# Patient Record
Sex: FEMALE | Race: WHITE | NOT HISPANIC OR LATINO | URBAN - METROPOLITAN AREA
[De-identification: names, ages, dates, MRNs, and addresses within clinical notes are randomized per-mention and may not be internally consistent; named-entity substitution may affect disease eponyms.]

---

## 2017-12-22 ENCOUNTER — MEDICAL CORRESPONDENCE (OUTPATIENT)
Dept: HEALTH INFORMATION MANAGEMENT | Facility: CLINIC | Age: 47
End: 2017-12-22

## 2018-01-16 ENCOUNTER — OFFICE VISIT (OUTPATIENT)
Dept: PSYCHIATRY | Facility: CLINIC | Age: 48
End: 2018-01-16
Payer: COMMERCIAL

## 2018-01-16 VITALS
HEIGHT: 62 IN | DIASTOLIC BLOOD PRESSURE: 87 MMHG | BODY MASS INDEX: 37.43 KG/M2 | WEIGHT: 203.4 LBS | RESPIRATION RATE: 16 BRPM | HEART RATE: 90 BPM | SYSTOLIC BLOOD PRESSURE: 149 MMHG

## 2018-01-16 DIAGNOSIS — F33.2 SEVERE EPISODE OF RECURRENT MAJOR DEPRESSIVE DISORDER, WITHOUT PSYCHOTIC FEATURES (H): Primary | ICD-10-CM

## 2018-01-16 ASSESSMENT — PATIENT HEALTH QUESTIONNAIRE - PHQ9: SUM OF ALL RESPONSES TO PHQ QUESTIONS 1-9: 23

## 2018-01-16 ASSESSMENT — PAIN SCALES - GENERAL: PAINLEVEL: NO PAIN (0)
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
## 2018-01-16 NOTE — MR AVS SNAPSHOT
"              After Visit Summary   2018    Aure Joy    MRN: [id number]           Patient Information     Date Of Birth          1970        Visit Information        Provider Department      2018 1:30 PM Evelyn Zamudio MD [facility]         Follow-ups after your visit        Who to contact     Please call your clinic at [phone] to:    Ask questions about your health    Make or cancel appointments    Discuss your medicines    Learn about your test results    Speak to your doctor   If you have compliments or concerns about an experience at your clinic, or if you wish to file a complaint, please contact [facility] Patient Relations at [phone] or email us at [email or web address]         Additional Information About Your Visit        MyChart Information     Partender is an electronic gateway that provides easy, online access to your medical records. With Partender, you can request a clinic appointment, read your test results, renew a prescription or communicate with your care team.     To sign up for reMailt visit the website at [email or web address]   You will be asked to enter the access code listed below, as well as some personal information. Please follow the directions to create your username and password.     Your access code is: [email or web address]  Expires: 2018  4:15 PM     Your access code will  in 90 days. If you need help or a new code, please contact your [facility] Clinic or call [phone] for assistance.        Care EveryWhere ID     This is your Care EveryWhere ID. This could be used by other organizations to access your [facility] medical records  [id number]        Your Vitals Were     Pulse Respirations Height BMI (Body Mass Index)          90 16 5' 2\" (157.5 cm) 37.2 kg/m2         Blood Pressure from Last 3 Encounters:   18 149/87    Weight from Last 3 Encounters:   18 " 203 lb 6.4 oz (92.3 kg)              Today, you had the following     No orders found for display       Primary Care Provider Office Phone # Fax #    Stephy Valentin [phone]       [facility] [address] [place] [address]        Equal Access to Services     [facility] Center[facility] : Hadii aad ku hadasho Soomaali, waaxda luqadaha, qaybta kaalmada adeegyada, waxay idiin hayaan adeeg kharash la'aan ah. So [facility] [phone].    ATENCIÓN: Si habla español, tiene a chamberlain disposición servicios gratuitos de asistencia lingüística. Llame al [phone].    We comply with applicable federal civil rights laws and [place] laws. We do not discriminate on the basis of race, color, national origin, age, disability, sex, sexual orientation, or gender identity.            Thank you!     Thank you for choosing [facility]  for your care. Our goal is always to provide you with excellent care. Hearing back from our patients is one way we can continue to improve our services. Please take a few minutes to complete the written survey that you may receive in the mail after your visit with us. Thank you!             Your Updated Medication List - Protect others around you: Learn how to safely use, store and throw away your medicines at www.disposemymeds.org.      Notice  As of 1/16/2018  4:15 PM    You have not been prescribed any medications.

## 2018-01-29 ENCOUNTER — OFFICE VISIT (OUTPATIENT)
Dept: PSYCHIATRY | Facility: CLINIC | Age: 48
End: 2018-01-29
Payer: COMMERCIAL

## 2018-01-29 VITALS
SYSTOLIC BLOOD PRESSURE: 127 MMHG | WEIGHT: 204.6 LBS | BODY MASS INDEX: 37.65 KG/M2 | HEART RATE: 86 BPM | TEMPERATURE: 98.6 F | RESPIRATION RATE: 16 BRPM | DIASTOLIC BLOOD PRESSURE: 76 MMHG | HEIGHT: 62 IN

## 2018-01-29 DIAGNOSIS — F33.2 SEVERE EPISODE OF RECURRENT MAJOR DEPRESSIVE DISORDER, WITHOUT PSYCHOTIC FEATURES (H): Primary | ICD-10-CM

## 2018-01-29 ASSESSMENT — PAIN SCALES - GENERAL: PAINLEVEL: MILD PAIN (3)

## 2018-01-29 NOTE — PROGRESS NOTES
" [facility] TMS [facility]  [address], [address]  [place] [address]  TMS Procedure Note   Aure Joy MRN# [id number]  Age: 47 year old year old YOB: 1970    Pre-Procedure:  History and Physical: Reviewed in medical record  Consent Signed by: Aure Joy  On: 01/29/18  Reviewed possible side effects associated with treatment as well as questions regarding possible course of treatments. Pt agreed to procedure and was able to reflect implications.    Clinical Narrative:  Pt endorses significant symptoms of depression including depressed mood, anhedonia, increased baseline anxiety, concentration difficulties, fatigue, amotivation, irrational guilt, self-derogatory thoughts, and passive suicidal ideation.    Indications for TMS:  MDD, recurrent, severe; 4+ medication trials (from 2+ classes) ineffective; Psychotherapy ineffective.     Pre-Procedure Diagnosis:  MDD, recurrent, severe 296.33    Treatment Hx:  Treatment number this series: 1  Total lifetime treatment number: 1    Allergies   Allergen Reactions     Codeine Nausea     Other  [No Clinical Screening - See Comments] Nausea and Vomiting and Other (See Comments)     general anesthesia- uncontrolled vomitting      /76 (BP Location: Right arm, Patient Position: Chair, Cuff Size: Adult Regular)  Pulse 86  Temp 98.6  F (37  C)  Resp 16  Ht 5' 1.81\" (157 cm)  Wt 204 lb 9.6 oz (92.8 kg)  BMI 37.65 kg/m2    Pause for the Cause  Right patient:  Yes  Right procedure/correct coil:  Yes; rTMS; cpt 58275; H1 coil.   Earplugs in place:  Yes    Procedure  Patient was seated in procedure chair.  Identity and procedure was verified.  Ear plugs were placed in ears and patient-specific cap was placed on head and tightened appropriately.  Ruler locations were verified.  Bone conducting headphones were not used. Coil was placed at 0 - eyebrow - 14 and stimulator was set to 69% (100% of MT). Pt was unable to tolerate initial train " secondary to pain over treatment site. Stimulator energy was decreased to 66% and then 64%. Pt was able to tolerate stimulator energy of 64% so 53 additional trains were delivered at that energy.  Pt tolerated procedure well with some minor facial movement but no other motor movement.    Motor Threshold Determination  Distance from nasion to inion: 35.5  MT 1: 0 - 6 - 16 @ 69% on 1/29/2018    Stimulation Parameters  Frequency: 18 Hz     Train duration: 2 sec  Total pulses delivered: 1980  Inter-train interval: 20 sec  Tx Loc: 0 - eyebrows  - 14  Energy: 64% (93% MT)  Trains: 55 trains    Psychiatric Examination  Appearance:  awake, alert, adequately groomed and appeared as age stated  Attitude:  cooperative  Eye Contact:  good  Mood:  anxious and depressed  Affect:  mood congruent, intensity is blunted, constricted mobility, restricted range and reactive  Speech:  clear, coherent and normal prosody  Psychomotor Behavior:  no evidence of tardive dyskinesia, dystonia, or tics and intact station, gait and muscle tone  Thought Process:  logical, linear and goal oriented  Associations:  no loose associations  Thought Content:  no evidence of psychotic thought and passive suicidal ideation present  Insight:  good  Judgment:  intact  Oriented to:  time, person, and place  Attention Span and Concentration:  intact  Recent and Remote Memory:  intact  Language: Able to name objects, Able to repeat phrases and Able to read and write  Fund of Knowledge: appropriate  Muscle Strength and Tone: normal  Gait and Station: Normal    Post-Procedure Diagnosis:  MDD, recurrent, severe 296.33    Plan   - Cont TMS  - Increase energy as tolerated     Evelyn Zamudio MD  [facility] Neuromodulation

## 2018-01-29 NOTE — MR AVS SNAPSHOT
After Visit Summary   1/29/2018    Aure Joy    MRN: [id number]           Patient Information     Date Of Birth          1970        Visit Information        Provider Department      1/29/2018 2:15 PM ME [facility] PROCEDURE ROOM [facility]        Today's Diagnoses     Severe episode of recurrent major depressive disorder, without psychotic features (H)    -  1       Follow-ups after your visit        Your next 10 appointments already scheduled     Jan 30, 2018  3:15 PM CST   TMS TREATMENT with ME UMP PROCEDURE ROOM   [facility] ([facility])    [address]  [facility] [address]   [phone]            Jan 31, 2018  3:15 PM CST   TMS TREATMENT with ME UMP PROCEDURE ROOM   [facility] ([facility])    [address]  [facility] [address]   [phone]            Feb 01, 2018  3:15 PM CST   TMS TREATMENT with ME UMP PROCEDURE ROOM   P Psychiatry ([facility])    [address]  [facility] [address]   [phone]            Feb 02, 2018  3:15 PM CST   TMS TREATMENT with ME UMP PROCEDURE ROOM   [facility] ([facility])    [address]  [facility] [address]   [phone]            Feb 05, 2018  3:15 PM CST   TMS TREATMENT with ME UMP PROCEDURE ROOM   [facility] ([facility])    [address]  [facility] [address]   [phone]            Feb 06, 2018  3:15 PM CST   TMS TREATMENT with ME UMP PROCEDURE ROOM   [facility] ([facility])    [address]  [facility] [address]   [phone]            Feb 07, 2018  3:15 PM CST   TMS TREATMENT with ME UMP PROCEDURE ROOM   P Psychiat[facility] ([facility])    [address]  [facility] [address]   [phone]            Feb 08, 2018  3:15 PM CST   TMS TREATMENT with ME UMP PROCEDURE ROOM   [facility]  Psychiatry ([facility])    [address]  [facility] [address]   [phone]            2018  3:15 PM CST   TMS TREATMENT with ME P PROCEDURE ROOM   [facility])    [address]  [facility] [address]   [phone]            2018  3:15 PM CST   TMS TREATMENT with ME P PROCEDURE ROOM   [facility])    [address]  [facility] [address]   [phone]              Who to contact     Please call your clinic at [phone] to:    Ask questions about your health    Make or cancel appointments    Discuss your medicines    Learn about your test results    Speak to your doctor   If you have compliments or concerns about an experience at your clinic, or if you wish to file a complaint, please contact [facility] Patient Relations at [phone] or email us at [email or web address]         Additional Information About Your Visit        [employer] Information     [employer] is an electronic gateway that provides easy, online access to your medical records. With [employer], you can request a clinic appointment, read your test results, renew a prescription or communicate with your care team.     To sign up for [employer] visit the website at [email or web address]   You will be asked to enter the access code listed below, as well as some personal information. Please follow the directions to create your username and password.     Your access code is: [email or web address]  Expires: 2018  4:15 PM     Your access code will  in 90 days. If you need help or a new code, please contact your [facility] or call [phone] for assistance.        Care EveryWhere ID     This is your Care EveryWhere ID. This could be used by other organizations to access your [place] medical records  [id number]        Your Vitals Were  "    Pulse Temperature Respirations Height BMI (Body Mass Index)       86 98.6  F (37  C) 16 5' 1.81\" (157 cm) 37.65 kg/m2        Blood Pressure from Last 3 Encounters:   01/29/18 127/76   01/16/18 149/87    Weight from Last 3 Encounters:   01/29/18 204 lb 9.6 oz (92.8 kg)   01/16/18 203 lb 6.4 oz (92.3 kg)              We Performed the Following     C TRANSCRANIAL MAGNETIC STIMULATION TREATMENT,PLANNING        Primary Care Provider Office Phone # Fax #    Stephy Valentin [phone]       [facility] [address]   [facility] [address]        Equal Access to Services     HERMANN WASHBURN : Hadii derrell christineo Sodev, waaxda luqadaha, qaybta kaalmada adeegyada, yordy elise. So [facility] [phone].    ATENCIÓN: Si habla español, tiene a chamberlain disposición servicios gratuitos de asistencia lingüística. Llame al [phone].    We comply with applicable federal civil rights laws and [place] laws. We do not discriminate on the basis of race, color, national origin, age, disability, sex, sexual orientation, or gender identity.            Thank you!     Thank you for choosing [facility]  for your care. Our goal is always to provide you with excellent care. Hearing back from our patients is one way we can continue to improve our services. Please take a few minutes to complete the written survey that you may receive in the mail after your visit with us. Thank you!             Your Updated Medication List - Protect others around you: Learn how to safely use, store and throw away your medicines at www.disposemymeds.org.      Notice  As of 1/29/2018  4:37 PM    You have not been prescribed any medications.      "

## 2018-01-30 ENCOUNTER — OFFICE VISIT (OUTPATIENT)
Dept: PSYCHIATRY | Facility: CLINIC | Age: 48
End: 2018-01-30
Payer: COMMERCIAL

## 2018-01-30 VITALS — SYSTOLIC BLOOD PRESSURE: 140 MMHG | DIASTOLIC BLOOD PRESSURE: 81 MMHG | HEART RATE: 94 BPM

## 2018-01-30 DIAGNOSIS — F33.2 SEVERE EPISODE OF RECURRENT MAJOR DEPRESSIVE DISORDER, WITHOUT PSYCHOTIC FEATURES (H): Primary | ICD-10-CM

## 2018-01-30 ASSESSMENT — PATIENT HEALTH QUESTIONNAIRE - PHQ9: SUM OF ALL RESPONSES TO PHQ QUESTIONS 1-9: 22

## 2018-01-30 NOTE — PROGRESS NOTES
[facility] TMS Program  [address]  [place] [address]  TMS Procedure Note   Aure Joy MRN# [id number]  Age: 47 year old year old YOB: 1970    Pre-Procedure:  History and Physical: Reviewed in medical record  Consent Signed by: Aure Joy  On: 01/29/18    Clinical Narrative:  Patient tolerated yesterdays treatment well.     Indications for TMS:  MDD, recurrent, severe; 4+ medication trials (from 2+ classes) ineffective; Psychotherapy ineffective.     Pre-Procedure Diagnosis:  MDD, recurrent, severe 296.33    Treatment Hx:  Treatment number this series: 2  Total lifetime treatment number: 2    Allergies   Allergen Reactions     Codeine Nausea     Other  [No Clinical Screening - See Comments] Nausea and Vomiting and Other (See Comments)     general anesthesia- uncontrolled vomitting      /81 (BP Location: Right arm, Patient Position: Chair, Cuff Size: Adult Regular)  Pulse 94    Pause for the Cause  Right patient:  Yes  Right procedure/correct coil:  Yes; rTMS; cpt 97666; H1 coil.   Earplugs in place:  Yes    Procedure  Patient was seated in procedure chair. Identity and procedure was verified. Ear plugs were placed in ears and patient-specific cap was placed on head and tightened appropriately. Ruler locations were verified. Coil was placed at treatment location and stimulator was set to parameters described below. A test train was delivered and pt tolerated train. Given pt tolerance, 55 treatment trains were delivered. Pt tolerated procedure well with some minor facial movement but no other motor movement.    Motor Threshold Determination  Distance from nasion to inion: 35.5  MT 1: 0 - 6 - 16 @ 69% on 1/29/2018    Stimulation Parameters  Frequency: 18 Hz     Train duration: 2 sec  Total pulses delivered: 1980  Inter-train interval: 20 sec  Tx Loc: 0 - eyebrows  - 14  Energy: 68% (98% MT)  Trains: 55 trains    Psychiatric Examination  Appearance:  awake, alert,  adequately groomed and appeared as age stated  Attitude:  cooperative  Eye Contact:  good  Mood:  anxious and depressed  Affect:  mood congruent, intensity is blunted, constricted mobility, restricted range and reactive  Speech:  clear, coherent and normal prosody  Psychomotor Behavior:  no evidence of tardive dyskinesia, dystonia, or tics and intact station, gait and muscle tone  Thought Process:  logical, linear and goal oriented  Associations:  no loose associations  Thought Content:  no evidence of psychotic thought and passive suicidal ideation present  Insight:  good  Judgment:  intact  Oriented to:  time, person, and place  Attention Span and Concentration:  intact  Recent and Remote Memory:  intact  Language: Able to name objects, Able to repeat phrases and Able to read and write  Fund of Knowledge: appropriate  Muscle Strength and Tone: normal  Gait and Station: Normal    Post-Procedure Diagnosis:  MDD, recurrent, severe 296.33    Pilo Almeida  [facility]    Plan   - Cont TMS  - Increase energy as tolerated       I didn t see the patient during/after treatment but remained available in the clinic during  treatment.    Evelyn Zamudio MD  [facility]      Evelyn Zamudio MD  [facility]

## 2018-01-30 NOTE — MR AVS SNAPSHOT
After Visit Summary   1/30/2018    Aure Joy    MRN: [id number]           Patient Information     Date Of Birth          1970        Visit Information        Provider Department      1/30/2018 3:15 PM ME UMP PROCEDURE ROOM [facility] Psychiatry        Today's Diagnoses     Severe episode of recurrent major depressive disorder, without psychotic features (H)    -  1       Follow-ups after your visit        Your next 10 appointments already scheduled     Jan 31, 2018  3:15 PM CST   TMS TREATMENT with ME UMP PROCEDURE ROOM   [facility] Psychiatry ([facility])    [address]  [facility] [address]   [phone]            Feb 01, 2018  3:15 PM CST   TMS TREATMENT with ME UMP PROCEDURE ROOM   [facility] ([facility])    [address]  [facility] [address]   [phone]            Feb 02, 2018  3:15 PM CST   TMS TREATMENT with ME UMP PROCEDURE ROOM   [facility] ([facility])    [address]  [facility] [address]   [phone]            Feb 05, 2018  3:15 PM CST   TMS TREATMENT with ME UMP PROCEDURE ROOM   [facility] ([facility])    [address]  [facility] [address]   [phone]            Feb 06, 2018  3:15 PM CST   TMS TREATMENT with ME UMP PROCEDURE ROOM   [facility] ([facility])    [address]  [facility] [address]   [phone]            Feb 07, 2018  3:15 PM CST   TMS TREATMENT with ME UMP PROCEDURE ROOM   [facility] ([facility])    [address]  [facility] [address]   [phone]            Feb 08, 2018  3:15 PM CST   TMS TREATMENT with ME UMP PROCEDURE ROOM   [facility] ([facility])    [address]  [facility] [address]   [phone]            Feb 09, 2018  3:15 PM CST   TMS TREATMENT with ME UMP PROCEDURE ROOM   [facility]  Psychiatry ([facility])    [address]  [facility] [address]   [phone]            2018  3:15 PM CST   TMS TREATMENT with ME P PROCEDURE ROOM   [facility] Psychiatry [facility])    [address]  [facility] [address]   [phone]            2018  3:15 PM CST   TMS TREATMENT with ME P PROCEDURE ROOM   [facility])    [address]  [facility] [address]   [phone]              Who to contact     Please call your clinic at [phone] to:    Ask questions about your health    Make or cancel appointments    Discuss your medicines    Learn about your test results    Speak to your doctor   If you have compliments or concerns about an experience at your clinic, or if you wish to file a complaint, please contact [facility] Patient Relations at [phone] or email us at [email or web address]         Additional Information About Your Visit        [employer] Information     [employer] is an electronic gateway that provides easy, online access to your medical records. With [employer], you can request a clinic appointment, read your test results, renew a prescription or communicate with your care team.     To sign up for [employer] visit the website at [email or web address]   You will be asked to enter the access code listed below, as well as some personal information. Please follow the directions to create your username and password.     Your access code is: [email or web address]  Expires: 2018  4:15 PM     Your access code will  in 90 days. If you need help or a new code, please contact your [facility] or call [phone] for assistance.        Care EveryWhere ID     This is your Care EveryWhere ID. This could be used by other organizations to access your [place] medical records  [id number]        Your Vitals Were      Pulse                   94            Blood Pressure from Last 3 Encounters:   01/30/18 140/81   01/29/18 127/76   01/16/18 149/87    Weight from Last 3 Encounters:   01/29/18 92.8 kg (204 lb 9.6 oz)   01/16/18 92.3 kg (203 lb 6.4 oz)              We Performed the Following     C TRANSCRANIAL MAGNETIC STIMULATION TREATMENT,DELIVERY/MANAGEMENT        Primary Care Provider Office Phone # Fax #    Stephy Valentin [phone]       [facility] [address]   [facility] [address]        Equal Access to Services     [facility]: Hadii aad ku hadasho Soomaali, waaxda luqadaha, qaybta kaalmada ademaribeth, yordy araujo . So [facility] [phone].    ATENCIÓN: Si habla español, tiene a chamberlain disposición servicios gratuitos de asistencia lingüística. [facility] [phone].    We comply with applicable federal civil rights laws and [place] laws. We do not discriminate on the basis of race, color, national origin, age, disability, sex, sexual orientation, or gender identity.            Thank you!     Thank you for choosing [facility]  for your care. Our goal is always to provide you with excellent care. Hearing back from our patients is one way we can continue to improve our services. Please take a few minutes to complete the written survey that you may receive in the mail after your visit with us. Thank you!             Your Updated Medication List - Protect others around you: Learn how to safely use, store and throw away your medicines at www.disposemymeds.org.      Notice  As of 1/30/2018  5:03 PM    You have not been prescribed any medications.

## 2018-01-31 ENCOUNTER — OFFICE VISIT (OUTPATIENT)
Dept: PSYCHIATRY | Facility: CLINIC | Age: 48
End: 2018-01-31
Payer: COMMERCIAL

## 2018-01-31 VITALS
DIASTOLIC BLOOD PRESSURE: 93 MMHG | HEART RATE: 99 BPM | BODY MASS INDEX: 37.54 KG/M2 | WEIGHT: 204 LBS | HEIGHT: 62 IN | SYSTOLIC BLOOD PRESSURE: 136 MMHG

## 2018-01-31 DIAGNOSIS — F33.2 SEVERE EPISODE OF RECURRENT MAJOR DEPRESSIVE DISORDER, WITHOUT PSYCHOTIC FEATURES (H): Primary | ICD-10-CM

## 2018-01-31 ASSESSMENT — PATIENT HEALTH QUESTIONNAIRE - PHQ9: SUM OF ALL RESPONSES TO PHQ QUESTIONS 1-9: 22

## 2018-01-31 ASSESSMENT — PAIN SCALES - GENERAL: PAINLEVEL: NO PAIN (0)

## 2018-01-31 NOTE — MR AVS SNAPSHOT
After Visit Summary   1/31/2018    Aure Joy    MRN: [id number]           Patient Information     Date Of Birth          1970        Visit Information        Provider Department      1/31/2018 3:15 PM ME UMP PROCEDURE ROOM [facility] Psychiatry        Today's Diagnoses     Severe episode of recurrent major depressive disorder, without psychotic features (H)    -  1       Follow-ups after your visit        Your next 10 appointments already scheduled     Feb 01, 2018  3:15 PM CST   TMS TREATMENT with ME UMP PROCEDURE ROOM   [facility] Psychiatry ([facility])    [address]  [facility] [address]   [phone]            Feb 02, 2018  3:15 PM CST   TMS TREATMENT with ME UMP PROCEDURE ROOM   [facility] Psychiatry ([facility])    [address]  [facility] [address]   [phone]            Feb 05, 2018  3:15 PM CST   TMS TREATMENT with ME UMP PROCEDURE ROOM   [facility] Psychiatry ([facility])    [address]  [facility] [address]   [phone]            Feb 06, 2018  3:15 PM CST   TMS TREATMENT with ME UMP PROCEDURE ROOM   [facility] Psychiatry ([facility])    [address]  [facility] [address]   [phone]            Feb 07, 2018  3:15 PM CST   TMS TREATMENT with ME UMP PROCEDURE ROOM   [facility] Psych[facility] ([facility])    [address]  [facility] [address]   [phone]            Feb 08, 2018  3:15 PM CST   TMS TREATMENT with ME UMP PROCEDURE ROOM   [facility] ([facility])    [address]  [facility] [address]   [phone]            Feb 09, 2018  3:15 PM CST   TMS TREATMENT with ME UMP PROCEDURE ROOM   [facility] ([facility])    [address]  [facility] [address]   [phone]            Feb 12, 2018  3:15 PM CST   TMS TREATMENT with ME UMP PROCEDURE ROOM   [facility]  Psychiatry ([facility])    [address]  [facility] [address]   [phone]            2018  3:15 PM CST   TMS TREATMENT with ME P PROCEDURE ROOM   [facility])    [address]  [facility] [address]   [phone]            2018  3:15 PM CST   TMS TREATMENT with ME P PROCEDURE ROOM   [facility] Psychiat[facility])    [address]  [facility] [address]   [phone]              Who to contact     Please call your clinic at [phone] to:    Ask questions about your health    Make or cancel appointments    Discuss your medicines    Learn about your test results    Speak to your doctor   If you have compliments or concerns about an experience at your clinic, or if you wish to file a complaint, please contact [facility] Patient Relations at [phone] or email us at [email or web address]         Additional Information About Your Visit        Book[employer] Information     [employer] is an electronic gateway that provides easy, online access to your medical records. With [employer], you can request a clinic appointment, read your test results, renew a prescription or communicate with your care team.     To sign up for [employer] visit the website at [email or web address]   You will be asked to enter the access code listed below, as well as some personal information. Please follow the directions to create your username and password.     Your access code is: [email or web address]  Expires: 2018  4:15 PM     Your access code will  in 90 days. If you need help or a new code, please contact your [facility] or call [phone] for assistance.        Care EveryWhere ID     This is your Care EveryWhere ID. This could be used by other organizations to access your [place] medical records  [id number]        Your Vitals Were  "    Pulse Height Breastfeeding? BMI (Body Mass Index)          99 5' 1.81\" (157 cm) No 37.54 kg/m2         Blood Pressure from Last 3 Encounters:   01/31/18 (!) 136/93   01/30/18 140/81   01/29/18 127/76    Weight from Last 3 Encounters:   01/31/18 204 lb (92.5 kg)   01/29/18 204 lb 9.6 oz (92.8 kg)   01/16/18 203 lb 6.4 oz (92.3 kg)              We Performed the Following     C TRANSCRANIAL MAGNETIC STIMULATION TREATMENT,DELIVERY/MANAGEMENT        Primary Care Provider Office Phone # Fax #    Stephy Valentin [phone]       [facility] [address]   [facility] [address]        Equal Access to Services     HERMANN WASHBURN : González christineo Sodev, waaxda luqadaha, qaybta kaalmada adeegyada, yordy tejadain haydaniel araujo . So [facility] [phone].    ATENCIÓN: Si habla español, tiene a chamberlain disposición servicios gratuitos de asistencia lingüística. Llame al [phone].    We comply with applicable federal civil rights laws and [place] laws. We do not discriminate on the basis of race, color, national origin, age, disability, sex, sexual orientation, or gender identity.            Thank you!     Thank you for choosing [facility] PSYCHIATRY  for your care. Our goal is always to provide you with excellent care. Hearing back from our patients is one way we can continue to improve our services. Please take a few minutes to complete the written survey that you may receive in the mail after your visit with us. Thank you!             Your Updated Medication List - Protect others around you: Learn how to safely use, store and throw away your medicines at www.disposemymeds.org.      Notice  As of 1/31/2018  4:25 PM    You have not been prescribed any medications.      "

## 2018-01-31 NOTE — PROGRESS NOTES
[facility] TMS Program  [address], [address]  [place] [address]  TMS Procedure Note   Aure Joy MRN# [id number]  Age: 47 year old year old YOB: 1970    Pre-Procedure:  History and Physical: Reviewed in medical record  Consent Signed by: Aure Joy  On: 01/29/18    Clinical Narrative:  Patient tolerating treatment well with no side effects.     Indications for TMS:  MDD, recurrent, severe; 4+ medication trials (from 2+ classes) ineffective; Psychotherapy ineffective.     Pre-Procedure Diagnosis:  MDD, recurrent, severe 296.33    Treatment Hx:  Treatment number this series: 3  Total lifetime treatment number: 3    Allergies   Allergen Reactions     Codeine Nausea     Other  [No Clinical Screening - See Comments] Nausea and Vomiting and Other (See Comments)     general anesthesia- uncontrolled vomitting      There were no vitals taken for this visit.    Pause for the Cause  Right patient:  Yes  Right procedure/correct coil:  Yes; rTMS; cpt 11699; H1 coil.   Earplugs in place:  Yes    Procedure  Patient was seated in procedure chair. Identity and procedure was verified. Ear plugs were placed in ears and patient-specific cap was placed on head and tightened appropriately. Ruler locations were verified. Coil was placed at treatment location and stimulator was set to parameters described below. A test train was delivered and pt tolerated train. Given pt tolerance, 55 treatment trains were delivered. Pt tolerated procedure well with some minor facial movement but no other motor movement.    Motor Threshold Determination  Distance from nasion to inion: 35.5  MT 1: 0 - 6 - 16 @ 69% on 1/29/2018    Stimulation Parameters  Frequency: 18 Hz     Train duration: 2 sec  Total pulses delivered: 1980  Inter-train interval: 20 sec  Tx Loc: 0 - eyebrows  - 14  Energy: 71% (103% MT)  Trains: 55 trains    Psychiatric Examination  Appearance:  awake, alert, adequately groomed and appeared as age  stated  Attitude:  cooperative  Eye Contact:  good  Mood:  anxious and depressed  Affect:  mood congruent, intensity is blunted, constricted mobility, restricted range and reactive  Speech:  clear, coherent and normal prosody  Psychomotor Behavior:  no evidence of tardive dyskinesia, dystonia, or tics and intact station, gait and muscle tone  Thought Process:  logical, linear and goal oriented  Associations:  no loose associations  Thought Content:  no evidence of psychotic thought and passive suicidal ideation present  Insight:  good  Judgment:  intact  Oriented to:  time, person, and place  Attention Span and Concentration:  intact  Recent and Remote Memory:  intact  Language: Able to name objects, Able to repeat phrases and Able to read and write  Fund of Knowledge: appropriate  Muscle Strength and Tone: normal  Gait and Station: Normal    Post-Procedure Diagnosis:  MDD, recurrent, severe 296.33    Pilo Almeida  [facility]    Plan   - Cont TMS  - Increase energy as tolerated       I didn t see the patient during/after treatment but remained available in the clinic during  treatment.    NELSY Ordonez CNP  [facility]

## 2018-02-01 ENCOUNTER — OFFICE VISIT (OUTPATIENT)
Dept: PSYCHIATRY | Facility: CLINIC | Age: 48
End: 2018-02-01
Payer: COMMERCIAL

## 2018-02-01 VITALS — HEART RATE: 88 BPM | DIASTOLIC BLOOD PRESSURE: 98 MMHG | SYSTOLIC BLOOD PRESSURE: 147 MMHG

## 2018-02-01 DIAGNOSIS — F33.2 SEVERE EPISODE OF RECURRENT MAJOR DEPRESSIVE DISORDER, WITHOUT PSYCHOTIC FEATURES (H): Primary | ICD-10-CM

## 2018-02-01 ASSESSMENT — PATIENT HEALTH QUESTIONNAIRE - PHQ9: SUM OF ALL RESPONSES TO PHQ QUESTIONS 1-9: 21

## 2018-02-01 NOTE — PROGRESS NOTES
[facility] TMS Program  [address], [address]  [place] [address]  TMS Procedure Note   Aure Joy MRN# [id number]  Age: 47 year old year old YOB: 1970    Pre-Procedure:  History and Physical: Reviewed in medical record  Consent Signed by: Aure Joy  On: 01/29/18    Clinical Narrative:  Patient tolerating treatment well with no side effects.     Indications for TMS:  MDD, recurrent, severe; 4+ medication trials (from 2+ classes) ineffective; Psychotherapy ineffective.     Pre-Procedure Diagnosis:  MDD, recurrent, severe F33.2    Treatment Hx:  Treatment number this series: 4  Total lifetime treatment number: 4    Allergies   Allergen Reactions     Codeine Nausea     Other  [No Clinical Screening - See Comments] Nausea and Vomiting and Other (See Comments)     general anesthesia- uncontrolled vomitting      BP (!) 147/98 (BP Location: Right arm, Patient Position: Chair, Cuff Size: Adult Regular)  Pulse 88    Pause for the Cause  Right patient:  Yes  Right procedure/correct coil:  Yes; rTMS; cpt 34960; H1 coil.   Earplugs in place:  Yes    Procedure  Patient was seated in procedure chair. Identity and procedure was verified. Ear plugs were placed in ears and patient-specific cap was placed on head and tightened appropriately. Ruler locations were verified. Coil was placed at treatment location and stimulator was set to parameters described below. A test train was delivered and pt tolerated train. Given pt tolerance, 55 treatment trains were delivered. Pt tolerated procedure well with some minor facial movement and mild hand movement.    Motor Threshold Determination  Distance from nasion to inion: 35.5  MT 1: 0 - 6 - 16 @ 69% on 1/29/2018    Stimulation Parameters  Frequency: 18 Hz     Train duration: 2 sec  Total pulses delivered: 1980  Inter-train interval: 20 sec  Tx Loc: 0 - eyebrows  - 14  Energy: 75% (108% MT)  Trains: 55 trains          Post-Procedure Diagnosis:  MDD,  recurrent, severe F33.2    Irma Marley  [facility]    Plan   - Cont TMS  - Increase energy as tolerated       Ms. Joy described feeling excited and optimistic about her treatment. She said that her anhedonia was improving as well as her motivation. She described having more energy and interest in personal organization, a practice she was previous adept at as an . Her headache and general discomfort from the treatment has decreased. She is still having some back and neck stiffness. On exam she was well groomed with a bright, reactive affect. Plan to continue rTMS as planned.    He Chaudhry MD PhD  Psychiatry Resident    Attestation:  I, Evelyn Zamudio MD,  have personally performed an examination of this patient on February 1, 2018 and I have reviewed the resident's documentation.  I have edited the note to reflect all relevant changes. I agree with the resident findings and plan in this resident note.  I have reviewed all vitals and laboratory findings.        Evelyn Zamudio MD  [facility]

## 2018-02-01 NOTE — MR AVS SNAPSHOT
After Visit Summary   2/1/2018    Aure Joy    MRN: [id number]           Patient Information     Date Of Birth          1970        Visit Information        Provider Department      2/1/2018 3:15 PM ME [facility] PROCEDURE ROOM [facility] Psychiatry        Today's Diagnoses     Severe episode of recurrent major depressive disorder, without psychotic features (H)    -  1       Follow-ups after your visit        Your next 10 appointments already scheduled     Feb 05, 2018  3:15 PM CST   TMS TREATMENT with ME [facility] PROCEDURE ROOM   [facility] Psychiatry ([facility])    [address]  [facility] [address]   [phone]            Feb 06, 2018  3:15 PM CST   TMS TREATMENT with ME [facility]MP PROCEDURE ROOM   [facility] ([facility])    [address]  [facility] [address]   [phone]            Feb 07, 2018  3:15 PM CST   TMS TREATMENT with ME [facility]MP PROCEDURE ROOM   [facility] ([facility])    [address]  [facility] [address]   [phone]            Feb 08, 2018  3:15 PM CST   TMS TREATMENT with ME UMP PROCEDURE ROOM   [facility] ([facility])    [address]  [facility] [address]   [phone]            Feb 09, 2018  3:15 PM CST   TMS TREATMENT with ME [facility]MP PROCEDURE ROOM   [facility] ([facility])    [address]  [facility] [address]   [phone]            Feb 12, 2018  3:15 PM CST   TMS TREATMENT with ME [facility] PROCEDURE ROOM   [facility] ([facility])    [address]  [facility] [address]   [phone]            Feb 13, 2018  3:15 PM CST   TMS TREATMENT with ME [facility] PROCEDURE ROOM   [facility] ([facility])    [address]  [facility] [address]   [phone]            Feb 14, 2018  3:15 PM CST   TMS TREATMENT with ME [facility]MP PROCEDURE ROOM   [facility]  Psychiatry ([facility])    [address]  [facility] [place] [address]   [phone]            Feb 15, 2018  3:15 PM CST   TMS TREATMENT with ME P PROCEDURE ROOM   [facility] Psychiatry [facility])    [address]  [facility] [place] [address]   [phone]            2018  3:15 PM CST   TMS TREATMENT with ME P PROCEDURE ROOM   [facility] Psychiatry [facility])    [address]  [facility] [place] [address]   [phone]              Who to contact     Please call your clinic at [phone] to:    Ask questions about your health    Make or cancel appointments    Discuss your medicines    Learn about your test results    Speak to your doctor   If you have compliments or concerns about an experience at your clinic, or if you wish to file a complaint, please contact [facility] Patient Relations at [phone] or email us at [email or web address]         Additional Information About Your Visit        Digital Caddies Information     [employer] is an electronic gateway that provides easy, online access to your medical records. With [employer] Caddies, you can request a clinic appointment, read your test results, renew a prescription or communicate with your care team.     To sign up for [employer] visit the website at [email or web address]   You will be asked to enter the access code listed below, as well as some personal information. Please follow the directions to create your username and password.     Your access code is: [email or web address]  Expires: 2018  4:15 PM     Your access code will  in 90 days. If you need help or a new code, please contact your [facility] Clinic or call [phone] for assistance.        Care EveryWhere ID     This is your Care EveryWhere ID. This could be used by other organizations to access your [place] medical records  [id number]        Your Vitals Were      Pulse                   88            Blood Pressure from Last 3 Encounters:   02/02/18 127/77   02/01/18 (!) 147/98   01/31/18 (!) 136/93    Weight from Last 3 Encounters:   02/02/18 92.5 kg (204 lb)   01/31/18 92.5 kg (204 lb)   01/29/18 92.8 kg (204 lb 9.6 oz)              We Performed the Following     C TRANSCRANIAL MAGNETIC STIMULATION TREATMENT,DELIVERY/MANAGEMENT        Primary Care Provider Office Phone # Fax #    Stephy Valentin [phone]       [facility] [address] [place]   [place] [address]        Equal Access to Services     [facility]: Hadii derrell Mcclure, waelenda caridad, qaybta kaalmada amos, yordy elise. So [facility] [phone].    ATENCIÓN: Si habla español, tiene a chamberlain disposición servicios gratuitos de asistencia lingüística. [facility] [phone].    We comply with applicable federal civil rights laws and [place] laws. We do not discriminate on the basis of race, color, national origin, age, disability, sex, sexual orientation, or gender identity.            Thank you!     Thank you for choosing [facility] PSYCHIATRY  for your care. Our goal is always to provide you with excellent care. Hearing back from our patients is one way we can continue to improve our services. Please take a few minutes to complete the written survey that you may receive in the mail after your visit with us. Thank you!             Your Updated Medication List - Protect others around you: Learn how to safely use, store and throw away your medicines at www.disposemymeds.org.      Notice  As of 2/1/2018 11:59 PM    You have not been prescribed any medications.

## 2018-02-02 ENCOUNTER — OFFICE VISIT (OUTPATIENT)
Dept: PSYCHIATRY | Facility: CLINIC | Age: 48
End: 2018-02-02
Payer: COMMERCIAL

## 2018-02-02 VITALS
SYSTOLIC BLOOD PRESSURE: 127 MMHG | DIASTOLIC BLOOD PRESSURE: 77 MMHG | HEART RATE: 82 BPM | HEIGHT: 62 IN | WEIGHT: 204 LBS | BODY MASS INDEX: 37.54 KG/M2

## 2018-02-02 DIAGNOSIS — F33.2 SEVERE EPISODE OF RECURRENT MAJOR DEPRESSIVE DISORDER, WITHOUT PSYCHOTIC FEATURES (H): Primary | ICD-10-CM

## 2018-02-02 ASSESSMENT — PATIENT HEALTH QUESTIONNAIRE - PHQ9: SUM OF ALL RESPONSES TO PHQ QUESTIONS 1-9: 19

## 2018-02-02 NOTE — PROGRESS NOTES
" [facility] TMS Program  [address], [address]  [place] [address]  TMS Procedure Note   Aure Joy MRN# [id number]  Age: 47 year old year old YOB: 1970    Pre-Procedure:  History and Physical: Reviewed in medical record  Consent Signed by: Aure Joy  On: 01/29/18    Clinical Narrative:  Patient tolerating treatment well with no side effects. IDSSR Completed today=44    Indications for TMS:  MDD, recurrent, severe; 4+ medication trials (from 2+ classes) ineffective; Psychotherapy ineffective.     Pre-Procedure Diagnosis:  MDD, recurrent, severe F33.2    Treatment Hx:  Treatment number this series: 5  Total lifetime treatment number: 5    Allergies   Allergen Reactions     Codeine Nausea     Other  [No Clinical Screening - See Comments] Nausea and Vomiting and Other (See Comments)     general anesthesia- uncontrolled vomitting      /77 (BP Location: Right arm, Patient Position: Chair, Cuff Size: Adult Large)  Pulse 82  Ht 1.57 m (5' 1.81\")  Wt 92.5 kg (204 lb)  Breastfeeding? No  BMI 37.54 kg/m2    Pause for the Cause  Right patient:  Yes  Right procedure/correct coil:  Yes; rTMS; cpt 46870; H1 coil.   Earplugs in place:  Yes    Procedure  Patient was seated in procedure chair. Identity and procedure was verified. Ear plugs were placed in ears and patient-specific cap was placed on head and tightened appropriately. Ruler locations were verified. Coil was placed at treatment location and stimulator was set to parameters described below. A test train was delivered and pt tolerated train. Given pt tolerance, 55 treatment trains were delivered. Pt tolerated procedure well with some minor facial movement and mild hand movement.    Motor Threshold Determination  Distance from nasion to inion: 35.5  MT 1: 0 - 6 - 16 @ 69% on 1/29/2018    Stimulation Parameters  Frequency: 18 Hz     Train duration: 2 sec  Total pulses delivered: 1980  Inter-train interval: 20 sec  Tx Loc: 0 - " eyebrows  - 14  Energy: 78% (113% MT)  Trains: 55 trains          Post-Procedure Diagnosis:  MDD, recurrent, severe F33.2    Puneet Jane LPN  [facility]    Plan   - Cont TMS  - Increase energy as tolerated     I didn t see the patient during/after treatment but remained available in the clinic during  treatment.    Evelyn Zamudio MD  [facility]

## 2018-02-02 NOTE — MR AVS SNAPSHOT
After Visit Summary   2/2/2018    Aure Joy    MRN: [id number]           Patient Information     Date Of Birth          1970        Visit Information        Provider Department      2/2/2018 3:15 PM ME [facility] PROCEDURE ROOM [facility] Psychiatry        Today's Diagnoses     Severe episode of recurrent major depressive disorder, without psychotic features (H)    -  1       Follow-ups after your visit        Your next 10 appointments already scheduled     Feb 05, 2018  3:15 PM CST   TMS TREATMENT with ME [facility] PROCEDURE ROOM   [facility] Psychiatry ([facility])    [address]  [facility] [address]   [phone]            Feb 06, 2018  3:15 PM CST   TMS TREATMENT with ME UMP PROCEDURE ROOM   [facility] ([facility])    [address]  [facility] [address]   [phone]            Feb 07, 2018  3:15 PM CST   TMS TREATMENT with ME [facility]MP PROCEDURE ROOM   [facility] ([facility])    [address]  [facility] [address]   [phone]            Feb 08, 2018  3:15 PM CST   TMS TREATMENT with ME UMP PROCEDURE ROOM   [facility] ([facility])    [address]  [facility] [address]   [phone]            Feb 09, 2018  3:15 PM CST   TMS TREATMENT with ME UMP PROCEDURE ROOM   [facility] ([facility])    [address]  [facility] [address]   [phone]            Feb 12, 2018  3:15 PM CST   TMS TREATMENT with ME UMP PROCEDURE ROOM   [facility] Psych[facility] ([facility])    [address]  [facility] [address]   [phone]            Feb 13, 2018  3:15 PM CST   TMS TREATMENT with ME [facility]MP PROCEDURE ROOM   [facility] ([facility])    [address]  [facility] [address]   [phone]            Feb 14, 2018  3:15 PM CST   TMS TREATMENT with ME UMP PROCEDURE ROOM   [facility]  Psychiatry ([facility])    [address]  [facility] [address]   [phone]            Feb 15, 2018  3:15 PM CST   TMS TREATMENT with ME P PROCEDURE ROOM   [facility] Psychiatry [facility])    [address]  [facility] [address]   [phone]            2018  3:15 PM CST   TMS TREATMENT with ME P PROCEDURE ROOM   [facility] Psychiatry [facility])    [address]  [facility] [address]   [phone]              Who to contact     Please call your clinic at [phone] to:    Ask questions about your health    Make or cancel appointments    Discuss your medicines    Learn about your test results    Speak to your doctor   If you have compliments or concerns about an experience at your clinic, or if you wish to file a complaint, please contact [facility] Patient Relations at [phone] or email us at [email or web address]         Additional Information About Your Visit        [employer] Information     [employer] is an electronic gateway that provides easy, online access to your medical records. With [employer], you can request a clinic appointment, read your test results, renew a prescription or communicate with your care team.     To sign up for [employer] visit the website at [email or web address]   You will be asked to enter the access code listed below, as well as some personal information. Please follow the directions to create your username and password.     Your access code is: [email or web address]  Expires: 2018  4:15 PM     Your access code will  in 90 days. If you need help or a new code, please contact your [facility] Clinic or call [phone] for assistance.        Care EveryWhere ID     This is your Care EveryWhere ID. This could be used by other organizations to access your [place] medical records  [id number]        Your Vitals Were  "    Pulse Height Breastfeeding? BMI (Body Mass Index)          82 5' 1.81\" (157 cm) No 37.54 kg/m2         Blood Pressure from Last 3 Encounters:   02/02/18 127/77   02/01/18 (!) 147/98   01/31/18 (!) 136/93    Weight from Last 3 Encounters:   02/02/18 204 lb (92.5 kg)   01/31/18 204 lb (92.5 kg)   01/29/18 204 lb 9.6 oz (92.8 kg)              We Performed the Following     C TRANSCRANIAL MAGNETIC STIMULATION TREATMENT,DELIVERY/MANAGEMENT        Primary Care Provider Office Phone # Fax #    Stephy Valentin [phone]       [facility] [address] [place] [facility] [address]        Equal Access to Services     HERMANN [place] : González christineo Sodev, waaxda luqadaha, qaybta kaalmada adeegyada, yordy araujo . So [facility] [phone].    ATENCIÓN: Si habla español, tiene a chamberlain disposición servicios gratuitos de asistencia lingüística. Llame al [phone].    We comply with applicable federal civil rights laws and [place] laws. We do not discriminate on the basis of race, color, national origin, age, disability, sex, sexual orientation, or gender identity.            Thank you!     Thank you for choosing [facility] PSYCHIATRY  for your care. Our goal is always to provide you with excellent care. Hearing back from our patients is one way we can continue to improve our services. Please take a few minutes to complete the written survey that you may receive in the mail after your visit with us. Thank you!             Your Updated Medication List - Protect others around you: Learn how to safely use, store and throw away your medicines at www.disposemymeds.org.      Notice  As of 2/2/2018  3:48 PM    You have not been prescribed any medications.      "

## 2018-02-03 ASSESSMENT — PATIENT HEALTH QUESTIONNAIRE - PHQ9: SUM OF ALL RESPONSES TO PHQ QUESTIONS 1-9: 19

## 2018-02-05 ENCOUNTER — OFFICE VISIT (OUTPATIENT)
Dept: PSYCHIATRY | Facility: CLINIC | Age: 48
End: 2018-02-05
Payer: COMMERCIAL

## 2018-02-05 VITALS
HEART RATE: 85 BPM | DIASTOLIC BLOOD PRESSURE: 91 MMHG | WEIGHT: 203.8 LBS | BODY MASS INDEX: 37.5 KG/M2 | SYSTOLIC BLOOD PRESSURE: 142 MMHG

## 2018-02-05 DIAGNOSIS — F33.2 SEVERE EPISODE OF RECURRENT MAJOR DEPRESSIVE DISORDER, WITHOUT PSYCHOTIC FEATURES (H): Primary | ICD-10-CM

## 2018-02-05 NOTE — MR AVS SNAPSHOT
After Visit Summary   2/5/2018    Aure Joy    MRN: [id number]           Patient Information     Date Of Birth          1970        Visit Information        Provider Department      2/5/2018 3:15 PM ME UMP PROCEDURE ROOM [facility] Psych[facility]        Today's Diagnoses     Severe episode of recurrent major depressive disorder, without psychotic features (H)    -  1       Follow-ups after your visit        Your next 10 appointments already scheduled     Feb 06, 2018  3:15 PM CST   TMS TREATMENT with ME UMP PROCEDURE ROOM   [facility] Psychiatry ([facility])    [address]  [facility] [address]   [phone]            Feb 07, 2018  3:15 PM CST   TMS TREATMENT with ME UMP PROCEDURE ROOM   [facility] ([facility])    [address]  [facility] [address]   [phone]            Feb 08, 2018  3:15 PM CST   TMS TREATMENT with ME UMP PROCEDURE ROOM   [facility] ([facility])    [address]  [facility] [address]   [phone]            Feb 09, 2018  3:15 PM CST   TMS TREATMENT with ME UMP PROCEDURE ROOM   [facility] ([facility])    [address]  [facility] [address]   [phone]            Feb 12, 2018  3:15 PM CST   TMS TREATMENT with ME UMP PROCEDURE ROOM   [facility] ([facility])    [address]  [facility] [address]   [phone]            Feb 13, 2018  3:15 PM CST   TMS TREATMENT with ME UMP PROCEDURE ROOM   [facility] ([facility])    [address]  [facility] [address]   [phone]            Feb 14, 2018  3:15 PM CST   TMS TREATMENT with ME UMP PROCEDURE ROOM   [facility] ([facility])    [address]  [facility] [address]   [phone]            Feb 15, 2018  3:15 PM CST   TMS TREATMENT with ME UMP PROCEDURE ROOM   [facility]  Psychiatry ([facility])    [address]  [facility] [address]   [phone]            2018  3:15 PM CST   TMS TREATMENT with ME P PROCEDURE ROOM   [facility] Psychiatry [facility])    [address]  [facility] [address]   [phone]            2018  3:15 PM CST   TMS TREATMENT with ME P PROCEDURE ROOM   [facility] Psychiatry [facility])    [address]  [facility] [address]   [phone]              Who to contact     Please call your clinic at [phone] to:    Ask questions about your health    Make or cancel appointments    Discuss your medicines    Learn about your test results    Speak to your doctor   If you have compliments or concerns about an experience at your clinic, or if you wish to file a complaint, please contact [facility] Patient Relations at [phone] or email us at [email or web address]         Additional Information About Your Visit        Leartieste [employer] Information     [employer] is an electronic gateway that provides easy, online access to your medical records. With [employer], you can request a clinic appointment, read your test results, renew a prescription or communicate with your care team.     To sign up for [employer] visit the website at [email or web address]   You will be asked to enter the access code listed below, as well as some personal information. Please follow the directions to create your username and password.     Your access code is: [email or web address]  Expires: 2018  4:15 PM     Your access code will  in 90 days. If you need help or a new code, please contact your [facility] or call [phone] for assistance.        Care EveryWhere ID     This is your Care EveryWhere ID. This could be used by other organizations to access your [place] medical records  [id number]        Your Vitals Were      Pulse BMI (Body Mass Index)                85 37.5 kg/m2           Blood Pressure from Last 3 Encounters:   02/05/18 (!) 142/91   02/02/18 127/77   02/01/18 (!) 147/98    Weight from Last 3 Encounters:   02/05/18 203 lb 12.8 oz (92.4 kg)   02/02/18 204 lb (92.5 kg)   01/31/18 204 lb (92.5 kg)              We Performed the Following     C TRANSCRANIAL MAGNETIC STIMULATION TREATMENT,DELIVERY/MANAGEMENT        Primary Care Provider Office Phone # Fax #    Stephy Valentin [phone]       [facility] [address]   [facility] [address]        Equal Access to Services     HERMANN WASHBURN : González Mcclure, waelenda caridad, qareynaldota kaalmada amos, yordy elise. So [facility] [phone].    ATENCIÓN: Si habla español, tiene a chamberlain disposición servicios gratuitos de asistencia lingüística. Llame al [phone].    We comply with applicable federal civil rights laws and [place] laws. We do not discriminate on the basis of race, color, national origin, age, disability, sex, sexual orientation, or gender identity.            Thank you!     Thank you for choosing [facility] PSYCHIATRY  for your care. Our goal is always to provide you with excellent care. Hearing back from our patients is one way we can continue to improve our services. Please take a few minutes to complete the written survey that you may receive in the mail after your visit with us. Thank you!             Your Updated Medication List - Protect others around you: Learn how to safely use, store and throw away your medicines at www.disposemymeds.org.      Notice  As of 2/5/2018  4:09 PM    You have not been prescribed any medications.

## 2018-02-05 NOTE — PROGRESS NOTES
[facility] TMS Program  [address]  [place] [address]  TMS Procedure Note   Aure Joy MRN# [id number]  Age: 47 year old year old YOB: 1970    Pre-Procedure:  History and Physical: Reviewed in medical record  Consent Signed by: Aure Joy  On: 01/29/18    Clinical Narrative:  Patient tolerating treatment well with no side effects.     Indications for TMS:  MDD, recurrent, severe; 4+ medication trials (from 2+ classes) ineffective; Psychotherapy ineffective.     Pre-Procedure Diagnosis:  MDD, recurrent, severe F33.2    Treatment Hx:  Treatment number this series: 6  Total lifetime treatment number: 6    Allergies   Allergen Reactions     Codeine Nausea     Other  [No Clinical Screening - See Comments] Nausea and Vomiting and Other (See Comments)     general anesthesia- uncontrolled vomitting      BP (!) 142/91 (BP Location: Right arm, Patient Position: Chair, Cuff Size: Adult Regular)  Pulse 85  Wt 92.4 kg (203 lb 12.8 oz)  BMI 37.5 kg/m2    Pause for the Cause  Right patient:  Yes  Right procedure/correct coil:  Yes; rTMS; cpt 79272; H1 coil.   Earplugs in place:  Yes    Procedure  Patient was seated in procedure chair. Identity and procedure was verified. Ear plugs were placed in ears and patient-specific cap was placed on head and tightened appropriately. Ruler locations were verified. Coil was placed at treatment location and stimulator was set to parameters described below. A test train was delivered and pt tolerated train. Given pt tolerance, 55 treatment trains were delivered. Pt tolerated procedure well with some minor facial movement and mild hand movement.    Motor Threshold Determination  Distance from nasion to inion: 35.5  MT 1: 0 - 6 - 16 @ 69% on 1/29/2018    Stimulation Parameters  Frequency: 18 Hz     Train duration: 2 sec  Total pulses delivered: 1980  Inter-train interval: 20 sec  Tx Loc: 0 - eyebrows  - 14  Energy: 81% (118% MT)  Trains: 55  trains      Post-Procedure Diagnosis:  MDD, recurrent, severe F33.2    Pilo Almeida  [facility]od[facility]    Plan   - Cont TMS  - Increase energy as tolerated   - ReMT Tuesday    I didn t see the patient during/after treatment but remained available in the clinic during  treatment.    NELSY Ordonez CNP  [facility]od[facility]

## 2018-02-06 ENCOUNTER — OFFICE VISIT (OUTPATIENT)
Dept: PSYCHIATRY | Facility: CLINIC | Age: 48
End: 2018-02-06
Payer: COMMERCIAL

## 2018-02-06 VITALS
WEIGHT: 203 LBS | DIASTOLIC BLOOD PRESSURE: 75 MMHG | SYSTOLIC BLOOD PRESSURE: 146 MMHG | BODY MASS INDEX: 37.36 KG/M2 | HEART RATE: 98 BPM

## 2018-02-06 DIAGNOSIS — F33.2 SEVERE EPISODE OF RECURRENT MAJOR DEPRESSIVE DISORDER, WITHOUT PSYCHOTIC FEATURES (H): Primary | ICD-10-CM

## 2018-02-06 ASSESSMENT — PATIENT HEALTH QUESTIONNAIRE - PHQ9: SUM OF ALL RESPONSES TO PHQ QUESTIONS 1-9: 18

## 2018-02-06 NOTE — MR AVS SNAPSHOT
After Visit Summary   2/6/2018    Aure Joy    MRN: [id number]           Patient Information     Date Of Birth          1970        Visit Information        Provider Department      2/6/2018 3:15 PM ME UMP PROCEDURE ROOM [facility] Psychiatry        Today's Diagnoses     Severe episode of recurrent major depressive disorder, without psychotic features (H)    -  1       Follow-ups after your visit        Your next 10 appointments already scheduled     Feb 07, 2018  3:15 PM CST   TMS TREATMENT with ME UMP PROCEDURE ROOM   [facility] Psych[facility] ([facility])    [address]  [facility] [address]   [phone]            Feb 08, 2018  3:15 PM CST   TMS TREATMENT with ME UMP PROCEDURE ROOM   [facility] ([facility])    [address]  [facility] [address]   [phone]            Feb 09, 2018  3:15 PM CST   TMS TREATMENT with ME UMP PROCEDURE ROOM   [facility] ([facility])    [address]  [facility] [address]   [phone]            Feb 12, 2018  3:15 PM CST   TMS TREATMENT with ME UMP PROCEDURE ROOM   [facility] ([facility])    [address]  [facility] [address]   [phone]            Feb 13, 2018  3:15 PM CST   TMS TREATMENT with ME UMP PROCEDURE ROOM   [facility] ([facility])    [address]  [facility] [address]   [phone]            Feb 14, 2018  3:15 PM CST   TMS TREATMENT with ME UMP PROCEDURE ROOM   [facility] ([facility])    [address]  [facility] [address]   [phone]            Feb 15, 2018  3:15 PM CST   TMS TREATMENT with ME UMP PROCEDURE ROOM   [facility] ([facility])    [address]  [facility] [address]   [phone]            Feb 16, 2018  3:15 PM CST   TMS TREATMENT with ME UMP PROCEDURE ROOM   [facility]  Psychiatry ([facility])    [address]  [facility] [address]   [phone]            2018  3:15 PM CST   TMS TREATMENT with ME P PROCEDURE ROOM   [facility] Psychiatry [facility])    [address]  [facility] [address]   [phone]            2018  3:15 PM CST   TMS TREATMENT with ME P PROCEDURE ROOM   [facility] Psychiatry [facility])    [address]  [facility] [address]   [phone]              Who to contact     Please call your clinic at [phone] to:    Ask questions about your health    Make or cancel appointments    Discuss your medicines    Learn about your test results    Speak to your doctor   If you have compliments or concerns about an experience at your clinic, or if you wish to file a complaint, please contact [facility] Patient Relations at [phone] or email us at [email or web address]         Additional Information About Your Visit        Razoom Information     Razoom is an electronic gateway that provides easy, online access to your medical records. With Razoom, you can request a clinic appointment, read your test results, renew a prescription or communicate with your care team.     To sign up for Razoom visit the website at [email or web address]   You will be asked to enter the access code listed below, as well as some personal information. Please follow the directions to create your username and password.     Your access code is: [email or web address]  Expires: 2018  4:15 PM     Your access code will  in 90 days. If you need help or a new code, please contact your [facility] or call [phone] for assistance.        Care EveryWhere ID     This is your Care EveryWhere ID. This could be used by other organizations to access your [place] medical records  [id number]        Your Vitals Were      Pulse BMI (Body Mass Index)                98 37.36 kg/m2           Blood Pressure from Last 3 Encounters:   02/06/18 146/75   02/05/18 (!) 142/91   02/02/18 127/77    Weight from Last 3 Encounters:   02/06/18 203 lb (92.1 kg)   02/05/18 203 lb 12.8 oz (92.4 kg)   02/02/18 204 lb (92.5 kg)              We Performed the Following     C REPET TMS TX SUBSEQ MOTR THRESHLD W/DELIV & MNGT        Primary Care Provider Office Phone # Fax #    Stephy Valentin [phone]       [facility] [address]   [facility] [address]        Equal Access to Services     HERMANN WASHBURN : Hadii aad ku hadasho Soomaali, waaxda luqadaha, qaybta kaalmada adeegyada, yordy tejadain haydaniel araujo . So [facility] [phone].    ATENCIÓN: Si habla español, tiene a chamberlain disposición servicios gratuitos de asistencia lingüística. Llame al [phone].    We comply with applicable federal civil rights laws and [place] laws. We do not discriminate on the basis of race, color, national origin, age, disability, sex, sexual orientation, or gender identity.            Thank you!     Thank you for choosing [facility] PSYCHIATRY  for your care. Our goal is always to provide you with excellent care. Hearing back from our patients is one way we can continue to improve our services. Please take a few minutes to complete the written survey that you may receive in the mail after your visit with us. Thank you!             Your Updated Medication List - Protect others around you: Learn how to safely use, store and throw away your medicines at www.disposemymeds.org.      Notice  As of 2/6/2018  3:55 PM    You have not been prescribed any medications.

## 2018-02-06 NOTE — PROGRESS NOTES
[facility] TMS [facility]  [address]  [place] [address]  TMS Procedure Note   Aure Joy MRN# [id number]  Age: 47 year old year old YOB: 1970    Pre-Procedure:  History and Physical: Reviewed in medical record  Consent Signed by: Aure Joy  On: 01/29/18    Clinical Narrative:  Patient tolerating treatment well with no side effects.     Indications for TMS:  MDD, recurrent, severe; 4+ medication trials (from 2+ classes) ineffective; Psychotherapy ineffective.     Pre-Procedure Diagnosis:  MDD, recurrent, severe F33.2    Treatment Hx:  Treatment number this series: 7  Total lifetime treatment number: 7    Allergies   Allergen Reactions     Codeine Nausea     Other  [No Clinical Screening - See Comments] Nausea and Vomiting and Other (See Comments)     general anesthesia- uncontrolled vomitting      There were no vitals taken for this visit.    Pause for the Cause  Right patient:  Yes  Right procedure/correct coil:  Yes; rTMS; cpt 09244; H1 coil.   Earplugs in place:  Yes    Procedure  Patient was seated in procedure chair. Identity and procedure was verified. Ear plugs were placed in ears and patient-specific cap was placed on head and tightened appropriately. Ruler locations were verified. Coil was placed at initial MT location and stimulator was set to initial MT. MT was retested and found as described below. Coil was placed at treatment location and stimulator was set to stimulation parameters detailed below. A test train was delivered and pt tolerated train fine. Given pt tolerance, 55 trains were delivered. Pt tolerated procedure well with some minor facial and hand movement.     Motor Threshold Determination  Distance from nasion to inion: 35.5  MT 1: 0 - 6 - 16 @ 69% on 1/29/2018  MT 2: 0 - 6 - 16 @ 69% on 2/6/2018       Stimulation Parameters  Frequency: 18 Hz     Train duration: 2 sec  Total pulses delivered: 1980  Inter-train interval: 20 sec  Tx Loc: 0 -  eyebrows  - 14  Energy: 83% (120% MT)  Trains: 55 trains      Post-Procedure Diagnosis:  MDD, recurrent, severe F33.2    Pilo Almeida  [facility]od[facility]    Plan   - Cont TMS    I didn t see the patient during/after treatment but remained available in the clinic during  treatment.    ESTELA Zamudio MD  [facility]

## 2018-02-07 ENCOUNTER — OFFICE VISIT (OUTPATIENT)
Dept: PSYCHIATRY | Facility: CLINIC | Age: 48
End: 2018-02-07
Payer: COMMERCIAL

## 2018-02-07 VITALS
WEIGHT: 203 LBS | SYSTOLIC BLOOD PRESSURE: 132 MMHG | HEART RATE: 87 BPM | BODY MASS INDEX: 37.36 KG/M2 | DIASTOLIC BLOOD PRESSURE: 85 MMHG

## 2018-02-07 DIAGNOSIS — F33.2 SEVERE EPISODE OF RECURRENT MAJOR DEPRESSIVE DISORDER, WITHOUT PSYCHOTIC FEATURES (H): Primary | ICD-10-CM

## 2018-02-07 ASSESSMENT — PATIENT HEALTH QUESTIONNAIRE - PHQ9: SUM OF ALL RESPONSES TO PHQ QUESTIONS 1-9: 17

## 2018-02-07 NOTE — MR AVS SNAPSHOT
After Visit Summary   2/7/2018    Aure Joy    MRN: [id number]           Patient Information     Date Of Birth          1970        Visit Information        Provider Department      2/7/2018 3:15 PM ME UMP PROCEDURE ROOM [facility] Psychiatry        Today's Diagnoses     Severe episode of recurrent major depressive disorder, without psychotic features (H)    -  1       Follow-ups after your visit        Your next 10 appointments already scheduled     Feb 08, 2018  3:15 PM CST   TMS TREATMENT with ME UMP PROCEDURE ROOM   [facility] Psychiat[facility] ([facility])    [address]  [facility] [address]   [phone]            Feb 09, 2018  3:15 PM CST   TMS TREATMENT with ME UMP PROCEDURE ROOM   [facility] ([facility])    [address]  [facility] [address]   [phone]            Feb 12, 2018  3:15 PM CST   TMS TREATMENT with ME UMP PROCEDURE ROOM   [facility] ([facility])    [address]  [facility] [address]   [phone]            Feb 13, 2018  3:15 PM CST   TMS TREATMENT with ME UMP PROCEDURE ROOM   [facility] ([facility])    [address]  [facility] [address]   [phone]            Feb 14, 2018  3:15 PM CST   TMS TREATMENT with ME UMP PROCEDURE ROOM   [facility] ([facility])    [address]  [facility] [address]   [phone]            Feb 15, 2018  3:15 PM CST   TMS TREATMENT with ME UMP PROCEDURE ROOM   [facility] ([facility])    [address]  [facility] [address]   [phone]            Feb 16, 2018  3:15 PM CST   TMS TREATMENT with ME UMP PROCEDURE ROOM   [facility] ([facility])    [address]  [facility] [address]   [phone]            Feb 19, 2018  3:15 PM CST   TMS TREATMENT with ME UMP PROCEDURE ROOM   [facility]  Psychiatry ([facility])    [address]  [facility] [address]   [phone]            2018  3:15 PM CST   TMS TREATMENT with ME P PROCEDURE ROOM   [facility] Psychiatry [facility])    [address]  [facility] [address]   [phone]            2018  3:15 PM CST   TMS TREATMENT with ME P PROCEDURE ROOM   [facility] Psychiatry [facility])    [address]  [facility] [address]   [phone]              Who to contact     Please call your clinic at [phone] to:    Ask questions about your health    Make or cancel appointments    Discuss your medicines    Learn about your test results    Speak to your doctor   If you have compliments or concerns about an experience at your clinic, or if you wish to file a complaint, please contact [facility] Patient Relations at [phone] or email us at [email or web address]         Additional Information About Your Visit        Datalogix Information     Datalogix is an electronic gateway that provides easy, online access to your medical records. With Datalogix, you can request a clinic appointment, read your test results, renew a prescription or communicate with your care team.     To sign up for Datalogix visit the website at [email or web address]   You will be asked to enter the access code listed below, as well as some personal information. Please follow the directions to create your username and password.     Your access code is: [email or web address]  Expires: 2018  4:15 PM     Your access code will  in 90 days. If you need help or a new code, please contact your [facility] or call [phone] for assistance.        Care EveryWhere ID     This is your Care EveryWhere ID. This could be used by other organizations to access your [place] medical records  [id number]        Your Vitals Were      Pulse BMI (Body Mass Index)                87 37.36 kg/m2           Blood Pressure from Last 3 Encounters:   02/07/18 132/85   02/06/18 146/75   02/05/18 (!) 142/91    Weight from Last 3 Encounters:   02/07/18 92.1 kg (203 lb)   02/06/18 92.1 kg (203 lb)   02/05/18 92.4 kg (203 lb 12.8 oz)              We Performed the Following     C TRANSCRANIAL MAGNETIC STIMULATION TREATMENT,DELIVERY/MANAGEMENT        Primary Care Provider Office Phone # Fax #    Stephy Valentin [phone]       [facility] [address]   [facility] [address]        Equal Access to Services     HERMANN WASHBURN : Hadii derrell Mcclure, warichard mclean, sindi kaalmada amos, yordy elise. So [facility] [place] [phone].    ATENCIÓN: Si habla español, tiene a chamberlain disposición servicios gratuitos de asistencia lingüística. Llame al [phone].    We comply with applicable federal civil rights laws and [place] laws. We do not discriminate on the basis of race, color, national origin, age, disability, sex, sexual orientation, or gender identity.            Thank you!     Thank you for choosing [facility] PSYCHIATRY  for your care. Our goal is always to provide you with excellent care. Hearing back from our patients is one way we can continue to improve our services. Please take a few minutes to complete the written survey that you may receive in the mail after your visit with us. Thank you!             Your Updated Medication List - Protect others around you: Learn how to safely use, store and throw away your medicines at www.disposemymeds.org.      Notice  As of 2/7/2018  4:38 PM    You have not been prescribed any medications.

## 2018-02-07 NOTE — PROGRESS NOTES
[facility] TMS Program  [address], [address]  [place] [address]  TMS Procedure Note   Aure Joy MRN# [id number]  Age: 47 year old year old YOB: 1970    Pre-Procedure:  History and Physical: Reviewed in medical record  Consent Signed by: Aure Joy  On: 01/29/18    Clinical Narrative:  Patient tolerating treatment well with no side effects.     Indications for TMS:  MDD, recurrent, severe; 4+ medication trials (from 2+ classes) ineffective; Psychotherapy ineffective.     Pre-Procedure Diagnosis:  MDD, recurrent, severe F33.2    Treatment Hx:  Treatment number this series: 8  Total lifetime treatment number: 8    Allergies   Allergen Reactions     Codeine Nausea     Other  [No Clinical Screening - See Comments] Nausea and Vomiting and Other (See Comments)     general anesthesia- uncontrolled vomitting      /85 (BP Location: Left arm, Patient Position: Sitting, Cuff Size: Adult Regular)  Pulse 87  Wt 92.1 kg (203 lb)  BMI 37.36 kg/m2    Pause for the Cause  Right patient:  Yes  Right procedure/correct coil:  Yes; rTMS; cpt 09772; H1 coil.   Earplugs in place:  Yes    Procedure  Patient was seated in procedure chair. Identity and procedure was verified. Ear plugs were placed in ears and patient-specific cap was placed on head and tightened appropriately. Ruler locations were verified. Coil was placed at treatment location and stimulator was set to parameters described below. A test train was delivered and pt tolerated train. Given pt tolerance, 55 treatment trains were delivered. Pt tolerated procedure well with some minor facial and hand movement.    Motor Threshold Determination  Distance from nasion to inion: 35.5  MT 1: 0 - 6 - 16 @ 69% on 1/29/2018  MT 2: 0 - 6 - 16 @ 69% on 2/6/2018       Stimulation Parameters  Frequency: 18 Hz     Train duration: 2 sec  Total pulses delivered: 1980  Inter-train interval: 20 sec  Tx Loc: 0 - eyebrows  - 14  Energy: 83% (120%  MT)  Trains: 55 trains      Post-Procedure Diagnosis:  MDD, recurrent, severe F33.2    Pilo Almeida  [facility]    Plan   - Cont TMS    I didn t see the patient during/after treatment but remained available in the clinic during  treatment.    NELSY Ordonez CNP  [facility]

## 2018-02-08 ENCOUNTER — OFFICE VISIT (OUTPATIENT)
Dept: PSYCHIATRY | Facility: CLINIC | Age: 48
End: 2018-02-08
Payer: COMMERCIAL

## 2018-02-08 VITALS — SYSTOLIC BLOOD PRESSURE: 138 MMHG | HEART RATE: 82 BPM | DIASTOLIC BLOOD PRESSURE: 85 MMHG

## 2018-02-08 DIAGNOSIS — F33.2 SEVERE EPISODE OF RECURRENT MAJOR DEPRESSIVE DISORDER, WITHOUT PSYCHOTIC FEATURES (H): Primary | ICD-10-CM

## 2018-02-08 ASSESSMENT — PATIENT HEALTH QUESTIONNAIRE - PHQ9: SUM OF ALL RESPONSES TO PHQ QUESTIONS 1-9: 16

## 2018-02-08 NOTE — MR AVS SNAPSHOT
After Visit Summary   2/8/2018    Aure Joy    MRN: [id number]           Patient Information     Date Of Birth          1970        Visit Information        Provider Department      2/8/2018 3:15 PM ME [facility] PROCEDURE ROOM [facility]        Today's Diagnoses     Severe episode of recurrent major depressive disorder, without psychotic features (H)    -  1       Follow-ups after your visit        Your next 10 appointments already scheduled     Feb 09, 2018  3:15 PM CST   TMS TREATMENT with ME [facility] PROCEDURE ROOM   [facility] ([facility])    [address]  [facility] [address]   [phone]            Feb 12, 2018  3:15 PM CST   TMS TREATMENT with ME [facility] PROCEDURE ROOM   [facility] ([facility])    [address]  [facility] [address]   [phone]            Feb 13, 2018  3:15 PM CST   TMS TREATMENT with ME [facility] PROCEDURE ROOM   [facility] ([facility])    [address]  [facility] [address]   [phone]            Feb 14, 2018  3:15 PM CST   TMS TREATMENT with ME [facility] PROCEDURE ROOM   [facility] ([facility])    [address]  [facility] [address]   [phone]            Feb 15, 2018  3:15 PM CST   TMS TREATMENT with ME [facility] PROCEDURE ROOM   [facility] ([facility])    [address]  [facility] [address]   [phone]            Feb 16, 2018  3:15 PM CST   TMS TREATMENT with ME [facility] PROCEDURE ROOM   [facility] ([facility])    [address]  [facility] [address]   [phone]            Feb 19, 2018  3:15 PM CST   TMS TREATMENT with ME [facility] PROCEDURE ROOM   [facility] ([facility])    [address]  [facility] [address]   [phone]            Feb 20, 2018  3:15 PM CST   TMS TREATMENT with ME [facility] PROCEDURE ROOM   [facility]  Psychiatry ([facility])    [address]  [facility] [place] [address]   [phone]            2018  3:15 PM CST   TMS TREATMENT with ME [email or web address] PROCEDURE [facility]   [email or web address] Psychiatry ([facility])    [address]  [facility] [place] [address]   [phone]            2018  3:15 PM CST   TMS TREATMENT with ME P PROCEDURE ROOM   [email or web address] Psychiatry [facility])    [address]  [facility] [place] [address]   [phone]              Who to contact     Please call your clinic at [phone] to:    Ask questions about your health    Make or cancel appointments    Discuss your medicines    Learn about your test results    Speak to your doctor            Additional Information About Your Visit        Minilogshart Information     HQ plus is an electronic gateway that provides easy, online access to your medical records. With HQ plus, you can request a clinic appointment, read your test results, renew a prescription or communicate with your care team.     To sign up for HQ plus visit the website at [email or web address]   You will be asked to enter the access code listed below, as well as some personal information. Please follow the directions to create your username and password.     Your access code is: [email or web address]  Expires: 2018  4:15 PM     Your access code will  in 90 days. If you need help or a new code, please contact your [facility] or call [phone] for assistance.        Care EveryWhere ID     This is your Care EveryWhere ID. This could be used by other organizations to access your [place] medical records  [id number]        Your Vitals Were     Pulse                   82            Blood Pressure from Last 3 Encounters:   18 138/85   18 132/85   18 146/75    Weight from Last 3 Encounters:   18 203 lb (92.1 kg)   18 203 lb (92.1 kg)   18 203  lb 12.8 oz (92.4 kg)              We Performed the Following     C TRANSCRANIAL MAGNETIC STIMULATION TREATMENT,DELIVERY/MANAGEMENT        Primary Care Provider Office Phone # Fax #    Stephy Valentin [phone]       [facility] [address] [place] [address]        Equal Access to Services     HERMANN [place] : Hadii aad ku hadasho Soomaali, waaxda luqadaha, qaybta kaalmada adeegyada, waxay idiin hayaan adeeg khvioletashukri lamayan ah. So [facility] [phone].    ATENCIÓN: Si habla español, tiene a chamberlain disposición servicios gratuitos de asistencia lingüística. Llame al [phone].    We comply with applicable federal civil rights laws and [place] laws. We do not discriminate on the basis of race, color, national origin, age, disability, sex, sexual orientation, or gender identity.            Thank you!     Thank you for choosing [facility]  for your care. Our goal is always to provide you with excellent care. Hearing back from our patients is one way we can continue to improve our services. Please take a few minutes to complete the written survey that you may receive in the mail after your visit with us. Thank you!             Your Updated Medication List - Protect others around you: Learn how to safely use, store and throw away your medicines at www.disposemymeds.org.      Notice  As of 2/8/2018  4:18 PM    You have not been prescribed any medications.

## 2018-02-08 NOTE — PROGRESS NOTES
[facility] TMS Program  [address], [address]  [place] [address]  TMS Procedure Note   Aure Joy MRN# [id number]  Age: 47 year old year old YOB: 1970    Pre-Procedure:  History and Physical: Reviewed in medical record  Consent Signed by: Aure Joy  On: 01/29/18    Clinical Narrative:  Patient tolerating treatment well with no side effects.     Indications for TMS:  MDD, recurrent, severe; 4+ medication trials (from 2+ classes) ineffective; Psychotherapy ineffective.     Pre-Procedure Diagnosis:  MDD, recurrent, severe F33.2    Treatment Hx:  Treatment number this series: 9  Total lifetime treatment number: 9    Allergies   Allergen Reactions     Codeine Nausea     Other  [No Clinical Screening - See Comments] Nausea and Vomiting and Other (See Comments)     general anesthesia- uncontrolled vomitting      /85 (BP Location: Right arm, Patient Position: Chair, Cuff Size: Adult Regular)  Pulse 82    Pause for the Cause  Right patient:  Yes  Right procedure/correct coil:  Yes; rTMS; cpt 58194; H1 coil.   Earplugs in place:  Yes    Procedure  Patient was seated in procedure chair. Identity and procedure was verified. Ear plugs were placed in ears and patient-specific cap was placed on head and tightened appropriately. Ruler locations were verified. Coil was placed at treatment location and stimulator was set to parameters described below. A test train was delivered and pt tolerated train. Given pt tolerance, 55 treatment trains were delivered. Pt tolerated procedure well with some minor facial and hand movement.    Motor Threshold Determination  Distance from nasion to inion: 35.5  MT 1: 0 - 6 - 16 @ 69% on 1/29/2018  MT 2: 0 - 6 - 16 @ 69% on 2/6/2018       Stimulation Parameters  Frequency: 18 Hz     Train duration: 2 sec  Total pulses delivered: 1980  Inter-train interval: 20 sec  Tx Loc: 0 - eyebrows  - 14  Energy: 83% (120% MT)  Trains: 55 trains      Post-Procedure  Diagnosis:  MDD, recurrent, severe F33.2    Pilo Almeida  [facility]    Plan   - Cont TMS    I did see the patient during/after treatment and remained available in the clinic during  Treatment. Pt reports ongoing good response to treatment with TMS. Although had substantial improvement in mood after first week, notes that improvement has plateaued some. Discussed this provider's observation that improvement with TMS is not linear but rather follows waxing waning course with general trend towards improvement in individuals that respond to treatment. Validated and encouraged pt's ongoing work in therapy. Plan for her to complete an assessment with TMS therapist, Dr. Maldonado, next week. Recommend that she have regular follow-up with Dr. Maldonado over the course of TMS treatment with goal of gaining greater skill in engaging/implementing behavioral activation strategies to augment response to TMS.    I spent 20 minutes with this patient, greater than 50% of which was spent on counseling.      ESTELA Zamudio MD  [facility]

## 2018-02-09 ENCOUNTER — OFFICE VISIT (OUTPATIENT)
Dept: PSYCHIATRY | Facility: CLINIC | Age: 48
End: 2018-02-09
Payer: COMMERCIAL

## 2018-02-09 VITALS
WEIGHT: 203 LBS | HEART RATE: 86 BPM | SYSTOLIC BLOOD PRESSURE: 136 MMHG | BODY MASS INDEX: 37.36 KG/M2 | DIASTOLIC BLOOD PRESSURE: 85 MMHG

## 2018-02-09 DIAGNOSIS — F33.2 SEVERE EPISODE OF RECURRENT MAJOR DEPRESSIVE DISORDER, WITHOUT PSYCHOTIC FEATURES (H): Primary | ICD-10-CM

## 2018-02-09 ASSESSMENT — PATIENT HEALTH QUESTIONNAIRE - PHQ9: SUM OF ALL RESPONSES TO PHQ QUESTIONS 1-9: 16

## 2018-02-09 NOTE — MR AVS SNAPSHOT
After Visit Summary   2/9/2018    Aure Joy    MRN: [id number]           Patient Information     Date Of Birth          1970        Visit Information        Provider Department      2/9/2018 3:15 PM ME [facility] PROCEDURE ROOM [facility]        Today's Diagnoses     Severe episode of recurrent major depressive disorder, without psychotic features (H)    -  1       Follow-ups after your visit        Your next 10 appointments already scheduled     Feb 14, 2018  3:15 PM CST   TMS TREATMENT with ME [facility] PROCEDURE ROOM   [facility]iat[facility] ([facility])    [address]  [facility] [address]   [phone]            Feb 15, 2018  2:00 PM CST   Adult Therapy Eval with Brenda Maldonado, PhD   [facility] ([facility])    [address]  [facility] [address]   [phone]            Feb 15, 2018  3:15 PM CST   TMS TREATMENT with [facility] PROCEDURE ROOM   [facility] Psychiat[facility] ([facility])    [address]  [facility] [address]   [phone]            Feb 16, 2018  3:15 PM CST   TMS TREATMENT with [facility] PROCEDURE ROOM   [facility] Psychiat[facility] ([facility])    [address]  [facility] [address]   [phone]            Feb 19, 2018  3:15 PM CST   TMS TREATMENT with [facility] PROCEDURE ROOM   [facility] ([facility])    [address]  [facility] [address]   [phone]            Feb 20, 2018  3:15 PM CST   [facility]   [facility] ([facility])    [address]  [facility] [address]   [phone]            Feb 21, 2018  3:15 PM CST   TMS TREATMENT with [facility] PROCEDURE ROOM   [facility] ([facility])    [address]  [facility] [address]   [phone]            Feb 22, 2018  3:15 PM CST   TMS TREATMENT with [facility] PROCEDURE ROOM    [facility] Psychiatry ([facility])    [address]  [facility] [address]   [phone]            2018  3:15 PM CST   TMS TREATMENT with ME [facility] PROCEDURE ROOM   [facility]    [address]  [facility] [address]   [phone]            2018  3:15 PM CST   TMS TREATMENT with ME [facility] PROCEDURE ROOM   [facility]    [address]  [facility] [address]   [phone]              Who to contact     Please call your clinic at [phone] to:    Ask questions about your health    Make or cancel appointments    Discuss your medicines    Learn about your test results    Speak to your doctor            Additional Information About Your Visit        Cellwitchhart Information     Wallix is an electronic gateway that provides easy, online access to your medical records. With Wallix, you can request a clinic appointment, read your test results, renew a prescription or communicate with your care team.     To sign up for Wallix visit the website at [email or web address]   You will be asked to enter the access code listed below, as well as some personal information. Please follow the directions to create your username and password.     Your access code is: [email or web address]  Expires: 2018  4:15 PM     Your access code will  in 90 days. If you need help or a new code, please contact your [facility] or call [phone] for assistance.        Care EveryWhere ID     This is your Care EveryWhere ID. This could be used by other organizations to access your [place] medical records  [id number]        Your Vitals Were     Pulse BMI (Body Mass Index)                86 37.36 kg/m2           Blood Pressure from Last 3 Encounters:   18 (!) 142/106   18 129/72   18 136/85    Weight from Last 3 Encounters:   18 92.1 kg (203 lb)    02/09/18 92.1 kg (203 lb)   02/07/18 92.1 kg (203 lb)              We Performed the Following     C TRANSCRANIAL MAGNETIC STIMULATION TREATMENT,DELIVERY/MANAGEMENT        Primary Care Provider Office Phone # Fax #    Stephy Valentin [phone]       [facility] [address] [address]        Equal Access to Services     [facility]: Hadii aad ku hadasho Soomaali, waaxda luqadaha, qaybta kaalmada adeegyada, waxay idiin hayaan adeeg kharash la'aan ah. So [facility] [phone].    ATENCIÓN: Si habla español, tiene a chamberlain disposición servicios gratuitos de asistencia lingüística. Tamara al [phone].    We comply with applicable federal civil rights laws and [place] laws. We do not discriminate on the basis of race, color, national origin, age, disability, sex, sexual orientation, or gender identity.            Thank you!     Thank you for choosing [facility]  for your care. Our goal is always to provide you with excellent care. Hearing back from our patients is one way we can continue to improve our services. Please take a few minutes to complete the written survey that you may receive in the mail after your visit with us. Thank you!             Your Updated Medication List - Protect others around you: Learn how to safely use, store and throw away your medicines at www.disposemymeds.org.      Notice  As of 2/9/2018 11:59 PM    You have not been prescribed any medications.

## 2018-02-09 NOTE — PROGRESS NOTES
[facility] TMS Program  [address], [address]  [place] [address]  TMS Procedure Note   Aure Joy MRN# [id number]  Age: 47 year old year old YOB: 1970    Pre-Procedure:  History and Physical: Reviewed in medical record  Consent Signed by: Aure Joy  On: 01/29/18    Clinical Narrative:  Patient tolerating treatment well with no side effects.  IDSSR=36    Indications for TMS:  MDD, recurrent, severe; 4+ medication trials (from 2+ classes) ineffective; Psychotherapy ineffective.     Pre-Procedure Diagnosis:  MDD, recurrent, severe F33.2    Treatment Hx:  Treatment number this series: 10  Total lifetime treatment number: 10    Allergies   Allergen Reactions     Codeine Nausea     Other  [No Clinical Screening - See Comments] Nausea and Vomiting and Other (See Comments)     general anesthesia- uncontrolled vomitting      /85 (BP Location: Left arm, Patient Position: Sitting, Cuff Size: Adult Regular)  Pulse 86  Wt 92.1 kg (203 lb)  BMI 37.36 kg/m2    Pause for the Cause  Right patient:  Yes  Right procedure/correct coil:  Yes; rTMS; cpt 02631; H1 coil.   Earplugs in place:  Yes    Procedure  Patient was seated in procedure chair. Identity and procedure was verified. Ear plugs were placed in ears and patient-specific cap was placed on head and tightened appropriately. Ruler locations were verified. Coil was placed at treatment location and stimulator was set to parameters described below. A test train was delivered and pt tolerated train. Given pt tolerance, 55 treatment trains were delivered. Pt tolerated procedure well with some minor facial and hand movement.    Motor Threshold Determination  Distance from nasion to inion: 35.5  MT 1: 0 - 6 - 16 @ 69% on 1/29/2018  MT 2: 0 - 6 - 16 @ 69% on 2/6/2018       Stimulation Parameters  Frequency: 18 Hz     Train duration: 2 sec  Total pulses delivered: 1980  Inter-train interval: 20 sec  Tx Loc: 0 - eyebrows  - 14  Energy: 83%  (120% MT)  Trains: 55 trains      Post-Procedure Diagnosis:  MDD, recurrent, severe F33.2    Jael Alexandra RN   [facility]    Plan   - Cont TMS    I didn t see the patient during/after treatment but remained available in the clinic during  treatment.    ESTELA Zamudio MD  [facility]

## 2018-02-10 ASSESSMENT — PATIENT HEALTH QUESTIONNAIRE - PHQ9: SUM OF ALL RESPONSES TO PHQ QUESTIONS 1-9: 16

## 2018-02-12 ENCOUNTER — OFFICE VISIT (OUTPATIENT)
Dept: PSYCHIATRY | Facility: CLINIC | Age: 48
End: 2018-02-12
Payer: COMMERCIAL

## 2018-02-12 VITALS
WEIGHT: 203 LBS | HEART RATE: 95 BPM | BODY MASS INDEX: 37.36 KG/M2 | SYSTOLIC BLOOD PRESSURE: 129 MMHG | DIASTOLIC BLOOD PRESSURE: 72 MMHG

## 2018-02-12 DIAGNOSIS — F33.2 SEVERE EPISODE OF RECURRENT MAJOR DEPRESSIVE DISORDER, WITHOUT PSYCHOTIC FEATURES (H): Primary | ICD-10-CM

## 2018-02-12 NOTE — PROGRESS NOTES
[facility] TMS Program  [address]  [place] [address]  TMS Procedure Note   Aure Joy MRN# [id number]  Age: 47 year old year old YOB: 1970    Pre-Procedure:  History and Physical: Reviewed in medical record  Consent Signed by: Aure Joy  On: 01/29/18    Clinical Narrative:  Patient tolerating treatment well with no side effects.  IDSSR=36    Indications for TMS:  MDD, recurrent, severe; 4+ medication trials (from 2+ classes) ineffective; Psychotherapy ineffective.     Pre-Procedure Diagnosis:  MDD, recurrent, severe F33.2    Treatment Hx:  Treatment number this series: 11  Total lifetime treatment number: 11    Allergies   Allergen Reactions     Codeine Nausea     Other  [No Clinical Screening - See Comments] Nausea and Vomiting and Other (See Comments)     general anesthesia- uncontrolled vomitting      /72 (BP Location: Left arm, Patient Position: Sitting)  Pulse 95  Wt 92.1 kg (203 lb)  BMI 37.36 kg/m2    Pause for the Cause  Right patient:  Yes  Right procedure/correct coil:  Yes; rTMS; cpt 83810; H1 coil.   Earplugs in place:  Yes    Procedure  Patient was seated in procedure chair. Identity and procedure was verified. Ear plugs were placed in ears and patient-specific cap was placed on head and tightened appropriately. Ruler locations were verified. Coil was placed at treatment location and stimulator was set to parameters described below. A test train was delivered and pt tolerated train. Given pt tolerance, 55 treatment trains were delivered. Pt tolerated procedure well with some minor facial and hand movement.    Motor Threshold Determination  Distance from nasion to inion: 35.5  MT 1: 0 - 6 - 16 @ 69% on 1/29/2018  MT 2: 0 - 6 - 16 @ 69% on 2/6/2018       Stimulation Parameters  Frequency: 18 Hz     Train duration: 2 sec  Total pulses delivered: 1980  Inter-train interval: 20 sec  Tx Loc: 0 - eyebrows  - 14  Energy: 83% (120% MT)  Trains: 55  trains      Post-Procedure Diagnosis:  MDD, recurrent, severe F33.2    Jael Alexandra RN   [facility]    Plan   - Cont TMS  -rMT tomorrow     I didn t see the patient during/after treatment but remained available in the clinic during  treatment.    ESTELA Zamudio MD  [facility]

## 2018-02-13 ENCOUNTER — OFFICE VISIT (OUTPATIENT)
Dept: PSYCHIATRY | Facility: CLINIC | Age: 48
End: 2018-02-13
Payer: COMMERCIAL

## 2018-02-13 VITALS — HEART RATE: 102 BPM | DIASTOLIC BLOOD PRESSURE: 106 MMHG | SYSTOLIC BLOOD PRESSURE: 142 MMHG

## 2018-02-13 DIAGNOSIS — F33.2 SEVERE EPISODE OF RECURRENT MAJOR DEPRESSIVE DISORDER, WITHOUT PSYCHOTIC FEATURES (H): Primary | ICD-10-CM

## 2018-02-13 ASSESSMENT — PATIENT HEALTH QUESTIONNAIRE - PHQ9: SUM OF ALL RESPONSES TO PHQ QUESTIONS 1-9: 15

## 2018-02-13 NOTE — PROGRESS NOTES
[facility] TMS Program  [address], [address]  [place] [address]  TMS Procedure Note   Aure Joy MRN# [id number]  Age: 47 year old year old YOB: 1970    Pre-Procedure:  History and Physical: Reviewed in medical record  Consent Signed by: Aure Joy  On: 01/29/18    Clinical Narrative:  Patient tolerating treatment well with no side effects.      Indications for TMS:  MDD, recurrent, severe; 4+ medication trials (from 2+ classes) ineffective; Psychotherapy ineffective.     Pre-Procedure Diagnosis:  MDD, recurrent, severe F33.2    Treatment Hx:  Treatment number this series: 12  Total lifetime treatment number: 12    Allergies   Allergen Reactions     Codeine Nausea     Other  [No Clinical Screening - See Comments] Nausea and Vomiting and Other (See Comments)     general anesthesia- uncontrolled vomitting      BP (!) 142/106 (BP Location: Right arm, Patient Position: Chair, Cuff Size: Adult Regular)  Pulse 102    Pause for the Cause  Right patient:  Yes  Right procedure/correct coil:  Yes; rTMS; cpt 46734; H1 coil.   Earplugs in place:  Yes    Procedure  Patient was seated in procedure chair. Identity and procedure was verified. Ear plugs were placed in ears and patient-specific cap was placed on head and tightened appropriately. Ruler locations were verified. Coil was placed at initial MT location and stimulator was set to initial MT. MT was retested and found as described below. Coil was placed at treatment location and stimulator was set to stimulation parameters detailed below. A test train was delivered and pt tolerated train fine. Given pt tolerance, 55 trains were delivered. Pt tolerated procedure well with some minor facial and hand movement.  .    Motor Threshold Determination  Distance from nasion to inion: 35.5  MT 1: 0 - 6 - 16 @ 69% on 1/29/2018  MT 2: 0 - 6 - 16 @ 69% on 2/6/2018   MT 3: 0 - 6 - 16 @ 69% on 2/13/2018         Stimulation Parameters  Frequency: 18 Hz      Train duration: 2 sec  Total pulses delivered: 1980  Inter-train interval: 20 sec  Tx Loc: 0 - eyebrows  - 14  Energy: 83% (120% MT)  Trains: 55 trains      Post-Procedure Diagnosis:  MDD, recurrent, severe F33.2    Jael Alexandra RN   [facility] Neuromodulation    Plan   - Cont TMS      I didn t see the patient during/after treatment but remained available in the clinic during  treatment.    ESTELA Zamudio MD  [facility] Neuromodulation

## 2018-02-13 NOTE — MR AVS SNAPSHOT
After Visit Summary   2/13/2018    Aure Joy    MRN: [id number]           Patient Information     Date Of Birth          1970        Visit Information        Provider Department      2/13/2018 3:15 PM ME UMP PROCEDURE ROOM [facility] Psychiatry        Today's Diagnoses     Severe episode of recurrent major depressive disorder, without psychotic features (H)    -  1       Follow-ups after your visit        Your next 10 appointments already scheduled     Feb 14, 2018  3:15 PM CST   TMS TREATMENT with ME UMP PROCEDURE ROOM   [facility] Psychiatry ([facility])    [address]  [facility] [address]   [phone]            Feb 15, 2018  2:00 PM CST   Adult Therapy Eval with Brenda Maldonado, PhD   [facility] ([facility])    [address]  [facility] [address]   [phone]            Feb 15, 2018  3:15 PM CST   TMS TREATMENT with ME UMP PROCEDURE ROOM   [facility] ([facility])    [address]  [facility] [address]   [phone]            Feb 16, 2018  3:15 PM CST   TMS TREATMENT with ME UMP PROCEDURE ROOM   [facility] ([facility])    [address]  [facility] [address]   [phone]            Feb 19, 2018  3:15 PM CST   TMS TREATMENT with ME UMP PROCEDURE ROOM   [facility] ([facility])    [address]  [facility] [address]   [phone]            Feb 20, 2018  3:15 PM CST   TMS TREATMENT with ME UMP PROCEDURE ROOM   [facility] ([facility])    [address]  [facility] [address]   [phone]            Feb 21, 2018  3:15 PM CST   TMS TREATMENT with ME UMP PROCEDURE ROOM   [facility] ([facility])    [address]  [facility] [address]   [phone]            Feb 22, 2018  3:15 PM CST   TMS TREATMENT with ME P PROCEDURE  ROOM   [facility] Psychiat[facility] ([facility])    [address]  [facility] [address]   [phone]            2018  3:15 PM CST   TMS TREATMENT with ME UMP PROCEDURE ROOM   [facility]    [address]  [facility] [address]   [phone]            2018  3:15 PM CST   TMS TREATMENT with ME UMP PROCEDURE ROOM   [facility]    [address]  [facility] [address]   [phone]              Who to contact     Please call your clinic at [phone] to:    Ask questions about your health    Make or cancel appointments    Discuss your medicines    Learn about your test results    Speak to your doctor            Additional Information About Your Visit        [employer]hart Information     Zyncd is an electronic gateway that provides easy, online access to your medical records. With Zyncd, you can request a clinic appointment, read your test results, renew a prescription or communicate with your care team.     To sign up for Zyncd visit the website at [email or web address]   You will be asked to enter the access code listed below, as well as some personal information. Please follow the directions to create your username and password.     Your access code is: [email or web address]  Expires: 2018  4:15 PM     Your access code will  in 90 days. If you need help or a new code, please contact your [facility] or call [phone] for assistance.        Care EveryWhere ID     This is your Care EveryWhere ID. This could be used by other organizations to access your [facility] medical records  [id number]        Your Vitals Were     Pulse                   102            Blood Pressure from Last 3 Encounters:   18 (!) 142/106   18 129/72   18 136/85    Weight from Last 3 Encounters:   18 92.1 kg (203 lb)   18 92.1 kg (203  lb)   02/07/18 92.1 kg (203 lb)              We Performed the Following     C REPET TMS TX SUBSEQ MOTR THRESHLD W/DELIV & MNGT        Primary Care Provider Office Phone # Fax #    Stephy Valentin [phone]       [facility] [address]   [place] [address]        Equal Access to Services     [facility]: Hadii aad ku hadasho Soomaali, waaxda luqadaha, qaybta kaalmada adeegyada, waxay idiin hayaan adeeg kharash la'aan . So [facility] [phone].    ATENCIÓN: Si habla español, tiene a chamberlain disposición servicios gratuitos de asistencia lingüística. Llame al [phone].    We comply with applicable federal civil rights laws and [place] laws. We do not discriminate on the basis of race, color, national origin, age, disability, sex, sexual orientation, or gender identity.            Thank you!     Thank you for choosing [facility] PSYCHIAT[facility]  for your care. Our goal is always to provide you with excellent care. Hearing back from our patients is one way we can continue to improve our services. Please take a few minutes to complete the written survey that you may receive in the mail after your visit with us. Thank you!             Your Updated Medication List - Protect others around you: Learn how to safely use, store and throw away your medicines at www.disposemymeds.org.      Notice  As of 2/13/2018 11:25 PM    You have not been prescribed any medications.

## 2018-02-14 ENCOUNTER — OFFICE VISIT (OUTPATIENT)
Dept: PSYCHIATRY | Facility: CLINIC | Age: 48
End: 2018-02-14
Payer: COMMERCIAL

## 2018-02-14 DIAGNOSIS — F33.2 SEVERE EPISODE OF RECURRENT MAJOR DEPRESSIVE DISORDER, WITHOUT PSYCHOTIC FEATURES (H): Primary | ICD-10-CM

## 2018-02-14 ASSESSMENT — PATIENT HEALTH QUESTIONNAIRE - PHQ9: SUM OF ALL RESPONSES TO PHQ QUESTIONS 1-9: 14

## 2018-02-14 NOTE — MR AVS SNAPSHOT
After Visit Summary   2/14/2018    Aure Joy    MRN: [id number]           Patient Information     Date Of Birth          1970        Visit Information        Provider Department      2/14/2018 3:15 PM ME UMP PROCEDURE ROOM [facility] Psychiat[facility]        Today's Diagnoses     Severe episode of recurrent major depressive disorder, without psychotic features (H)    -  1       Follow-ups after your visit        Your next 10 appointments already scheduled     Mar 05, 2018  3:15 PM CST   TMS TREATMENT with ME UMP PROCEDURE ROOM   [facility] Psychiatry ([facility])    [address]  [facility] [address]   [phone]            Mar 06, 2018  3:15 PM CST   TMS TREATMENT with [facility] PROCEDURE ROOM   [facility] Psychiat[facility] ([facility])    [address]  [facility] [address]   [phone]            Mar 07, 2018  3:15 PM CST   [facility]   [facility] ([facility])    [address]  [facility] [address]   [phone]            Mar 08, 2018 12:00 PM CST   Adult Psychotherapy with Brenda Maldonado, PhD   [facility] Psych[facility] ([facility])    [address]  [facility] [address]   [phone]            Mar 08, 2018  3:15 PM CST   TMS TREATMENT with [facility] PROCEDURE ROOM   [facility] Psychiat[facility] ([facility])    [address]  [facility] [address]   [phone]            Mar 09, 2018  3:15 PM CST   [facility]   [facility] ([facility])    [address]  [facility] [address]   [phone]            Mar 12, 2018  3:15 PM CDT   T[facility] TREATMENT [facility] PROCEDURE ROOM   [facility] Psychiat[facility] ([facility])    [address]  [facility] [address]   [phone]            Mar 14, 2018  3:15 PM CDT   T[facility] TRE[facility]MENT [facility] PROCEDURE  ROOM   [facility] Psychiatry ([facility])    [address]  [facility] [address]   [phone]            Mar 15, 2018 12:00 PM CDT   Adult Psychotherapy with Brenda Maldonado, PhD   [facility] Psych[facility] ([facility])    [address]  [facility] [address]   [phone]            Mar 16, 2018  3:15 PM CDT   TMS TREATMENT with ME [facility] PROCEDURE ROOM   [facility] Psych[facility] ([facility])    [address]  [facility] [address]   [phone]              Who to contact     Please call your clinic at [phone] to:    Ask questions about your health    Make or cancel appointments    Discuss your medicines    Learn about your test results    Speak to your doctor            Additional Information About Your Visit        [employer] Information     Enpirion is an electronic gateway that provides easy, online access to your medical records. With Enpirion, you can request a clinic appointment, read your test results, renew a prescription or communicate with your care team.     To sign up for Enpirion visit the website at [email or web address]   You will be asked to enter the access code listed below, as well as some personal information. Please follow the directions to create your username and password.     Your access code is: [email or web address]  Expires: 2018  4:15 PM     Your access code will  in 90 days. If you need help or a new code, please contact your [facility] or call [phone] for assistance.        Care EveryWhere ID     This is your Care EveryWhere ID. This could be used by other organizations to access your [facility] medical records  [id number]         Blood Pressure from Last 3 Encounters:   18 130/78   18 (!) 143/91   18 128/80    Weight from Last 3 Encounters:   18 92.1 kg (203 lb)   18 92.1 kg (203 lb)   18 92.1 kg (203 lb)              We  Performed the Following     C TRANSCRANIAL MAGNETIC STIMULATION TREATMENT,DELIVERY/MANAGEMENT        Primary Care Provider Office Phone # Fax #    Stephy Valentin [phone]       [facility] [address]   [facility] [address]        Equal Access to Services     [facility] : Hadii derrell villarreal hadsherrieo Soomaali, waaxda luqadaha, qaybta kaalmada adeegyada, waxmagdalena idiin hayaan denton esthershukri elise. So [facility] [phone].    ATENCIÓN: Si habla español, tiene a chamberlain disposición servicios gratuitos de asistencia lingüística. Llame al [phone].    We comply with applicable federal civil rights laws and [place] laws. We do not discriminate on the basis of race, color, national origin, age, disability, sex, sexual orientation, or gender identity.            Thank you!     Thank you for choosing [facility] PSYCHIATRY  for your care. Our goal is always to provide you with excellent care. Hearing back from our patients is one way we can continue to improve our services. Please take a few minutes to complete the written survey that you may receive in the mail after your visit with us. Thank you!             Your Updated Medication List - Protect others around you: Learn how to safely use, store and throw away your medicines at www.disposemymeds.org.      Notice  As of 2/14/2018 11:59 PM    You have not been prescribed any medications.

## 2018-02-14 NOTE — PROGRESS NOTES
[facility] TMS Program  [address]  [place] [address]  TMS Procedure Note   Aure Joy MRN# [id number]  Age: 47 year old year old YOB: 1970    Pre-Procedure:  History and Physical: Reviewed in medical record  Consent Signed by: Aure Joy  On: 01/29/18    Clinical Narrative:  Patient tolerating treatment well with no side effects.      Indications for TMS:  MDD, recurrent, severe; 4+ medication trials (from 2+ classes) ineffective; Psychotherapy ineffective.     Pre-Procedure Diagnosis:  MDD, recurrent, severe F33.2    Treatment Hx:  Treatment number this series: 13  Total lifetime treatment number: 13    Allergies   Allergen Reactions     Codeine Nausea     Other  [No Clinical Screening - See Comments] Nausea and Vomiting and Other (See Comments)     general anesthesia- uncontrolled vomitting      There were no vitals taken for this visit.    Pause for the Cause  Right patient:  Yes  Right procedure/correct coil:  Yes; rTMS; cpt 26084; H1 coil.   Earplugs in place:  Yes    Procedure  Patient was seated in procedure chair. Identity and procedure was verified. Ear plugs were placed in ears and patient-specific cap was placed on head and tightened appropriately. Ruler locations were verified. Coil was placed at treatment location and stimulator was set to parameters described below. A test train was delivered and pt tolerated train. Given pt tolerance, 55 treatment trains were delivered. Pt tolerated procedure well with some minor facial and hand movement.      Motor Threshold Determination  Distance from nasion to inion: 35.5  MT 1: 0 - 6 - 16 @ 69% on 1/29/2018  MT 2: 0 - 6 - 16 @ 69% on 2/6/2018   MT 3: 0 - 6 - 16 @ 69% on 2/13/2018         Stimulation Parameters  Frequency: 18 Hz     Train duration: 2 sec  Total pulses delivered: 1980  Inter-train interval: 20 sec  Tx Loc: 0 - eyebrows  - 14  Energy: 83% (120% MT)  Trains: 55 trains      Post-Procedure  Diagnosis:  MDD, recurrent, severe F33.2    Jael Alexandra, GERARDO   [facility] Neuromodulation    Plan   - Cont TMS      I didn t see the patient during/after treatment but remained available in the clinic during  treatment.    NELSY Ordonez CNP  [facility] Neuromodulation

## 2018-02-15 ENCOUNTER — OFFICE VISIT (OUTPATIENT)
Dept: PSYCHIATRY | Facility: CLINIC | Age: 48
End: 2018-02-15
Payer: COMMERCIAL

## 2018-02-15 VITALS — SYSTOLIC BLOOD PRESSURE: 126 MMHG | DIASTOLIC BLOOD PRESSURE: 82 MMHG | HEART RATE: 86 BPM

## 2018-02-15 DIAGNOSIS — F33.2 SEVERE EPISODE OF RECURRENT MAJOR DEPRESSIVE DISORDER, WITHOUT PSYCHOTIC FEATURES (H): Primary | ICD-10-CM

## 2018-02-15 NOTE — MR AVS SNAPSHOT
After Visit Summary   2/15/2018    Aure Joy    MRN: [id number]           Patient Information     Date Of Birth          1970        Visit Information        Provider Department      2/15/2018 3:15 PM ME UMP PROCEDURE ROOM [facility] Psychiatry        Today's Diagnoses     Severe episode of recurrent major depressive disorder, without psychotic features (H)    -  1       Follow-ups after your visit        Your next 10 appointments already scheduled     Feb 23, 2018  3:15 PM CST   TMS TREATMENT with ME UMP PROCEDURE ROOM   P Psychiatry ([facility])    [address]  [facility] [address]   [phone]            Feb 26, 2018  3:15 PM CST   TMS TREATMENT with ME UMP PROCEDURE ROOM   [facility] Psychiatry ([facility])    [address]  [facility] [address]   [phone]            Feb 27, 2018  3:15 PM CST   TMS TREATMENT with ME UMP PROCEDURE ROOM   [facility] Psychiatry ([facility])    [address]  [facility] [address]   [phone]            Feb 28, 2018  3:15 PM CST   TMS TREATMENT with ME UMP PROCEDURE ROOM   [facility] Psychiatry ([facility])    [address]  [facility] [address]   [phone]            Mar 01, 2018 12:00 PM CST   Adult Psychotherapy with Brenda Maldonado, PhD   [facility] Psychiatry ([facility])    [address] [place] [address]   [phone]            Mar 01, 2018  3:15 PM CST   TMS TREATMENT with ME UMP PROCEDURE ROOM   [facility]iatry ([facility])    [address]  [facility] [address]   [phone]            Mar 02, 2018  3:15 PM CST   TMS TREATMENT with ME UMP PROCEDURE ROOM   [facility] Psychiatry ([facility])    [address]  [facility] [address]   [phone]            Mar 05, 2018  3:15 PM CST   TMS TREATMENT with ME UMP PROCEDURE  ROOM   [facility] Psychiatry ([facility])    [address]  [facility] [address]   [phone]            Mar 06, 2018  3:15 PM CST   TMS TREATMENT with ME UMP PROCEDURE ROOM   [facility])    [address]  [facility] [address]   [phone]            Mar 07, 2018  3:15 PM CST   TMS TREATMENT with ME UMP PROCEDURE ROOM   [facility])    [address]  [facility] [address]   [phone]              Who to contact     Please call your clinic at [phone] to:    Ask questions about your health    Make or cancel appointments    Discuss your medicines    Learn about your test results    Speak to your doctor            Additional Information About Your Visit        alikehart Information     [employer] is an electronic gateway that provides easy, online access to your medical records. With [employer], you can request a clinic appointment, read your test results, renew a prescription or communicate with your care team.     To sign up for [employer] visit the website at [email or web address]   You will be asked to enter the access code listed below, as well as some personal information. Please follow the directions to create your username and password.     Your access code is: [email or web address]  Expires: 2018  4:15 PM     Your access code will  in 90 days. If you need help or a new code, please contact your [facility] or call [phone] for assistance.        Care EveryWhere ID     This is your Care EveryWhere ID. This could be used by other organizations to access your [facility] medical records  [id number]        Your Vitals Were     Pulse                   86            Blood Pressure from Last 3 Encounters:   18 132/78   18 (!) 155/92   18 138/80    Weight from Last 3 Encounters:   18 92.1 kg (203 lb)   18 92.1 kg (203 lb)    02/09/18 92.1 kg (203 lb)              We Performed the Following     C TRANSCRANIAL MAGNETIC STIMULATION TREATMENT,DELIVERY/MANAGEMENT        Primary Care Provider Office Phone # Fax #    Stephy Valentin [phone]       [facility] [address] [place] [address]        Equal Access to Services     [facility] MADDISON : Hadii aad ku hadasho Soomaali, waaxda luqadaha, qaybta kaalmada adeegyada, waxay idiin hayaan adeeg kharash la'aan . So [facility] [phone].    ATENCIÓN: Si habla español, tiene a chamberlain disposición servicios gratuitos de asistencia lingüística. Llame al [phone].    We comply with applicable federal civil rights laws and [place] laws. We do not discriminate on the basis of race, color, national origin, age, disability, sex, sexual orientation, or gender identity.            Thank you!     Thank you for choosing [facility] PSYCHIATRY  for your care. Our goal is always to provide you with excellent care. Hearing back from our patients is one way we can continue to improve our services. Please take a few minutes to complete the written survey that you may receive in the mail after your visit with us. Thank you!             Your Updated Medication List - Protect others around you: Learn how to safely use, store and throw away your medicines at www.disposemymeds.org.      Notice  As of 2/15/2018 11:59 PM    You have not been prescribed any medications.
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
## 2018-02-15 NOTE — PROGRESS NOTES
[facility] TMS Program  [address], [address]  [place] [address]  TMS Procedure Note   Aure Joy MRN# [id number]  Age: 47 year old year old YOB: 1970    Pre-Procedure:  History and Physical: Reviewed in medical record  Consent Signed by: Aure Joy  On: 01/29/18    Clinical Narrative:  Patient tolerating treatment well with no side effects.      Indications for TMS:  MDD, recurrent, severe; 4+ medication trials (from 2+ classes) ineffective; Psychotherapy ineffective.     Pre-Procedure Diagnosis:  MDD, recurrent, severe F33.2    Treatment Hx:  Treatment number this series: 14  Total lifetime treatment number: 14    Allergies   Allergen Reactions     Codeine Nausea     Other  [No Clinical Screening - See Comments] Nausea and Vomiting and Other (See Comments)     general anesthesia- uncontrolled vomitting      /82 (BP Location: Right arm, Patient Position: Chair, Cuff Size: Adult Large)  Pulse 86    Pause for the Cause  Right patient:  Yes  Right procedure/correct coil:  Yes; rTMS; cpt 97206; H1 coil.   Earplugs in place:  Yes    Procedure  Patient was seated in procedure chair. Identity and procedure was verified. Ear plugs were placed in ears and patient-specific cap was placed on head and tightened appropriately. Ruler locations were verified. Coil was placed at treatment location and stimulator was set to parameters described below. A test train was delivered and pt tolerated train. Given pt tolerance, 55 treatment trains were delivered. Pt tolerated procedure well with some minor facial and hand movement.      Motor Threshold Determination  Distance from nasion to inion: 35.5  MT 1: 0 - 6 - 16 @ 69% on 1/29/2018  MT 2: 0 - 6 - 16 @ 69% on 2/6/2018   MT 3: 0 - 6 - 16 @ 69% on 2/13/2018         Stimulation Parameters  Frequency: 18 Hz     Train duration: 2 sec  Total pulses delivered: 1980  Inter-train interval: 20 sec  Tx Loc: 0 - eyebrows  - 14  Energy: 83% (120%  MT)  Trains: 55 trains      Post-Procedure Diagnosis:  MDD, recurrent, severe F33.2    Jael Alexandra RN   [facility]    Plan   - Cont TMS      I didn t see the patient during/after treatment but remained available in the clinic during  treatment.    ESTELA Zamudio MD  [facility]

## 2018-02-15 NOTE — MR AVS SNAPSHOT
After Visit Summary   2/15/2018    Aure Joy    MRN: [id number]           Patient Information     Date Of Birth          1970        Visit Information        Provider Department      2/15/2018 2:00 PM Brenda Maldonado, PhD [facility] Psychiatry        Today's Diagnoses     Severe episode of recurrent major depressive disorder, without psychotic features (H)    -  1       Follow-ups after your visit        Follow-up notes from your care team     Return in about 7 days (around 2/22/2018).      Your next 10 appointments already scheduled     Feb 16, 2018  3:15 PM CST   TMS TREATMENT with ME UMP PROCEDURE ROOM   P Psychiatry ([facility])    [address]  [facility] [address]   [phone]            Feb 19, 2018  3:15 PM CST   TMS TREATMENT with ME UMP PROCEDURE ROOM   [facility] Psychiatry ([facility])    [address]  [facility] [address]   [phone]            Feb 20, 2018  3:15 PM CST   TMS TREATMENT with ME UMP PROCEDURE ROOM   [facility] Psychiat[facility] ([facility])    [address]  [facility] [address] [phone]            Feb 21, 2018  3:15 PM CST   TMS TREATMENT with ME UMP PROCEDURE ROOM   [facility] Psychiat[facility] ([facility])    [address]  [facility] [address] [phone]            Feb 22, 2018  3:15 PM CST   TMS TREATMENT with ME UMP PROCEDURE ROOM   [facility] ([facility])    [address]  [facility] [address]   [phone]            Feb 23, 2018  3:15 PM CST   TMS TREATMENT with ME UMP PROCEDURE ROOM   [facility] ([facility])    [address]  [facility] [id number] [phone]            Feb 26, 2018  3:15 PM CST   TMS TREATMENT with ME UMP PROCEDURE ROOM   [facility] Psych[facility] ([facility])    [address]  [facility] [address]    [phone]            2018  3:15 PM CST   TMS TREATMENT with ME UMP PROCEDURE ROOM   [facility] Psychiatry ([facility])    [address]  [facility] [address] [phone]            2018  3:15 PM CST   TMS TREATMENT with ME UMP PROCEDURE ROOM   [facility] Psychiatry ([facility])    [address]  [facility] [address] [phone]            Mar 01, 2018  3:15 PM CST   TMS TREATMENT with ME UMP PROCEDURE ROOM   [facility]iat[facility] ([facility])    [address]  [facility] [address] [phone]              Who to contact     Please call your clinic at [phone] to:    Ask questions about your health    Make or cancel appointments    Discuss your medicines    Learn about your test results    Speak to your doctor            Additional Information About Your Visit        [employer] Information     [employer] is an electronic gateway that provides easy, online access to your medical records. With [employer], you can request a clinic appointment, read your test results, renew a prescription or communicate with your care team.     To sign up for [employer] visit the website at [email or web address]   You will be asked to enter the access code listed below, as well as some personal information. Please follow the directions to create your username and password.     Your access code is: [email or web address]  Expires: 2018  4:15 PM     Your access code will  in 90 days. If you need help or a new code, please contact your [facility] or call [phone] for assistance.        Care EveryWhere ID     This is your Care EveryWhere ID. This could be used by other organizations to access your [facility] medical records  [id number]         Blood Pressure from Last 3 Encounters:   02/15/18 126/82   18 (!) 142/106   18 129/72    Weight from Last 3 Encounters:   18 92.1 kg (203  lb)   02/09/18 92.1 kg (203 lb)   02/07/18 92.1 kg (203 lb)              Today, you had the following     No orders found for display       Primary Care Provider Office Phone # Fax #    Stephy Valentin [phone]       [facility] [address] [address]        Equal Access to Services     [facility]: Hadii aad ku hadasho Soomaali, waaxda luqadaha, qaybta kaalmada adeegyada, waxay idiin hayaan adeeg kharash la'aan ah. So [facility] [phone].    ATENCIÓN: Si habla español, tiene a chamberlain disposición servicios gratuitos de asistencia lingüística. Tamara al [phone].    We comply with applicable federal civil rights laws and [place] laws. We do not discriminate on the basis of race, color, national origin, age, disability, sex, sexual orientation, or gender identity.            Thank you!     Thank you for choosing [facility] PSYCHIAT[facility]  for your care. Our goal is always to provide you with excellent care. Hearing back from our patients is one way we can continue to improve our services. Please take a few minutes to complete the written survey that you may receive in the mail after your visit with us. Thank you!             Your Updated Medication List - Protect others around you: Learn how to safely use, store and throw away your medicines at www.disposemymeds.org.      Notice  As of 2/15/2018  5:53 PM    You have not been prescribed any medications.

## 2018-02-16 ENCOUNTER — OFFICE VISIT (OUTPATIENT)
Dept: PSYCHIATRY | Facility: CLINIC | Age: 48
End: 2018-02-16
Payer: COMMERCIAL

## 2018-02-16 VITALS — HEART RATE: 91 BPM | DIASTOLIC BLOOD PRESSURE: 92 MMHG | SYSTOLIC BLOOD PRESSURE: 143 MMHG

## 2018-02-16 DIAGNOSIS — F33.2 SEVERE EPISODE OF RECURRENT MAJOR DEPRESSIVE DISORDER, WITHOUT PSYCHOTIC FEATURES (H): Primary | ICD-10-CM

## 2018-02-16 ASSESSMENT — PATIENT HEALTH QUESTIONNAIRE - PHQ9: SUM OF ALL RESPONSES TO PHQ QUESTIONS 1-9: 17

## 2018-02-16 NOTE — MR AVS SNAPSHOT
After Visit Summary   2/16/2018    Aure Joy    MRN: [id number]           Patient Information     Date Of Birth          1970        Visit Information        Provider Department      2/16/2018 3:15 PM ME UMP PROCEDURE ROOM [facility] Psychiatry        Today's Diagnoses     Severe episode of recurrent major depressive disorder, without psychotic features (H)    -  1       Follow-ups after your visit        Your next 10 appointments already scheduled     Feb 26, 2018  3:15 PM CST   TMS TREATMENT with ME UMP PROCEDURE ROOM   [facility]iatry ([facility])    [address]  [facility] [address]   [phone]            Feb 27, 2018  3:15 PM CST   TMS TREATMENT with ME UMP PROCEDURE ROOM   [facility]iatry ([facility])    [address]  [facility] [address]   [phone]            Feb 28, 2018  3:15 PM CST   TMS TREATMENT with ME UMP PROCEDURE ROOM   [facility] ([facility])    [address]  [facility] [address]   [phone]            Mar 01, 2018 12:00 PM CST   Adult Psychotherapy with Brenda Maldonado, PhD   [facility] ([facility])    [address]  [facility] [address]   [phone]            Mar 01, 2018  3:15 PM CST   TMS TREATMENT with ME UMP PROCEDURE ROOM   [facility] ([facility])    [address]  [facility] [address]   [phone]            Mar 02, 2018  3:15 PM CST   TMS TREATMENT with ME UMP PROCEDURE ROOM   [facility] ([facility])    [address]  [facility] [address]   [phone]            Mar 05, 2018  3:15 PM CST   TMS TREATMENT with ME UMP PROCEDURE ROOM   [facility] ([facility])    [address]  [facility] [address]   [phone]            Mar 06, 2018  3:15 PM CST   TMS TREATMENT with ME [facility] PROCEDURE  ROOM   [facility] Psychiatry ([facility])    [address]  [facility] [address]   [phone]            Mar 07, 2018  3:15 PM CST   TMS TREATMENT with ME UMP PROCEDURE ROOM   [facility])    [address]  [facility] [address]   [phone]            Mar 08, 2018  3:15 PM CST   TMS TREATMENT with ME UMP PROCEDURE ROOM   [facility])    [address]  [facility] [address]   [phone]              Who to contact     Please call your clinic at [phone] to:    Ask questions about your health    Make or cancel appointments    Discuss your medicines    Learn about your test results    Speak to your doctor            Additional Information About Your Visit        [employer] Information     [employer] is an electronic gateway that provides easy, online access to your medical records. With [employer], you can request a clinic appointment, read your test results, renew a prescription or communicate with your care team.     To sign up for [employer] visit the website at [email or web address]   You will be asked to enter the access code listed below, as well as some personal information. Please follow the directions to create your username and password.     Your access code is: [email or web address]  Expires: 2018  4:15 PM     Your access code will  in 90 days. If you need help or a new code, please contact your [facility] or call [phone] for assistance.        Care EveryWhere ID     This is your Care EveryWhere ID. This could be used by other organizations to access your [facility] medical records  [id number]        Your Vitals Were     Pulse                   91            Blood Pressure from Last 3 Encounters:   18 140/79   18 132/78   18 (!) 155/92    Weight from Last 3 Encounters:   18 92.1 kg (203 lb)   18 92.1 kg (203 lb)    02/09/18 92.1 kg (203 lb)              We Performed the Following     C TRANSCRANIAL MAGNETIC STIMULATION TREATMENT,DELIVERY/MANAGEMENT        Primary Care Provider Office Phone # Fax #    Stephy Valentin [phone]       [facility] [address] [place] [address]        Equal Access to Services     [facility] MADDISON : Hadii aad ku hadasho Soomaali, waaxda luqadaha, qaybta kaalmada adeegyada, waxay idiin hayaan adeeg kharash la'aan . So [facility] [phone].    ATENCIÓN: Si habla español, tiene a chamberlain disposición servicios gratuitos de asistencia lingüística. Llame al [phone].    We comply with applicable federal civil rights laws and [place] laws. We do not discriminate on the basis of race, color, national origin, age, disability, sex, sexual orientation, or gender identity.            Thank you!     Thank you for choosing [facility] PSYCHIATRY  for your care. Our goal is always to provide you with excellent care. Hearing back from our patients is one way we can continue to improve our services. Please take a few minutes to complete the written survey that you may receive in the mail after your visit with us. Thank you!             Your Updated Medication List - Protect others around you: Learn how to safely use, store and throw away your medicines at www.disposemymeds.org.      Notice  As of 2/16/2018 11:59 PM    You have not been prescribed any medications.

## 2018-02-16 NOTE — PROGRESS NOTES
[facility] TMS Program  [address]  [place] [address]  TMS Procedure Note   Aure Joy MRN# [id number]  Age: 47 year old year old YOB: 1970    Pre-Procedure:  History and Physical: Reviewed in medical record  Consent Signed by: Aure Joy  On: 01/29/18    Clinical Narrative:  Patient tolerating treatment well with no side effects. IDSSR= 38     Indications for TMS:  MDD, recurrent, severe; 4+ medication trials (from 2+ classes) ineffective; Psychotherapy ineffective.     Pre-Procedure Diagnosis:  MDD, recurrent, severe F33.2    Treatment Hx:  Treatment number this series: 15  Total lifetime treatment number: 15    Allergies   Allergen Reactions     Codeine Nausea     Other  [No Clinical Screening - See Comments] Nausea and Vomiting and Other (See Comments)     general anesthesia- uncontrolled vomitting      BP (!) 143/92 (BP Location: Left arm, Patient Position: Sitting, Cuff Size: Adult Regular)  Pulse 91    Pause for the Cause  Right patient:  Yes  Right procedure/correct coil:  Yes; rTMS; cpt 79287; H1 coil.   Earplugs in place:  Yes    Procedure  Patient was seated in procedure chair. Identity and procedure was verified. Ear plugs were placed in ears and patient-specific cap was placed on head and tightened appropriately. Ruler locations were verified. Coil was placed at treatment location and stimulator was set to parameters described below. A test train was delivered and pt tolerated train. Given pt tolerance, 55 treatment trains were delivered. Pt tolerated procedure well with some minor facial and hand movement.      Motor Threshold Determination  Distance from nasion to inion: 35.5  MT 1: 0 - 6 - 16 @ 69% on 1/29/2018  MT 2: 0 - 6 - 16 @ 69% on 2/6/2018   MT 3: 0 - 6 - 16 @ 69% on 2/13/2018         Stimulation Parameters  Frequency: 18 Hz     Train duration: 2 sec  Total pulses delivered: 1980  Inter-train interval: 20 sec  Tx Loc: 0 - eyebrows  -  14  Energy: 83% (120% MT)  Trains: 55 trains      Post-Procedure Diagnosis:  MDD, recurrent, severe F33.2    Jael Alexandra RN   [facility]    Plan   - Cont TMS      I did see the patient during/after treatment and remained available in the clinic during  Treatment. Pt reports ongoing improvement in mood and anxiety. Has chosen to work with our staff psychologist on behavioral activation skills in lieu of DBT skills group as a way to optimize TMS treatment series. Denies side effects. Describes work in psychotherapy as being very helpful.    ESTELA Zamudio MD  [facility]

## 2018-02-17 ASSESSMENT — PATIENT HEALTH QUESTIONNAIRE - PHQ9: SUM OF ALL RESPONSES TO PHQ QUESTIONS 1-9: 17

## 2018-02-19 ENCOUNTER — OFFICE VISIT (OUTPATIENT)
Dept: PSYCHIATRY | Facility: CLINIC | Age: 48
End: 2018-02-19
Payer: COMMERCIAL

## 2018-02-19 VITALS — DIASTOLIC BLOOD PRESSURE: 89 MMHG | HEART RATE: 102 BPM | SYSTOLIC BLOOD PRESSURE: 149 MMHG

## 2018-02-19 DIAGNOSIS — F33.2 SEVERE EPISODE OF RECURRENT MAJOR DEPRESSIVE DISORDER, WITHOUT PSYCHOTIC FEATURES (H): ICD-10-CM

## 2018-02-19 NOTE — PROGRESS NOTES
[facility] TMS Program  [address], [address]  [place] [address]  TMS Procedure Note   Aure Joy MRN# [id number]  Age: 47 year old year old YOB: 1970    Pre-Procedure:  History and Physical: Reviewed in medical record  Consent Signed by: Aure Joy  On: 01/29/18    Clinical Narrative:  Patient tolerating treatment well with no side effects. IDSSR= 38     Indications for TMS:  MDD, recurrent, severe; 4+ medication trials (from 2+ classes) ineffective; Psychotherapy ineffective.     Pre-Procedure Diagnosis:  MDD, recurrent, severe F33.2    Treatment Hx:  Treatment number this series: 16  Total lifetime treatment number: 16    Allergies   Allergen Reactions     Codeine Nausea     Other  [No Clinical Screening - See Comments] Nausea and Vomiting and Other (See Comments)     general anesthesia- uncontrolled vomitting      /89 (BP Location: Left arm, Patient Position: Sitting, Cuff Size: Adult Regular)  Pulse 102    Pause for the Cause  Right patient:  Yes  Right procedure/correct coil:  Yes; rTMS; cpt 21729; H1 coil.   Earplugs in place:  Yes    Procedure  Patient was seated in procedure chair. Identity and procedure was verified. Ear plugs were placed in ears and patient-specific cap was placed on head and tightened appropriately. Ruler locations were verified. Coil was placed at treatment location and stimulator was set to parameters described below. A test train was delivered and pt tolerated train. Given pt tolerance, 55 treatment trains were delivered. Pt tolerated procedure well with some minor facial and hand movement.      Motor Threshold Determination  Distance from nasion to inion: 35.5  MT 1: 0 - 6 - 16 @ 69% on 1/29/2018  MT 2: 0 - 6 - 16 @ 69% on 2/6/2018   MT 3: 0 - 6 - 16 @ 69% on 2/13/2018         Stimulation Parameters  Frequency: 18 Hz     Train duration: 2 sec  Total pulses delivered: 1980  Inter-train interval: 20 sec  Tx Loc: 0 - eyebrows  - 14  Energy:  83% (120% MT)  Trains: 55 trains      Post-Procedure Diagnosis:  MDD, recurrent, severe F33.2    Jael Alexandra RN   [facility] Neuromodulation    Plan   - Cont TMS  -rMT tomorrow       I didn t see the patient during/after treatment but remained available in the clinic during  treatment.    ESTELA Zamudio MD  [facility] Neuromodulation

## 2018-02-19 NOTE — MR AVS SNAPSHOT
After Visit Summary   2/19/2018    Aure Joy    MRN: [id number]           Patient Information     Date Of Birth          1970        Visit Information        Provider Department      2/19/2018 3:15 PM ME UMP PROCEDURE ROOM [facility] Psychiatry        Today's Diagnoses     Severe episode of recurrent major depressive disorder, without psychotic features (H)           Follow-ups after your visit        Your next 10 appointments already scheduled     Feb 22, 2018  3:15 PM CST   TMS TREATMENT with ME UMP PROCEDURE ROOM   [facility] Psychiatry ([facility])    [address]  [facility] [address]   [phone]            Feb 23, 2018  3:15 PM CST   [facility]   [facility] Psychiatry ([facility])    [address]  [facility] [address]   [phone]            Feb 26, 2018  3:15 PM CST   TMS TREATMENT with ME UMP PROCEDURE ROOM   [facility] Psychiatry ([facility])    [address]  [facility] [address]   [phone]            Feb 27, 2018  3:15 PM CST   TMS TREATMENT with ME UMP PROCEDURE ROOM   [facility] Psychiatry ([facility])    [address]  [facility] [address]   [phone]            Feb 28, 2018  3:15 PM CST   TMS TREATMENT with ME UMP PROCEDURE ROOM   [facility] Psychiatry ([facility])    [address]  [facility] [address]   [phone]            Mar 01, 2018 12:00 PM CST   Adult Psychotherapy with Brenda Maldonado, PhD   [facility] Psychiatry ([facility])    [address]  [facility] [address]   [phone]            Mar 01, 2018  3:15 PM CST   [facility]   [facility] Psychiatry ([facility])    [address]  [facility] [address]   [phone]            Mar 02, 2018  3:15 PM CST   TMS TREATMENT with [facility] PROCEDURE ROOM    [facility] Psychiatry ([facility])    [address]  [facility] [address]   [phone]            Mar 05, 2018  3:15 PM CST   TMS TREATMENT with ME [facility] PROCEDURE ROOM   [facility]    [address]  [facility] [address]   [phone]            Mar 06, 2018  3:15 PM CST   TMS TREATMENT with ME [facility] PROCEDURE ROOM   [facility] Psychiatry [facility])    [address]  [facility] [address]   [phone]              Who to contact     Please call your clinic at [phone] to:    Ask questions about your health    Make or cancel appointments    Discuss your medicines    Learn about your test results    Speak to your doctor            Additional Information About Your Visit        Cipher Surgicalhart Information     Goojitsu is an electronic gateway that provides easy, online access to your medical records. With Goojitsu, you can request a clinic appointment, read your test results, renew a prescription or communicate with your care team.     To sign up for Goojitsu visit the website at [email or web address]   You will be asked to enter the access code listed below, as well as some personal information. Please follow the directions to create your username and password.     Your access code is: [email or web address]  Expires: 2018  4:15 PM     Your access code will  in 90 days. If you need help or a new code, please contact your [facility] or call [phone] for assistance.        Care EveryWhere ID     This is your Care EveryWhere ID. This could be used by other organizations to access your [place] medical records  [id number]        Your Vitals Were     Pulse                   102            Blood Pressure from Last 3 Encounters:   18 (!) 155/92   18 138/80   18 149/89    Weight from Last 3 Encounters:   18 92.1 kg (203 lb)   18 92.1 kg (203 lb)    02/09/18 92.1 kg (203 lb)              We Performed the Following     C TRANSCRANIAL MAGNETIC STIMULATION TREATMENT,DELIVERY/MANAGEMENT        Primary Care Provider Office Phone # Fax #    Stephy Valentin [phone]       [facility] [address] [place] [address]        Equal Access to Services     [facility] MADDISON : Hadii aad ku hadasho Soomaali, waaxda luqadaha, qaybta kaalmada adeegyada, waxay idiin hayaan adeeg kharash la'aan . So [facility] [phone].    ATENCIÓN: Si habla español, tiene a chamberlain disposición servicios gratuitos de asistencia lingüística. Llame al [phone].    We comply with applicable federal civil rights laws and [place] laws. We do not discriminate on the basis of race, color, national origin, age, disability, sex, sexual orientation, or gender identity.            Thank you!     Thank you for choosing [facility] PSYCHIATRY  for your care. Our goal is always to provide you with excellent care. Hearing back from our patients is one way we can continue to improve our services. Please take a few minutes to complete the written survey that you may receive in the mail after your visit with us. Thank you!             Your Updated Medication List - Protect others around you: Learn how to safely use, store and throw away your medicines at www.disposemymeds.org.      Notice  As of 2/19/2018 11:59 PM    You have not been prescribed any medications.

## 2018-02-20 ENCOUNTER — OFFICE VISIT (OUTPATIENT)
Dept: PSYCHIATRY | Facility: CLINIC | Age: 48
End: 2018-02-20
Payer: COMMERCIAL

## 2018-02-20 VITALS
WEIGHT: 203 LBS | HEART RATE: 93 BPM | BODY MASS INDEX: 37.36 KG/M2 | DIASTOLIC BLOOD PRESSURE: 80 MMHG | HEIGHT: 62 IN | SYSTOLIC BLOOD PRESSURE: 138 MMHG

## 2018-02-20 DIAGNOSIS — F33.2 SEVERE EPISODE OF RECURRENT MAJOR DEPRESSIVE DISORDER, WITHOUT PSYCHOTIC FEATURES (H): Primary | ICD-10-CM

## 2018-02-20 ASSESSMENT — PATIENT HEALTH QUESTIONNAIRE - PHQ9: SUM OF ALL RESPONSES TO PHQ QUESTIONS 1-9: 17

## 2018-02-20 NOTE — MR AVS SNAPSHOT
After Visit Summary   2/20/2018    Aure Joy    MRN: [id number]           Patient Information     Date Of Birth          1970        Visit Information        Provider Department      2/20/2018 3:15 PM ME UMP PROCEDURE ROOM [facility] Psychiatry        Today's Diagnoses     Severe episode of recurrent major depressive disorder, without psychotic features (H)    -  1       Follow-ups after your visit        Your next 10 appointments already scheduled     Feb 21, 2018  3:15 PM CST   TMS TREATMENT with ME UMP PROCEDURE ROOM   [facility] Psychiatry ([facility])    [address]  [facility] [address]   [phone]            Feb 22, 2018 12:00 PM CST   Adult Psychotherapy with Brenda Maldonado, PhD   [facility] Psychiatry ([facility])    [address]  [facility] [address]   [phone]            Feb 22, 2018  3:15 PM CST   [facility]   [facility] Psychiatry ([facility])    [address]  [facility] [address]   [phone]            Feb 23, 2018  3:15 PM CST   [facility]   [facility] Psychiatry ([facility])    [address]  [facility] [address]   [phone]            Feb 26, 2018  3:15 PM CST   [facility]   [facility] Psychiatry ([facility])    [address]  [facility] [address]   [phone]            Feb 27, 2018  3:15 PM CST   TMS TREATMENT with ME UMP PROCEDURE ROOM   [facility] Psychiatry ([facility])    [address]  [facility] [address]   [phone]            Feb 28, 2018  3:15 PM CST   TMS TREATMENT with ME UMP PROCEDURE ROOM   [facility] Psychiatry ([facility])    [address]  [facility] [address]   [phone]            Mar 01, 2018 12:00 PM CST   Adult Psychotherapy with Brenda Strange  "Joel, PhD   [facility] Psychiatry ([facility])    [address]  [facility] [address] [phone]            Mar 01, 2018  3:15 PM CST   TMS TREATMENT with ME [facility] PROCEDURE ROOM   [facility] ([facility])    [address]  [facility] [address]   [phone]            Mar 02, 2018  3:15 PM CST   TMS TREATMENT with ME [facility] PROCEDURE ROOM   [facility])    [address]  [facility] [address] [phone]              Who to contact     Please call your clinic at [phone] to:    Ask questions about your health    Make or cancel appointments    Discuss your medicines    Learn about your test results    Speak to your doctor            Additional Information About Your Visit        Mobeehart Information     Prodagio Software is an electronic gateway that provides easy, online access to your medical records. With Prodagio Software, you can request a clinic appointment, read your test results, renew a prescription or communicate with your care team.     To sign up for Prodagio Software visit the website at [email or web address]   You will be asked to enter the access code listed below, as well as some personal information. Please follow the directions to create your username and password.     Your access code is: [email or web address]  Expires: 2018  4:15 PM     Your access code will  in 90 days. If you need help or a new code, please contact your [facility] or call [phone] for assistance.        Care EveryWhere ID     This is your Care EveryWhere ID. This could be used by other organizations to access your [place] medical records  [id number]        Your Vitals Were     Pulse Height Breastfeeding? BMI (Body Mass Index)          93 5' 1.81\" (157 cm) No 37.36 kg/m2         Blood Pressure from Last 3 Encounters:   18 138/80   18 149/89   18 (!) 143/92    Weight from " Last 3 Encounters:   02/20/18 203 lb (92.1 kg)   02/12/18 203 lb (92.1 kg)   02/09/18 203 lb (92.1 kg)              We Performed the Following     C TRANSCRANIAL MAGNETIC STIMULATION TREATMENT,DELIVERY/MANAGEMENT        Primary Care Provider Office Phone # Fax #    Stephy Valentin [phone]       [place] [facility] [address] [place] [facility] [address]        Equal Access to Services     RAPHAEL WASHBURN : Hadii aad ku hadasho Soomaali, waaxda luqadaha, qaybta kaalmada adeegyada, waxay idiin hayaan adeeg adriana araujo . So [facility] [phone].    ATENCIÓN: Si habla español, tiene a chamberlain disposición servicios gratuitos de asistencia lingüística. Llame al [phone].    We comply with applicable federal civil rights laws and [place] laws. We do not discriminate on the basis of race, color, national origin, age, disability, sex, sexual orientation, or gender identity.            Thank you!     Thank you for choosing [facility] PSYCHIATRY  for your care. Our goal is always to provide you with excellent care. Hearing back from our patients is one way we can continue to improve our services. Please take a few minutes to complete the written survey that you may receive in the mail after your visit with us. Thank you!             Your Updated Medication List - Protect others around you: Learn how to safely use, store and throw away your medicines at www.disposemymeds.org.      Notice  As of 2/20/2018 11:59 PM    You have not been prescribed any medications.

## 2018-02-20 NOTE — PROGRESS NOTES
" [facility] TMS Program  [address], [address]  [place] [address]  TMS Procedure Note   Aure Joy MRN# [id number]  Age: 47 year old year old YOB: 1970    Pre-Procedure:  History and Physical: Reviewed in medical record  Consent Signed by: Aure Joy  On: 01/29/18    Clinical Narrative:  Patient tolerating treatment well with no side effects. IDSSR= 38     Indications for TMS:  MDD, recurrent, severe; 4+ medication trials (from 2+ classes) ineffective; Psychotherapy ineffective.     Pre-Procedure Diagnosis:  MDD, recurrent, severe F33.2    Treatment Hx:  Treatment number this series: 17  Total lifetime treatment number: 17    Allergies   Allergen Reactions     Codeine Nausea     Other  [No Clinical Screening - See Comments] Nausea and Vomiting and Other (See Comments)     general anesthesia- uncontrolled vomitting      /80 (BP Location: Right arm, Patient Position: Chair, Cuff Size: Adult Large)  Pulse 93  Ht 1.57 m (5' 1.81\")  Wt 92.1 kg (203 lb)  Breastfeeding? No  BMI 37.36 kg/m2    Pause for the Cause  Right patient:  Yes  Right procedure/correct coil:  Yes; rTMS; cpt 05868; H1 coil.   Earplugs in place:  Yes    Procedure  Patient was seated in procedure chair. Identity and procedure was verified. Ear plugs were placed in ears and patient-specific cap was placed on head and tightened appropriately. Ruler locations were verified. Coil was placed at treatment location and stimulator was set to parameters described below. A test train was delivered and pt tolerated train. Given pt tolerance, 55 treatment trains were delivered. Pt tolerated procedure well with some minor facial and hand movement.      Motor Threshold Determination  Distance from nasion to inion: 35.5  MT 1: 0 - 6 - 16 @ 69% on 1/29/2018  MT 2: 0 - 6 - 16 @ 69% on 2/6/2018   MT 3: 0 - 6 - 16 @ 69% on 2/13/2018         Stimulation Parameters  Frequency: 18 Hz     Train duration: 2 sec  Total pulses " delivered: 1980  Inter-train interval: 20 sec  Tx Loc: 0 - eyebrows  - 14  Energy: 83% (120% MT)  Trains: 55 trains      Post-Procedure Diagnosis:  MDD, recurrent, severe F33.2    Jael Alexandra RN   [facility] Neuromodulation    Plan   - Cont TMS      I didn t see the patient during/after treatment but remained available in the clinic during  treatment.    ESTELA Zamudio MD  [facility] Neuromodulation

## 2018-02-21 ENCOUNTER — OFFICE VISIT (OUTPATIENT)
Dept: PSYCHIATRY | Facility: CLINIC | Age: 48
End: 2018-02-21
Payer: COMMERCIAL

## 2018-02-21 VITALS — SYSTOLIC BLOOD PRESSURE: 155 MMHG | HEART RATE: 101 BPM | DIASTOLIC BLOOD PRESSURE: 92 MMHG

## 2018-02-21 DIAGNOSIS — F33.2 SEVERE EPISODE OF RECURRENT MAJOR DEPRESSIVE DISORDER, WITHOUT PSYCHOTIC FEATURES (H): Primary | ICD-10-CM

## 2018-02-21 ASSESSMENT — PATIENT HEALTH QUESTIONNAIRE - PHQ9: SUM OF ALL RESPONSES TO PHQ QUESTIONS 1-9: 16

## 2018-02-21 NOTE — MR AVS SNAPSHOT
After Visit Summary   2/21/2018    Aure Joy    MRN: [id number]           Patient Information     Date Of Birth          1970        Visit Information        Provider Department      2/21/2018 3:15 PM ME UMP PROCEDURE ROOM [facility] Psychiatry        Today's Diagnoses     Severe episode of recurrent major depressive disorder, without psychotic features (H)    -  1       Follow-ups after your visit        Your next 10 appointments already scheduled     Mar 05, 2018  3:15 PM CST   TMS TREATMENT with ME UMP PROCEDURE ROOM   [facility] Psychiatry ([facility])    [address]  [facility] [address]   [phone]            Mar 06, 2018  3:15 PM CST   [facility]   [facility] Psychiatry ([facility])    [address]  [facility] [address]   [phone]            Mar 07, 2018  3:15 PM CST   [facility]   [facility] Psychiatry ([facility])    [address]  [facility] [address]   [phone]            Mar 08, 2018 12:00 PM CST   Adult Psychotherapy with Brenda Maldonado, PhD   [facility] Psychiatry ([facility])    [address]  [facility] [address]   [phone]            Mar 08, 2018  3:15 PM CST   [facility]   [facility] Psychiatry ([facility])    [address]  [facility] [address]   [phone]            Mar 09, 2018  3:15 PM CST   [facility]   [facility] Psychiatry ([facility])    [address]  [facility] [address]   [phone]            Mar 12, 2018  3:15 PM CDT   TMS TREATMENT with [facility] PROCEDURE ROOM   [facility] Psychiatry ([facility])    [address]  [facility] [address]   [phone]            Mar 14, 2018  3:15 PM CDT   TMS TREATMENT with [facility] PROCEDURE  ROOM   [facility] Psychiatry ([facility])    [address]  [facility] [address]   [phone]            Mar 15, 2018 12:00 PM CDT   Adult Psychotherapy with Brenda Maldonado, PhD   [facility] Psychiatry ([facility])    [address]  [facility] [address]   [phone]            Mar 16, 2018  3:15 PM CDT   TMS TREATMENT with ME [facility] PROCEDURE ROOM   [facility] Psychiatry ([facility])    [address]  [facility] [address] [phone]              Who to contact     Please call your clinic at [phone] to:    Ask questions about your health    Make or cancel appointments    Discuss your medicines    Learn about your test results    Speak to your doctor            Additional Information About Your Visit        Pocketbookhart Information     Kopo Kopo is an electronic gateway that provides easy, online access to your medical records. With Kopo Kopo, you can request a clinic appointment, read your test results, renew a prescription or communicate with your care team.     To sign up for Kopo Kopo visit the website at [email or web address]   You will be asked to enter the access code listed below, as well as some personal information. Please follow the directions to create your username and password.     Your access code is: [email or web address]  Expires: 2018  4:15 PM     Your access code will  in 90 days. If you need help or a new code, please contact your [facility] or call [phone] for assistance.        Care EveryWhere ID     This is your Care EveryWhere ID. This could be used by other organizations to access your [place] medical records  [id number]        Your Vitals Were     Pulse                   101            Blood Pressure from Last 3 Encounters:   18 130/78   18 (!) 143/91   18 128/80    Weight from Last 3 Encounters:   18 92.1 kg (203 lb)   18 92.1  kg (203 lb)   02/09/18 92.1 kg (203 lb)              We Performed the Following     C TRANSCRANIAL MAGNETIC STIMULATION TREATMENT,DELIVERY/MANAGEMENT        Primary Care Provider Office Phone # Fax #    Stephy Valentin [phone]       [facility] [address]   [place] [address]        Equal Access to Services     RAPHAEL WASHBURN : Hadii aad ku hadasho Soomaali, waaxda luqadaha, qaybta kaalmada adeegyada, waxay idiin hayaan adeeg kharash laJesus Manuelaan . So [facility] [place] [phone].    ATENCIÓN: Si habla español, tiene a chamberlain disposición servicios gratuitos de asistencia lingüística. Llame al [phone].    We comply with applicable federal civil rights laws and [place] laws. We do not discriminate on the basis of race, color, national origin, age, disability, sex, sexual orientation, or gender identity.            Thank you!     Thank you for choosing [facility] PSYCHIATRY  for your care. Our goal is always to provide you with excellent care. Hearing back from our patients is one way we can continue to improve our services. Please take a few minutes to complete the written survey that you may receive in the mail after your visit with us. Thank you!             Your Updated Medication List - Protect others around you: Learn how to safely use, store and throw away your medicines at www.disposemymeds.org.      Notice  As of 2/21/2018 11:59 PM    You have not been prescribed any medications.

## 2018-02-21 NOTE — PROGRESS NOTES
[facility] TMS Program  [address], [address]  [place] [address]  TMS Procedure Note   Aure Joy MRN# [id number]  Age: 47 year old year old YOB: 1970    Pre-Procedure:  History and Physical: Reviewed in medical record  Consent Signed by: Aure Joy  On: 01/29/18    Clinical Narrative:  Patient tolerating treatment well with no side effects. IDSSR= 38     Indications for TMS:  MDD, recurrent, severe; 4+ medication trials (from 2+ classes) ineffective; Psychotherapy ineffective.     Pre-Procedure Diagnosis:  MDD, recurrent, severe F33.2    Treatment Hx:  Treatment number this series: 18  Total lifetime treatment number: 18    Allergies   Allergen Reactions     Codeine Nausea     Other  [No Clinical Screening - See Comments] Nausea and Vomiting and Other (See Comments)     general anesthesia- uncontrolled vomitting      BP (!) 155/92 (BP Location: Right arm, Patient Position: Chair, Cuff Size: Adult Regular)  Pulse 101    Pause for the Cause  Right patient:  Yes  Right procedure/correct coil:  Yes; rTMS; cpt 02065; H1 coil.   Earplugs in place:  Yes    Procedure  Patient was seated in procedure chair. Identity and procedure was verified. Ear plugs were placed in ears and patient-specific cap was placed on head and tightened appropriately. Ruler locations were verified. Coil was placed at treatment location and stimulator was set to parameters described below. A test train was delivered and pt tolerated train. Given pt tolerance, 55 treatment trains were delivered. Pt tolerated procedure well with some minor facial and hand movement.      Motor Threshold Determination  Distance from nasion to inion: 35.5  MT 1: 0 - 6 - 16 @ 69% on 1/29/2018  MT 2: 0 - 6 - 16 @ 69% on 2/6/2018   MT 3: 0 - 6 - 16 @ 69% on 2/13/2018         Stimulation Parameters  Frequency: 18 Hz     Train duration: 2 sec  Total pulses delivered: 1980  Inter-train interval: 20 sec  Tx Loc: 0 - eyebrows  -  14  Energy: 83% (120% MT)  Trains: 55 trains      Post-Procedure Diagnosis:  MDD, recurrent, severe F33.2    Jael Alexandra RN   [facility] Neuromodulation    Plan   - Cont TMS      I didn t see the patient during/after treatment but remained available in the clinic during  treatment.      NELSY Ordonez CNP  [facility] Neuromodulation

## 2018-02-22 ENCOUNTER — OFFICE VISIT (OUTPATIENT)
Dept: PSYCHIATRY | Facility: CLINIC | Age: 48
End: 2018-02-22
Payer: COMMERCIAL

## 2018-02-22 VITALS — HEART RATE: 87 BPM | SYSTOLIC BLOOD PRESSURE: 132 MMHG | DIASTOLIC BLOOD PRESSURE: 78 MMHG

## 2018-02-22 DIAGNOSIS — F33.2 SEVERE EPISODE OF RECURRENT MAJOR DEPRESSIVE DISORDER, WITHOUT PSYCHOTIC FEATURES (H): Primary | ICD-10-CM

## 2018-02-22 ASSESSMENT — PATIENT HEALTH QUESTIONNAIRE - PHQ9: SUM OF ALL RESPONSES TO PHQ QUESTIONS 1-9: 16

## 2018-02-22 NOTE — PROGRESS NOTES
[facility] TMS Program  [address], [address]  [place] [address]  TMS Procedure Note   Aure Joy MRN# [id number]  Age: 47 year old year old YOB: 1970    Pre-Procedure:  History and Physical: Reviewed in medical record  Consent Signed by: Aure Joy  On: 01/29/18    Clinical Narrative:  Patient tolerating treatment well with no side effects. IDSSR= 38     Indications for TMS:  MDD, recurrent, severe; 4+ medication trials (from 2+ classes) ineffective; Psychotherapy ineffective.     Pre-Procedure Diagnosis:  MDD, recurrent, severe F33.2    Treatment Hx:  Treatment number this series: 19  Total lifetime treatment number: 19    Allergies   Allergen Reactions     Codeine Nausea     Other  [No Clinical Screening - See Comments] Nausea and Vomiting and Other (See Comments)     general anesthesia- uncontrolled vomitting      /78 (BP Location: Left arm, Patient Position: Sitting, Cuff Size: Adult Regular)  Pulse 87    Pause for the Cause  Right patient:  Yes  Right procedure/correct coil:  Yes; rTMS; cpt 87509; H1 coil.   Earplugs in place:  Yes    Procedure  Patient was seated in procedure chair. Identity and procedure was verified. Ear plugs were placed in ears and patient-specific cap was placed on head and tightened appropriately. Ruler locations were verified. Coil was placed at treatment location and stimulator was set to parameters described below. A test train was delivered and pt tolerated train. Given pt tolerance, 55 treatment trains were delivered. Pt tolerated procedure well with some minor facial and hand movement.      Motor Threshold Determination  Distance from nasion to inion: 35.5  MT 1: 0 - 6 - 16 @ 69% on 1/29/2018  MT 2: 0 - 6 - 16 @ 69% on 2/6/2018   MT 3: 0 - 6 - 16 @ 69% on 2/13/2018         Stimulation Parameters  Frequency: 18 Hz     Train duration: 2 sec  Total pulses delivered: 1980  Inter-train interval: 20 sec  Tx Loc: 0 - eyebrows  - 14  Energy:  83% (120% MT)  Trains: 55 trains      Post-Procedure Diagnosis:  MDD, recurrent, severe F33.2    Jael Alexandra RN   [facility] Neuromodulation    Plan   - Cont TMS      I didn t see the patient during/after treatment but remained available in the clinic during  treatment.      ESTELA Zamudio MD  [facility] Neuromodulation

## 2018-02-22 NOTE — MR AVS SNAPSHOT
After Visit Summary   2/22/2018    Aure Joy    MRN: [id number]           Patient Information     Date Of Birth          1970        Visit Information        Provider Department      2/22/2018 3:15 PM ME UMP PROCEDURE ROOM [facility] Psychiatry        Today's Diagnoses     Severe episode of recurrent major depressive disorder, without psychotic features (H)    -  1       Follow-ups after your visit        Your next 10 appointments already scheduled     Feb 23, 2018  3:15 PM CST   TMS TREATMENT with ME UMP PROCEDURE ROOM   P Psychiatry ([facility])    [address]  [facility] [address]   [phone]            Feb 26, 2018  3:15 PM CST   TMS TREATMENT with ME UMP PROCEDURE ROOM   [facility] Psychiatry ([facility])    [address]  [facility] [address]   [phone]            Feb 27, 2018  3:15 PM CST   TMS TREATMENT with ME UMP PROCEDURE ROOM   [facility] Psychiatry ([facility])    [address]  [facility] [address]   [phone]            Feb 28, 2018  3:15 PM CST   TMS TREATMENT with ME UMP PROCEDURE ROOM   [facility] Psychiatry ([facility])    [address]  [facility] [address]   [phone]            Mar 01, 2018 12:00 PM CST   Adult Psychotherapy with Brenda Maldonado, PhD   [facility] Psychiatry ([facility])    [address] [place] [address]   [phone]            Mar 01, 2018  3:15 PM CST   TMS TREATMENT with ME UMP PROCEDURE ROOM   [facility] Psychiatry ([facility])    [address]  [facility] [address]   [phone]            Mar 02, 2018  3:15 PM CST   TMS TREATMENT with ME UMP PROCEDURE ROOM   [facility] Psychiatry ([facility])    [address]  [facility] [address]   [phone]            Mar 05, 2018  3:15 PM CST   TMS TREATMENT with ME UMP PROCEDURE  ROOM   [facility] Psychiatry ([facility])    [address]  [facility] [address]   [phone]            Mar 06, 2018  3:15 PM CST   TMS TREATMENT with ME UMP PROCEDURE ROOM   [facility])    [address]  [facility] [address]   [phone]            Mar 07, 2018  3:15 PM CST   TMS TREATMENT with ME UMP PROCEDURE ROOM   [facility])    [address]  [facility] [address]   [phone]              Who to contact     Please call your clinic at [phone] to:    Ask questions about your health    Make or cancel appointments    Discuss your medicines    Learn about your test results    Speak to your doctor            Additional Information About Your Visit        PaymentWorkshart Information     LiquidPiston is an electronic gateway that provides easy, online access to your medical records. With LiquidPiston, you can request a clinic appointment, read your test results, renew a prescription or communicate with your care team.     To sign up for LiquidPiston visit the website at [email or web address]   You will be asked to enter the access code listed below, as well as some personal information. Please follow the directions to create your username and password.     Your access code is: [email or web address]  Expires: 2018  4:15 PM     Your access code will  in 90 days. If you need help or a new code, please contact your [facility] or call [phone] for assistance.        Care EveryWhere ID     This is your Care EveryWhere ID. This could be used by other organizations to access your [facility] medical records  [id number]        Your Vitals Were     Pulse                   87            Blood Pressure from Last 3 Encounters:   18 132/78   18 (!) 155/92   18 138/80    Weight from Last 3 Encounters:   18 92.1 kg (203 lb)   18 92.1 kg (203 lb)    02/09/18 92.1 kg (203 lb)              We Performed the Following     C TRANSCRANIAL MAGNETIC STIMULATION TREATMENT,DELIVERY/MANAGEMENT        Primary Care Provider Office Phone # Fax #    Stephy Valentin [phone]       [facility] [address] [place] [address]        Equal Access to Services     [facility] MADDISON : Hadii aad ku hadasho Soomaali, waaxda luqadaha, qaybta kaalmada adeegyada, waxay idiin hayaan adeeg kharash la'aan . So [facility] [phone].    ATENCIÓN: Si habla español, tiene a chamberlain disposición servicios gratuitos de asistencia lingüística. Llame al [phone].    We comply with applicable federal civil rights laws and [place] laws. We do not discriminate on the basis of race, color, national origin, age, disability, sex, sexual orientation, or gender identity.            Thank you!     Thank you for choosing [facility] PSYCHIATRY  for your care. Our goal is always to provide you with excellent care. Hearing back from our patients is one way we can continue to improve our services. Please take a few minutes to complete the written survey that you may receive in the mail after your visit with us. Thank you!             Your Updated Medication List - Protect others around you: Learn how to safely use, store and throw away your medicines at www.disposemymeds.org.      Notice  As of 2/22/2018  4:26 PM    You have not been prescribed any medications.

## 2018-02-22 NOTE — MR AVS SNAPSHOT
After Visit Summary   2/22/2018    Aure Joy    MRN: [id number]           Patient Information     Date Of Birth          1970        Visit Information        Provider Department      2/22/2018 12:00 PM Brenda Maldonado, PhD [facility] Psychiatry        Today's Diagnoses     Severe episode of recurrent major depressive disorder, without psychotic features (H)    -  1      Care Instructions    Weekly behavioral activation therapy sessions on Thursdays at noon through the course of TMS.          Follow-ups after your visit        Follow-up notes from your care team     Return in 7 days (on 3/1/2018) for Behavioral activation psychotherapy session.      Your next 10 appointments already scheduled     Feb 22, 2018  3:15 PM CST   TMS TREATMENT with [facility] PROCEDURE ROOM   [facility] Psychiatry ([facility])    [address]  [facility] [address]   [phone]            Feb 23, 2018  3:15 PM CST   TMS TREATMENT with [facility] PROCEDURE ROOM   [facility] Psychiatry ([facility])    [address]  [facility] [address]   [phone]            Feb 26, 2018  3:15 PM CST   TMS TREATMENT with [facility] PROCEDURE ROOM   [facility] Psychiatry ([facility])    [address]  [facility] [address]   [phone]            Feb 27, 2018  3:15 PM CST   TMS TREATMENT with [facility] PROCEDURE ROOM   [facility] Psychiatry ([facility])    [address]  [facility] [address]   [phone]            Feb 28, 2018  3:15 PM CST   TMS TREATMENT with [facility] PROCEDURE ROOM   [facility] Psychiatry ([facility])    [address]  [facility] [address]   [phone]            Mar 01, 2018 12:00 PM CST   Adult Psychotherapy with Brenda Maldonado, PhD   [facility] Psychiatry ([facility])    [address] [place] [address]   [phone]            Mar 01,    3:15 PM CST   TMS TREATMENT with ME UMP PROCEDURE ROOM   [facility] Psychiatry ([facility])    [address]  [facility] [address]   [phone]            Mar 02, 2018  3:15 PM CST   TMS TREATMENT with ME UMP PROCEDURE ROOM   [facility] Psychiatry ([facility])    [address]  [facility] [address]   [phone]            Mar 05, 2018  3:15 PM CST   TMS TREATMENT with ME UMP PROCEDURE ROOM   [facility] Psychiatry ([facility])    [address]  [facility] [address]   [phone]            Mar 06, 2018  3:15 PM CST   TMS TREATMENT with ME UMP PROCEDURE ROOM   [facility] Psychiatry ([facility])    [address] [place] [address]   [phone]              Who to contact     Please call your clinic at [phone] to:    Ask questions about your health    Make or cancel appointments    Discuss your medicines    Learn about your test results    Speak to your doctor            Additional Information About Your Visit        [email or web address] Information     Spling is an electronic gateway that provides easy, online access to your medical records. With Spling, you can request a clinic appointment, read your test results, renew a prescription or communicate with your care team.     To sign up for Spling visit the website at [email or web address]   You will be asked to enter the access code listed below, as well as some personal information. Please follow the directions to create your username and password.     Your access code is: [email or web address]  Expires: 2018  4:15 PM     Your access code will  in 90 days. If you need help or a new code, please contact your [facility] or call [phone] for assistance.        Care EveryWhere ID     This is your Care EveryWhere ID. This could be used by other organizations to access your [facility]  records  [id number]         Blood Pressure from Last 3 Encounters:   02/21/18 (!) 155/92   02/20/18 138/80   02/19/18 149/89    Weight from Last 3 Encounters:   02/20/18 92.1 kg (203 lb)   02/12/18 92.1 kg (203 lb)   02/09/18 92.1 kg (203 lb)              Today, you had the following     No orders found for display       Primary Care Provider Office Phone # Fax #    Stephy Valentin [phone]       [facility] [address]   [facility] [address]        Equal Access to Services     [facility]: Hadii aad phil hadmary Sodev, waaxda luqadaha, qaybta kaalmada adeamyyada, yordy araujo . So [facility] [phone].    ATENCIÓN: Si habla español, tiene a chamberlain disposición servicios gratuitos de asistencia lingüística. Llame al [phone].    We comply with applicable federal civil rights laws and [place] laws. We do not discriminate on the basis of race, color, national origin, age, disability, sex, sexual orientation, or gender identity.            Thank you!     Thank you for choosing [facility] PSYCHIATRY  for your care. Our goal is always to provide you with excellent care. Hearing back from our patients is one way we can continue to improve our services. Please take a few minutes to complete the written survey that you may receive in the mail after your visit with us. Thank you!             Your Updated Medication List - Protect others around you: Learn how to safely use, store and throw away your medicines at www.disposemymeds.org.      Notice  As of 2/22/2018  1:17 PM    You have not been prescribed any medications.

## 2018-02-22 NOTE — PROGRESS NOTES
Office Visit  Brenda Maldonado, PhD   Psychology  Severe episode of recurrent major depressive disorder, without psychotic features (H)   Dx  Referred by Evelyn Zamudio MD   Reason for visit    Progress Notes      Psychotherapy Session (Behavioral Activation)      [facility] Program  [address]  [place] [address]       Name: Aure Joy   MRN# [id number]  Age: 47 year old   YOB: 1970  Date of Session: February 22, 2018  Duration: 45 minutes (starting time = 12:00; stopping time = 12:45)                Session Content  Ms. Joy completed all of the behavioral activation homework assigned at last week's session. We reviewed self-monitoring of the impact of specific activities and noted the effectiveness of cleaning, time with her daughter, and interactions with her dog as particularly mood elevating. We discussed ways of increasing the frequency of these activities as well as strategies to manage her 's criticism (e.g., of cleaning). We then reviewed values assignment and discussed behavioral manifestations (e.g., volunteer activities) as well as potential challenges. We discussed how to engage in activities that elicit positive emotions but do not represent avoidance behavior. Reviewed current status including significant improvement in mood as well as increased activity frequency as well as improved communication with her . Discussed plans to attend daughter's school play and specific strategies (e.g., where to sit) and emotion regulation activities in the context of this challenge.    Assessment  Increased behavioral frequency, highly engaged in behavioral activation strategies, improved mood by self-report and observation. Denies suicidal ideation.    Mental Status Exam   Appearance: appropriate  Attitude: cooperative  Behavior: normal  Eye Contact: normal  Speech: normal  Orientation: oriented  x3  Mood:  Anxious with intermittent  brightening  Affect: Mood congruent  Thought Process: clear  Suicidal Ideation: Denies current suicidal ideation; denies plan or intent, assures of safety   Hallucinations: none    Plan  Continue weekly behavioral activation sessions in the context of TMS treatment. Continue DBT skills group at [facility].    Signed:  Brenda Maldonado, Ph.D., L.P.    Department of Psychiatry  [facility]

## 2018-02-23 ENCOUNTER — OFFICE VISIT (OUTPATIENT)
Dept: PSYCHIATRY | Facility: CLINIC | Age: 48
End: 2018-02-23
Payer: COMMERCIAL

## 2018-02-23 VITALS — SYSTOLIC BLOOD PRESSURE: 140 MMHG | HEART RATE: 84 BPM | DIASTOLIC BLOOD PRESSURE: 79 MMHG

## 2018-02-23 DIAGNOSIS — F33.2 SEVERE EPISODE OF RECURRENT MAJOR DEPRESSIVE DISORDER, WITHOUT PSYCHOTIC FEATURES (H): Primary | ICD-10-CM

## 2018-02-23 ASSESSMENT — PATIENT HEALTH QUESTIONNAIRE - PHQ9: SUM OF ALL RESPONSES TO PHQ QUESTIONS 1-9: 16

## 2018-02-23 NOTE — MR AVS SNAPSHOT
After Visit Summary   2/23/2018    Aure Joy    MRN: [id number]           Patient Information     Date Of Birth          1970        Visit Information        Provider Department      2/23/2018 3:15 PM ME UMP PROCEDURE ROOM [facility] Psychiatry        Today's Diagnoses     Severe episode of recurrent major depressive disorder, without psychotic features (H)    -  1       Follow-ups after your visit        Your next 10 appointments already scheduled     Feb 26, 2018  3:15 PM CST   TMS TREATMENT with ME UMP PROCEDURE ROOM   [facility] Psychiatry ([facility])    [address]  [facility] [address]   [phone]            Feb 27, 2018  3:15 PM CST   TMS TREATMENT with ME UMP PROCEDURE ROOM   [facility] ([facility])    [address]  [facility] [address]   [phone]            Feb 28, 2018  3:15 PM CST   TMS TREATMENT with ME UMP PROCEDURE ROOM   [facility] ([facility])    [address]  [facility] [address]   [phone]            Mar 01, 2018 12:00 PM CST   Adult Psychotherapy with Brenda Maldonado, PhD   [facility] ([facility])    [address]  [facility] [address]   [phone]            Mar 01, 2018  3:15 PM CST   TMS TREATMENT with ME UMP PROCEDURE ROOM   [facility] ([facility])    [address]  [facility] [address]   [phone]            Mar 02, 2018  3:15 PM CST   TMS TREATMENT with ME UMP PROCEDURE ROOM   [facility] ([facility])    [address]  [facility] [address]   [phone]            Mar 05, 2018  3:15 PM CST   TMS TREATMENT with ME UMP PROCEDURE ROOM   [facility] ([facility])    [address]  [facility] [address]   [phone]            Mar 06, 2018  3:15 PM CST   TMS TREATMENT with ME [facility] PROCEDURE  ROOM   [facility] Psychiatry ([facility])    [address]  [facility] [address]   [phone]            Mar 07, 2018  3:15 PM CST   TMS TREATMENT with ME UMP PROCEDURE ROOM   [facility]    [address]  [facility] [address]   [phone]            Mar 08, 2018  3:15 PM CST   TMS TREATMENT with ME UMP PROCEDURE ROOM   [facility]    [address]  [facility] [address]   [phone]              Who to contact     Please call your clinic at [phone] to:    Ask questions about your health    Make or cancel appointments    Discuss your medicines    Learn about your test results    Speak to your doctor            Additional Information About Your Visit        Brain Sentryhart Information     [employer]udev is an electronic gateway that provides easy, online access to your medical records. With Trudev, you can request a clinic appointment, read your test results, renew a prescription or communicate with your care team.     To sign up for Trudev visit the website at [email or web address]   You will be asked to enter the access code listed below, as well as some personal information. Please follow the directions to create your username and password.     Your access code is: [email or web address]  Expires: 2018  4:15 PM     Your access code will  in 90 days. If you need help or a new code, please contact your [facility] or call [phone] for assistance.        Care EveryWhere ID     This is your Care EveryWhere ID. This could be used by other organizations to access your [place] medical records  [id number]        Your Vitals Were     Pulse                   84            Blood Pressure from Last 3 Encounters:   18 140/79   18 132/78   18 (!) 155/92    Weight from Last 3 Encounters:   18 92.1 kg (203 lb)   18 92.1 kg (203 lb)    02/09/18 92.1 kg (203 lb)              We Performed the Following     C REPET TMS TX SUBSEQ MOTR THRESHLD W/DELIV & MNGT        Primary Care Provider Office Phone # Fax #    Stephy Valentin [phone]       [facility] [address]   [place] [address]        Equal Access to Services     [facility]: Hadii aad ku hadasho Soomaali, waaxda luqadaha, qaybta kaalmada adeegyada, waxay idiin hayaan adeeg kharash la'aan . So [facility] [phone].    ATENCIÓN: Si habla español, tiene a chamberlain disposición servicios gratuitos de asistencia lingüística. Llame al [phone].    We comply with applicable federal civil rights laws and [place] laws. We do not discriminate on the basis of race, color, national origin, age, disability, sex, sexual orientation, or gender identity.            Thank you!     Thank you for choosing [facility] PSYCHIATRY  for your care. Our goal is always to provide you with excellent care. Hearing back from our patients is one way we can continue to improve our services. Please take a few minutes to complete the written survey that you may receive in the mail after your visit with us. Thank you!             Your Updated Medication List - Protect others around you: Learn how to safely use, store and throw away your medicines at www.disposemymeds.org.      Notice  As of 2/23/2018  4:39 PM    You have not been prescribed any medications.

## 2018-02-23 NOTE — PROGRESS NOTES
[facility] TMS Program  [address]  [place] [address]  TMS Procedure Note   Aure Joy MRN# [id number]  Age: 47 year old year old YOB: 1970    Pre-Procedure:  History and Physical: Reviewed in medical record  Consent Signed by: Aure Joy  On: 01/29/18    Clinical Narrative:  Patient tolerating treatment well with no side effects. IDSSR= 38     Indications for TMS:  MDD, recurrent, severe; 4+ medication trials (from 2+ classes) ineffective; Psychotherapy ineffective.     Pre-Procedure Diagnosis:  MDD, recurrent, severe F33.2    Treatment Hx:  Treatment number this series: 20  Total lifetime treatment number: 20    Allergies   Allergen Reactions     Codeine Nausea     Other  [No Clinical Screening - See Comments] Nausea and Vomiting and Other (See Comments)     general anesthesia- uncontrolled vomitting      /79 (BP Location: Left arm, Patient Position: Sitting, Cuff Size: Adult Regular)  Pulse 84    Pause for the Cause  Right patient:  Yes  Right procedure/correct coil:  Yes; rTMS; cpt 42524; H1 coil.   Earplugs in place:  Yes    Procedure  Patient was seated in procedure chair. Identity and procedure was verified. Ear plugs were placed in ears and patient-specific cap was placed on head and tightened appropriately. Ruler locations were verified. Coil was placed at initial MT location and stimulator was set to initial MT. MT was retested and found as described below. Coil was placed at treatment location and stimulator was set to stimulation parameters detailed below. A test train was delivered and pt tolerated train fine. Given pt tolerance, 55 trains were delivered. Pt tolerated procedure well with some minor facial and hand movement.    Motor Threshold Determination  Distance from nasion to inion: 35.5  MT 1: 0 - 6 - 16 @ 69% on 1/29/2018  MT 2: 0 - 6 - 16 @ 69% on 2/6/2018   MT 3: 0 - 6 - 16 @ 69% on 2/13/2018   MT 3: 0 - 6 - 16 @ 69% on 2/23/2018          Stimulation Parameters  Frequency: 18 Hz     Train duration: 2 sec  Total pulses delivered: 1980  Inter-train interval: 20 sec  Tx Loc: 0 - eyebrows  - 14  Energy: 83% (120% MT)  Trains: 55 trains      Post-Procedure Diagnosis:  MDD, recurrent, severe F33.2    Jael Alexandra RN   [facility] Neuromodulation    Plan   - Cont TMS      I did see the patient during/after treatment and remained available in the clinic during treatment. Pt reports significant improvement in symptoms of depression including improvement in mood, energy level, sleep, pain, and interest in enjoyable activities. She reports feeling better than she has in many years. Described a number of situations in which she was able to tolerate stress/fatigue and appropriately cope. Discussed prognosis for maintenance of benefit for the future as well as likelihood of TMS being an effective treatment should her depression return. She also describes significant improvement in her symptoms of anxiety. Expressed the belief that amelioration of depressed mood has left her feeling that she can adequately cope/manage anxiety. Symptoms of agoraphobia feel more manageable than they ever have. Plan to continue TMS with possibility of extended taper given good current response.    I spent 30 minutes with this patient, greater than 50% of which was spent on counseling.    ESTELA Zamudio MD  [facility] Neuromodulation

## 2018-02-26 ENCOUNTER — OFFICE VISIT (OUTPATIENT)
Dept: PSYCHIATRY | Facility: CLINIC | Age: 48
End: 2018-02-26
Payer: COMMERCIAL

## 2018-02-26 VITALS — DIASTOLIC BLOOD PRESSURE: 87 MMHG | SYSTOLIC BLOOD PRESSURE: 134 MMHG | HEART RATE: 96 BPM

## 2018-02-26 DIAGNOSIS — F33.2 SEVERE EPISODE OF RECURRENT MAJOR DEPRESSIVE DISORDER, WITHOUT PSYCHOTIC FEATURES (H): Primary | ICD-10-CM

## 2018-02-26 NOTE — PROGRESS NOTES
[facility] TMS Program  [address]  [place] [address]  TMS Procedure Note   Aure Joy MRN# [id number]  Age: 47 year old year old YOB: 1970    Pre-Procedure:  History and Physical: Reviewed in medical record  Consent Signed by: Aure Joy  On: 01/29/18    Clinical Narrative:  Patient tolerating treatment well with no side effects. IDSSR= 38     Indications for TMS:  MDD, recurrent, severe; 4+ medication trials (from 2+ classes) ineffective; Psychotherapy ineffective.     Pre-Procedure Diagnosis:  MDD, recurrent, severe F33.2    Treatment Hx:  Treatment number this series: 21  Total lifetime treatment number: 21    Allergies   Allergen Reactions     Codeine Nausea     Other  [No Clinical Screening - See Comments] Nausea and Vomiting and Other (See Comments)     general anesthesia- uncontrolled vomitting      /87 (BP Location: Left arm, Patient Position: Sitting, Cuff Size: Adult Regular)  Pulse 96    Pause for the Cause  Right patient:  Yes  Right procedure/correct coil:  Yes; rTMS; cpt 18274; H1 coil.   Earplugs in place:  Yes    Procedure  Patient was seated in procedure chair. Identity and procedure was verified. Ear plugs were placed in ears and patient-specific cap was placed on head and tightened appropriately. Ruler locations were verified. Coil was placed at treatment location and stimulator was set to parameters described below. A test train was delivered and pt tolerated train. Given pt tolerance, 55 treatment trains were delivered. Pt tolerated procedure with some minor facial and hand movement.    Motor Threshold Determination  Distance from nasion to inion: 35.5  MT 1: 0 - 6 - 16 @ 69% on 1/29/2018  MT 2: 0 - 6 - 16 @ 69% on 2/6/2018   MT 3: 0 - 6 - 16 @ 69% on 2/13/2018   MT 3: 0 - 6 - 16 @ 69% on 2/23/2018         Stimulation Parameters  Frequency: 18 Hz     Train duration: 2 sec  Total pulses delivered: 1980  Inter-train interval: 20 sec  Tx Loc:  0 - eyebrows  - 14  Energy: 83% (120% MT)  Trains: 55 trains      Post-Procedure Diagnosis:  MDD, recurrent, severe F33.2    Jael Alexandra RN   [facility] Neuromodulation    Plan   - Cont TMS    I didn t see the patient during/after treatment but remained available in the clinic during  treatment.    Evelyn Zamudio MD  [facility] Neuromodulation

## 2018-02-26 NOTE — MR AVS SNAPSHOT
After Visit Summary   2/26/2018    Aure Joy    MRN: [id number]           Patient Information     Date Of Birth          1970        Visit Information        Provider Department      2/26/2018 3:15 PM ME UMP PROCEDURE ROOM [facility] Psychiatry        Today's Diagnoses     Severe episode of recurrent major depressive disorder, without psychotic features (H)    -  1       Follow-ups after your visit        Your next 10 appointments already scheduled     Feb 27, 2018  3:15 PM CST   TMS TREATMENT with ME UMP PROCEDURE ROOM   [facility] ([facility])    [address]  [facility] [address]   [phone]            Feb 28, 2018  3:15 PM CST   TMS TREATMENT with ME UMP PROCEDURE ROOM   [facility] ([facility])    [address]  [facility] [address]   [phone]            Mar 01, 2018 12:00 PM CST   Adult Psychotherapy with Brenda Maldonado, PhD   [facility] ([facility])    [address]  [facility] [address]   [phone]            Mar 01, 2018  3:15 PM CST   TMS TREATMENT with ME UMP PROCEDURE ROOM   [facility] ([facility])    [address]  [facility] [address]   [phone]            Mar 02, 2018  3:15 PM CST   TMS TREATMENT with ME UMP PROCEDURE ROOM   [facility] ([facility])    [address]  [facility] [address]   [phone]            Mar 05, 2018  3:15 PM CST   TMS TREATMENT with ME UMP PROCEDURE ROOM   [facility] ([facility])    [address]  [facility] [address]   [phone]            Mar 06, 2018  3:15 PM CST   TMS TREATMENT with ME UMP PROCEDURE ROOM   [facility] ([facility])    [address]  [facility] [address]   [phone]            Mar 07, 2018  3:15 PM CST   TMS TREATMENT with ME UMP PROCEDURE  ROOM   [email or web address]ry ([facility])    [address]  [facility] [address]   [phone]            Mar 08, 2018 12:00 PM CST   Adult Psychotherapy with Brenda Maldonado, PhD   [email or web address] ([facility])    [address]  [facility] [address]   [phone]            Mar 08, 2018  3:15 PM CST   TMS TREATMENT with ME [email or web address] PROCEDURE ROOM   [email or web address]ry ([facility])    [address]  [facility] [address]   [phone]              Who to contact     Please call your clinic at [phone] to:    Ask questions about your health    Make or cancel appointments    Discuss your medicines    Learn about your test results    Speak to your doctor            Additional Information About Your Visit        KIHEITAIhart Information     [email or web address] is an electronic gateway that provides easy, online access to your medical records. With [email or web address], you can request a clinic appointment, read your test results, renew a prescription or communicate with your care team.     To sign up for [email or web address] visit the website at [email or web address]   You will be asked to enter the access code listed below, as well as some personal information. Please follow the directions to create your username and password.     Your access code is: [email or web address]  Expires: 2018  4:15 PM     Your access code will  in 90 days. If you need help or a new code, please contact your [facility] or call [phone] for assistance.        Care EveryWhere ID     This is your Care EveryWhere ID. This could be used by other organizations to access your [place] medical records  [id number]        Your Vitals Were     Pulse                   96            Blood Pressure from Last 3 Encounters:   18 134/87   18 140/79   18 132/78    Weight from Last 3 Encounters:   18 203 lb (92.1 kg)   18 203 lb  (92.1 kg)   02/09/18 203 lb (92.1 kg)              We Performed the Following     C TRANSCRANIAL MAGNETIC STIMULATION TREATMENT,DELIVERY/MANAGEMENT        Primary Care Provider Office Phone # Fax #    Stephy Valentin [phone]       [facility] [address]   [place] [address]        Equal Access to Services     [facility] : Hadii aad ku hadasho Soomaali, waaxda luqadaha, qaybta kaalmada adeegyada, waxay idiin hayaan adeeg kharash lamayan . So [facility] [phone].    ATENCIÓN: Si habla español, tiene a chamberlain disposición servicios gratuitos de asistencia lingüística. Llame al [phone].    We comply with applicable federal civil rights laws and [place] laws. We do not discriminate on the basis of race, color, national origin, age, disability, sex, sexual orientation, or gender identity.            Thank you!     Thank you for choosing [facility] PSYCHIATRY  for your care. Our goal is always to provide you with excellent care. Hearing back from our patients is one way we can continue to improve our services. Please take a few minutes to complete the written survey that you may receive in the mail after your visit with us. Thank you!             Your Updated Medication List - Protect others around you: Learn how to safely use, store and throw away your medicines at www.disposemymeds.org.      Notice  As of 2/26/2018  4:09 PM    You have not been prescribed any medications.

## 2018-02-27 ENCOUNTER — OFFICE VISIT (OUTPATIENT)
Dept: PSYCHIATRY | Facility: CLINIC | Age: 48
End: 2018-02-27
Payer: COMMERCIAL

## 2018-02-27 VITALS — HEART RATE: 92 BPM | DIASTOLIC BLOOD PRESSURE: 78 MMHG | SYSTOLIC BLOOD PRESSURE: 120 MMHG

## 2018-02-27 DIAGNOSIS — F33.2 SEVERE EPISODE OF RECURRENT MAJOR DEPRESSIVE DISORDER, WITHOUT PSYCHOTIC FEATURES (H): Primary | ICD-10-CM

## 2018-02-27 ASSESSMENT — PATIENT HEALTH QUESTIONNAIRE - PHQ9: SUM OF ALL RESPONSES TO PHQ QUESTIONS 1-9: 3

## 2018-02-27 NOTE — PROGRESS NOTES
[facility] TMS Program  [address]  [place] [address]  TMS Procedure Note   Aure Joy MRN# [id number]  Age: 47 year old year old YOB: 1970    Pre-Procedure:  History and Physical: Reviewed in medical record  Consent Signed by: Aure Joy  On: 01/29/18    Clinical Narrative:  Patient tolerating treatment well with no side effects. IDSSR= 38     Indications for TMS:  MDD, recurrent, severe; 4+ medication trials (from 2+ classes) ineffective; Psychotherapy ineffective.     Pre-Procedure Diagnosis:  MDD, recurrent, severe F33.2    Treatment Hx:  Treatment number this series: 22  Total lifetime treatment number: 22    Allergies   Allergen Reactions     Codeine Nausea     Other  [No Clinical Screening - See Comments] Nausea and Vomiting and Other (See Comments)     general anesthesia- uncontrolled vomitting      /78 (BP Location: Left arm, Patient Position: Chair, Cuff Size: Adult Regular)  Pulse 92    Pause for the Cause  Right patient:  Yes  Right procedure/correct coil:  Yes; rTMS; cpt 09025; H1 coil.   Earplugs in place:  Yes    Procedure  Patient was seated in procedure chair. Identity and procedure was verified. Ear plugs were placed in ears and patient-specific cap was placed on head and tightened appropriately. Ruler locations were verified. Coil was placed at treatment location and stimulator was set to parameters described below. A test train was delivered and pt tolerated train. Given pt tolerance, 55 treatment trains were delivered. Pt tolerated procedure well with some minor facial and hand movement.    Motor Threshold Determination  Distance from nasion to inion: 35.5  MT 1: 0 - 6 - 16 @ 69% on 1/29/2018  MT 2: 0 - 6 - 16 @ 69% on 2/6/2018   MT 3: 0 - 6 - 16 @ 69% on 2/13/2018   MT 3: 0 - 6 - 16 @ 69% on 2/23/2018         Stimulation Parameters  Frequency: 18 Hz     Train duration: 2 sec  Total pulses delivered: 1980  Inter-train interval: 20 sec  Tx  Loc: 0 - eyebrows  - 14  Energy: 83% (120% MT)  Trains: 55 trains      Post-Procedure Diagnosis:  MDD, recurrent, severe F33.2    Jael Alexandra RN   [facility] Neuromodulation    Plan   - Cont TMS    I didn t see the patient during/after treatment but remained available in the clinic during  treatment.    Evelyn Zamudio MD  [facility] Neuromodulation

## 2018-02-27 NOTE — MR AVS SNAPSHOT
After Visit Summary   2/27/2018    Aure Joy    MRN: [id number]           Patient Information     Date Of Birth          1970        Visit Information        Provider Department      2/27/2018 3:15 PM ME [facility] PROCEDURE ROOM [facility] Psychiatry        Today's Diagnoses     Severe episode of recurrent major depressive disorder, without psychotic features (H)    -  1       Follow-ups after your visit        Your next 10 appointments already scheduled     Feb 28, 2018  3:15 PM CST   [facility]   [facility] ([facility])    [address]  [facility] [address]   [phone]            Mar 01, 2018 12:00 PM CST   Adult Psychotherapy with Brenda Maldonado, PhD   [facility] Psychiatry ([facility])    [address]  [facility] [address]   [phone]            Mar 01, 2018  3:15 PM CST   TMS TREATMENT with [facility] PROCEDURE ROOM   [facility]ry ([facility])    [address]  [facility] [address]   [phone]            Mar 02, 2018  3:15 PM CST   TMS TREATMENT with [facility] PROCEDURE ROOM   [facility] ([facility])    [address]  [facility] [address]   [phone]            Mar 05, 2018  3:15 PM CST   TMS TREATMENT with [facility] PROCEDURE ROOM   [facility] ([facility])    [address]  [facility] [address]   [phone]            Mar 06, 2018  3:15 PM CST   TMS TREATMENT with [facility] PROCEDURE ROOM   [facility] ([facility])    [address]  [facility] [address]   [phone]            Mar 07, 2018  3:15 PM CST   TMS TREATMENT with [facility] PROCEDURE ROOM   [facility] ([facility])    [address]  [facility] [address]   [phone]            Mar 08, 2018 12:00 PM CST   Adult Psychotherapy with Brenda Strange  Joel, PhD   [facility] ([facility])    [address]  [facility] [address]   [phone]            Mar 08, 2018  3:15 PM CST   TMS TREATMENT with ME [facility] PROCEDURE ROOM   [facility] ([facility])    [address]  [facility] [address]   [phone]            Mar 09, 2018  3:15 PM CST   TMS TREATMENT with ME [facility] PROCEDURE ROOM   [facility])    [address]  [facility] [address]   [phone]              Who to contact     Please call your clinic at [phone] to:    Ask questions about your health    Make or cancel appointments    Discuss your medicines    Learn about your test results    Speak to your doctor            Additional Information About Your Visit        MyChart Information     [employer] is an electronic gateway that provides easy, online access to your medical records. With [employer], you can request a clinic appointment, read your test results, renew a prescription or communicate with your care team.     To sign up for [employer] visit the website at [email or web address]   You will be asked to enter the access code listed below, as well as some personal information. Please follow the directions to create your username and password.     Your access code is: [email or web address]  Expires: 2018  4:15 PM     Your access code will  in 90 days. If you need help or a new code, please contact your [facility] or call [phone] for assistance.        Care EveryWhere ID     This is your Care EveryWhere ID. This could be used by other organizations to access your [place] medical records  [id number]        Your Vitals Were     Pulse                   92            Blood Pressure from Last 3 Encounters:   18 120/78   18 134/87   18 140/79    Weight from Last 3 Encounters:   18 203 lb (92.1 kg)   18 203 lb (92.1  kg)   02/09/18 203 lb (92.1 kg)              We Performed the Following     C TRANSCRANIAL MAGNETIC STIMULATION TREATMENT,DELIVERY/MANAGEMENT        Primary Care Provider Office Phone # Fax #    Stephy Valentin [phone]       [facility] [address]   [facility] [address]        Equal Access to Services     [facility] : Hadii aad ku hadasho Soomaali, waaxda luqadaha, qaybta kaalmada adeegyada, waxay idiin hayaan adeamy khvioletash lamayan . So [facility] [phone].    ATENCIÓN: Si habla español, tiene a chamberlain disposición servicios gratuitos de asistencia lingüística. Tamara al [phone].    We comply with applicable federal civil rights laws and [place] laws. We do not discriminate on the basis of race, color, national origin, age, disability, sex, sexual orientation, or gender identity.            Thank you!     Thank you for choosing [facility] PSYCHIATRY  for your care. Our goal is always to provide you with excellent care. Hearing back from our patients is one way we can continue to improve our services. Please take a few minutes to complete the written survey that you may receive in the mail after your visit with us. Thank you!             Your Updated Medication List - Protect others around you: Learn how to safely use, store and throw away your medicines at www.disposemymeds.org.      Notice  As of 2/27/2018  3:43 PM    You have not been prescribed any medications.

## 2018-02-28 ENCOUNTER — OFFICE VISIT (OUTPATIENT)
Dept: PSYCHIATRY | Facility: CLINIC | Age: 48
End: 2018-02-28
Payer: COMMERCIAL

## 2018-02-28 VITALS — DIASTOLIC BLOOD PRESSURE: 80 MMHG | HEART RATE: 86 BPM | SYSTOLIC BLOOD PRESSURE: 128 MMHG

## 2018-02-28 DIAGNOSIS — F33.2 SEVERE EPISODE OF RECURRENT MAJOR DEPRESSIVE DISORDER, WITHOUT PSYCHOTIC FEATURES (H): Primary | ICD-10-CM

## 2018-02-28 ASSESSMENT — PATIENT HEALTH QUESTIONNAIRE - PHQ9: SUM OF ALL RESPONSES TO PHQ QUESTIONS 1-9: 2

## 2018-02-28 NOTE — MR AVS SNAPSHOT
After Visit Summary   2/28/2018    Aure Joy    MRN: [id number]           Patient Information     Date Of Birth          1970        Visit Information        Provider Department      2/28/2018 3:15 PM ME [facility] PROCEDURE ROOM [facility] Psychiatry        Today's Diagnoses     Severe episode of recurrent major depressive disorder, without psychotic features (H)    -  1       Follow-ups after your visit        Your next 10 appointments already scheduled     Mar 01, 2018 12:00 PM CST   Adult Psychotherapy with Brenda Maldonado, PhD   [facility] Psychiatry ([facility])    [address]  [facility] [address]   [phone]            Mar 01, 2018  3:15 PM CST   TMS TREATMENT with ME [facility] PROCEDURE ROOM   [facility] ([facility])    [address]  [facility] [address]   [phone]            Mar 02, 2018  3:15 PM CST   TMS TREATMENT with ME [facility] PROCEDURE ROOM   [facility] ([facility])    [address]  [facility] [address]   [phone]            Mar 05, 2018  3:15 PM CST   TMS TREATMENT with ME [facility] PROCEDURE ROOM   [facility] ([facility])    [address]  [facility] [address]   [phone]            Mar 06, 2018  3:15 PM CST   TMS TREATMENT with [facility] PROCEDURE ROOM   [facility] ([facility])    [address]  [facility] [address]   [phone]            Mar 07, 2018  3:15 PM CST   TMS TREATMENT with [facility] PROCEDURE ROOM   [facility] ([facility])    [address]  [facility] [address]   [phone]            Mar 08, 2018 12:00 PM CST   Adult Psychotherapy with Brenda Maldonado, PhD   [facility] ([facility])    [address]  [facility] [address]   [phone]            Mar 08, 2018  3:15 PM CST   [facility]   [facility] ([facility])    [address]  [facility] [address]   [phone]            Mar 09, 2018  3:15 PM CST   TMS TREATMENT with ME [facility] PROCEDURE ROOM   [facility] ([facility])    [address]  [facility] [address]   [phone]            Mar 12, 2018  3:15 PM CDT   TMS TREATMENT with ME [facility] PROCEDURE ROOM   [facility])    [address]  [facility] [address]   [phone]              Who to contact     Please call your clinic at [phone] to:    Ask questions about your health    Make or cancel appointments    Discuss your medicines    Learn about your test results    Speak to your doctor            Additional Information About Your Visit        [employer] Information     [employer] is an electronic gateway that provides easy, online access to your medical records. With [employer], you can request a clinic appointment, read your test results, renew a prescription or communicate with your care team.     To sign up for [employer] visit the website at [email or web address]   You will be asked to enter the access code listed below, as well as some personal information. Please follow the directions to create your username and password.     Your access code is: [email or web address]  Expires: 2018  4:15 PM     Your access code will  in 90 days. If you need help or a new code, please contact your [facility] or call [phone] for assistance.        Care EveryWhere ID     This is your Care EveryWhere ID. This could be used by other organizations to access your [place] medical records  [id number]        Your Vitals Were     Pulse                   86            Blood Pressure from Last 3 Encounters:   18 128/80   18 120/78   18 134/87    Weight from Last 3 Encounters:   18 203 lb (92.1 kg)   18 203 lb  (92.1 kg)   02/09/18 203 lb (92.1 kg)              We Performed the Following     C TRANSCRANIAL MAGNETIC STIMULATION TREATMENT,DELIVERY/MANAGEMENT        Primary Care Provider Office Phone # Fax #    Stephy Valentin [phone]       [facility] [facility] [address]   [place] [address]        Equal Access to Services     [facility] : Hadii aad ku hadasho Soomaali, waaxda luqadaha, qaybta kaalmada adeegyada, waxay idiin hayaan adeeg kharash lamayan . So [facility] [phone].    ATENCIÓN: Si habla español, tiene a chamberlain disposición servicios gratuitos de asistencia lingüística. Llame al [phone].    We comply with applicable federal civil rights laws and [place] laws. We do not discriminate on the basis of race, color, national origin, age, disability, sex, sexual orientation, or gender identity.            Thank you!     Thank you for choosing [facility] PSYCHIATRY  for your care. Our goal is always to provide you with excellent care. Hearing back from our patients is one way we can continue to improve our services. Please take a few minutes to complete the written survey that you may receive in the mail after your visit with us. Thank you!             Your Updated Medication List - Protect others around you: Learn how to safely use, store and throw away your medicines at www.disposemymeds.org.      Notice  As of 2/28/2018  4:11 PM    You have not been prescribed any medications.

## 2018-02-28 NOTE — PROGRESS NOTES
[facility] TMS Program  [address]  [place] [address]  TMS Procedure Note   Aure Joy MRN# [id number]  Age: 47 year old year old YOB: 1970    Pre-Procedure:  History and Physical: Reviewed in medical record  Consent Signed by: Aure Joy  On: 01/29/18    Clinical Narrative:  Patient tolerating treatment well with no side effects. IDSSR= 38     Indications for TMS:  MDD, recurrent, severe; 4+ medication trials (from 2+ classes) ineffective; Psychotherapy ineffective.     Pre-Procedure Diagnosis:  MDD, recurrent, severe F33.2    Treatment Hx:  Treatment number this series: 23  Total lifetime treatment number: 23    Allergies   Allergen Reactions     Codeine Nausea     Other  [No Clinical Screening - See Comments] Nausea and Vomiting and Other (See Comments)     general anesthesia- uncontrolled vomitting      /80 (BP Location: Left arm, Patient Position: Chair, Cuff Size: Adult Large)  Pulse 86    Pause for the Cause  Right patient:  Yes  Right procedure/correct coil:  Yes; rTMS; cpt 90429; H1 coil.   Earplugs in place:  Yes    Procedure  Patient was seated in procedure chair. Identity and procedure was verified. Ear plugs were placed in ears and patient-specific cap was placed on head and tightened appropriately. Ruler locations were verified. Coil was placed at treatment location and stimulator was set to parameters described below. A test train was delivered and pt tolerated train. Given pt tolerance, 55 treatment trains were delivered. Pt tolerated procedure well with some minor facial and hand movement.    Motor Threshold Determination  Distance from nasion to inion: 35.5  MT 1: 0 - 6 - 16 @ 69% on 1/29/2018  MT 2: 0 - 6 - 16 @ 69% on 2/6/2018   MT 3: 0 - 6 - 16 @ 69% on 2/13/2018   MT 3: 0 - 6 - 16 @ 69% on 2/23/2018         Stimulation Parameters  Frequency: 18 Hz     Train duration: 2 sec  Total pulses delivered: 1980  Inter-train interval: 20 sec  Tx  Loc: 0 - eyebrows  - 14  Energy: 83% (120% MT)  Trains: 55 trains      Post-Procedure Diagnosis:  MDD, recurrent, severe F33.2    Jael Alexandra RN   [facility] Neuromodulation    Plan   - Cont TMS      I didn t see the patient during/after treatment but remained available in the clinic during  treatment.    Evelyn Zamudio MD  [facility] Neuromodulation

## 2018-03-01 ENCOUNTER — OFFICE VISIT (OUTPATIENT)
Dept: PSYCHIATRY | Facility: CLINIC | Age: 48
End: 2018-03-01
Payer: COMMERCIAL

## 2018-03-01 VITALS — SYSTOLIC BLOOD PRESSURE: 143 MMHG | DIASTOLIC BLOOD PRESSURE: 91 MMHG | HEIGHT: 62 IN | HEART RATE: 86 BPM

## 2018-03-01 DIAGNOSIS — F33.2 SEVERE EPISODE OF RECURRENT MAJOR DEPRESSIVE DISORDER, WITHOUT PSYCHOTIC FEATURES (H): Primary | ICD-10-CM

## 2018-03-01 ASSESSMENT — PATIENT HEALTH QUESTIONNAIRE - PHQ9: SUM OF ALL RESPONSES TO PHQ QUESTIONS 1-9: 4

## 2018-03-01 NOTE — PROGRESS NOTES
"Office Visit  Brenda Maldonado, PhD   Psychology  Severe episode of recurrent major depressive disorder, without psychotic features (H)   Dx  Referred by Evelyn Zamudio MD      Progress Notes       Psychotherapy Session (Behavioral Activation)       [facility]  [address]  [place] [address]       Name: Aure Joy   MRN# [id number]  Age: 47 year old   YOB: 1970  Date of Session: March 1, 2018  Duration: 45 minutes (starting time = 12:02; stopping time = 12:47)                Session Content  Ms. Joy completed all of the behavioral activation homework assigned at last week's session. We reviewed self-monitoring of the impact of specific activities and noted significant improvement in symptoms of anxiety, depression, anhedonia, and behavioral avoidance. She engaged in numerous value-based and hedonic activities this week, including attending her daughter's play. We discussed ways to continue increasing behavioral activities including pleasurable, task-based, and value-related activities. We also discussed strategies for \"pacing\" as well as ways of self-monitoring in the context of challenges. We focused on anxiety and agoraphobia as well as review of strategies to reduce panic symptoms and increase behavioral exposure. Discussed increasing range and experimenting with pleasurable events with reduced anhedonia. Completed treatment plan.     Assessment  Increased behavioral frequency, highly engaged in behavioral activation strategies, improved mood by self-report and observation. Rates depression 2 on a scale of 10 (formerly 6-7). Denies suicidal and homicidal ideation.     Mental Status Exam   Appearance: appropriate  Attitude: cooperative  Behavior: normal  Eye Contact: normal  Speech: normal  Orientation: oriented  x3  Mood:  euthymic  Affect: Mood congruent  Thought Process: clear  Suicidal Ideation: Denies current suicidal ideation; denies " plan or intent, assures of safety   Hallucinations: none     Plan  Continue weekly behavioral activation sessions in the context of TMS treatment. Continue DBT skills group at [facility].     Signed:  Brenda Maldonado, Ph.D., L.P.    Department of Psychiatry  [facility]

## 2018-03-01 NOTE — PROGRESS NOTES
[facility] TMS Program  [address]  [place] [address]  TMS Procedure Note   Aure Joy MRN# [id number]  Age: 47 year old year old YOB: 1970    Pre-Procedure:  History and Physical: Reviewed in medical record  Consent Signed by: Aure Joy  On: 01/29/18    Clinical Narrative:  Patient tolerating treatment well with no side effects.     Indications for TMS:  MDD, recurrent, severe; 4+ medication trials (from 2+ classes) ineffective; Psychotherapy ineffective.     Pre-Procedure Diagnosis:  MDD, recurrent, severe F33.2    Treatment Hx:  Treatment number this series: 24  Total lifetime treatment number: 24    Allergies   Allergen Reactions     Codeine Nausea     Other  [No Clinical Screening - See Comments] Nausea and Vomiting and Other (See Comments)     general anesthesia- uncontrolled vomitting      There were no vitals taken for this visit.    Pause for the Cause  Right patient:  Yes  Right procedure/correct coil:  Yes; rTMS; cpt 03672; H1 coil.   Earplugs in place:  Yes    Procedure  Patient was seated in procedure chair. Identity and procedure was verified. Ear plugs were placed in ears and patient-specific cap was placed on head and tightened appropriately. Ruler locations were verified. Coil was placed at treatment location and stimulator was set to parameters described below. A test train was delivered and pt tolerated train. Given pt tolerance, 55 treatment trains were delivered. Pt tolerated procedure well with some minor facial and hand movement.    Motor Threshold Determination  Distance from nasion to inion: 35.5  MT 1: 0 - 6 - 16 @ 69% on 1/29/2018  MT 2: 0 - 6 - 16 @ 69% on 2/6/2018   MT 3: 0 - 6 - 16 @ 69% on 2/13/2018   MT 3: 0 - 6 - 16 @ 69% on 2/23/2018         Stimulation Parameters  Frequency: 18 Hz     Train duration: 2 sec  Total pulses delivered: 1980  Inter-train interval: 20 sec  Tx Loc: 0 - eyebrows  - 14  Energy: 83% (120% MT)  Trains: 55  trains      Post-Procedure Diagnosis:  MDD, recurrent, severe F33.2    Jael Alexandra RN   [facility] Neuromodulation    Plan   - Cont TMS      I didn t see the patient during/after treatment but remained available in the clinic during  treatment.    Evelyn Zamudio MD  [facility] Neuromodulation

## 2018-03-01 NOTE — MR AVS SNAPSHOT
After Visit Summary   3/1/2018    Aure Joy    MRN: [id number]           Patient Information     Date Of Birth          1970        Visit Information        Provider Department      3/1/2018 12:00 PM Brenda Maldonado, PhD [facility] Psychiatry        Today's Diagnoses     Severe episode of recurrent major depressive disorder, without psychotic features (H)    -  1      Care Instructions    Weekly behavioral activation psychotherapy sessions concurrent with neuromodulation/TMS.          Follow-ups after your visit        Follow-up notes from your care team     Return in 7 days (on 3/8/2018).      Your next 10 appointments already scheduled     Mar 02, 2018  3:15 PM CST   TMS TREATMENT with ME [facility] PROCEDURE ROOM   [facility] Psychiatry ([facility])    [address]  [facility] [address]   [phone]            Mar 05, 2018  3:15 PM CST   TMS TREATMENT with [facility] PROCEDURE ROOM   [facility] Psychiatry ([facility])    [address]  [facility] [address]   [phone]            Mar 06, 2018  3:15 PM CST   TMS TREATMENT with [facility] PROCEDURE ROOM   [facility] Psychiatry ([facility])    [address]  [facility] [address]   [phone]            Mar 07, 2018  3:15 PM CST   TMS TREATMENT with [facility] PROCEDURE ROOM   [facility] Psychiatry ([facility])    [address]  [facility] [address]   [phone]            Mar 08, 2018 12:00 PM CST   Adult Psychotherapy with Brenda Maldonado, PhD   [facility] Psychiatry ([facility])    [address]  [facility] [address]   [phone]            Mar 08, 2018  3:15 PM CST   TMS TREATMENT with [facility] PROCEDURE ROOM   [facility] Psychiatry ([facility])    [address]  [facility] [address]   [phone]            Mar 09, 2018  3:15 PM CST   TMS TREATMENT with [facility] PROCEDURE  ROOM   [facility] Psychiatry ([facility])    [address]  [facility] [address]   [phone]            Mar 12, 2018  3:15 PM CDT   TMS TREATMENT with ME [facility] PROCEDURE ROOM   [facility])    [address]  [facility] [address]   [phone]            Mar 14, 2018  3:15 PM CDT   TMS TREATMENT with ME [facility] PROCEDURE ROOM   [facility] Psychiatry [facility])    [address]  [facility] [address]   [phone]            Mar 15, 2018 12:00 PM CDT   Adult Psychotherapy with Brenda Maldonado, PhD   [facility])    [address]  [facility] [address]   [phone]              Who to contact     Please call your clinic at [phone] to:    Ask questions about your health    Make or cancel appointments    Discuss your medicines    Learn about your test results    Speak to your doctor            Additional Information About Your Visit        Ancanco Information     Ancanco is an electronic gateway that provides easy, online access to your medical records. With Ancanco, you can request a clinic appointment, read your test results, renew a prescription or communicate with your care team.     To sign up for Anc[employer] visit the website at [email or web address]   You will be asked to enter the access code listed below, as well as some personal information. Please follow the directions to create your username and password.     Your access code is: [email or web address]  Expires: 2018  4:15 PM     Your access code will  in 90 days. If you need help or a new code, please contact your [facility] or call [phone] for assistance.        Care EveryWhere ID     This is your Care EveryWhere ID. This could be used by other organizations to access your [place] medical records  [id number]         Blood Pressure from Last 3  Encounters:   03/01/18 (!) 143/91   02/28/18 128/80   02/27/18 120/78    Weight from Last 3 Encounters:   02/20/18 203 lb (92.1 kg)   02/12/18 203 lb (92.1 kg)   02/09/18 203 lb (92.1 kg)              Today, you had the following     No orders found for display       Primary Care Provider Office Phone # Fax #    Stephy Valentin [phone]       [facility] [address]   [facility] [address]        Equal Access to Services     [facility]: Hadii derrell villarreal hadasho Soedv, waaxda luqadaha, qaybta kaalmada ademaribeth, yordy araujo . So [facility] [phone].    ATENCIÓN: Si habla español, tiene a chamberlain disposición servicios gratuitos de asistencia lingüística. [facility] [phone].    We comply with applicable federal civil rights laws and [place] laws. We do not discriminate on the basis of race, color, national origin, age, disability, sex, sexual orientation, or gender identity.            Thank you!     Thank you for choosing [facility] PSYCHIATRY  for your care. Our goal is always to provide you with excellent care. Hearing back from our patients is one way we can continue to improve our services. Please take a few minutes to complete the written survey that you may receive in the mail after your visit with us. Thank you!             Your Updated Medication List - Protect others around you: Learn how to safely use, store and throw away your medicines at www.disposemymeds.org.      Notice  As of 3/1/2018  4:14 PM    You have not been prescribed any medications.

## 2018-03-01 NOTE — MR AVS SNAPSHOT
After Visit Summary   3/1/2018    Aure Joy    MRN: [id number]           Patient Information     Date Of Birth          1970        Visit Information        Provider Department      3/1/2018 3:15 PM ME [facility]MP PROCEDURE ROOM [facility] Psychiatry        Today's Diagnoses     Severe episode of recurrent major depressive disorder, without psychotic features (H)    -  1       Follow-ups after your visit        Your next 10 appointments already scheduled     Mar 02, 2018  3:15 PM CST   TMS TREATMENT with ME [facility] PROCEDURE ROOM   [facility] Psychiatry ([facility])    [address]  [facility] [address]   [phone]            Mar 05, 2018  3:15 PM CST   TMS TREATMENT with [facility] PROCEDURE ROOM   [facility] Psychiatry ([facility])    [address]  [facility] [address]   [phone]            Mar 06, 2018  3:15 PM CST   TMS TREATMENT with [facility] PROCEDURE ROOM   [facility] Psychiatry ([facility])    [address]  [facility] [address]   [phone]            Mar 07, 2018  3:15 PM CST   TMS TREATMENT with [facility] PROCEDURE ROOM   [facility] Psychiatry ([facility])    [address]  [facility] [address]   [phone]            Mar 08, 2018 12:00 PM CST   Adult Psychotherapy with Brenda Maldonado, PhD   [facility] Psychiatry ([facility])    [address]  [facility] [address]   [phone]            Mar 08, 2018  3:15 PM CST   TMS TREATMENT with [facility] PROCEDURE ROOM   [facility] Psychiatry ([facility])    [address]  [facility] [address]   [phone]            Mar 09, 2018  3:15 PM CST   TMS TREATMENT with [facility] PROCEDURE ROOM   [facility] Psychiatry ([facility])    [address]  [facility] [address]   [phone]            Mar 12, 2018  3:15 PM CDT   TMS TREATMENT with [facility] PROCEDURE ROOM  "  [facility] Psychiatry ([facility])    [address]  [facility] [address]   [phone]            Mar 14, 2018  3:15 PM CDT   TMS TREATMENT with ME [facility] PROCEDURE ROOM   [facility] ([facility])    [address]  [facility] [address]   [phone]            Mar 15, 2018 12:00 PM CDT   Adult Psychotherapy with Brenda Maldonado, PhD   [facility] Psychiatry [facility])    [address]  [facility] [address]   [phone]              Who to contact     Please call your clinic at [phone] to:    Ask questions about your health    Make or cancel appointments    Discuss your medicines    Learn about your test results    Speak to your doctor            Additional Information About Your Visit        [facility] [employer] Information     [employer] is an electronic gateway that provides easy, online access to your medical records. With [employer], you can request a clinic appointment, read your test results, renew a prescription or communicate with your care team.     To sign up for [employer] visit the website at [email or web address]   You will be asked to enter the access code listed below, as well as some personal information. Please follow the directions to create your username and password.     Your access code is: [email or web address]  Expires: 2018  4:15 PM     Your access code will  in 90 days. If you need help or a new code, please contact your [facility] or call [phone] for assistance.        Care EveryWhere ID     This is your Care EveryWhere ID. This could be used by other organizations to access your [place] medical records  [id number]        Your Vitals Were     Pulse Height                86 5' 1.81\" (157 cm)           Blood Pressure from Last 3 Encounters:   18 (!) 143/91   18 128/80   18 120/78    Weight from Last 3 Encounters:   18 203 lb (92.1 kg) "   02/12/18 203 lb (92.1 kg)   02/09/18 203 lb (92.1 kg)              We Performed the Following     C TRANSCRANIAL MAGNETIC STIMULATION TREATMENT,DELIVERY/MANAGEMENT        Primary Care Provider Office Phone # Fax #    Stephy Valentin [phone]       [facility] [address] [address]        Equal Access to Services     [facility]: Hadii aad ku hadasho Soomaali, waaxda luqadaha, qaybta kaalmada adeegyada, waxay idiin hayaan adeeg kharash la'aan ah. So [facility] [phone].    ATENCIÓN: Si habla español, tiene a chamberlain disposición servicios gratuitos de asistencia lingüística. Tamara al [phone].    We comply with applicable federal civil rights laws and [place] laws. We do not discriminate on the basis of race, color, national origin, age, disability, sex, sexual orientation, or gender identity.            Thank you!     Thank you for choosing [facility] PSYCHIATRY  for your care. Our goal is always to provide you with excellent care. Hearing back from our patients is one way we can continue to improve our services. Please take a few minutes to complete the written survey that you may receive in the mail after your visit with us. Thank you!             Your Updated Medication List - Protect others around you: Learn how to safely use, store and throw away your medicines at www.disposemymeds.org.      Notice  As of 3/1/2018 11:59 PM    You have not been prescribed any medications.

## 2018-03-02 ENCOUNTER — OFFICE VISIT (OUTPATIENT)
Dept: PSYCHIATRY | Facility: CLINIC | Age: 48
End: 2018-03-02
Payer: COMMERCIAL

## 2018-03-02 VITALS — SYSTOLIC BLOOD PRESSURE: 130 MMHG | HEART RATE: 92 BPM | DIASTOLIC BLOOD PRESSURE: 78 MMHG

## 2018-03-02 DIAGNOSIS — F33.2 SEVERE EPISODE OF RECURRENT MAJOR DEPRESSIVE DISORDER, WITHOUT PSYCHOTIC FEATURES (H): Primary | ICD-10-CM

## 2018-03-02 ASSESSMENT — PATIENT HEALTH QUESTIONNAIRE - PHQ9: SUM OF ALL RESPONSES TO PHQ QUESTIONS 1-9: 2

## 2018-03-02 NOTE — MR AVS SNAPSHOT
After Visit Summary   3/2/2018    Aure Joy    MRN: [id number]           Patient Information     Date Of Birth          1970        Visit Information        Provider Department      3/2/2018 3:15 PM ME UMP PROCEDURE ROOM [facility] Psychiatry         Follow-ups after your visit        Your next 10 appointments already scheduled     Mar 05, 2018  3:15 PM CST   TMS TREATMENT with ME [facility] PROCEDURE ROOM   P Psychiatry ([facility])    [address]  [facility] [address]   [phone]            Mar 06, 2018  3:15 PM CST   TMS TREATMENT with ME [facility] PROCEDURE ROOM   P Psychiatry ([facility])    [address]  [facility] [address]   [phone]            Mar 07, 2018  3:15 PM CST   TMS TREATMENT with ME [facility] PROCEDURE ROOM   [facility] Psychiatry ([facility])    [address]  [facility] [address]   [phone]            Mar 08, 2018 12:00 PM CST   Adult Psychotherapy with Brenda Maldonado, PhD   [facility] Psychiatry ([facility])    [address]  [facility] [address]   [phone]            Mar 08, 2018  3:15 PM CST   TMS TREATMENT with ME [facility] PROCEDURE ROOM   [facility] Psychiatry ([facility])    [address] [place] [address]   [phone]            Mar 09, 2018  3:15 PM CST   TMS TREATMENT with ME [facility] PROCEDURE ROOM   [facility] Psychiatry ([facility])    [address]  [facility] [address]   [phone]            Mar 12, 2018  3:15 PM CDT   TMS TREATMENT with ME [facility] PROCEDURE ROOM   [facility] Psychiatry ([facility])    [address]  [facility] [address]   [phone]            Mar 14, 2018  3:15 PM CDT   TMS TREATMENT with ME [facility] PROCEDURE ROOM   [facility] Psychiatry ([facility])    [address]  [facility] [address]   [phone]             Mar 15, 2018 12:00 PM CDT   Adult Psychotherapy with Brenda Maldonado, PhD   [facility] Psychiatry ([facility])    [address]  [facility] [address] [phone]            Mar 16, 2018  3:15 PM CDT   TMS TREATMENT with ME [facility] PROCEDURE ROOM   [facility] Psychiatry ([facility])    [address]  [facility] [address] [phone]              Who to contact     Please call your clinic at [phone] to:    Ask questions about your health    Make or cancel appointments    Discuss your medicines    Learn about your test results    Speak to your doctor            Additional Information About Your Visit        [employer] Information     Zimory is an electronic gateway that provides easy, online access to your medical records. With Zimory, you can request a clinic appointment, read your test results, renew a prescription or communicate with your care team.     To sign up for Zimory visit the website at [email or web address]   You will be asked to enter the access code listed below, as well as some personal information. Please follow the directions to create your username and password.     Your access code is: [email or web address]  Expires: 2018  4:15 PM     Your access code will  in 90 days. If you need help or a new code, please contact your [facility] or call [phone] for assistance.        Care EveryWhere ID     This is your Care EveryWhere ID. This could be used by other organizations to access your [place] medical records  [id number]        Your Vitals Were     Pulse                   92            Blood Pressure from Last 3 Encounters:   18 130/78   18 (!) 143/91   18 128/80    Weight from Last 3 Encounters:   18 203 lb (92.1 kg)   18 203 lb (92.1 kg)   18 203 lb (92.1 kg)              Today, you had the following     No orders found for display        Primary Care Provider Office Phone # Fax #    Stephy Valentin [phone]       [facility] [address] [facility] [address]        Equal Access to Services     [facility] : Hadii aad ku hadsherrieo Soomaali, waaxda luqadaha, qaybta kaalmada adeegyada, waxmagdalena vincentn adeamy wright laJesus Manueldaniel arik. So [facility] [phone].    ATENCIÓN: Si habla español, tiene a chamberlain disposición servicios gratuitos de asistencia lingüística. Llame al [phone].    We comply with applicable federal civil rights laws and [place] laws. We do not discriminate on the basis of race, color, national origin, age, disability, sex, sexual orientation, or gender identity.            Thank you!     Thank you for choosing [facility] PSYCHIATRY  for your care. Our goal is always to provide you with excellent care. Hearing back from our patients is one way we can continue to improve our services. Please take a few minutes to complete the written survey that you may receive in the mail after your visit with us. Thank you!             Your Updated Medication List - Protect others around you: Learn how to safely use, store and throw away your medicines at www.disposemymeds.org.      Notice  As of 3/2/2018  3:31 PM    You have not been prescribed any medications.

## 2018-03-02 NOTE — PROGRESS NOTES
[facility] TMS Program  [address]  [place] [address]  TMS Procedure Note   Aure Joy MRN# [id number]  Age: 47 year old year old YOB: 1970    Pre-Procedure:  History and Physical: Reviewed in medical record  Consent Signed by: Aure Joy  On: 01/29/18    Clinical Narrative:  Patient tolerating treatment well with no side effects.     Indications for TMS:  MDD, recurrent, severe; 4+ medication trials (from 2+ classes) ineffective; Psychotherapy ineffective.     Pre-Procedure Diagnosis:  MDD, recurrent, severe F33.2    Treatment Hx:  Treatment number this series: 25  Total lifetime treatment number: 25    Allergies   Allergen Reactions     Codeine Nausea     Other  [No Clinical Screening - See Comments] Nausea and Vomiting and Other (See Comments)     general anesthesia- uncontrolled vomitting      /78 (BP Location: Left arm, Patient Position: Chair, Cuff Size: Adult Large)  Pulse 92    Pause for the Cause  Right patient:  Yes  Right procedure/correct coil:  Yes; rTMS; cpt 85166; H1 coil.   Earplugs in place:  Yes    Procedure  Patient was seated in procedure chair. Identity and procedure was verified. Ear plugs were placed in ears and patient-specific cap was placed on head and tightened appropriately. Ruler locations were verified. Coil was placed at initial MT location and stimulator was set to initial MT. MT was retested and found as described below. Coil was placed at treatment location and stimulator was set to stimulation parameters detailed below. A test train was delivered and pt tolerated train fine. Given pt tolerance, 55 trains were delivered. Pt tolerated procedure with some minor facial and hand movement.    Motor Threshold Determination  Distance from nasion to inion: 35.5  MT 1: 0 - 6 - 16 @ 69% on 1/29/2018  MT 2: 0 - 6 - 16 @ 69% on 2/6/2018   MT 3: 0 - 6 - 16 @ 69% on 2/13/2018   MT 4: 0 - 6 - 16 @ 69% on 2/23/2018   MT 5: 0 - 6 - 16 @ 69% on  3/2/2018        Stimulation Parameters  Frequency: 18 Hz     Train duration: 2 sec  Total pulses delivered: 1980  Inter-train interval: 20 sec  Tx Loc: 0 - eyebrows  - 14  Energy: 83% (120% MT)  Trains: 55 trains      Post-Procedure Diagnosis:  MDD, recurrent, severe F33.2    Jael Alexandra RN   [facility] Neuromodulation    Plan   - Cont TMS      I didn t see the patient during/after treatment but remained available in the clinic during  treatment.    Evelyn Zamudio MD  [facility] Neuromodulation

## 2018-03-03 ASSESSMENT — PATIENT HEALTH QUESTIONNAIRE - PHQ9: SUM OF ALL RESPONSES TO PHQ QUESTIONS 1-9: 4

## 2018-03-05 ENCOUNTER — OFFICE VISIT (OUTPATIENT)
Dept: PSYCHIATRY | Facility: CLINIC | Age: 48
End: 2018-03-05
Payer: COMMERCIAL

## 2018-03-05 VITALS
SYSTOLIC BLOOD PRESSURE: 143 MMHG | HEART RATE: 91 BPM | BODY MASS INDEX: 37.32 KG/M2 | DIASTOLIC BLOOD PRESSURE: 77 MMHG | WEIGHT: 202.8 LBS

## 2018-03-05 DIAGNOSIS — F33.2 SEVERE EPISODE OF RECURRENT MAJOR DEPRESSIVE DISORDER, WITHOUT PSYCHOTIC FEATURES (H): Primary | ICD-10-CM

## 2018-03-05 NOTE — PROGRESS NOTES
[facility] TMS Program  [address]  [place] [address]  TMS Procedure Note   Aure Joy MRN# [id number]  Age: 47 year old year old YOB: 1970    Pre-Procedure:  History and Physical: Reviewed in medical record  Consent Signed by: Aure Joy  On: 01/29/18    Clinical Narrative:  Patient tolerating treatment well with no side effects.     Indications for TMS:  MDD, recurrent, severe; 4+ medication trials (from 2+ classes) ineffective; Psychotherapy ineffective.     Pre-Procedure Diagnosis:  MDD, recurrent, severe F33.2    Treatment Hx:  Treatment number this series: 26  Total lifetime treatment number: 26    Allergies   Allergen Reactions     Codeine Nausea     Other  [No Clinical Screening - See Comments] Nausea and Vomiting and Other (See Comments)     general anesthesia- uncontrolled vomitting      There were no vitals taken for this visit.    Pause for the Cause  Right patient:  Yes  Right procedure/correct coil:  Yes; rTMS; cpt 15048; H1 coil.   Earplugs in place:  Yes    Procedure  Patient was seated in procedure chair. Identity and procedure was verified. Ear plugs were placed in ears and patient-specific cap was placed on head and tightened appropriately. Ruler locations were verified. Coil was placed at treatment location and stimulator was set to parameters described below. A test train was delivered and pt tolerated train. Given pt tolerance, 55 treatment trains were delivered. Pt tolerated procedure well with some minor facial and hand movement.    Motor Threshold Determination  Distance from nasion to inion: 35.5  MT 1: 0 - 6 - 16 @ 69% on 1/29/2018  MT 2: 0 - 6 - 16 @ 69% on 2/6/2018   MT 3: 0 - 6 - 16 @ 69% on 2/13/2018   MT 4: 0 - 6 - 16 @ 69% on 2/23/2018   MT 5: 0 - 6 - 16 @ 69% on 3/2/2018        Stimulation Parameters  Frequency: 18 Hz     Train duration: 2 sec  Total pulses delivered: 1980  Inter-train interval: 20 sec  Tx Loc: 0 - eyebrows  -  14  Energy: 83% (120% MT)  Trains: 55 trains      Post-Procedure Diagnosis:  MDD, recurrent, severe F33.2    Jael Alexandra RN   [facility] Neuromodulation    Plan   - Cont TMS      I didn t see the patient during/after treatment but remained available in the clinic during  treatment.    Evelyn Zamudio MD  [facility] Neuromodulation

## 2018-03-05 NOTE — MR AVS SNAPSHOT
After Visit Summary   3/5/2018    Aure Joy    MRN: [id number]           Patient Information     Date Of Birth          1970        Visit Information        Provider Department      3/5/2018 3:15 PM ME P PROCEDURE ROOM [facility] Psychiatry        Today's Diagnoses     Severe episode of recurrent major depressive disorder, without psychotic features (H)    -  1       Follow-ups after your visit        Your next 10 appointments already scheduled     Mar 07, 2018  3:15 PM CST   [facility]   [facility] ([facility])    [address]  [facility] [address]   [phone]            Mar 08, 2018 12:00 PM CST   Adult Psychotherapy with Brenda Maldonado, PhD   [facility] Psychiatry ([facility])    [address] [place] [address]   [phone]            Mar 08, 2018  3:15 PM CST   TMS TREATMENT with [facility] PROCEDURE ROOM   [facility]iatry ([facility])    [address]  [facility] [address]   [phone]            Mar 09, 2018  3:15 PM CST   TMS TREATMENT with [facility] PROCEDURE ROOM   [facility] Psychiatry ([facility])    [address]  [facility] [address]   [phone]            Mar 12, 2018  3:15 PM CDT   [facility]   [facility] ([facility])    [address] [place] [address]   [phone]            Mar 14, 2018  3:15 PM CDT   TMS TREATMENT with [facility] PROCEDURE ROOM   [facility] ([facility])    [address] [place] [address]   [phone]            Mar 15, 2018 12:00 PM CDT   Adult Psychotherapy with Brenda Maldonado, PhD   [facility] ([facility])    [address] [place] [address]   [phone]            Mar 16, 2018  3:15 PM CDT   [facility]   [facility] Psychiatry ([facility])    [address]  [facility] [address]   [phone]            Mar 19, 2018  3:15 PM CDT   TMS TREATMENT with ME [facility] PROCEDURE ROOM   [facility] ([facility])    [address]  [facility] [address]   [phone]            Mar 21, 2018  3:15 PM CDT   TMS TREATMENT with ME [facility] PROCEDURE ROOM   [facility])    [address]  [facility] [address]   [phone]              Who to contact     Please call your clinic at [phone] to:    Ask questions about your health    Make or cancel appointments    Discuss your medicines    Learn about your test results    Speak to your doctor            Additional Information About Your Visit        MyChart Information     [employer] is an electronic gateway that provides easy, online access to your medical records. With [employer], you can request a clinic appointment, read your test results, renew a prescription or communicate with your care team.     To sign up for [employer] visit the website at [email or web address]   You will be asked to enter the access code listed below, as well as some personal information. Please follow the directions to create your username and password.     Your access code is: [email or web address]  Expires: 2018  4:15 PM     Your access code will  in 90 days. If you need help or a new code, please contact your [facility] or call [phone] for assistance.        Care EveryWhere ID     This is your Care EveryWhere ID. This could be used by other organizations to access your [place] medical records  [id number]        Your Vitals Were     Pulse BMI (Body Mass Index)                91 37.32 kg/m2           Blood Pressure from Last 3 Encounters:   18 133/79   18 143/77   18 130/78    Weight from Last 3 Encounters:   18 92 kg  (202 lb 12.8 oz)   02/20/18 92.1 kg (203 lb)   02/12/18 92.1 kg (203 lb)              We Performed the Following     C TRANSCRANIAL MAGNETIC STIMULATION TREATMENT,DELIVERY/MANAGEMENT        Primary Care Provider Office Phone # Fax #    Stephy Valentin [phone]       [facility] [address] [place]   [facility] [address]        Equal Access to Services     RAPHAEL WASHBURN : Hadii aad ku hadasho Soomaali, waaxda luqadaha, qaybta kaalmada adeegyada, waxay idiin hayaan adeeg kharash la'aan ah. So [facility] [phone].    ATENCIÓN: Si habla elder, tiene a chamberlain disposición servicios gratuitos de asistencia lingüística. Llame al [phone].    We comply with applicable federal civil rights laws and [place] laws. We do not discriminate on the basis of race, color, national origin, age, disability, sex, sexual orientation, or gender identity.            Thank you!     Thank you for choosing [facility] PSYCHIATRY  for your care. Our goal is always to provide you with excellent care. Hearing back from our patients is one way we can continue to improve our services. Please take a few minutes to complete the written survey that you may receive in the mail after your visit with us. Thank you!             Your Updated Medication List - Protect others around you: Learn how to safely use, store and throw away your medicines at www.disposemymeds.org.      Notice  As of 3/5/2018 11:59 PM    You have not been prescribed any medications.

## 2018-03-06 ENCOUNTER — OFFICE VISIT (OUTPATIENT)
Dept: PSYCHIATRY | Facility: CLINIC | Age: 48
End: 2018-03-06
Payer: COMMERCIAL

## 2018-03-06 VITALS — HEART RATE: 85 BPM | SYSTOLIC BLOOD PRESSURE: 133 MMHG | DIASTOLIC BLOOD PRESSURE: 79 MMHG

## 2018-03-06 DIAGNOSIS — F33.2 SEVERE EPISODE OF RECURRENT MAJOR DEPRESSIVE DISORDER, WITHOUT PSYCHOTIC FEATURES (H): Primary | ICD-10-CM

## 2018-03-06 ASSESSMENT — PATIENT HEALTH QUESTIONNAIRE - PHQ9: SUM OF ALL RESPONSES TO PHQ QUESTIONS 1-9: 3

## 2018-03-06 NOTE — PROGRESS NOTES
[facility] TMS Program  [address]  [place] [address]  TMS Procedure Note   Aure Joy MRN# [id number]  Age: 47 year old year old YOB: 1970    Pre-Procedure:  History and Physical: Reviewed in medical record  Consent Signed by: Aure Joy  On: 01/29/18    Clinical Narrative:  Patient tolerating treatment well with no side effects.     Indications for TMS:  MDD, recurrent, severe; 4+ medication trials (from 2+ classes) ineffective; Psychotherapy ineffective.     Pre-Procedure Diagnosis:  MDD, recurrent, severe F33.2    Treatment Hx:  Treatment number this series: 27  Total lifetime treatment number: 27    Allergies   Allergen Reactions     Codeine Nausea     Other  [No Clinical Screening - See Comments] Nausea and Vomiting and Other (See Comments)     general anesthesia- uncontrolled vomitting      /79 (BP Location: Left arm, Patient Position: Sitting, Cuff Size: Adult Regular)  Pulse 85    Pause for the Cause  Right patient:  Yes  Right procedure/correct coil:  Yes; rTMS; cpt 68858; H1 coil.   Earplugs in place:  Yes    Procedure  Patient was seated in procedure chair. Identity and procedure was verified. Ear plugs were placed in ears and patient-specific cap was placed on head and tightened appropriately. Ruler locations were verified. Coil was placed at treatment location and stimulator was set to parameters described below. A test train was delivered and pt tolerated train. Given pt tolerance, 55 treatment trains were delivered. Pt tolerated procedure well with some minor facial and hand movement.    Motor Threshold Determination  Distance from nasion to inion: 35.5  MT 1: 0 - 6 - 16 @ 69% on 1/29/2018  MT 2: 0 - 6 - 16 @ 69% on 2/6/2018   MT 3: 0 - 6 - 16 @ 69% on 2/13/2018   MT 4: 0 - 6 - 16 @ 69% on 2/23/2018   MT 5: 0 - 6 - 16 @ 69% on 3/2/2018        Stimulation Parameters  Frequency: 18 Hz     Train duration: 2 sec  Total pulses delivered:  1980  Inter-train interval: 20 sec  Tx Loc: 0 - eyebrows  - 14  Energy: 83% (120% MT)  Trains: 55 trains      Post-Procedure Diagnosis:  MDD, recurrent, severe F33.2    Jael Alexandra RN   [facility] Neuromodulation    Plan   - Cont TMS      I didn t see the patient during/after treatment but remained available in the clinic during  treatment.    Evelyn Zamudio MD  [facility] Neuromodulation

## 2018-03-06 NOTE — MR AVS SNAPSHOT
After Visit Summary   3/6/2018    Aure Joy    MRN: [id number]           Patient Information     Date Of Birth          1970        Visit Information        Provider Department      3/6/2018 3:15 PM ME P PROCEDURE ROOM [facility] Psychiatry        Today's Diagnoses     Severe episode of recurrent major depressive disorder, without psychotic features (H)    -  1       Follow-ups after your visit        Your next 10 appointments already scheduled     Mar 07, 2018  3:15 PM CST   [facility]   [facility] Psychiatry ([facility])    [address]  [facility] [address]   [phone]            Mar 08, 2018 12:00 PM CST   Adult Psychotherapy with Brenda Maldonado, PhD   [facility] Psychiatry ([facility])    [address] [place] [address]   [phone]            Mar 08, 2018  3:15 PM CST   TMS TREATMENT with [facility] PROCEDURE ROOM   [facility] Psychiatry ([facility])    [address]  [facility] [address]   [phone]            Mar 09, 2018  3:15 PM CST   TMS TREATMENT with [facility] PROCEDURE ROOM   [facility] Psychiat[facility] ([facility])    [address]  [facility] [address]   [phone]            Mar 12, 2018  3:15 PM CDT   [facility]   [facility] Psychiat[facility] ([facility])    [address] [place] [address]   [phone]            Mar 14, 2018  3:15 PM CDT   TMS TREATMENT with [facility] PROCEDURE ROOM   [facility] Psychiatry ([facility])    [address] [place] [address]   [phone]            Mar 15, 2018 12:00 PM CDT   Adult Psychotherapy with Brenda Maldonado, PhD   [facility] Psych[facility] ([facility])    [address] [place] [address]   [phone]            Mar 16, 2018  3:15 PM CDT   [facility]  [facility]   [facility] Psychiatry ([facility])    [address]  [facility] [address]   [phone]            Mar 19, 2018  3:15 PM CDT   TMS TREATMENT with ME [facility] PROCEDURE ROOM   [facility] ([facility])    [address]  [facility] [address]   [phone]            Mar 21, 2018  3:15 PM CDT   TMS TREATMENT with ME [facility] PROCEDURE ROOM   [facility]    [address]  [facility] [address]   [phone]              Who to contact     Please call your clinic at [phone] to:    Ask questions about your health    Make or cancel appointments    Discuss your medicines    Learn about your test results    Speak to your doctor            Additional Information About Your Visit        MyChart Information     [employer] is an electronic gateway that provides easy, online access to your medical records. With [employer], you can request a clinic appointment, read your test results, renew a prescription or communicate with your care team.     To sign up for [employer] visit the website at [email or web address]   You will be asked to enter the access code listed below, as well as some personal information. Please follow the directions to create your username and password.     Your access code is: [email or web address]  Expires: 2018  4:15 PM     Your access code will  in 90 days. If you need help or a new code, please contact your [facility] or call [phone] for assistance.        Care EveryWhere ID     This is your Care EveryWhere ID. This could be used by other organizations to access your [place] medical records  [id number]        Your Vitals Were     Pulse                   85            Blood Pressure from Last 3 Encounters:   18 133/79   18 143/77   18 130/78    Weight from Last 3 Encounters:   18 92 kg (202 lb 12.8 oz)   18 92.1  kg (203 lb)   02/12/18 92.1 kg (203 lb)              We Performed the Following     C TRANSCRANIAL MAGNETIC STIMULATION TREATMENT,DELIVERY/MANAGEMENT        Primary Care Provider Office Phone # Fax #    Stephy Valentin [phone]       [facility] [address] [place]   [place] [address]        Equal Access to Services     RAPHAEL WASHBURN : Hadii aad ku hadasho Soomaali, waaxda luqadaha, qaybta kaalmada adeegyada, waxay idiin hayaan adeeg kharash laJesus Manuelaan . So [facility] [phone].    ATENCIÓN: Si habla español, tiene a chamberlain disposición servicios gratuitos de asistencia lingüística. Llame al [phone].    We comply with applicable federal civil rights laws and [place] laws. We do not discriminate on the basis of race, color, national origin, age, disability, sex, sexual orientation, or gender identity.            Thank you!     Thank you for choosing [facility] PSYCHIATRY  for your care. Our goal is always to provide you with excellent care. Hearing back from our patients is one way we can continue to improve our services. Please take a few minutes to complete the written survey that you may receive in the mail after your visit with us. Thank you!             Your Updated Medication List - Protect others around you: Learn how to safely use, store and throw away your medicines at www.disposemymeds.org.      Notice  As of 3/6/2018  5:07 PM    You have not been prescribed any medications.

## 2018-03-07 ENCOUNTER — OFFICE VISIT (OUTPATIENT)
Dept: PSYCHIATRY | Facility: CLINIC | Age: 48
End: 2018-03-07
Payer: COMMERCIAL

## 2018-03-07 VITALS — HEART RATE: 88 BPM | SYSTOLIC BLOOD PRESSURE: 136 MMHG | DIASTOLIC BLOOD PRESSURE: 85 MMHG

## 2018-03-07 DIAGNOSIS — F33.2 SEVERE EPISODE OF RECURRENT MAJOR DEPRESSIVE DISORDER, WITHOUT PSYCHOTIC FEATURES (H): Primary | ICD-10-CM

## 2018-03-07 ASSESSMENT — PATIENT HEALTH QUESTIONNAIRE - PHQ9: SUM OF ALL RESPONSES TO PHQ QUESTIONS 1-9: 1

## 2018-03-07 NOTE — MR AVS SNAPSHOT
After Visit Summary   3/7/2018    Aure Joy    MRN: [id number]           Patient Information     Date Of Birth          1970        Visit Information        Provider Department      3/7/2018 3:15 PM ME UMP PROCEDURE ROOM [facility] Psychiatry        Today's Diagnoses     Severe episode of recurrent major depressive disorder, without psychotic features (H)    -  1       Follow-ups after your visit        Your next 10 appointments already scheduled     Mar 08, 2018 12:00 PM CST   Adult Psychotherapy with Brenda Maldonado, PhD   [facility] Psychiatry ([facility])    [address]  [facility] [address]   [phone]            Mar 08, 2018  3:15 PM CST   TMS TREATMENT with ME [facility] PROCEDURE ROOM   [facility] Psychiatry ([facility])    [address]  [facility] [address]   [phone]            Mar 09, 2018  3:15 PM CST   TMS TREATMENT with ME P PROCEDURE ROOM   [facility] Psychiatry ([facility])    [address]  [facility] [address]   [phone]            Mar 12, 2018  3:15 PM CDT   TMS TREATMENT with ME UMP PROCEDURE ROOM   [facility] Psychiatry ([facility])    [address]  [facility] [address]   [phone]            Mar 14, 2018  3:15 PM CDT   TMS TREATMENT with ME UMP PROCEDURE ROOM   [facility] Psychiatry ([facility])    [address]  [facility] [address]   [phone]            Mar 15, 2018 12:00 PM CDT   Adult Psychotherapy with Brenda Maldonado, PhD   [facility] Psychiatry ([facility])    [address]  [facility] [address]   [phone]            Mar 16, 2018  3:15 PM CDT   TMS TREATMENT with ME P PROCEDURE ROOM   [facility] Psychiatry ([facility])    [address]  [facility] [address]   [phone]            Mar 19, 2018  3:15 PM CDT   [facility]   [facility] Psychiatry ([facility])    [address]  [facility] [address]   [phone]            Mar 21, 2018  3:15 PM CDT   TMS TREATMENT with ME [facility] PROCEDURE ROOM   [facility] ([facility])    [address]  [facility] [address]   [phone]            Mar 23, 2018  3:15 PM CDT   TMS TREATMENT with ME [facility] PROCEDURE ROOM   [facility])    [address]  [facility] [address]   [phone]              Who to contact     Please call your clinic at [phone] to:    Ask questions about your health    Make or cancel appointments    Discuss your medicines    Learn about your test results    Speak to your doctor            Additional Information About Your Visit        MyChart Information     [employer] is an electronic gateway that provides easy, online access to your medical records. With [employer], you can request a clinic appointment, read your test results, renew a prescription or communicate with your care team.     To sign up for [employer] visit the website at [email or web address]   You will be asked to enter the access code listed below, as well as some personal information. Please follow the directions to create your username and password.     Your access code is: [email or web address]  Expires: 2018  4:15 PM     Your access code will  in 90 days. If you need help or a new code, please contact your [facility] or call [phone] for assistance.        Care EveryWhere ID     This is your Care EveryWhere ID. This could be used by other organizations to access your [place] medical records  [id number]        Your Vitals Were     Pulse                   88            Blood Pressure from Last 3 Encounters:   18 136/85   18 133/79   18 143/77    Weight from Last 3 Encounters:   18 92 kg (202 lb 12.8 oz)   18 92.1  kg (203 lb)   02/12/18 92.1 kg (203 lb)              We Performed the Following     C TRANSCRANIAL MAGNETIC STIMULATION TREATMENT,DELIVERY/MANAGEMENT        Primary Care Provider Office Phone # Fax #    Stephy Valentin [phone]       [facility] [address] [place]   [place] [address]        Equal Access to Services     RAPHAEL [facility] : Hadii aad ku hadasho Soomaali, waaxda luqadaha, qaybta kaalmada adeegyada, waxay idiin hayaan adeeg kharash laJesus Manuelaan . So [facility] [phone].    ATENCIÓN: Si habla español, tiene a chamberlain disposición servicios gratuitos de asistencia lingüística. Llame al [phone].    We comply with applicable federal civil rights laws and [place] laws. We do not discriminate on the basis of race, color, national origin, age, disability, sex, sexual orientation, or gender identity.            Thank you!     Thank you for choosing [facility] PSYCHIATRY  for your care. Our goal is always to provide you with excellent care. Hearing back from our patients is one way we can continue to improve our services. Please take a few minutes to complete the written survey that you may receive in the mail after your visit with us. Thank you!             Your Updated Medication List - Protect others around you: Learn how to safely use, store and throw away your medicines at www.disposemymeds.org.      Notice  As of 3/7/2018 11:59 PM    You have not been prescribed any medications.

## 2018-03-07 NOTE — PROGRESS NOTES
[facility] TMS Program  [address]  [place] [address]  TMS Procedure Note   Aure Joy MRN# [id number]  Age: 47 year old year old YOB: 1970    Pre-Procedure:  History and Physical: Reviewed in medical record  Consent Signed by: Aure Joy  On: 01/29/18    Clinical Narrative:  Patient tolerating treatment well with no side effects.     Indications for TMS:  MDD, recurrent, severe; 4+ medication trials (from 2+ classes) ineffective; Psychotherapy ineffective.     Pre-Procedure Diagnosis:  MDD, recurrent, severe F33.2    Treatment Hx:  Treatment number this series: 28  Total lifetime treatment number: 28    Allergies   Allergen Reactions     Codeine Nausea     Other  [No Clinical Screening - See Comments] Nausea and Vomiting and Other (See Comments)     general anesthesia- uncontrolled vomitting      /85 (BP Location: Right arm, Patient Position: Chair, Cuff Size: Adult Regular)  Pulse 88    Pause for the Cause  Right patient:  Yes  Right procedure/correct coil:  Yes; rTMS; cpt 40913; H1 coil.   Earplugs in place:  Yes    Procedure  Patient was seated in procedure chair. Identity and procedure was verified. Ear plugs were placed in ears and patient-specific cap was placed on head and tightened appropriately. Ruler locations were verified. Coil was placed at treatment location and stimulator was set to parameters described below. A test train was delivered and pt tolerated train. Given pt tolerance, 55 treatment trains were delivered. Pt tolerated procedure well with some minor facial and hand movement.    Motor Threshold Determination  Distance from nasion to inion: 35.5  MT 1: 0 - 6 - 16 @ 69% on 1/29/2018  MT 2: 0 - 6 - 16 @ 69% on 2/6/2018   MT 3: 0 - 6 - 16 @ 69% on 2/13/2018   MT 4: 0 - 6 - 16 @ 69% on 2/23/2018   MT 5: 0 - 6 - 16 @ 69% on 3/2/2018        Stimulation Parameters  Frequency: 18 Hz     Train duration: 2 sec  Total pulses delivered:  1980  Inter-train interval: 20 sec  Tx Loc: 0 - eyebrows  - 14  Energy: 83% (120% MT)  Trains: 55 trains      Post-Procedure Diagnosis:  MDD, recurrent, severe F33.2    Jael Alexandra RN   [facility] Neuromodulation    Plan   - Cont TMS      I didn t see the patient during/after treatment but remained available in the clinic during  treatment.    NELSY Ordonez CNP  [facility] Neuromodulation

## 2018-03-08 ENCOUNTER — OFFICE VISIT (OUTPATIENT)
Dept: PSYCHIATRY | Facility: CLINIC | Age: 48
End: 2018-03-08
Payer: COMMERCIAL

## 2018-03-08 VITALS — SYSTOLIC BLOOD PRESSURE: 127 MMHG | HEART RATE: 84 BPM | DIASTOLIC BLOOD PRESSURE: 86 MMHG

## 2018-03-08 DIAGNOSIS — F33.2 SEVERE EPISODE OF RECURRENT MAJOR DEPRESSIVE DISORDER, WITHOUT PSYCHOTIC FEATURES (H): Primary | ICD-10-CM

## 2018-03-08 ASSESSMENT — PATIENT HEALTH QUESTIONNAIRE - PHQ9: SUM OF ALL RESPONSES TO PHQ QUESTIONS 1-9: 1

## 2018-03-08 NOTE — PROGRESS NOTES
Progress Notes      Office Visit  Brenda Maldonado, PhD   Psychology  Severe episode of recurrent major depressive disorder, without psychotic features (H)   Dx  Referred by Evelyn Zamudio MD    Progress Notes       Psychotherapy Session (Behavioral Activation)       [facility] TMS Program  [address], [address]  [place] [address]       Name: Aure Joy   MRN# [id number]  Age: 47 year old   YOB: 1970  Date of Session: March 8, 2018  Duration: 45 minutes (starting time = 12:05; stopping time = 12:50)                Session Content  Ms. Joy completed all of the behavioral activation homework assigned at last week's session. We reviewed self-monitoring and noted continued improvement in symptoms of anxiety, depression, anhedonia, and behavioral avoidance. She reports continued use of behavioral activation skills and focus on developing habits, including hygiene, cleaning, and time with her family members. Discussed transition with family members in the context of her symptoms remission.  We focused on relapse prevention strategies including continued self-monitoring, use of behavioral activation skills, and cognitive techniques as well as research evidence for mindfulness-based stress reduction. Continued to focus on identity and choices in focus including work, parenting, and writing.      Assessment  In partial remission from major depressive episode, severe (non psychotic features), recurrent. She rates current depression as a 1 on a 1-10 scale and anxiety 3-5. Denies suicidal or homicidal ideation. Reports improvements in mood, hedonic response, motivation, energy, self-evaluation, and cognition. In partial remission from agoraphobia with residual anxiety and reduced avoidance.    Mental Status Exam   Appearance: appropriate  Attitude: cooperative  Behavior: normal  Eye Contact: normal  Speech: normal  Orientation: oriented  x3  Mood:  euthymic  Affect: Mood  congruent  Thought Process: clear  Suicidal Ideation: Denies current suicidal ideation; denies plan or intent, assures of safety   Hallucinations: none      Plan  Continue weekly behavioral activation sessions in the context of TMS treatment. Continue DBT skills group at [facility].      Signed:  Brenda Maldonado, Ph.D., L.P.    Department of Psychiatry  [facility]

## 2018-03-08 NOTE — MR AVS SNAPSHOT
After Visit Summary   3/8/2018    Aure Joy    MRN: [id number]           Patient Information     Date Of Birth          1970        Visit Information        Provider Department      3/8/2018 12:00 PM Brenda Maldonado, PhD [facility] Psychiatry        Today's Diagnoses     Severe episode of recurrent major depressive disorder, without psychotic features (H)    -  1       Follow-ups after your visit        Follow-up notes from your care team     Return in about 7 days (around 3/15/2018) for Behavioral activation psychotherapy session.      Your next 10 appointments already scheduled     Mar 09, 2018  3:15 PM CST   TMS TREATMENT with ME [facility] PROCEDURE ROOM   [facility] Psychiatry ([facility])    [address]  [facility] [address]   [phone]            Mar 12, 2018  3:15 PM CDT   TMS TREATMENT with [facility] PROCEDURE ROOM   [facility] Psychiatry ([facility])    [address]  [facility] [address]   [phone]            Mar 14, 2018  3:15 PM CDT   TMS TREATMENT with [facility] PROCEDURE ROOM   [facility] Psychiatry ([facility])    [address]  [facility] [address]   [phone]            Mar 15, 2018 12:00 PM CDT   Adult Psychotherapy with Brenda Maldonado, PhD   [facility] Psychiatry ([facility])    [address]  [facility] [address]   [phone]            Mar 16, 2018  3:15 PM CDT   TMS TREATMENT with [facility] PROCEDURE ROOM   [facility] Psychiatry ([facility])    [address]  [facility] [address]   [phone]            Mar 19, 2018  3:15 PM CDT   TMS TREATMENT with ME [facility] PROCEDURE ROOM   [facility] Psychiatry ([facility])    [address]  [facility] [address]   [phone]            Mar 21, 2018  3:15 PM CDT   TMS TREATMENT with [facility] PROCEDURE ROOM   [facility] Psychiatry ([facility])    [address]  [facility] [address] [phone]            Mar 23, 2018  3:15 PM CDT   TMS TREATMENT with ME [facility] PROCEDURE ROOM   [facility] Psychiatry ([facility])    [address]  [facility] [address] [phone]              Who to contact     Please call your clinic at [phone] to:    Ask questions about your health    Make or cancel appointments    Discuss your medicines    Learn about your test results    Speak to your doctor            Additional Information About Your Visit        Pairyhart Information     Geodynamics is an electronic gateway that provides easy, online access to your medical records. With Geodynamics, you can request a clinic appointment, read your test results, renew a prescription or communicate with your care team.     To sign up for Geodynamics visit the website at [email or web address]   You will be asked to enter the access code listed below, as well as some personal information. Please follow the directions to create your username and password.     Your access code is: [email or web address]  Expires: 2018  4:15 PM     Your access code will  in 90 days. If you need help or a new code, please contact your [facility] or call [phone] for assistance.        Care EveryWhere ID     This is your Care EveryWhere ID. This could be used by other organizations to access your [place] medical records  [id number]         Blood Pressure from Last 3 Encounters:   18 127/86   18 136/85   18 133/79    Weight from Last 3 Encounters:   18 202 lb 12.8 oz (92 kg)   18 203 lb (92.1 kg)   18 203 lb (92.1 kg)              Today, you had the following     No orders found for display       Primary Care Provider Office Phone # Fax #    Stephy Valentin [phone]       [facility] [address] [address]        Equal Access to Services     HERMANN  GAAR : Hadii aad ku hadmary Mcclure, waelenda luqadaha, qaybta kakishor santanachadyvette, yordy upvioletashukri elise. So [facility] [phone].    ATENCIÓN: Si habla español, tiene a chamberlain disposición servicios gratuitos de asistencia lingüística. Llame al [phone].    We comply with applicable federal civil rights laws and [place] laws. We do not discriminate on the basis of race, color, national origin, age, disability, sex, sexual orientation, or gender identity.            Thank you!     Thank you for choosing [facility] PSYCHIATRY  for your care. Our goal is always to provide you with excellent care. Hearing back from our patients is one way we can continue to improve our services. Please take a few minutes to complete the written survey that you may receive in the mail after your visit with us. Thank you!             Your Updated Medication List - Protect others around you: Learn how to safely use, store and throw away your medicines at www.disposemymeds.org.      Notice  As of 3/8/2018  3:18 PM    You have not been prescribed any medications.

## 2018-03-08 NOTE — MR AVS SNAPSHOT
After Visit Summary   3/8/2018    Aure Joy    MRN: [id number]           Patient Information     Date Of Birth          1970        Visit Information        Provider Department      3/8/2018 3:15 PM ME [facility] PROCEDURE ROOM [facility] Psychiatry        Today's Diagnoses     Severe episode of recurrent major depressive disorder, without psychotic features (H)    -  1       Follow-ups after your visit        Your next 10 appointments already scheduled     Mar 09, 2018  3:15 PM CST   TMS TREATMENT with ME [facility] PROCEDURE ROOM   [facility] Psychiatry ([facility])    [address]  [facility] [address]   [phone]            Mar 12, 2018  3:15 PM CDT   TMS TREATMENT with [facility] PROCEDURE ROOM   [facility] Psychiatry ([facility])    [address]  [facility] [address]   [phone]            Mar 14, 2018  3:15 PM CDT   [facility]   [facility] Psychiatry ([facility])    [address]  [facility] [address]   [phone]            Mar 15, 2018 12:00 PM CDT   Adult Psychotherapy with Brenda Maldonado, PhD   [facility] Psychiatry ([facility])    [address]  [facility] [address]   [phone]            Mar 16, 2018  3:15 PM CDT   TMS TREATMENT with [facility] PROCEDURE ROOM   [facility] Psychiatry ([facility])    [address] [place] [address]   [phone]            Mar 19, 2018  3:15 PM CDT   [facility]   [facility] Psychiatry ([facility])    [address]  [facility] [address]   [phone]            Mar 21, 2018  3:15 PM CDT   TMS TREATMENT with [facility] PROCEDURE ROOM   [facility] Psychiatry ([facility])    [address]  [facility] [address]   [phone]            Mar 23, 2018  3:15 PM CDT   TMS TREATMENT with [facility] PROCEDURE ROOM    [facility] Psychiatry ([facility])    [address]  [facility] [address]   [phone]              Who to contact     Please call your clinic at [phone] to:    Ask questions about your health    Make or cancel appointments    Discuss your medicines    Learn about your test results    Speak to your doctor            Additional Information About Your Visit        MyChart Information     Gridiumt is an electronic gateway that provides easy, online access to your medical records. With Cequint, you can request a clinic appointment, read your test results, renew a prescription or communicate with your care team.     To sign up for Gridiumt visit the website at [email or web address]   You will be asked to enter the access code listed below, as well as some personal information. Please follow the directions to create your username and password.     Your access code is: [email or web address]  Expires: 2018  4:15 PM     Your access code will  in 90 days. If you need help or a new code, please contact your [facility] or call [phone] for assistance.        Care EveryWhere ID     This is your Care EveryWhere ID. This could be used by other organizations to access your [place] medical records  [id number]        Your Vitals Were     Pulse                   84            Blood Pressure from Last 3 Encounters:   18 127/86   18 136/85   18 133/79    Weight from Last 3 Encounters:   18 92 kg (202 lb 12.8 oz)   18 92.1 kg (203 lb)   18 92.1 kg (203 lb)              We Performed the Following     C TRANSCRANIAL MAGNETIC STIMULATION TREATMENT,DELIVERY/MANAGEMENT        Primary Care Provider Office Phone # Fax #    Stephy Valentin [phone]       [facility] [address] [place] [address]        Equal Access to Services     HERMANN WASHBURN AH: varinder Muse  sindi mclean waxmagdalena terrellin hayaakenneth dillonamy carringtonkenneth arik. So [facility] [phone].    ATENCIÓN: Si ninala elder, tiene a chamberlain disposición servicios gratuitos de asistencia lingüística. Llame al [phone].    We comply with applicable federal civil rights laws and [place] laws. We do not discriminate on the basis of race, color, national origin, age, disability, sex, sexual orientation, or gender identity.            Thank you!     Thank you for choosing [facility] PSYCHIATRY  for your care. Our goal is always to provide you with excellent care. Hearing back from our patients is one way we can continue to improve our services. Please take a few minutes to complete the written survey that you may receive in the mail after your visit with us. Thank you!             Your Updated Medication List - Protect others around you: Learn how to safely use, store and throw away your medicines at www.disposemymeds.org.      Notice  As of 3/8/2018 11:59 PM    You have not been prescribed any medications.

## 2018-03-08 NOTE — PROGRESS NOTES
[facility] TMS Program  [address]  [place] [address]  TMS Procedure Note   Aure Joy MRN# [id number]  Age: 47 year old year old YOB: 1970    Pre-Procedure:  History and Physical: Reviewed in medical record  Consent Signed by: Aure Joy  On: 01/29/18    Clinical Narrative:  Patient tolerating treatment well with no side effects.     Indications for TMS:  MDD, recurrent, severe; 4+ medication trials (from 2+ classes) ineffective; Psychotherapy ineffective.     Pre-Procedure Diagnosis:  MDD, recurrent, severe F33.2    Treatment Hx:  Treatment number this series: 29  Total lifetime treatment number: 29    Allergies   Allergen Reactions     Codeine Nausea     Other  [No Clinical Screening - See Comments] Nausea and Vomiting and Other (See Comments)     general anesthesia- uncontrolled vomitting      /86 (BP Location: Left arm, Patient Position: Sitting, Cuff Size: Adult Regular)  Pulse 84    Pause for the Cause  Right patient:  Yes  Right procedure/correct coil:  Yes; rTMS; cpt 11167; H1 coil.   Earplugs in place:  Yes    Procedure  Patient was seated in procedure chair. Identity and procedure was verified. Ear plugs were placed in ears and patient-specific cap was placed on head and tightened appropriately. Ruler locations were verified. Coil was placed at treatment location and stimulator was set to parameters described below. A test train was delivered and pt tolerated train. Given pt tolerance, 55 treatment trains were delivered. Pt tolerated procedure well with some minor facial and hand movement.    Motor Threshold Determination  Distance from nasion to inion: 35.5  MT 1: 0 - 6 - 16 @ 69% on 1/29/2018  MT 2: 0 - 6 - 16 @ 69% on 2/6/2018   MT 3: 0 - 6 - 16 @ 69% on 2/13/2018   MT 4: 0 - 6 - 16 @ 69% on 2/23/2018   MT 5: 0 - 6 - 16 @ 69% on 3/2/2018        Stimulation Parameters  Frequency: 18 Hz     Train duration: 2 sec  Total pulses delivered:  1980  Inter-train interval: 20 sec  Tx Loc: 0 - eyebrows  - 14  Energy: 83% (120% MT)  Trains: 55 trains      Post-Procedure Diagnosis:  MDD, recurrent, severe F33.2    Jael Alexandra RN   [facility]    Plan   - Cont TMS      I didn t see the patient during/after treatment but remained available in the clinic during  treatment.    ESTELA Zamudio MD   [facility]

## 2018-03-09 ENCOUNTER — OFFICE VISIT (OUTPATIENT)
Dept: PSYCHIATRY | Facility: CLINIC | Age: 48
End: 2018-03-09
Payer: COMMERCIAL

## 2018-03-09 VITALS — HEART RATE: 88 BPM | DIASTOLIC BLOOD PRESSURE: 65 MMHG | SYSTOLIC BLOOD PRESSURE: 103 MMHG

## 2018-03-09 DIAGNOSIS — F33.2 SEVERE EPISODE OF RECURRENT MAJOR DEPRESSIVE DISORDER, WITHOUT PSYCHOTIC FEATURES (H): Primary | ICD-10-CM

## 2018-03-09 ASSESSMENT — PATIENT HEALTH QUESTIONNAIRE - PHQ9: SUM OF ALL RESPONSES TO PHQ QUESTIONS 1-9: 1

## 2018-03-09 NOTE — MR AVS SNAPSHOT
After Visit Summary   3/9/2018    Aure Joy    MRN: [id number]           Patient Information     Date Of Birth          1970        Visit Information        Provider Department      3/9/2018 3:15 PM ME [facility] PROCEDURE ROOM [facility] Psychiatry        Today's Diagnoses     Severe episode of recurrent major depressive disorder, without psychotic features (H)    -  1       Follow-ups after your visit        Your next 10 appointments already scheduled     Mar 12, 2018  3:15 PM CDT   TMS TREATMENT with ME [facility] PROCEDURE ROOM   P Psychiatry ([facility])    [address]  [facility] [address]   [phone]            Mar 14, 2018  3:15 PM CDT   TMS TREATMENT with ME [facility] PROCEDURE ROOM   [facility] Psychiatry ([facility])    [address]  [facility] [address]   [phone]            Mar 15, 2018 12:00 PM CDT   Adult Psychotherapy with Brenda Maldonado, PhD   [facility] Psychiatry ([facility])    [address]  [facility] [address]   [phone]            Mar 16, 2018  3:15 PM CDT   TMS TREATMENT with ME [facility] PROCEDURE ROOM   [facility] Psychiatry ([facility])    [address]  [facility] [address]   [phone]            Mar 19, 2018  3:15 PM CDT   TMS TREATMENT with ME [facility] PROCEDURE ROOM   P Psychiatry ([facility])    [address]  [facility] [address]   [phone]            Mar 21, 2018  3:15 PM CDT   TMS TREATMENT with ME [facility] PROCEDURE ROOM   P Psychiatry ([facility])    [address]  [facility] [address]   [phone]            Mar 23, 2018  3:15 PM CDT   TMS TREATMENT with ME [facility] PROCEDURE ROOM   [facility] Psychiatry ([facility])    [address]  [facility] [address]   [phone]              Who to contact     Please call your clinic at [phone]  to:    Ask questions about your health    Make or cancel appointments    Discuss your medicines    Learn about your test results    Speak to your doctor            Additional Information About Your Visit        MyChart Information     Jackrabbit is an electronic gateway that provides easy, online access to your medical records. With Jackrabbit, you can request a clinic appointment, read your test results, renew a prescription or communicate with your care team.     To sign up for Jackrabbit visit the website at [email or web address]   You will be asked to enter the access code listed below, as well as some personal information. Please follow the directions to create your username and password.     Your access code is: [email or web address]  Expires: 2018  4:15 PM     Your access code will  in 90 days. If you need help or a new code, please contact your [facility] or call [phone] for assistance.        Care EveryWhere ID     This is your Care EveryWhere ID. This could be used by other organizations to access your [place] medical records  [id number]        Your Vitals Were     Pulse                   88            Blood Pressure from Last 3 Encounters:   18 103/65   18 127/86   18 136/85    Weight from Last 3 Encounters:   18 202 lb 12.8 oz (92 kg)   18 203 lb (92.1 kg)   18 203 lb (92.1 kg)              We Performed the Following     C TRANSCRANIAL MAGNETIC STIMULATION TREATMENT,DELIVERY/MANAGEMENT        Primary Care Provider Office Phone # Fax #    Stephy Garciairon [phone]       [facility] [address]   [facility] [address]        Equal Access to Services     [facility]: Hadii aad phil hadasho Soomaali, waaxda luqadaha, qaybta kaalmada adeegyada, yordy elise. [facility] [phone].    ATENCIÓN: Si habla español, tiene a chamberlain disposición servicios gratuitos de asistencia  lingüísticaSue Mcdonald al [phone].    We comply with applicable federal civil rights laws and [place] laws. We do not discriminate on the basis of race, color, national origin, age, disability, sex, sexual orientation, or gender identity.            Thank you!     Thank you for choosing [facility] PSYCHIATRY  for your care. Our goal is always to provide you with excellent care. Hearing back from our patients is one way we can continue to improve our services. Please take a few minutes to complete the written survey that you may receive in the mail after your visit with us. Thank you!             Your Updated Medication List - Protect others around you: Learn how to safely use, store and throw away your medicines at www.disposemymeds.org.      Notice  As of 3/9/2018  3:44 PM    You have not been prescribed any medications.

## 2018-03-09 NOTE — PROGRESS NOTES
[facility] TMS Program  [address]  [place] [address]  TMS Procedure Note   Aure Joy MRN# [id number]  Age: 47 year old year old YOB: 1970    Pre-Procedure:  History and Physical: Reviewed in medical record  Consent Signed by: Aure Joy  On: 01/29/18    Clinical Narrative:  Patient tolerating treatment well with no side effects.     Indications for TMS:  MDD, recurrent, severe; 4+ medication trials (from 2+ classes) ineffective; Psychotherapy ineffective.     Pre-Procedure Diagnosis:  MDD, recurrent, severe F33.2    Treatment Hx:  Treatment number this series: 30  Total lifetime treatment number: 30    Allergies   Allergen Reactions     Codeine Nausea     Other  [No Clinical Screening - See Comments] Nausea and Vomiting and Other (See Comments)     general anesthesia- uncontrolled vomitting      /65 (BP Location: Right arm, Patient Position: Chair, Cuff Size: Adult Regular)  Pulse 88    Pause for the Cause  Right patient:  Yes  Right procedure/correct coil:  Yes; rTMS; cpt 65574; H1 coil.   Earplugs in place:  Yes    Procedure  Patient was seated in procedure chair. Identity and procedure was verified. Ear plugs were placed in ears and patient-specific cap was placed on head and tightened appropriately. Ruler locations were verified. Coil was placed at treatment location and stimulator was set to parameters described below. A test train was delivered and pt tolerated train. Given pt tolerance, 55 treatment trains were delivered. Pt tolerated procedure well with some minor facial and hand movement.    Motor Threshold Determination  Distance from nasion to inion: 35.5  MT 1: 0 - 6 - 16 @ 69% on 1/29/2018  MT 2: 0 - 6 - 16 @ 69% on 2/6/2018   MT 3: 0 - 6 - 16 @ 69% on 2/13/2018   MT 4: 0 - 6 - 16 @ 69% on 2/23/2018   MT 5: 0 - 6 - 16 @ 69% on 3/2/2018        Stimulation Parameters  Frequency: 18 Hz     Train duration: 2 sec  Total pulses delivered:  1980  Inter-train interval: 20 sec  Tx Loc: 0 - eyebrows  - 14  Energy: 83% (120% MT)  Trains: 55 trains      Post-Procedure Diagnosis:  MDD, recurrent, severe F33.2    Jael Alexandra RN   [facility] Neu[facility]od[facility]    Plan   - Cont TMS  -Reviewed next two weeks of taper of TMS with patient      I did see the patient during/after treatment and remained available in the clinic during  treatment.    ESTELA Zamudio MD   [facility] Neuromod[facility]

## 2018-03-10 ASSESSMENT — PATIENT HEALTH QUESTIONNAIRE - PHQ9: SUM OF ALL RESPONSES TO PHQ QUESTIONS 1-9: 2

## 2018-03-12 ENCOUNTER — OFFICE VISIT (OUTPATIENT)
Dept: PSYCHIATRY | Facility: CLINIC | Age: 48
End: 2018-03-12
Payer: COMMERCIAL

## 2018-03-12 VITALS
WEIGHT: 204 LBS | BODY MASS INDEX: 37.54 KG/M2 | HEART RATE: 95 BPM | DIASTOLIC BLOOD PRESSURE: 98 MMHG | SYSTOLIC BLOOD PRESSURE: 120 MMHG

## 2018-03-12 DIAGNOSIS — F33.2 SEVERE EPISODE OF RECURRENT MAJOR DEPRESSIVE DISORDER, WITHOUT PSYCHOTIC FEATURES (H): Primary | ICD-10-CM

## 2018-03-12 NOTE — PROGRESS NOTES
[facility] TMS Program  [address], [address]  [place] [address]  TMS Procedure Note   Aure Joy MRN# [id number]  Age: 47 year old year old YOB: 1970    Pre-Procedure:  History and Physical: Reviewed in medical record  Consent Signed by: Aure Joy  On: 01/29/18    Clinical Narrative:  Patient tolerating treatment well with no side effects.     Indications for TMS:  MDD, recurrent, severe; 4+ medication trials (from 2+ classes) ineffective; Psychotherapy ineffective.     Pre-Procedure Diagnosis:  MDD, recurrent, severe F33.2    Treatment Hx:  Treatment number this series: 31  Total lifetime treatment number: 31    Allergies   Allergen Reactions     Codeine Nausea     Other  [No Clinical Screening - See Comments] Nausea and Vomiting and Other (See Comments)     general anesthesia- uncontrolled vomitting      There were no vitals taken for this visit.    Pause for the Cause  Right patient:  Yes  Right procedure/correct coil:  Yes; rTMS; cpt 87359; H1 coil.   Earplugs in place:  Yes    Procedure  Patient was seated in procedure chair. Identity and procedure was verified. Ear plugs were placed in ears and patient-specific cap was placed on head and tightened appropriately. Ruler locations were verified. Coil was placed at treatment location and stimulator was set to parameters described below. A test train was delivered and pt tolerated train. Given pt tolerance, 55 treatment trains were delivered. Pt tolerated procedure well with some minor facial and hand movement.    Motor Threshold Determination  Distance from nasion to inion: 35.5  MT 1: 0 - 6 - 16 @ 69% on 1/29/2018  MT 2: 0 - 6 - 16 @ 69% on 2/6/2018   MT 3: 0 - 6 - 16 @ 69% on 2/13/2018   MT 4: 0 - 6 - 16 @ 69% on 2/23/2018   MT 5: 0 - 6 - 16 @ 69% on 3/2/2018        Stimulation Parameters  Frequency: 18 Hz     Train duration: 2 sec  Total pulses delivered: 1980  Inter-train interval: 20 sec  Tx Loc: 0 - eyebrows  -  14  Energy: 83% (120% MT)  Trains: 55 trains      Post-Procedure Diagnosis:  MDD, recurrent, severe F33.2    Jael Alexandra RN   [facility]    Plan   - Cont TMS taper         I didn t see the patient during/after treatment but remained available in the clinic during  treatment.    Elizabeth Gordon MD  [facility]

## 2018-03-12 NOTE — MR AVS SNAPSHOT
After Visit Summary   3/12/2018    Aure Joy    MRN: [id number]           Patient Information     Date Of Birth          1970        Visit Information        Provider Department      3/12/2018 3:15 PM ME UMP PROCEDURE ROOM [facility]        Today's Diagnoses     Severe episode of recurrent major depressive disorder, without psychotic features (H)    -  1       Follow-ups after your visit        Your next 10 appointments already scheduled     Mar 14, 2018  3:15 PM CDT   TMS TREATMENT with ME UMP PROCEDURE ROOM   [facility] ([facility])    [address]  [facility] [address]   [phone]            Mar 15, 2018 12:00 PM CDT   Adult Psychotherapy with Brenda Maldonado, PhD   [facility] ([facility])    [address]  [facility] [address]   [phone]            Mar 16, 2018  3:15 PM CDT   [facility]   [facility] ([facility])    [address]  [facility] [address]   [phone]            Mar 19, 2018  3:15 PM CDT   TMS TREATMENT with ME [facility] PROCEDURE ROOM   [facility] ([facility])    [address]  [facility] [address]   [phone]            Mar 21, 2018  3:15 PM CDT   TMS TREATMENT with [facility] PROCEDURE ROOM   [facility] ([facility])    [address]  [facility] [address]   [phone]            Mar 23, 2018  3:15 PM CDT   TMS TREATMENT with ME [facility] PROCEDURE ROOM   [facility] ([facility])    [address]  [facility] [address]   [phone]              Who to contact     Please call your clinic at [phone] to:    Ask questions about your health    Make or cancel appointments    Discuss your medicines    Learn about your test results    Speak to your doctor            Additional Information About Your  Visit        [employer] Information     [employer] is an electronic gateway that provides easy, online access to your medical records. With [employer], you can request a clinic appointment, read your test results, renew a prescription or communicate with your care team.     To sign up for [employer] visit the website at [email or web address]   You will be asked to enter the access code listed below, as well as some personal information. Please follow the directions to create your username and password.     Your access code is: [email or web address]  Expires: 2018  5:15 PM     Your access code will  in 90 days. If you need help or a new code, please contact your [facility] or call [phone] for assistance.        Care EveryWhere ID     This is your Care EveryWhere ID. This could be used by other organizations to access your [place] medical records  [id number]        Your Vitals Were     Pulse BMI (Body Mass Index)                95 37.54 kg/m2           Blood Pressure from Last 3 Encounters:   18 (!) 120/98   18 103/65   18 127/86    Weight from Last 3 Encounters:   18 204 lb (92.5 kg)   18 202 lb 12.8 oz (92 kg)   18 203 lb (92.1 kg)              We Performed the Following     C TRANSCRANIAL MAGNETIC STIMULATION TREATMENT,DELIVERY/MANAGEMENT        Primary Care Provider Office Phone # Fax #    Stpehy Valentin [phone]       [facility] [address]   [facility] [address]        Equal Access to Services     HERMANN WASHBURN : Hadii aad ku hadasho Soomaali, waaxda luqadaha, qaybta kaalmada adeegyada, yordy elise. So [facility] [phone].    ATENCIÓN: Si habla español, tiene a chamberlain disposición servicios gratuitos de asistencia lingüística. Llame al [phone].    We comply with applicable federal civil rights laws and [place] laws. We do not discriminate on the basis of race, color, national  origin, age, disability, sex, sexual orientation, or gender identity.            Thank you!     Thank you for choosing [facility]  for your care. Our goal is always to provide you with excellent care. Hearing back from our patients is one way we can continue to improve our services. Please take a few minutes to complete the written survey that you may receive in the mail after your visit with us. Thank you!             Your Updated Medication List - Protect others around you: Learn how to safely use, store and throw away your medicines at www.disposemymeds.org.      Notice  As of 3/12/2018  4:13 PM    You have not been prescribed any medications.

## 2018-03-13 VITALS
HEART RATE: 88 BPM | DIASTOLIC BLOOD PRESSURE: 79 MMHG | SYSTOLIC BLOOD PRESSURE: 134 MMHG | WEIGHT: 203 LBS | HEIGHT: 62 IN | BODY MASS INDEX: 37.36 KG/M2

## 2018-03-14 ENCOUNTER — OFFICE VISIT (OUTPATIENT)
Dept: PSYCHIATRY | Facility: CLINIC | Age: 48
End: 2018-03-14
Payer: COMMERCIAL

## 2018-03-14 VITALS — DIASTOLIC BLOOD PRESSURE: 75 MMHG | HEART RATE: 77 BPM | SYSTOLIC BLOOD PRESSURE: 128 MMHG

## 2018-03-14 DIAGNOSIS — F33.2 SEVERE EPISODE OF RECURRENT MAJOR DEPRESSIVE DISORDER, WITHOUT PSYCHOTIC FEATURES (H): Primary | ICD-10-CM

## 2018-03-14 ASSESSMENT — PATIENT HEALTH QUESTIONNAIRE - PHQ9
SUM OF ALL RESPONSES TO PHQ QUESTIONS 1-9: 16
SUM OF ALL RESPONSES TO PHQ QUESTIONS 1-9: 16

## 2018-03-14 NOTE — MR AVS SNAPSHOT
After Visit Summary   3/14/2018    Aure Joy    MRN: [id number]           Patient Information     Date Of Birth          1970        Visit Information        Provider Department      3/14/2018 3:15 PM [facility] PROCEDURE ROOM [facility]        Today's Diagnoses     Severe episode of recurrent major depressive disorder, without psychotic features (H)    -  1       Follow-ups after your visit        Your next 10 appointments already scheduled     Jul 24, 2018  1:00 PM CDT   TMS FOLLOW-UP with Evelyn Zamudio MD   [facility] ([facility])    [address] [place] [id number] [phone]              Who to contact     Please call your clinic at [phone] to:    Ask questions about your health    Make or cancel appointments    Discuss your medicines    Learn about your test results    Speak to your doctor            Additional Information About Your Visit        MyChart Information     [employer] gives you secure access to your electronic health record. If you see a primary care provider, you can also send messages to your care team and make appointments. If you have questions, please call your primary care clinic.  If you do not have a primary care provider, please call [phone] and they will assist you.      [employer] is an electronic gateway that provides easy, online access to your medical records. With [employer], you can request a clinic appointment, read your test results, renew a prescription or communicate with your care team.     To access your existing account, please contact your [facility] or call [phone] for assistance.        Care EveryWhere ID     This is your Care EveryWhere ID. This could be used by other organizations to access your [place] medical records  [id number]        Your Vitals Were     Pulse                   77            Blood Pressure from Last 3 Encounters:   06/26/18  128/79   05/22/18 126/75   04/24/18 158/87    Weight from Last 3 Encounters:   06/26/18 92.5 kg (204 lb)   05/22/18 91.1 kg (200 lb 12.8 oz)   04/24/18 93.3 kg (205 lb 9.6 oz)              We Performed the Following     C TRANSCRANIAL MAGNETIC STIMULATION TREATMENT,DELIVERY/MANAGEMENT        Primary Care Provider Office Phone # Fax #    Stephy Valentin [phone]       [facility] [address]   [facility] [address]        Equal Access to Services     [facility]: Hadii derrell villarreal hadasho Sodev, waaxda luqadaha, qaybta kaalmada amos, yordy araujo . So [facility] [phone].    ATENCIÓN: Si habla español, tiene a chamberlain disposición servicios gratuitos de asistencia lingüística. [facility] [phone].    We comply with applicable federal civil rights laws and [place] laws. We do not discriminate on the basis of race, color, national origin, age, disability, sex, sexual orientation, or gender identity.            Thank you!     Thank you for choosing [facility]  for your care. Our goal is always to provide you with excellent care. Hearing back from our patients is one way we can continue to improve our services. Please take a few minutes to complete the written survey that you may receive in the mail after your visit with us. Thank you!             Your Updated Medication List - Protect others around you: Learn how to safely use, store and throw away your medicines at www.disposemymeds.org.      Notice  As of 3/14/2018 11:59 PM    You have not been prescribed any medications.

## 2018-03-14 NOTE — PROGRESS NOTES
[facility] TMS Program  [address]  [place] [address]  TMS Procedure Note   Aure Joy MRN# [id number]  Age: 47 year old year old YOB: 1970    Pre-Procedure:  History and Physical: Reviewed in medical record  Consent Signed by: Aure Joy  On: 01/29/18    Clinical Narrative:  Patient tolerating treatment well with no side effects.     Indications for TMS:  MDD, recurrent, severe; 4+ medication trials (from 2+ classes) ineffective; Psychotherapy ineffective.     Pre-Procedure Diagnosis:  MDD, recurrent, severe F33.2    Treatment Hx:  Treatment number this series: 32  Total lifetime treatment number: 32    Allergies   Allergen Reactions     Codeine Nausea     Other  [No Clinical Screening - See Comments] Nausea and Vomiting and Other (See Comments)     general anesthesia- uncontrolled vomitting      /75 (BP Location: Left arm, Patient Position: Sitting, Cuff Size: Adult Regular)  Pulse 77    Pause for the Cause  Right patient:  Yes  Right procedure/correct coil:  Yes; rTMS; cpt 84994; H1 coil.   Earplugs in place:  Yes    Procedure  Patient was seated in procedure chair. Identity and procedure was verified. Ear plugs were placed in ears and patient-specific cap was placed on head and tightened appropriately. Ruler locations were verified. Coil was placed at treatment location and stimulator was set to parameters described below. A test train was delivered and pt tolerated train. Given pt tolerance, 55 treatment trains were delivered. Pt tolerated procedure well with some minor facial and hand movement.    Motor Threshold Determination  Distance from nasion to inion: 35.5  MT 1: 0 - 6 - 16 @ 69% on 1/29/2018  MT 2: 0 - 6 - 16 @ 69% on 2/6/2018   MT 3: 0 - 6 - 16 @ 69% on 2/13/2018   MT 4: 0 - 6 - 16 @ 69% on 2/23/2018   MT 5: 0 - 6 - 16 @ 69% on 3/2/2018        Stimulation Parameters  Frequency: 18 Hz     Train duration: 2 sec  Total pulses delivered:  1980  Inter-train interval: 20 sec  Tx Loc: 0 - eyebrows  - 14  Energy: 83% (120% MT)  Trains: 55 trains      Post-Procedure Diagnosis:  MDD, recurrent, severe F33.2    Jael Alexandra RN   [facility]    Plan   - Cont TMS taper         I didn t see the patient during/after treatment but remained available in the clinic during  treatment.    Elizabeth Gordon MD  [facility]

## 2018-03-15 ENCOUNTER — OFFICE VISIT (OUTPATIENT)
Dept: PSYCHIATRY | Facility: CLINIC | Age: 48
End: 2018-03-15
Payer: COMMERCIAL

## 2018-03-15 DIAGNOSIS — F33.2 SEVERE EPISODE OF RECURRENT MAJOR DEPRESSIVE DISORDER, WITHOUT PSYCHOTIC FEATURES (H): Primary | ICD-10-CM

## 2018-03-15 ASSESSMENT — PATIENT HEALTH QUESTIONNAIRE - PHQ9: SUM OF ALL RESPONSES TO PHQ QUESTIONS 1-9: 0

## 2018-03-15 NOTE — PROGRESS NOTES
Progress Notes       Office Visit       Progress Notes       Psychotherapy Session (Behavioral Activation)       [facility] TMS Program  [address]  [place] [address]       Name: Aure Joy   MRN# [id number]  Age: 47 year old   YOB: 1970  Date of Session: March 15, 2018  Duration: 45 minutes (starting time = 12:02; stopping time = 12:47)                Session Content  Ms. Joy completed all of the written behavioral activation homework assigned at last week's session. We reviewed self-monitoring and noted continued improvement in symptoms of anxiety, depression, anhedonia, and behavioral avoidance. She reports continued use of behavioral activation skills and continued progress in activity, hedonia, motivation, energy, . Discussed relapse prevention strategies including continued self-monitoring, use of behavioral activation skills, and indications of worsening symptoms. Continued to focus on identity and choices in focus including work, parenting, and writing. Focused on avoidance behaviors related to anxiety (e.g., grocery store) including exposure hierarchy. Discussed strategies to prevent major depression if symptoms recur.      Assessment  In remission from major depressive episode, severe (non psychotic features), recurrent. She rates current depression as a 1 on a 1-10 scale. Denies suicidal or homicidal ideation. Reports improvements in mood, hedonic response, motivation, energy, self-evaluation, and cognition. In partial remission from agoraphobia with residual anxiety and avoidance (e.g., grocery store)      Mental Status Exam   Appearance: appropriate  Attitude: cooperative  Behavior: normal  Eye Contact: normal  Speech: normal  Orientation: oriented  x3  Mood:  euthymic  Affect: Mood congruent  Thought Process: clear  Suicidal Ideation: Denies current suicidal ideation; denies plan or intent, assures of safety   Hallucinations: none      Plan  Final  behavioral activation therapy session with me in two weeks (3/29/18) at noon following the completion of TMS. Continue DBT skills group at [facility] during and following TMS.      Signed:  Brenda Maldonado, Ph.D., L.P.    Department of Psychiatry  [facility]

## 2018-03-15 NOTE — MR AVS SNAPSHOT
After Visit Summary   3/15/2018    Aure Joy    MRN: [id number]           Patient Information     Date Of Birth          1970        Visit Information        Provider Department      3/15/2018 12:00 PM Brenda Maldonado, PhD [facility]        Today's Diagnoses     Severe episode of recurrent major depressive disorder, without psychotic features (H)    -  1      Care Instructions    Final behavioral activation psychotherapy session in two weeks on Thursady, March 29th at noon.          Follow-ups after your visit        Follow-up notes from your care team     Return in 2 weeks (on 3/29/2018) for Final behavioral activation psychotherapy session (3/29 at noon).      Your next 10 appointments already scheduled     Mar 16, 2018  3:15 PM CDT   TMS TREATMENT with ME UMP PROCEDURE ROOM   [facility] ([facility])    [address]  [facility] [address] [phone]            Mar 19, 2018  3:15 PM CDT   TMS TREATMENT with ME UMP PROCEDURE ROOM   [facility] ([facility])    [address]  [facility] [address] [phone]            Mar 21, 2018  3:15 PM CDT   TMS TREATMENT with ME UMP PROCEDURE ROOM   [facility] ([facility])    [address]  [facility] [address] [phone]            Mar 23, 2018  3:15 PM CDT   TMS TREATMENT with ME UMP PROCEDURE ROOM   [facility] ([facility])    [address]  [facility] [address] [phone]              Who to contact     Please call your clinic at [phone] to:    Ask questions about your health    Make or cancel appointments    Discuss your medicines    Learn about your test results    Speak to your doctor            Additional Information About Your Visit        [email or web address] Information     [employer] is an electronic gateway that provides easy, online access to your medical records. With  [employer], you can request a clinic appointment, read your test results, renew a prescription or communicate with your care team.     To sign up for [email or web address] visit the website at [email or web address]   You will be asked to enter the access code listed below, as well as some personal information. Please follow the directions to create your username and password.     Your access code is: [email or web address]  Expires: 2018  5:15 PM     Your access code will  in 90 days. If you need help or a new code, please contact your [facility] or call [phone] for assistance.        Care EveryWhere ID     This is your Care EveryWhere ID. This could be used by other organizations to access your [place] medical records  [id number]         Blood Pressure from Last 3 Encounters:   18 128/75   18 (!) 120/98   18 103/65    Weight from Last 3 Encounters:   18 204 lb (92.5 kg)   18 202 lb 12.8 oz (92 kg)   18 203 lb (92.1 kg)              Today, you had the following     No orders found for display       Primary Care Provider Office Phone # Fax #    Stephy Valentin [phone]       [facility] [address] [facility] [address]        Equal Access to Services     HERMANN WASHBURN AH: Hadii aad ku hadasho Soomaali, waaxda luqadaha, qaybta kaalmada adeegyada, yordy barron haydaniel araujo . So [facility] [phone].    ATENCIÓN: Si habla español, tiene a chamberlain disposición servicios gratuitos de asistencia lingüística. Tamara al [phone].    We comply with applicable federal civil rights laws and [place] laws. We do not discriminate on the basis of race, color, national origin, age, disability, sex, sexual orientation, or gender identity.            Thank you!     Thank you for choosing [facility]  for your care. Our goal is always to provide you with excellent care. Hearing back from our patients is one way we can  continue to improve our services. Please take a few minutes to complete the written survey that you may receive in the mail after your visit with us. Thank you!             Your Updated Medication List - Protect others around you: Learn how to safely use, store and throw away your medicines at www.disposemymeds.org.      Notice  As of 3/15/2018  2:01 PM    You have not been prescribed any medications.

## 2018-03-16 ENCOUNTER — OFFICE VISIT (OUTPATIENT)
Dept: PSYCHIATRY | Facility: CLINIC | Age: 48
End: 2018-03-16
Payer: COMMERCIAL

## 2018-03-16 VITALS — SYSTOLIC BLOOD PRESSURE: 120 MMHG | HEART RATE: 78 BPM | DIASTOLIC BLOOD PRESSURE: 72 MMHG

## 2018-03-16 DIAGNOSIS — F33.2 SEVERE EPISODE OF RECURRENT MAJOR DEPRESSIVE DISORDER, WITHOUT PSYCHOTIC FEATURES (H): Primary | ICD-10-CM

## 2018-03-16 NOTE — MR AVS SNAPSHOT
After Visit Summary   3/16/2018    Aure Joy    MRN: [id number]           Patient Information     Date Of Birth          1970        Visit Information        Provider Department      3/16/2018 3:15 PM [facility] PROCEDURE ROOM [facility] Psychiatry        Today's Diagnoses     Severe episode of recurrent major depressive disorder, without psychotic features (H)    -  1       Follow-ups after your visit        Your next 10 appointments already scheduled     Mar 23, 2018  3:15 PM CDT   TMS TREATMENT with [facility] PROCEDURE ROOM   [facility] ([facility])    [address] [place] [id number] [phone]            Mar 29, 2018 12:00 PM CDT   Adult Psychotherapy with Brenda Maldonado, PhD   [facility] ([facility])    [address] [place] [address] [id number] [phone]              Who to contact     Please call your clinic at [phone] to:    Ask questions about your health    Make or cancel appointments    Discuss your medicines    Learn about your test results    Speak to your doctor            Additional Information About Your Visit        uromovieharAtlas Powered Information     [facility] is an electronic gateway that provides easy, online access to your medical records. With [facility], you can request a clinic appointment, read your test results, renew a prescription or communicate with your care team.     To sign up for Private Practice visit the website at [email or web address]   You will be asked to enter the access code listed below, as well as some personal information. Please follow the directions to create your username and password.     Your access code is: [email or web address]  Expires: 2018  5:15 PM     Your access code will  in 90 days. If you need help or a new code, please contact your [facility] or call [phone] for assistance.        Care EveryWhere ID     This is your Care  EveryWhere ID. This could be used by other organizations to access your [place] medical records  [id number]        Your Vitals Were     Pulse                   78            Blood Pressure from Last 3 Encounters:   03/21/18 132/81   03/19/18 133/81   03/16/18 120/72    Weight from Last 3 Encounters:   03/12/18 92.5 kg (204 lb)   03/05/18 92 kg (202 lb 12.8 oz)   02/20/18 92.1 kg (203 lb)              We Performed the Following     C TRANSCRANIAL MAGNETIC STIMULATION TREATMENT,DELIVERY/MANAGEMENT        Primary Care Provider Office Phone # Fax #    Stephy Valentin [phone]       [facility] [address] [place]   [place] [address]        Equal Access to Services     HERMANN WASH[facility]URN : Hadii derrell christineo Sodev, waaxda luqadaha, qaybta kaalmada adeegyada, waxmagdalena elise. So [facility] [phone].    ATENCIÓN: Si habla español, tiene a chamberlain disposición servicios gratuitos de asistencia lingüística. [facility] [phone].    We comply with applicable federal civil rights laws and [place] laws. We do not discriminate on the basis of race, color, national origin, age, disability, sex, sexual orientation, or gender identity.            Thank you!     Thank you for choosing [facility] PSYCHIATRY  for your care. Our goal is always to provide you with excellent care. Hearing back from our patients is one way we can continue to improve our services. Please take a few minutes to complete the written survey that you may receive in the mail after your visit with us. Thank you!             Your Updated Medication List - Protect others around you: Learn how to safely use, store and throw away your medicines at www.disposemymeds.org.      Notice  As of 3/16/2018 11:59 PM    You have not been prescribed any medications.

## 2018-03-16 NOTE — PROGRESS NOTES
[facility] TMS Program  [address]  [place] [address]  TMS Procedure Note   Aure Joy MRN# [id number]  Age: 47 year old year old YOB: 1970    Pre-Procedure:  History and Physical: Reviewed in medical record  Consent Signed by: Aure Joy  On: 01/29/18    Clinical Narrative:  Patient tolerating treatment well with no side effects.     Indications for TMS:  MDD, recurrent, severe; 4+ medication trials (from 2+ classes) ineffective; Psychotherapy ineffective.     Pre-Procedure Diagnosis:  MDD, recurrent, severe F33.2    Treatment Hx:  Treatment number this series: 33  Total lifetime treatment number: 33    Allergies   Allergen Reactions     Codeine Nausea     Other  [No Clinical Screening - See Comments] Nausea and Vomiting and Other (See Comments)     general anesthesia- uncontrolled vomitting      /72 (BP Location: Right arm, Patient Position: Chair, Cuff Size: Adult Regular)  Pulse 78    Pause for the Cause  Right patient:  Yes  Right procedure/correct coil:  Yes; rTMS; cpt 77841; H1 coil.   Earplugs in place:  Yes    Procedure  Patient was seated in procedure chair. Identity and procedure was verified. Ear plugs were placed in ears and patient-specific cap was placed on head and tightened appropriately. Ruler locations were verified. Coil was placed at treatment location and stimulator was set to parameters described below. A test train was delivered and pt tolerated train. Given pt tolerance, 55 treatment trains were delivered. Pt tolerated procedure well with some minor facial and hand movement.    Motor Threshold Determination  Distance from nasion to inion: 35.5  MT 1: 0 - 6 - 16 @ 69% on 1/29/2018  MT 2: 0 - 6 - 16 @ 69% on 2/6/2018   MT 3: 0 - 6 - 16 @ 69% on 2/13/2018   MT 4: 0 - 6 - 16 @ 69% on 2/23/2018   MT 5: 0 - 6 - 16 @ 69% on 3/2/2018        Stimulation Parameters  Frequency: 18 Hz     Train duration: 2 sec  Total pulses delivered:  1980  Inter-train interval: 20 sec  Tx Loc: 0 - eyebrows  - 14  Energy: 83% (120% MT)  Trains: 55 trains      Post-Procedure Diagnosis:  MDD, recurrent, severe F33.2    Jael Alexandra RN   [facility] Neuromodulation    Plan   - Cont TMS taper         I did see the patient during/after treatment and remained available in the clinic during  Treatment. Pt reports that she continues to do well with remission of symptoms of depression. Anxiety continues to be well managed and she describes actively working on strategies for managing anxiety. Discussed her overall good progress with treatment as well as expectations around maintenance of response to TMS. Also discussed plans for follow-up care. Discussed having her join this provider's outpatient clinic panel.    I spent 10 minutes with this patient, greater than 50% of which was spent on counseling.    ESTELA Zamudio MD  [facility] Neuromodulation

## 2018-03-17 ASSESSMENT — PATIENT HEALTH QUESTIONNAIRE - PHQ9: SUM OF ALL RESPONSES TO PHQ QUESTIONS 1-9: 1

## 2018-03-19 ENCOUNTER — OFFICE VISIT (OUTPATIENT)
Dept: PSYCHIATRY | Facility: CLINIC | Age: 48
End: 2018-03-19
Payer: COMMERCIAL

## 2018-03-19 VITALS — HEART RATE: 82 BPM | DIASTOLIC BLOOD PRESSURE: 81 MMHG | SYSTOLIC BLOOD PRESSURE: 133 MMHG

## 2018-03-19 DIAGNOSIS — F33.2 SEVERE EPISODE OF RECURRENT MAJOR DEPRESSIVE DISORDER, WITHOUT PSYCHOTIC FEATURES (H): Primary | ICD-10-CM

## 2018-03-19 NOTE — MR AVS SNAPSHOT
After Visit Summary   3/19/2018    Aure Joy    MRN: [id number]           Patient Information     Date Of Birth          1970        Visit Information        Provider Department      3/19/2018 3:15 PM [facility] PROCEDURE ROOM [facility] Psychiatry        Today's Diagnoses     Severe episode of recurrent major depressive disorder, without psychotic features (H)    -  1       Follow-ups after your visit        Your next 10 appointments already scheduled     Jul 24, 2018  1:00 PM CDT   TMS FOLLOW-UP with Evelyn Zamudio MD   [facility] ([facility])    [address] [place] [id number] [phone]              Who to contact     Please call your clinic at [phone] to:    Ask questions about your health    Make or cancel appointments    Discuss your medicines    Learn about your test results    Speak to your doctor            Additional Information About Your Visit        MyChart Information     [facility] [employer] gives you secure access to your electronic health record. If you see a primary care provider, you can also send messages to your care team and make appointments. If you have questions, please call your primary care clinic.  If you do not have a primary care provider, please call [phone] and they will assist you.      [facility] is an electronic gateway that provides easy, online access to your medical records. With [facility], you can request a clinic appointment, read your test results, renew a prescription or communicate with your care team.     To access your existing account, please contact your [facility] or call [phone] for assistance.        Care EveryWhere ID     This is your Care EveryWhere ID. This could be used by other organizations to access your [place] medical records  [id number]        Your Vitals Were     Pulse                   82            Blood Pressure from Last 3 Encounters:   06/26/18  128/79   05/22/18 126/75   04/24/18 158/87    Weight from Last 3 Encounters:   06/26/18 92.5 kg (204 lb)   05/22/18 91.1 kg (200 lb 12.8 oz)   04/24/18 93.3 kg (205 lb 9.6 oz)              We Performed the Following     C TRANSCRANIAL MAGNETIC STIMULATION TREATMENT,DELIVERY/MANAGEMENT        Primary Care Provider Office Phone # Fax #    Stephy Valentin [phone]       [facility] [address]   [facility] [address]        Equal Access to Services     [facility]: Hadii derrell villarreal hadasho Sodev, waaxda luqadaha, qaybta kaalmada amos, yordy araujo . So [facility] [phone].    ATENCIÓN: Si habla español, tiene a chamberlain disposición servicios gratuitos de asistencia lingüística. [facility] [phone].    We comply with applicable federal civil rights laws and [place] laws. We do not discriminate on the basis of race, color, national origin, age, disability, sex, sexual orientation, or gender identity.            Thank you!     Thank you for choosing [facility] PSYCHIATRY  for your care. Our goal is always to provide you with excellent care. Hearing back from our patients is one way we can continue to improve our services. Please take a few minutes to complete the written survey that you may receive in the mail after your visit with us. Thank you!             Your Updated Medication List - Protect others around you: Learn how to safely use, store and throw away your medicines at www.disposemymeds.org.      Notice  As of 3/19/2018 11:59 PM    You have not been prescribed any medications.

## 2018-03-19 NOTE — PROGRESS NOTES
[facility] TMS Program  [address]  [place] [address]  TMS Procedure Note   Aure Joy MRN# [id number]  Age: 47 year old year old YOB: 1970    Pre-Procedure:  History and Physical: Reviewed in medical record  Consent Signed by: Aure Joy  On: 01/29/18    Clinical Narrative:  Patient tolerating treatment well with no side effects.     Indications for TMS:  MDD, recurrent, severe; 4+ medication trials (from 2+ classes) ineffective; Psychotherapy ineffective.     Pre-Procedure Diagnosis:  MDD, recurrent, severe F33.2    Treatment Hx:  Treatment number this series: 34  Total lifetime treatment number: 34    Allergies   Allergen Reactions     Codeine Nausea     Other  [No Clinical Screening - See Comments] Nausea and Vomiting and Other (See Comments)     general anesthesia- uncontrolled vomitting      /81 (BP Location: Left arm, Patient Position: Sitting, Cuff Size: Adult Regular)  Pulse 82    Pause for the Cause  Right patient:  Yes  Right procedure/correct coil:  Yes; rTMS; cpt 55362; H1 coil.   Earplugs in place:  Yes    Procedure  Patient was seated in procedure chair. Identity and procedure was verified. Ear plugs were placed in ears and patient-specific cap was placed on head and tightened appropriately. Ruler locations were verified. Coil was placed at treatment location and stimulator was set to parameters described below. A test train was delivered and pt tolerated train. Given pt tolerance, 55 treatment trains were delivered. Pt tolerated procedure well with some minor facial and hand movement.    Motor Threshold Determination  Distance from nasion to inion: 35.5  MT 1: 0 - 6 - 16 @ 69% on 1/29/2018  MT 2: 0 - 6 - 16 @ 69% on 2/6/2018   MT 3: 0 - 6 - 16 @ 69% on 2/13/2018   MT 4: 0 - 6 - 16 @ 69% on 2/23/2018   MT 5: 0 - 6 - 16 @ 69% on 3/2/2018        Stimulation Parameters  Frequency: 18 Hz     Train duration: 2 sec  Total pulses delivered:  1980  Inter-train interval: 20 sec  Tx Loc: 0 - eyebrows  - 14  Energy: 83% (120% MT)  Trains: 55 trains      Post-Procedure Diagnosis:  MDD, recurrent, severe F33.2    Jael Alexandra RN   [facility] Neuromod[facility]    Plan   - Cont TMS taper         I didn t see the patient during/after treatment but remained available in the clinic during  treatment.    Elizabeth Gordon MD   [facility] Neuromod[facility]

## 2018-03-20 ASSESSMENT — PATIENT HEALTH QUESTIONNAIRE - PHQ9: SUM OF ALL RESPONSES TO PHQ QUESTIONS 1-9: 0

## 2018-03-21 ENCOUNTER — OFFICE VISIT (OUTPATIENT)
Dept: PSYCHIATRY | Facility: CLINIC | Age: 48
End: 2018-03-21
Payer: COMMERCIAL

## 2018-03-21 VITALS — SYSTOLIC BLOOD PRESSURE: 132 MMHG | DIASTOLIC BLOOD PRESSURE: 81 MMHG | HEART RATE: 80 BPM

## 2018-03-21 DIAGNOSIS — F33.2 SEVERE EPISODE OF RECURRENT MAJOR DEPRESSIVE DISORDER, WITHOUT PSYCHOTIC FEATURES (H): Primary | ICD-10-CM

## 2018-03-21 NOTE — MR AVS SNAPSHOT
After Visit Summary   3/21/2018    Aure Joy    MRN: [id number]           Patient Information     Date Of Birth          1970        Visit Information        Provider Department      3/21/2018 3:15 PM [facility] PROCEDURE ROOM [facility] Psychiatry        Today's Diagnoses     Severe episode of recurrent major depressive disorder, without psychotic features (H)    -  1       Follow-ups after your visit        Your next 10 appointments already scheduled     Mar 23, 2018  3:15 PM CDT   TMS TREATMENT with [facility] PROCEDURE ROOM   [facility] Psychiatry ([facility])    [address] [place] [id number] [phone]            Mar 29, 2018 12:00 PM CDT   Adult Psychotherapy with Brenda Maldonado, PhD   [facility] Psychiatry ([facility])    [address] [place] [address] [phone]              Who to contact     Please call your clinic at [phone] to:    Ask questions about your health    Make or cancel appointments    Discuss your medicines    Learn about your test results    Speak to your doctor            Additional Information About Your Visit        PharmiWeb SolutionsharEnersave Information     Nu-B-2B is an electronic gateway that provides easy, online access to your medical records. With [employer]B-[employer]B, you can request a clinic appointment, read your test results, renew a prescription or communicate with your care team.     To sign up for Nu-[employer]-2B visit the website at [email or web address]   You will be asked to enter the access code listed below, as well as some personal information. Please follow the directions to create your username and password.     Your access code is: [email or web address]  Expires: 2018  5:15 PM     Your access code will  in 90 days. If you need help or a new code, please contact your [facility] or call [phone] for assistance.        Care EveryWhere ID     This is your Care  EveryWhere ID. This could be used by other organizations to access your [place] medical records  [id number]        Your Vitals Were     Pulse                   80            Blood Pressure from Last 3 Encounters:   03/21/18 132/81   03/19/18 133/81   03/16/18 120/72    Weight from Last 3 Encounters:   03/12/18 92.5 kg (204 lb)   03/05/18 92 kg (202 lb 12.8 oz)   02/20/18 92.1 kg (203 lb)              We Performed the Following     C TRANSCRANIAL MAGNETIC STIMULATION TREATMENT,DELIVERY/MANAGEMENT        Primary Care Provider Office Phone # Fax #    Stephy Valentin [phone]       [facility] [address] [place]   [place] [address]        Equal Access to Services     HERMANN WASHBURN : Hadii derrell christineo Sodev, waaxda luqadaha, qaybta kaalmada adeegyada, waxmagdalena elise. So [facility] [phone].    ATENCIÓN: Si habla español, tiene a chamberlain disposición servicios gratuitos de asistencia lingüística. [facility] [phone].    We comply with applicable federal civil rights laws and [place] laws. We do not discriminate on the basis of race, color, national origin, age, disability, sex, sexual orientation, or gender identity.            Thank you!     Thank you for choosing [facility] PSYCHIATRY  for your care. Our goal is always to provide you with excellent care. Hearing back from our patients is one way we can continue to improve our services. Please take a few minutes to complete the written survey that you may receive in the mail after your visit with us. Thank you!             Your Updated Medication List - Protect others around you: Learn how to safely use, store and throw away your medicines at www.disposemymeds.org.      Notice  As of 3/21/2018  4:47 PM    You have not been prescribed any medications.

## 2018-03-21 NOTE — PROGRESS NOTES
[facility] TMS Program  [address]  [place] [address]  TMS Procedure Note   Aure Joy MRN# [id number]  Age: 47 year old year old YOB: 1970    Pre-Procedure:  History and Physical: Reviewed in medical record  Consent Signed by: Aure Joy  On: 01/29/18    Clinical Narrative:  Patient tolerating treatment well with no side effects.     Indications for TMS:  MDD, recurrent, severe; 4+ medication trials (from 2+ classes) ineffective; Psychotherapy ineffective.     Pre-Procedure Diagnosis:  MDD, recurrent, severe F33.2    Treatment Hx:  Treatment number this series: 35  Total lifetime treatment number: 35    Allergies   Allergen Reactions     Codeine Nausea     Other  [No Clinical Screening - See Comments] Nausea and Vomiting and Other (See Comments)     general anesthesia- uncontrolled vomitting      /81 (BP Location: Left arm, Patient Position: Sitting, Cuff Size: Adult Regular)  Pulse 80    Pause for the Cause  Right patient:  Yes  Right procedure/correct coil:  Yes; rTMS; cpt 72466; H1 coil.   Earplugs in place:  Yes    Procedure  Patient was seated in procedure chair. Identity and procedure was verified. Ear plugs were placed in ears and patient-specific cap was placed on head and tightened appropriately. Ruler locations were verified. Coil was placed at treatment location and stimulator was set to parameters described below. A test train was delivered and pt tolerated train. Given pt tolerance, 55 treatment trains were delivered. Pt tolerated procedure well with some minor facial and hand movement.    Motor Threshold Determination  Distance from nasion to inion: 35.5  MT 1: 0 - 6 - 16 @ 69% on 1/29/2018  MT 2: 0 - 6 - 16 @ 69% on 2/6/2018   MT 3: 0 - 6 - 16 @ 69% on 2/13/2018   MT 4: 0 - 6 - 16 @ 69% on 2/23/2018   MT 5: 0 - 6 - 16 @ 69% on 3/2/2018        Stimulation Parameters  Frequency: 18 Hz     Train duration: 2 sec  Total pulses delivered:  1980  Inter-train interval: 20 sec  Tx Loc: 0 - eyebrows  - 14  Energy: 83% (120% MT)  Trains: 55 trains      Post-Procedure Diagnosis:  MDD, recurrent, severe F33.2    Jael Alexandra RN   [facility] Neuromodulation    Plan   - Cont TMS taper         I didn t see the patient during/after treatment but remained available in the clinic during  treatment.    ESTELA Zamudio MD  [facility] Neuromodulation

## 2018-03-22 ASSESSMENT — PATIENT HEALTH QUESTIONNAIRE - PHQ9: SUM OF ALL RESPONSES TO PHQ QUESTIONS 1-9: 0

## 2018-03-23 ENCOUNTER — OFFICE VISIT (OUTPATIENT)
Dept: PSYCHIATRY | Facility: CLINIC | Age: 48
End: 2018-03-23
Payer: COMMERCIAL

## 2018-03-23 VITALS — DIASTOLIC BLOOD PRESSURE: 83 MMHG | HEART RATE: 83 BPM | SYSTOLIC BLOOD PRESSURE: 125 MMHG

## 2018-03-23 DIAGNOSIS — F33.2 SEVERE EPISODE OF RECURRENT MAJOR DEPRESSIVE DISORDER, WITHOUT PSYCHOTIC FEATURES (H): Primary | ICD-10-CM

## 2018-03-23 NOTE — MR AVS SNAPSHOT
After Visit Summary   3/23/2018    Aure Joy    MRN: [id number]           Patient Information     Date Of Birth          1970        Visit Information        Provider Department      3/23/2018 3:15 PM [facility] PROCEDURE ROOM [facility] Psychiatry        Today's Diagnoses     Severe episode of recurrent major depressive disorder, without psychotic features (H)    -  1       Follow-ups after your visit        Your next 10 appointments already scheduled     2018  1:00 PM CDT   TMS FOLLOW-UP with Evelyn Zamudio MD   [facility] Psychiatry ([facility])    [address] [place] [address] [id number] [phone]              Who to contact     Please call your clinic at [phone] to:    Ask questions about your health    Make or cancel appointments    Discuss your medicines    Learn about your test results    Speak to your doctor            Additional Information About Your Visit        MyChart Information     NXT-ID is an electronic gateway that provides easy, online access to your medical records. With NXT-ID, you can request a clinic appointment, read your test results, renew a prescription or communicate with your care team.     To sign up for copygramt visit the website at [email or web address]   You will be asked to enter the access code listed below, as well as some personal information. Please follow the directions to create your username and password.     Your access code is: [email or web address]  Expires: 2018  5:15 PM     Your access code will  in 90 days. If you need help or a new code, please contact your [facility] or call [phone] for assistance.        Care EveryWhere ID     This is your Care EveryWhere ID. This could be used by other organizations to access your [place] medical records  [id number]        Your Vitals Were     Pulse                   83            Blood Pressure from Last 3  Encounters:   03/23/18 125/83   03/21/18 132/81   03/19/18 133/81    Weight from Last 3 Encounters:   03/12/18 92.5 kg (204 lb)   03/05/18 92 kg (202 lb 12.8 oz)   02/20/18 92.1 kg (203 lb)              We Performed the Following     C TRANSCRANIAL MAGNETIC STIMULATION TREATMENT,DELIVERY/MANAGEMENT        Primary Care Provider Office Phone # Fax #    Stephy Valentin [phone]       [facility] [address]   [facility] [address]        Equal Access to Services     [facility]: Hadii aad phil hadasho Sodev, waaxda luqadaha, qaybta kaalmada amos, yordy araujo . So [facility] [phone].    ATENCIÓN: Si habla español, tiene a chamberlain disposición servicios gratuitos de asistencia lingüística. [facility] [phone].    We comply with applicable federal civil rights laws and [place] laws. We do not discriminate on the basis of race, color, national origin, age, disability, sex, sexual orientation, or gender identity.            Thank you!     Thank you for choosing [facility] PSYCHIATRY  for your care. Our goal is always to provide you with excellent care. Hearing back from our patients is one way we can continue to improve our services. Please take a few minutes to complete the written survey that you may receive in the mail after your visit with us. Thank you!             Your Updated Medication List - Protect others around you: Learn how to safely use, store and throw away your medicines at www.disposemymeds.org.      Notice  As of 3/23/2018 11:59 PM    You have not been prescribed any medications.

## 2018-03-23 NOTE — PROGRESS NOTES
[facility] TMS Program  [address], [address]  [place] [address]  TMS Procedure Note   Aure Joy MRN# [id number]  Age: 47 year old year old YOB: 1970    Pre-Procedure:  History and Physical: Reviewed in medical record  Consent Signed by: Aure Joy  On: 01/29/18    Clinical Narrative:  Patient tolerating treatment well with no side effects.     Indications for TMS:  MDD, recurrent, severe; 4+ medication trials (from 2+ classes) ineffective; Psychotherapy ineffective.     Pre-Procedure Diagnosis:  MDD, recurrent, severe F33.2    Treatment Hx:  Treatment number this series: 36  Total lifetime treatment number: 36    Allergies   Allergen Reactions     Codeine Nausea     Other  [No Clinical Screening - See Comments] Nausea and Vomiting and Other (See Comments)     general anesthesia- uncontrolled vomitting      /83 (BP Location: Right arm, Patient Position: Chair, Cuff Size: Adult Regular)  Pulse 83    Pause for the Cause  Right patient:  Yes  Right procedure/correct coil:  Yes; rTMS; cpt 72365; H1 coil.   Earplugs in place:  Yes    Procedure  Patient was seated in procedure chair. Identity and procedure was verified. Ear plugs were placed in ears and patient-specific cap was placed on head and tightened appropriately. Ruler locations were verified. Coil was placed at treatment location and stimulator was set to parameters described below. A test train was delivered and pt tolerated train. Given pt tolerance, 55 treatment trains were delivered. Pt tolerated procedure well with some minor facial and hand movement.    Motor Threshold Determination  Distance from nasion to inion: 35.5  MT 1: 0 - 6 - 16 @ 69% on 1/29/2018  MT 2: 0 - 6 - 16 @ 69% on 2/6/2018   MT 3: 0 - 6 - 16 @ 69% on 2/13/2018   MT 4: 0 - 6 - 16 @ 69% on 2/23/2018   MT 5: 0 - 6 - 16 @ 69% on 3/2/2018        Stimulation Parameters  Frequency: 18 Hz     Train duration: 2 sec  Total pulses delivered:  1980  Inter-train interval: 20 sec  Tx Loc: 0 - eyebrows  - 14  Energy: 83% (120% MT)  Trains: 55 trains      Post-Procedure Diagnosis:  MDD, recurrent, severe F33.2    Jael Alexandra RN   [facility] Neuromodulation    Plan   -Schedule 1 month f/u         I did see the patient during/after treatment and remained available in the clinic during  Treatment. Pt continues to describe substantial benefit from TMS. She describes some ongoing work in therapy processing grief over missing much of her children's childhood secondary to her formidable struggles with depression. Encouraged pt continue to grieve this loss but also to actively practice kindness and forgiveness of herself. She reports that she is actively working on this but states that it is difficult, especially given her developmental history. Discussed plan for moving forward. Will meet with her in 1 months time and then will continue to follow her on a monthly basis for regular medication management. Encouraged her to call the office if she feels she needs to be seen sooner or should her mood decrease in a significant way.     ESTELA Zamudio MD  [facility] Neuromodulation

## 2018-03-24 ASSESSMENT — PATIENT HEALTH QUESTIONNAIRE - PHQ9: SUM OF ALL RESPONSES TO PHQ QUESTIONS 1-9: 0

## 2018-03-29 ENCOUNTER — OFFICE VISIT (OUTPATIENT)
Dept: PSYCHIATRY | Facility: CLINIC | Age: 48
End: 2018-03-29
Payer: COMMERCIAL

## 2018-03-29 DIAGNOSIS — F33.2 SEVERE EPISODE OF RECURRENT MAJOR DEPRESSIVE DISORDER, WITHOUT PSYCHOTIC FEATURES (H): Primary | ICD-10-CM

## 2018-03-29 NOTE — MR AVS SNAPSHOT
After Visit Summary   3/29/2018    Aure Joy    MRN: [id number]           Patient Information     Date Of Birth          1970        Visit Information        Provider Department      3/29/2018 12:00 PM Brenda Maldonado, PhD [facility] Psychiatry        Today's Diagnoses     Severe episode of recurrent major depressive disorder, without psychotic features (H)    -  1       Follow-ups after your visit        Follow-up notes from your care team     Return If patient would like to follow up.      Your next 10 appointments already scheduled     2018  1:00 PM CDT   TMS FOLLOW-UP with Evelyn Zamudio MD   [facility] Psychiatry ([facility])    [address] [facility] [address] [place] [id number] [phone]              Who to contact     Please call your clinic at [phone] to:    Ask questions about your health    Make or cancel appointments    Discuss your medicines    Learn about your test results    Speak to your doctor            Additional Information About Your Visit        MyChart Information     to be is an electronic gateway that provides easy, online access to your medical records. With to be, you can request a clinic appointment, read your test results, renew a prescription or communicate with your care team.     To sign up for ActionBaset visit the website at [email or web address]   You will be asked to enter the access code listed below, as well as some personal information. Please follow the directions to create your username and password.     Your access code is: [email or web address]  Expires: 2018  5:15 PM     Your access code will  in 90 days. If you need help or a new code, please contact your [facility] or call [phone] for assistance.        Care EveryWhere ID     This is your Care EveryWhere ID. This could be used by other organizations to access your [place] medical records  [id number]          Blood Pressure from Last 3 Encounters:   03/23/18 125/83   03/21/18 132/81   03/19/18 133/81    Weight from Last 3 Encounters:   03/12/18 204 lb (92.5 kg)   03/05/18 202 lb 12.8 oz (92 kg)   02/20/18 203 lb (92.1 kg)              Today, you had the following     No orders found for display       Primary Care Provider Office Phone # Fax #    Stephy Valentin [phone]       [facility] [address]   [facility] [address]        Equal Access to Services     [facility]: Hadii aad ku hadasho Soomaali, waaxda luqadaha, qaybta kaalmada adeamyyada, yordy araujo . So [facility] [phone].    ATENCIÓN: Si habla español, tiene a chamberlain disposición servicios gratuitos de asistencia lingüística. Llame al [phone].    We comply with applicable federal civil rights laws and [place] laws. We do not discriminate on the basis of race, color, national origin, age, disability, sex, sexual orientation, or gender identity.            Thank you!     Thank you for choosing [facility] PSYCHIATRY  for your care. Our goal is always to provide you with excellent care. Hearing back from our patients is one way we can continue to improve our services. Please take a few minutes to complete the written survey that you may receive in the mail after your visit with us. Thank you!             Your Updated Medication List - Protect others around you: Learn how to safely use, store and throw away your medicines at www.disposemymeds.org.      Notice  As of 3/29/2018  1:12 PM    You have not been prescribed any medications.

## 2018-03-29 NOTE — PROGRESS NOTES
Office Visit     3/29/2018  [facility] Psychiatry    Brenda Maldonado, PhD   Psychology    Severe episode of recurrent major depressive disorder, without psychotic features (H)   Dx    Referred by Evelyn Zamudio MD   Reason for visit    Progress Notes                   Psychotherapy Session (Behavioral Activation)       [facility]MS [facility]  [address]  [place] [address]       Name: Aure Joy   MRN# [id number]  Age: 47 year old   YOB: 1970  Date of Session: March 29, 2018  Duration: 45 minutes (starting time = 12:01; stopping time = 12:46)                Session Content  We reviewed progress including improvements in mood (euthymic), sleep (8 hour uninterrupted), motivation, engagement in tasks, absence of anhedonia, and consistent energy. Discussed termination of treatment now that TMS is completed. Reviewed self-monitoring and behavioral activation homework. Focused on relapse prevention and discussed ways of incorporating behavioral activation self-monitoring into her ongoing psychotherapy with her DBT therapist. Discussed persistence of guilt and avoidance behavior related to agoraphobia, as well as continued use of emotion-focused and behavioral exposure interventions. Discussed cognitive restructuring as well as habits useful for preventing relapse as well as interpersonal patterns in the context of family and transitions related to her improved functioning.    Assessment  In remission from major depressive episode, severe (non psychotic features), recurrent. Denies suicidal or homicidal ideation. Reports improvements in mood, hedonic response, motivation, energy, self-evaluation, and cognition. In partial remission from agoraphobia with residual anxiety and avoidance.      Mental Status Exam   Appearance: appropriate  Attitude: cooperative  Behavior: normal  Eye Contact: normal  Speech: normal  Orientation: oriented  x3  Mood:  euthymic  Affect:  Mood congruent  Thought Process: clear  Suicidal Ideation: Denies current suicidal ideation; denies plan or intent, assures of safety   Hallucinations: none      Plan  Terminate behavioral activation therapy with the completion of TMS. Encouraged her to contact me if she needs additional resources or wants to follow up. Continue DBT skills group and individual psychotherapy at [facility]. Follow up appointment with Dr. Zamudio next month      Signed:  Brenda Maldonado, Ph.D., L.P.    Department of Psychiatry  [facility]

## 2018-04-24 ENCOUNTER — OFFICE VISIT (OUTPATIENT)
Dept: PSYCHIATRY | Facility: CLINIC | Age: 48
End: 2018-04-24
Payer: COMMERCIAL

## 2018-04-24 VITALS
WEIGHT: 205.6 LBS | SYSTOLIC BLOOD PRESSURE: 158 MMHG | BODY MASS INDEX: 37.84 KG/M2 | HEART RATE: 94 BPM | DIASTOLIC BLOOD PRESSURE: 87 MMHG

## 2018-04-24 DIAGNOSIS — F33.42 RECURRENT MAJOR DEPRESSIVE DISORDER, IN FULL REMISSION (H): Primary | ICD-10-CM

## 2018-04-24 ASSESSMENT — PAIN SCALES - GENERAL: PAINLEVEL: NO PAIN (0)

## 2018-04-24 NOTE — PROGRESS NOTES
Psychiatry Clinic Progress Note  Aure Joy is a 47 year old female with previous diagnoses of ***.     Subjective  Pt reports that she has been pretty good since she ws last seen.   Continues to have some ups and downs secondary to reltionship with people. Staes that because she is no longer depressed she is able to rebound.  Describes having a bad relationship with inlaws. Stes that she was albe to   Reports that she is planning to start couples counseling with  Describes writing   Most of the time is not sad and feels calm.  Therapy continues to go well. Is close to completing year of DBT. After this she and her therapist plans to do some trauma. Sophia Bah  [facility] [place].  Describes trying to get back into your life.  States that she continues to work on agoraphobia.    Review of Systems  Complete ROS was performed and found to be negative other than as noted in the HPI.  Medical Hx  Past Medical History:   Diagnosis Date     Cancer (H)     brain tumor     Complication of anesthesia      Depressive disorder      Hearing loss      Migraines        PCP: Stephy Valentin         Therapist: ***  Allergy  Allergies   Allergen Reactions     Codeine Nausea     Other  [No Clinical Screening - See Comments] Nausea and Vomiting and Other (See Comments)     general anesthesia- uncontrolled vomitting     Medications    No current outpatient prescriptions on file prior to visit.  No current facility-administered medications on file prior to visit.   No current outpatient prescriptions on file.     No current facility-administered medications for this visit.        Vitals  /87 (BP Location: Right arm, Patient Position: Chair, Cuff Size: Adult Regular)  Pulse 94  Wt 205 lb 9.6 oz (93.3 kg)  BMI 37.84 kg/m2  Weight is 205 lbs 9.6 oz BMI  PHQ-9 SCORE 3/21/2018 3/23/2018 4/24/2018   Total Score 0 0 3       Labs  { :809262}    Mental Status Exam  Appearance:  { :556293}  Attitude:  {  :659243}  Eye Contact:  { :366646}  Mood:  { :636088}  Affect:  { :299073}  Speech:  { :742862}  Psychomotor Behavior:  { :745794}  Thought Process:  { :241516}  Associations:  { :961841}  Thought Content:  { :837715}  Insight:  { :560715}  Judgment:  { :347476}  Oriented to:  { :516145}  Attention Span and Concentration:  { :522133}  Recent and Remote Memory:  { :553479}  Language: { :126278}  Fund of Knowledge: { :372242}  These cognitive functions grossly appear as described, but were NOT formally tested.    Assessment  Aure Joy is a 47 year old female with previous psychiatric diagnoses of MDD and ***  who presents for follow-up post TMS. @ notes a significant improvement from TMS and is clearly a responder. @ continues to have numerous stressors and continues in remission from her depression.  At this point her main difficulty is likely related to ***. @ plan is to continue to work with her primary psychiatrist as well as her individual therapist to address these issues.  Should her depression return, she is a good candidate for re-treatment. @ was instructed to call us should her depression return in a significant way.    Treatment Risk Statement: The risks, benefits, alternatives and potential adverse effects have been explained and are understood by the pt. The pt agrees to the treatment plan with the ability to do so.  Discussion of specific concerns included- medication side effects. The pt knows to call the clinic for any problems or access emergency care if needed. There are no medical considerations relevant to treatment, as noted above. Substance use is not a problem as noted above.     Diagnoses:  MDD, recurrent, moderate     Plan:  - Cont meds and therapy per regular outpt providers  - Call our clinic should depression become severe and in need of re-treatment with TMS.    ESTELA Zamudio MD  Psychiatry      Psychiatry Clinic Individual Psychotherapy Note                                                                      [16]   Start time - {psyN/A:214843}        End time - {psyN/A:140014}  Date last reviewed - {psyN/A:886510}       Date next due - {psyN/A:399301}    Subjective: This supportive psychotherapy session addressed issues related to {Psych Therapy issues:872452}.  Patient's reaction: {STAGES OF CHANGE:671547} in the context of mental status appropriate for ambulatory setting.  Progress: {good,fair,poor:134506}  Plan: RTC ***  Psychotherapy services during this visit included  myself and the patient.   Treatment Plan      SYMPTOMS; PROBLEMS   MEASURABLE GOALS;    FUNCTIONAL IMPROVEMENT INTERVENTIONS;   GAINS MADE DISCHARGE CRITERIA   Depression: anhedonia   {Measurable Goals:545755} {INT:199473} {PSYDC:721856}   Anxiety: {:392506}   {Measurable Goals:249678} {INT:199473} {PSYDC:738642}

## 2018-04-24 NOTE — MR AVS SNAPSHOT
After Visit Summary   2018    Aure Joy    MRN: [id number]           Patient Information     Date Of Birth          1970        Visit Information        Provider Department      2018 1:00 PM Evelyn Zamudio MD [facility] Psychiatry        Today's Diagnoses     Recurrent major depressive disorder, in full remission (H)    -  1       Follow-ups after your visit        Follow-up notes from your care team     Return in about 4 weeks (around 2018) for 30 min. U.      Your next 10 appointments already scheduled     2018  1:00 PM CDT   TMS FOLLOW-UP with Evelyn Zamudio MD   [facility] Psychiatry ([facility])    [address] [id number] [phone]              Who to contact     Please call your clinic at [phone] to:    Ask questions about your health    Make or cancel appointments    Discuss your medicines    Learn about your test results    Speak to your doctor            Additional Information About Your Visit        MyChart Information     FlyData is an electronic gateway that provides easy, online access to your medical records. With FlyData, you can request a clinic appointment, read your test results, renew a prescription or communicate with your care team.     To sign up for FlyData visit the website at [email or web address]   You will be asked to enter the access code listed below, as well as some personal information. Please follow the directions to create your username and password.     Your access code is: [email or web address]  Expires: 2018  1:40 PM     Your access code will  in 90 days. If you need help or a new code, please contact your [place] [facility] or call [phone] for assistance.        Care EveryWhere ID     This is your Care EveryWhere ID. This could be used by other organizations to access your [place] medical records  [id number]        Your Vitals  Were     Pulse BMI (Body Mass Index)                94 37.84 kg/m2           Blood Pressure from Last 3 Encounters:   05/22/18 126/75   04/24/18 158/87   03/23/18 125/83    Weight from Last 3 Encounters:   05/22/18 91.1 kg (200 lb 12.8 oz)   04/24/18 93.3 kg (205 lb 9.6 oz)   03/12/18 92.5 kg (204 lb)              Today, you had the following     No orders found for display       Primary Care Provider Office Phone # Fax #    Stephy Valentin [phone]       [facility] [address]   [facility] [address]        Equal Access to Services     [facility] : Hadii derrell Mcclure, warichard mclean, sindi kaalmada amos, yordy elise. So [facility] [phone].    ATENCIÓN: Si habla español, tiene a chamberlain disposición servicios gratuitos de asistencia lingüística. Llame al [phone].    We comply with applicable federal civil rights laws and [place] laws. We do not discriminate on the basis of race, color, national origin, age, disability, sex, sexual orientation, or gender identity.            Thank you!     Thank you for choosing [facility] PSYCHIATRY  for your care. Our goal is always to provide you with excellent care. Hearing back from our patients is one way we can continue to improve our services. Please take a few minutes to complete the written survey that you may receive in the mail after your visit with us. Thank you!             Your Updated Medication List - Protect others around you: Learn how to safely use, store and throw away your medicines at www.disposemymeds.org.      Notice  As of 4/24/2018 11:59 PM    You have not been prescribed any medications.

## 2018-04-25 ASSESSMENT — PATIENT HEALTH QUESTIONNAIRE - PHQ9: SUM OF ALL RESPONSES TO PHQ QUESTIONS 1-9: 3
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
## 2018-05-22 ENCOUNTER — OFFICE VISIT (OUTPATIENT)
Dept: PSYCHIATRY | Facility: CLINIC | Age: 48
End: 2018-05-22
Payer: COMMERCIAL

## 2018-05-22 VITALS
BODY MASS INDEX: 36.95 KG/M2 | HEART RATE: 93 BPM | WEIGHT: 200.8 LBS | SYSTOLIC BLOOD PRESSURE: 126 MMHG | TEMPERATURE: 98.5 F | DIASTOLIC BLOOD PRESSURE: 75 MMHG

## 2018-05-22 DIAGNOSIS — F33.42 RECURRENT MAJOR DEPRESSIVE DISORDER, IN FULL REMISSION (H): Primary | ICD-10-CM

## 2018-05-22 ASSESSMENT — PAIN SCALES - GENERAL: PAINLEVEL: MILD PAIN (3)
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
## 2018-05-22 NOTE — MR AVS SNAPSHOT
After Visit Summary   2018    Arue Joy    MRN: [id number]           Patient Information     Date Of Birth          1970        Visit Information        Provider Department      2018 1:00 PM Evelyn Zamudio MD [facility]         Follow-ups after your visit        Your next 10 appointments already scheduled     2018  1:00 PM CDT   TMS FOLLOW-UP with Evelyn Zamudio MD   [facility] ([facility])    [address] [address] [phone]              Who to contact     Please call your clinic at [phone] to:    Ask questions about your health    Make or cancel appointments    Discuss your medicines    Learn about your test results    Speak to your doctor            Additional Information About Your Visit        MyChart Information     [email or web address] is an electronic gateway that provides easy, online access to your medical records. With [email or web address], you can request a clinic appointment, read your test results, renew a prescription or communicate with your care team.     To sign up for [employer] visit the website at [email or web address]   You will be asked to enter the access code listed below, as well as some personal information. Please follow the directions to create your username and password.     Your access code is: [email or web address]  Expires: 2018  1:40 PM     Your access code will  in 90 days. If you need help or a new code, please contact your [facility] or call [phone] for assistance.        Care EveryWhere ID     This is your Care EveryWhere ID. This could be used by other organizations to access your [place] medical records  [id number]        Your Vitals Were     Pulse Temperature BMI (Body Mass Index)             93 98.5  F (36.9  C) (Temporal) 36.95 kg/m2          Blood Pressure from Last 3 Encounters:   18 126/75   18 158/87   18  125/83    Weight from Last 3 Encounters:   05/22/18 200 lb 12.8 oz (91.1 kg)   04/24/18 205 lb 9.6 oz (93.3 kg)   03/12/18 204 lb (92.5 kg)              Today, you had the following     No orders found for display       Primary Care Provider Office Phone # Fax #    Stephy Valentin [phone]       [facility] [address]   [facility] [address]        Equal Access to Services     RAPHAEL WASHBURN : Hadii aad ku hadasho Soomaali, waaxda luqadaha, qaybta kaalmada adeegyada, waxay idiin hayaan adeeg annelarashukri larandee . So [facility] [phone].    ATENCIÓN: Si habla español, tiene a chamberlain disposición servicios gratuitos de asistencia lingüística. Llame al [phone].    We comply with applicable federal civil rights laws and [place] laws. We do not discriminate on the basis of race, color, national origin, age, disability, sex, sexual orientation, or gender identity.            Thank you!     Thank you for choosing [facility]  for your care. Our goal is always to provide you with excellent care. Hearing back from our patients is one way we can continue to improve our services. Please take a few minutes to complete the written survey that you may receive in the mail after your visit with us. Thank you!             Your Updated Medication List - Protect others around you: Learn how to safely use, store and throw away your medicines at www.disposemymeds.org.          This list is accurate as of 5/22/18  1:40 PM.  Always use your most recent med list.                   Brand Name Dispense Instructions for use Diagnosis    FLEXERIL PO      Take 5 mg by mouth

## 2018-05-25 ASSESSMENT — PATIENT HEALTH QUESTIONNAIRE - PHQ9: SUM OF ALL RESPONSES TO PHQ QUESTIONS 1-9: 4

## 2018-06-26 ENCOUNTER — OFFICE VISIT (OUTPATIENT)
Dept: PSYCHIATRY | Facility: CLINIC | Age: 48
End: 2018-06-26
Payer: COMMERCIAL

## 2018-06-26 VITALS
SYSTOLIC BLOOD PRESSURE: 128 MMHG | HEIGHT: 62 IN | WEIGHT: 204 LBS | HEART RATE: 85 BPM | DIASTOLIC BLOOD PRESSURE: 79 MMHG | TEMPERATURE: 98.1 F | BODY MASS INDEX: 37.54 KG/M2

## 2018-06-26 DIAGNOSIS — F33.42 RECURRENT MAJOR DEPRESSIVE DISORDER, IN FULL REMISSION (H): Primary | ICD-10-CM

## 2018-06-26 ASSESSMENT — PAIN SCALES - GENERAL: PAINLEVEL: NO PAIN (0)
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
## 2018-06-26 NOTE — MR AVS SNAPSHOT
After Visit Summary   2018    Aure Joy    MRN: [id number]           Patient Information     Date Of Birth          1970        Visit Information        Provider Department      2018 1:00 PM Evelyn Zamudio MD [facility] Psychiatry         Follow-ups after your visit        Follow-up notes from your care team     Return in about 4 weeks (around 2018) for 30 min. U.      Your next 10 appointments already scheduled     2018  1:00 PM CDT   TMS FOLLOW-UP with Evelyn Zamudio MD   [facility] Psychiatry ([facility])    [address] [id number] [phone]              Who to contact     Please call your clinic at [phone] to:    Ask questions about your health    Make or cancel appointments    Discuss your medicines    Learn about your test results    Speak to your doctor            Additional Information About Your Visit        MyChart Information     [employer] is an electronic gateway that provides easy, online access to your medical records. With [employer], you can request a clinic appointment, read your test results, renew a prescription or communicate with your care team.     To sign up for [employer] visit the website at [email or web address]   You will be asked to enter the access code listed below, as well as some personal information. Please follow the directions to create your username and password.     Your access code is: [email or web address]  Expires: 2018 12:55 PM     Your access code will  in 90 days. If you need help or a new code, please contact your [facility] or call [phone] for assistance.        Care EveryWhere ID     This is your Care EveryWhere ID. This could be used by other organizations to access your [place] medical records  [id number]        Your Vitals Were     Pulse Temperature Height BMI (Body Mass Index)          85 98.1  F (36.7  C)  "(Temporal) 5' 1.5\" (156.2 cm) 37.92 kg/m2         Blood Pressure from Last 3 Encounters:   06/26/18 128/79   05/22/18 126/75   04/24/18 158/87    Weight from Last 3 Encounters:   06/26/18 204 lb (92.5 kg)   05/22/18 200 lb 12.8 oz (91.1 kg)   04/24/18 205 lb 9.6 oz (93.3 kg)              Today, you had the following     No orders found for display       Primary Care Provider Office Phone # Fax #    Stephy Valentin [phone]       [facility] [address]   [facility] [address]        Equal Access to Services     RAPHAEL WASHBURN : Hadii derrell villarreal hadsherrieo Sodev, waaxda luqadaha, qaybta kaalmada adeegyada, yordy elise. So [facility] [phone].    ATENCIÓN: Si habla español, tiene a chamberlain disposición servicios gratuitos de asistencia lingüística. Llame al [phone].    We comply with applicable federal civil rights laws and [place] laws. We do not discriminate on the basis of race, color, national origin, age, disability, sex, sexual orientation, or gender identity.            Thank you!     Thank you for choosing [facility]  for your care. Our goal is always to provide you with excellent care. Hearing back from our patients is one way we can continue to improve our services. Please take a few minutes to complete the written survey that you may receive in the mail after your visit with us. Thank you!             Your Updated Medication List - Protect others around you: Learn how to safely use, store and throw away your medicines at www.disposemymeds.org.          This list is accurate as of 6/26/18  1:51 PM.  Always use your most recent med list.                   Brand Name Dispense Instructions for use Diagnosis    FLEXERIL PO      Take 5 mg by mouth          "

## 2018-06-27 ASSESSMENT — PATIENT HEALTH QUESTIONNAIRE - PHQ9: SUM OF ALL RESPONSES TO PHQ QUESTIONS 1-9: 2

## 2018-06-30 ENCOUNTER — HEALTH MAINTENANCE LETTER (OUTPATIENT)
Age: 48
End: 2018-06-30

## 2018-07-24 ENCOUNTER — OFFICE VISIT (OUTPATIENT)
Dept: PSYCHIATRY | Facility: CLINIC | Age: 48
End: 2018-07-24
Payer: COMMERCIAL

## 2018-07-24 VITALS
HEIGHT: 62 IN | BODY MASS INDEX: 37.76 KG/M2 | HEART RATE: 99 BPM | TEMPERATURE: 98.1 F | WEIGHT: 205.2 LBS | DIASTOLIC BLOOD PRESSURE: 86 MMHG | SYSTOLIC BLOOD PRESSURE: 141 MMHG

## 2018-07-24 DIAGNOSIS — F33.42 RECURRENT MAJOR DEPRESSIVE DISORDER, IN FULL REMISSION (H): Primary | ICD-10-CM

## 2018-07-24 ASSESSMENT — PAIN SCALES - GENERAL: PAINLEVEL: MODERATE PAIN (4)

## 2018-07-24 NOTE — MR AVS SNAPSHOT
After Visit Summary   7/24/2018    Aure Joy    MRN: [id number]           Patient Information     Date Of Birth          1970        Visit Information        Provider Department      7/24/2018 1:00 PM Evelyn Zamudio MD [facility] Psychiatry        Today's Diagnoses     Recurrent major depressive disorder, in full remission (H)    -  1       Follow-ups after your visit        Follow-up notes from your care team     Return in about 4 weeks (around 8/21/2018) for 30 min. MFU.      Your next 10 appointments already scheduled     Sep 04, 2018  2:00 PM CDT   TMS FOLLOW-UP with Evelyn Zamudio MD   [facility]ry ([facility])    [address] [address] [phone]              Who to contact     Please call your clinic at [phone] to:    Ask questions about your health    Make or cancel appointments    Discuss your medicines    Learn about your test results    Speak to your doctor            Additional Information About Your Visit        "[employer] Information     [employer] gives you secure access to your electronic health record. If you see a primary care provider, you can also send messages to your care team and make appointments. If you have questions, please call your primary care clinic.  If you do not have a primary care provider, please call [phone] and they will assist you.      [employer] is an electronic gateway that provides easy, online access to your medical records. With [employer], you can request a clinic appointment, read your test results, renew a prescription or communicate with your care team.     To access your existing account, please contact your [facility] or call [phone] for assistance.        Care EveryWhere ID     This is your Care EveryWhere ID. This could be used by other organizations to access your [facility] medical records  [id number]        Your Vitals Were     Pulse  "Temperature Height BMI (Body Mass Index)          99 98.1  F (36.7  C) (Temporal) 1.562 m (5' 1.5\") 38.14 kg/m2         Blood Pressure from Last 3 Encounters:   07/24/18 141/86   06/26/18 128/79   05/22/18 126/75    Weight from Last 3 Encounters:   07/24/18 93.1 kg (205 lb 3.2 oz)   06/26/18 92.5 kg (204 lb)   05/22/18 91.1 kg (200 lb 12.8 oz)              Today, you had the following     No orders found for display       Primary Care Provider Office Phone # Fax #    Stephy Valentin PA-C [phone]       [facility] [address] [place] [facility] [address]        Equal Access to Services     [facility]: Hadii aad ku hadasho Soomaali, waaxda luqadaha, qaybta kaalmada adeegyada, yordy araujo . So [facility] [phone].    ATENCIÓN: Si habla español, tiene a chamberlain disposición servicios gratuitos de asistencia lingüística. Llame al [phone].    We comply with applicable federal civil rights laws and [place] laws. We do not discriminate on the basis of race, color, national origin, age, disability, sex, sexual orientation, or gender identity.            Thank you!     Thank you for choosing [facility]  for your care. Our goal is always to provide you with excellent care. Hearing back from our patients is one way we can continue to improve our services. Please take a few minutes to complete the written survey that you may receive in the mail after your visit with us. Thank you!             Your Updated Medication List - Protect others around you: Learn how to safely use, store and throw away your medicines at www.disposemymeds.org.          This list is accurate as of 7/24/18 11:59 PM.  Always use your most recent med list.                   Brand Name Dispense Instructions for use Diagnosis    FLEXERIL PO      Take 5 mg by mouth as needed          "
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
## 2018-07-26 ASSESSMENT — PATIENT HEALTH QUESTIONNAIRE - PHQ9: SUM OF ALL RESPONSES TO PHQ QUESTIONS 1-9: 4

## 2018-09-25 ENCOUNTER — OFFICE VISIT (OUTPATIENT)
Dept: PSYCHIATRY | Facility: CLINIC | Age: 48
End: 2018-09-25
Payer: COMMERCIAL

## 2018-09-25 VITALS
SYSTOLIC BLOOD PRESSURE: 142 MMHG | TEMPERATURE: 98 F | BODY MASS INDEX: 37.55 KG/M2 | WEIGHT: 202 LBS | DIASTOLIC BLOOD PRESSURE: 88 MMHG | HEART RATE: 82 BPM

## 2018-09-25 DIAGNOSIS — F33.42 RECURRENT MAJOR DEPRESSIVE DISORDER, IN FULL REMISSION (H): Primary | ICD-10-CM

## 2018-09-25 ASSESSMENT — PAIN SCALES - GENERAL: PAINLEVEL: NO PAIN (0)

## 2018-09-25 NOTE — MR AVS SNAPSHOT
After Visit Summary   9/25/2018    Aure Joy    MRN: [id number]           Patient Information     Date Of Birth          1970        Visit Information        Provider Department      9/25/2018 1:00 PM Evelyn Zamudio MD [facility]        Today's Diagnoses     Recurrent major depressive disorder, in full remission (H)    -  1       Follow-ups after your visit        Follow-up notes from your care team     Return in about 2 months (around 11/25/2018) for 60 min. APY.      Your next 10 appointments already scheduled     Nov 27, 2018  1:00 PM CST   TMS FOLLOW-UP with Evelyn Zamudio MD   [facility] ([facility])    [address] [address] [phone]              Who to contact     Please call your clinic at [phone] to:    Ask questions about your health    Make or cancel appointments    Discuss your medicines    Learn about your test results    Speak to your doctor            Additional Information About Your Visit        [employer] Information     [employer] gives you secure access to your electronic health record. If you see a primary care provider, you can also send messages to your care team and make appointments. If you have questions, please call your primary care clinic.  If you do not have a primary care provider, please call [phone] and they will assist you.      [employer] is an electronic gateway that provides easy, online access to your medical records. With [employer], you can request a clinic appointment, read your test results, renew a prescription or communicate with your care team.     To access your existing account, please contact your [facility] or call [phone] for assistance.        Care EveryWhere ID     This is your Care EveryWhere ID. This could be used by other organizations to access your [facility] medical records  [id number]        Your Vitals Were      Pulse Temperature BMI (Body Mass Index)             82 98  F (36.7  C) 37.55 kg/m2          Blood Pressure from Last 3 Encounters:   09/25/18 142/88   07/24/18 141/86   06/26/18 128/79    Weight from Last 3 Encounters:   09/25/18 91.6 kg (202 lb)   07/24/18 93.1 kg (205 lb 3.2 oz)   06/26/18 92.5 kg (204 lb)              Today, you had the following     No orders found for display       Primary Care Provider Office Phone # Fax #    Stephy Valentin PA-C [phone]       [facility] [address]   [facility] [address]        Equal Access to Services     HERMANN WASHBURN : Hadii derrell christineo Sodev, waaxda luqadaha, qaybta kaalmada adeegyada, yordy araujo . So [facility] [phone].    ATENCIÓN: Si habla español, tiene a chamberlain disposición servicios gratuitos de asistencia lingüística. Llame al [phone].    We comply with applicable federal civil rights laws and [place] laws. We do not discriminate on the basis of race, color, national origin, age, disability, sex, sexual orientation, or gender identity.            Thank you!     Thank you for choosing [facility]  for your care. Our goal is always to provide you with excellent care. Hearing back from our patients is one way we can continue to improve our services. Please take a few minutes to complete the written survey that you may receive in the mail after your visit with us. Thank you!             Your Updated Medication List - Protect others around you: Learn how to safely use, store and throw away your medicines at www.disposemymeds.org.          This list is accurate as of 9/25/18 11:59 PM.  Always use your most recent med list.                   Brand Name Dispense Instructions for use Diagnosis    FLEXERIL PO      Take 5 mg by mouth as needed
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
## 2018-09-26 ASSESSMENT — PATIENT HEALTH QUESTIONNAIRE - PHQ9: SUM OF ALL RESPONSES TO PHQ QUESTIONS 1-9: 7

## 2018-10-23 ENCOUNTER — OFFICE VISIT (OUTPATIENT)
Dept: PSYCHIATRY | Facility: CLINIC | Age: 48
End: 2018-10-23
Payer: COMMERCIAL

## 2018-10-23 VITALS
BODY MASS INDEX: 37.51 KG/M2 | WEIGHT: 201.8 LBS | SYSTOLIC BLOOD PRESSURE: 135 MMHG | DIASTOLIC BLOOD PRESSURE: 86 MMHG | HEART RATE: 101 BPM | RESPIRATION RATE: 16 BRPM

## 2018-10-23 DIAGNOSIS — F33.2 SEVERE EPISODE OF RECURRENT MAJOR DEPRESSIVE DISORDER, WITHOUT PSYCHOTIC FEATURES (H): Primary | ICD-10-CM

## 2018-10-23 ASSESSMENT — PAIN SCALES - GENERAL: PAINLEVEL: NO PAIN (0)
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
## 2018-10-23 NOTE — MR AVS SNAPSHOT
After Visit Summary   10/23/2018    Aure Joy    MRN: [id number]           Patient Information     Date Of Birth          1970        Visit Information        Provider Department      10/23/2018 3:30 PM Evelyn Zamudio MD [facility] Psychiatry        Today's Diagnoses     Recurrent major depressive disorder, in full remission (H)    -  1       Follow-ups after your visit        Who to contact     Please call your clinic at [phone] to:    Ask questions about your health    Make or cancel appointments    Discuss your medicines    Learn about your test results    Speak to your doctor            Additional Information About Your Visit        MyChart Information     [employer] gives you secure access to your electronic health record. If you see a primary care provider, you can also send messages to your care team and make appointments. If you have questions, please call your primary care clinic.  If you do not have a primary care provider, please call [phone] and they will assist you.      [employer] is an electronic gateway that provides easy, online access to your medical records. With [employer], you can request a clinic appointment, read your test results, renew a prescription or communicate with your care team.     To access your existing account, please contact your [facility] or call [phone] for assistance.        Care EveryWhere ID     This is your Care EveryWhere ID. This could be used by other organizations to access your [facility] medical records  [id number]        Your Vitals Were     Pulse Respirations BMI (Body Mass Index)             101 16 37.51 kg/m2          Blood Pressure from Last 3 Encounters:   10/23/18 135/86   09/25/18 142/88   07/24/18 141/86    Weight from Last 3 Encounters:   10/23/18 91.5 kg (201 lb 12.8 oz)   09/25/18 91.6 kg (202 lb)   07/24/18 93.1 kg (205 lb 3.2 oz)              Today, you had the following     No orders  found for display       Primary Care Provider Office Phone # Fax #    Stephy Valentin PA-C [phone]       [facility] [address]   [facility] [address]        Equal Access to Services     HERMANN WASHBURN : Hadii aad ku hadasho Soomaali, waaxda luqadaha, qaybta kaalmada adeegyada, waxay idiin hayaan adeeg khvioletash lamayan arik. So [facility] [phone].    ATENCIÓN: Si habla español, tiene a chamberlain disposición servicios gratuitos de asistencia lingüística. Llame al [phone].    We comply with applicable federal civil rights laws and [place] laws. We do not discriminate on the basis of race, color, national origin, age, disability, sex, sexual orientation, or gender identity.            Thank you!     Thank you for choosing [facility] PSYCHIATRY  for your care. Our goal is always to provide you with excellent care. Hearing back from our patients is one way we can continue to improve our services. Please take a few minutes to complete the written survey that you may receive in the mail after your visit with us. Thank you!             Your Updated Medication List - Protect others around you: Learn how to safely use, store and throw away your medicines at www.disposemymeds.org.          This list is accurate as of 10/23/18 11:59 PM.  Always use your most recent med list.                   Brand Name Dispense Instructions for use Diagnosis    FLEXERIL PO      Take 5 mg by mouth as needed

## 2018-10-24 ASSESSMENT — PATIENT HEALTH QUESTIONNAIRE - PHQ9: SUM OF ALL RESPONSES TO PHQ QUESTIONS 1-9: 18

## 2019-08-12 ENCOUNTER — TELEPHONE (OUTPATIENT)
Dept: PSYCHIATRY | Facility: CLINIC | Age: 49
End: 2019-08-12

## 2019-08-12 NOTE — TELEPHONE ENCOUNTER
What is the concern that needs to be addressed by a nurse? Patient is feeling more suicidal and her depression feels very severe.  She feels like it may be appropriate to go inpatient.  Had a round of TMS and ECT since our last round of TMS.  Patient also stopped taking Lithium due to severe digestive issues.  Interested in Esketamine, but is unsure if she can wait a month to see Dr. Zamudio.  [place] seeing [place] Wis[place] [facility] [place].    May a detailed message be left on Hara?     Date of last office visit: 10/23/18    Message routed to: Jael

## 2019-08-12 NOTE — TELEPHONE ENCOUNTER
-Writer spoke with Dr. Zamudio.  She agreed to see patient this Thursday, due to her not feeling well.

## 2019-08-12 NOTE — TELEPHONE ENCOUNTER
-Writer returned call to Aure.  States that she has had a bad bought of depression since last winter 2018.  States that she was able to get into T[facility] at [facility], but states that it did not work.        -Aure was hospitalized at [facility] in February, started ECT and did 8 treatments.  Reports this was not effective.      -Tried lithium-states it worked really well however had s/e from it.  Had every GI s/e.  Had an extensive GI workup, but ultimately it was the lithium, took about a month for GI s/e to go away.      -Aure reports that the suicidal thoughts are intense and states that she is approaching being hospitalized again if something does not change.  She is wondering if she were to present to [facility] if they would be able to start Ketamine in the hospital and then transfer to Dr. Zamudio.  Did let her know that this is not likely.      -Writer informed her that she will speak with Dr. Zamudio and see if she can open an earlier appointment time.

## 2019-08-12 NOTE — TELEPHONE ENCOUNTER
-Writer called and spoke with Aure.  Got her scheduled to see Dr. Zamudio this week.  Also received ok from Dr. Zamudio to get a PA started for [employer] TMS.

## 2019-08-15 ENCOUNTER — OFFICE VISIT (OUTPATIENT)
Dept: PSYCHIATRY | Facility: CLINIC | Age: 49
End: 2019-08-15
Payer: COMMERCIAL

## 2019-08-15 VITALS
BODY MASS INDEX: 36.1 KG/M2 | SYSTOLIC BLOOD PRESSURE: 130 MMHG | HEART RATE: 98 BPM | WEIGHT: 194.2 LBS | DIASTOLIC BLOOD PRESSURE: 74 MMHG

## 2019-08-15 DIAGNOSIS — F33.2 SEVERE EPISODE OF RECURRENT MAJOR DEPRESSIVE DISORDER, WITHOUT PSYCHOTIC FEATURES (H): ICD-10-CM

## 2019-08-15 DIAGNOSIS — F33.2 SEVERE RECURRENT MAJOR DEPRESSION WITHOUT PSYCHOTIC FEATURES (H): Primary | ICD-10-CM

## 2019-08-15 RX ORDER — HYDROCODONE BITARTRATE AND ACETAMINOPHEN 5; 325 MG/1; MG/1
1-2 TABLET ORAL
COMMUNITY
Start: 2019-08-13 | End: 2020-05-28

## 2019-08-15 RX ORDER — LAMOTRIGINE 25 MG/1
25 TABLET ORAL DAILY
Qty: 120 TABLET | Refills: 0 | Status: SHIPPED | OUTPATIENT
Start: 2019-08-15 | End: 2019-10-02

## 2019-08-15 ASSESSMENT — PATIENT HEALTH QUESTIONNAIRE - PHQ9: SUM OF ALL RESPONSES TO PHQ QUESTIONS 1-9: 21

## 2019-08-15 ASSESSMENT — PAIN SCALES - GENERAL: PAINLEVEL: MILD PAIN (3)
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
## 2019-08-16 ENCOUNTER — TELEPHONE (OUTPATIENT)
Dept: PSYCHIATRY | Facility: CLINIC | Age: 49
End: 2019-08-16

## 2019-08-16 NOTE — TELEPHONE ENCOUNTER
-Writer called to let Aure know that we are going to be working on getting an Auth for TMS and that we will call her as soon as it comes in, to start treatment.

## 2019-08-19 ENCOUNTER — TELEPHONE (OUTPATIENT)
Dept: PSYCHIATRY | Facility: CLINIC | Age: 49
End: 2019-08-19

## 2019-08-19 NOTE — TELEPHONE ENCOUNTER
TRD Program Chart Review  A Part of the [facility] First Episode of Psychosis Program     Patient Name: Aure Joy  /Age:  1970 (49 year old)        Assessment/Progress Note:     Writer completed an in-depth chart review to gather information for TMS authorization.     Medication Trials  Medication Duration Max dose Outcome Reason for discontinuation   citalopram 6/10-8/10 40 mg Discontinued Fatigue   fluoxetine -4/15 60 mg Discontinued Fatigue   bupropion 1803-0818 300 mg Discontinued Not effective    Amitriptyline unknown unknown Discontinued Not effective   aripiprazole 4783-3764 10 mg Discontinued Excessive sedation    lamotrigine 3250-7258 300mg Discontinued Lost effectiveness   desipramine 3/17- 10 mg  Discontinued Blurry vision   duloxetine - 60 mg Discontinued Choking sensation   sertraline 10/16- 50 mg  Discontinued  Choking sensation    venlafaxine 8/10- 300 mg  Discontinued Loss effectiveness over time   Lithium  3/19- 1200 mg  Discontinued GI effects         Therapy:   Patient has completed two courses of DBT and PHP at [facility].  Patient currently is also participating in another course of DBT at [facility].  Patient has also had couples therapy.      Previous Tx:   Patient had a course of TMS with this clinic 2018-3/2018 and was in remission per MADRS score of 7.  Patient also had a course of TMS at [facility] 2018.  Patient also completed 8 ECT treatments at [facility] 3/2019.        Diagnosis:   Current diagnosis is Severe episode of recurrent major depressive disorder, without psychotic features.  F33.2.  PHQ9 score of 21 on 8/15/19.         8. Plan/Referrals:     Will submit chart review and information to insurance company for TMS authorization.      Jael Alexandra, RN   TRD RN Care Coordinator

## 2019-08-23 ENCOUNTER — ALLIED HEALTH/NURSE VISIT (OUTPATIENT)
Dept: PSYCHIATRY | Facility: CLINIC | Age: 49
End: 2019-08-23
Payer: COMMERCIAL

## 2019-08-23 ENCOUNTER — OFFICE VISIT (OUTPATIENT)
Dept: PSYCHIATRY | Facility: CLINIC | Age: 49
End: 2019-08-23
Payer: COMMERCIAL

## 2019-08-23 VITALS — SYSTOLIC BLOOD PRESSURE: 146 MMHG | HEART RATE: 92 BPM | DIASTOLIC BLOOD PRESSURE: 96 MMHG

## 2019-08-23 DIAGNOSIS — F33.2 SEVERE EPISODE OF RECURRENT MAJOR DEPRESSIVE DISORDER, WITHOUT PSYCHOTIC FEATURES (H): Primary | ICD-10-CM

## 2019-08-23 ASSESSMENT — PATIENT HEALTH QUESTIONNAIRE - PHQ9: SUM OF ALL RESPONSES TO PHQ QUESTIONS 1-9: 21

## 2019-08-23 NOTE — PROGRESS NOTES
TMS Education     Aure Joy MRN# [id number]  Age: 49 year old year old YOB: 1970        Patient is here in clinic for TMS education and consenting.  Patient will be starting TMS today on the Tailored Fit Machine.  Writer and patient reviewed mechanics of TMS.  Discussed using magnetic pulses to stimulate and induce an electrical field inside the brain.  Discussed the difference between F8 and H1 coil.  Patient was able to verbalize understanding of this.  Discussed that the idea is that we are treating the DLPFC of the brain, as it has been shown by in studies that people who have depression have decreased activity in this part of the brain, the idea is that we stimulate this part of the brain to increase activity.       Reviewed processing of mapping and how visit today would go.  Encouraged patient to communicate with staff if treatment at anytime became painful.         Discussed side effects of TMS.  Side effects include headaches after treatment, hearing loss, and seizures.  Discussed ways that TMS Clinic helps with preventing these side effects.  Such as retesting motor thresholds weekly and having patient wear ear plugs.  Instructed patient that she can take OTC pain relievers such as tylenol or IBU if she has a headache after today's treatment.      Encouraged patient to communicate any concerns or life changes to staff as they happen.     Patient was able to ask questions regarding procedure.  Patient asked question regarding how Theta Burst works.     Consent was signed by patient today.

## 2019-08-23 NOTE — PROGRESS NOTES
[facility] TMS Program  [address], [address]  [place] [address]  TMS Procedure Note   Aure Joy MRN# [id number]  Age: 47 year old year old YOB: 1970    Pre-Procedure:  History and Physical: Reviewed in medical record  Consent Signed by: Aure Joy  On: 8/23/2019    Clinical Narrative:  Recurrent depression, with previous exposure to TMS. First was 1.5 years ago with successful treatment with deep TMS and a remission that lasted 5-6 months, the second one back in January 2019 to figure of 8 TMS with no response. NOte that patient has had reported difficulties obtaining motor threshold on her at a different TMS clinic and unclear if she received an adequate dose and intensity of treatment.    Indications for TMS:  MDD, recurrent, severe; 4+ medication trials (from 2+ classes) ineffective; Psychotherapy ineffective.     Pre-Procedure Diagnosis:  MDD, recurrent, severe F33.2    Treatment Hx:  Treatment number this series: 1  Total lifetime treatment number: 37    Allergies   Allergen Reactions     Codeine Nausea     Other  [No Clinical Screening - See Comments] Nausea and Vomiting and Other (See Comments)     general anesthesia- uncontrolled vomitting      There were no vitals taken for this visit.    Pause for the Cause  Right patient:  Yes  Right procedure/correct coil:  Yes; rTMS; cpt 32726; H1 coil.   Earplugs in place:  Yes    Procedure  Patient was seated in procedure chair. Identity and procedure was verified. Ear plugs were placed in ears and patient-specific cap was placed on head and tightened appropriately. Ruler locations were placed on head per  specifications. Coil was placed  and stimulator   MT was found with specific location and energy detailed below. Coil was then placed at treatment location and stimulator was set to parameters described below. A test train was delivered and pt tolerated train. Given pt tolerance, 55 treatment trains were delivered.  Pt tolerated procedure with facial and right hand movement.    Motor Threshold Determination  Distance from nasion to inion: 35.5  MT 1: 0 - 6 - 16 @ 69% on 1/29/2018  MT 2: 0 - 6 - 16 @ 69% on 2/6/2018   MT 3: 0 - 6 - 16 @ 69% on 2/13/2018   MT 4: 0 - 6 - 16 @ 69% on 2/23/2018   MT 5: 0 - 6 - 16 @ 69% on 3/2/2018   MT 6: 0 - 5.5 - 15.5(always use intersection at left side of ruler)@ 85% on 8/23/19        Stimulation Parameters  Frequency: 18 Hz     Train duration: 2 sec  Total pulses delivered: 1980  Inter-train interval: 20 sec  Tx Loc: 0 - eyebrows  - 14  Energy: 81% (95% MT)  Trains: 55 trains    Date MADRS IDS-SR PHQ-9   8/23/19 36 53 21                     Post-Procedure Diagnosis:  MDD, recurrent, severe F33.2    Irma Marley  [facility] Neuromodulation    Plan   -Continue TMS  -Increase energy as tolerated    After attempting and failing to find her motor threshold (MT) both at rest and active (patient flexing arm, wrist and fingers against Dr Taylor's exerting resistance to movement) with a figure of 8 coil (patient was originally scheduled to undergo theta burst treatment), and knowing that she had previously been safely and successfully treated with deep TMS (presence of left dorsal frontal meningioma - currently unchanged in size) I explained to the patient that I would be concerned treating her with a figure of 8 without a clear indication that we could achieve a therapeutic dose. Patient agreed to try the deep TMS again and we proceeded to change room and follow regular procedures. I completed motor threshold mapping  and determination (found it higher than what appears to have been her previous MT) and remained available in the clinic throughout the treatment.      Elizabeth Gordon MD  [facility] Neuromodulation

## 2019-08-23 NOTE — Clinical Note
Sarah, please see my note from today's TMS session. Let me know if you agree with the new treatment plan. I have not seen her MRI to see if unusual distance from skull to cortex (thick skull, generalized atrophy, etc) might explain this increased in MT.

## 2019-08-26 ENCOUNTER — OFFICE VISIT (OUTPATIENT)
Dept: PSYCHIATRY | Facility: CLINIC | Age: 49
End: 2019-08-26
Payer: COMMERCIAL

## 2019-08-26 VITALS — DIASTOLIC BLOOD PRESSURE: 84 MMHG | HEART RATE: 85 BPM | SYSTOLIC BLOOD PRESSURE: 148 MMHG

## 2019-08-26 DIAGNOSIS — F33.2 SEVERE EPISODE OF RECURRENT MAJOR DEPRESSIVE DISORDER, WITHOUT PSYCHOTIC FEATURES (H): Primary | ICD-10-CM

## 2019-08-26 ASSESSMENT — PATIENT HEALTH QUESTIONNAIRE - PHQ9: SUM OF ALL RESPONSES TO PHQ QUESTIONS 1-9: 16

## 2019-08-26 NOTE — PROGRESS NOTES
[facility] TMS Program  [address], [address]  [place] [address]  TMS Procedure Note   Aure Joy MRN# [id number]  Age: 47 year old year old YOB: 1970    Pre-Procedure:  History and Physical: Reviewed in medical record  Consent Signed by: Aure Joy  On: 8/23/2019    Clinical Narrative:  Patient tolerated first treatment with no side effects.    Indications for TMS:  MDD, recurrent, severe; 4+ medication trials (from 2+ classes) ineffective; Psychotherapy ineffective.     Pre-Procedure Diagnosis:  MDD, recurrent, severe F33.2    Treatment Hx:  Treatment number this series: 2  Total lifetime treatment number: 38    Allergies   Allergen Reactions     Codeine Nausea     Other  [No Clinical Screening - See Comments] Nausea and Vomiting and Other (See Comments)     general anesthesia- uncontrolled vomitting      BP (!) 148/84 (BP Location: Right arm, Patient Position: Sitting, Cuff Size: Adult Regular)   Pulse 85     Pause for the Cause  Right patient:  Yes  Right procedure/correct coil:  Yes; rTMS; cpt 13332; H1 coil.   Earplugs in place:  Yes    Procedure  Patient was seated in procedure chair. Identity and procedure was verified. Ear plugs were placed in ears and patient-specific cap was placed on head and tightened appropriately. Ruler locations were verified. Coil was placed at treatment location and stimulator was set to parameters described below. A test train was delivered and pt tolerated train. Given pt tolerance, 55 treatment trains were delivered. Pt tolerated procedure with facial and hand movement.    Motor Threshold Determination  Distance from nasion to inion: 35.5  MT 1: 0 - 6 - 16 @ 69% on 1/29/2018  MT 2: 0 - 6 - 16 @ 69% on 2/6/2018   MT 3: 0 - 6 - 16 @ 69% on 2/13/2018   MT 4: 0 - 6 - 16 @ 69% on 2/23/2018   MT 5: 0 - 6 - 16 @ 69% on 3/2/2018   MT 6: 0 - 5.5 - 15.5(always use intersection at left side of ruler)@ 85% on 8/23/19        Stimulation  Parameters  Frequency: 18 Hz     Train duration: 2 sec  Total pulses delivered: 1980  Inter-train interval: 20 sec  Tx Loc: 0 - eyebrows  - 14  Energy: 85% (100% MT)  Trains: 55 trains    Date MADRS IDS-SR PHQ-9   8/23/19 36 53 21                     Post-Procedure Diagnosis:  MDD, recurrent, severe F33.2    Irma Marley  [facility]  Mental [facility] Neuromodulation    Plan   -Continue TMS  -Increase energy as tolerated    I did not see the patient but remained available in the clinic throughout the treatment.      Elizabeth Gordon MD  [facility] Neuromodulation

## 2019-08-27 ENCOUNTER — OFFICE VISIT (OUTPATIENT)
Dept: PSYCHIATRY | Facility: CLINIC | Age: 49
End: 2019-08-27
Payer: COMMERCIAL

## 2019-08-27 VITALS — SYSTOLIC BLOOD PRESSURE: 136 MMHG | DIASTOLIC BLOOD PRESSURE: 95 MMHG | HEART RATE: 92 BPM

## 2019-08-27 DIAGNOSIS — F33.2 SEVERE EPISODE OF RECURRENT MAJOR DEPRESSIVE DISORDER, WITHOUT PSYCHOTIC FEATURES (H): Primary | ICD-10-CM

## 2019-08-27 ASSESSMENT — PATIENT HEALTH QUESTIONNAIRE - PHQ9: SUM OF ALL RESPONSES TO PHQ QUESTIONS 1-9: 14

## 2019-08-27 NOTE — PROGRESS NOTES
[facility] TMS Program  [address], [address]  [place] [address]  TMS Procedure Note   Aure Joy MRN# [id number]  Age: 47 year old year old YOB: 1970    Pre-Procedure:  History and Physical: Reviewed in medical record  Consent Signed by: Aure Joy  On: 8/23/2019    Clinical Narrative:  Patient tolerating treatment. Patient reports no changes from yesterday. Patient was comfortable doing half the treatment at 105% of her MT and the other half at 110% of her MT.     Indications for TMS:  MDD, recurrent, severe; 4+ medication trials (from 2+ classes) ineffective; Psychotherapy ineffective.     Pre-Procedure Diagnosis:  MDD, recurrent, severe F33.2    Treatment Hx:  Treatment number this series: 3  Total lifetime treatment number: 39    Allergies   Allergen Reactions     Codeine Nausea     Other  [No Clinical Screening - See Comments] Nausea and Vomiting and Other (See Comments)     general anesthesia- uncontrolled vomitting      BP (!) 136/95 (BP Location: Left arm, Patient Position: Sitting, Cuff Size: Adult Regular)   Pulse 92     Pause for the Cause  Right patient:  Yes  Right procedure/correct coil:  Yes; rTMS; cpt 85282; H1 coil.   Earplugs in place:  Yes    Procedure  Patient was seated in procedure chair. Identity and procedure was verified. Ear plugs were placed in ears and patient-specific cap was placed on head and tightened appropriately. Ruler locations were verified. Coil was placed at treatment location and stimulator was set to parameters described below. A test train was delivered and pt tolerated train. Given pt tolerance, 55 treatment trains were delivered. Pt tolerated procedure with facial and hand movement.    Motor Threshold Determination  Distance from nasion to inion: 35.5  MT 1: 0 - 6 - 16 @ 69% on 1/29/2018  MT 2: 0 - 6 - 16 @ 69% on 2/6/2018   MT 3: 0 - 6 - 16 @ 69% on 2/13/2018   MT 4: 0 - 6 - 16 @ 69% on 2/23/2018   MT 5: 0 - 6 - 16 @ 69% on 3/2/2018    MT 6: 0 - 5.5 - 15.5(always use intersection at left side of ruler)@ 85% on 8/23/19        Stimulation Parameters  Frequency: 18 Hz     Train duration: 2 sec  Total pulses delivered: 1980  Inter-train interval: 20 sec  Tx Loc: 0 - eyebrows  - 14  Energy: 85% (110% MT)  Trains: 55 trains    Date MADRS IDS-SR PHQ-9   8/23/19 36 53 21                     Post-Procedure Diagnosis:  MDD, recurrent, severe F33.2    Taamr Balderrama  [facility]  Mental [facility] Neuromodulation    Plan   -Continue TMS  -Increase energy as tolerated      I did not see the patient during/after treatment but remained available in the clinic during  treatment.    Evelyn Zamudio MD  [facility] Neuromodulation

## 2019-08-28 ENCOUNTER — OFFICE VISIT (OUTPATIENT)
Dept: PSYCHIATRY | Facility: CLINIC | Age: 49
End: 2019-08-28
Payer: COMMERCIAL

## 2019-08-28 VITALS — SYSTOLIC BLOOD PRESSURE: 144 MMHG | DIASTOLIC BLOOD PRESSURE: 88 MMHG | HEART RATE: 91 BPM

## 2019-08-28 DIAGNOSIS — F33.2 SEVERE EPISODE OF RECURRENT MAJOR DEPRESSIVE DISORDER, WITHOUT PSYCHOTIC FEATURES (H): Primary | ICD-10-CM

## 2019-08-28 ASSESSMENT — PATIENT HEALTH QUESTIONNAIRE - PHQ9: SUM OF ALL RESPONSES TO PHQ QUESTIONS 1-9: 15

## 2019-08-28 NOTE — PROGRESS NOTES
[facility] TMS Program  [address], [address]  [place] [address]  TMS Procedure Note   Aure Joy MRN# [id number]  Age: 47 year old year old YOB: 1970    Pre-Procedure:  History and Physical: Reviewed in medical record  Consent Signed by: Aure Joy  On: 8/23/2019    Clinical Narrative:  Patient tolerating treatment. Patient reports decrease in symptoms she is noticing is likely placebo that she always feels when she begins any new treatment regimen.  It typically only lasts for 2 weeks.    Indications for TMS:  MDD, recurrent, severe; 4+ medication trials (from 2+ classes) ineffective; Psychotherapy ineffective.     Pre-Procedure Diagnosis:  MDD, recurrent, severe F33.2    Treatment Hx:  Treatment number this series: 4  Total lifetime treatment number: 40    Allergies   Allergen Reactions     Codeine Nausea     Other  [No Clinical Screening - See Comments] Nausea and Vomiting and Other (See Comments)     general anesthesia- uncontrolled vomitting      BP (!) 144/88 (BP Location: Right arm, Patient Position: Sitting, Cuff Size: Adult Regular)   Pulse 91     Pause for the Cause  Right patient:  Yes  Right procedure/correct coil:  Yes; rTMS; cpt 79203; H1 coil.   Earplugs in place:  Yes    Procedure  Patient was seated in procedure chair. Identity and procedure was verified. Ear plugs were placed in ears and patient-specific cap was placed on head and tightened appropriately. Ruler locations were verified. Coil was placed at treatment location and stimulator was set to parameters described below. A test train was delivered at 115%MT as well as 110%MT and 105% MT with significant motor movements, including leg.  Stimulator was reduced to 100% MT and patient tolerated train.  Given pt tolerance, 55 treatment trains were delivered. Pt tolerated procedure with facial and hand movement.    Motor Threshold Determination  Distance from nasion to inion: 35.5  MT 1: 0 - 6 - 16 @ 69% on  1/29/2018  MT 2: 0 - 6 - 16 @ 69% on 2/6/2018   MT 3: 0 - 6 - 16 @ 69% on 2/13/2018   MT 4: 0 - 6 - 16 @ 69% on 2/23/2018   MT 5: 0 - 6 - 16 @ 69% on 3/2/2018   MT 6: 0 - 5.5 - 15.5(always use intersection at left side of ruler)@ 85% on 8/23/19        Stimulation Parameters  Frequency: 18 Hz     Train duration: 2 sec  Total pulses delivered: 1980  Inter-train interval: 20 sec  Tx Loc: 0 - eyebrows  - 14  Energy: 85% (100% MT)  Trains: 55 trains    Date MADRS IDS-SR PHQ-9   8/23/19 36 53 21                     Post-Procedure Diagnosis:  MDD, recurrent, severe F33.2    Irma Marley  [facility]  Mental Health Neuromodulation    Plan   -REMT IS ABSOLUTELY NECESSARY TOMORROW DUE TO INCREASED MOTOR ACTIVATION, RECOMMEND USING EMG   -Continue TMS  -Increase energy as tolerated      I was available in the clinic throughout the treatment but did not evaluate the patient.  Diane Hassan M.D., Ph.D.     Dept. of Psychiatry   [facility]

## 2019-08-29 ENCOUNTER — OFFICE VISIT (OUTPATIENT)
Dept: PSYCHIATRY | Facility: CLINIC | Age: 49
End: 2019-08-29
Payer: COMMERCIAL

## 2019-08-29 VITALS — DIASTOLIC BLOOD PRESSURE: 112 MMHG | SYSTOLIC BLOOD PRESSURE: 151 MMHG | HEART RATE: 84 BPM

## 2019-08-29 DIAGNOSIS — F33.2 SEVERE EPISODE OF RECURRENT MAJOR DEPRESSIVE DISORDER, WITHOUT PSYCHOTIC FEATURES (H): Primary | ICD-10-CM

## 2019-08-29 ASSESSMENT — PATIENT HEALTH QUESTIONNAIRE - PHQ9: SUM OF ALL RESPONSES TO PHQ QUESTIONS 1-9: 14

## 2019-08-30 ENCOUNTER — OFFICE VISIT (OUTPATIENT)
Dept: PSYCHIATRY | Facility: CLINIC | Age: 49
End: 2019-08-30
Payer: COMMERCIAL

## 2019-08-30 VITALS — DIASTOLIC BLOOD PRESSURE: 96 MMHG | HEART RATE: 96 BPM | SYSTOLIC BLOOD PRESSURE: 133 MMHG

## 2019-08-30 DIAGNOSIS — F33.2 SEVERE EPISODE OF RECURRENT MAJOR DEPRESSIVE DISORDER, WITHOUT PSYCHOTIC FEATURES (H): Primary | ICD-10-CM

## 2019-08-30 ASSESSMENT — PATIENT HEALTH QUESTIONNAIRE - PHQ9: SUM OF ALL RESPONSES TO PHQ QUESTIONS 1-9: 14

## 2019-08-30 NOTE — PROGRESS NOTES
[facility] TMS Program  [address]  [place] [address]  TMS Procedure Note   Aure Joy MRN# [id number]  Age: 47 year old year old YOB: 1970    Pre-Procedure:  History and Physical: Reviewed in medical record  Consent Signed by: Aure Joy  On: 8/23/2019    Clinical Narrative:  Patient tolerating treatment. Patient states that she's feeling sad because her oldest daughter moving away to college this weekend.     Indications for TMS:  MDD, recurrent, severe; 4+ medication trials (from 2+ classes) ineffective; Psychotherapy ineffective.     Pre-Procedure Diagnosis:  MDD, recurrent, severe F33.2    Treatment Hx:  Treatment number this series: 6  Total lifetime treatment number: 42    Allergies   Allergen Reactions     Codeine Nausea     Other  [No Clinical Screening - See Comments] Nausea and Vomiting and Other (See Comments)     general anesthesia- uncontrolled vomitting      BP (!) 133/96 (BP Location: Left arm, Patient Position: Sitting, Cuff Size: Adult Regular)   Pulse 96     Pause for the Cause  Right patient:  Yes  Right procedure/correct coil:  Yes; rTMS; cpt 17417; H1 coil.   Earplugs in place:  Yes    Procedure  Patient was seated in procedure chair. Identity and procedure was verified. Ear plugs were placed in ears and patient-specific cap was placed on head and tightened appropriately. Ruler locations were verified. Coil was placed at treatment location and stimulator was set to parameters described below. A test train was delivered and pt tolerated train. Given pt tolerance, 55 treatment trains were delivered. Pt tolerated procedure with some arm and facial movement.    Motor Threshold Determination  Distance from nasion to inion: 35.5  MT 1: 0 - 6 - 16 @ 69% on 1/29/2018  MT 2: 0 - 6 - 16 @ 69% on 2/6/2018   MT 3: 0 - 6 - 16 @ 69% on 2/13/2018   MT 4: 0 - 6 - 16 @ 69% on 2/23/2018   MT 5: 0 - 6 - 16 @ 69% on 3/2/2018   MT 6: 0 - 5.5 - 15.5(always use  intersection at left side of ruler)@ 85% on 8/23/19  MT 7: 0 - 5.5 - 15.5(always use intersection at left side of ruler)@ 80% on 8/29/19      Stimulation Parameters  Frequency: 18 Hz     Train duration: 2 sec  Total pulses delivered: 1980  Inter-train interval: 20 sec  Tx Loc: 0 - eyebrows  - 14  Energy: 88% (110% MT)  Trains: 55 trains    Date MADRS IDS-SR PHQ-9   8/23/19 36 53 21   8/30/19  48 14               Post-Procedure Diagnosis:  MDD, recurrent, severe F33.2    Tamar [facility]  Mental [facility] Neuromodulation    Plan   -Continue TMS  -Increase energy as tolerated    I did not see the patient during/after treatment but remained available in the clinic during  treatment.    Tim Williamson MD, MD, PhD  [facility] Neuromodulation

## 2019-09-03 ENCOUNTER — OFFICE VISIT (OUTPATIENT)
Dept: PSYCHIATRY | Facility: CLINIC | Age: 49
End: 2019-09-03
Payer: COMMERCIAL

## 2019-09-03 VITALS — SYSTOLIC BLOOD PRESSURE: 147 MMHG | HEART RATE: 87 BPM | DIASTOLIC BLOOD PRESSURE: 89 MMHG

## 2019-09-03 DIAGNOSIS — F33.2 SEVERE EPISODE OF RECURRENT MAJOR DEPRESSIVE DISORDER, WITHOUT PSYCHOTIC FEATURES (H): Primary | ICD-10-CM

## 2019-09-03 ASSESSMENT — PATIENT HEALTH QUESTIONNAIRE - PHQ9: SUM OF ALL RESPONSES TO PHQ QUESTIONS 1-9: 10

## 2019-09-03 NOTE — PROGRESS NOTES
[facility] TMS Program  [address]  [place] [address]  TMS Procedure Note   Aure Joy MRN# [id number]  Age: 47 year old year old YOB: 1970    Pre-Procedure:  History and Physical: Reviewed in medical record  Consent Signed by: Aure Joy  On: 8/23/2019    Clinical Narrative:  Patient tolerating treatment. Patient states that she's feeling sad because her oldest daughter moving away to college this weekend.     Indications for TMS:  MDD, recurrent, severe; 4+ medication trials (from 2+ classes) ineffective; Psychotherapy ineffective.     Pre-Procedure Diagnosis:  MDD, recurrent, severe F33.2    Treatment Hx:  Treatment number this series: 6  Total lifetime treatment number: 42    Allergies   Allergen Reactions     Codeine Nausea     Other  [No Clinical Screening - See Comments] Nausea and Vomiting and Other (See Comments)     general anesthesia- uncontrolled vomitting      BP (!) 147/89 (BP Location: Right arm, Patient Position: Sitting, Cuff Size: Adult Regular)   Pulse 87     Pause for the Cause  Right patient:  Yes  Right procedure/correct coil:  Yes; rTMS; cpt 46315; H1 coil.   Earplugs in place:  Yes    Procedure  Patient was seated in procedure chair. Identity and procedure was verified. Ear plugs were placed in ears and patient-specific cap was placed on head and tightened appropriately. Ruler locations were verified. Coil was placed at treatment location and stimulator was set to 88% (110%MT) for 10 trains. Then stimulator was set to parameters below.  Given pt tolerance, 45 treatment additional trains were delivered. Pt tolerated procedure with some arm and facial movement.    Motor Threshold Determination  Distance from nasion to inion: 35.5  MT 1: 0 - 6 - 16 @ 69% on 1/29/2018  MT 2: 0 - 6 - 16 @ 69% on 2/6/2018   MT 3: 0 - 6 - 16 @ 69% on 2/13/2018   MT 4: 0 - 6 - 16 @ 69% on 2/23/2018   MT 5: 0 - 6 - 16 @ 69% on 3/2/2018   MT 6: 0 - 5.5 - 15.5(always use  intersection at left side of ruler)@ 85% on 8/23/19  MT 7: 0 - 5.5 - 15.5(always use intersection at left side of ruler)@ 80% on 8/29/19      Stimulation Parameters  Frequency: 18 Hz     Train duration: 2 sec  Total pulses delivered: 1980  Inter-train interval: 20 sec  Tx Loc: 0 - eyebrows  - 14  Energy: 92% (115% MT)  Trains: 55 trains    Date MADRS IDS-SR PHQ-9   8/23/19 36 53 21   8/30/19  48 14               Post-Procedure Diagnosis:  MDD, recurrent, severe F33.2    Irma Marley  [facility]  Mental [facility] Neuromodulation    Plan   -Continue TMS  -Increase energy as tolerated    I did not see the patient during/after treatment but remained available in the clinic during  treatment.    Evelyn Zamudio MD  [facility] Neuromodulation

## 2019-09-04 ENCOUNTER — OFFICE VISIT (OUTPATIENT)
Dept: PSYCHIATRY | Facility: CLINIC | Age: 49
End: 2019-09-04
Payer: COMMERCIAL

## 2019-09-04 VITALS — SYSTOLIC BLOOD PRESSURE: 154 MMHG | DIASTOLIC BLOOD PRESSURE: 83 MMHG | HEART RATE: 87 BPM

## 2019-09-04 DIAGNOSIS — F33.2 SEVERE EPISODE OF RECURRENT MAJOR DEPRESSIVE DISORDER, WITHOUT PSYCHOTIC FEATURES (H): Primary | ICD-10-CM

## 2019-09-04 ASSESSMENT — PATIENT HEALTH QUESTIONNAIRE - PHQ9: SUM OF ALL RESPONSES TO PHQ QUESTIONS 1-9: 11

## 2019-09-04 NOTE — PROGRESS NOTES
[facility] TMS Program  [address], [address]  [place] [address]  TMS Procedure Note   Aure Joy MRN# [id number]  Age: 47 year old year old YOB: 1970    Pre-Procedure:  History and Physical: Reviewed in medical record  Consent Signed by: Aure Joy  On: 8/23/2019    Clinical Narrative:  Patient tolerating treatment. Patient states that she's doing fine.     Indications for TMS:  MDD, recurrent, severe; 4+ medication trials (from 2+ classes) ineffective; Psychotherapy ineffective.     Pre-Procedure Diagnosis:  MDD, recurrent, severe F33.2    Treatment Hx:  Treatment number this series: 7  Total lifetime treatment number: 43    Allergies   Allergen Reactions     Codeine Nausea     Other  [No Clinical Screening - See Comments] Nausea and Vomiting and Other (See Comments)     general anesthesia- uncontrolled vomitting      BP (!) 154/83 (BP Location: Left arm, Patient Position: Sitting, Cuff Size: Adult Regular)   Pulse 87     Pause for the Cause  Right patient:  Yes  Right procedure/correct coil:  Yes; rTMS; cpt 51434; H1 coil.   Earplugs in place:  Yes    Procedure  Patient was seated in procedure chair. Identity and procedure was verified. Ear plugs were placed in ears and patient-specific cap was placed on head and tightened appropriately. Ruler locations were verified. Coil was placed at treatment location and stimulator was set to parameters as described below. A test train was delivered at 120%MT and 115%MT with significant motor movements, including leg.  Stimulator was reduced to 110% MT and patient tolerated train.  Given pt tolerance, 55 treatment trains were delivered. Pt tolerated procedure with facial and hand movement.    Motor Threshold Determination  Distance from nasion to inion: 35.5  MT 1: 0 - 6 - 16 @ 69% on 1/29/2018  MT 2: 0 - 6 - 16 @ 69% on 2/6/2018   MT 3: 0 - 6 - 16 @ 69% on 2/13/2018   MT 4: 0 - 6 - 16 @ 69% on 2/23/2018   MT 5: 0 - 6 - 16 @ 69% on  3/2/2018   MT 6: 0 - 5.5 - 15.5(always use intersection at left side of ruler)@ 85% on 8/23/19  MT 7: 0 - 5.5 - 15.5(always use intersection at left side of ruler)@ 80% on 8/29/19    Stimulation Parameters  Frequency: 18 Hz     Train duration: 2 sec  Total pulses delivered: 1980  Inter-train interval: 20 sec  Tx Loc: 0 - eyebrows  - 14  Energy: 88% (110% MT)  Trains: 55 trains    Date MADRS IDS-SR PHQ-9   8/23/19 36 53 21   8/30/19  48 14               Post-Procedure Diagnosis:  MDD, recurrent, severe F33.2    [facility]  Mental Health Neuromodulation    Plan   -Continue TMS  -Increase energy as tolerated   -ReMT tomorrow    I was available in the clinic throughout the treatment but did not evaluate the patient.  Diane Hassan M.D., Ph.D.     Dept. of Psychiatry   [facility]

## 2019-09-05 ENCOUNTER — OFFICE VISIT (OUTPATIENT)
Dept: PSYCHIATRY | Facility: CLINIC | Age: 49
End: 2019-09-05
Payer: COMMERCIAL

## 2019-09-05 VITALS — SYSTOLIC BLOOD PRESSURE: 138 MMHG | HEART RATE: 94 BPM | DIASTOLIC BLOOD PRESSURE: 93 MMHG

## 2019-09-05 DIAGNOSIS — F33.2 SEVERE EPISODE OF RECURRENT MAJOR DEPRESSIVE DISORDER, WITHOUT PSYCHOTIC FEATURES (H): Primary | ICD-10-CM

## 2019-09-05 ASSESSMENT — PATIENT HEALTH QUESTIONNAIRE - PHQ9: SUM OF ALL RESPONSES TO PHQ QUESTIONS 1-9: 13

## 2019-09-05 NOTE — PROGRESS NOTES
" [facility] TMS Program  [address], [address]  [place] [address]  TMS Procedure Note   Aure Joy MRN# [id number]  Age: 47 year old year old YOB: 1970    Pre-Procedure:  History and Physical: Reviewed in medical record  Consent Signed by: Aure Joy  On: 8/23/2019    Clinical Narrative:  Patient tolerating treatment. Patient states that her initial bump in mood is gone and she is feeling more depressed lately. She feels that her \"body won't let [her] get better.\" She also describes finding TMS as being a \"humiliating\" treatment likely secondary to the motor activation associated with stimulation. Expressed concern about this and scheduled a follow-up visit next week to discuss options and plan for moving forward.    Indications for TMS:  MDD, recurrent, severe; 4+ medication trials (from 2+ classes) ineffective; Psychotherapy ineffective.     Pre-Procedure Diagnosis:  MDD, recurrent, severe F33.2    Treatment Hx:  Treatment number this series: 8  Total lifetime treatment number: 44    Allergies   Allergen Reactions     Codeine Nausea     Other  [No Clinical Screening - See Comments] Nausea and Vomiting and Other (See Comments)     general anesthesia- uncontrolled vomitting      BP (!) 138/93 (BP Location: Right arm, Patient Position: Sitting, Cuff Size: Adult Regular)   Pulse 94     Pause for the Cause  Right patient:  Yes  Right procedure/correct coil:  Yes; rTMS; cpt 30399; H1 coil.   Earplugs in place:  Yes    Procedure  Patient was seated in procedure chair. Identity and procedure was verified. Ear plugs were placed in ears and patient-specific cap was placed on head and tightened appropriately. Ruler locations were verified. Coil was placed at initial MT location and stimulator was set to initial MT. MT was retested and found as described below. Coil was placed at treatment location and stimulator was set to stimulation parameters detailed below. A test train was delivered " and pt tolerated train fine. Given pt tolerance, 55 trains were delivered. Pt tolerated procedure with facial and bilateral hand movement.    Motor Threshold Determination  Distance from nasion to inion: 35.5  MT 1: 0 - 6 - 16 @ 69% on 1/29/2018  MT 2: 0 - 6 - 16 @ 69% on 2/6/2018   MT 3: 0 - 6 - 16 @ 69% on 2/13/2018   MT 4: 0 - 6 - 16 @ 69% on 2/23/2018   MT 5: 0 - 6 - 16 @ 69% on 3/2/2018   MT 6: 0 - 5.5 - 15.5(always use intersection at left side of ruler)@ 85% on 8/23/19  MT 7: 0 - 5.5 - 15.5(always use intersection at left side of ruler)@ 80% on 8/29/19  MT : 0 - 5.5 - 37.5(always use intersection at left side of ruler)@ 76% on 8/29/19 (right side using EMG on left hand)    Stimulation Parameters  Frequency: 18 Hz     Train duration: 2 sec  Total pulses delivered: 1980  Inter-train interval: 20 sec  Tx Loc: 0 - 3- 13.5  Energy: 87% (115% MT)  Trains: 55 trains    Date MADRS IDS-SR PHQ-9   8/23/19 36 53 21   8/30/19 -- 48 14               Post-Procedure Diagnosis:  MDD, recurrent, severe F33.2    Irma Marley  [facility]  Mental [facility] Neuromodulation    Plan   -Continue TMS  -Increase energy as tolerated     I completed repeat determination of the pt's motor threshold during the visit today. I was available in the clinic throughout the treatment.    Evelyn Zamudio MD  [facility]  Mental [facility] Neuromodulation

## 2019-09-09 ENCOUNTER — OFFICE VISIT (OUTPATIENT)
Dept: PSYCHIATRY | Facility: CLINIC | Age: 49
End: 2019-09-09
Payer: COMMERCIAL

## 2019-09-09 VITALS — SYSTOLIC BLOOD PRESSURE: 135 MMHG | DIASTOLIC BLOOD PRESSURE: 87 MMHG | HEART RATE: 89 BPM

## 2019-09-09 DIAGNOSIS — F33.2 SEVERE EPISODE OF RECURRENT MAJOR DEPRESSIVE DISORDER, WITHOUT PSYCHOTIC FEATURES (H): Primary | ICD-10-CM

## 2019-09-09 ASSESSMENT — PATIENT HEALTH QUESTIONNAIRE - PHQ9: SUM OF ALL RESPONSES TO PHQ QUESTIONS 1-9: 17

## 2019-09-09 NOTE — PROGRESS NOTES
[facility] TMS Program  [address], [address]  [place] [address]  TMS Procedure Note   Aure Joy MRN# [id number]  Age: 47 year old year old YOB: 1970    Pre-Procedure:  History and Physical: Reviewed in medical record  Consent Signed by: Aure Joy  On: 8/23/2019    Clinical Narrative:  Patient tolerating treatment. Pt stated it was a very quite weekend for her. No changes.     Indications for TMS:  MDD, recurrent, severe; 4+ medication trials (from 2+ classes) ineffective; Psychotherapy ineffective.     Pre-Procedure Diagnosis:  MDD, recurrent, severe F33.2    Treatment Hx:  Treatment number this series: 9  Total lifetime treatment number: 45    Allergies   Allergen Reactions     Codeine Nausea     Other  [No Clinical Screening - See Comments] Nausea and Vomiting and Other (See Comments)     general anesthesia- uncontrolled vomitting      /87 (BP Location: Right arm, Patient Position: Sitting, Cuff Size: Adult Regular)   Pulse 89     Pause for the Cause  Right patient:  Yes  Right procedure/correct coil:  Yes; rTMS; cpt 33140; H1 coil.   Earplugs in place:  Yes    Procedure  Patient was seated in procedure chair. Identity and procedure was verified. Ear plugs were placed in ears and patient-specific cap was placed on head and tightened appropriately. Ruler locations were verified. Coil was placed at treatment location and stimulator was set to parameters described below. A test train was delivered and pt tolerated train. Given pt tolerance, 55 treatment trains were delivered. Pt tolerated procedure with facial and right hand movement, along with very minimal to no right leg movement throughout treatment.    Motor Threshold Determination  Distance from nasion to inion: 35.5  MT 1: 0 - 6 - 16 @ 69% on 1/29/2018  MT 2: 0 - 6 - 16 @ 69% on 2/6/2018   MT 3: 0 - 6 - 16 @ 69% on 2/13/2018   MT 4: 0 - 6 - 16 @ 69% on 2/23/2018   MT 5: 0 - 6 - 16 @ 69% on 3/2/2018   MT 6: 0 -  5.5 - 15.5(always use intersection at left side of ruler)@ 85% on 8/23/19  MT 7: 0 - 5.5 - 15.5(always use intersection at left side of ruler)@ 80% on 8/29/19  MT 8: 0 - 5.5 - 37.5(always use intersection at left side of ruler)@ 76% on 9/5/19 (right side using EMG on left hand)    Stimulation Parameters  Frequency: 18 Hz     Train duration: 2 sec  Total pulses delivered: 1980  Inter-train interval: 20 sec  Tx Loc: 0 - 3- 13.5  Energy: 91% (120% MT)  Trains: 55 trains    Date MADRS IDS-SR PHQ-9   8/23/19 36 53 21   8/30/19 -- 48 14               Post-Procedure Diagnosis:  MDD, recurrent, severe F33.2    Pilo Almeida  [facility]  Mental [facility] Neuromodulation    Plan   -Continue TMS      I didn't see the patient during/after treatment but remained available throughout treatment.    Elizabeth Gordon MD  [facility] Neuromodulation

## 2019-09-10 ENCOUNTER — OFFICE VISIT (OUTPATIENT)
Dept: PSYCHIATRY | Facility: CLINIC | Age: 49
End: 2019-09-10
Payer: COMMERCIAL

## 2019-09-10 VITALS
BODY MASS INDEX: 35.65 KG/M2 | DIASTOLIC BLOOD PRESSURE: 83 MMHG | HEART RATE: 100 BPM | WEIGHT: 191.8 LBS | SYSTOLIC BLOOD PRESSURE: 119 MMHG

## 2019-09-10 VITALS — DIASTOLIC BLOOD PRESSURE: 77 MMHG | SYSTOLIC BLOOD PRESSURE: 125 MMHG | HEART RATE: 103 BPM

## 2019-09-10 DIAGNOSIS — F33.2 SEVERE EPISODE OF RECURRENT MAJOR DEPRESSIVE DISORDER, WITHOUT PSYCHOTIC FEATURES (H): Primary | ICD-10-CM

## 2019-09-10 RX ORDER — LORAZEPAM 0.5 MG/1
0.5 TABLET ORAL EVERY 6 HOURS PRN
COMMUNITY
End: 2021-09-09

## 2019-09-10 ASSESSMENT — PAIN SCALES - GENERAL: PAINLEVEL: NO PAIN (0)

## 2019-09-10 ASSESSMENT — PATIENT HEALTH QUESTIONNAIRE - PHQ9: SUM OF ALL RESPONSES TO PHQ QUESTIONS 1-9: 19
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
## 2019-09-10 NOTE — PROGRESS NOTES
[facility] TMS Program  [address], [address]  [place] [address]  TMS Procedure Note   Aure Joy MRN# [id number]  Age: 47 year old year old YOB: 1970    Pre-Procedure:  History and Physical: Reviewed in medical record  Consent Signed by: Aure Joy  On: 8/23/2019    Clinical Narrative:  Patient tolerating treatment. Patient states that the boost of starting a new treatment is gone, and feels depressed.     Indications for TMS:  MDD, recurrent, severe; 4+ medication trials (from 2+ classes) ineffective; Psychotherapy ineffective.     Pre-Procedure Diagnosis:  MDD, recurrent, severe F33.2    Treatment Hx:  Treatment number this series: 10  Total lifetime treatment number: 46    Allergies   Allergen Reactions     Codeine Nausea     Other  [No Clinical Screening - See Comments] Nausea and Vomiting and Other (See Comments)     general anesthesia- uncontrolled vomitting      /77 (BP Location: Left arm, Patient Position: Sitting, Cuff Size: Adult Regular)   Pulse 103     Pause for the Cause  Right patient:  Yes  Right procedure/correct coil:  Yes; rTMS; cpt 13534; H1 coil.   Earplugs in place:  Yes    Procedure  Patient was seated in procedure chair. Identity and procedure was verified. Ear plugs were placed in ears and patient-specific cap was placed on head and tightened appropriately. Ruler locations were verified. Coil was placed at treatment location and stimulator was set to parameters described below. A test train was delivered and pt tolerated train. Given pt tolerance, 55 treatment trains were delivered. Pt tolerated procedure with facial and right hand movement, along with very minimal to no right leg movement throughout treatment.    Motor Threshold Determination  Distance from nasion to inion: 35.5  MT 1: 0 - 6 - 16 @ 69% on 1/29/2018  MT 2: 0 - 6 - 16 @ 69% on 2/6/2018   MT 3: 0 - 6 - 16 @ 69% on 2/13/2018   MT 4: 0 - 6 - 16 @ 69% on 2/23/2018   MT 5: 0 - 6 - 16 @  69% on 3/2/2018   MT 6: 0 - 5.5 - 15.5(always use intersection at left side of ruler)@ 85% on 8/23/19  MT 7: 0 - 5.5 - 15.5(always use intersection at left side of ruler)@ 80% on 8/29/19  MT 8: 0 - 5.5 - 37.5(always use intersection at left side of ruler)@ 76% on 9/5/19 (right side using EMG on left hand)    Stimulation Parameters  Frequency: 18 Hz     Train duration: 2 sec  Total pulses delivered: 1980  Inter-train interval: 20 sec  Tx Loc: 0 - 3- 13.5  Energy: 91% (120% MT)  Trains: 55 trains    Date MADRS IDS-SR PHQ-9   8/23/19 36 53 21   8/30/19 -- 48 14               Post-Procedure Diagnosis:  MDD, recurrent, severe F33.2    Tamar Balderrama  [facility]  Mental [facility] Neuromodulation    Plan   -Continue TMS      I did see the patient before treatment and remained available in the clinic during  treatment.    Evelyn Zamudio MD  [facility] Neuromodulation
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
## 2019-09-11 ENCOUNTER — OFFICE VISIT (OUTPATIENT)
Dept: PSYCHIATRY | Facility: CLINIC | Age: 49
End: 2019-09-11
Payer: COMMERCIAL

## 2019-09-11 VITALS — SYSTOLIC BLOOD PRESSURE: 141 MMHG | DIASTOLIC BLOOD PRESSURE: 92 MMHG | HEART RATE: 94 BPM

## 2019-09-11 DIAGNOSIS — F33.2 SEVERE EPISODE OF RECURRENT MAJOR DEPRESSIVE DISORDER, WITHOUT PSYCHOTIC FEATURES (H): Primary | ICD-10-CM

## 2019-09-11 ASSESSMENT — PATIENT HEALTH QUESTIONNAIRE - PHQ9: SUM OF ALL RESPONSES TO PHQ QUESTIONS 1-9: 20

## 2019-09-11 NOTE — PROGRESS NOTES
[facility] TMS Program  [address], [address]  [place] [address]  TMS Procedure Note   Aure Jyo MRN# [id number]  Age: 47 year old year old YOB: 1970    Pre-Procedure:  History and Physical: Reviewed in medical record  Consent Signed by: Aure Joy  On: 8/23/2019    Clinical Narrative:  Patient tolerating treatment. Patient would like to start setting goals for herself. Patient's oldest daughter moved away to college and patient states that she wants to pack all of her oldest daughter's things in boxes and put them in the basement, so her other daughter can move into that bedroom. Patient's first goal is to get boxes/storage containers.     Indications for TMS:  MDD, recurrent, severe; 4+ medication trials (from 2+ classes) ineffective; Psychotherapy ineffective.     Pre-Procedure Diagnosis:  MDD, recurrent, severe F33.2    Treatment Hx:  Treatment number this series: 11  Total lifetime treatment number: 47    Allergies   Allergen Reactions     Codeine Nausea     Other  [No Clinical Screening - See Comments] Nausea and Vomiting and Other (See Comments)     general anesthesia- uncontrolled vomitting      BP (!) 141/92 (BP Location: Left arm, Patient Position: Sitting, Cuff Size: Adult Regular)   Pulse 94     Pause for the Cause  Right patient:  Yes  Right procedure/correct coil:  Yes; rTMS; cpt 03003; H1 coil.   Earplugs in place:  Yes    Procedure  Patient was seated in procedure chair. Identity and procedure was verified. Ear plugs were placed in ears and patient-specific cap was placed on head and tightened appropriately. Ruler locations were verified. Coil was placed at treatment location and stimulator was set to parameters described below. A test train was delivered and pt tolerated train. Given pt tolerance, 55 treatment trains were delivered. Pt tolerated procedure with facial and right hand movement, along with very minimal to no right leg movement throughout  treatment.    Motor Threshold Determination  Distance from nasion to inion: 35.5  MT 1: 0 - 6 - 16 @ 69% on 1/29/2018  MT 2: 0 - 6 - 16 @ 69% on 2/6/2018   MT 3: 0 - 6 - 16 @ 69% on 2/13/2018   MT 4: 0 - 6 - 16 @ 69% on 2/23/2018   MT 5: 0 - 6 - 16 @ 69% on 3/2/2018   MT 6: 0 - 5.5 - 15.5(always use intersection at left side of ruler)@ 85% on 8/23/19  MT 7: 0 - 5.5 - 15.5(always use intersection at left side of ruler)@ 80% on 8/29/19  MT 8: 0 - 5.5 - 37.5(always use intersection at left side of ruler)@ 76% on 9/5/19 (right side using EMG on left hand)    Stimulation Parameters  Frequency: 18 Hz     Train duration: 2 sec  Total pulses delivered: 1980  Inter-train interval: 20 sec  Tx Loc: 0 - 3- 13.5  Energy: 91% (120% MT)  Trains: 55 trains    Date MADRS IDS-SR PHQ-9   8/23/19 36 53 21   8/30/19 -- 48 14               Post-Procedure Diagnosis:  MDD, recurrent, severe F33.2    Tamar Conchis  [facility]  Mental Health Neuromodulation    Plan   -Continue TMS    I was available in the clinic throughout the treatment but did not evaluate the patient.  Diane Hassan M.D., Ph.D.     Dept. of Psychiatry   [facility]

## 2019-09-12 ENCOUNTER — OFFICE VISIT (OUTPATIENT)
Dept: PSYCHIATRY | Facility: CLINIC | Age: 49
End: 2019-09-12
Payer: COMMERCIAL

## 2019-09-12 VITALS — DIASTOLIC BLOOD PRESSURE: 79 MMHG | HEART RATE: 76 BPM | SYSTOLIC BLOOD PRESSURE: 127 MMHG

## 2019-09-12 DIAGNOSIS — F33.2 SEVERE EPISODE OF RECURRENT MAJOR DEPRESSIVE DISORDER, WITHOUT PSYCHOTIC FEATURES (H): Primary | ICD-10-CM

## 2019-09-12 ASSESSMENT — PATIENT HEALTH QUESTIONNAIRE - PHQ9: SUM OF ALL RESPONSES TO PHQ QUESTIONS 1-9: 18
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
## 2019-09-13 ENCOUNTER — OFFICE VISIT (OUTPATIENT)
Dept: PSYCHIATRY | Facility: CLINIC | Age: 49
End: 2019-09-13
Payer: COMMERCIAL

## 2019-09-13 VITALS — DIASTOLIC BLOOD PRESSURE: 87 MMHG | SYSTOLIC BLOOD PRESSURE: 136 MMHG | HEART RATE: 98 BPM

## 2019-09-13 DIAGNOSIS — F33.2 SEVERE EPISODE OF RECURRENT MAJOR DEPRESSIVE DISORDER, WITHOUT PSYCHOTIC FEATURES (H): Primary | ICD-10-CM

## 2019-09-13 ASSESSMENT — PATIENT HEALTH QUESTIONNAIRE - PHQ9: SUM OF ALL RESPONSES TO PHQ QUESTIONS 1-9: 14
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
## 2019-09-16 ENCOUNTER — OFFICE VISIT (OUTPATIENT)
Dept: PSYCHIATRY | Facility: CLINIC | Age: 49
End: 2019-09-16
Payer: COMMERCIAL

## 2019-09-16 VITALS — SYSTOLIC BLOOD PRESSURE: 138 MMHG | HEART RATE: 89 BPM | DIASTOLIC BLOOD PRESSURE: 87 MMHG

## 2019-09-16 DIAGNOSIS — F33.2 SEVERE EPISODE OF RECURRENT MAJOR DEPRESSIVE DISORDER, WITHOUT PSYCHOTIC FEATURES (H): Primary | ICD-10-CM

## 2019-09-16 ASSESSMENT — PATIENT HEALTH QUESTIONNAIRE - PHQ9: SUM OF ALL RESPONSES TO PHQ QUESTIONS 1-9: 11

## 2019-09-16 NOTE — PROGRESS NOTES
[facility] TMS Program  [address], [address]  [place] [address]  TMS Procedure Note   Aure Joy MRN# [id number]  Age: 47 year old year old YOB: 1970    Pre-Procedure:  History and Physical: Reviewed in medical record  Consent Signed by: Aure Joy  On: 8/23/2019    Clinical Narrative:  Patient tolerating treatment.  Patient reports no changes.    Indications for TMS:  MDD, recurrent, severe; 4+ medication trials (from 2+ classes) ineffective; Psychotherapy ineffective.     Pre-Procedure Diagnosis:  MDD, recurrent, severe F33.2    Treatment Hx:  Treatment number this series: 14  Total lifetime treatment number: 50    Allergies   Allergen Reactions     Codeine Nausea     Other  [No Clinical Screening - See Comments] Nausea and Vomiting and Other (See Comments)     general anesthesia- uncontrolled vomitting      /87 (BP Location: Right arm, Patient Position: Sitting, Cuff Size: Adult Regular)   Pulse 89     Pause for the Cause  Right patient:  Yes  Right procedure/correct coil:  Yes; rTMS; cpt 89761; F8 coil.   Earplugs in place:  Yes    Procedure  Patient was seated in procedure chair. Identity and procedure was verified. Ear plugs were placed in ears and patient-specific cap was placed on head and tightened appropriately. Ruler locations were verified. Coil was placed at treatment location and stimulator was set to parameters described below. A test train was delivered and pt tolerated train. Given pt tolerance, 60 treatment trains were delivered. Pt tolerated procedure with forehead movement.      Motor Threshold Determination  Distance from nasion to inion: 35.5  MT 1: 0 - 6 - 16 @ 69% on 1/29/2018  MT 2: 0 - 6 - 16 @ 69% on 2/6/2018   MT 3: 0 - 6 - 16 @ 69% on 2/13/2018   MT 4: 0 - 6 - 16 @ 69% on 2/23/2018   MT 5: 0 - 6 - 16 @ 69% on 3/2/2018   MT 6: 0 - 5.5 - 15.5(always use intersection at left side of ruler)@ 85% on 8/23/19  MT 7: 0 - 5.5 - 15.5(always use  intersection at left side of ruler)@ 80% on 8/29/19  MT 8: 0 - 5.5 - 37.5(always use intersection at left side of ruler)@ 76% on 9/5/19 (right side using EMG on left hand)  MT 9: C2 (R side using EMG on L hand) 78% on 9/12/2019    LDLPFC:  Frequency: 50 Hz  Number of pulses:  3                        Number of bursts: 10  Burst frequency: 5 Hz  Cycle time: 10 sec  Total pulses delivered: 1800  Tx Loc: F3  Energy: 70% (90% MT)  Number of Cycles: 60 trains    Date MADRS IDS-SR PHQ-9   8/23/19 36 53 21   8/30/19 -- 48 14   9/13/19  43 14         Post-Procedure Diagnosis:  MDD, recurrent, severe F33.2    Irma Marley  [facility]  Mental [facility] Neuromodulation    Plan   -Continue TMS      I did not see patient but remained available in clinic throughout the TMS session.     Elizabeth Gordon MD  [facility] Neuromodulation

## 2019-09-17 ENCOUNTER — OFFICE VISIT (OUTPATIENT)
Dept: PSYCHIATRY | Facility: CLINIC | Age: 49
End: 2019-09-17
Payer: COMMERCIAL

## 2019-09-17 VITALS — HEART RATE: 94 BPM | SYSTOLIC BLOOD PRESSURE: 144 MMHG | DIASTOLIC BLOOD PRESSURE: 86 MMHG

## 2019-09-17 DIAGNOSIS — F33.2 SEVERE EPISODE OF RECURRENT MAJOR DEPRESSIVE DISORDER, WITHOUT PSYCHOTIC FEATURES (H): Primary | ICD-10-CM

## 2019-09-17 ASSESSMENT — PATIENT HEALTH QUESTIONNAIRE - PHQ9: SUM OF ALL RESPONSES TO PHQ QUESTIONS 1-9: 12

## 2019-09-17 NOTE — PROGRESS NOTES
[facility] TMS [facility]  [address], [address]  [place] [address]  TMS Procedure Note   Aure Joy MRN# [id number]  Age: 47 year old year old YOB: 1970    Pre-Procedure:  History and Physical: Reviewed in medical record  Consent Signed by: Aure Joy  On: 8/23/2019    Clinical Narrative:  Patient tolerating treatment.  Patient has set a goal of packing her daughter's things for 30 minutes each day. Patient feels like this has been helping her stay motivated but not overwhelmed.     Indications for TMS:  MDD, recurrent, severe; 4+ medication trials (from 2+ classes) ineffective; Psychotherapy ineffective.     Pre-Procedure Diagnosis:  MDD, recurrent, severe F33.2    Treatment Hx:  Treatment number this series: 15  Total lifetime treatment number: 51    Allergies   Allergen Reactions     Codeine Nausea     Other  [No Clinical Screening - See Comments] Nausea and Vomiting and Other (See Comments)     general anesthesia- uncontrolled vomitting      BP (!) 144/86 (BP Location: Right arm, Patient Position: Sitting, Cuff Size: Adult Regular)   Pulse 94     Pause for the Cause  Right patient:  Yes  Right procedure/correct coil:  Yes; rTMS; cpt 22986; F8 coil.   Earplugs in place:  Yes    Procedure  Patient was seated in procedure chair. Identity and procedure was verified. Ear plugs were placed in ears and patient-specific cap was placed on head and tightened appropriately. Ruler locations were verified. Coil was placed at treatment location and stimulator was set to parameters described below. A test train was delivered and pt tolerated train. Given pt tolerance, 60 treatment trains were delivered. Pt tolerated procedure with forehead movement.      Motor Threshold Determination  Distance from nasion to inion: 35.5  MT 1: 0 - 6 - 16 @ 69% on 1/29/2018  MT 2: 0 - 6 - 16 @ 69% on 2/6/2018   MT 3: 0 - 6 - 16 @ 69% on 2/13/2018   MT 4: 0 - 6 - 16 @ 69% on 2/23/2018   MT 5: 0 - 6 - 16 @ 69%  on 3/2/2018   MT 6: 0 - 5.5 - 15.5(always use intersection at left side of ruler)@ 85% on 8/23/19  MT 7: 0 - 5.5 - 15.5(always use intersection at left side of ruler)@ 80% on 8/29/19  MT 8: 0 - 5.5 - 37.5(always use intersection at left side of ruler)@ 76% on 9/5/19 (right side using EMG on left hand)  MT 9: C2 (R side using EMG on L hand) 78% on 9/12/2019    LDLPFC:  Frequency: 50 Hz  Number of pulses:  3                        Number of bursts: 10  Burst frequency: 5 Hz  Cycle time: 10 sec  Total pulses delivered: 1800  Tx Loc: F3  Energy: 70% (90% MT)  Number of Cycles: 60 trains    Date MADRS IDS-SR PHQ-9   8/23/19 36 53 21   8/30/19 -- 48 14   9/13/19  43 14         Post-Procedure Diagnosis:  MDD, recurrent, severe F33.2    Tamar Balderrama   [facility]  Mental [facility] Neuromodulation    Plan   -Continue TMS      I did not see the patient during/after treatment but remained available in the clinic during  treatment.    Evelyn Zamudio MD  [facility] Neuromodulation

## 2019-09-18 ENCOUNTER — OFFICE VISIT (OUTPATIENT)
Dept: PSYCHIATRY | Facility: CLINIC | Age: 49
End: 2019-09-18
Payer: COMMERCIAL

## 2019-09-18 VITALS — SYSTOLIC BLOOD PRESSURE: 155 MMHG | HEART RATE: 94 BPM | DIASTOLIC BLOOD PRESSURE: 105 MMHG

## 2019-09-18 DIAGNOSIS — F33.2 SEVERE EPISODE OF RECURRENT MAJOR DEPRESSIVE DISORDER, WITHOUT PSYCHOTIC FEATURES (H): Primary | ICD-10-CM

## 2019-09-18 ASSESSMENT — PATIENT HEALTH QUESTIONNAIRE - PHQ9: SUM OF ALL RESPONSES TO PHQ QUESTIONS 1-9: 13

## 2019-09-18 NOTE — PROGRESS NOTES
[facility] TMS Program  [address], [address]  [place] [address]  TMS Procedure Note   Aure Joy MRN# [id number]  Age: 47 year old year old YOB: 1970    Pre-Procedure:  History and Physical: Reviewed in medical record  Consent Signed by: Aure Joy  On: 8/23/2019    Clinical Narrative:  Patient tolerating treatment.  Patient didn't report any mood changes, although she seemed to be in good spirits.     Indications for TMS:  MDD, recurrent, severe; 4+ medication trials (from 2+ classes) ineffective; Psychotherapy ineffective.     Pre-Procedure Diagnosis:  MDD, recurrent, severe F33.2    Treatment Hx:  Treatment number this series: 16  Total lifetime treatment number: 52    Allergies   Allergen Reactions     Codeine Nausea     Other  [No Clinical Screening - See Comments] Nausea and Vomiting and Other (See Comments)     general anesthesia- uncontrolled vomitting      BP (!) 155/105 (BP Location: Right arm, Patient Position: Sitting, Cuff Size: Adult Regular)   Pulse 94     Pause for the Cause  Right patient:  Yes  Right procedure/correct coil:  Yes; rTMS; cpt 78479; F8 coil.   Earplugs in place:  Yes    Procedure  Patient was seated in procedure chair. Identity and procedure was verified. Ear plugs were placed in ears and patient-specific cap was placed on head and tightened appropriately. Ruler locations were verified. Coil was placed at treatment location and stimulator was set to parameters described below. A test train was delivered and pt tolerated train. Given pt tolerance, 60 treatment trains were delivered. Pt tolerated procedure with forehead movement.      Motor Threshold Determination  Distance from nasion to inion: 35.5  MT 1: 0 - 6 - 16 @ 69% on 1/29/2018  MT 2: 0 - 6 - 16 @ 69% on 2/6/2018   MT 3: 0 - 6 - 16 @ 69% on 2/13/2018   MT 4: 0 - 6 - 16 @ 69% on 2/23/2018   MT 5: 0 - 6 - 16 @ 69% on 3/2/2018   MT 6: 0 - 5.5 - 15.5(always use intersection at left side of  ruler)@ 85% on 8/23/19  MT 7: 0 - 5.5 - 15.5(always use intersection at left side of ruler)@ 80% on 8/29/19  MT 8: 0 - 5.5 - 37.5(always use intersection at left side of ruler)@ 76% on 9/5/19 (right side using EMG on left hand)  MT 9: C2 (R side using EMG on L hand) 78% on 9/12/2019    LDLPFC:  Frequency: 50 Hz  Number of pulses:  3                        Number of bursts: 10  Burst frequency: 5 Hz  Cycle time: 10 sec  Total pulses delivered: 1800  Tx Loc: F3  Energy: 70% (90% MT)  Number of Cycles: 60 trains    Date MADRS IDS-SR PHQ-9   8/23/19 36 53 21   8/30/19 -- 48 14   9/13/19  43 14         Post-Procedure Diagnosis:  MDD, recurrent, severe F33.2    Tamar [facility]  Mental Health Neuromodulation    Plan   -Continue TMS    I was available in the clinic throughout the treatment but did not evaluate the patient.  Diane Hassan M.D., Ph.D.     Dept. of Psychiatry   [facility]

## 2019-09-19 ENCOUNTER — OFFICE VISIT (OUTPATIENT)
Dept: PSYCHIATRY | Facility: CLINIC | Age: 49
End: 2019-09-19
Payer: COMMERCIAL

## 2019-09-19 VITALS — SYSTOLIC BLOOD PRESSURE: 108 MMHG | DIASTOLIC BLOOD PRESSURE: 81 MMHG | HEART RATE: 88 BPM

## 2019-09-19 DIAGNOSIS — F33.2 SEVERE EPISODE OF RECURRENT MAJOR DEPRESSIVE DISORDER, WITHOUT PSYCHOTIC FEATURES (H): Primary | ICD-10-CM

## 2019-09-19 ASSESSMENT — PATIENT HEALTH QUESTIONNAIRE - PHQ9: SUM OF ALL RESPONSES TO PHQ QUESTIONS 1-9: 15

## 2019-09-19 NOTE — PROGRESS NOTES
[facility] TMS [facility]  [address], [address]  [place] [address]  TMS Procedure Note   Aure Joy MRN# [id number]  Age: 47 year old year old YOB: 1970    Pre-Procedure:  History and Physical: Reviewed in medical record  Consent Signed by: Aure Joy  On: 8/23/2019    Clinical Narrative:  Patient tolerating treatment.  Patient reported having a difficult day yesterday, she tried a day treatment program then ended up not being a good fit for her.  She was disappointed about this.    Indications for TMS:  MDD, recurrent, severe; 4+ medication trials (from 2+ classes) ineffective; Psychotherapy ineffective.     Pre-Procedure Diagnosis:  MDD, recurrent, severe F33.2    Treatment Hx:  Treatment number this series: 17  Total lifetime treatment number: 53    Allergies   Allergen Reactions     Codeine Nausea     Other  [No Clinical Screening - See Comments] Nausea and Vomiting and Other (See Comments)     general anesthesia- uncontrolled vomitting      /81 (BP Location: Right arm, Patient Position: Sitting, Cuff Size: Adult Regular)   Pulse 88     Pause for the Cause  Right patient:  Yes  Right procedure/correct coil:  Yes; rTMS; cpt 17712; F8 coil.   Earplugs in place:  Yes    Procedure  Patient was seated in procedure chair. Identity and procedure was verified. Ear plugs were placed in ears and patient-specific cap was placed on head and tightened appropriately. Ruler locations were verified. Coil was placed at treatment location and stimulator was set to parameters described below. A test train was delivered and pt tolerated train. Given pt tolerance, 60 treatment trains were delivered. Pt tolerated procedure with forehead movement.      Motor Threshold Determination  Distance from nasion to inion: 35.5  MT 1: 0 - 6 - 16 @ 69% on 1/29/2018  MT 2: 0 - 6 - 16 @ 69% on 2/6/2018   MT 3: 0 - 6 - 16 @ 69% on 2/13/2018   MT 4: 0 - 6 - 16 @ 69% on 2/23/2018   MT 5: 0 - 6 - 16 @ 69% on  3/2/2018   MT 6: 0 - 5.5 - 15.5(always use intersection at left side of ruler)@ 85% on 8/23/19  MT 7: 0 - 5.5 - 15.5(always use intersection at left side of ruler)@ 80% on 8/29/19  MT 8: 0 - 5.5 - 37.5(always use intersection at left side of ruler)@ 76% on 9/5/19 (right side using EMG on left hand)  MT 9: C2 (R side using EMG on L hand) 78% on 9/12/2019    LDLPFC:  Frequency: 50 Hz  Number of pulses:  3                        Number of bursts: 10  Burst frequency: 5 Hz  Cycle time: 10 sec  Total pulses delivered: 1800  Tx Loc: F3  Energy: 70% (90% MT)  Number of Cycles: 60 trains    Date MADRS IDS-SR PHQ-9   8/23/19 36 53 21   8/30/19 -- 48 14   9/13/19  43 14         Post-Procedure Diagnosis:  MDD, recurrent, severe F33.2    Irma Marley  [facility]  Mental [facility] Neuromodulation    Plan   -Continue TMS    I did not see the patient during/after treatment but remained available in the clinic during  treatment.    Evelyn Zamudio MD  [facility] Neuromodulation

## 2019-09-20 ENCOUNTER — OFFICE VISIT (OUTPATIENT)
Dept: PSYCHIATRY | Facility: CLINIC | Age: 49
End: 2019-09-20
Payer: COMMERCIAL

## 2019-09-20 VITALS — HEART RATE: 90 BPM | SYSTOLIC BLOOD PRESSURE: 135 MMHG | DIASTOLIC BLOOD PRESSURE: 86 MMHG

## 2019-09-20 DIAGNOSIS — F33.2 SEVERE EPISODE OF RECURRENT MAJOR DEPRESSIVE DISORDER, WITHOUT PSYCHOTIC FEATURES (H): Primary | ICD-10-CM

## 2019-09-20 ASSESSMENT — PATIENT HEALTH QUESTIONNAIRE - PHQ9: SUM OF ALL RESPONSES TO PHQ QUESTIONS 1-9: 16

## 2019-09-20 NOTE — PROGRESS NOTES
[facility] TMS Program  [address]  [place] [address]  TMS Procedure Note   Aure Joy MRN# [id number]  Age: 47 year old year old YOB: 1970    Pre-Procedure:  History and Physical: Reviewed in medical record  Consent Signed by: Aure Joy  On: 8/23/2019    Clinical Narrative:  Patient tolerating treatment.  Patient reports no new changes.    Indications for TMS:  MDD, recurrent, severe; 4+ medication trials (from 2+ classes) ineffective; Psychotherapy ineffective.     Pre-Procedure Diagnosis:  MDD, recurrent, severe F33.2    Treatment Hx:  Treatment number this series: 18  Total lifetime treatment number: 54    Allergies   Allergen Reactions     Codeine Nausea     Other  [No Clinical Screening - See Comments] Nausea and Vomiting and Other (See Comments)     general anesthesia- uncontrolled vomitting      /86 (BP Location: Right arm, Patient Position: Sitting, Cuff Size: Adult Regular)   Pulse 90     Pause for the Cause  Right patient:  Yes  Right procedure/correct coil:  Yes; rTMS; cpt 89761; F8 coil.   Earplugs in place:  Yes    Procedure  Patient was seated in procedure chair. Identity and procedure was verified. Ear plugs were placed in ears and patient-specific cap was placed on head and tightened appropriately. Ruler locations were verified. Coil was placed at treatment location and stimulator was set to parameters described below. A test train was delivered and pt tolerated train. Given pt tolerance, 60 treatment trains were delivered. Pt tolerated procedure with forehead movement.      Motor Threshold Determination  Distance from nasion to inion: 35.5  MT 1: 0 - 6 - 16 @ 69% on 1/29/2018  MT 2: 0 - 6 - 16 @ 69% on 2/6/2018   MT 3: 0 - 6 - 16 @ 69% on 2/13/2018   MT 4: 0 - 6 - 16 @ 69% on 2/23/2018   MT 5: 0 - 6 - 16 @ 69% on 3/2/2018   MT 6: 0 - 5.5 - 15.5(always use intersection at left side of ruler)@ 85% on 8/23/19  MT 7: 0 - 5.5 - 15.5(always use  intersection at left side of ruler)@ 80% on 8/29/19  MT 8: 0 - 5.5 - 37.5(always use intersection at left side of ruler)@ 76% on 9/5/19 (right side using EMG on left hand)  MT 9: C2 (R side using EMG on L hand) 78% on 9/12/2019    LDLPFC:  Frequency: 50 Hz  Number of pulses:  3                        Number of bursts: 10  Burst frequency: 5 Hz  Cycle time: 10 sec  Total pulses delivered: 1800  Tx Loc: F3  Energy: 70% (90% MT)  Number of Cycles: 60 trains    Date MADRS IDS-SR PHQ-9   8/23/19 36 53 21   8/30/19 -- 48 14   9/13/19  43 14   9/20/19  43 16                           Post-Procedure Diagnosis:  MDD, recurrent, severe F33.2    Irma Marley  [facility] Neuromodulation    Plan   -Continue TMS    I did not see the patient during/after treatment but remained available in the clinic during  treatment.    Tim Williamson MD, MD, PhD  [facility] Neuromodulation

## 2019-09-23 ENCOUNTER — OFFICE VISIT (OUTPATIENT)
Dept: PSYCHIATRY | Facility: CLINIC | Age: 49
End: 2019-09-23
Payer: COMMERCIAL

## 2019-09-23 VITALS — HEART RATE: 83 BPM | SYSTOLIC BLOOD PRESSURE: 153 MMHG | DIASTOLIC BLOOD PRESSURE: 77 MMHG

## 2019-09-23 DIAGNOSIS — F33.2 SEVERE EPISODE OF RECURRENT MAJOR DEPRESSIVE DISORDER, WITHOUT PSYCHOTIC FEATURES (H): Primary | ICD-10-CM

## 2019-09-23 ASSESSMENT — PATIENT HEALTH QUESTIONNAIRE - PHQ9: SUM OF ALL RESPONSES TO PHQ QUESTIONS 1-9: 13

## 2019-09-23 NOTE — PROGRESS NOTES
[facility] TMS Program  [address], [address]  [place] [address]  TMS Procedure Note   Aure Joy MRN# [id number]  Age: 47 year old year old YOB: 1970    Pre-Procedure:  History and Physical: Reviewed in medical record  Consent Signed by: Aure Joy  On: 8/23/2019    Clinical Narrative:  Patient tolerating treatment.  Patient reports no new changes.    Indications for TMS:  MDD, recurrent, severe; 4+ medication trials (from 2+ classes) ineffective; Psychotherapy ineffective.     Pre-Procedure Diagnosis:  MDD, recurrent, severe F33.2    Treatment Hx:  Treatment number this series: 19  Total lifetime treatment number: 55    Allergies   Allergen Reactions     Codeine Nausea     Other  [No Clinical Screening - See Comments] Nausea and Vomiting and Other (See Comments)     general anesthesia- uncontrolled vomitting      BP (!) 153/77 (BP Location: Right arm, Patient Position: Sitting, Cuff Size: Adult Regular)   Pulse 83     Pause for the Cause  Right patient:  Yes  Right procedure/correct coil:  Yes; rTMS; cpt 54697; F8 coil.   Earplugs in place:  Yes    Procedure  Patient was seated in procedure chair. Identity and procedure was verified. Ear plugs were placed in ears and patient-specific cap was placed on head and tightened appropriately. Ruler locations were verified. Coil was placed at treatment location and stimulator was set to parameters described below. A test train was delivered and pt tolerated train. Given pt tolerance, 60 treatment trains were delivered. Pt tolerated procedure with forehead movement.      Motor Threshold Determination  Distance from nasion to inion: 35.5  MT 1: 0 - 6 - 16 @ 69% on 1/29/2018  MT 2: 0 - 6 - 16 @ 69% on 2/6/2018   MT 3: 0 - 6 - 16 @ 69% on 2/13/2018   MT 4: 0 - 6 - 16 @ 69% on 2/23/2018   MT 5: 0 - 6 - 16 @ 69% on 3/2/2018   MT 6: 0 - 5.5 - 15.5(always use intersection at left side of ruler)@ 85% on 8/23/19  MT 7: 0 - 5.5 - 15.5(always use  intersection at left side of ruler)@ 80% on 8/29/19  MT 8: 0 - 5.5 - 37.5(always use intersection at left side of ruler)@ 76% on 9/5/19 (right side using EMG on left hand)  MT 9: C2 (R side using EMG on L hand) 78% on 9/12/2019    LDLPFC:  Frequency: 50 Hz  Number of pulses:  3                        Number of bursts: 10  Burst frequency: 5 Hz  Cycle time: 10 sec  Total pulses delivered: 1800  Tx Loc: F3  Energy: 70% (90% MT)  Number of Cycles: 60 trains    Date MADRS IDS-SR PHQ-9   8/23/19 36 53 21   8/30/19 -- 48 14   9/13/19  43 14   9/20/19  43 16                           Post-Procedure Diagnosis:  MDD, recurrent, severe F33.2    Irma Marley  [facility]  Mental [facility] Neuromodulation    Plan   -Continue TMS    I did not see patient but remained available at the clinic throughout the TMS session    Elizabeth Gordon MD  [facility] Neuromodulation

## 2019-09-24 ENCOUNTER — OFFICE VISIT (OUTPATIENT)
Dept: PSYCHIATRY | Facility: CLINIC | Age: 49
End: 2019-09-24
Payer: COMMERCIAL

## 2019-09-24 VITALS — DIASTOLIC BLOOD PRESSURE: 99 MMHG | SYSTOLIC BLOOD PRESSURE: 138 MMHG | HEART RATE: 102 BPM

## 2019-09-24 DIAGNOSIS — F33.2 SEVERE EPISODE OF RECURRENT MAJOR DEPRESSIVE DISORDER, WITHOUT PSYCHOTIC FEATURES (H): Primary | ICD-10-CM

## 2019-09-24 ASSESSMENT — PATIENT HEALTH QUESTIONNAIRE - PHQ9: SUM OF ALL RESPONSES TO PHQ QUESTIONS 1-9: 11

## 2019-09-24 NOTE — PROGRESS NOTES
" [facility] TMS Program  [address], [address]  [place] [address]  TMS Procedure Note   Aure Joy MRN# [id number]  Age: 47 year old year old YOB: 1970    Pre-Procedure:  History and Physical: Reviewed in medical record  Consent Signed by: Aure Joy  On: 8/23/2019    Clinical Narrative:  Patient tolerating treatment.  Patient is unsure if she's actually doing better or if she's still in her \"honeymoon\" stage. Patient states she's doing well right now.     Indications for TMS:  MDD, recurrent, severe; 4+ medication trials (from 2+ classes) ineffective; Psychotherapy ineffective.     Pre-Procedure Diagnosis:  MDD, recurrent, severe F33.2    Treatment Hx:  Treatment number this series: 20  Total lifetime treatment number: 56    Allergies   Allergen Reactions     Codeine Nausea     Other  [No Clinical Screening - See Comments] Nausea and Vomiting and Other (See Comments)     general anesthesia- uncontrolled vomitting      BP (!) 138/99 (BP Location: Right arm, Patient Position: Sitting, Cuff Size: Adult Regular)   Pulse 102     Pause for the Cause  Right patient:  Yes  Right procedure/correct coil:  Yes; rTMS; cpt 36639; F8 coil.   Earplugs in place:  Yes    Procedure  Patient was seated in procedure chair. Identity and procedure was verified. Ear plugs were placed in ears and patient-specific cap was placed on head and tightened appropriately. Ruler locations were verified. Coil was placed at treatment location and stimulator was set to parameters described below. A test train was delivered and pt tolerated train. Given pt tolerance, 60 treatment trains were delivered. Pt tolerated procedure with forehead movement.      Motor Threshold Determination  Distance from nasion to inion: 35.5  MT 1: 0 - 6 - 16 @ 69% on 1/29/2018  MT 2: 0 - 6 - 16 @ 69% on 2/6/2018   MT 3: 0 - 6 - 16 @ 69% on 2/13/2018   MT 4: 0 - 6 - 16 @ 69% on 2/23/2018   MT 5: 0 - 6 - 16 @ 69% on 3/2/2018   MT 6: 0 - " 5.5 - 15.5(always use intersection at left side of ruler)@ 85% on 8/23/19  MT 7: 0 - 5.5 - 15.5(always use intersection at left side of ruler)@ 80% on 8/29/19  MT 8: 0 - 5.5 - 37.5(always use intersection at left side of ruler)@ 76% on 9/5/19 (right side using EMG on left hand)  MT 9: C2 (R side using EMG on L hand) 78% on 9/12/2019    LDLPFC:  Frequency: 50 Hz  Number of pulses:  3                        Number of bursts: 10  Burst frequency: 5 Hz  Cycle time: 10 sec  Total pulses delivered: 1800  Tx Loc: F3  Energy: 70% (90% MT)  Number of Cycles: 60 trains    Date MADRS IDS-SR PHQ-9   8/23/19 36 53 21   8/30/19 -- 48 14   9/13/19  43 14   9/20/19  43 16                           Post-Procedure Diagnosis:  MDD, recurrent, severe F33.2    Tamar Balderrama  [facility]  Mental [facility] Neuromodulation    Plan   -Continue TMS    I did not see patient but remained available in the clinic throughout the TMS session    Elizabeth Gordon MD  [facility] Neuromodulation

## 2019-09-25 ENCOUNTER — OFFICE VISIT (OUTPATIENT)
Dept: PSYCHIATRY | Facility: CLINIC | Age: 49
End: 2019-09-25
Payer: COMMERCIAL

## 2019-09-25 VITALS — HEART RATE: 85 BPM | DIASTOLIC BLOOD PRESSURE: 90 MMHG | SYSTOLIC BLOOD PRESSURE: 137 MMHG

## 2019-09-25 DIAGNOSIS — F33.2 SEVERE EPISODE OF RECURRENT MAJOR DEPRESSIVE DISORDER, WITHOUT PSYCHOTIC FEATURES (H): Primary | ICD-10-CM

## 2019-09-25 ASSESSMENT — PATIENT HEALTH QUESTIONNAIRE - PHQ9: SUM OF ALL RESPONSES TO PHQ QUESTIONS 1-9: 9

## 2019-09-25 NOTE — PROGRESS NOTES
[facility] TMS Program  [address], [address]  [place] [address]  TMS Procedure Note   Aure Joy MRN# [id number]  Age: 47 year old year old YOB: 1970    Pre-Procedure:  History and Physical: Reviewed in medical record  Consent Signed by: Aure Joy  On: 8/23/2019    Clinical Narrative:  Patient tolerating treatment.  Patient states that she feels good today, she was able to get a lot of household chores done.     Indications for TMS:  MDD, recurrent, severe; 4+ medication trials (from 2+ classes) ineffective; Psychotherapy ineffective.     Pre-Procedure Diagnosis:  MDD, recurrent, severe F33.2    Treatment Hx:  Treatment number this series: 21  Total lifetime treatment number: 57    Allergies   Allergen Reactions     Codeine Nausea     Other  [No Clinical Screening - See Comments] Nausea and Vomiting and Other (See Comments)     general anesthesia- uncontrolled vomitting      BP (!) 137/90 (BP Location: Right arm, Patient Position: Sitting, Cuff Size: Adult Regular)   Pulse 85     Pause for the Cause  Right patient:  Yes  Right procedure/correct coil:  Yes; rTMS; cpt 38589; F8 coil.   Earplugs in place:  Yes    Procedure  Patient was seated in procedure chair. Identity and procedure was verified. Ear plugs were placed in ears and patient-specific cap was placed on head and tightened appropriately. Ruler locations were verified. Coil was placed at treatment location and stimulator was set to parameters described below. A test train was delivered and pt tolerated train. Given pt tolerance, 60 treatment trains were delivered. Pt tolerated procedure with forehead movement.      Motor Threshold Determination  Distance from nasion to inion: 35.5  MT 1: 0 - 6 - 16 @ 69% on 1/29/2018  MT 2: 0 - 6 - 16 @ 69% on 2/6/2018   MT 3: 0 - 6 - 16 @ 69% on 2/13/2018   MT 4: 0 - 6 - 16 @ 69% on 2/23/2018   MT 5: 0 - 6 - 16 @ 69% on 3/2/2018   MT 6: 0 - 5.5 - 15.5(always use intersection at left  side of ruler)@ 85% on 8/23/19  MT 7: 0 - 5.5 - 15.5(always use intersection at left side of ruler)@ 80% on 8/29/19  MT 8: 0 - 5.5 - 37.5(always use intersection at left side of ruler)@ 76% on 9/5/19 (right side using EMG on left hand)  MT 9: C2 (R side using EMG on L hand) 78% on 9/12/2019    LDLPFC:  Frequency: 50 Hz  Number of pulses:  3                        Number of bursts: 10  Burst frequency: 5 Hz  Cycle time: 10 sec  Total pulses delivered: 1800  Tx Loc: F3  Energy: 70% (90% MT)  Number of Cycles: 60 trains    Date MADRS IDS-SR PHQ-9   8/23/19 36 53 21   8/30/19 -- 48 14   9/13/19  43 14   9/20/19  43 16                           Post-Procedure Diagnosis:  MDD, recurrent, severe F33.2    [facility]  Mental Health Neuromodulation    Plan   -Continue TMS    I was available in the clinic throughout the treatment but did not evaluate the patient.  Diane Hassan M.D., Ph.D.     Dept. of Psychiatry   [facility]

## 2019-09-26 ENCOUNTER — OFFICE VISIT (OUTPATIENT)
Dept: PSYCHIATRY | Facility: CLINIC | Age: 49
End: 2019-09-26
Payer: COMMERCIAL

## 2019-09-26 VITALS — SYSTOLIC BLOOD PRESSURE: 130 MMHG | DIASTOLIC BLOOD PRESSURE: 89 MMHG | HEART RATE: 62 BPM

## 2019-09-26 DIAGNOSIS — F33.2 SEVERE EPISODE OF RECURRENT MAJOR DEPRESSIVE DISORDER, WITHOUT PSYCHOTIC FEATURES (H): Primary | ICD-10-CM

## 2019-09-26 ASSESSMENT — PATIENT HEALTH QUESTIONNAIRE - PHQ9: SUM OF ALL RESPONSES TO PHQ QUESTIONS 1-9: 9

## 2019-09-26 NOTE — PROGRESS NOTES
[facility] TMS Program  [address], [address]  [place] [address]  TMS Procedure Note   Aure Joy MRN# [id number]  Age: 47 year old year old YOB: 1970    Pre-Procedure:  History and Physical: Reviewed in medical record  Consent Signed by: Aure Joy  On: 8/23/2019    Clinical Narrative:  Patient tolerating treatment.  Patient states that she feels good today.      Indications for TMS:  MDD, recurrent, severe; 4+ medication trials (from 2+ classes) ineffective; Psychotherapy ineffective.     Pre-Procedure Diagnosis:  MDD, recurrent, severe F33.2    Treatment Hx:  Treatment number this series: 22  Total lifetime treatment number: 58    Allergies   Allergen Reactions     Codeine Nausea     Other  [No Clinical Screening - See Comments] Nausea and Vomiting and Other (See Comments)     general anesthesia- uncontrolled vomitting      /89 (BP Location: Right arm, Patient Position: Sitting, Cuff Size: Adult Regular)   Pulse 62     Pause for the Cause  Right patient:  Yes  Right procedure/correct coil:  Yes; rTMS; cpt 48234; F8 coil.   Earplugs in place:  Yes    Procedure  Patient was seated in procedure chair. Identity and procedure was verified. Ear plugs were placed in ears and patient-specific cap was placed on head and tightened appropriately. Ruler locations were verified. Coil was placed at initial MT location and stimulator was set to initial MT. MT was retested and found as described below. Coil was placed at treatment location and stimulator was set to stimulation parameters detailed below. A test train was delivered and pt tolerated train fine. Given pt tolerance, 60 trains were delivered. Pt tolerated procedure with forehead movement.        Motor Threshold Determination  Distance from nasion to inion: 35.5  MT 1: 0 - 6 - 16 @ 69% on 1/29/2018  MT 2: 0 - 6 - 16 @ 69% on 2/6/2018   MT 3: 0 - 6 - 16 @ 69% on 2/13/2018   MT 4: 0 - 6 - 16 @ 69% on 2/23/2018   MT 5: 0 - 6 -  16 @ 69% on 3/2/2018   MT 6: 0 - 5.5 - 15.5(always use intersection at left side of ruler)@ 85% on 8/23/19  MT 7: 0 - 5.5 - 15.5(always use intersection at left side of ruler)@ 80% on 8/29/19  MT 8: 0 - 5.5 - 37.5(always use intersection at left side of ruler)@ 76% on 9/5/19 (right side using EMG on left hand)  MT 9: C2 (R side using EMG on L hand) 78% on 9/12/2019  MT 10: C2 (R side using EMG on L hand) 76% on 9/26/2019    LDLPFC:  Frequency: 50 Hz  Number of pulses:  3                        Number of bursts: 10  Burst frequency: 5 Hz  Cycle time: 10 sec  Total pulses delivered: 1800  Tx Loc: F3  Energy: 68% (90% MT)  Number of Cycles: 60 trains    Date MADRS IDS-SR PHQ-9   8/23/19 36 53 21   8/30/19 -- 48 14   9/13/19  43 14   9/20/19  43 16                           Post-Procedure Diagnosis:  MDD, recurrent, severe F33.2    Irma Marley  [facility] Neuromodulation    Plan   -Continue TMS      I completed repeat determination of the pt's motor threshold during the visit today. I was available in the clinic throughout the treatment.    Evelyn Zamudio MD  [facility] Neuromodulation

## 2019-09-27 ENCOUNTER — OFFICE VISIT (OUTPATIENT)
Dept: PSYCHIATRY | Facility: CLINIC | Age: 49
End: 2019-09-27
Payer: COMMERCIAL

## 2019-09-27 VITALS — SYSTOLIC BLOOD PRESSURE: 135 MMHG | DIASTOLIC BLOOD PRESSURE: 91 MMHG | HEART RATE: 72 BPM

## 2019-09-27 DIAGNOSIS — F33.2 SEVERE EPISODE OF RECURRENT MAJOR DEPRESSIVE DISORDER, WITHOUT PSYCHOTIC FEATURES (H): Primary | ICD-10-CM

## 2019-09-27 ASSESSMENT — PATIENT HEALTH QUESTIONNAIRE - PHQ9: SUM OF ALL RESPONSES TO PHQ QUESTIONS 1-9: 9

## 2019-09-27 NOTE — PROGRESS NOTES
[facility] TMS Program  [address], [address]  [place] [address]  TMS Procedure Note   Aure Joy MRN# [id number]  Age: 47 year old year old YOB: 1970    Pre-Procedure:  History and Physical: Reviewed in medical record  Consent Signed by: Aure Joy  On: 8/23/2019    Clinical Narrative:  Patient tolerating treatment.  Patient states that she feels good today.      Indications for TMS:  MDD, recurrent, severe; 4+ medication trials (from 2+ classes) ineffective; Psychotherapy ineffective.     Pre-Procedure Diagnosis:  MDD, recurrent, severe F33.2    Treatment Hx:  Treatment number this series: 23  Total lifetime treatment number: 59    Allergies   Allergen Reactions     Codeine Nausea     Other  [No Clinical Screening - See Comments] Nausea and Vomiting and Other (See Comments)     general anesthesia- uncontrolled vomitting      BP (!) 135/91 (BP Location: Right arm, Patient Position: Sitting, Cuff Size: Adult Regular)   Pulse 72     Pause for the Cause  Right patient:  Yes  Right procedure/correct coil:  Yes; rTMS; cpt 78370; F8 coil.   Earplugs in place:  Yes    Procedure  Patient was seated in procedure chair. Identity and procedure was verified. Ear plugs were placed in ears and patient-specific cap was placed on head and tightened appropriately. Ruler locations were verified. Coil was placed at treatment location and stimulator was set to parameters described below. A test train was delivered and pt tolerated train. Given pt tolerance, 60 treatment trains were delivered. Pt tolerated procedure with forehead movement.        Motor Threshold Determination  Distance from nasion to inion: 35.5  MT 1: 0 - 6 - 16 @ 69% on 1/29/2018  MT 2: 0 - 6 - 16 @ 69% on 2/6/2018   MT 3: 0 - 6 - 16 @ 69% on 2/13/2018   MT 4: 0 - 6 - 16 @ 69% on 2/23/2018   MT 5: 0 - 6 - 16 @ 69% on 3/2/2018   MT 6: 0 - 5.5 - 15.5(always use intersection at left side of ruler)@ 85% on 8/23/19  MT 7: 0 - 5.5 -  15.5(always use intersection at left side of ruler)@ 80% on 8/29/19  MT 8: 0 - 5.5 - 37.5(always use intersection at left side of ruler)@ 76% on 9/5/19 (right side using EMG on left hand)  MT 9: C2 (R side using EMG on L hand) 78% on 9/12/2019  MT 10: C2 (R side using EMG on L hand) 76% on 9/26/2019    LDLPFC:  Frequency: 50 Hz  Number of pulses:  3                        Number of bursts: 10  Burst frequency: 5 Hz  Cycle time: 10 sec  Total pulses delivered: 1800  Tx Loc: F3  Energy: 68% (90% MT)  Number of Cycles: 60 trains    Date MADRS IDS-SR PHQ-9   8/23/19 36 53 21   8/30/19 -- 48 14   9/13/19  43 14   9/20/19  43 16   9/27/19  34 9                     Post-Procedure Diagnosis:  MDD, recurrent, severe F33.2    Irma Marley  [facility] Neuromodulation    Plan   -Continue TMS      I did not see the patient during/after treatment but remained available in the clinic during  treatment.    Tim Williamson MD, MD, PhD  [facility] Neuromodulation

## 2019-10-01 ENCOUNTER — OFFICE VISIT (OUTPATIENT)
Dept: PSYCHIATRY | Facility: CLINIC | Age: 49
End: 2019-10-01
Payer: COMMERCIAL

## 2019-10-01 VITALS — DIASTOLIC BLOOD PRESSURE: 106 MMHG | SYSTOLIC BLOOD PRESSURE: 156 MMHG | HEART RATE: 93 BPM

## 2019-10-01 DIAGNOSIS — F33.2 SEVERE EPISODE OF RECURRENT MAJOR DEPRESSIVE DISORDER, WITHOUT PSYCHOTIC FEATURES (H): Primary | ICD-10-CM

## 2019-10-01 ASSESSMENT — PATIENT HEALTH QUESTIONNAIRE - PHQ9: SUM OF ALL RESPONSES TO PHQ QUESTIONS 1-9: 11

## 2019-10-01 NOTE — PROGRESS NOTES
[facility] TMS Program  [address], [address]  [place] [address]  TMS Procedure Note   Aure Joy MRN# [id number]  Age: 47 year old year old YOB: 1970    Pre-Procedure:  History and Physical: Reviewed in medical record  Consent Signed by: Aure Joy  On: 8/23/2019    Clinical Narrative:  Patient tolerating treatment.  Patient is doing well.     Indications for TMS:  MDD, recurrent, severe; 4+ medication trials (from 2+ classes) ineffective; Psychotherapy ineffective.     Pre-Procedure Diagnosis:  MDD, recurrent, severe F33.2    Treatment Hx:  Treatment number this series: 24  Total lifetime treatment number: 60     Allergies   Allergen Reactions     Codeine Nausea     Other  [No Clinical Screening - See Comments] Nausea and Vomiting and Other (See Comments)     general anesthesia- uncontrolled vomitting      BP (!) 156/106 (BP Location: Right arm, Patient Position: Sitting, Cuff Size: Adult Regular)   Pulse 93     Pause for the Cause  Right patient:  Yes  Right procedure/correct coil:  Yes; rTMS; cpt 38703; F8 coil.   Earplugs in place:  Yes    Procedure  Patient was seated in procedure chair. Identity and procedure was verified. Ear plugs were placed in ears and patient-specific cap was placed on head and tightened appropriately. Ruler locations were verified. Coil was placed at treatment location and stimulator was set to parameters described below. A test train was delivered and pt tolerated train. Given pt tolerance, 60 treatment trains were delivered. Pt tolerated procedure with forehead movement.        Motor Threshold Determination  Distance from nasion to inion: 35.5  MT 1: 0 - 6 - 16 @ 69% on 1/29/2018  MT 2: 0 - 6 - 16 @ 69% on 2/6/2018   MT 3: 0 - 6 - 16 @ 69% on 2/13/2018   MT 4: 0 - 6 - 16 @ 69% on 2/23/2018   MT 5: 0 - 6 - 16 @ 69% on 3/2/2018   MT 6: 0 - 5.5 - 15.5(always use intersection at left side of ruler)@ 85% on 8/23/19  MT 7: 0 - 5.5 - 15.5(always use  intersection at left side of ruler)@ 80% on 8/29/19  MT 8: 0 - 5.5 - 37.5(always use intersection at left side of ruler)@ 76% on 9/5/19 (right side using EMG on left hand)  MT 9: C2 (R side using EMG on L hand) 78% on 9/12/2019  MT 10: C2 (R side using EMG on L hand) 76% on 9/26/2019    LDLPFC:  Frequency: 50 Hz  Number of pulses:  3                        Number of bursts: 10  Burst frequency: 5 Hz  Cycle time: 10 sec  Total pulses delivered: 1800  Tx Loc: F3  Energy: 68% (90% MT)  Number of Cycles: 60 trains    Date MADRS IDS-SR PHQ-9   8/23/19 36 53 21   8/30/19 -- 48 14   9/13/19  43 14   9/20/19  43 16   9/27/19  34 9                     Post-Procedure Diagnosis:  MDD, recurrent, severe F33.2    Tamar Balderrama  [facility] Neuromodulation    Plan   -Continue TMS      I did not see the patient during/after treatment but remained available in the clinic during  treatment.    Evelyn Zamudio MD  [facility] Neuromodulation

## 2019-10-02 ENCOUNTER — OFFICE VISIT (OUTPATIENT)
Dept: PSYCHIATRY | Facility: CLINIC | Age: 49
End: 2019-10-02
Payer: COMMERCIAL

## 2019-10-02 VITALS — SYSTOLIC BLOOD PRESSURE: 158 MMHG | HEART RATE: 96 BPM | DIASTOLIC BLOOD PRESSURE: 103 MMHG

## 2019-10-02 DIAGNOSIS — F33.2 SEVERE EPISODE OF RECURRENT MAJOR DEPRESSIVE DISORDER, WITHOUT PSYCHOTIC FEATURES (H): ICD-10-CM

## 2019-10-02 DIAGNOSIS — F33.2 SEVERE EPISODE OF RECURRENT MAJOR DEPRESSIVE DISORDER, WITHOUT PSYCHOTIC FEATURES (H): Primary | ICD-10-CM

## 2019-10-02 ASSESSMENT — PATIENT HEALTH QUESTIONNAIRE - PHQ9: SUM OF ALL RESPONSES TO PHQ QUESTIONS 1-9: 9

## 2019-10-02 NOTE — PROGRESS NOTES
[facility] TMS Program  [address], [address]  [place] [address]  TMS Procedure Note   Aure Joy MRN# [id number]  Age: 47 year old year old YOB: 1970    Pre-Procedure:  History and Physical: Reviewed in medical record  Consent Signed by: Aure Joy  On: 8/23/2019    Clinical Narrative:  Patient tolerating treatment.  Patient is doing well.     Indications for TMS:  MDD, recurrent, severe; 4+ medication trials (from 2+ classes) ineffective; Psychotherapy ineffective.     Pre-Procedure Diagnosis:  MDD, recurrent, severe F33.2    Treatment Hx:  Treatment number this series: 25  Total lifetime treatment number: 61    Allergies   Allergen Reactions     Codeine Nausea     Other  [No Clinical Screening - See Comments] Nausea and Vomiting and Other (See Comments)     general anesthesia- uncontrolled vomitting      BP (!) 158/103 (BP Location: Right arm, Patient Position: Sitting, Cuff Size: Adult Regular)   Pulse 96     Pause for the Cause  Right patient:  Yes  Right procedure/correct coil:  Yes; rTMS; cpt 47805; F8 coil.   Earplugs in place:  Yes    Procedure  Patient was seated in procedure chair. Identity and procedure was verified. Ear plugs were placed in ears and patient-specific cap was placed on head and tightened appropriately. Ruler locations were verified. Coil was placed at treatment location and stimulator was set to parameters described below. A test train was delivered and pt tolerated train. Given pt tolerance, 60 treatment trains were delivered. Pt tolerated procedure with forehead movement.        Motor Threshold Determination  Distance from nasion to inion: 35.5  MT 1: 0 - 6 - 16 @ 69% on 1/29/2018  MT 2: 0 - 6 - 16 @ 69% on 2/6/2018   MT 3: 0 - 6 - 16 @ 69% on 2/13/2018   MT 4: 0 - 6 - 16 @ 69% on 2/23/2018   MT 5: 0 - 6 - 16 @ 69% on 3/2/2018   MT 6: 0 - 5.5 - 15.5(always use intersection at left side of ruler)@ 85% on 8/23/19  MT 7: 0 - 5.5 - 15.5(always use  intersection at left side of ruler)@ 80% on 8/29/19  MT 8: 0 - 5.5 - 37.5(always use intersection at left side of ruler)@ 76% on 9/5/19 (right side using EMG on left hand)  MT 9: C2 (R side using EMG on L hand) 78% on 9/12/2019  MT 10: C2 (R side using EMG on L hand) 76% on 9/26/2019    LDLPFC:  Frequency: 50 Hz  Number of pulses:  3                        Number of bursts: 10  Burst frequency: 5 Hz  Cycle time: 10 sec  Total pulses delivered: 1800  Tx Loc: F3  Energy: 68% (90% MT)  Number of Cycles: 60 trains    Date MADRS IDS-SR PHQ-9   8/23/19 36 53 21   8/30/19 -- 48 14   9/13/19  43 14   9/20/19  43 16   9/27/19  34 9                     Post-Procedure Diagnosis:  MDD, recurrent, severe F33.2    [facility]  Mental Health Neuromodulation    Plan   -Continue TMS    I was available in the clinic throughout the treatment but did not evaluate the patient.  Diane Hassan M.D., Ph.D.     Dept. of Psychiatry   [facility]

## 2019-10-03 ENCOUNTER — OFFICE VISIT (OUTPATIENT)
Dept: PSYCHIATRY | Facility: CLINIC | Age: 49
End: 2019-10-03
Payer: COMMERCIAL

## 2019-10-03 VITALS — HEART RATE: 84 BPM | DIASTOLIC BLOOD PRESSURE: 83 MMHG | SYSTOLIC BLOOD PRESSURE: 128 MMHG

## 2019-10-03 DIAGNOSIS — F33.2 SEVERE EPISODE OF RECURRENT MAJOR DEPRESSIVE DISORDER, WITHOUT PSYCHOTIC FEATURES (H): Primary | ICD-10-CM

## 2019-10-03 ASSESSMENT — PATIENT HEALTH QUESTIONNAIRE - PHQ9: SUM OF ALL RESPONSES TO PHQ QUESTIONS 1-9: 10

## 2019-10-03 NOTE — PROGRESS NOTES
[facility] TMS Program  [address], [address]  [place] [address]  TMS Procedure Note   Aure Joy MRN# [id number]  Age: 47 year old year old YOB: 1970    Pre-Procedure:  History and Physical: Reviewed in medical record  Consent Signed by: Aure Joy  On: 8/23/2019    Clinical Narrative:  Patient tolerating treatment.  Patient is doing well.     Indications for TMS:  MDD, recurrent, severe; 4+ medication trials (from 2+ classes) ineffective; Psychotherapy ineffective.     Pre-Procedure Diagnosis:  MDD, recurrent, severe F33.2    Treatment Hx:  Treatment number this series: 26  Total lifetime treatment number: 62    Allergies   Allergen Reactions     Codeine Nausea     Other  [No Clinical Screening - See Comments] Nausea and Vomiting and Other (See Comments)     general anesthesia- uncontrolled vomitting      /83 (BP Location: Right arm, Patient Position: Sitting, Cuff Size: Adult Regular)   Pulse 84     Pause for the Cause  Right patient:  Yes  Right procedure/correct coil:  Yes; rTMS; cpt 82495; F8 coil.   Earplugs in place:  Yes    Procedure  Patient was seated in procedure chair. Identity and procedure was verified. Ear plugs were placed in ears and patient-specific cap was placed on head and tightened appropriately. Ruler locations were verified. Coil was placed at treatment location and stimulator was set to parameters described below. A test train was delivered and pt tolerated train. Given pt tolerance, 60 treatment trains were delivered. Pt tolerated procedure with forehead movement.        Motor Threshold Determination  Distance from nasion to inion: 35.5  MT 1: 0 - 6 - 16 @ 69% on 1/29/2018  MT 2: 0 - 6 - 16 @ 69% on 2/6/2018   MT 3: 0 - 6 - 16 @ 69% on 2/13/2018   MT 4: 0 - 6 - 16 @ 69% on 2/23/2018   MT 5: 0 - 6 - 16 @ 69% on 3/2/2018   MT 6: 0 - 5.5 - 15.5(always use intersection at left side of ruler)@ 85% on 8/23/19  MT 7: 0 - 5.5 - 15.5(always use  intersection at left side of ruler)@ 80% on 8/29/19  MT 8: 0 - 5.5 - 37.5(always use intersection at left side of ruler)@ 76% on 9/5/19 (right side using EMG on left hand)  MT 9: C2 (R side using EMG on L hand) 78% on 9/12/2019  MT 10: C2 (R side using EMG on L hand) 76% on 9/26/2019    LDLPFC:  Frequency: 50 Hz  Number of pulses:  3                        Number of bursts: 10  Burst frequency: 5 Hz  Cycle time: 10 sec  Total pulses delivered: 1800  Tx Loc: F3  Energy: 68% (90% MT)  Number of Cycles: 60 trains    Date MADRS IDS-SR PHQ-9   8/23/19 36 53 21   8/30/19 -- 48 14   9/13/19  43 14   9/20/19  43 16   9/27/19  34 9                     Post-Procedure Diagnosis:  MDD, recurrent, severe F33.2    Irma Marley  [facility]  Mental [facility] Neuromodulation    Plan   -Continue TMS    I did not see the patient during/after treatment but remained available in the clinic during  treatment.    Evelyn Zamudio MD  [facility] Neuromodulation,[facility]

## 2019-10-04 ENCOUNTER — HEALTH MAINTENANCE LETTER (OUTPATIENT)
Age: 49
End: 2019-10-04

## 2019-10-04 RX ORDER — LAMOTRIGINE 100 MG/1
100 TABLET ORAL DAILY
Qty: 30 TABLET | Refills: 0 | Status: SHIPPED | OUTPATIENT
Start: 2019-10-04 | End: 2019-10-23

## 2019-10-04 NOTE — TELEPHONE ENCOUNTER
Last seen: 9/10/19  RTC: 1 month  Cancel: none  No-show: none  Next appt: 10/15/19    Incoming refill from Pharmacy via interface    Medication requested: Lamotrigine 100 mg     Directions: Take 1 tablet by mouth daily   Qty: 30  Last refilled: 8/15/19    Medication refill approved per refill protocol

## 2019-10-07 ENCOUNTER — OFFICE VISIT (OUTPATIENT)
Dept: PSYCHIATRY | Facility: CLINIC | Age: 49
End: 2019-10-07
Payer: COMMERCIAL

## 2019-10-07 VITALS — HEART RATE: 93 BPM | SYSTOLIC BLOOD PRESSURE: 129 MMHG | DIASTOLIC BLOOD PRESSURE: 83 MMHG

## 2019-10-07 DIAGNOSIS — F33.2 SEVERE EPISODE OF RECURRENT MAJOR DEPRESSIVE DISORDER, WITHOUT PSYCHOTIC FEATURES (H): Primary | ICD-10-CM

## 2019-10-07 ASSESSMENT — PATIENT HEALTH QUESTIONNAIRE - PHQ9: SUM OF ALL RESPONSES TO PHQ QUESTIONS 1-9: 7

## 2019-10-07 NOTE — PROGRESS NOTES
[facility] TMS Program  [address], [address]  [place] [address]  TMS Procedure Note   Aure Joy MRN# [id number]  Age: 47 year old year old YOB: 1970    Pre-Procedure:  History and Physical: Reviewed in medical record  Consent Signed by: Aure Joy  On: 8/23/2019    Clinical Narrative:  Patient tolerating treatment.  Patient is doing well.     Indications for TMS:  MDD, recurrent, severe; 4+ medication trials (from 2+ classes) ineffective; Psychotherapy ineffective.     Pre-Procedure Diagnosis:  MDD, recurrent, severe F33.2    Treatment Hx:  Treatment number this series: 27  Total lifetime treatment number: 63    Allergies   Allergen Reactions     Codeine Nausea     Other  [No Clinical Screening - See Comments] Nausea and Vomiting and Other (See Comments)     general anesthesia- uncontrolled vomitting      /83 (BP Location: Right arm, Patient Position: Sitting, Cuff Size: Adult Regular)   Pulse 93     Pause for the Cause  Right patient:  Yes  Right procedure/correct coil:  Yes; rTMS; cpt 73217; F8 coil.   Earplugs in place:  Yes    Procedure  Patient was seated in procedure chair. Identity and procedure was verified. Ear plugs were placed in ears and patient-specific cap was placed on head and tightened appropriately. Ruler locations were verified. Coil was placed at treatment location and stimulator was set to parameters described below. A test train was delivered and pt tolerated train. Given pt tolerance, 60 treatment trains were delivered. Pt tolerated procedure with forehead movement.        Motor Threshold Determination  Distance from nasion to inion: 35.5  MT 1: 0 - 6 - 16 @ 69% on 1/29/2018  MT 2: 0 - 6 - 16 @ 69% on 2/6/2018   MT 3: 0 - 6 - 16 @ 69% on 2/13/2018   MT 4: 0 - 6 - 16 @ 69% on 2/23/2018   MT 5: 0 - 6 - 16 @ 69% on 3/2/2018   MT 6: 0 - 5.5 - 15.5(always use intersection at left side of ruler)@ 85% on 8/23/19  MT 7: 0 - 5.5 - 15.5(always use  intersection at left side of ruler)@ 80% on 8/29/19  MT 8: 0 - 5.5 - 37.5(always use intersection at left side of ruler)@ 76% on 9/5/19 (right side using EMG on left hand)  MT 9: C2 (R side using EMG on L hand) 78% on 9/12/2019  MT 10: C2 (R side using EMG on L hand) 76% on 9/26/2019    LDLPFC:  Frequency: 50 Hz  Number of pulses:  3                        Number of bursts: 10  Burst frequency: 5 Hz  Cycle time: 10 sec  Total pulses delivered: 1800  Tx Loc: F3  Energy: 68% (90% MT)  Number of Cycles: 60 trains    Date MADRS IDS-SR PHQ-9   8/23/19 36 53 21   8/30/19 -- 48 14   9/13/19  43 14   9/20/19  43 16   9/27/19  34 9                     Post-Procedure Diagnosis:  MDD, recurrent, severe F33.2    Irma Marley  [facility]  Mental [facility] Neuromodulation    Plan   -Continue TMS    I did not see patient but remained available at the clinic throughout the TMS session    Elizabeth Gordon MD  [facility] Neuromodulation,

## 2019-10-08 ENCOUNTER — OFFICE VISIT (OUTPATIENT)
Dept: PSYCHIATRY | Facility: CLINIC | Age: 49
End: 2019-10-08
Payer: COMMERCIAL

## 2019-10-08 VITALS — SYSTOLIC BLOOD PRESSURE: 161 MMHG | HEART RATE: 98 BPM | DIASTOLIC BLOOD PRESSURE: 93 MMHG

## 2019-10-08 DIAGNOSIS — F33.2 SEVERE EPISODE OF RECURRENT MAJOR DEPRESSIVE DISORDER, WITHOUT PSYCHOTIC FEATURES (H): Primary | ICD-10-CM

## 2019-10-08 ASSESSMENT — PATIENT HEALTH QUESTIONNAIRE - PHQ9: SUM OF ALL RESPONSES TO PHQ QUESTIONS 1-9: 7

## 2019-10-08 NOTE — PROGRESS NOTES
[facility] TMS Program  [address], [address]  [place] [address]  TMS Procedure Note   Aure Joy MRN# [id number]  Age: 47 year old year old YOB: 1970    Pre-Procedure:  History and Physical: Reviewed in medical record  Consent Signed by: Aure Joy  On: 8/23/2019    Clinical Narrative:  Patient tolerating treatment.  Patient is doing well.     Indications for TMS:  MDD, recurrent, severe; 4+ medication trials (from 2+ classes) ineffective; Psychotherapy ineffective.     Pre-Procedure Diagnosis:  MDD, recurrent, severe F33.2    Treatment Hx:  Treatment number this series: 28  Total lifetime treatment number: 64    Allergies   Allergen Reactions     Codeine Nausea     Other  [No Clinical Screening - See Comments] Nausea and Vomiting and Other (See Comments)     general anesthesia- uncontrolled vomitting      BP (!) 161/93 (BP Location: Right arm, Patient Position: Sitting, Cuff Size: Adult Regular)   Pulse 98     Pause for the Cause  Right patient:  Yes  Right procedure/correct coil:  Yes; rTMS; cpt 10047; F8 coil.   Earplugs in place:  Yes    Procedure  Patient was seated in procedure chair. Identity and procedure was verified. Ear plugs were placed in ears and patient-specific cap was placed on head and tightened appropriately. Ruler locations were verified. Coil was placed at treatment location and stimulator was set to parameters described below. A test train was delivered and pt tolerated train. Given pt tolerance, 60 treatment trains were delivered. Pt tolerated procedure with forehead movement.        Motor Threshold Determination  Distance from nasion to inion: 35.5  MT 1: 0 - 6 - 16 @ 69% on 1/29/2018  MT 2: 0 - 6 - 16 @ 69% on 2/6/2018   MT 3: 0 - 6 - 16 @ 69% on 2/13/2018   MT 4: 0 - 6 - 16 @ 69% on 2/23/2018   MT 5: 0 - 6 - 16 @ 69% on 3/2/2018   MT 6: 0 - 5.5 - 15.5(always use intersection at left side of ruler)@ 85% on 8/23/19  MT 7: 0 - 5.5 - 15.5(always use  intersection at left side of ruler)@ 80% on 8/29/19  MT 8: 0 - 5.5 - 37.5(always use intersection at left side of ruler)@ 76% on 9/5/19 (right side using EMG on left hand)  MT 9: C2 (R side using EMG on L hand) 78% on 9/12/2019  MT 10: C2 (R side using EMG on L hand) 76% on 9/26/2019    LDLPFC:  Frequency: 50 Hz  Number of pulses:  3                        Number of bursts: 10  Burst frequency: 5 Hz  Cycle time: 10 sec  Total pulses delivered: 1800  Tx Loc: F3  Energy: 68% (90% MT)  Number of Cycles: 60 trains    Date MADRS IDS-SR PHQ-9   8/23/19 36 53 21   8/30/19 -- 48 14   9/13/19  43 14   9/20/19  43 16   9/27/19  34 9                   Post-Procedure Diagnosis:  MDD, recurrent, severe F33.2    Tamar Balderrama  [facility] Neuromodulation    Plan   -Continue TMS      I did not see the patient during/after treatment but remained available in the clinic during  treatment.    Evelyn Zamudio MD  [facility] Neuromodulation

## 2019-10-09 ENCOUNTER — OFFICE VISIT (OUTPATIENT)
Dept: PSYCHIATRY | Facility: CLINIC | Age: 49
End: 2019-10-09
Payer: COMMERCIAL

## 2019-10-09 VITALS — DIASTOLIC BLOOD PRESSURE: 85 MMHG | SYSTOLIC BLOOD PRESSURE: 132 MMHG | HEART RATE: 92 BPM

## 2019-10-09 DIAGNOSIS — F33.2 SEVERE EPISODE OF RECURRENT MAJOR DEPRESSIVE DISORDER, WITHOUT PSYCHOTIC FEATURES (H): Primary | ICD-10-CM

## 2019-10-09 ASSESSMENT — PATIENT HEALTH QUESTIONNAIRE - PHQ9: SUM OF ALL RESPONSES TO PHQ QUESTIONS 1-9: 8

## 2019-10-09 NOTE — PROGRESS NOTES
[facility] TMS Program  [address], [address]  [place] [address]  TMS Procedure Note   Aure Joy MRN# [id number]  Age: 47 year old year old YOB: 1970    Pre-Procedure:  History and Physical: Reviewed in medical record  Consent Signed by: Aure Joy  On: 8/23/2019    Clinical Narrative:  Patient tolerating treatment.  Patient is doing well. Patient reports no changes.     Indications for TMS:  MDD, recurrent, severe; 4+ medication trials (from 2+ classes) ineffective; Psychotherapy ineffective.     Pre-Procedure Diagnosis:  MDD, recurrent, severe F33.2    Treatment Hx:  Treatment number this series: 29  Total lifetime treatment number: 65    Allergies   Allergen Reactions     Codeine Nausea     Other  [No Clinical Screening - See Comments] Nausea and Vomiting and Other (See Comments)     general anesthesia- uncontrolled vomitting      /85 (BP Location: Right arm, Patient Position: Sitting, Cuff Size: Adult Regular)   Pulse 92     Pause for the Cause  Right patient:  Yes  Right procedure/correct coil:  Yes; rTMS; cpt 45215; F8 coil.   Earplugs in place:  Yes    Procedure  Patient was seated in procedure chair. Identity and procedure was verified. Ear plugs were placed in ears and patient-specific cap was placed on head and tightened appropriately. Ruler locations were verified. Coil was placed at treatment location and stimulator was set to parameters described below. A test train was delivered and pt tolerated train. Given pt tolerance, 60 treatment trains were delivered. Pt tolerated procedure with forehead movement.        Motor Threshold Determination  Distance from nasion to inion: 35.5  MT 1: 0 - 6 - 16 @ 69% on 1/29/2018  MT 2: 0 - 6 - 16 @ 69% on 2/6/2018   MT 3: 0 - 6 - 16 @ 69% on 2/13/2018   MT 4: 0 - 6 - 16 @ 69% on 2/23/2018   MT 5: 0 - 6 - 16 @ 69% on 3/2/2018   MT 6: 0 - 5.5 - 15.5(always use intersection at left side of ruler)@ 85% on 8/23/19  MT 7: 0 - 5.5  - 15.5(always use intersection at left side of ruler)@ 80% on 8/29/19  MT 8: 0 - 5.5 - 37.5(always use intersection at left side of ruler)@ 76% on 9/5/19 (right side using EMG on left hand)  MT 9: C2 (R side using EMG on L hand) 78% on 9/12/2019  MT 10: C2 (R side using EMG on L hand) 76% on 9/26/2019    LDLPFC:  Frequency: 50 Hz  Number of pulses:  3                        Number of bursts: 10  Burst frequency: 5 Hz  Cycle time: 10 sec  Total pulses delivered: 1800  Tx Loc: F3  Energy: 68% (90% MT)  Number of Cycles: 60 trains    Date MADRS IDS-SR PHQ-9   8/23/19 36 53 21   8/30/19 -- 48 14   9/13/19  43 14   9/20/19  43 16   9/27/19  34 9                   Post-Procedure Diagnosis:  MDD, recurrent, severe F33.2    [facility]  Mental Health Neuromodulation    Plan   -Continue TMS    I was available in the clinic throughout the treatment but did not evaluate the patient.  Diane Hassan M.D., Ph.D.     Dept. of Psychiatry   [facility]

## 2019-10-10 ENCOUNTER — OFFICE VISIT (OUTPATIENT)
Dept: PSYCHIATRY | Facility: CLINIC | Age: 49
End: 2019-10-10
Payer: COMMERCIAL

## 2019-10-10 VITALS — DIASTOLIC BLOOD PRESSURE: 84 MMHG | SYSTOLIC BLOOD PRESSURE: 129 MMHG | HEART RATE: 79 BPM

## 2019-10-10 DIAGNOSIS — F33.2 SEVERE EPISODE OF RECURRENT MAJOR DEPRESSIVE DISORDER, WITHOUT PSYCHOTIC FEATURES (H): Primary | ICD-10-CM

## 2019-10-10 ASSESSMENT — PATIENT HEALTH QUESTIONNAIRE - PHQ9: SUM OF ALL RESPONSES TO PHQ QUESTIONS 1-9: 14

## 2019-10-10 NOTE — PROGRESS NOTES
[facility] TMS Program  [address], [address]  [place] [address]  TMS Procedure Note   Aure Joy MRN# [id number]  Age: 47 year old year old YOB: 1970    Pre-Procedure:  History and Physical: Reviewed in medical record  Consent Signed by: Aure Joy  On: 8/23/2019    Clinical Narrative:  Patient tolerating treatment.  Patient reports a dip in mood the last couple of days, she doesn't feel there was anything that caused this.  Just woke up feeling more depressed and wanting to sleep all day.    Indications for TMS:  MDD, recurrent, severe; 4+ medication trials (from 2+ classes) ineffective; Psychotherapy ineffective.     Pre-Procedure Diagnosis:  MDD, recurrent, severe F33.2    Treatment Hx:  Treatment number this series: 30  Total lifetime treatment number: 66    Allergies   Allergen Reactions     Codeine Nausea     Other  [No Clinical Screening - See Comments] Nausea and Vomiting and Other (See Comments)     general anesthesia- uncontrolled vomitting      /84 (BP Location: Right arm, Patient Position: Sitting, Cuff Size: Adult Regular)   Pulse 79     Pause for the Cause  Right patient:  Yes  Right procedure/correct coil:  Yes; rTMS; cpt 39258; F8 coil.   Earplugs in place:  Yes    Procedure  Patient was seated in procedure chair. Identity and procedure was verified. Ear plugs were placed in ears and patient-specific cap was placed on head and tightened appropriately. Ruler locations were verified. Coil was placed at treatment location and stimulator was set to parameters described below. A test train was delivered and pt tolerated train. Given pt tolerance, 60 treatment trains were delivered. Pt tolerated procedure with forehead movement.        Motor Threshold Determination  Distance from nasion to inion: 35.5  MT 1: 0 - 6 - 16 @ 69% on 1/29/2018  MT 2: 0 - 6 - 16 @ 69% on 2/6/2018   MT 3: 0 - 6 - 16 @ 69% on 2/13/2018   MT 4: 0 - 6 - 16 @ 69% on 2/23/2018   MT 5: 0 - 6  - 16 @ 69% on 3/2/2018   MT 6: 0 - 5.5 - 15.5(always use intersection at left side of ruler)@ 85% on 8/23/19  MT 7: 0 - 5.5 - 15.5(always use intersection at left side of ruler)@ 80% on 8/29/19  MT 8: 0 - 5.5 - 37.5(always use intersection at left side of ruler)@ 76% on 9/5/19 (right side using EMG on left hand)  MT 9: C2 (R side using EMG on L hand) 78% on 9/12/2019  MT 10: C2 (R side using EMG on L hand) 76% on 9/26/2019    LDLPFC:  Frequency: 50 Hz  Number of pulses:  3                        Number of bursts: 10  Burst frequency: 5 Hz  Cycle time: 10 sec  Total pulses delivered: 1800  Tx Loc: F3  Energy: 68% (90% MT)  Number of Cycles: 60 trains    Date MADRS IDS-SR PHQ-9   8/23/19 36 53 21   8/30/19 -- 48 14   9/13/19  43 14   9/20/19  43 16   9/27/19  34 9                   Post-Procedure Diagnosis:  MDD, recurrent, severe F33.2    Irma Marley  [facility] Neuromodulation    Plan   -Continue TMS      I did not see the patient during/after treatment but remained available in the clinic during  treatment.    Evelyn Zamudio MD  [facility] Neuromodulation

## 2019-10-11 ENCOUNTER — OFFICE VISIT (OUTPATIENT)
Dept: PSYCHIATRY | Facility: CLINIC | Age: 49
End: 2019-10-11
Payer: COMMERCIAL

## 2019-10-11 VITALS — HEART RATE: 92 BPM | DIASTOLIC BLOOD PRESSURE: 79 MMHG | SYSTOLIC BLOOD PRESSURE: 146 MMHG

## 2019-10-11 DIAGNOSIS — F33.2 SEVERE EPISODE OF RECURRENT MAJOR DEPRESSIVE DISORDER, WITHOUT PSYCHOTIC FEATURES (H): Primary | ICD-10-CM

## 2019-10-11 ASSESSMENT — PATIENT HEALTH QUESTIONNAIRE - PHQ9: SUM OF ALL RESPONSES TO PHQ QUESTIONS 1-9: 11

## 2019-10-11 NOTE — PROGRESS NOTES
[facility] TMS Program  [address], [address]  [place] [address]  TMS Procedure Note   Aure Joy MRN# [id number]  Age: 47 year old year old YOB: 1970    Pre-Procedure:  History and Physical: Reviewed in medical record  Consent Signed by: Aure Joy  On: 8/23/2019    Clinical Narrative:  Patient tolerating treatment.  Met with Dr. Zamudio today, we will be adding right sided treatment today.      Indications for TMS:  MDD, recurrent, severe; 4+ medication trials (from 2+ classes) ineffective; Psychotherapy ineffective.     Pre-Procedure Diagnosis:  MDD, recurrent, severe F33.2    Treatment Hx:  Treatment number this series: 31  Total lifetime treatment number: 67    Allergies   Allergen Reactions     Codeine Nausea     Other  [No Clinical Screening - See Comments] Nausea and Vomiting and Other (See Comments)     general anesthesia- uncontrolled vomitting      BP (!) 146/79 (BP Location: Right arm, Patient Position: Sitting, Cuff Size: Adult Regular)   Pulse 92     Pause for the Cause  Right patient:  Yes  Right procedure/correct coil:  Yes; rTMS; cpt 44746; F8 coil.   Earplugs in place:  Yes    Procedure  Patient was seated in procedure chair. Identity and procedure was verified. Ear plugs were placed in ears and patient-specific cap was placed on head and tightened appropriately. Ruler locations were verified. Coil was placed at treatment location and stimulator was set to parameters described below. A test train was delivered and pt tolerated train. Given pt tolerance, 60 treatment trains were delivered to the left side and 3 treatment trains were delivered to the right side. Pt tolerated procedure with forehead movement.        Motor Threshold Determination  Distance from nasion to inion: 35.5  MT 1: 0 - 6 - 16 @ 69% on 1/29/2018  MT 2: 0 - 6 - 16 @ 69% on 2/6/2018   MT 3: 0 - 6 - 16 @ 69% on 2/13/2018   MT 4: 0 - 6 - 16 @ 69% on 2/23/2018   MT 5: 0 - 6 - 16 @ 69% on 3/2/2018    MT 6: 0 - 5.5 - 15.5(always use intersection at left side of ruler)@ 85% on 8/23/19  MT 7: 0 - 5.5 - 15.5(always use intersection at left side of ruler)@ 80% on 8/29/19  MT 8: 0 - 5.5 - 37.5(always use intersection at left side of ruler)@ 76% on 9/5/19 (right side using EMG on left hand)  MT 9: C2 (R side using EMG on L hand) 78% on 9/12/2019  MT 10: C2 (R side using EMG on L hand) 76% on 9/26/2019    LDLPFC:  Frequency: 50 Hz  Number of pulses:  3                        Number of bursts: 10  Burst frequency: 5 Hz  Cycle time: 10 sec  Total pulses delivered: 1800  Tx Loc: F3  Energy: 68% (90% MT)  Number of Cycles: 60 trains    RDLPFC:  Frequency: 50 Hz  Number of pulses:  3                        Number of bursts: 200  Burst frequency: 5 Hz  Cycle time: 84.0  Total pulses delivered: 1800  Tx Loc: F4 (right side)  Energy: 76% (100% MT)  Number of Cycles: 3 trains          Date MADRS IDS-SR PHQ-9   8/23/19 36 53 21   8/30/19 -- 48 14   9/13/19  43 14   9/20/19  43 16   9/27/19  34 9   10/11/19  36 11             Post-Procedure Diagnosis:  MDD, recurrent, severe F33.2    Irma Marley  [facility] Neuromodulation    Plan   -Continue TMS    I did see the patient before treatment and remained available in the clinic during  treatment.    Evelyn Zamudio MD  [facility] Neuromodulation

## 2019-10-14 ENCOUNTER — OFFICE VISIT (OUTPATIENT)
Dept: PSYCHIATRY | Facility: CLINIC | Age: 49
End: 2019-10-14
Payer: COMMERCIAL

## 2019-10-14 VITALS — HEART RATE: 91 BPM | SYSTOLIC BLOOD PRESSURE: 146 MMHG | DIASTOLIC BLOOD PRESSURE: 90 MMHG

## 2019-10-14 DIAGNOSIS — F33.2 SEVERE EPISODE OF RECURRENT MAJOR DEPRESSIVE DISORDER, WITHOUT PSYCHOTIC FEATURES (H): Primary | ICD-10-CM

## 2019-10-14 ASSESSMENT — PATIENT HEALTH QUESTIONNAIRE - PHQ9: SUM OF ALL RESPONSES TO PHQ QUESTIONS 1-9: 13

## 2019-10-14 NOTE — PROGRESS NOTES
[facility] TMS Program  [address], [address]  [place] [address]  TMS Procedure Note   Aure Joy MRN# [id number]  Age: 47 year old year old YOB: 1970    Pre-Procedure:  History and Physical: Reviewed in medical record  Consent Signed by: Aure Joy  On: 8/23/2019    Clinical Narrative:  Patient tolerating treatment.  Patient reported her mood was a little better last night, she was joking with her family.        Indications for TMS:  MDD, recurrent, severe; 4+ medication trials (from 2+ classes) ineffective; Psychotherapy ineffective.     Pre-Procedure Diagnosis:  MDD, recurrent, severe F33.2    Treatment Hx:  Treatment number this series: 32  Total lifetime treatment number: 68    Allergies   Allergen Reactions     Codeine Nausea     Other  [No Clinical Screening - See Comments] Nausea and Vomiting and Other (See Comments)     general anesthesia- uncontrolled vomitting      BP (!) 146/90 (BP Location: Right arm, Patient Position: Sitting, Cuff Size: Adult Regular)   Pulse 91     Pause for the Cause  Right patient:  Yes  Right procedure/correct coil:  Yes; rTMS; cpt 87661; F8 coil.   Earplugs in place:  Yes    Procedure  Patient was seated in procedure chair. Identity and procedure was verified. Ear plugs were placed in ears and patient-specific cap was placed on head and tightened appropriately. Ruler locations were verified. Coil was placed at treatment location and stimulator was set to parameters described below. A test train was delivered and pt tolerated train. Given pt tolerance, 60 treatment trains were delivered to the left side and 3 treatment trains were delivered to the right side. Pt tolerated procedure with forehead movement.        Motor Threshold Determination  Distance from nasion to inion: 35.5  MT 1: 0 - 6 - 16 @ 69% on 1/29/2018  MT 2: 0 - 6 - 16 @ 69% on 2/6/2018   MT 3: 0 - 6 - 16 @ 69% on 2/13/2018   MT 4: 0 - 6 - 16 @ 69% on 2/23/2018   MT 5: 0 - 6 - 16  @ 69% on 3/2/2018   MT 6: 0 - 5.5 - 15.5(always use intersection at left side of ruler)@ 85% on 8/23/19  MT 7: 0 - 5.5 - 15.5(always use intersection at left side of ruler)@ 80% on 8/29/19  MT 8: 0 - 5.5 - 37.5(always use intersection at left side of ruler)@ 76% on 9/5/19 (right side using EMG on left hand)  MT 9: C2 (R side using EMG on L hand) 78% on 9/12/2019  MT 10: C2 (R side using EMG on L hand) 76% on 9/26/2019    LDLPFC:  Frequency: 50 Hz  Number of pulses:  3                        Number of bursts: 10  Burst frequency: 5 Hz  Cycle time: 10 sec  Total pulses delivered: 1800  Tx Loc: F3  Energy: 68% (90% MT)  Number of Cycles: 60 trains    RDLPFC:  Frequency: 50 Hz  Number of pulses:  3                        Number of bursts: 200  Burst frequency: 5 Hz  Cycle time: 86.5  Total pulses delivered: 1800  Tx Loc: F4 (right side)  Energy: 76% (100% MT)  Number of Cycles: 3 trains          Date MADRS IDS-SR PHQ-9   8/23/19 36 53 21   8/30/19 -- 48 14   9/13/19  43 14   9/20/19  43 16   9/27/19  34 9   10/11/19  36 11             Post-Procedure Diagnosis:  MDD, recurrent, severe F33.2    Irma Marley  [facility]  Mental [facility] Neuromodulation    Plan   -Continue TMS    I did not see patient but remained available at the clinic throughout the TMS session    Elizabeth Gordon MD  [facility] Neuromodulation

## 2019-10-15 ENCOUNTER — OFFICE VISIT (OUTPATIENT)
Dept: PSYCHIATRY | Facility: CLINIC | Age: 49
End: 2019-10-15
Payer: COMMERCIAL

## 2019-10-15 DIAGNOSIS — F32.2 CURRENT SEVERE EPISODE OF MAJOR DEPRESSIVE DISORDER WITHOUT PSYCHOTIC FEATURES WITHOUT PRIOR EPISODE (H): Primary | ICD-10-CM

## 2019-10-15 DIAGNOSIS — F33.2 SEVERE EPISODE OF RECURRENT MAJOR DEPRESSIVE DISORDER, WITHOUT PSYCHOTIC FEATURES (H): ICD-10-CM

## 2019-10-16 ENCOUNTER — OFFICE VISIT (OUTPATIENT)
Dept: PSYCHIATRY | Facility: CLINIC | Age: 49
End: 2019-10-16
Payer: COMMERCIAL

## 2019-10-16 VITALS — HEART RATE: 88 BPM | DIASTOLIC BLOOD PRESSURE: 92 MMHG | SYSTOLIC BLOOD PRESSURE: 147 MMHG

## 2019-10-16 DIAGNOSIS — F32.2 CURRENT SEVERE EPISODE OF MAJOR DEPRESSIVE DISORDER WITHOUT PSYCHOTIC FEATURES WITHOUT PRIOR EPISODE (H): Primary | ICD-10-CM

## 2019-10-16 ASSESSMENT — PATIENT HEALTH QUESTIONNAIRE - PHQ9: SUM OF ALL RESPONSES TO PHQ QUESTIONS 1-9: 7

## 2019-10-16 NOTE — PROGRESS NOTES
[facility] TMS Program  [address], [address]  [place] [address]  TMS Procedure Note   Aure Joy MRN# [id number]  Age: 47 year old year old YOB: 1970    Pre-Procedure:  History and Physical: Reviewed in medical record  Consent Signed by: Aure Joy  On: 8/23/2019    Clinical Narrative:  Patient tolerating treatment.  Patient reports no changes from yesterday.     Indications for TMS:  MDD, recurrent, severe; 4+ medication trials (from 2+ classes) ineffective; Psychotherapy ineffective.     Pre-Procedure Diagnosis:  MDD, recurrent, severe F33.2    Treatment Hx:  Treatment number this series: 34  Total lifetime treatment number: 70    Allergies   Allergen Reactions     Codeine Nausea     Other  [No Clinical Screening - See Comments] Nausea and Vomiting and Other (See Comments)     general anesthesia- uncontrolled vomitting      BP (!) 147/92 (BP Location: Right arm, Patient Position: Sitting, Cuff Size: Adult Regular)   Pulse 88     Pause for the Cause  Right patient:  Yes  Right procedure/correct coil:  Yes; rTMS; cpt 94404; F8 coil.   Earplugs in place:  Yes    Procedure  Patient was seated in procedure chair. Identity and procedure was verified. Ear plugs were placed in ears and patient-specific cap was placed on head and tightened appropriately. Ruler locations were verified. Coil was placed at treatment location and stimulator was set to parameters described below. A test train was delivered and pt tolerated train. Given pt tolerance, 60 treatment trains were delivered to the left side and 3 treatment trains were delivered to the right side. Pt tolerated procedure with forehead movement.    Motor Threshold Determination  Distance from nasion to inion: 35.5  MT 1: 0 - 6 - 16 @ 69% on 1/29/2018  MT 2: 0 - 6 - 16 @ 69% on 2/6/2018   MT 3: 0 - 6 - 16 @ 69% on 2/13/2018   MT 4: 0 - 6 - 16 @ 69% on 2/23/2018   MT 5: 0 - 6 - 16 @ 69% on 3/2/2018   MT 6: 0 - 5.5 - 15.5(always use  intersection at left side of ruler)@ 85% on 8/23/19  MT 7: 0 - 5.5 - 15.5(always use intersection at left side of ruler)@ 80% on 8/29/19  MT 8: 0 - 5.5 - 37.5(always use intersection at left side of ruler)@ 76% on 9/5/19 (right side using EMG on left hand)  MT 9: C2 (R side using EMG on L hand) 78% on 9/12/2019  MT 10: C2 (R side using EMG on L hand) 76% on 9/26/2019    LDLPFC:  Frequency: 50 Hz  Number of pulses:  3                        Number of bursts: 10  Burst frequency: 5 Hz  Cycle time: 10 sec  Total pulses delivered: 1800  Tx Loc: F3  Energy: 68% (90% MT)  Number of Cycles: 60 trains    RDLPFC:  Frequency: 50 Hz  Number of pulses:  3                        Number of bursts: 200  Burst frequency: 5 Hz  Cycle time: 86.5  Total pulses delivered: 1800  Tx Loc: F4 (right side)  Energy: 76% (100% MT)  Number of Cycles: 3 trains      Date MADRS IDS-SR PHQ-9   8/23/19 36 53 21   8/30/19 -- 48 14   9/13/19  43 14   9/20/19  43 16   9/27/19  34 9   10/11/19  36 11             Post-Procedure Diagnosis:  MDD, recurrent, severe F33.2    [facility]  Mental Health Neuromodulation    Plan   -Continue TMS      I was available in the clinic throughout the treatment but did not evaluate the patient.  Diane Hassan M.D., Ph.D.     Dept. of Psychiatry   [facility]

## 2019-10-16 NOTE — PROGRESS NOTES
[facility] TMS Program  [address], [address]  [place] [address]  TMS Procedure Note   Aure Joy MRN# [id number]  Age: 47 year old year old YOB: 1970    Pre-Procedure:  History and Physical: Reviewed in medical record  Consent Signed by: Aure Joy  On: 8/23/2019    Clinical Narrative:  Patient tolerating treatment.  Patient reports no changes from yesterday.     Indications for TMS:  MDD, recurrent, severe; 4+ medication trials (from 2+ classes) ineffective; Psychotherapy ineffective.     Pre-Procedure Diagnosis:  MDD, recurrent, severe F33.2    Treatment Hx:  Treatment number this series: 33  Total lifetime treatment number: 69    Allergies   Allergen Reactions     Codeine Nausea     Other  [No Clinical Screening - See Comments] Nausea and Vomiting and Other (See Comments)     general anesthesia- uncontrolled vomitting      There were no vitals taken for this visit.    Pause for the Cause  Right patient:  Yes  Right procedure/correct coil:  Yes; rTMS; cpt 39582; F8 coil.   Earplugs in place:  Yes    Procedure  Patient was seated in procedure chair. Identity and procedure was verified. Ear plugs were placed in ears and patient-specific cap was placed on head and tightened appropriately. Ruler locations were verified. Coil was placed at treatment location and stimulator was set to parameters described below. A test train was delivered and pt tolerated train. Given pt tolerance, 60 treatment trains were delivered to the left side and 3 treatment trains were delivered to the right side. Pt tolerated procedure with forehead movement.    Motor Threshold Determination  Distance from nasion to inion: 35.5  MT 1: 0 - 6 - 16 @ 69% on 1/29/2018  MT 2: 0 - 6 - 16 @ 69% on 2/6/2018   MT 3: 0 - 6 - 16 @ 69% on 2/13/2018   MT 4: 0 - 6 - 16 @ 69% on 2/23/2018   MT 5: 0 - 6 - 16 @ 69% on 3/2/2018   MT 6: 0 - 5.5 - 15.5(always use intersection at left side of ruler)@ 85% on 8/23/19  MT 7: 0 -  5.5 - 15.5(always use intersection at left side of ruler)@ 80% on 8/29/19  MT 8: 0 - 5.5 - 37.5(always use intersection at left side of ruler)@ 76% on 9/5/19 (right side using EMG on left hand)  MT 9: C2 (R side using EMG on L hand) 78% on 9/12/2019  MT 10: C2 (R side using EMG on L hand) 76% on 9/26/2019    LDLPFC:  Frequency: 50 Hz  Number of pulses:  3                        Number of bursts: 10  Burst frequency: 5 Hz  Cycle time: 10 sec  Total pulses delivered: 1800  Tx Loc: F3  Energy: 68% (90% MT)  Number of Cycles: 60 trains    RDLPFC:  Frequency: 50 Hz  Number of pulses:  3                        Number of bursts: 200  Burst frequency: 5 Hz  Cycle time: 86.5  Total pulses delivered: 1800  Tx Loc: F4 (right side)  Energy: 76% (100% MT)  Number of Cycles: 3 trains      Date MADRS IDS-SR PHQ-9   8/23/19 36 53 21   8/30/19 -- 48 14   9/13/19  43 14   9/20/19  43 16   9/27/19  34 9   10/11/19  36 11             Post-Procedure Diagnosis:  MDD, recurrent, severe F33.2    Tamar Balderrama   [facility] Neuromodulation    Plan   -Continue TMS    I did not see the patient during/after treatment but remained available in the clinic during  treatment.    Evelyn Zamudio MD  [facility] Neuromodulation

## 2019-10-17 ENCOUNTER — OFFICE VISIT (OUTPATIENT)
Dept: PSYCHIATRY | Facility: CLINIC | Age: 49
End: 2019-10-17
Payer: COMMERCIAL

## 2019-10-17 VITALS — SYSTOLIC BLOOD PRESSURE: 136 MMHG | DIASTOLIC BLOOD PRESSURE: 86 MMHG | HEART RATE: 92 BPM

## 2019-10-17 DIAGNOSIS — F33.2 SEVERE EPISODE OF RECURRENT MAJOR DEPRESSIVE DISORDER, WITHOUT PSYCHOTIC FEATURES (H): Primary | ICD-10-CM

## 2019-10-17 ASSESSMENT — PATIENT HEALTH QUESTIONNAIRE - PHQ9: SUM OF ALL RESPONSES TO PHQ QUESTIONS 1-9: 7

## 2019-10-17 NOTE — PROGRESS NOTES
[facility] TMS Program  [address], [address]  [place] [address]  TMS Procedure Note   Aure Joy MRN# [id number]  Age: 47 year old year old YOB: 1970    Pre-Procedure:  History and Physical: Reviewed in medical record  Consent Signed by: Aure Joy  On: 8/23/2019    Clinical Narrative:  Patient tolerating treatment.  Patient reports no changes from yesterday. She is feeling better overall.     Indications for TMS:  MDD, recurrent, severe; 4+ medication trials (from 2+ classes) ineffective; Psychotherapy ineffective.     Pre-Procedure Diagnosis:  MDD, recurrent, severe F33.2    Treatment Hx:  Treatment number this series: 35  Total lifetime treatment number: 71    Allergies   Allergen Reactions     Codeine Nausea     Other  [No Clinical Screening - See Comments] Nausea and Vomiting and Other (See Comments)     general anesthesia- uncontrolled vomitting      /86 (BP Location: Right arm, Patient Position: Sitting, Cuff Size: Adult Regular)   Pulse 92     Pause for the Cause  Right patient:  Yes  Right procedure/correct coil:  Yes; rTMS; cpt 18110; F8 coil.   Earplugs in place:  Yes    Procedure  Patient was seated in procedure chair. Identity and procedure was verified. Ear plugs were placed in ears and patient-specific cap was placed on head and tightened appropriately. Ruler locations were verified. Coil was placed at treatment location and stimulator was set to parameters described below. A test train was delivered and pt tolerated train. Given pt tolerance, 60 treatment trains were delivered to the left side and 3 treatment trains were delivered to the right side. Pt tolerated procedure with forehead movement.    Motor Threshold Determination  Distance from nasion to inion: 35.5  MT 1: 0 - 6 - 16 @ 69% on 1/29/2018  MT 2: 0 - 6 - 16 @ 69% on 2/6/2018   MT 3: 0 - 6 - 16 @ 69% on 2/13/2018   MT 4: 0 - 6 - 16 @ 69% on 2/23/2018   MT 5: 0 - 6 - 16 @ 69% on 3/2/2018   MT 6:  0 - 5.5 - 15.5(always use intersection at left side of ruler)@ 85% on 8/23/19  MT 7: 0 - 5.5 - 15.5(always use intersection at left side of ruler)@ 80% on 8/29/19  MT 8: 0 - 5.5 - 37.5(always use intersection at left side of ruler)@ 76% on 9/5/19 (right side using EMG on left hand)  MT 9: C2 (R side using EMG on L hand) 78% on 9/12/2019  MT 10: C2 (R side using EMG on L hand) 76% on 9/26/2019    LDLPFC:  Frequency: 50 Hz  Number of pulses:  3                        Number of bursts: 10  Burst frequency: 5 Hz  Cycle time: 10 sec  Total pulses delivered: 1800  Tx Loc: F3  Energy: 68% (90% MT)  Number of Cycles: 60 trains    RDLPFC:  Frequency: 50 Hz  Number of pulses:  3                        Number of bursts: 200  Burst frequency: 5 Hz  Cycle time: 86.5  Total pulses delivered: 1800  Tx Loc: F4 (right side)  Energy: 76% (100% MT)  Number of Cycles: 3 trains      Date MADRS IDS-SR PHQ-9   8/23/19 36 53 21   8/30/19 -- 48 14   9/13/19  43 14   9/20/19  43 16   9/27/19  34 9   10/11/19  36 11             Post-Procedure Diagnosis:  MDD, recurrent, severe F33.2    Pilo Almeida  [facility] Neuromodulation    Plan   -Continue TMS    I did not see this patient but was available for potential intervention throughout the entire TMS visit.    William Rodriguez MD  [facility] Neuromod[facility]

## 2019-10-18 ENCOUNTER — OFFICE VISIT (OUTPATIENT)
Dept: PSYCHIATRY | Facility: CLINIC | Age: 49
End: 2019-10-18
Payer: COMMERCIAL

## 2019-10-18 VITALS — DIASTOLIC BLOOD PRESSURE: 80 MMHG | HEART RATE: 83 BPM | SYSTOLIC BLOOD PRESSURE: 136 MMHG

## 2019-10-18 DIAGNOSIS — F32.2 CURRENT SEVERE EPISODE OF MAJOR DEPRESSIVE DISORDER WITHOUT PSYCHOTIC FEATURES WITHOUT PRIOR EPISODE (H): Primary | ICD-10-CM

## 2019-10-18 ASSESSMENT — PATIENT HEALTH QUESTIONNAIRE - PHQ9: SUM OF ALL RESPONSES TO PHQ QUESTIONS 1-9: 6

## 2019-10-18 NOTE — PROGRESS NOTES
[facility] TMS Program  [address], [address]  [place] [address]  TMS Procedure Note   Aure Joy MRN# [id number]  Age: 47 year old year old YOB: 1970    Pre-Procedure:  History and Physical: Reviewed in medical record  Consent Signed by: Aure Joy  On: 8/23/2019    Clinical Narrative:  Patient tolerating treatment.  Patient reports no changes from yesterday. She is feeling better overall.     Indications for TMS:  MDD, recurrent, severe; 4+ medication trials (from 2+ classes) ineffective; Psychotherapy ineffective.     Pre-Procedure Diagnosis:  MDD, recurrent, severe F33.2    Treatment Hx:  Treatment number this series: 36  Total lifetime treatment number: 72    Allergies   Allergen Reactions     Codeine Nausea     Other  [No Clinical Screening - See Comments] Nausea and Vomiting and Other (See Comments)     general anesthesia- uncontrolled vomitting      /80 (BP Location: Right arm, Patient Position: Sitting, Cuff Size: Adult Regular)   Pulse 83     Pause for the Cause  Right patient:  Yes  Right procedure/correct coil:  Yes; rTMS; cpt 48360; F8 coil.   Earplugs in place:  Yes    Procedure  Patient was seated in procedure chair. Identity and procedure was verified. Ear plugs were placed in ears and patient-specific cap was placed on head and tightened appropriately. Ruler locations were verified. Coil was placed at treatment location and stimulator was set to parameters described below. A test train was delivered and pt tolerated train. Given pt tolerance, 60 treatment trains were delivered to the left side and 3 treatment trains were delivered to the right side. Pt tolerated procedure with forehead movement.    Motor Threshold Determination  Distance from nasion to inion: 35.5  MT 1: 0 - 6 - 16 @ 69% on 1/29/2018  MT 2: 0 - 6 - 16 @ 69% on 2/6/2018   MT 3: 0 - 6 - 16 @ 69% on 2/13/2018   MT 4: 0 - 6 - 16 @ 69% on 2/23/2018   MT 5: 0 - 6 - 16 @ 69% on 3/2/2018   MT 6:  0 - 5.5 - 15.5(always use intersection at left side of ruler)@ 85% on 8/23/19  MT 7: 0 - 5.5 - 15.5(always use intersection at left side of ruler)@ 80% on 8/29/19  MT 8: 0 - 5.5 - 37.5(always use intersection at left side of ruler)@ 76% on 9/5/19 (right side using EMG on left hand)  MT 9: C2 (R side using EMG on L hand) 78% on 9/12/2019  MT 10: C2 (R side using EMG on L hand) 76% on 9/26/2019    LDLPFC:  Frequency: 50 Hz  Number of pulses:  3                        Number of bursts: 10  Burst frequency: 5 Hz  Cycle time: 10 sec  Total pulses delivered: 1800  Tx Loc: F3  Energy: 68% (90% MT)  Number of Cycles: 60 trains    RDLPFC:  Frequency: 50 Hz  Number of pulses:  3                        Number of bursts: 200  Burst frequency: 5 Hz  Cycle time: 86.5  Total pulses delivered: 1800  Tx Loc: F4 (right side)  Energy: 76% (100% MT)  Number of Cycles: 3 trains      Date MADRS IDS-SR PHQ-9   8/23/19 36 53 21   8/30/19 -- 48 14   9/13/19  43 14   9/20/19  43 16   9/27/19  34 9   10/11/19  36 11   10/18/19  37 6       Post-Procedure Diagnosis:  MDD, recurrent, severe F33.2    Tamar Conchis  [facility] Neuromod[facility]    Plan   -Continue TMS    I did not see the patient during/after treatment but remained available in the clinic during  treatment.    Tim Williamson MD, MD, PhD  [facility]od[facility]

## 2019-10-23 ENCOUNTER — OFFICE VISIT (OUTPATIENT)
Dept: PSYCHIATRY | Facility: CLINIC | Age: 49
End: 2019-10-23
Payer: COMMERCIAL

## 2019-10-23 VITALS — SYSTOLIC BLOOD PRESSURE: 147 MMHG | HEART RATE: 89 BPM | DIASTOLIC BLOOD PRESSURE: 98 MMHG

## 2019-10-23 DIAGNOSIS — F32.2 CURRENT SEVERE EPISODE OF MAJOR DEPRESSIVE DISORDER WITHOUT PSYCHOTIC FEATURES WITHOUT PRIOR EPISODE (H): Primary | ICD-10-CM

## 2019-10-23 RX ORDER — LAMOTRIGINE 200 MG/1
200 TABLET ORAL DAILY
Qty: 30 TABLET | Refills: 1 | Status: SHIPPED | OUTPATIENT
Start: 2019-10-23 | End: 2019-12-19

## 2019-10-23 ASSESSMENT — PATIENT HEALTH QUESTIONNAIRE - PHQ9: SUM OF ALL RESPONSES TO PHQ QUESTIONS 1-9: 6

## 2019-10-23 NOTE — PROGRESS NOTES
[facility] TMS Program  [address], [address]  [place] [address]  TMS Procedure Note   Aure Joy MRN# [id number]  Age: 47 year old year old YOB: 1970    Pre-Procedure:  History and Physical: Reviewed in medical record  Consent Signed by: Aure Joy  On: 8/23/2019    Clinical Narrative:  Patient tolerating treatment.  States TMS is going well.  States she is feeling much better, does report that the Magstim machine is more tolerable compared to Bra[employer]way.      Indications for TMS:  MDD, recurrent, severe; 4+ medication trials (from 2+ classes) ineffective; Psychotherapy ineffective.     Pre-Procedure Diagnosis:  MDD, recurrent, severe F33.2    Treatment Hx:  Treatment number this series: 37  Total lifetime treatment number: 73    Allergies   Allergen Reactions     Codeine Nausea     Other  [No Clinical Screening - See Comments] Nausea and Vomiting and Other (See Comments)     general anesthesia- uncontrolled vomitting      BP (!) 147/98   Pulse 89     Pause for the Cause  Right patient:  Yes  Right procedure/correct coil:  Yes; rTMS; cpt 53162; F8 coil.   Earplugs in place:  Yes    Procedure  Patient was seated in procedure chair. Identity and procedure was verified. Ear plugs were placed in ears and patient-specific cap was placed on head and tightened appropriately. Ruler locations were verified. Coil was placed at treatment location and stimulator was set to parameters described below. A test train was delivered and pt tolerated train. Given pt tolerance, 60 treatment trains were delivered to the left side and 3 treatment trains were delivered to the right side. Pt tolerated procedure with forehead movement.    Motor Threshold Determination  Distance from nasion to inion: 35.5  MT 1: 0 - 6 - 16 @ 69% on 1/29/2018  MT 2: 0 - 6 - 16 @ 69% on 2/6/2018   MT 3: 0 - 6 - 16 @ 69% on 2/13/2018   MT 4: 0 - 6 - 16 @ 69% on 2/23/2018   MT 5: 0 - 6 - 16 @ 69% on 3/2/2018   MT 6: 0 - 5.5  - 15.5(always use intersection at left side of ruler)@ 85% on 8/23/19  MT 7: 0 - 5.5 - 15.5(always use intersection at left side of ruler)@ 80% on 8/29/19  MT 8: 0 - 5.5 - 37.5(always use intersection at left side of ruler)@ 76% on 9/5/19 (right side using EMG on left hand)  MT 9: C2 (R side using EMG on L hand) 78% on 9/12/2019  MT 10: C2 (R side using EMG on L hand) 76% on 9/26/2019    LDLPFC:  Frequency: 50 Hz  Number of pulses:  3                        Number of bursts: 10  Burst frequency: 5 Hz  Cycle time: 10 sec  Total pulses delivered: 1800  Tx Loc: F3  Energy: 68% (90% MT)  Number of Cycles: 60 trains    RDLPFC:  Frequency: 50 Hz  Number of pulses:  3                        Number of bursts: 200  Burst frequency: 5 Hz  Cycle time: 86.5  Total pulses delivered: 1800  Tx Loc: F4 (right side)  Energy: 76% (100% MT)  Number of Cycles: 3 trains      Date MADRS IDS-SR PHQ-9   8/23/19 36 53 21   8/30/19 -- 48 14   9/13/19  43 14   9/20/19  43 16   9/27/19  34 9   10/11/19  36 11   10/18/19  37 6       Post-Procedure Diagnosis:  MDD, recurrent, severe F33.2    Jael Alexandra RN   [facility]  Mental Health Neuromodulation    Plan   -Continue TMS      I was available in the clinic throughout the treatment but did not evaluate the patient.  Diane Hassan M.D., Ph.D.     Dept. of Psychiatry   [facility]

## 2019-10-24 ENCOUNTER — OFFICE VISIT (OUTPATIENT)
Dept: PSYCHIATRY | Facility: CLINIC | Age: 49
End: 2019-10-24
Payer: COMMERCIAL

## 2019-10-24 VITALS — SYSTOLIC BLOOD PRESSURE: 153 MMHG | HEART RATE: 85 BPM | DIASTOLIC BLOOD PRESSURE: 90 MMHG

## 2019-10-24 DIAGNOSIS — F33.2 SEVERE EPISODE OF RECURRENT MAJOR DEPRESSIVE DISORDER, WITHOUT PSYCHOTIC FEATURES (H): Primary | ICD-10-CM

## 2019-10-24 ASSESSMENT — PATIENT HEALTH QUESTIONNAIRE - PHQ9: SUM OF ALL RESPONSES TO PHQ QUESTIONS 1-9: 6
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
## 2019-10-24 NOTE — PROGRESS NOTES
[facility] TMS Program  [address], [address]  [place] [address]  TMS Procedure Note   Aure Joy MRN# [id number]  Age: 47 year old year old YOB: 1970    Pre-Procedure:  History and Physical: Reviewed in medical record  Consent Signed by: Aure Joy  On: 8/23/2019    Clinical Narrative:  Patient tolerating treatment.  States TMS is going well.  States she is feeling much better, does report that the Magstim machine is more tolerable compared to Brainsway.      Indications for TMS:  MDD, recurrent, severe; 4+ medication trials (from 2+ classes) ineffective; Psychotherapy ineffective.     Pre-Procedure Diagnosis:  MDD, recurrent, severe F33.2    Treatment Hx:  Treatment number this series: 38  Total lifetime treatment number: 74    Allergies   Allergen Reactions     Codeine Nausea     Other  [No Clinical Screening - See Comments] Nausea and Vomiting and Other (See Comments)     general anesthesia- uncontrolled vomitting      BP (!) 153/90 (BP Location: Right arm, Patient Position: Sitting, Cuff Size: Adult Regular)   Pulse 85     Pause for the Cause  Right patient:  Yes  Right procedure/correct coil:  Yes; rTMS; cpt 92433; F8 coil.   Earplugs in place:  Yes    Procedure  Patient was seated in procedure chair. Identity and procedure was verified. Ear plugs were placed in ears and patient-specific cap was placed on head and tightened appropriately. Ruler locations were verified. Coil was placed at treatment location and stimulator was set to parameters described below. A test train was delivered and pt tolerated train. Given pt tolerance, 60 treatment trains were delivered to the left side and 3 treatment trains were delivered to the right side. Pt tolerated procedure with forehead movement.    Motor Threshold Determination  Distance from nasion to inion: 35.5  MT 1: 0 - 6 - 16 @ 69% on 1/29/2018  MT 2: 0 - 6 - 16 @ 69% on 2/6/2018   MT 3: 0 - 6 - 16 @ 69% on 2/13/2018   MT 4: 0 -  6 - 16 @ 69% on 2/23/2018   MT 5: 0 - 6 - 16 @ 69% on 3/2/2018   MT 6: 0 - 5.5 - 15.5(always use intersection at left side of ruler)@ 85% on 8/23/19  MT 7: 0 - 5.5 - 15.5(always use intersection at left side of ruler)@ 80% on 8/29/19  MT 8: 0 - 5.5 - 37.5(always use intersection at left side of ruler)@ 76% on 9/5/19 (right side using EMG on left hand)  MT 9: C2 (R side using EMG on L hand) 78% on 9/12/2019  MT 10: C2 (R side using EMG on L hand) 76% on 9/26/2019    LDLPFC:  Frequency: 50 Hz  Number of pulses:  3                        Number of bursts: 10  Burst frequency: 5 Hz  Cycle time: 10 sec  Total pulses delivered: 1800  Tx Loc: F3  Energy: 68% (90% MT)  Number of Cycles: 60 trains    RDLPFC:  Frequency: 50 Hz  Number of pulses:  3                        Number of bursts: 200  Burst frequency: 5 Hz  Cycle time: 86.5  Total pulses delivered: 1800  Tx Loc: F4 (right side)  Energy: 76% (100% MT)  Number of Cycles: 3 trains      Date MADRS IDS-SR PHQ-9   8/23/19 36 53 21   8/30/19 -- 48 14   9/13/19  43 14   9/20/19  43 16   9/27/19  34 9   10/11/19  36 11   10/18/19  37 6       Post-Procedure Diagnosis:  MDD, recurrent, severe F33.2    Irma Marley  [facility] Neuromodulation    Plan   -Continue TMS    I did not see the patient during/after treatment but remained available in the clinic during  treatment.    Evelyn Zamudio MD  [facility] Neuromodulation
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
## 2019-10-25 ENCOUNTER — OFFICE VISIT (OUTPATIENT)
Dept: PSYCHIATRY | Facility: CLINIC | Age: 49
End: 2019-10-25
Payer: COMMERCIAL

## 2019-10-25 VITALS — DIASTOLIC BLOOD PRESSURE: 90 MMHG | SYSTOLIC BLOOD PRESSURE: 145 MMHG | HEART RATE: 84 BPM

## 2019-10-25 DIAGNOSIS — F33.2 SEVERE EPISODE OF RECURRENT MAJOR DEPRESSIVE DISORDER, WITHOUT PSYCHOTIC FEATURES (H): Primary | ICD-10-CM

## 2019-10-25 ASSESSMENT — PATIENT HEALTH QUESTIONNAIRE - PHQ9: SUM OF ALL RESPONSES TO PHQ QUESTIONS 1-9: 6

## 2019-10-25 NOTE — PROGRESS NOTES
[facility] TMS Program  [address], [address]  [place] [address]  TMS Procedure Note   Aure Joy MRN# [id number]  Age: 47 year old year old YOB: 1970    Pre-Procedure:  History and Physical: Reviewed in medical record  Consent Signed by: Aure Joy  On: 8/23/2019    Clinical Narrative:  Patient tolerating treatment.  States TMS is going well.  States she is feeling much better, does report that the Magstim machine is more tolerable compared to Brainsway.      Indications for TMS:  MDD, recurrent, severe; 4+ medication trials (from 2+ classes) ineffective; Psychotherapy ineffective.     Pre-Procedure Diagnosis:  MDD, recurrent, severe F33.2    Treatment Hx:  Treatment number this series: 39  Total lifetime treatment number: 75    Allergies   Allergen Reactions     Codeine Nausea     Other  [No Clinical Screening - See Comments] Nausea and Vomiting and Other (See Comments)     general anesthesia- uncontrolled vomitting      BP (!) 145/90 (BP Location: Right arm, Patient Position: Sitting, Cuff Size: Adult Regular)   Pulse 84     Pause for the Cause  Right patient:  Yes  Right procedure/correct coil:  Yes; rTMS; cpt 34594; F8 coil.   Earplugs in place:  Yes    Procedure  Patient was seated in procedure chair. Identity and procedure was verified. Ear plugs were placed in ears and patient-specific cap was placed on head and tightened appropriately. Ruler locations were verified. Coil was placed at treatment location and stimulator was set to parameters described below. A test train was delivered and pt tolerated train. Given pt tolerance, 60 treatment trains were delivered to the left side and 3 treatment trains were delivered to the right side. Pt tolerated procedure with forehead movement.    Motor Threshold Determination  Distance from nasion to inion: 35.5  MT 1: 0 - 6 - 16 @ 69% on 1/29/2018  MT 2: 0 - 6 - 16 @ 69% on 2/6/2018   MT 3: 0 - 6 - 16 @ 69% on 2/13/2018   MT 4: 0 -  6 - 16 @ 69% on 2/23/2018   MT 5: 0 - 6 - 16 @ 69% on 3/2/2018   MT 6: 0 - 5.5 - 15.5(always use intersection at left side of ruler)@ 85% on 8/23/19  MT 7: 0 - 5.5 - 15.5(always use intersection at left side of ruler)@ 80% on 8/29/19  MT 8: 0 - 5.5 - 37.5(always use intersection at left side of ruler)@ 76% on 9/5/19 (right side using EMG on left hand)  MT 9: C2 (R side using EMG on L hand) 78% on 9/12/2019  MT 10: C2 (R side using EMG on L hand) 76% on 9/26/2019    LDLPFC:  Frequency: 50 Hz  Number of pulses:  3                        Number of bursts: 10  Burst frequency: 5 Hz  Cycle time: 10 sec  Total pulses delivered: 1800  Tx Loc: F3  Energy: 68% (90% MT)  Number of Cycles: 60 trains    RDLPFC:  Frequency: 50 Hz  Number of pulses:  3                        Number of bursts: 200  Burst frequency: 5 Hz  Cycle time: 86.5  Total pulses delivered: 1800  Tx Loc: F4 (right side)  Energy: 76% (100% MT)  Number of Cycles: 3 trains      Date MADRS IDS-SR PHQ-9   8/23/19 36 53 21   8/30/19 -- 48 14   9/13/19  43 14   9/20/19  43 16   9/27/19  34 9   10/11/19  36 11   10/18/19  37 6   10/25/19  33 6             Post-Procedure Diagnosis:  MDD, recurrent, severe F33.2    Irma Marley  [facility]  Mental [facility] Neuromodulation    Plan   -Continue TMS    I did not see the patient during/after treatment but remained available in the clinic during  treatment.    Tim Williamson MD, MD, PhD  [facility] Neuromodulation

## 2019-10-28 ENCOUNTER — OFFICE VISIT (OUTPATIENT)
Dept: PSYCHIATRY | Facility: CLINIC | Age: 49
End: 2019-10-28
Payer: COMMERCIAL

## 2019-10-28 VITALS — DIASTOLIC BLOOD PRESSURE: 81 MMHG | SYSTOLIC BLOOD PRESSURE: 134 MMHG | HEART RATE: 90 BPM

## 2019-10-28 DIAGNOSIS — F33.2 SEVERE EPISODE OF RECURRENT MAJOR DEPRESSIVE DISORDER, WITHOUT PSYCHOTIC FEATURES (H): Primary | ICD-10-CM

## 2019-10-28 ASSESSMENT — PATIENT HEALTH QUESTIONNAIRE - PHQ9: SUM OF ALL RESPONSES TO PHQ QUESTIONS 1-9: 6

## 2019-10-28 NOTE — PROGRESS NOTES
[facility] TMS Program  [address], [address]  [place] [address]  TMS Procedure Note   Aure Joy MRN# [id number]  Age: 47 year old year old YOB: 1970    Pre-Procedure:  History and Physical: Reviewed in medical record  Consent Signed by: Aure Joy  On: 8/23/2019    Clinical Narrative:  Patient tolerating treatment.  States TMS is going well.      Indications for TMS:  MDD, recurrent, severe; 4+ medication trials (from 2+ classes) ineffective; Psychotherapy ineffective.     Pre-Procedure Diagnosis:  MDD, recurrent, severe F33.2    Treatment Hx:  Treatment number this series: 40  Total lifetime treatment number: 76    Allergies   Allergen Reactions     Codeine Nausea     Other  [No Clinical Screening - See Comments] Nausea and Vomiting and Other (See Comments)     general anesthesia- uncontrolled vomitting      /81 (BP Location: Right arm, Patient Position: Sitting, Cuff Size: Adult Regular)   Pulse 90     Pause for the Cause  Right patient:  Yes  Right procedure/correct coil:  Yes; rTMS; cpt 12494; F8 coil.   Earplugs in place:  Yes    Procedure  Patient was seated in procedure chair. Identity and procedure was verified. Ear plugs were placed in ears and patient-specific cap was placed on head and tightened appropriately. Ruler locations were verified. Coil was placed at treatment location and stimulator was set to parameters described below. A test train was delivered and pt tolerated train. Given pt tolerance, 60 treatment trains were delivered to the left side and 3 treatment trains were delivered to the right side. Pt tolerated procedure with forehead movement.    Motor Threshold Determination  Distance from nasion to inion: 35.5  MT 1: 0 - 6 - 16 @ 69% on 1/29/2018  MT 2: 0 - 6 - 16 @ 69% on 2/6/2018   MT 3: 0 - 6 - 16 @ 69% on 2/13/2018   MT 4: 0 - 6 - 16 @ 69% on 2/23/2018   MT 5: 0 - 6 - 16 @ 69% on 3/2/2018   MT 6: 0 - 5.5 - 15.5(always use intersection at left  side of ruler)@ 85% on 8/23/19  MT 7: 0 - 5.5 - 15.5(always use intersection at left side of ruler)@ 80% on 8/29/19  MT 8: 0 - 5.5 - 37.5(always use intersection at left side of ruler)@ 76% on 9/5/19 (right side using EMG on left hand)  MT 9: C2 (R side using EMG on L hand) 78% on 9/12/2019  MT 10: C2 (R side using EMG on L hand) 76% on 9/26/2019    LDLPFC:  Frequency: 50 Hz  Number of pulses:  3                        Number of bursts: 10  Burst frequency: 5 Hz  Cycle time: 10 sec  Total pulses delivered: 1800  Tx Loc: F3  Energy: 68% (90% MT)  Number of Cycles: 60 trains    RDLPFC:  Frequency: 50 Hz  Number of pulses:  3                        Number of bursts: 200  Burst frequency: 5 Hz  Cycle time: 86.5  Total pulses delivered: 1800  Tx Loc: F4 (right side)  Energy: 76% (100% MT)  Number of Cycles: 3 trains      Date MADRS IDS-SR PHQ-9   8/23/19 36 53 21   8/30/19 -- 48 14   9/13/19  43 14   9/20/19  43 16   9/27/19  34 9   10/11/19  36 11   10/18/19  37 6   10/25/19  33 6             Post-Procedure Diagnosis:  MDD, recurrent, severe F33.2    Irma Marley  [facility]  Mental [facility] Neuromodulation    Plan   -Continue TMS    I did not see patient but remained available at the clinic throughout the TMS session.    Elizabeth Gordon MD  [facility] Neuromodulation

## 2019-10-29 ENCOUNTER — OFFICE VISIT (OUTPATIENT)
Dept: PSYCHIATRY | Facility: CLINIC | Age: 49
End: 2019-10-29
Payer: COMMERCIAL

## 2019-10-29 VITALS — HEART RATE: 93 BPM | DIASTOLIC BLOOD PRESSURE: 82 MMHG | SYSTOLIC BLOOD PRESSURE: 141 MMHG

## 2019-10-29 DIAGNOSIS — F32.2 CURRENT SEVERE EPISODE OF MAJOR DEPRESSIVE DISORDER WITHOUT PSYCHOTIC FEATURES WITHOUT PRIOR EPISODE (H): Primary | ICD-10-CM

## 2019-10-29 ASSESSMENT — PATIENT HEALTH QUESTIONNAIRE - PHQ9: SUM OF ALL RESPONSES TO PHQ QUESTIONS 1-9: 7

## 2019-10-29 NOTE — PROGRESS NOTES
[facility] TMS [facility]  [address], [address]  [place] [address]  TMS Procedure Note   Aure Joy MRN# [id number]  Age: 47 year old year old YOB: 1970    Pre-Procedure:  History and Physical: Reviewed in medical record  Consent Signed by: Aure oJy  On: 8/23/2019    Clinical Narrative:  Patient tolerating treatment.  States TMS is going well.      Indications for TMS:  MDD, recurrent, severe; 4+ medication trials (from 2+ classes) ineffective; Psychotherapy ineffective.     Pre-Procedure Diagnosis:  MDD, recurrent, severe F33.2    Treatment Hx:  Treatment number this series: 41  Total lifetime treatment number: 77    Allergies   Allergen Reactions     Codeine Nausea     Other  [No Clinical Screening - See Comments] Nausea and Vomiting and Other (See Comments)     general anesthesia- uncontrolled vomitting      BP (!) 141/82 (BP Location: Right arm, Patient Position: Sitting, Cuff Size: Adult Regular)   Pulse 93     Pause for the Cause  Right patient:  Yes  Right procedure/correct coil:  Yes; rTMS; cpt 22048; F8 coil.   Earplugs in place:  Yes    Procedure  Patient was seated in procedure chair. Identity and procedure was verified. Ear plugs were placed in ears and patient-specific cap was placed on head and tightened appropriately. Ruler locations were verified. Coil was placed at treatment location and stimulator was set to parameters described below. A test train was delivered and pt tolerated train. Given pt tolerance, 60 treatment trains were delivered to the left side and 3 treatment trains were delivered to the right side. Pt tolerated procedure with forehead movement.    Motor Threshold Determination  Distance from nasion to inion: 35.5  MT 1: 0 - 6 - 16 @ 69% on 1/29/2018  MT 2: 0 - 6 - 16 @ 69% on 2/6/2018   MT 3: 0 - 6 - 16 @ 69% on 2/13/2018   MT 4: 0 - 6 - 16 @ 69% on 2/23/2018   MT 5: 0 - 6 - 16 @ 69% on 3/2/2018   MT 6: 0 - 5.5 - 15.5(always use intersection at  left side of ruler)@ 85% on 8/23/19  MT 7: 0 - 5.5 - 15.5(always use intersection at left side of ruler)@ 80% on 8/29/19  MT 8: 0 - 5.5 - 37.5(always use intersection at left side of ruler)@ 76% on 9/5/19 (right side using EMG on left hand)  MT 9: C2 (R side using EMG on L hand) 78% on 9/12/2019  MT 10: C2 (R side using EMG on L hand) 76% on 9/26/2019    LDLPFC:  Frequency: 50 Hz  Number of pulses:  3                        Number of bursts: 10  Burst frequency: 5 Hz  Cycle time: 10 sec  Total pulses delivered: 1800  Tx Loc: F3  Energy: 68% (90% MT)  Number of Cycles: 60 trains    RDLPFC:  Frequency: 50 Hz  Number of pulses:  3                        Number of bursts: 200  Burst frequency: 5 Hz  Cycle time: 86.5  Total pulses delivered: 1800  Tx Loc: F4 (right side)  Energy: 76% (100% MT)  Number of Cycles: 3 trains      Date MADRS IDS-SR PHQ-9   8/23/19 36 53 21   8/30/19 -- 48 14   9/13/19  43 14   9/20/19  43 16   9/27/19  34 9   10/11/19  36 11   10/18/19  37 6   10/25/19  33 6   10/29/19 25         Post-Procedure Diagnosis:  MDD, recurrent, severe F33.2    Tamar Balderrama   [facility]  [facility] Neuromodulation    Plan   -Continue TMS    I did not see the patient during/after treatment but remained available in the clinic during  treatment.    Evelyn Zamudio MD  [facility] Neuromodulation

## 2019-12-19 ENCOUNTER — OFFICE VISIT (OUTPATIENT)
Dept: PSYCHIATRY | Facility: CLINIC | Age: 49
End: 2019-12-19
Payer: COMMERCIAL

## 2019-12-19 VITALS — BODY MASS INDEX: 36.69 KG/M2 | WEIGHT: 197.4 LBS

## 2019-12-19 DIAGNOSIS — F33.2 SEVERE EPISODE OF RECURRENT MAJOR DEPRESSIVE DISORDER, WITHOUT PSYCHOTIC FEATURES (H): Primary | ICD-10-CM

## 2019-12-19 RX ORDER — LAMOTRIGINE 200 MG/1
300 TABLET ORAL DAILY
Qty: 45 TABLET | Refills: 1 | Status: SHIPPED | OUTPATIENT
Start: 2019-12-19 | End: 2020-02-13

## 2019-12-19 ASSESSMENT — ANXIETY QUESTIONNAIRES
2. NOT BEING ABLE TO STOP OR CONTROL WORRYING: MORE THAN HALF THE DAYS
1. FEELING NERVOUS, ANXIOUS, OR ON EDGE: NEARLY EVERY DAY
5. BEING SO RESTLESS THAT IT IS HARD TO SIT STILL: NOT AT ALL
3. WORRYING TOO MUCH ABOUT DIFFERENT THINGS: NEARLY EVERY DAY

## 2019-12-19 ASSESSMENT — PATIENT HEALTH QUESTIONNAIRE - PHQ9
SUM OF ALL RESPONSES TO PHQ QUESTIONS 1-9: 20
5. POOR APPETITE OR OVEREATING: SEVERAL DAYS

## 2019-12-19 ASSESSMENT — PAIN SCALES - GENERAL: PAINLEVEL: NO PAIN (0)

## 2019-12-19 NOTE — Clinical Note
Rashi Duque,Can we get a PA for another round of TMS? Dr. Zamudio will also write a letter to [facility] for maintenance TMS. In addition, we are ordering esketamine.Thanks!Mukesh
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
## 2019-12-19 NOTE — PATIENT INSTRUCTIONS
- Increase lamotrigine to 300mg (1.5 tablets)  - we will ask your insurance about the potential for maintenance TMS  - we can continue to look into VNS as a future option  - please go to any [facility] lab at your convenience to have kidney/liver function checked  - we will send a prior authorization request for intranasal esketamine      INSTRUCTIONS FOR USE OF LAMICTAL  [lamotrigine]      DOSE INSTRUCTIONS:                  Titration to a therapeutic dose ALWAYS STRICTLY follows this dosing plan:  - Increase lamictal to 1.5 tablets daily (300mg)      CRITICAL ADDITIONAL DOSING INSTRUCTIONS:  - lamotrigine must be taken daily  - if you miss 2 or more days of medication, DO NOT restart at previous dose; you will be at risk for           developing a serious rash;  please call the clinic if you miss 2 or more days of medication      DO:  - call clinic if you, or another provider, makes any medication changes  - call clinic if your use of Ibuprofen (or similar) increases significantly  - call clinic if you experience any symptom listed below         FOOD: take with or without food       COMMON ADVERSE EFFECTS:  non-dangerous rash (7-14%), GI distress and blurred vision (up to 25%), dizziness (up to 54%),  headache (29%), ataxia, insomnia, somnolence, tremor, dizziness, low risk for anxiety and depression;  [please call the clinic or present to urgent care for any rash]      CALL CLINIC URGENTLY or EMERGENCY ROOM:  if you have any concerns, especially the following...  - unusual itching, dark urine, yellow skin/eyes, any skin changes  - thoughts of death or hurting yourself    - LAMICTAL RASH (Shah-Mahesh rash)-    You indicated that you understand use and risks of Lamictal.  This includes the fact that Lamictal must be     taken daily and cannot be restarted at previous dose if 2 or more days of medication are missed (see above).       If the following RASH occurs, STOP lamotrigine and go to the ED:    rash  accompanied by fever or flu-like symptoms, and loss of appetite.    involves the face, mouth, tongue, nose, eyes ('above the neck') and genital or anal areas.    more prominent in the neck and upper trunk.     merging rash that appear red and swollen (wheal or hives)    blistering     purplish, small spots that are tender to touch.    skin redness and swelling all over the body, with or without skin shedding

## 2019-12-20 ENCOUNTER — TELEPHONE (OUTPATIENT)
Dept: PSYCHIATRY | Facility: CLINIC | Age: 49
End: 2019-12-20

## 2019-12-20 NOTE — TELEPHONE ENCOUNTER
Prior Authorization Retail Medication Request    Medication/Dose: Spravato 56 mg   ICD code (if different than what is on RX):  F33.2  Previously Tried and Failed:  See chart  Rationale:  Patient has failed multiple trials of medications     Insurance Name:     Insurance ID:  [id number]       Pharmacy Information (if different than what is on RX)  Name:  [facility]   Phone:  [phone]

## 2019-12-20 NOTE — TELEPHONE ENCOUNTER
Central Prior Authorization Team   Phone: [phone]      PA Initiation via fax    Medication: esketamine (SPRAVATO) 56 mg dose kit  Insurance Company: [employer] - Phone [phone] Fax [phone]  Pharmacy Filling the Rx: [id number] [place] [address] & [facility]  Filling Pharmacy Phone: [phone]  Filling Pharmacy Fax:    Start Date: 12/20/2019

## 2019-12-20 NOTE — LETTER
December 27, 2019    RE:Aure Joy  [address] [address]    Request: Authorization for Treatment with Spravato (esketamine) Nasal [place] CIII  Diagnosis: Severe Episode of recurrent Major Depressive Disorder, without Psychotic Features   Dose and Frequency: 56 mg Twice a week for one month, 56 mg weekly for one month  Request Type: Standard    To Whom It May Concern,    I am writing to support my request for an authorization for the above mentioned patient to receive treatment with Spravato for Treatment Resistant Depression.  My request is supported by the following.      Summary of Patient's Diagnosis   Ms. Joywas diagnosed with Severe Episode of Major Depressive Disorder  years ago, nine years ago, but previously has reported depression since age 12.  Ms. Joy currently endorses symptoms of depressed mood, anhedonia, low energy, insomnia, appetite changes, poor concentration /memory, excessive guilt, indecisiveness and self-critical cognitions.     Summary of Patient's History   Ms. Joy has had adequate trials of citalopram, fluoxetine, bupropion, amitriptyline, desipramine, duloxetine, sertraline, and venlafaxine without any response.  Ms. Joy has also had three acute courses of Transcranial Magnetic Stimulation and a course of Electroconvulsive Therapy.      Ms. Joy has failed many medications along with two neuromodulation techniques, therefore making Spravato the next line in treatment.    Rationale for Treatment  Considering Ms. Joy's history, condition and the full prescribing information supporting uses of Spravato, I believe treatment with Spravato at this time is medically necessary, and should be a covered and reimbursed service.  Our clinic is certified with the Spravato REMS program and patient will be enrolled once Spravato is approved.        Please let us know if you have any questions or require more information.  Thank you.                    Sincerely,      ESTELA  Sarah Zamudio MD

## 2019-12-23 DIAGNOSIS — F33.2 SEVERE EPISODE OF RECURRENT MAJOR DEPRESSIVE DISORDER, WITHOUT PSYCHOTIC FEATURES (H): ICD-10-CM

## 2019-12-23 PROCEDURE — 80053 COMPREHEN METABOLIC PANEL: CPT | Performed by: PSYCHIATRY & NEUROLOGY

## 2019-12-23 PROCEDURE — 36415 COLL VENOUS BLD VENIPUNCTURE: CPT | Performed by: PSYCHIATRY & NEUROLOGY

## 2019-12-23 NOTE — TELEPHONE ENCOUNTER
PRIOR AUTHORIZATION DENIED    Medication: esketamine (SPRAVATO) 56 mg dose kit-DENIED    Denial Date: 12/21/2019    Denial Rational:           Appeal Information:

## 2019-12-24 LAB
ALBUMIN SERPL-MCNC: 3.5 G/DL (ref 3.4–5)
ALP SERPL-CCNC: 95 U/L (ref 40–150)
ALT SERPL W P-5'-P-CCNC: 28 U/L (ref 0–50)
ANION GAP SERPL CALCULATED.3IONS-SCNC: 6 MMOL/L (ref 3–14)
AST SERPL W P-5'-P-CCNC: 14 U/L (ref 0–45)
BILIRUB SERPL-MCNC: 0.1 MG/DL (ref 0.2–1.3)
BUN SERPL-MCNC: 8 MG/DL (ref 7–30)
CALCIUM SERPL-MCNC: 9.1 MG/DL (ref 8.5–10.1)
CHLORIDE SERPL-SCNC: 107 MMOL/L (ref 94–109)
CO2 SERPL-SCNC: 26 MMOL/L (ref 20–32)
CREAT SERPL-MCNC: 0.69 MG/DL (ref 0.52–1.04)
GFR SERPL CREATININE-BSD FRML MDRD: >90 ML/MIN/{1.73_M2}
GLUCOSE SERPL-MCNC: 127 MG/DL (ref 70–99)
POTASSIUM SERPL-SCNC: 3.9 MMOL/L (ref 3.4–5.3)
PROT SERPL-MCNC: 7 G/DL (ref 6.8–8.8)
SODIUM SERPL-SCNC: 139 MMOL/L (ref 133–144)

## 2019-12-27 DIAGNOSIS — F33.2 SEVERE EPISODE OF RECURRENT MAJOR DEPRESSIVE DISORDER, WITHOUT PSYCHOTIC FEATURES (H): ICD-10-CM

## 2019-12-27 NOTE — TELEPHONE ENCOUNTER
PRIOR AUTHORIZATION DENIED    Medication: esketamine (SPRAVATO) 56 mg dose kit-DENIED    Denial Date: 12/24/2019    Denial Rational:             Appeal Information:

## 2019-12-30 NOTE — TELEPHONE ENCOUNTER
Medication Appeal Initiation    We have initiated an appeal for the requested medication:  Medication: esketamine (SPRAVATO) 56 mg dose kit-APPEAL PENDING  Appeal Start Date:  12/30/2019  Insurance Company: [employer] - Phone [phone] Fax [phone]  Comments:  Appeal letter faxed to [facility]

## 2019-12-30 NOTE — TELEPHONE ENCOUNTER
Prior Authorization Approval    Authorization Effective Date: 12/30/2019  Authorization Expiration Date: 3/30/2020  Medication: esketamine (SPRAVATO) 56 mg dose kit-APPEAL PENDING  Approved Dose/Quantity:   Reference #:     Insurance Company: [employer] - Phone [phone] Fax [phone]  Expected CoPay:       CoPay Card Available:      Foundation Assistance Needed:    Which Pharmacy is filling the prescription (Not needed for infusion/clinic administered): [id number] - [place] [address] & [facility]  Pharmacy Notified: Yes  Patient Notified: No      Per pharmacy, [facility] is unable to order the medication in so patient must fill at a pharmacy that can.

## 2019-12-30 NOTE — TELEPHONE ENCOUNTER
"Received phone call from [employer] Appeals Department and spoke with Tiny. She was calling to inform that the appeal was approved as of today's date 12/30/2019. She did not have the exact approval dates as it has not completely updated yet in the sytem.  She also inquired about the patient's phone number as they were going to call her.  I told her that I would send this to my co-worker that was working on the case to let her know that the appeal was approved.  It was approved based on these directions: \"2 weekly for one month\", then \"1 weekly for one month.  "

## 2019-12-31 NOTE — TELEPHONE ENCOUNTER
-Writer called Aure and directed her to the website to download the Spravato enrollment form.  She will fill it out and drop it off at the clinic.

## 2020-01-07 ENCOUNTER — TELEPHONE (OUTPATIENT)
Dept: PSYCHIATRY | Facility: CLINIC | Age: 50
End: 2020-01-07

## 2020-01-07 DIAGNOSIS — F33.2 SEVERE EPISODE OF RECURRENT MAJOR DEPRESSIVE DISORDER, WITHOUT PSYCHOTIC FEATURES (H): Primary | ICD-10-CM

## 2020-01-10 ENCOUNTER — TELEPHONE (OUTPATIENT)
Dept: PSYCHIATRY | Facility: CLINIC | Age: 50
End: 2020-01-10

## 2020-01-10 NOTE — TELEPHONE ENCOUNTER
Prior Authorization Approval    Authorization Effective Date: 12/10/2019  Authorization Expiration Date: 4/10/2020  Medication: esketamine (SPRAVATO) 56 mg dose kit-APPROVED  Approved Dose/Quantity:   Reference #:     Insurance Company: [employer] - Phone [phone] Fax [phone]  Expected CoPay:       CoPay Card Available:      Foundation Assistance Needed:    Which Pharmacy is filling the prescription (Not needed for infusion/clinic administered): [id number] - [place], [place] - [address]  Pharmacy Notified: Yes  Patient Notified: No    Pharmacist at [facility] states that even if the drug is covered up to 8 packages for 28 days, they can only dispense 1 box at a time legally per REMS program. They are processing the script at 1 box per 1 day, however that exceeds the limit the insurance will cover.

## 2020-01-10 NOTE — TELEPHONE ENCOUNTER
Prior Authorization Retail Medication Request    Medication/Dose: esketamine (SPRAVATO) 56 mg dose kit  ICD code (if different than what is on RX):  F33.2  Previously Tried and Failed:   Rationale:  Patient has failed multiple trials of medications     Insurance Name:  [employer]  Insurance ID:  [id number]      Pharmacy Information (if different than what is on RX)  Name:  [id number] - [place], [place] - [address]  Phone:  [phone]    Resubmit PA due to pharmacy change

## 2020-01-10 NOTE — TELEPHONE ENCOUNTER
PA Initiation via fax    Medication: esketamine (SPRAVATO) 56 mg dose kit  Insurance Company: [employer] - Phone [phone] Fax [phone]  Pharmacy Filling the Rx: [id number] - [place], [place] - [address] [place] [address] AVE  Filling Pharmacy Phone: [phone]  Filling Pharmacy Fax:    Start Date: 1/10/2020    Resubmitted PA to fill at [facility], [place].

## 2020-01-21 ENCOUNTER — ALLIED HEALTH/NURSE VISIT (OUTPATIENT)
Dept: PSYCHIATRY | Facility: CLINIC | Age: 50
End: 2020-01-21
Payer: COMMERCIAL

## 2020-01-21 VITALS — OXYGEN SATURATION: 96 % | HEART RATE: 64 BPM | SYSTOLIC BLOOD PRESSURE: 134 MMHG | DIASTOLIC BLOOD PRESSURE: 90 MMHG

## 2020-01-21 DIAGNOSIS — F33.2 SEVERE EPISODE OF RECURRENT MAJOR DEPRESSIVE DISORDER, WITHOUT PSYCHOTIC FEATURES (H): Primary | ICD-10-CM

## 2020-01-21 ASSESSMENT — PATIENT HEALTH QUESTIONNAIRE - PHQ9: SUM OF ALL RESPONSES TO PHQ QUESTIONS 1-9: 20

## 2020-01-21 NOTE — PROGRESS NOTES
Aure Joy comes into clinic today at the request of ESTELA Zamudio MD Ordering Provider for Medication Management:  Spravato Admin.    Medication checked by: Eliot Fuentes RN  Prior to administration pt identified by name and : yes    Appearance: dressed casually   Mood: depressed, anxious   Affect: flat  Eye contact: good  Side effects: dissociation   Level of cooperation: cooperative  Education: Reviewed how to use Spravato and let patient practice with  device.    Next appt: Thursday       I spent an additional 120 minutes today, in direct patient contact monitoring the patient after Spravato administration. Patient had the following issues; First treatment for treatment resistant depression, vitals monitored through entire visit.      Medication provided by Pharmacy      This service provided today was under the supervising provider of the day ESTELA Zamudio MD, who was available if needed.    BEN CORDOVA, RN

## 2020-01-23 ENCOUNTER — ALLIED HEALTH/NURSE VISIT (OUTPATIENT)
Dept: PSYCHIATRY | Facility: CLINIC | Age: 50
End: 2020-01-23
Payer: COMMERCIAL

## 2020-01-23 VITALS — DIASTOLIC BLOOD PRESSURE: 85 MMHG | HEART RATE: 70 BPM | SYSTOLIC BLOOD PRESSURE: 143 MMHG | OXYGEN SATURATION: 98 %

## 2020-01-23 DIAGNOSIS — F33.2 SEVERE EPISODE OF RECURRENT MAJOR DEPRESSIVE DISORDER, WITHOUT PSYCHOTIC FEATURES (H): Primary | ICD-10-CM

## 2020-01-23 ASSESSMENT — PATIENT HEALTH QUESTIONNAIRE - PHQ9: SUM OF ALL RESPONSES TO PHQ QUESTIONS 1-9: 20

## 2020-01-23 NOTE — PROGRESS NOTES
Aure Joy comes into clinic today at the request of ESTELA Zamudio MD Ordering Provider for Medication Management:  Spravato Admin .    Medication checked by: Eliot Fuentes RN  Prior to administration pt identified by name and : yes    Appearance: dressed casually   Mood: depressed, anxious  Affect: congruent to mood  Eye contact: good  Side effects: dissociatoin  Level of cooperation: cooperatve  Education: none needed  Next appt: next week     I spent an additional 120 minutes today, in direct patient contact monitoring the patient after Spravato administration. Patient had the following issues; depression continues, vitals monitored through entire visit today.      Medication provided by Pharmacy      This service provided today was under the supervising provider of the day ESTELA Zamudio MD, who was available if needed.    BEN CORDOVA, RN

## 2020-01-28 ENCOUNTER — ALLIED HEALTH/NURSE VISIT (OUTPATIENT)
Dept: PSYCHIATRY | Facility: CLINIC | Age: 50
End: 2020-01-28
Payer: COMMERCIAL

## 2020-01-28 VITALS — HEART RATE: 93 BPM | OXYGEN SATURATION: 99 % | SYSTOLIC BLOOD PRESSURE: 117 MMHG | DIASTOLIC BLOOD PRESSURE: 77 MMHG

## 2020-01-28 DIAGNOSIS — F33.2 SEVERE EPISODE OF RECURRENT MAJOR DEPRESSIVE DISORDER, WITHOUT PSYCHOTIC FEATURES (H): Primary | ICD-10-CM

## 2020-01-28 NOTE — PROGRESS NOTES
Aure Joy comes into clinic today at the request of ESTELA Zamudio MD Ordering Provider for Medication Management:  Spravato Admin.    Medication checked by: Irma East RN  Prior to administration pt identified by name and : yes    Appearance: dressed casually   Mood: depressed  Affect: flat  Eye contact: good  Side effects: dissociation  Level of cooperation: cooperative  Education: none needed  Next appt: Thursday       I spent an additional 120 minutes today, in direct patient contact monitoring the patient after Spravato administration. Patient had the following issues; depression continues, vitals monitored through entire visit.    Medication provided by Pharmacy      This service provided today was under the supervising provider of the day ESTELA Zamudio MD, who was available if needed.    BEN CORDOVA, RN

## 2020-01-30 ENCOUNTER — ALLIED HEALTH/NURSE VISIT (OUTPATIENT)
Dept: PSYCHIATRY | Facility: CLINIC | Age: 50
End: 2020-01-30
Payer: COMMERCIAL

## 2020-01-30 VITALS
SYSTOLIC BLOOD PRESSURE: 119 MMHG | RESPIRATION RATE: 16 BRPM | DIASTOLIC BLOOD PRESSURE: 88 MMHG | OXYGEN SATURATION: 99 % | HEART RATE: 71 BPM

## 2020-01-30 DIAGNOSIS — F33.2 SEVERE EPISODE OF RECURRENT MAJOR DEPRESSIVE DISORDER, WITHOUT PSYCHOTIC FEATURES (H): Primary | ICD-10-CM

## 2020-01-31 ENCOUNTER — ALLIED HEALTH/NURSE VISIT (OUTPATIENT)
Dept: PSYCHIATRY | Facility: CLINIC | Age: 50
End: 2020-01-31
Payer: COMMERCIAL

## 2020-01-31 ENCOUNTER — OFFICE VISIT (OUTPATIENT)
Dept: PSYCHIATRY | Facility: CLINIC | Age: 50
End: 2020-01-31
Payer: COMMERCIAL

## 2020-01-31 VITALS — SYSTOLIC BLOOD PRESSURE: 136 MMHG | HEART RATE: 75 BPM | DIASTOLIC BLOOD PRESSURE: 87 MMHG

## 2020-01-31 DIAGNOSIS — F33.2 SEVERE EPISODE OF RECURRENT MAJOR DEPRESSIVE DISORDER, WITHOUT PSYCHOTIC FEATURES (H): Primary | ICD-10-CM

## 2020-01-31 ASSESSMENT — PATIENT HEALTH QUESTIONNAIRE - PHQ9: SUM OF ALL RESPONSES TO PHQ QUESTIONS 1-9: 8

## 2020-01-31 NOTE — PROGRESS NOTES
[facility] TMS Program  [address], [address]  [place] [address]  TMS Procedure Note   Aure Joy MRN# [id number]  Age: 49 year old year old YOB: 1970    Pre-Procedure:  History and Physical: Reviewed in medical record  Consent Signed by: Aure Joy  On: 1/31/2020    Clinical Narrative:  Patient presents for treatment of recurrent depression.    Indications for TMS:  MDD, recurrent, severe; 4+ medication trials (from 2+ classes) ineffective; Psychotherapy ineffective.     Pre-Procedure Diagnosis:  MDD, recurrent, severe F33.2    Treatment Hx:  Treatment number this series: 1  Total lifetime treatment number: 78    Allergies   Allergen Reactions     Codeine Nausea     Other  [No Clinical Screening - See Comments] Nausea and Vomiting and Other (See Comments)     general anesthesia- uncontrolled vomitting      /87 (BP Location: Right arm, Patient Position: Sitting, Cuff Size: Adult Regular)   Pulse 75     Pause for the Cause  Right patient:  Yes  Right procedure/correct coil:  Yes; rTMS; cpt 14307; F8 coil.   Earplugs in place:  Yes    Procedure  Patient was seated in procedure chair.  Identity and procedure was verified.  Ear plugs were placed in ears and patient-specific cap was placed on head and tightened appropriately.  Ruler locations were determined according to standard cranial landmarks. Coil was placed at C3 and stimulator was set to initial 50%. Mapping pulses were delivered and response was quantified by visual inspection.  MT was found with specific location and energy detailed below. Coil was placed at treatment location and stimulator was set to parameters described below. A test train was delivered which pt tolerated well. Given pt tolerance,  60 treatment trains were delivered to the left side and 3 treatment trains were delivered to the right side . Pt tolerated procedure well with forehead movement.    Motor Threshold Determination  Distance from nasion to  inion: 35.5  MT 1: 0 - 6 - 16 @ 69% on 1/29/2018  MT 2: 0 - 6 - 16 @ 69% on 2/6/2018   MT 3: 0 - 6 - 16 @ 69% on 2/13/2018   MT 4: 0 - 6 - 16 @ 69% on 2/23/2018   MT 5: 0 - 6 - 16 @ 69% on 3/2/2018   MT 6: 0 - 5.5 - 15.5(always use intersection at left side of ruler)@ 85% on 8/23/19  MT 7: 0 - 5.5 - 15.5(always use intersection at left side of ruler)@ 80% on 8/29/19  MT 8: 0 - 5.5 - 37.5(always use intersection at left side of ruler)@ 76% on 9/5/19 (right side using EMG on left hand)  MT 9: C2 (R side using EMG on L hand) 78% on 9/12/2019  MT 10: C2 (R side using EMG on L hand) 76% on 9/26/2019  MT 11: C2 (R side using EMG on L hand) 82 on 1/31/2020    LDLPFC:  Frequency: 50 Hz  Number of pulses:  3                        Number of bursts: 10  Burst frequency: 5 Hz  Cycle time: 10 sec  Total pulses delivered: 1800  Tx Loc: F3  Energy: 73% (90% MT)  Number of Cycles: 60 trains    RDLPFC:  Frequency: 50 Hz  Number of pulses:  3                        Number of bursts: 200  Burst frequency: 5 Hz  Cycle time: 79.0  Total pulses delivered: 1800  Tx Loc: F4 (right side)  Energy: 73% (90% MT)  Number of Cycles: 3 trains        Rating Scales:  Date MADRS IDS-SR PHQ-9   01/31/20 23  8    --         Post-Procedure Diagnosis:  MDD, recurrent, severe 296.33    Irma Marley  [facility]  Mental [facility] Neuromodulation      Plan   - Cont TMS  - increase energy on right side as tolerated     I saw the patient and determined the motor threshold using EMG. I remained available at the clinic throughout the TMS session    Elizabeth Gordon MD  [facility] Neuromodulation

## 2020-01-31 NOTE — PROGRESS NOTES
TMS Education     Aure Joy MRN# [id number]  Age: 49 year old year old YOB: 1970        Patient is here in clinic for TMS education and consenting.  Patient will be starting TMS today on the FIMBex.  Writer and patient reviewed mechanics of TMS.  Discussed using magnetic pulses to stimulate and induce an electrical field inside the brain.  Discussed the difference between F8 and H1 coil.  Patient was able to verbalize understanding of this.  Discussed that the idea is that we are treating the DLPFC of the brain, as it has been shown by in studies that people who have depression have decreased activity in this part of the brain, the idea is that we stimulate this part of the brain to increase activity.       Reviewed processing of mapping and how visit today would go.  Encouraged patient to communicate with staff if treatment at anytime became painful.         Discussed side effects of TMS.  Side effects include headaches after treatment, hearing loss, lightheadness, and seizures.  Discussed ways that TMS Clinic helps with preventing these side effects.  Such as retesting motor thresholds weekly and having patient wear ear plugs.  Instructed patient that she can take OTC pain relievers such as tylenol or IBU if she has a headache after today's treatment.       Patient was able to ask questions regarding procedure.  Encouraged patient to communicate with treatment team should there be any changes.     Consent was signed by patient today.

## 2020-02-03 ENCOUNTER — OFFICE VISIT (OUTPATIENT)
Dept: PSYCHIATRY | Facility: CLINIC | Age: 50
End: 2020-02-03
Payer: COMMERCIAL

## 2020-02-03 VITALS — SYSTOLIC BLOOD PRESSURE: 159 MMHG | DIASTOLIC BLOOD PRESSURE: 95 MMHG | HEART RATE: 85 BPM

## 2020-02-03 DIAGNOSIS — F33.2 SEVERE RECURRENT MAJOR DEPRESSION WITHOUT PSYCHOTIC FEATURES (H): Primary | ICD-10-CM

## 2020-02-03 ASSESSMENT — PATIENT HEALTH QUESTIONNAIRE - PHQ9: SUM OF ALL RESPONSES TO PHQ QUESTIONS 1-9: 4

## 2020-02-03 NOTE — PROGRESS NOTES
[facility] TMS Program  [address], [address]  [place] [address]  TMS Procedure Note   Aure Joy MRN# [id number]  Age: 49 year old year old YOB: 1970    Pre-Procedure:  History and Physical: Reviewed in medical record  Consent Signed by: Aure Joy  On: 1/31/2020    Clinical Narrative:  Patient tolerating treatment, no side effect after first treatment.  Machine overheated prior to 3rd train on right side, needed to let it cool down before administering that final train.  May need to treat at less then 80% (97% MT) to prevent this.    Indications for TMS:  MDD, recurrent, severe; 4+ medication trials (from 2+ classes) ineffective; Psychotherapy ineffective.     Pre-Procedure Diagnosis:  MDD, recurrent, severe F33.2    Treatment Hx:  Treatment number this series: 2  Total lifetime treatment number: 79    Allergies   Allergen Reactions     Codeine Nausea     Other  [No Clinical Screening - See Comments] Nausea and Vomiting and Other (See Comments)     general anesthesia- uncontrolled vomitting      BP (!) 159/95 (BP Location: Right arm, Patient Position: Sitting, Cuff Size: Adult Regular)   Pulse 85     Pause for the Cause  Right patient:  Yes  Right procedure/correct coil:  Yes; rTMS; cpt 52854; F8 coil.   Earplugs in place:  Yes    Procedure  Patient was seated in procedure chair. Identity and procedure was verified. Ear plugs were placed in ears and patient-specific cap was placed on head and tightened appropriately. Ruler locations were verified. Coil was placed at treatment location and stimulator was set to parameters described below. A test train was delivered and pt tolerated train. Given pt tolerance, 60 treatment trains were delivered to the left side and 3 trains were delivered to the right side. Pt tolerated procedure with forehead movement.      Motor Threshold Determination  Distance from nasion to inion: 35.5  MT 1: 0 - 6 - 16 @ 69% on 1/29/2018  MT 2: 0 - 6 - 16  @ 69% on 2/6/2018   MT 3: 0 - 6 - 16 @ 69% on 2/13/2018   MT 4: 0 - 6 - 16 @ 69% on 2/23/2018   MT 5: 0 - 6 - 16 @ 69% on 3/2/2018   MT 6: 0 - 5.5 - 15.5(always use intersection at left side of ruler)@ 85% on 8/23/19  MT 7: 0 - 5.5 - 15.5(always use intersection at left side of ruler)@ 80% on 8/29/19  MT 8: 0 - 5.5 - 37.5(always use intersection at left side of ruler)@ 76% on 9/5/19 (right side using EMG on left hand)  MT 9: C2 (R side using EMG on L hand) 78% on 9/12/2019  MT 10: C2 (R side using EMG on L hand) 76% on 9/26/2019  MT 11: C2 (R side using EMG on L hand) 82 on 1/31/2020    LDLPFC:  Frequency: 50 Hz  Number of pulses:  3                        Number of bursts: 10  Burst frequency: 5 Hz  Cycle time: 10 sec  Total pulses delivered: 1800  Tx Loc: F3  Energy: 73% (90% MT)  Number of Cycles: 60 trains    RDLPFC:  Frequency: 50 Hz  Number of pulses:  3                        Number of bursts: 200  Burst frequency: 5 Hz  Cycle time: 97.0  Total pulses delivered: 1800  Tx Loc: F4 (right side)  Energy: 80% (97% MT, maximum due to stimulator limitations)  Number of Cycles: 3 trains        Rating Scales:  Date MADRS IDS-SR PHQ-9   01/31/20 23  8    --         Post-Procedure Diagnosis:  MDD, recurrent, severe 296.33    Irma Marley  [facility] Neuromodulation      Plan   - Cont TMS      I did not see patient but remained available in the clinic throughout the TMS session    Elizabeth Gordon MD  [facility] Neuromodulation

## 2020-02-04 ENCOUNTER — OFFICE VISIT (OUTPATIENT)
Dept: PSYCHIATRY | Facility: CLINIC | Age: 50
End: 2020-02-04
Payer: COMMERCIAL

## 2020-02-04 VITALS — HEART RATE: 88 BPM | DIASTOLIC BLOOD PRESSURE: 92 MMHG | SYSTOLIC BLOOD PRESSURE: 141 MMHG

## 2020-02-04 DIAGNOSIS — F33.2 SEVERE RECURRENT MAJOR DEPRESSION WITHOUT PSYCHOTIC FEATURES (H): Primary | ICD-10-CM

## 2020-02-04 ASSESSMENT — PATIENT HEALTH QUESTIONNAIRE - PHQ9: SUM OF ALL RESPONSES TO PHQ QUESTIONS 1-9: 5

## 2020-02-04 NOTE — PROGRESS NOTES
[facility] TMS Program  [address], [address]  [place] [address]  TMS Procedure Note   Aure Joy MRN# [id number]  Age: 49 year old year old YOB: 1970    Pre-Procedure:  History and Physical: Reviewed in medical record  Consent Signed by: Aure Joy  On: 1/31/2020    Clinical Narrative:  Patient tolerating treatment with no side effects.      Indications for TMS:  MDD, recurrent, severe; 4+ medication trials (from 2+ classes) ineffective; Psychotherapy ineffective.     Pre-Procedure Diagnosis:  MDD, recurrent, severe F33.2    Treatment Hx:  Treatment number this series: 3  Total lifetime treatment number: 80    Allergies   Allergen Reactions     Codeine Nausea     Other  [No Clinical Screening - See Comments] Nausea and Vomiting and Other (See Comments)     general anesthesia- uncontrolled vomitting      BP (!) 141/92 (BP Location: Right arm, Patient Position: Sitting, Cuff Size: Adult Regular)   Pulse 88     Pause for the Cause  Right patient:  Yes  Right procedure/correct coil:  Yes; rTMS; cpt 01289; F8 coil.   Earplugs in place:  Yes    Procedure  Patient was seated in procedure chair. Identity and procedure was verified. Ear plugs were placed in ears and patient-specific cap was placed on head and tightened appropriately. Ruler locations were verified. Coil was placed at treatment location and stimulator was set to parameters described below. A test train was delivered and pt tolerated train. Given pt tolerance, 60 treatment trains were delivered to the left side and 3 trains were delivered to the right side. Pt tolerated procedure with forehead movement.      Motor Threshold Determination  Distance from nasion to inion: 35.5  MT 1: 0 - 6 - 16 @ 69% on 1/29/2018  MT 2: 0 - 6 - 16 @ 69% on 2/6/2018   MT 3: 0 - 6 - 16 @ 69% on 2/13/2018   MT 4: 0 - 6 - 16 @ 69% on 2/23/2018   MT 5: 0 - 6 - 16 @ 69% on 3/2/2018   MT 6: 0 - 5.5 - 15.5(always use intersection at left side of  ruler)@ 85% on 8/23/19  MT 7: 0 - 5.5 - 15.5(always use intersection at left side of ruler)@ 80% on 8/29/19  MT 8: 0 - 5.5 - 37.5(always use intersection at left side of ruler)@ 76% on 9/5/19 (right side using EMG on left hand)  MT 9: C2 (R side using EMG on L hand) 78% on 9/12/2019  MT 10: C2 (R side using EMG on L hand) 76% on 9/26/2019  MT 11: C2 (R side using EMG on L hand) 82 on 1/31/2020    LDLPFC:  Frequency: 50 Hz  Number of pulses:  3                        Number of bursts: 10  Burst frequency: 5 Hz  Cycle time: 10 sec  Total pulses delivered: 1800  Tx Loc: F3  Energy: 80% (97% MT, maximum due to stimulator limitations)  Number of Cycles: 60 trains    RDLPFC:  Frequency: 50 Hz  Number of pulses:  3                        Number of bursts: 200  Burst frequency: 5 Hz  Cycle time: 100.0 (overheated after 2 trains, will need to extend this time, try 105.0)  Total pulses delivered: 1800  Tx Loc: F4 (right side)  Energy: 80% (97% MT, maximum due to stimulator limitations)  Number of Cycles: 3 trains        Rating Scales:  Date MADRS IDS-SR PHQ-9   01/31/20 23  8    --         Post-Procedure Diagnosis:  MDD, recurrent, severe 296.33    Irma Marley  [facility] Neuromodulation      Plan   - Cont TMS      I did not see the patient during/after treatment but remained available in the clinic during  treatment.    Evelyn Zamudio MD  [facility] Neuromodulation

## 2020-02-05 ENCOUNTER — ALLIED HEALTH/NURSE VISIT (OUTPATIENT)
Dept: PSYCHIATRY | Facility: CLINIC | Age: 50
End: 2020-02-05
Payer: COMMERCIAL

## 2020-02-05 ENCOUNTER — OFFICE VISIT (OUTPATIENT)
Dept: PSYCHIATRY | Facility: CLINIC | Age: 50
End: 2020-02-05
Payer: COMMERCIAL

## 2020-02-05 VITALS — HEART RATE: 74 BPM | SYSTOLIC BLOOD PRESSURE: 133 MMHG | OXYGEN SATURATION: 96 % | DIASTOLIC BLOOD PRESSURE: 87 MMHG

## 2020-02-05 VITALS — HEART RATE: 76 BPM | SYSTOLIC BLOOD PRESSURE: 124 MMHG | DIASTOLIC BLOOD PRESSURE: 83 MMHG

## 2020-02-05 DIAGNOSIS — F32.2 CURRENT SEVERE EPISODE OF MAJOR DEPRESSIVE DISORDER WITHOUT PSYCHOTIC FEATURES WITHOUT PRIOR EPISODE (H): Primary | ICD-10-CM

## 2020-02-05 ASSESSMENT — PATIENT HEALTH QUESTIONNAIRE - PHQ9: SUM OF ALL RESPONSES TO PHQ QUESTIONS 1-9: 5

## 2020-02-05 NOTE — PROGRESS NOTES
[facility] TMS Program  [address], [address]  [place] [address]  TMS Procedure Note   Aure Joy MRN# [id number]  Age: 49 year old year old YOB: 1970    Pre-Procedure:  History and Physical: Reviewed in medical record  Consent Signed by: Aure Joy  On: 1/31/2020    Clinical Narrative:  Patient tolerating treatment with no side effects.    Indications for TMS:  MDD, recurrent, severe; 4+ medication trials (from 2+ classes) ineffective; Psychotherapy ineffective.     Pre-Procedure Diagnosis:  MDD, recurrent, severe F33.2    Treatment Hx:  Treatment number this series: 4  Total lifetime treatment number: 81    Allergies   Allergen Reactions     Codeine Nausea     Other  [No Clinical Screening - See Comments] Nausea and Vomiting and Other (See Comments)     general anesthesia- uncontrolled vomitting      /83 (BP Location: Right arm, Patient Position: Sitting, Cuff Size: Adult Regular)   Pulse 76     Pause for the Cause  Right patient:  Yes  Right procedure/correct coil:  Yes; rTMS; cpt 88792; F8 coil.   Earplugs in place:  Yes    Procedure  Patient was seated in procedure chair. Identity and procedure was verified. Ear plugs were placed in ears and patient-specific cap was placed on head and tightened appropriately. Ruler locations were verified. Coil was placed at treatment location and stimulator was set to parameters described below. A test train was delivered and pt tolerated train. Given pt tolerance, 60 treatment trains were delivered to the left side and 3 trains were delivered to the right side. Pt tolerated procedure with forehead movement.      Motor Threshold Determination  Distance from nasion to inion: 35.5  MT 1: 0 - 6 - 16 @ 69% on 1/29/2018  MT 2: 0 - 6 - 16 @ 69% on 2/6/2018   MT 3: 0 - 6 - 16 @ 69% on 2/13/2018   MT 4: 0 - 6 - 16 @ 69% on 2/23/2018   MT 5: 0 - 6 - 16 @ 69% on 3/2/2018   MT 6: 0 - 5.5 - 15.5(always use intersection at left side of ruler)@  85% on 8/23/19  MT 7: 0 - 5.5 - 15.5(always use intersection at left side of ruler)@ 80% on 8/29/19  MT 8: 0 - 5.5 - 37.5(always use intersection at left side of ruler)@ 76% on 9/5/19 (right side using EMG on left hand)  MT 9: C2 (R side using EMG on L hand) 78% on 9/12/2019  MT 10: C2 (R side using EMG on L hand) 76% on 9/26/2019  MT 11: C2 (R side using EMG on L hand) 82 on 1/31/2020    LDLPFC:  Frequency: 50 Hz  Number of pulses:  3                        Number of bursts: 10  Burst frequency: 5 Hz  Cycle time: 10 sec  Total pulses delivered: 1800  Tx Loc: F3  Energy: 80% (97% MT, maximum due to stimulator limitations)  Number of Cycles: 60 trains    RDLPFC:  Frequency: 50 Hz  Number of pulses:  3                        Number of bursts: 200  Burst frequency: 5 Hz  Cycle time: 100.0 (overheated after 2 trains, will need to extend this time, try 105.0)  Total pulses delivered: 1800  Tx Loc: F4 (right side)  Energy: 80% (97% MT, maximum due to stimulator limitations)  Number of Cycles: 3 trains    Rating Scales:  Date MADRS IDS-SR PHQ-9   01/31/20 23  8    --         Post-Procedure Diagnosis:  MDD, recurrent, severe 296.33    Tamar Balderrama  [facility] Neuromodulation      Plan   - Cont TMS      I did not see this patient but was available for potential intervention throughout the entire TMS visit.        William Rodriguez MD  [facility] Neuromodulation

## 2020-02-05 NOTE — PROGRESS NOTES
Aure Joy comes into clinic today at the request of ESTELA Zamudio MD Ordering Provider for Medication Management:  Spravato Admin .    Medication checked by: John Velazco RN  Prior to administration pt identified by name and : yes    Appearance: dressed casually   Mood: better today!  Affect: congruent to mood  Eye contact: good  Side effects: dissociation  Level of cooperation: cooperative  Education: none needed  Next appt: Friday     I spent an additional 120 minutes today, in direct patient contact monitoring the patient after Spravato administration. Patient had the following issues; depression improved, vitals monitored through entire visit today.      Medication provided by Pharmacy      This service provided today was under the supervising provider of the day Jessee Fu MD, who was available if needed.    BEN CORDOVA RN

## 2020-02-06 ENCOUNTER — OFFICE VISIT (OUTPATIENT)
Dept: PSYCHIATRY | Facility: CLINIC | Age: 50
End: 2020-02-06
Payer: COMMERCIAL

## 2020-02-06 VITALS — HEART RATE: 88 BPM | SYSTOLIC BLOOD PRESSURE: 139 MMHG | DIASTOLIC BLOOD PRESSURE: 89 MMHG

## 2020-02-06 DIAGNOSIS — F33.2 SEVERE EPISODE OF RECURRENT MAJOR DEPRESSIVE DISORDER, WITHOUT PSYCHOTIC FEATURES (H): Primary | ICD-10-CM

## 2020-02-06 ASSESSMENT — PATIENT HEALTH QUESTIONNAIRE - PHQ9: SUM OF ALL RESPONSES TO PHQ QUESTIONS 1-9: 4

## 2020-02-06 NOTE — PROGRESS NOTES
[facility] TMS Program  [address], [address]  [place] [address]  TMS Procedure Note   Aure Joy MRN# [id number]  Age: 49 year old year old YOB: 1970    Pre-Procedure:  History and Physical: Reviewed in medical record  Consent Signed by: Aure Joy  On: 1/31/2020    Clinical Narrative:  Patient tolerating treatment with no side effects. Patient report feeling better overall with TMS and ketamine together.     Indications for TMS:  MDD, recurrent, severe; 4+ medication trials (from 2+ classes) ineffective; Psychotherapy ineffective.     Pre-Procedure Diagnosis:  MDD, recurrent, severe F33.2    Treatment Hx:  Treatment number this series: 5  Total lifetime treatment number: 82    Allergies   Allergen Reactions     Codeine Nausea     Other  [No Clinical Screening - See Comments] Nausea and Vomiting and Other (See Comments)     general anesthesia- uncontrolled vomitting      /89 (BP Location: Right arm, Patient Position: Sitting, Cuff Size: Adult Regular)   Pulse 88     Pause for the Cause  Right patient:  Yes  Right procedure/correct coil:  Yes; rTMS; cpt 78618; F8 coil.   Earplugs in place:  Yes    Procedure  Patient was seated in procedure chair. Identity and procedure was verified. Ear plugs were placed in ears and patient-specific cap was placed on head and tightened appropriately. Ruler locations were verified. Coil was placed at treatment location and stimulator was set to parameters described below. A test train was delivered and pt tolerated train. Given pt tolerance, 60 treatment trains were delivered to the left side and 3 trains were delivered to the right side. Pt tolerated procedure with forehead movement.      Motor Threshold Determination  Distance from nasion to inion: 35.5  MT 1: 0 - 6 - 16 @ 69% on 1/29/2018  MT 2: 0 - 6 - 16 @ 69% on 2/6/2018   MT 3: 0 - 6 - 16 @ 69% on 2/13/2018   MT 4: 0 - 6 - 16 @ 69% on 2/23/2018   MT 5: 0 - 6 - 16 @ 69% on 3/2/2018    MT 6: 0 - 5.5 - 15.5(always use intersection at left side of ruler)@ 85% on 8/23/19  MT 7: 0 - 5.5 - 15.5(always use intersection at left side of ruler)@ 80% on 8/29/19  MT 8: 0 - 5.5 - 37.5(always use intersection at left side of ruler)@ 76% on 9/5/19 (right side using EMG on left hand)  MT 9: C2 (R side using EMG on L hand) 78% on 9/12/2019  MT 10: C2 (R side using EMG on L hand) 76% on 9/26/2019  MT 11: C2 (R side using EMG on L hand) 82 on 1/31/2020    LDLPFC:  Frequency: 50 Hz  Number of pulses:  3                        Number of bursts: 10  Burst frequency: 5 Hz  Cycle time: 10 sec  Total pulses delivered: 1800  Tx Loc: F3  Energy: 80% (97% MT, maximum due to stimulator limitations)  Number of Cycles: 60 trains    RDLPFC:  Frequency: 50 Hz  Number of pulses:  3                        Number of bursts: 200  Burst frequency: 5 Hz  Cycle time: 110.0 (overheated after 2 trains, try 120.0 sec or reduce energy)  Total pulses delivered: 1800  Tx Loc: F4 (right side)  Energy: 80% (97% MT, maximum due to stimulator limitations)  Number of Cycles: 3 trains    Rating Scales:  Date MADRS IDS-SR PHQ-9   01/31/20 23  8                   Post-Procedure Diagnosis:  MDD, recurrent, severe 296.33    Pilo Almeida  [facility]od[facility]      Plan   - Cont TMS      I did not see the patient during/after treatment but remained available in the clinic during  treatment.    Evelyn Zamudio MD  [facility]

## 2020-02-07 ENCOUNTER — OFFICE VISIT (OUTPATIENT)
Dept: PSYCHIATRY | Facility: CLINIC | Age: 50
End: 2020-02-07
Payer: COMMERCIAL

## 2020-02-07 ENCOUNTER — ALLIED HEALTH/NURSE VISIT (OUTPATIENT)
Dept: PSYCHIATRY | Facility: CLINIC | Age: 50
End: 2020-02-07
Payer: COMMERCIAL

## 2020-02-07 VITALS — HEART RATE: 80 BPM | OXYGEN SATURATION: 95 % | DIASTOLIC BLOOD PRESSURE: 75 MMHG | SYSTOLIC BLOOD PRESSURE: 123 MMHG

## 2020-02-07 VITALS — SYSTOLIC BLOOD PRESSURE: 155 MMHG | DIASTOLIC BLOOD PRESSURE: 83 MMHG | HEART RATE: 80 BPM

## 2020-02-07 DIAGNOSIS — F33.2 SEVERE EPISODE OF RECURRENT MAJOR DEPRESSIVE DISORDER, WITHOUT PSYCHOTIC FEATURES (H): Primary | ICD-10-CM

## 2020-02-07 ASSESSMENT — PATIENT HEALTH QUESTIONNAIRE - PHQ9: SUM OF ALL RESPONSES TO PHQ QUESTIONS 1-9: 7

## 2020-02-07 NOTE — PROGRESS NOTES
Aure Joy comes into clinic today at the request of ESTELA Zamudio MD Ordering Provider for Medication Management:  Spravato Admin .    Medication checked by: Irma East RN  Prior to administration pt identified by name and : yes    Appearance: dressed casually   Mood: good  Affect: congruent to mood  Eye contact: good  Side effects: dissociation   Level of cooperation: cooperative  Education: none needed  Next appt: next week     I spent an additional 120 minutes today, in direct patient contact monitoring the patient after Spravato administration. Patient had the following issues; depression improved, vitals monitored through entire visit.      Medication provided by Pharmacy      This service provided today was under the supervising provider of the day Elizabeth Gordon MD, who was available if needed.    BEN CORDOVA RN

## 2020-02-10 ENCOUNTER — OFFICE VISIT (OUTPATIENT)
Dept: PSYCHIATRY | Facility: CLINIC | Age: 50
End: 2020-02-10
Payer: COMMERCIAL

## 2020-02-10 ENCOUNTER — TELEPHONE (OUTPATIENT)
Dept: PSYCHIATRY | Facility: CLINIC | Age: 50
End: 2020-02-10

## 2020-02-10 VITALS — HEART RATE: 103 BPM | DIASTOLIC BLOOD PRESSURE: 116 MMHG | SYSTOLIC BLOOD PRESSURE: 150 MMHG

## 2020-02-10 DIAGNOSIS — F33.2 SEVERE RECURRENT MAJOR DEPRESSION WITHOUT PSYCHOTIC FEATURES (H): Primary | ICD-10-CM

## 2020-02-10 ASSESSMENT — PATIENT HEALTH QUESTIONNAIRE - PHQ9: SUM OF ALL RESPONSES TO PHQ QUESTIONS 1-9: 10

## 2020-02-10 NOTE — TELEPHONE ENCOUNTER
What is the concern that needs to be addressed by a nurse? There was information missing from the [facility] Patient Monitoring form from 2/7/20 visit. Please call with the reference number: [id number]     May a detailed message be left on voicemail? n/a    Date of last office visit: n/a    Message routed to: Jael Alexandra

## 2020-02-10 NOTE — TELEPHONE ENCOUNTER
-Writer called and spoke with Irma at [facility]  Answered her question on patient monitoring form.  No further action needed.

## 2020-02-10 NOTE — PROGRESS NOTES
[facility] TMS Program  [address], [address]  [place] [address]  TMS Procedure Note   Aure Joy MRN# [id number]  Age: 49 year old year old YOB: 1970    Pre-Procedure:  History and Physical: Reviewed in medical record  Consent Signed by: Aure Joy  On: 1/31/2020    Clinical Narrative:  Patient tolerating treatment with no side effects. Patient report feeling better overall with TMS and ketamine together.     Indications for TMS:  MDD, recurrent, severe; 4+ medication trials (from 2+ classes) ineffective; Psychotherapy ineffective.     Pre-Procedure Diagnosis:  MDD, recurrent, severe F33.2    Treatment Hx:  Treatment number this series: 7  Total lifetime treatment number: 84    Allergies   Allergen Reactions     Codeine Nausea     Other  [No Clinical Screening - See Comments] Nausea and Vomiting and Other (See Comments)     general anesthesia- uncontrolled vomitting      BP (!) 150/116 (BP Location: Right arm, Patient Position: Sitting, Cuff Size: Adult Regular)   Pulse 103     Pause for the Cause  Right patient:  Yes  Right procedure/correct coil:  Yes; rTMS; cpt 28389; F8 coil.   Earplugs in place:  Yes    Procedure  Patient was seated in procedure chair. Identity and procedure was verified. Ear plugs were placed in ears and patient-specific cap was placed on head and tightened appropriately. Ruler locations were verified. Coil was placed at treatment location and stimulator was set to parameters described below. A test train was delivered and pt tolerated train. Given pt tolerance, 60 treatment trains were delivered to the left side and 3 trains were delivered to the right side. Pt tolerated procedure with forehead movement.      Motor Threshold Determination  Distance from nasion to inion: 35.5  MT 1: 0 - 6 - 16 @ 69% on 1/29/2018  MT 2: 0 - 6 - 16 @ 69% on 2/6/2018   MT 3: 0 - 6 - 16 @ 69% on 2/13/2018   MT 4: 0 - 6 - 16 @ 69% on 2/23/2018   MT 5: 0 - 6 - 16 @ 69% on  3/2/2018   MT 6: 0 - 5.5 - 15.5(always use intersection at left side of ruler)@ 85% on 8/23/19  MT 7: 0 - 5.5 - 15.5(always use intersection at left side of ruler)@ 80% on 8/29/19  MT 8: 0 - 5.5 - 37.5(always use intersection at left side of ruler)@ 76% on 9/5/19 (right side using EMG on left hand)  MT 9: C2 (R side using EMG on L hand) 78% on 9/12/2019  MT 10: C2 (R side using EMG on L hand) 76% on 9/26/2019  MT 11: C2 (R side using EMG on L hand) 82 on 1/31/2020    LDLPFC:  Frequency: 50 Hz  Number of pulses:  3                        Number of bursts: 10  Burst frequency: 5 Hz  Cycle time: 10 sec  Total pulses delivered: 1800  Tx Loc: F3  Energy: 80% (97% MT, maximum due to stimulator limitations)  Number of Cycles: 60 trains    RDLPFC:  Frequency: 50 Hz  Number of pulses:  3                        Number of bursts: 200  Burst frequency: 5 Hz  Cycle time: 120.0 *Friday this length worked, today it overheated.  Total pulses delivered: 1800  Tx Loc: F4 (right side)  Energy: 80% (97% MT, maximum due to stimulator limitations)  Number of Cycles: 3 trains    Rating Scales:  Date MADRS IDS-SR PHQ-9   01/31/20 23  8   2/7/20 28  7             Post-Procedure Diagnosis:  MDD, recurrent, severe 296.33    Irma Marley  [facility]romod[facility]      Plan   - Cont TMS    I did not see patient but remained available at the clinic throughout the TMS session    Elizabeth Gordon MD  [facility]od[facility]

## 2020-02-11 ENCOUNTER — OFFICE VISIT (OUTPATIENT)
Dept: PSYCHIATRY | Facility: CLINIC | Age: 50
End: 2020-02-11
Payer: COMMERCIAL

## 2020-02-11 VITALS — HEART RATE: 98 BPM | SYSTOLIC BLOOD PRESSURE: 150 MMHG | DIASTOLIC BLOOD PRESSURE: 92 MMHG

## 2020-02-11 DIAGNOSIS — F32.2 CURRENT SEVERE EPISODE OF MAJOR DEPRESSIVE DISORDER WITHOUT PSYCHOTIC FEATURES WITHOUT PRIOR EPISODE (H): Primary | ICD-10-CM

## 2020-02-11 ASSESSMENT — PATIENT HEALTH QUESTIONNAIRE - PHQ9: SUM OF ALL RESPONSES TO PHQ QUESTIONS 1-9: 8

## 2020-02-11 NOTE — PROGRESS NOTES
[facility] TMS Program  [address], [address]  [place] [address]  TMS Procedure Note   Aure Joy MRN# [id number]  Age: 49 year old year old YOB: 1970    Pre-Procedure:  History and Physical: Reviewed in medical record  Consent Signed by: Aure Joy  On: 1/31/2020    Clinical Narrative:  Patient tolerating treatment with no side effects. Patient reports feeling low over the weekend.     Indications for TMS:  MDD, recurrent, severe; 4+ medication trials (from 2+ classes) ineffective; Psychotherapy ineffective.     Pre-Procedure Diagnosis:  MDD, recurrent, severe F33.2    Treatment Hx:  Treatment number this series: 8  Total lifetime treatment number: 85    Allergies   Allergen Reactions     Codeine Nausea     Other  [No Clinical Screening - See Comments] Nausea and Vomiting and Other (See Comments)     general anesthesia- uncontrolled vomitting      BP (!) 150/92 (BP Location: Right arm, Patient Position: Sitting, Cuff Size: Adult Regular)   Pulse 98     Pause for the Cause  Right patient:  Yes  Right procedure/correct coil:  Yes; rTMS; cpt 19162; F8 coil.   Earplugs in place:  Yes    Procedure  Patient was seated in procedure chair. Identity and procedure was verified. Ear plugs were placed in ears and patient-specific cap was placed on head and tightened appropriately. Ruler locations were verified. Coil was placed at treatment location and stimulator was set to parameters described below. A test train was delivered and pt tolerated train. Given pt tolerance, 60 treatment trains were delivered to the left side and 3 trains were delivered to the right side. Pt tolerated procedure with forehead movement.      Motor Threshold Determination  Distance from nasion to inion: 35.5  MT 1: 0 - 6 - 16 @ 69% on 1/29/2018  MT 2: 0 - 6 - 16 @ 69% on 2/6/2018   MT 3: 0 - 6 - 16 @ 69% on 2/13/2018   MT 4: 0 - 6 - 16 @ 69% on 2/23/2018   MT 5: 0 - 6 - 16 @ 69% on 3/2/2018   MT 6: 0 - 5.5 -  15.5(always use intersection at left side of ruler)@ 85% on 8/23/19  MT 7: 0 - 5.5 - 15.5(always use intersection at left side of ruler)@ 80% on 8/29/19  MT 8: 0 - 5.5 - 37.5(always use intersection at left side of ruler)@ 76% on 9/5/19 (right side using EMG on left hand)  MT 9: C2 (R side using EMG on L hand) 78% on 9/12/2019  MT 10: C2 (R side using EMG on L hand) 76% on 9/26/2019  MT 11: C2 (R side using EMG on L hand) 82 on 1/31/2020    LDLPFC:  Frequency: 50 Hz  Number of pulses:  3                        Number of bursts: 10  Burst frequency: 5 Hz  Cycle time: 10 sec  Total pulses delivered: 1800  Tx Loc: F3  Energy: 80% (97% MT, maximum due to stimulator limitations)  Number of Cycles: 60 trains    RDLPFC:  Frequency: 50 Hz  Number of pulses:  3                        Number of bursts: 200  Burst frequency: 5 Hz  Cycle time: 120.0 *Friday this length worked, today it overheated.  Total pulses delivered: 1800  Tx Loc: F4 (right side)  Energy: 80% (97% MT, maximum due to stimulator limitations)  Number of Cycles: 3 trains    Rating Scales:  Date MADRS IDS-SR PHQ-9   01/31/20 23  8   2/7/20 28  7             Post-Procedure Diagnosis:  MDD, recurrent, severe 296.33    Tamar Balderrama  [facility] Neuromod[facility]      Plan   - Cont TMS      I did not see the patient during/after treatment but remained available in the clinic during  treatment.    Evelyn Zamudio MD  [facility]od[facility]

## 2020-02-12 ENCOUNTER — OFFICE VISIT (OUTPATIENT)
Dept: PSYCHIATRY | Facility: CLINIC | Age: 50
End: 2020-02-12
Payer: COMMERCIAL

## 2020-02-12 ENCOUNTER — ALLIED HEALTH/NURSE VISIT (OUTPATIENT)
Dept: PSYCHIATRY | Facility: CLINIC | Age: 50
End: 2020-02-12
Payer: COMMERCIAL

## 2020-02-12 VITALS — SYSTOLIC BLOOD PRESSURE: 132 MMHG | DIASTOLIC BLOOD PRESSURE: 85 MMHG | HEART RATE: 79 BPM | OXYGEN SATURATION: 98 %

## 2020-02-12 VITALS — DIASTOLIC BLOOD PRESSURE: 86 MMHG | HEART RATE: 81 BPM | SYSTOLIC BLOOD PRESSURE: 137 MMHG

## 2020-02-12 DIAGNOSIS — F33.2 SEVERE EPISODE OF RECURRENT MAJOR DEPRESSIVE DISORDER, WITHOUT PSYCHOTIC FEATURES (H): Primary | ICD-10-CM

## 2020-02-12 ASSESSMENT — PATIENT HEALTH QUESTIONNAIRE - PHQ9: SUM OF ALL RESPONSES TO PHQ QUESTIONS 1-9: 8

## 2020-02-12 NOTE — PROGRESS NOTES
[facility] TMS Program  [address], [address]  [place] [address]  TMS Procedure Note   Aure Joy MRN# [id number]  Age: 49 year old year old YOB: 1970    Pre-Procedure:  History and Physical: Reviewed in medical record  Consent Signed by: Aure Joy  On: 1/31/2020    Clinical Narrative:  Patient tolerating treatment with no side effects. Patient reports mood still low.     Indications for TMS:  MDD, recurrent, severe; 4+ medication trials (from 2+ classes) ineffective; Psychotherapy ineffective.     Pre-Procedure Diagnosis:  MDD, recurrent, severe F33.2    Treatment Hx:  Treatment number this series: 9  Total lifetime treatment number: 86    Allergies   Allergen Reactions     Codeine Nausea     Other  [No Clinical Screening - See Comments] Nausea and Vomiting and Other (See Comments)     general anesthesia- uncontrolled vomitting      /86 (BP Location: Right arm, Patient Position: Sitting, Cuff Size: Adult Regular)   Pulse 81     Pause for the Cause  Right patient:  Yes  Right procedure/correct coil:  Yes; rTMS; cpt 41730; F8 coil.   Earplugs in place:  Yes    Procedure  Patient was seated in procedure chair. Identity and procedure was verified. Ear plugs were placed in ears and patient-specific cap was placed on head and tightened appropriately. Ruler locations were verified. Coil was placed at treatment location and stimulator was set to parameters described below. A test train was delivered and pt tolerated train. Given pt tolerance, 60 treatment trains were delivered to the left side and 3 trains were delivered to the right side. Pt tolerated procedure with forehead movement.      Motor Threshold Determination  Distance from nasion to inion: 35.5  MT 1: 0 - 6 - 16 @ 69% on 1/29/2018  MT 2: 0 - 6 - 16 @ 69% on 2/6/2018   MT 3: 0 - 6 - 16 @ 69% on 2/13/2018   MT 4: 0 - 6 - 16 @ 69% on 2/23/2018   MT 5: 0 - 6 - 16 @ 69% on 3/2/2018   MT 6: 0 - 5.5 - 15.5(always use  intersection at left side of ruler)@ 85% on 8/23/19  MT 7: 0 - 5.5 - 15.5(always use intersection at left side of ruler)@ 80% on 8/29/19  MT 8: 0 - 5.5 - 37.5(always use intersection at left side of ruler)@ 76% on 9/5/19 (right side using EMG on left hand)  MT 9: C2 (R side using EMG on L hand) 78% on 9/12/2019  MT 10: C2 (R side using EMG on L hand) 76% on 9/26/2019  MT 11: C2 (R side using EMG on L hand) 82 on 1/31/2020    LDLPFC:  Frequency: 50 Hz  Number of pulses:  3                        Number of bursts: 10  Burst frequency: 5 Hz  Cycle time: 10 sec  Total pulses delivered: 1800  Tx Loc: F3  Energy: 80% (97% MT, maximum due to stimulator limitations)  Number of Cycles: 60 trains    RDLPFC:  Frequency: 50 Hz  Number of pulses:  3                        Number of bursts: 200  Burst frequency: 5 Hz  Cycle time: 120.0, Overheated, try increasing to 130 sec next treatment  Total pulses delivered: 1800  Tx Loc: F4 (right side)  Energy: 80% (97% MT, maximum due to stimulator limitations)  Number of Cycles: 3 trains    Rating Scales:  Date MADRS IDS-SR PHQ-9   01/31/20 23  8   2/7/20 28  7                   Post-Procedure Diagnosis:  MDD, recurrent, severe 296.33    Pilo Almeida  [facility] Neuromod[facility]      Plan   - Cont TMS    I did not see patient but remained available throughout the TMS session.    Steven Gill MD  [facility] Neuromod[facility]

## 2020-02-12 NOTE — PROGRESS NOTES
Aure Joy comes into clinic today at the request of ESTELA Zamudio MD Ordering Provider for Medication Management:  Spravato Admin .    Medication checked by: Irma East RN  Prior to administration pt identified by name and : yes    Appearance: dressed casually   Mood: good  Affect: flat  Eye contact: good  Side effects: dissociatoin  Level of cooperation: cooperative  Education: none needed  Next appt: Friday     I spent an additional 120 minutes today, in direct patient contact monitoring the patient after Spravato administration. Patient had the following issues; depression improved, vitals monitored through entire visit today.      Medication provided by Pharmacy      This service provided today was under the supervising provider of the day Jessee Fu MD, who was available if needed.    BEN CORDOVA RN

## 2020-02-13 ENCOUNTER — OFFICE VISIT (OUTPATIENT)
Dept: PSYCHIATRY | Facility: CLINIC | Age: 50
End: 2020-02-13
Payer: COMMERCIAL

## 2020-02-13 VITALS — DIASTOLIC BLOOD PRESSURE: 104 MMHG | SYSTOLIC BLOOD PRESSURE: 158 MMHG | HEART RATE: 87 BPM

## 2020-02-13 VITALS — BODY MASS INDEX: 36.62 KG/M2 | WEIGHT: 197 LBS

## 2020-02-13 DIAGNOSIS — F32.2 CURRENT SEVERE EPISODE OF MAJOR DEPRESSIVE DISORDER WITHOUT PSYCHOTIC FEATURES WITHOUT PRIOR EPISODE (H): Primary | ICD-10-CM

## 2020-02-13 DIAGNOSIS — F33.2 SEVERE EPISODE OF RECURRENT MAJOR DEPRESSIVE DISORDER, WITHOUT PSYCHOTIC FEATURES (H): Primary | ICD-10-CM

## 2020-02-13 DIAGNOSIS — F33.2 SEVERE RECURRENT MAJOR DEPRESSION WITHOUT PSYCHOTIC FEATURES (H): ICD-10-CM

## 2020-02-13 RX ORDER — LAMOTRIGINE 200 MG/1
300 TABLET ORAL DAILY
Qty: 45 TABLET | Refills: 1 | Status: SHIPPED | OUTPATIENT
Start: 2020-02-13 | End: 2020-04-16

## 2020-02-13 ASSESSMENT — PATIENT HEALTH QUESTIONNAIRE - PHQ9
SUM OF ALL RESPONSES TO PHQ QUESTIONS 1-9: 7
SUM OF ALL RESPONSES TO PHQ QUESTIONS 1-9: 7

## 2020-02-13 ASSESSMENT — PAIN SCALES - GENERAL: PAINLEVEL: NO PAIN (0)

## 2020-02-13 NOTE — PROGRESS NOTES
[facility] TMS Program  [address], [address]  [place] [address]  TMS Procedure Note   Aure Joy MRN# [id number]  Age: 49 year old year old YOB: 1970    Pre-Procedure:  History and Physical: Reviewed in medical record  Consent Signed by: Aure Joy  On: 1/31/2020    Clinical Narrative:  Patient tolerating treatment with no side effects. Patient reports no change in mood since yesterday. Patient had a follow up with Dr. Zamudio this morning.     Indications for TMS:  MDD, recurrent, severe; 4+ medication trials (from 2+ classes) ineffective; Psychotherapy ineffective.     Pre-Procedure Diagnosis:  MDD, recurrent, severe F33.2    Treatment Hx:  Treatment number this series: 10  Total lifetime treatment number: 87    Allergies   Allergen Reactions     Codeine Nausea     Other  [No Clinical Screening - See Comments] Nausea and Vomiting and Other (See Comments)     general anesthesia- uncontrolled vomitting      BP (!) 158/104 (BP Location: Right arm, Patient Position: Sitting, Cuff Size: Adult Regular)   Pulse 87     Pause for the Cause  Right patient:  Yes  Right procedure/correct coil:  Yes; rTMS; cpt 89882; F8 coil.   Earplugs in place:  Yes    Procedure  Patient was seated in procedure chair. Identity and procedure was verified. Ear plugs were placed in ears and patient-specific cap was placed on head and tightened appropriately. Ruler locations were verified. Coil was placed at treatment location and stimulator was set to parameters described below. A test train was delivered and pt tolerated train. Given pt tolerance, 60 treatment trains were delivered to the left side and 3 trains were delivered to the right side. Pt tolerated procedure with forehead movement.      Motor Threshold Determination  Distance from nasion to inion: 35.5  MT 1: 0 - 6 - 16 @ 69% on 1/29/2018  MT 2: 0 - 6 - 16 @ 69% on 2/6/2018   MT 3: 0 - 6 - 16 @ 69% on 2/13/2018   MT 4: 0 - 6 - 16 @ 69% on  2/23/2018   MT 5: 0 - 6 - 16 @ 69% on 3/2/2018   MT 6: 0 - 5.5 - 15.5(always use intersection at left side of ruler)@ 85% on 8/23/19  MT 7: 0 - 5.5 - 15.5(always use intersection at left side of ruler)@ 80% on 8/29/19  MT 8: 0 - 5.5 - 37.5(always use intersection at left side of ruler)@ 76% on 9/5/19 (right side using EMG on left hand)  MT 9: C2 (R side using EMG on L hand) 78% on 9/12/2019  MT 10: C2 (R side using EMG on L hand) 76% on 9/26/2019  MT 11: C2 (R side using EMG on L hand) 82 on 1/31/2020    LDLPFC:  Frequency: 50 Hz  Number of pulses:  3                        Number of bursts: 10  Burst frequency: 5 Hz  Cycle time: 10 sec  Total pulses delivered: 1800  Tx Loc: F3  Energy: 80% (97% MT, maximum due to stimulator limitations)  Number of Cycles: 60 trains    RDLPFC:  Frequency: 50 Hz  Number of pulses:  3                        Number of bursts: 200  Burst frequency: 5 Hz  Cycle time: 130.0, machine did not overheat  Total pulses delivered: 1800  Tx Loc: F4 (right side)  Energy: 80% (97% MT, maximum due to stimulator limitations)  Number of Cycles: 3 trains    Rating Scales:  Date MADRS IDS-SR PHQ-9   01/31/20 23  8   2/7/20 28  7                   Post-Procedure Diagnosis:  MDD, recurrent, severe 296.33    Tamar Conchis  [facility] Neuromod[facility]      Plan   - Cont TMS      I did not see the patient during/after treatment but remained available in the clinic during  treatment.    Evelyn Zamudio MD  [facility] Neuromod[facility]

## 2020-02-13 NOTE — Clinical Note
Rashi Gonzalez,Could you possible see this patient for therapy to address depression and anxiety. She has a current therapist but has not been seeing them much due to distance, and she is currently coming in every day for TMS. She likely would benefit from weaving in some DBT components but is pretty adamant about not wanting to redo DBT therapy (states that she felt invalidated since there was a strong emphasis on exercise). Do you think you could see her?Thanks!Mukesh
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
## 2020-02-13 NOTE — PATIENT INSTRUCTIONS
- try to reconnect with your therapist and see if then can give you a referral for a closer therapist  - continue TMS and ketamine course

## 2020-02-14 ENCOUNTER — OFFICE VISIT (OUTPATIENT)
Dept: PSYCHIATRY | Facility: CLINIC | Age: 50
End: 2020-02-14
Payer: COMMERCIAL

## 2020-02-14 ENCOUNTER — TELEPHONE (OUTPATIENT)
Dept: PSYCHIATRY | Facility: CLINIC | Age: 50
End: 2020-02-14

## 2020-02-14 ENCOUNTER — ALLIED HEALTH/NURSE VISIT (OUTPATIENT)
Dept: PSYCHIATRY | Facility: CLINIC | Age: 50
End: 2020-02-14
Payer: COMMERCIAL

## 2020-02-14 VITALS — DIASTOLIC BLOOD PRESSURE: 85 MMHG | HEART RATE: 71 BPM | SYSTOLIC BLOOD PRESSURE: 145 MMHG

## 2020-02-14 VITALS — SYSTOLIC BLOOD PRESSURE: 129 MMHG | OXYGEN SATURATION: 95 % | HEART RATE: 76 BPM | DIASTOLIC BLOOD PRESSURE: 79 MMHG

## 2020-02-14 DIAGNOSIS — F33.2 SEVERE EPISODE OF RECURRENT MAJOR DEPRESSIVE DISORDER, WITHOUT PSYCHOTIC FEATURES (H): Primary | ICD-10-CM

## 2020-02-14 ASSESSMENT — PATIENT HEALTH QUESTIONNAIRE - PHQ9: SUM OF ALL RESPONSES TO PHQ QUESTIONS 1-9: 6

## 2020-02-14 NOTE — PROGRESS NOTES
[facility] TMS Program  [address], [address]  [place] [address]  TMS Procedure Note   Aure Joy MRN# [id number]  Age: 49 year old year old YOB: 1970    Pre-Procedure:  History and Physical: Reviewed in medical record  Consent Signed by: Aure Joy  On: 1/31/2020    Clinical Narrative:  Patient tolerating treatment with no side effects. Patient reports no change in mood since yesterday.     Indications for TMS:  MDD, recurrent, severe; 4+ medication trials (from 2+ classes) ineffective; Psychotherapy ineffective.     Pre-Procedure Diagnosis:  MDD, recurrent, severe F33.2    Treatment Hx:  Treatment number this series: 11  Total lifetime treatment number: 88    Allergies   Allergen Reactions     Codeine Nausea     Other  [No Clinical Screening - See Comments] Nausea and Vomiting and Other (See Comments)     general anesthesia- uncontrolled vomitting      BP (!) 145/85 (BP Location: Right arm, Patient Position: Sitting, Cuff Size: Adult Regular)   Pulse 71     Pause for the Cause  Right patient:  Yes  Right procedure/correct coil:  Yes; rTMS; cpt 63821; F8 coil.   Earplugs in place:  Yes    Procedure  Patient was seated in procedure chair. Identity and procedure was verified. Ear plugs were placed in ears and patient-specific cap was placed on head and tightened appropriately. Ruler locations were verified. Coil was placed at treatment location and stimulator was set to parameters described below. A test train was delivered and pt tolerated train. Given pt tolerance, 60 treatment trains were delivered to the left side and 3 trains were delivered to the right side. Pt tolerated procedure with forehead movement.      Motor Threshold Determination  Distance from nasion to inion: 35.5  MT 1: 0 - 6 - 16 @ 69% on 1/29/2018  MT 2: 0 - 6 - 16 @ 69% on 2/6/2018   MT 3: 0 - 6 - 16 @ 69% on 2/13/2018   MT 4: 0 - 6 - 16 @ 69% on 2/23/2018   MT 5: 0 - 6 - 16 @ 69% on 3/2/2018   MT 6: 0 - 5.5  - 15.5(always use intersection at left side of ruler)@ 85% on 8/23/19  MT 7: 0 - 5.5 - 15.5(always use intersection at left side of ruler)@ 80% on 8/29/19  MT 8: 0 - 5.5 - 37.5(always use intersection at left side of ruler)@ 76% on 9/5/19 (right side using EMG on left hand)  MT 9: C2 (R side using EMG on L hand) 78% on 9/12/2019  MT 10: C2 (R side using EMG on L hand) 76% on 9/26/2019  MT 11: C2 (R side using EMG on L hand) 82 on 1/31/2020    LDLPFC:  Frequency: 50 Hz  Number of pulses:  3                        Number of bursts: 10  Burst frequency: 5 Hz  Cycle time: 10 sec  Total pulses delivered: 1800  Tx Loc: F3  Energy: 80% (97% MT, maximum due to stimulator limitations)  Number of Cycles: 60 trains    RDLPFC:  Frequency: 50 Hz  Number of pulses:  3                        Number of bursts: 200  Burst frequency: 5 Hz  Cycle time: 130.0, machine overheated prior to 3rd train  Total pulses delivered: 1800  Tx Loc: F4 (right side)  Energy: 80% (97% MT, maximum due to stimulator limitations)  Number of Cycles: 3 trains    Rating Scales:  Date MADRS IDS-SR PHQ-9   01/31/20 23  8   2/7/20  28 7   2/14/20  27 6             Post-Procedure Diagnosis:  MDD, recurrent, severe 296.33    [facility]od[facility]      Plan   - Cont TMS    I did not see the patient during/after treatment but remained available in the clinic during  treatment.    Tim Williamson MD, PhD  [facility] Neuromodulation

## 2020-02-14 NOTE — PROGRESS NOTES
Aure Joy comes into clinic today at the request of ESTELA Zamudio MD Ordering Provider for Medication Management:  Spravato Admin .    Medication checked by: John Velazco RN  Prior to administration pt identified by name and : yes    Appearance: dressed casually   Mood: good  Affect: flat  Eye contact: good  Side effects: dissociation  Level of cooperation: cooperative  Education: none needed  Next appt: next week     I spent an additional 120 minutes today, in direct patient contact monitoring the patient after Spravato administration. Patient had the following issues; depression stable, vitals monitored through entire visit.      Medication provided by Pharmacy      This service provided today was under the supervising provider of the day Tim Williamson MD, who was available if needed.    BEN CORDOVA RN

## 2020-02-17 ENCOUNTER — OFFICE VISIT (OUTPATIENT)
Dept: PSYCHIATRY | Facility: CLINIC | Age: 50
End: 2020-02-17
Payer: COMMERCIAL

## 2020-02-17 ENCOUNTER — ALLIED HEALTH/NURSE VISIT (OUTPATIENT)
Dept: PSYCHIATRY | Facility: CLINIC | Age: 50
End: 2020-02-17
Payer: COMMERCIAL

## 2020-02-17 VITALS — HEART RATE: 85 BPM | DIASTOLIC BLOOD PRESSURE: 78 MMHG | OXYGEN SATURATION: 98 % | SYSTOLIC BLOOD PRESSURE: 120 MMHG

## 2020-02-17 VITALS — HEART RATE: 93 BPM | DIASTOLIC BLOOD PRESSURE: 87 MMHG | SYSTOLIC BLOOD PRESSURE: 144 MMHG

## 2020-02-17 DIAGNOSIS — F33.2 SEVERE RECURRENT MAJOR DEPRESSION WITHOUT PSYCHOTIC FEATURES (H): Primary | ICD-10-CM

## 2020-02-17 ASSESSMENT — PATIENT HEALTH QUESTIONNAIRE - PHQ9: SUM OF ALL RESPONSES TO PHQ QUESTIONS 1-9: 7

## 2020-02-17 NOTE — PROGRESS NOTES
[facility] TMS Program  [address], [address]  [place] [address]  TMS Procedure Note   Aure Joy MRN# [id number]  Age: 49 year old year old YOB: 1970    Pre-Procedure:  History and Physical: Reviewed in medical record  Consent Signed by: Aure Joy  On: 1/31/2020    Clinical Narrative:  Patient tolerating treatment with no side effects. Patient reports no change in mood since yesterday.     Indications for TMS:  MDD, recurrent, severe; 4+ medication trials (from 2+ classes) ineffective; Psychotherapy ineffective.     Pre-Procedure Diagnosis:  MDD, recurrent, severe F33.2    Treatment Hx:  Treatment number this series: 12  Total lifetime treatment number: 89    Allergies   Allergen Reactions     Codeine Nausea     Other  [No Clinical Screening - See Comments] Nausea and Vomiting and Other (See Comments)     general anesthesia- uncontrolled vomitting      BP (!) 144/87 (BP Location: Right arm, Patient Position: Sitting, Cuff Size: Adult Regular)   Pulse 93     Pause for the Cause  Right patient:  Yes  Right procedure/correct coil:  Yes; rTMS; cpt 64807(do not bill, provider unavailable); F8 coil.   Earplugs in place:  Yes    Procedure  Patient was seated in procedure chair. Identity and procedure was verified. Ear plugs were placed in ears and patient-specific cap was placed on head and tightened appropriately. Ruler locations were verified. Coil was placed at treatment location and stimulator was set to parameters described below. A test train was delivered and pt tolerated train. Given pt tolerance, 60 treatment trains were delivered to the left side and 3 trains were delivered to the right side. Pt tolerated procedure with forehead movement.      Motor Threshold Determination  Distance from nasion to inion: 35.5  MT 1: 0 - 6 - 16 @ 69% on 1/29/2018  MT 2: 0 - 6 - 16 @ 69% on 2/6/2018   MT 3: 0 - 6 - 16 @ 69% on 2/13/2018   MT 4: 0 - 6 - 16 @ 69% on 2/23/2018   MT 5: 0 - 6 -  16 @ 69% on 3/2/2018   MT 6: 0 - 5.5 - 15.5(always use intersection at left side of ruler)@ 85% on 8/23/19  MT 7: 0 - 5.5 - 15.5(always use intersection at left side of ruler)@ 80% on 8/29/19  MT 8: 0 - 5.5 - 37.5(always use intersection at left side of ruler)@ 76% on 9/5/19 (right side using EMG on left hand)  MT 9: C2 (R side using EMG on L hand) 78% on 9/12/2019  MT 10: C2 (R side using EMG on L hand) 76% on 9/26/2019  MT 11: C2 (R side using EMG on L hand) 82 on 1/31/2020    LDLPFC:  Frequency: 50 Hz  Number of pulses:  3                        Number of bursts: 10  Burst frequency: 5 Hz  Cycle time: 10 sec  Total pulses delivered: 1800  Tx Loc: F3  Energy: 80% (97% MT, maximum due to stimulator limitations)  Number of Cycles: 60 trains    RDLPFC:  Frequency: 50 Hz  Number of pulses:  3                        Number of bursts: 200  Burst frequency: 5 Hz  Cycle time: 130.0, machine overheated prior to 3rd train  Total pulses delivered: 1800  Tx Loc: F4 (right side)  Energy: 80% (97% MT, maximum due to stimulator limitations)  Number of Cycles: 3 trains    Rating Scales:  Date MADRS IDS-SR PHQ-9   01/31/20 23  8   2/7/20  28 7   2/14/20  27 6             Post-Procedure Diagnosis:  MDD, recurrent, severe 296.33    Irma Marley  [facility]  Mental [facility] Neuromodulation      Plan   - Cont TMS    Provider unavailable, do not bill    Elizabeth Gordon MD  [facility] Neuromodulation

## 2020-02-17 NOTE — PROGRESS NOTES
Aure Joy comes into clinic today at the request of ESTELA Zamudio MD Ordering Provider for Medication Management:  Spravato Admin .    Medication checked by: John Velazco RN  Prior to administration pt identified by name and : yes    Appearance: dressed casually   Mood: good  Affect: congruent to mood  Eye contact: good  Side effects: dissociation  Level of cooperation: cooperative  Education: none needed  Next appt: weekly     I spent an additional 120 minutes today, in direct patient contact monitoring the patient after Spravato administration. Patient had the following issues; depression stable, vitals monitored through entire visit.      Medication provided by Pharmacy      This service provided today was under the supervising provider of the day Elizabeth Gordon MD, who was available if needed.    BEN CORDOVA RN

## 2020-02-18 ENCOUNTER — OFFICE VISIT (OUTPATIENT)
Dept: PSYCHIATRY | Facility: CLINIC | Age: 50
End: 2020-02-18
Payer: COMMERCIAL

## 2020-02-18 VITALS — SYSTOLIC BLOOD PRESSURE: 136 MMHG | DIASTOLIC BLOOD PRESSURE: 88 MMHG | HEART RATE: 81 BPM

## 2020-02-18 DIAGNOSIS — F32.2 CURRENT SEVERE EPISODE OF MAJOR DEPRESSIVE DISORDER WITHOUT PSYCHOTIC FEATURES WITHOUT PRIOR EPISODE (H): Primary | ICD-10-CM

## 2020-02-18 ASSESSMENT — PATIENT HEALTH QUESTIONNAIRE - PHQ9: SUM OF ALL RESPONSES TO PHQ QUESTIONS 1-9: 7

## 2020-02-18 NOTE — PROGRESS NOTES
[facility] TMS Program  [address], [address]  [place] [address]  TMS Procedure Note   Aure Joy MRN# [id number]  Age: 49 year old year old YOB: 1970    Pre-Procedure:  History and Physical: Reviewed in medical record  Consent Signed by: Aure Joy  On: 1/31/2020    Clinical Narrative:  Patient tolerating treatment with no side effects. Patient reports no change in mood since yesterday.     Indications for TMS:  MDD, recurrent, severe; 4+ medication trials (from 2+ classes) ineffective; Psychotherapy ineffective.     Pre-Procedure Diagnosis:  MDD, recurrent, severe F33.2    Treatment Hx:  Treatment number this series: 13  Total lifetime treatment number: 90    Allergies   Allergen Reactions     Codeine Nausea     Other  [No Clinical Screening - See Comments] Nausea and Vomiting and Other (See Comments)     general anesthesia- uncontrolled vomitting      /88 (BP Location: Right arm, Patient Position: Sitting, Cuff Size: Adult Regular)   Pulse 81     Pause for the Cause  Right patient:  Yes  Right procedure/correct coil:  Yes; rTMS; cpt 80282; F8 coil.   Earplugs in place:  Yes    Procedure  Patient was seated in procedure chair. Identity and procedure was verified. Ear plugs were placed in ears and patient-specific cap was placed on head and tightened appropriately. Ruler locations were verified. Coil was placed at treatment location and stimulator was set to parameters described below. A test train was delivered and pt tolerated train. Given pt tolerance, 60 treatment trains were delivered to the left side and 3 trains were delivered to the right side. Pt tolerated procedure with forehead movement.      Motor Threshold Determination  Distance from nasion to inion: 35.5  MT 1: 0 - 6 - 16 @ 69% on 1/29/2018  MT 2: 0 - 6 - 16 @ 69% on 2/6/2018   MT 3: 0 - 6 - 16 @ 69% on 2/13/2018   MT 4: 0 - 6 - 16 @ 69% on 2/23/2018   MT 5: 0 - 6 - 16 @ 69% on 3/2/2018   MT 6: 0 - 5.5 -  15.5(always use intersection at left side of ruler)@ 85% on 8/23/19  MT 7: 0 - 5.5 - 15.5(always use intersection at left side of ruler)@ 80% on 8/29/19  MT 8: 0 - 5.5 - 37.5(always use intersection at left side of ruler)@ 76% on 9/5/19 (right side using EMG on left hand)  MT 9: C2 (R side using EMG on L hand) 78% on 9/12/2019  MT 10: C2 (R side using EMG on L hand) 76% on 9/26/2019  MT 11: C2 (R side using EMG on L hand) 82 on 1/31/2020    LDLPFC:  Frequency: 50 Hz  Number of pulses:  3                        Number of bursts: 10  Burst frequency: 5 Hz  Cycle time: 10 sec  Total pulses delivered: 1800  Tx Loc: F3  Energy: 80% (97% MT, maximum due to stimulator limitations)  Number of Cycles: 60 trains    RDLPFC:  Frequency: 50 Hz  Number of pulses:  3                        Number of bursts: 200  Burst frequency: 5 Hz  Cycle time: 130.0, machine overheated prior to 3rd train  Total pulses delivered: 1800  Tx Loc: F4 (right side)  Energy: 80% (97% MT, maximum due to stimulator limitations)  Number of Cycles: 3 trains    Rating Scales:  Date MADRS IDS-SR PHQ-9   01/31/20 23  8   2/7/20  28 7   2/14/20  27 6             Post-Procedure Diagnosis:  MDD, recurrent, severe 296.33    Tamar Balderrama  [facility]      Plan   - Cont TMS      I did see the patient before treatment and remained available in the clinic during  treatment.    Evelyn Zamudio MD  [facility]

## 2020-02-19 ENCOUNTER — OFFICE VISIT (OUTPATIENT)
Dept: PSYCHIATRY | Facility: CLINIC | Age: 50
End: 2020-02-19
Payer: COMMERCIAL

## 2020-02-19 VITALS — DIASTOLIC BLOOD PRESSURE: 86 MMHG | SYSTOLIC BLOOD PRESSURE: 150 MMHG | HEART RATE: 92 BPM

## 2020-02-19 DIAGNOSIS — F33.2 SEVERE RECURRENT MAJOR DEPRESSION WITHOUT PSYCHOTIC FEATURES (H): Primary | ICD-10-CM

## 2020-02-19 ASSESSMENT — PATIENT HEALTH QUESTIONNAIRE - PHQ9: SUM OF ALL RESPONSES TO PHQ QUESTIONS 1-9: 5

## 2020-02-19 NOTE — PROGRESS NOTES
[facility] TMS Program  [address], [address]  [place] [address]  TMS Procedure Note   Aure Joy MRN# [id number]  Age: 49 year old year old YOB: 1970    Pre-Procedure:  History and Physical: Reviewed in medical record  Consent Signed by: Aure Joy  On: 1/31/2020    Clinical Narrative:  Patient tolerating treatment with no side effects. Patient reports no change in mood since yesterday.     Indications for TMS:  MDD, recurrent, severe; 4+ medication trials (from 2+ classes) ineffective; Psychotherapy ineffective.     Pre-Procedure Diagnosis:  MDD, recurrent, severe F33.2    Treatment Hx:  Treatment number this series: 14  Total lifetime treatment number: 91    Allergies   Allergen Reactions     Codeine Nausea     Other  [No Clinical Screening - See Comments] Nausea and Vomiting and Other (See Comments)     general anesthesia- uncontrolled vomitting      BP (!) 150/86 (BP Location: Right arm, Patient Position: Sitting, Cuff Size: Adult Regular)   Pulse 92     Pause for the Cause  Right patient:  Yes  Right procedure/correct coil:  Yes; rTMS; cpt 48147; F8 coil.   Earplugs in place:  Yes    Procedure  Patient was seated in procedure chair. Identity and procedure was verified. Ear plugs were placed in ears and patient-specific cap was placed on head and tightened appropriately. Ruler locations were verified. Coil was placed at treatment location and stimulator was set to parameters described below. A test train was delivered and pt tolerated train. Given pt tolerance, 60 treatment trains were delivered to the left side and 3 trains were delivered to the right side. Pt tolerated procedure with forehead movement.      Motor Threshold Determination  Distance from nasion to inion: 35.5  MT 1: 0 - 6 - 16 @ 69% on 1/29/2018  MT 2: 0 - 6 - 16 @ 69% on 2/6/2018   MT 3: 0 - 6 - 16 @ 69% on 2/13/2018   MT 4: 0 - 6 - 16 @ 69% on 2/23/2018   MT 5: 0 - 6 - 16 @ 69% on 3/2/2018   MT 6: 0 - 5.5  - 15.5(always use intersection at left side of ruler)@ 85% on 8/23/19  MT 7: 0 - 5.5 - 15.5(always use intersection at left side of ruler)@ 80% on 8/29/19  MT 8: 0 - 5.5 - 37.5(always use intersection at left side of ruler)@ 76% on 9/5/19 (right side using EMG on left hand)  MT 9: C2 (R side using EMG on L hand) 78% on 9/12/2019  MT 10: C2 (R side using EMG on L hand) 76% on 9/26/2019  MT 11: C2 (R side using EMG on L hand) 82 on 1/31/2020    LDLPFC:  Frequency: 50 Hz  Number of pulses:  3                        Number of bursts: 10  Burst frequency: 5 Hz  Cycle time: 10 sec  Total pulses delivered: 1800  Tx Loc: F3  Energy: 80% (97% MT, maximum due to stimulator limitations)  Number of Cycles: 60 trains    RDLPFC:  Frequency: 50 Hz  Number of pulses:  3                        Number of bursts: 200  Burst frequency: 5 Hz  Cycle time: 130.0, machine overheated prior to 3rd train  Total pulses delivered: 1800  Tx Loc: F4 (right side)  Energy: 80% (97% MT, maximum due to stimulator limitations)  Number of Cycles: 3 trains    Rating Scales:  Date MADRS IDS-SR PHQ-9   01/31/20 23  8   2/7/20  28 7   2/14/20  27 6             Post-Procedure Diagnosis:  MDD, recurrent, severe 296.33    Irma Marley  [facility] Neuromod[facility]      Plan   - Cont TMS    I did not see patient but remained available throughout the TMS session.    Steven Gill MD  [facility] Neu[facility]od[facility]

## 2020-02-20 ENCOUNTER — OFFICE VISIT (OUTPATIENT)
Dept: PSYCHIATRY | Facility: CLINIC | Age: 50
End: 2020-02-20
Payer: COMMERCIAL

## 2020-02-20 VITALS — DIASTOLIC BLOOD PRESSURE: 92 MMHG | SYSTOLIC BLOOD PRESSURE: 156 MMHG | HEART RATE: 94 BPM

## 2020-02-20 DIAGNOSIS — F33.2 SEVERE RECURRENT MAJOR DEPRESSION WITHOUT PSYCHOTIC FEATURES (H): Primary | ICD-10-CM

## 2020-02-20 ASSESSMENT — PATIENT HEALTH QUESTIONNAIRE - PHQ9: SUM OF ALL RESPONSES TO PHQ QUESTIONS 1-9: 6

## 2020-02-20 NOTE — PROGRESS NOTES
[facility] TMS Program  [address], [address]  [place] [address]  TMS Procedure Note   Aure Joy MRN# [id number]  Age: 49 year old year old YOB: 1970    Pre-Procedure:  History and Physical: Reviewed in medical record  Consent Signed by: Aure Joy  On: 1/31/2020    Clinical Narrative:  Patient tolerating treatment with no side effects. Patient reports no change in mood since yesterday.     Indications for TMS:  MDD, recurrent, severe; 4+ medication trials (from 2+ classes) ineffective; Psychotherapy ineffective.     Pre-Procedure Diagnosis:  MDD, recurrent, severe F33.2    Treatment Hx:  Treatment number this series: 15  Total lifetime treatment number: 92    Allergies   Allergen Reactions     Codeine Nausea     Other  [No Clinical Screening - See Comments] Nausea and Vomiting and Other (See Comments)     general anesthesia- uncontrolled vomitting      BP (!) 156/92 (BP Location: Right arm, Patient Position: Sitting, Cuff Size: Adult Regular)   Pulse 94     Pause for the Cause  Right patient:  Yes  Right procedure/correct coil:  Yes; rTMS; cpt 30236; F8 coil.   Earplugs in place:  Yes    Procedure  Patient was seated in procedure chair. Identity and procedure was verified. Ear plugs were placed in ears and patient-specific cap was placed on head and tightened appropriately. Ruler locations were verified. Coil was placed at treatment location and stimulator was set to parameters described below. A test train was delivered and pt tolerated train. Given pt tolerance, 60 treatment trains were delivered to the left side and 3 trains were delivered to the right side. Pt tolerated procedure with forehead movement.      Motor Threshold Determination  Distance from nasion to inion: 35.5  MT 1: 0 - 6 - 16 @ 69% on 1/29/2018  MT 2: 0 - 6 - 16 @ 69% on 2/6/2018   MT 3: 0 - 6 - 16 @ 69% on 2/13/2018   MT 4: 0 - 6 - 16 @ 69% on 2/23/2018   MT 5: 0 - 6 - 16 @ 69% on 3/2/2018   MT 6: 0 - 5.5  - 15.5(always use intersection at left side of ruler)@ 85% on 8/23/19  MT 7: 0 - 5.5 - 15.5(always use intersection at left side of ruler)@ 80% on 8/29/19  MT 8: 0 - 5.5 - 37.5(always use intersection at left side of ruler)@ 76% on 9/5/19 (right side using EMG on left hand)  MT 9: C2 (R side using EMG on L hand) 78% on 9/12/2019  MT 10: C2 (R side using EMG on L hand) 76% on 9/26/2019  MT 11: C2 (R side using EMG on L hand) 82 on 1/31/2020    LDLPFC:  Frequency: 50 Hz  Number of pulses:  3                        Number of bursts: 10  Burst frequency: 5 Hz  Cycle time: 10 sec  Total pulses delivered: 1800  Tx Loc: F3  Energy: 80% (97% MT, maximum due to stimulator limitations)  Number of Cycles: 60 trains    RDLPFC:  Frequency: 50 Hz  Number of pulses:  3                        Number of bursts: 200  Burst frequency: 5 Hz  Cycle time: 94.3, machine overheated prior to 3rd train, overheats regardless, so kept cycle shorter  Total pulses delivered: 1800  Tx Loc: F4 (right side)  Energy: 80% (97% MT, maximum due to stimulator limitations)  Number of Cycles: 3 trains    Rating Scales:  Date MADRS IDS-SR PHQ-9   01/31/20 23  8   2/7/20  28 7   2/14/20  27 6             Post-Procedure Diagnosis:  MDD, recurrent, severe 296.33    Irma Marley  [facility] Neuromod[facility]      Plan   - Cont TMS    I did not see the patient during/after treatment but remained available in the clinic during  treatment.    Evelyn Zamudio MD  [facility] Neuromod[facility]

## 2020-02-21 ENCOUNTER — OFFICE VISIT (OUTPATIENT)
Dept: PSYCHIATRY | Facility: CLINIC | Age: 50
End: 2020-02-21
Payer: COMMERCIAL

## 2020-02-21 VITALS — HEART RATE: 99 BPM | SYSTOLIC BLOOD PRESSURE: 118 MMHG | DIASTOLIC BLOOD PRESSURE: 71 MMHG

## 2020-02-21 DIAGNOSIS — F33.2 SEVERE RECURRENT MAJOR DEPRESSION WITHOUT PSYCHOTIC FEATURES (H): Primary | ICD-10-CM

## 2020-02-21 ASSESSMENT — PATIENT HEALTH QUESTIONNAIRE - PHQ9: SUM OF ALL RESPONSES TO PHQ QUESTIONS 1-9: 5

## 2020-02-21 NOTE — PROGRESS NOTES
[facility] TMS Program  [address], [address]  [place] [address]  TMS Procedure Note   Aure Joy MRN# [id number]  Age: 49 year old year old YOB: 1970    Pre-Procedure:  History and Physical: Reviewed in medical record  Consent Signed by: Aure Joy  On: 1/31/2020    Clinical Narrative:  Patient tolerating treatment with no side effects. Patient reports she has really noticed an improvement in her mood over the past week.     Indications for TMS:  MDD, recurrent, severe; 4+ medication trials (from 2+ classes) ineffective; Psychotherapy ineffective.     Pre-Procedure Diagnosis:  MDD, recurrent, severe F33.2    Treatment Hx:  Treatment number this series: 16  Total lifetime treatment number: 93    Allergies   Allergen Reactions     Codeine Nausea     Other  [No Clinical Screening - See Comments] Nausea and Vomiting and Other (See Comments)     general anesthesia- uncontrolled vomitting      /71 (BP Location: Right arm, Patient Position: Sitting, Cuff Size: Adult Regular)   Pulse 99     Pause for the Cause  Right patient:  Yes  Right procedure/correct coil:  Yes; rTMS; cpt 86993; F8 coil.   Earplugs in place:  Yes    Procedure  Patient was seated in procedure chair. Identity and procedure was verified. Ear plugs were placed in ears and patient-specific cap was placed on head and tightened appropriately. Ruler locations were verified. Coil was placed at treatment location and stimulator was set to parameters described below. A test train was delivered and pt tolerated train. Given pt tolerance, 60 treatment trains were delivered to the left side and 3 trains were delivered to the right side. Pt tolerated procedure with forehead movement.      Motor Threshold Determination  Distance from nasion to inion: 35.5  MT 1: 0 - 6 - 16 @ 69% on 1/29/2018  MT 2: 0 - 6 - 16 @ 69% on 2/6/2018   MT 3: 0 - 6 - 16 @ 69% on 2/13/2018   MT 4: 0 - 6 - 16 @ 69% on 2/23/2018   MT 5: 0 - 6 - 16 @  69% on 3/2/2018   MT 6: 0 - 5.5 - 15.5(always use intersection at left side of ruler)@ 85% on 8/23/19  MT 7: 0 - 5.5 - 15.5(always use intersection at left side of ruler)@ 80% on 8/29/19  MT 8: 0 - 5.5 - 37.5(always use intersection at left side of ruler)@ 76% on 9/5/19 (right side using EMG on left hand)  MT 9: C2 (R side using EMG on L hand) 78% on 9/12/2019  MT 10: C2 (R side using EMG on L hand) 76% on 9/26/2019  MT 11: C2 (R side using EMG on L hand) 82 on 1/31/2020    LDLPFC:  Frequency: 50 Hz  Number of pulses:  3                        Number of bursts: 10  Burst frequency: 5 Hz  Cycle time: 10 sec  Total pulses delivered: 1800  Tx Loc: F3  Energy: 80% (97% MT, maximum due to stimulator limitations)  Number of Cycles: 60 trains    RDLPFC:  Frequency: 50 Hz  Number of pulses:  3                        Number of bursts: 200  Burst frequency: 5 Hz  Cycle time: 120.0, machine overheated prior to aminata train  Total pulses delivered: 1800  Tx Loc: F4 (right side)  Energy: 80% (97% MT, maximum due to stimulator limitations)  Number of Cycles: 3 trains    Rating Scales:  Date MADRS IDS-SR PHQ-9   01/31/20 23  8   2/7/20  28 7   2/14/20  27 6   2/21/20  26 5       Post-Procedure Diagnosis:  MDD, recurrent, severe 296.33    Irma Marley  [facility]  [facility]      Plan   - Cont TMS    I did not see patient but remained available in the clinic throughout the TMS session    Elizabeth Gordon MD  [facility] Neuromodulation

## 2020-02-24 ENCOUNTER — OFFICE VISIT (OUTPATIENT)
Dept: PSYCHIATRY | Facility: CLINIC | Age: 50
End: 2020-02-24
Payer: COMMERCIAL

## 2020-02-24 VITALS — DIASTOLIC BLOOD PRESSURE: 91 MMHG | HEART RATE: 81 BPM | SYSTOLIC BLOOD PRESSURE: 146 MMHG

## 2020-02-24 DIAGNOSIS — F40.00 AGORAPHOBIA: ICD-10-CM

## 2020-02-24 DIAGNOSIS — F33.1 MODERATE RECURRENT MAJOR DEPRESSION (H): Primary | ICD-10-CM

## 2020-02-24 DIAGNOSIS — F33.2 SEVERE RECURRENT MAJOR DEPRESSION WITHOUT PSYCHOTIC FEATURES (H): Primary | ICD-10-CM

## 2020-02-24 DIAGNOSIS — T74.12XA: ICD-10-CM

## 2020-02-24 ASSESSMENT — PATIENT HEALTH QUESTIONNAIRE - PHQ9: SUM OF ALL RESPONSES TO PHQ QUESTIONS 1-9: 7
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
## 2020-02-24 NOTE — Clinical Note
Amy- this is Dr Zamudio and my TRD patient whom is very motivated for trauma therapy. Thank you for seeing her!  Lias

## 2020-02-24 NOTE — PROGRESS NOTES
[facility] TMS Program  [address], [address]  [place] [address]  TMS Procedure Note   Aure Joy MRN# [id number]  Age: 49 year old year old YOB: 1970    Pre-Procedure:  History and Physical: Reviewed in medical record  Consent Signed by: Aure Joy  On: 1/31/2020    Clinical Narrative:  Patient tolerating treatment with no side effects. Patient reports no changes in mood, had an ok weekend.    Indications for TMS:  MDD, recurrent, severe; 4+ medication trials (from 2+ classes) ineffective; Psychotherapy ineffective.     Pre-Procedure Diagnosis:  MDD, recurrent, severe F33.2    Treatment Hx:  Treatment number this series: 17  Total lifetime treatment number: 94    Allergies   Allergen Reactions     Codeine Nausea     Other  [No Clinical Screening - See Comments] Nausea and Vomiting and Other (See Comments)     general anesthesia- uncontrolled vomitting      BP (!) 146/91 (BP Location: Right arm, Patient Position: Sitting, Cuff Size: Adult Regular)   Pulse 81     Pause for the Cause  Right patient:  Yes  Right procedure/correct coil:  Yes; rTMS; cpt 50884; F8 coil.   Earplugs in place:  Yes    Procedure  Patient was seated in procedure chair. Identity and procedure was verified. Ear plugs were placed in ears and patient-specific cap was placed on head and tightened appropriately. Ruler locations were verified. Coil was placed at treatment location and stimulator was set to parameters described below. A test train was delivered and pt tolerated train. Given pt tolerance, 60 treatment trains were delivered to the left side and 3 trains were delivered to the right side. Pt tolerated procedure with forehead movement.      Motor Threshold Determination  Distance from nasion to inion: 35.5  MT 1: 0 - 6 - 16 @ 69% on 1/29/2018  MT 2: 0 - 6 - 16 @ 69% on 2/6/2018   MT 3: 0 - 6 - 16 @ 69% on 2/13/2018   MT 4: 0 - 6 - 16 @ 69% on 2/23/2018   MT 5: 0 - 6 - 16 @ 69% on 3/2/2018   MT 6: 0 -  5.5 - 15.5(always use intersection at left side of ruler)@ 85% on 8/23/19  MT 7: 0 - 5.5 - 15.5(always use intersection at left side of ruler)@ 80% on 8/29/19  MT 8: 0 - 5.5 - 37.5(always use intersection at left side of ruler)@ 76% on 9/5/19 (right side using EMG on left hand)  MT 9: C2 (R side using EMG on L hand) 78% on 9/12/2019  MT 10: C2 (R side using EMG on L hand) 76% on 9/26/2019  MT 11: C2 (R side using EMG on L hand) 82 on 1/31/2020    LDLPFC:  Frequency: 50 Hz  Number of pulses:  3                        Number of bursts: 10  Burst frequency: 5 Hz  Cycle time: 10 sec  Total pulses delivered: 1800  Tx Loc: F3  Energy: 80% (97% MT, maximum due to stimulator limitations)  Number of Cycles: 60 trains    RDLPFC:  Frequency: 50 Hz  Number of pulses:  3                        Number of bursts: 200  Burst frequency: 5 Hz  Cycle time: 110.0, machine overheated prior to 2nd and 3rd  train  Total pulses delivered: 1800  Tx Loc: F4 (right side)  Energy: 80% (97% MT, maximum due to stimulator limitations)  Number of Cycles: 3 trains    Rating Scales:  Date MADRS IDS-SR PHQ-9   01/31/20 23  8   2/7/20  28 7   2/14/20  27 6   2/21/20  26 5       Post-Procedure Diagnosis:  MDD, recurrent, severe 296.33    Irma Marley  [facility]      Plan   - Cont TMS    I did not see patient but remained available at the clinic throughout the TMS session    Elizabeth Gordon MD  [facility]

## 2020-02-25 ENCOUNTER — OFFICE VISIT (OUTPATIENT)
Dept: PSYCHIATRY | Facility: CLINIC | Age: 50
End: 2020-02-25
Payer: COMMERCIAL

## 2020-02-25 VITALS — DIASTOLIC BLOOD PRESSURE: 86 MMHG | HEART RATE: 84 BPM | SYSTOLIC BLOOD PRESSURE: 146 MMHG

## 2020-02-25 DIAGNOSIS — F32.2 CURRENT SEVERE EPISODE OF MAJOR DEPRESSIVE DISORDER WITHOUT PSYCHOTIC FEATURES WITHOUT PRIOR EPISODE (H): Primary | ICD-10-CM

## 2020-02-25 ASSESSMENT — PATIENT HEALTH QUESTIONNAIRE - PHQ9: SUM OF ALL RESPONSES TO PHQ QUESTIONS 1-9: 9

## 2020-02-25 NOTE — PROGRESS NOTES
[facility] TMS Program  [address], [address]  [place] [address]  TMS Procedure Note   Aure Joy MRN# [id number]  Age: 49 year old year old YOB: 1970    Pre-Procedure:  History and Physical: Reviewed in medical record  Consent Signed by: Aure Joy  On: 1/31/2020    Clinical Narrative:  Patient tolerating treatment with no side effects. Patient reports no changes in mood.    Indications for TMS:  MDD, recurrent, severe; 4+ medication trials (from 2+ classes) ineffective; Psychotherapy ineffective.     Pre-Procedure Diagnosis:  MDD, recurrent, severe F33.2    Treatment Hx:  Treatment number this series: 18  Total lifetime treatment number: 95    Allergies   Allergen Reactions     Codeine Nausea     Other  [No Clinical Screening - See Comments] Nausea and Vomiting and Other (See Comments)     general anesthesia- uncontrolled vomitting      BP (!) 146/86 (BP Location: Right arm, Patient Position: Sitting, Cuff Size: Adult Regular)   Pulse 84     Pause for the Cause  Right patient:  Yes  Right procedure/correct coil:  Yes; rTMS; cpt 70043; F8 coil.   Earplugs in place:  Yes    Procedure  Patient was seated in procedure chair. Identity and procedure was verified. Ear plugs were placed in ears and patient-specific cap was placed on head and tightened appropriately. Ruler locations were verified. Coil was placed at treatment location and stimulator was set to parameters described below. A test train was delivered and pt tolerated train. Given pt tolerance, 60 treatment trains were delivered to the left side and 3 trains were delivered to the right side. Pt tolerated procedure with forehead movement.      Motor Threshold Determination  Distance from nasion to inion: 35.5  MT 1: 0 - 6 - 16 @ 69% on 1/29/2018  MT 2: 0 - 6 - 16 @ 69% on 2/6/2018   MT 3: 0 - 6 - 16 @ 69% on 2/13/2018   MT 4: 0 - 6 - 16 @ 69% on 2/23/2018   MT 5: 0 - 6 - 16 @ 69% on 3/2/2018   MT 6: 0 - 5.5 - 15.5(always  use intersection at left side of ruler)@ 85% on 8/23/19  MT 7: 0 - 5.5 - 15.5(always use intersection at left side of ruler)@ 80% on 8/29/19  MT 8: 0 - 5.5 - 37.5(always use intersection at left side of ruler)@ 76% on 9/5/19 (right side using EMG on left hand)  MT 9: C2 (R side using EMG on L hand) 78% on 9/12/2019  MT 10: C2 (R side using EMG on L hand) 76% on 9/26/2019  MT 11: C2 (R side using EMG on L hand) 82 on 1/31/2020    LDLPFC:  Frequency: 50 Hz  Number of pulses:  3                        Number of bursts: 10  Burst frequency: 5 Hz  Cycle time: 10 sec  Total pulses delivered: 1800  Tx Loc: F3  Energy: 80% (97% MT, maximum due to stimulator limitations)  Number of Cycles: 60 trains    RDLPFC:  Frequency: 50 Hz  Number of pulses:  3                        Number of bursts: 200  Burst frequency: 5 Hz  Cycle time: 110.0, machine overheated prior to 2nd and 3rd  train  Total pulses delivered: 1800  Tx Loc: F4 (right side)  Energy: 80% (97% MT, maximum due to stimulator limitations)  Number of Cycles: 3 trains    Rating Scales:  Date MADRS IDS-SR PHQ-9   01/31/20 23  8   2/7/20  28 7   2/14/20  27 6   2/21/20  26 5       Post-Procedure Diagnosis:  MDD, recurrent, severe 296.33    Tamar Conchis  [facility]      Plan   - Cont TMS      I did not see the patient during/after treatment but remained available in the clinic during  treatment.    Evelyn Zamudio MD  [facility]

## 2020-02-26 ENCOUNTER — OFFICE VISIT (OUTPATIENT)
Dept: PSYCHIATRY | Facility: CLINIC | Age: 50
End: 2020-02-26
Payer: COMMERCIAL

## 2020-02-26 VITALS — SYSTOLIC BLOOD PRESSURE: 148 MMHG | DIASTOLIC BLOOD PRESSURE: 85 MMHG | HEART RATE: 84 BPM

## 2020-02-26 DIAGNOSIS — F33.2 SEVERE RECURRENT MAJOR DEPRESSION WITHOUT PSYCHOTIC FEATURES (H): Primary | ICD-10-CM

## 2020-02-26 ASSESSMENT — PATIENT HEALTH QUESTIONNAIRE - PHQ9: SUM OF ALL RESPONSES TO PHQ QUESTIONS 1-9: 6

## 2020-02-27 ENCOUNTER — ALLIED HEALTH/NURSE VISIT (OUTPATIENT)
Dept: PSYCHIATRY | Facility: CLINIC | Age: 50
End: 2020-02-27
Payer: COMMERCIAL

## 2020-02-27 ENCOUNTER — OFFICE VISIT (OUTPATIENT)
Dept: PSYCHIATRY | Facility: CLINIC | Age: 50
End: 2020-02-27
Payer: COMMERCIAL

## 2020-02-27 VITALS — SYSTOLIC BLOOD PRESSURE: 138 MMHG | DIASTOLIC BLOOD PRESSURE: 81 MMHG | HEART RATE: 77 BPM

## 2020-02-27 VITALS
HEART RATE: 69 BPM | OXYGEN SATURATION: 97 % | SYSTOLIC BLOOD PRESSURE: 117 MMHG | DIASTOLIC BLOOD PRESSURE: 78 MMHG | TEMPERATURE: 98.2 F

## 2020-02-27 DIAGNOSIS — F33.2 SEVERE RECURRENT MAJOR DEPRESSION WITHOUT PSYCHOTIC FEATURES (H): Primary | ICD-10-CM

## 2020-02-27 ASSESSMENT — PATIENT HEALTH QUESTIONNAIRE - PHQ9: SUM OF ALL RESPONSES TO PHQ QUESTIONS 1-9: 7

## 2020-02-27 NOTE — PROGRESS NOTES
"Aure Joy comes into clinic today at the request of Dr. Sarah Zamudio Ordering Provider for Medication Management:  Spravato administration.   Verbal order obtained from Dr. Zamudio to administer spravato today.     Medication checked by: Irma East RN  Prior to administration pt identified by name and : yes    Appearance: casual, clean   Mood: \"fine\"  Affect: flat  Eye contact: good  Side effects: unpleasant feeling of being \"high\"  Level of cooperation: cooperative  Education: no needs identified  Next appt: next week, Thursday      Medication provided by Pharmacy    I spent an additional 120 minutes today, in direct patient contact monitoring the patient after Spravato administration. Patient had the following issues sedation.    This service provided today was under the supervising provider of the day Dr. Sarah Zamudio, who was available if needed.    Estrella Fuentes RN    "

## 2020-02-27 NOTE — PROGRESS NOTES
[facility] TMS Program  [address], [address]  [place] [address]  TMS Procedure Note   Aure Joy MRN# [id number]  Age: 49 year old year old YOB: 1970    Pre-Procedure:  History and Physical: Reviewed in medical record  Consent Signed by: Aure Joy  On: 1/31/2020    Clinical Narrative:  Patient tolerating treatment with no side effects. Patient reports no changes in mood.    Indications for TMS:  MDD, recurrent, severe; 4+ medication trials (from 2+ classes) ineffective; Psychotherapy ineffective.     Pre-Procedure Diagnosis:  MDD, recurrent, severe F33.2    Treatment Hx:  Treatment number this series: 20  Total lifetime treatment number: 97    Allergies   Allergen Reactions     Codeine Nausea     Other  [No Clinical Screening - See Comments] Nausea and Vomiting and Other (See Comments)     general anesthesia- uncontrolled vomitting      /81 (BP Location: Right arm, Patient Position: Sitting, Cuff Size: Adult Regular)   Pulse 77     Pause for the Cause  Right patient:  Yes  Right procedure/correct coil:  Yes; rTMS; cpt 56671; F8 coil.   Earplugs in place:  Yes    Procedure  Patient was seated in procedure chair. Identity and procedure was verified. Ear plugs were placed in ears and patient-specific cap was placed on head and tightened appropriately. Ruler locations were verified. Coil was placed at treatment location and stimulator was set to parameters described below. A test train was delivered and pt tolerated train. Given pt tolerance, 60 treatment trains were delivered to the left side and 3 trains were delivered to the right side. Pt tolerated procedure with forehead movement.      Motor Threshold Determination  Distance from nasion to inion: 35.5  MT 1: 0 - 6 - 16 @ 69% on 1/29/2018  MT 2: 0 - 6 - 16 @ 69% on 2/6/2018   MT 3: 0 - 6 - 16 @ 69% on 2/13/2018   MT 4: 0 - 6 - 16 @ 69% on 2/23/2018   MT 5: 0 - 6 - 16 @ 69% on 3/2/2018   MT 6: 0 - 5.5 - 15.5(always use  intersection at left side of ruler)@ 85% on 8/23/19  MT 7: 0 - 5.5 - 15.5(always use intersection at left side of ruler)@ 80% on 8/29/19  MT 8: 0 - 5.5 - 37.5(always use intersection at left side of ruler)@ 76% on 9/5/19 (right side using EMG on left hand)  MT 9: C2 (R side using EMG on L hand) 78% on 9/12/2019  MT 10: C2 (R side using EMG on L hand) 76% on 9/26/2019  MT 11: C2 (R side using EMG on L hand) 82 on 1/31/2020    LDLPFC:  Frequency: 50 Hz  Number of pulses:  3                        Number of bursts: 10  Burst frequency: 5 Hz  Cycle time: 10 sec  Total pulses delivered: 1800  Tx Loc: F3  Energy: 80% (97% MT, maximum due to stimulator limitations)  Number of Cycles: 60 trains    RDLPFC:  Frequency: 50 Hz  Number of pulses:  3                        Number of bursts: 200  Burst frequency: 5 Hz  Cycle time: 105.0 sec  Total pulses delivered: 1800  Tx Loc: F4 (right side)  Energy: 80% (97% MT, maximum due to stimulator limitations)  Number of Cycles: 3 trains    Rating Scales:  Date MADRS IDS-SR PHQ-9   01/31/20 23  8   2/7/20  28 7   2/14/20  27 6   2/21/20  26 5                   Post-Procedure Diagnosis:  MDD, recurrent, severe 296.33    Pilo Almeida  [facility]      Plan   - Cont TMS      I did not see the patient during/after treatment but remained available in the clinic during  treatment.    Evelyn Zamudio MD  [facility]

## 2020-02-28 ENCOUNTER — OFFICE VISIT (OUTPATIENT)
Dept: PSYCHIATRY | Facility: CLINIC | Age: 50
End: 2020-02-28
Payer: COMMERCIAL

## 2020-02-28 VITALS — HEART RATE: 79 BPM | SYSTOLIC BLOOD PRESSURE: 129 MMHG | DIASTOLIC BLOOD PRESSURE: 83 MMHG

## 2020-02-28 DIAGNOSIS — F33.2 SEVERE RECURRENT MAJOR DEPRESSION WITHOUT PSYCHOTIC FEATURES (H): Primary | ICD-10-CM

## 2020-02-28 ASSESSMENT — PATIENT HEALTH QUESTIONNAIRE - PHQ9: SUM OF ALL RESPONSES TO PHQ QUESTIONS 1-9: 7

## 2020-02-28 NOTE — PROGRESS NOTES
[facility] TMS Program  [address], [address]  [place] [address]  TMS Procedure Note   Aure Joy MRN# [id number]  Age: 49 year old year old YOB: 1970    Pre-Procedure:  History and Physical: Reviewed in medical record  Consent Signed by: Aure Joy  On: 1/31/2020    Clinical Narrative:  Patient tolerating treatment with no side effects. Patient reports no changes in mood. She feels kind of fluster today. She's happy as her daughter will be coming home for the weekend.      Indications for TMS:  MDD, recurrent, severe; 4+ medication trials (from 2+ classes) ineffective; Psychotherapy ineffective.     Pre-Procedure Diagnosis:  MDD, recurrent, severe F33.2    Treatment Hx:  Treatment number this series: 21  Total lifetime treatment number: 98    Allergies   Allergen Reactions     Codeine Nausea     Other  [No Clinical Screening - See Comments] Nausea and Vomiting and Other (See Comments)     general anesthesia- uncontrolled vomitting      /83 (BP Location: Right arm, Patient Position: Sitting, Cuff Size: Adult Regular)   Pulse 79     Pause for the Cause  Right patient:  Yes  Right procedure/correct coil:  Yes; rTMS; cpt 58650; F8 coil.   Earplugs in place:  Yes    Procedure  Patient was seated in procedure chair. Identity and procedure was verified. Ear plugs were placed in ears and patient-specific cap was placed on head and tightened appropriately. Ruler locations were verified. Coil was placed at treatment location and stimulator was set to parameters described below. A test train was delivered and pt tolerated train. Given pt tolerance, 60 treatment trains were delivered to the left side and 3 trains were delivered to the right side. Pt tolerated procedure with forehead movement.      Motor Threshold Determination  Distance from nasion to inion: 35.5  MT 1: 0 - 6 - 16 @ 69% on 1/29/2018  MT 2: 0 - 6 - 16 @ 69% on 2/6/2018   MT 3: 0 - 6 - 16 @ 69% on 2/13/2018   MT 4: 0  - 6 - 16 @ 69% on 2/23/2018   MT 5: 0 - 6 - 16 @ 69% on 3/2/2018   MT 6: 0 - 5.5 - 15.5(always use intersection at left side of ruler)@ 85% on 8/23/19  MT 7: 0 - 5.5 - 15.5(always use intersection at left side of ruler)@ 80% on 8/29/19  MT 8: 0 - 5.5 - 37.5(always use intersection at left side of ruler)@ 76% on 9/5/19 (right side using EMG on left hand)  MT 9: C2 (R side using EMG on L hand) 78% on 9/12/2019  MT 10: C2 (R side using EMG on L hand) 76% on 9/26/2019  MT 11: C2 (R side using EMG on L hand) 82 on 1/31/2020    LDLPFC:  Frequency: 50 Hz  Number of pulses:  3                        Number of bursts: 10  Burst frequency: 5 Hz  Cycle time: 10 sec  Total pulses delivered: 1800  Tx Loc: F3  Energy: 80% (97% MT, maximum due to stimulator limitations)  Number of Cycles: 60 trains    RDLPFC:  Frequency: 50 Hz  Number of pulses:  3                        Number of bursts: 200  Burst frequency: 5 Hz  Cycle time: 105.0 sec  Total pulses delivered: 1800  Tx Loc: F4 (right side)  Energy: 80% (97% MT, maximum due to stimulator limitations)  Number of Cycles: 3 trains    Rating Scales:  Date MADRS IDS-SR PHQ-9   01/31/20 23  8   2/7/20  28 7   2/14/20  27 6   2/21/20  26 5   2/28/20  32 7                   Post-Procedure Diagnosis:  MDD, recurrent, severe 296.33    Pilo Almeida  [facility]      Plan   - Cont TMS  - ReMT    I did not see patient but remained available at the clinic throughout the TMS session    Elizabeth Gordon MD  [facility]

## 2020-03-02 ENCOUNTER — OFFICE VISIT (OUTPATIENT)
Dept: PSYCHIATRY | Facility: CLINIC | Age: 50
End: 2020-03-02
Payer: COMMERCIAL

## 2020-03-02 ENCOUNTER — BEH TREATMENT PLAN (OUTPATIENT)
Dept: PSYCHIATRY | Facility: CLINIC | Age: 50
End: 2020-03-02

## 2020-03-02 VITALS — HEART RATE: 93 BPM | DIASTOLIC BLOOD PRESSURE: 93 MMHG | SYSTOLIC BLOOD PRESSURE: 146 MMHG

## 2020-03-02 DIAGNOSIS — T74.12XA: ICD-10-CM

## 2020-03-02 DIAGNOSIS — F40.00 AGORAPHOBIA: ICD-10-CM

## 2020-03-02 DIAGNOSIS — F33.1 MODERATE RECURRENT MAJOR DEPRESSION (H): Primary | ICD-10-CM

## 2020-03-02 DIAGNOSIS — F33.2 SEVERE RECURRENT MAJOR DEPRESSION WITHOUT PSYCHOTIC FEATURES (H): Primary | ICD-10-CM

## 2020-03-02 ASSESSMENT — PATIENT HEALTH QUESTIONNAIRE - PHQ9: SUM OF ALL RESPONSES TO PHQ QUESTIONS 1-9: 7

## 2020-03-02 NOTE — PROGRESS NOTES
[facility] TMS Program  [address], [address]  [place] [address]  TMS Procedure Note   Aure Joy MRN# [id number]  Age: 49 year old year old YOB: 1970    Pre-Procedure:  History and Physical: Reviewed in medical record  Consent Signed by: Aure Joy  On: 1/31/2020    Clinical Narrative:  Patient tolerating treatment with no side effects. Patient reports no changes in mood. She had a good weekend since her daughter was in town from college. They went out and had breakfast together.     Indications for TMS:  MDD, recurrent, severe; 4+ medication trials (from 2+ classes) ineffective; Psychotherapy ineffective.     Pre-Procedure Diagnosis:  MDD, recurrent, severe F33.2    Treatment Hx:  Treatment number this series: 22  Total lifetime treatment number: 99    Allergies   Allergen Reactions     Codeine Nausea     Other  [No Clinical Screening - See Comments] Nausea and Vomiting and Other (See Comments)     general anesthesia- uncontrolled vomitting      BP (!) 146/93 (BP Location: Right arm, Patient Position: Sitting, Cuff Size: Adult Regular)   Pulse 93     Pause for the Cause  Right patient:  Yes  Right procedure/correct coil:  Yes; rTMS; cpt 75864; F8 coil.   Earplugs in place:  Yes    Procedure  Patient was seated in procedure chair. Identity and procedure was verified. Ear plugs were placed in ears and patient-specific cap was placed on head and tightened appropriately. Ruler locations were verified. Coil was placed at treatment location and stimulator was set to parameters described below. A test train was delivered and pt tolerated train. Given pt tolerance, 60 treatment trains were delivered to the left side and 3 trains were delivered to the right side. Pt tolerated procedure with forehead movement.      Motor Threshold Determination  Distance from nasion to inion: 35.5  MT 1: 0 - 6 - 16 @ 69% on 1/29/2018  MT 2: 0 - 6 - 16 @ 69% on 2/6/2018   MT 3: 0 - 6 - 16 @ 69% on  2/13/2018   MT 4: 0 - 6 - 16 @ 69% on 2/23/2018   MT 5: 0 - 6 - 16 @ 69% on 3/2/2018   MT 6: 0 - 5.5 - 15.5(always use intersection at left side of ruler)@ 85% on 8/23/19  MT 7: 0 - 5.5 - 15.5(always use intersection at left side of ruler)@ 80% on 8/29/19  MT 8: 0 - 5.5 - 37.5(always use intersection at left side of ruler)@ 76% on 9/5/19 (right side using EMG on left hand)  MT 9: C2 (R side using EMG on L hand) 78% on 9/12/2019  MT 10: C2 (R side using EMG on L hand) 76% on 9/26/2019  MT 11: C2 (R side using EMG on L hand) 82 on 1/31/2020    LDLPFC:  Frequency: 50 Hz  Number of pulses:  3                        Number of bursts: 10  Burst frequency: 5 Hz  Cycle time: 10 sec  Total pulses delivered: 1800  Tx Loc: F3  Energy: 80% (97% MT, maximum due to stimulator limitations)  Number of Cycles: 60 trains    RDLPFC:  Frequency: 50 Hz  Number of pulses:  3                        Number of bursts: 200  Burst frequency: 5 Hz  Cycle time: 105.0 sec (machine overheated after 2 trains, increase cycle time tomorrow)  Total pulses delivered: 1800  Tx Loc: F4 (right side)  Energy: 80% (97% MT, maximum due to stimulator limitations)  Number of Cycles: 3 trains    Rating Scales:  Date MADRS IDS-SR PHQ-9   01/31/20 23  8   2/7/20  28 7   2/14/20  27 6   2/21/20  26 5   2/28/20  32 7                   Post-Procedure Diagnosis:  MDD, recurrent, severe 296.33    Pilo Almeida  [facility]      Plan   - Cont TMS  - ReMT    I did not see patient but remained available at the clinic throughout the TMS session    Elizabeth Gordon MD  [facility]

## 2020-03-02 NOTE — PROGRESS NOTES
Outpatient Treatment Plan     Today's Date: March 2, 2020                         90-Day Review Date: March 2, 2020 +90 days    Date of Initial Service: 2/24/2020          Diagnosis:  Major Depressive Disorder, moderate, recurrent  Agoraphobia  Anxiety and residual symptoms of PTSD    Current symptoms and circumstances that substantiate the diagnosis:  Depression, anxiety, fear, difficulty with trust in relationships, agoraphobia    How symptoms and/or behaviors are affecting level of function:  work, marital    Risk Assessment:  Suicide:  Assessed Level of Immediate Risk: estimated to be low at this time  Ideation: None current  Plan:  No  Means: No  Intent: No     Homicide/Violence:  Assessed Level of Immediate Risk: None  Ideation: No  Plan: No  Means: No  Intent: No          SYMPTOMS; PROBLEMS   MEASURABLE GOALS;    FUNCTIONAL IMPROVEMENT INTERVENTIONS Gains Made:     Depression reduce depressive symptoms, reduce depressive episodes and find enjoyment at least once a day cognitive behavioral therapy; BA; ACT; mindfulness practice N/A Onset of treatment   Fear, agoraphobia        PTSD, trauma hx Reduce fear and anxiety inference with functioning    Reduce PTSD residual symptoms and avoidance CBT for anxiety        Refer for trauma - focused therapy (PTSD protocols)   N/A Onset of treatment                                Frequency of Sessions: Weekly     Discharge and Aftercare Goals: see measurable goals above     Expected duration of treatment: 2-6 months     Participants in therapy plan (family, friends, support network): self     See Consents for Acknowledgement of Current Treatment Plan

## 2020-03-03 ENCOUNTER — OFFICE VISIT (OUTPATIENT)
Dept: PSYCHIATRY | Facility: CLINIC | Age: 50
End: 2020-03-03
Payer: COMMERCIAL

## 2020-03-03 VITALS — HEART RATE: 92 BPM | DIASTOLIC BLOOD PRESSURE: 95 MMHG | SYSTOLIC BLOOD PRESSURE: 141 MMHG

## 2020-03-03 DIAGNOSIS — F33.2 SEVERE RECURRENT MAJOR DEPRESSION WITHOUT PSYCHOTIC FEATURES (H): Primary | ICD-10-CM

## 2020-03-03 ASSESSMENT — PATIENT HEALTH QUESTIONNAIRE - PHQ9: SUM OF ALL RESPONSES TO PHQ QUESTIONS 1-9: 7

## 2020-03-03 NOTE — PROGRESS NOTES
[facility] TMS Program  [address], [address]  [place] [address]  TMS Procedure Note   Aure Joy MRN# [id number]  Age: 49 year old year old YOB: 1970    Pre-Procedure:  History and Physical: Reviewed in medical record  Consent Signed by: Aure Joy  On: 1/31/2020    Clinical Narrative:  Patient tolerating treatment with no side effects.  Patient reports no change in mood.      Indications for TMS:  MDD, recurrent, severe; 4+ medication trials (from 2+ classes) ineffective; Psychotherapy ineffective.     Pre-Procedure Diagnosis:  MDD, recurrent, severe F33.2    Treatment Hx:  Treatment number this series: 23  Total lifetime treatment number: 100    Allergies   Allergen Reactions     Codeine Nausea     Other  [No Clinical Screening - See Comments] Nausea and Vomiting and Other (See Comments)     general anesthesia- uncontrolled vomitting      BP (!) 141/95   Pulse 92     Pause for the Cause  Right patient:  Yes  Right procedure/correct coil:  Yes; rTMS; cpt 18553; F8 coil.   Earplugs in place:  Yes    Procedure  Patient was seated in procedure chair. Identity and procedure was verified. Ear plugs were placed in ears and patient-specific cap was placed on head and tightened appropriately. Ruler locations were verified. Coil was placed at treatment location and stimulator was set to parameters described below. A test train was delivered and pt tolerated train. Given pt tolerance, 60 treatment trains were delivered to the left side and 3 trains were delivered to the right side. Pt tolerated procedure with forehead movement.      Motor Threshold Determination  Distance from nasion to inion: 35.5  MT 1: 0 - 6 - 16 @ 69% on 1/29/2018  MT 2: 0 - 6 - 16 @ 69% on 2/6/2018   MT 3: 0 - 6 - 16 @ 69% on 2/13/2018   MT 4: 0 - 6 - 16 @ 69% on 2/23/2018   MT 5: 0 - 6 - 16 @ 69% on 3/2/2018   MT 6: 0 - 5.5 - 15.5(always use intersection at left side of ruler)@ 85% on 8/23/19  MT 7: 0 - 5.5 -  15.5(always use intersection at left side of ruler)@ 80% on 8/29/19  MT 8: 0 - 5.5 - 37.5(always use intersection at left side of ruler)@ 76% on 9/5/19 (right side using EMG on left hand)  MT 9: C2 (R side using EMG on L hand) 78% on 9/12/2019  MT 10: C2 (R side using EMG on L hand) 76% on 9/26/2019  MT 11: C2 (R side using EMG on L hand) 82 on 1/31/2020    LDLPFC:  Frequency: 50 Hz  Number of pulses:  3                        Number of bursts: 10  Burst frequency: 5 Hz  Cycle time: 10 sec  Total pulses delivered: 1800  Tx Loc: F3  Energy: 80% (97% MT, maximum due to stimulator limitations)  Number of Cycles: 60 trains    RDLPFC:  Frequency: 50 Hz  Number of pulses:  3                        Number of bursts: 200  Burst frequency: 5 Hz  Cycle time: 115.0 sec (machine overheated after 2 trains, increase cycle time tomorrow)  Total pulses delivered: 1800  Tx Loc: F4 (right side)  Energy: 80% (97% MT, maximum due to stimulator limitations)  Number of Cycles: 3 trains    Rating Scales:  Date MADRS IDS-SR PHQ-9   01/31/20 23  8   2/7/20  28 7   2/14/20  27 6   2/21/20  26 5   2/28/20  32 7                   Post-Procedure Diagnosis:  MDD, recurrent, severe 296.33    Jael Alexandra RN   [facility]      Plan   - Cont TMS  - ReMT      I did not see the patient during/after treatment but remained available in the clinic during  treatment.    Evelyn Zamudio MD  [facility]

## 2020-03-04 ENCOUNTER — OFFICE VISIT (OUTPATIENT)
Dept: PSYCHIATRY | Facility: CLINIC | Age: 50
End: 2020-03-04
Payer: COMMERCIAL

## 2020-03-04 DIAGNOSIS — F33.2 SEVERE RECURRENT MAJOR DEPRESSION WITHOUT PSYCHOTIC FEATURES (H): Primary | ICD-10-CM

## 2020-03-04 ASSESSMENT — PATIENT HEALTH QUESTIONNAIRE - PHQ9: SUM OF ALL RESPONSES TO PHQ QUESTIONS 1-9: 7

## 2020-03-04 NOTE — PROGRESS NOTES
[facility] TMS Program  [address], [address]  [place] [address]  TMS Procedure Note   Aure Joy MRN# [id number]  Age: 49 year old year old YOB: 1970    Pre-Procedure:  History and Physical: Reviewed in medical record  Consent Signed by: Aure Joy  On: 1/31/2020    Clinical Narrative:  Patient tolerating treatment with no side effects.  Patient reports no change in mood.      Indications for TMS:  MDD, recurrent, severe; 4+ medication trials (from 2+ classes) ineffective; Psychotherapy ineffective.     Pre-Procedure Diagnosis:  MDD, recurrent, severe F33.2    Treatment Hx:  Treatment number this series: 24  Total lifetime treatment number: 101    Allergies   Allergen Reactions     Codeine Nausea     Other  [No Clinical Screening - See Comments] Nausea and Vomiting and Other (See Comments)     general anesthesia- uncontrolled vomitting      There were no vitals taken for this visit.    Pause for the Cause  Right patient:  Yes  Right procedure/correct coil:  Yes; rTMS; cpt 34533; F8 coil.   Earplugs in place:  Yes    Procedure  Patient was seated in procedure chair. Identity and procedure was verified. Ear plugs were placed in ears and patient-specific cap was placed on head and tightened appropriately. Ruler locations were verified. Coil was placed at treatment location and stimulator was set to parameters described below. A test train was delivered and pt tolerated train. Given pt tolerance, 60 treatment trains were delivered to the left side and 3 trains were delivered to the right side. Pt tolerated procedure with forehead movement.      Motor Threshold Determination  Distance from nasion to inion: 35.5  MT 1: 0 - 6 - 16 @ 69% on 1/29/2018  MT 2: 0 - 6 - 16 @ 69% on 2/6/2018   MT 3: 0 - 6 - 16 @ 69% on 2/13/2018   MT 4: 0 - 6 - 16 @ 69% on 2/23/2018   MT 5: 0 - 6 - 16 @ 69% on 3/2/2018   MT 6: 0 - 5.5 - 15.5(always use intersection at left side of ruler)@ 85% on 8/23/19  MT  7: 0 - 5.5 - 15.5(always use intersection at left side of ruler)@ 80% on 8/29/19  MT 8: 0 - 5.5 - 37.5(always use intersection at left side of ruler)@ 76% on 9/5/19 (right side using EMG on left hand)  MT 9: C2 (R side using EMG on L hand) 78% on 9/12/2019  MT 10: C2 (R side using EMG on L hand) 76% on 9/26/2019  MT 11: C2 (R side using EMG on L hand) 82 on 1/31/2020    LDLPFC:  Frequency: 50 Hz  Number of pulses:  3                        Number of bursts: 10  Burst frequency: 5 Hz  Cycle time: 10 sec  Total pulses delivered: 1800  Tx Loc: F3  Energy: 80% (97% MT, maximum due to stimulator limitations)  Number of Cycles: 60 trains    RDLPFC:  Frequency: 50 Hz  Number of pulses:  3                        Number of bursts: 200  Burst frequency: 5 Hz  Cycle time: 115.0 sec (machine overheated after 2 trains, increase cycle time tomorrow)  Total pulses delivered: 1800  Tx Loc: F4 (right side)  Energy: 80% (97% MT, maximum due to stimulator limitations)  Number of Cycles: 3 trains    Rating Scales:  Date MADRS IDS-SR PHQ-9   01/31/20 23  8   2/7/20  28 7   2/14/20  27 6   2/21/20  26 5   2/28/20  32 7                   Post-Procedure Diagnosis:  MDD, recurrent, severe 296.33    Jael Alexandra RN   [facility]      Plan   - Cont TMS  - ReMT    I did not see patient but remained available throughout the TMS session.    Steven Gill MD  [facility]

## 2020-03-05 ENCOUNTER — ALLIED HEALTH/NURSE VISIT (OUTPATIENT)
Dept: PSYCHIATRY | Facility: CLINIC | Age: 50
End: 2020-03-05
Payer: COMMERCIAL

## 2020-03-05 ENCOUNTER — TELEPHONE (OUTPATIENT)
Dept: PSYCHIATRY | Facility: CLINIC | Age: 50
End: 2020-03-05

## 2020-03-05 ENCOUNTER — OFFICE VISIT (OUTPATIENT)
Dept: PSYCHIATRY | Facility: CLINIC | Age: 50
End: 2020-03-05
Payer: COMMERCIAL

## 2020-03-05 VITALS — SYSTOLIC BLOOD PRESSURE: 128 MMHG | DIASTOLIC BLOOD PRESSURE: 90 MMHG | OXYGEN SATURATION: 96 % | HEART RATE: 79 BPM

## 2020-03-05 VITALS — HEART RATE: 81 BPM | SYSTOLIC BLOOD PRESSURE: 147 MMHG | DIASTOLIC BLOOD PRESSURE: 83 MMHG

## 2020-03-05 DIAGNOSIS — F33.2 SEVERE RECURRENT MAJOR DEPRESSION WITHOUT PSYCHOTIC FEATURES (H): Primary | ICD-10-CM

## 2020-03-05 ASSESSMENT — PATIENT HEALTH QUESTIONNAIRE - PHQ9: SUM OF ALL RESPONSES TO PHQ QUESTIONS 1-9: 6

## 2020-03-05 NOTE — TELEPHONE ENCOUNTER
Patient called in stating that she has been experiencing more anxiety.  Reports that she feels shaky and nervous.  She is wondering if this is a side effect of Spravato.      -Discussed with Aure that anxiety can be a side effect of Spravato and that patients have experienced this.  Encouraged her to use her normal coping skills of anxiety and to follow up tomorrow if things are not better.

## 2020-03-05 NOTE — PROGRESS NOTES
Aure Joy comes into clinic today at the request of ESTELA Zamudio MD Ordering Provider for Medication Management:  Spravato Admin .    Medication checked by: Irma East RN  Prior to administration pt identified by name and : yes    Appearance: dressed casually   Mood: good  Affect: congruen to mood  Eye contact: good  Side effects: dissociatoin  Level of cooperation: coopeerative  Education: none needed  Next appt: weekly     I spent an additional 120 minutes today, in direct patient contact monitoring the patient after Spravato administration. Patient had the following issues; depression stable; vitals monitored through entire visit.      Medication provided by Pharmacy      This service provided today was under the supervising provider of the day ESTELA Zamudio MD, who was available if needed.    BEN CORDOVA, RN

## 2020-03-09 ENCOUNTER — BEH TREATMENT PLAN (OUTPATIENT)
Dept: PSYCHIATRY | Facility: CLINIC | Age: 50
End: 2020-03-09

## 2020-03-09 ENCOUNTER — OFFICE VISIT (OUTPATIENT)
Dept: PSYCHIATRY | Facility: CLINIC | Age: 50
End: 2020-03-09
Payer: COMMERCIAL

## 2020-03-09 VITALS — DIASTOLIC BLOOD PRESSURE: 88 MMHG | HEART RATE: 94 BPM | SYSTOLIC BLOOD PRESSURE: 146 MMHG

## 2020-03-09 DIAGNOSIS — T74.12XA: ICD-10-CM

## 2020-03-09 DIAGNOSIS — F33.1 MODERATE RECURRENT MAJOR DEPRESSION (H): Primary | ICD-10-CM

## 2020-03-09 DIAGNOSIS — F32.2 CURRENT SEVERE EPISODE OF MAJOR DEPRESSIVE DISORDER WITHOUT PSYCHOTIC FEATURES WITHOUT PRIOR EPISODE (H): Primary | ICD-10-CM

## 2020-03-09 DIAGNOSIS — F40.00 AGORAPHOBIA: ICD-10-CM

## 2020-03-09 ASSESSMENT — ANXIETY QUESTIONNAIRES
2. NOT BEING ABLE TO STOP OR CONTROL WORRYING: SEVERAL DAYS
IF YOU CHECKED OFF ANY PROBLEMS ON THIS QUESTIONNAIRE, HOW DIFFICULT HAVE THESE PROBLEMS MADE IT FOR YOU TO DO YOUR WORK, TAKE CARE OF THINGS AT HOME, OR GET ALONG WITH OTHER PEOPLE: VERY DIFFICULT
4. TROUBLE RELAXING: SEVERAL DAYS
3. WORRYING TOO MUCH ABOUT DIFFERENT THINGS: MORE THAN HALF THE DAYS
7. FEELING AFRAID AS IF SOMETHING AWFUL MIGHT HAPPEN: SEVERAL DAYS
6. BECOMING EASILY ANNOYED OR IRRITABLE: MORE THAN HALF THE DAYS
1. FEELING NERVOUS, ANXIOUS, OR ON EDGE: NEARLY EVERY DAY
5. BEING SO RESTLESS THAT IT IS HARD TO SIT STILL: SEVERAL DAYS
GAD7 TOTAL SCORE: 11

## 2020-03-09 ASSESSMENT — PATIENT HEALTH QUESTIONNAIRE - PHQ9: SUM OF ALL RESPONSES TO PHQ QUESTIONS 1-9: 7
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
## 2020-03-09 NOTE — PROGRESS NOTES
[facility] TMS Program  [address], [address]  [place] [address]  TMS Procedure Note   Aure Joy MRN# [id number]  Age: 49 year old year old YOB: 1970    Pre-Procedure:  History and Physical: Reviewed in medical record  Consent Signed by: Aure Joy  On: 1/31/2020    Clinical Narrative:  Patient tolerating treatment with no side effects.  Patient reports no change in mood.  Patient completed 2 of 3  trains during the right-sided treatment.     Indications for TMS:  MDD, recurrent, severe; 4+ medication trials (from 2+ classes) ineffective; Psychotherapy ineffective.     Pre-Procedure Diagnosis:  MDD, recurrent, severe F33.2    Treatment Hx:  Treatment number this series: 26  Total lifetime treatment number: 103    Allergies   Allergen Reactions     Codeine Nausea     Other  [No Clinical Screening - See Comments] Nausea and Vomiting and Other (See Comments)     general anesthesia- uncontrolled vomitting      BP (!) 146/88 (BP Location: Right arm, Patient Position: Sitting, Cuff Size: Adult Regular)   Pulse 94     Pause for the Cause  Right patient:  Yes  Right procedure/correct coil:  Yes; rTMS; cpt 98608; F8 coil.   Earplugs in place:  Yes    Procedure  Patient was seated in procedure chair. Identity and procedure was verified. Ear plugs were placed in ears and patient-specific cap was placed on head and tightened appropriately. Ruler locations were verified. Coil was placed at treatment location and stimulator was set to parameters described below. A test train was delivered and pt tolerated train. Given pt tolerance, 60 treatment trains were delivered to the left side and 3 trains were delivered to the right side. Pt tolerated procedure with forehead movement.      Motor Threshold Determination  Distance from nasion to inion: 35.5  MT 1: 0 - 6 - 16 @ 69% on 1/29/2018  MT 2: 0 - 6 - 16 @ 69% on 2/6/2018   MT 3: 0 - 6 - 16 @ 69% on 2/13/2018   MT 4: 0 - 6 - 16 @ 69% on  2/23/2018   MT 5: 0 - 6 - 16 @ 69% on 3/2/2018   MT 6: 0 - 5.5 - 15.5(always use intersection at left side of ruler)@ 85% on 8/23/19  MT 7: 0 - 5.5 - 15.5(always use intersection at left side of ruler)@ 80% on 8/29/19  MT 8: 0 - 5.5 - 37.5(always use intersection at left side of ruler)@ 76% on 9/5/19 (right side using EMG on left hand)  MT 9: C2 (R side using EMG on L hand) 78% on 9/12/2019  MT 10: C2 (R side using EMG on L hand) 76% on 9/26/2019  MT 11: C2 (R side using EMG on L hand) 82 on 1/31/2020    LDLPFC:  Frequency: 50 Hz  Number of pulses:  3                        Number of bursts: 10  Burst frequency: 5 Hz  Cycle time: 10 sec  Total pulses delivered: 1800  Tx Loc: F3  Energy: 80% (97% MT, maximum due to stimulator limitations)  Number of Cycles: 60 trains    RDLPFC:  Frequency: 50 Hz  Number of pulses:  3                        Number of bursts: 200  Burst frequency: 5 Hz  Cycle time: 100.0   Total pulses delivered: 1800  Tx Loc: F4 (right side)  Energy: 80% (97% MT, maximum due to stimulator limitations)  Number of Cycles: 3 trains    Rating Scales:  Date MADRS IDS-SR PHQ-9   01/31/20 23  8   2/7/20  28 7   2/14/20  27 6   2/21/20  26 5   2/28/20  32 7                   Post-Procedure Diagnosis:  MDD, recurrent, severe 296.33    Tamar Conchis  [facility]  Mental [facility]      Plan   - Cont TMS  - ReMT    I did not see patient but remained available in the clinic throughout the TMS session.    Elizabeth Gordon MD  [facility]

## 2020-03-10 ENCOUNTER — OFFICE VISIT (OUTPATIENT)
Dept: PSYCHIATRY | Facility: CLINIC | Age: 50
End: 2020-03-10
Payer: COMMERCIAL

## 2020-03-10 VITALS — HEART RATE: 87 BPM | DIASTOLIC BLOOD PRESSURE: 105 MMHG | SYSTOLIC BLOOD PRESSURE: 152 MMHG

## 2020-03-10 DIAGNOSIS — F32.2 CURRENT SEVERE EPISODE OF MAJOR DEPRESSIVE DISORDER WITHOUT PSYCHOTIC FEATURES WITHOUT PRIOR EPISODE (H): Primary | ICD-10-CM

## 2020-03-10 ASSESSMENT — PATIENT HEALTH QUESTIONNAIRE - PHQ9: SUM OF ALL RESPONSES TO PHQ QUESTIONS 1-9: 5

## 2020-03-10 ASSESSMENT — ANXIETY QUESTIONNAIRES: GAD7 TOTAL SCORE: 11

## 2020-03-10 NOTE — PROGRESS NOTES
[facility] TMS Program  [address], [address]  [place] [address]  TMS Procedure Note   Aure Joy MRN# [id number]  Age: 49 year old year old YOB: 1970    Pre-Procedure:  History and Physical: Reviewed in medical record  Consent Signed by: Aure Joy  On: 1/31/2020    Clinical Narrative:  Patient tolerating treatment with no side effects.  Patient reports no change in mood.      Indications for TMS:  MDD, recurrent, severe; 4+ medication trials (from 2+ classes) ineffective; Psychotherapy ineffective.     Pre-Procedure Diagnosis:  MDD, recurrent, severe F33.2    Treatment Hx:  Treatment number this series: 27  Total lifetime treatment number: 104    Allergies   Allergen Reactions     Codeine Nausea     Other  [No Clinical Screening - See Comments] Nausea and Vomiting and Other (See Comments)     general anesthesia- uncontrolled vomitting      BP (!) 152/105 (BP Location: Right arm, Patient Position: Sitting, Cuff Size: Adult Regular)   Pulse 87     Pause for the Cause  Right patient:  Yes  Right procedure/correct coil:  Yes; rTMS; cpt 32363; F8 coil.   Earplugs in place:  Yes    Procedure  Patient was seated in procedure chair. Identity and procedure was verified. Ear plugs were placed in ears and patient-specific cap was placed on head and tightened appropriately. Ruler locations were verified. Coil was placed at treatment location and stimulator was set to parameters described below. A test train was delivered and pt tolerated train. Given pt tolerance, 60 treatment trains were delivered to the left side and 3 trains were delivered to the right side. Pt tolerated procedure with forehead movement.      Motor Threshold Determination  Distance from nasion to inion: 35.5  MT 1: 0 - 6 - 16 @ 69% on 1/29/2018  MT 2: 0 - 6 - 16 @ 69% on 2/6/2018   MT 3: 0 - 6 - 16 @ 69% on 2/13/2018   MT 4: 0 - 6 - 16 @ 69% on 2/23/2018   MT 5: 0 - 6 - 16 @ 69% on 3/2/2018   MT 6: 0 - 5.5 -  15.5(always use intersection at left side of ruler)@ 85% on 8/23/19  MT 7: 0 - 5.5 - 15.5(always use intersection at left side of ruler)@ 80% on 8/29/19  MT 8: 0 - 5.5 - 37.5(always use intersection at left side of ruler)@ 76% on 9/5/19 (right side using EMG on left hand)  MT 9: C2 (R side using EMG on L hand) 78% on 9/12/2019  MT 10: C2 (R side using EMG on L hand) 76% on 9/26/2019  MT 11: C2 (R side using EMG on L hand) 82 on 1/31/2020    LDLPFC:  Frequency: 50 Hz  Number of pulses:  3                        Number of bursts: 10  Burst frequency: 5 Hz  Cycle time: 10 sec  Total pulses delivered: 1800  Tx Loc: F3  Energy: 80% (97% MT, maximum due to stimulator limitations)  Number of Cycles: 60 trains    RDLPFC:  Frequency: 50 Hz  Number of pulses:  3                        Number of bursts: 200  Burst frequency: 5 Hz  Cycle time: 100.0   Total pulses delivered: 1800  Tx Loc: F4 (right side)  Energy: 80% (97% MT, maximum due to stimulator limitations)  Number of Cycles: 3 trains    Rating Scales:  Date MADRS IDS-SR PHQ-9   01/31/20 23  8   2/7/20  28 7   2/14/20  27 6   2/21/20  26 5   2/28/20  32 7                   Post-Procedure Diagnosis:  MDD, recurrent, severe 296.33    Tamar Conchis  [facility]      Plan   - Cont TMS  - ReMT      I did not see the patient during/after treatment but remained available in the clinic during  treatment.    Evelyn Zamudio MD  [facility]

## 2020-03-11 ENCOUNTER — OFFICE VISIT (OUTPATIENT)
Dept: PSYCHIATRY | Facility: CLINIC | Age: 50
End: 2020-03-11
Payer: COMMERCIAL

## 2020-03-11 VITALS — SYSTOLIC BLOOD PRESSURE: 164 MMHG | DIASTOLIC BLOOD PRESSURE: 85 MMHG | HEART RATE: 84 BPM

## 2020-03-11 DIAGNOSIS — F32.2 CURRENT SEVERE EPISODE OF MAJOR DEPRESSIVE DISORDER WITHOUT PSYCHOTIC FEATURES WITHOUT PRIOR EPISODE (H): Primary | ICD-10-CM

## 2020-03-11 ASSESSMENT — PATIENT HEALTH QUESTIONNAIRE - PHQ9: SUM OF ALL RESPONSES TO PHQ QUESTIONS 1-9: 4

## 2020-03-11 NOTE — PROGRESS NOTES
[facility] TMS Program  [address], [address]  [place] [address]  TMS Procedure Note   Aure Joy MRN# [id number]  Age: 49 year old year old YOB: 1970    Pre-Procedure:  History and Physical: Reviewed in medical record  Consent Signed by: Aure Joy  On: 1/31/2020    Clinical Narrative:  Patient tolerating treatment with no side effects.  Patient reports no change in mood.  Patient states she is doing really well today.     Indications for TMS:  MDD, recurrent, severe; 4+ medication trials (from 2+ classes) ineffective; Psychotherapy ineffective.     Pre-Procedure Diagnosis:  MDD, recurrent, severe F33.2    Treatment Hx:  Treatment number this series: 28  Total lifetime treatment number: 105    Allergies   Allergen Reactions     Codeine Nausea     Other  [No Clinical Screening - See Comments] Nausea and Vomiting and Other (See Comments)     general anesthesia- uncontrolled vomitting      BP (!) 164/85   Pulse 84     Pause for the Cause  Right patient:  Yes  Right procedure/correct coil:  Yes; rTMS; cpt 10877; F8 coil.   Earplugs in place:  Yes    Procedure  Patient was seated in procedure chair. Identity and procedure was verified. Ear plugs were placed in ears and patient-specific cap was placed on head and tightened appropriately. Ruler locations were verified. Coil was placed at treatment location and stimulator was set to parameters described below. A test train was delivered and pt tolerated train. Given pt tolerance, 60 treatment trains were delivered to the left side and 3 trains were delivered to the right side. Pt tolerated procedure with forehead movement.      Motor Threshold Determination  Distance from nasion to inion: 35.5  MT 1: 0 - 6 - 16 @ 69% on 1/29/2018  MT 2: 0 - 6 - 16 @ 69% on 2/6/2018   MT 3: 0 - 6 - 16 @ 69% on 2/13/2018   MT 4: 0 - 6 - 16 @ 69% on 2/23/2018   MT 5: 0 - 6 - 16 @ 69% on 3/2/2018   MT 6: 0 - 5.5 - 15.5(always use intersection at left side  of ruler)@ 85% on 8/23/19  MT 7: 0 - 5.5 - 15.5(always use intersection at left side of ruler)@ 80% on 8/29/19  MT 8: 0 - 5.5 - 37.5(always use intersection at left side of ruler)@ 76% on 9/5/19 (right side using EMG on left hand)  MT 9: C2 (R side using EMG on L hand) 78% on 9/12/2019  MT 10: C2 (R side using EMG on L hand) 76% on 9/26/2019  MT 11: C2 (R side using EMG on L hand) 82 on 1/31/2020    LDLPFC:  Frequency: 50 Hz  Number of pulses:  3                        Number of bursts: 10  Burst frequency: 5 Hz  Cycle time: 10 sec  Total pulses delivered: 1800  Tx Loc: F3  Energy: 80% (97% MT, maximum due to stimulator limitations)  Number of Cycles: 60 trains    RDLPFC:  Frequency: 50 Hz  Number of pulses:  3                        Number of bursts: 200  Burst frequency: 5 Hz  Cycle time: 100.0   Total pulses delivered: 1800  Tx Loc: F4 (right side)  Energy: 80% (97% MT, maximum due to stimulator limitations)  Number of Cycles: 3 trains    Rating Scales:  Date MADRS IDS-SR PHQ-9   01/31/20 23  8   2/7/20  28 7   2/14/20  27 6   2/21/20  26 5   2/28/20  32 7                   Post-Procedure Diagnosis:  MDD, recurrent, severe 296.33    Puneet Jane LPN  [facility]      Plan   - Cont TMS  - ReMT      I did not see the patient during/after treatment but remained available in the clinic during  treatment.    William Rodriguez MD  [facility]

## 2020-03-12 ENCOUNTER — OFFICE VISIT (OUTPATIENT)
Dept: PSYCHIATRY | Facility: CLINIC | Age: 50
End: 2020-03-12
Payer: COMMERCIAL

## 2020-03-12 ENCOUNTER — ALLIED HEALTH/NURSE VISIT (OUTPATIENT)
Dept: PSYCHIATRY | Facility: CLINIC | Age: 50
End: 2020-03-12
Payer: COMMERCIAL

## 2020-03-12 VITALS — HEART RATE: 86 BPM | DIASTOLIC BLOOD PRESSURE: 86 MMHG | SYSTOLIC BLOOD PRESSURE: 138 MMHG

## 2020-03-12 VITALS — SYSTOLIC BLOOD PRESSURE: 105 MMHG | HEART RATE: 74 BPM | OXYGEN SATURATION: 95 % | DIASTOLIC BLOOD PRESSURE: 65 MMHG

## 2020-03-12 DIAGNOSIS — F32.2 CURRENT SEVERE EPISODE OF MAJOR DEPRESSIVE DISORDER WITHOUT PSYCHOTIC FEATURES WITHOUT PRIOR EPISODE (H): Primary | ICD-10-CM

## 2020-03-12 ASSESSMENT — PATIENT HEALTH QUESTIONNAIRE - PHQ9: SUM OF ALL RESPONSES TO PHQ QUESTIONS 1-9: 3

## 2020-03-12 NOTE — PROGRESS NOTES
Aure Joy comes into clinic today at the request of ESTELA Zamudio MD Ordering Provider for Medication Management:  Spravato Admin .    Medication checked by: Irma East RN  Prior to administration pt identified by name and : yes    Appearance: dressed casually   Mood: good  Affect: wnl  Eye contact: good  Side effects: dissociation  Level of cooperation: cooperative  Education: none needed  Next appt:     I spent an additional 120 minutes today, in direct patient contact monitoring the patient after Spravato administration. Patient had the following issues; depression stable, vitals monitored through entire visit today.    QIDS score of 11  PCL-5 score of 29    Medication provided by Pharmacy      This service provided today was under the supervising provider of the day ESTELA Zamudio MD, who was available if needed.    BEN CORDOVA, RN

## 2020-03-12 NOTE — PROGRESS NOTES
[facility] TMS Program  [address], [address]  [place] [address]  TMS Procedure Note   Aure Joy MRN# [id number]  Age: 49 year old year old YOB: 1970    Pre-Procedure:  History and Physical: Reviewed in medical record  Consent Signed by: Aure Joy  On: 1/31/2020    Clinical Narrative:  Patient tolerating treatment with no side effects.  Patient reports no change in mood.      Indications for TMS:  MDD, recurrent, severe; 4+ medication trials (from 2+ classes) ineffective; Psychotherapy ineffective.     Pre-Procedure Diagnosis:  MDD, recurrent, severe F33.2    Treatment Hx:  Treatment number this series: 29  Total lifetime treatment number: 106    Allergies   Allergen Reactions     Codeine Nausea     Other  [No Clinical Screening - See Comments] Nausea and Vomiting and Other (See Comments)     general anesthesia- uncontrolled vomitting      /86 (BP Location: Right arm, Patient Position: Sitting, Cuff Size: Adult Regular)   Pulse 86     Pause for the Cause  Right patient:  Yes  Right procedure/correct coil:  Yes; rTMS; cpt 25453; F8 coil.   Earplugs in place:  Yes    Procedure  Patient was seated in procedure chair. Identity and procedure was verified. Ear plugs were placed in ears and patient-specific cap was placed on head and tightened appropriately. Ruler locations were verified. Coil was placed at treatment location and stimulator was set to parameters described below. A test train was delivered and pt tolerated train. Given pt tolerance, 60 treatment trains were delivered to the left side and 3 trains were delivered to the right side. Pt tolerated procedure with forehead movement.      Motor Threshold Determination  Distance from nasion to inion: 35.5  MT 1: 0 - 6 - 16 @ 69% on 1/29/2018  MT 2: 0 - 6 - 16 @ 69% on 2/6/2018   MT 3: 0 - 6 - 16 @ 69% on 2/13/2018   MT 4: 0 - 6 - 16 @ 69% on 2/23/2018   MT 5: 0 - 6 - 16 @ 69% on 3/2/2018   MT 6: 0 - 5.5 - 15.5(always  use intersection at left side of ruler)@ 85% on 8/23/19  MT 7: 0 - 5.5 - 15.5(always use intersection at left side of ruler)@ 80% on 8/29/19  MT 8: 0 - 5.5 - 37.5(always use intersection at left side of ruler)@ 76% on 9/5/19 (right side using EMG on left hand)  MT 9: C2 (R side using EMG on L hand) 78% on 9/12/2019  MT 10: C2 (R side using EMG on L hand) 76% on 9/26/2019  MT 11: C2 (R side using EMG on L hand) 82 on 1/31/2020    LDLPFC:  Frequency: 50 Hz  Number of pulses:  3                        Number of bursts: 10  Burst frequency: 5 Hz  Cycle time: 10 sec  Total pulses delivered: 1800  Tx Loc: F3  Energy: 80% (97% MT, maximum due to stimulator limitations)  Number of Cycles: 60 trains    RDLPFC:  Frequency: 50 Hz  Number of pulses:  3                        Number of bursts: 200  Burst frequency: 5 Hz  Cycle time: 100.0   Total pulses delivered: 1800  Tx Loc: F4 (right side)  Energy: 80% (97% MT, maximum due to stimulator limitations)  Number of Cycles: 3 trains    Rating Scales:  Date MADRS IDS-SR PHQ-9   01/31/20 23  8   2/7/20  28 7   2/14/20  27 6   2/21/20  26 5   2/28/20  32 7                   Post-Procedure Diagnosis:  MDD, recurrent, severe 296.33    Tamar Balderraam   [facility]      Plan   - Cont TMS  - ReMT      I did not see the patient during/after treatment but remained available in the clinic during  treatment.    Evelyn Zamudio MD  [facility]

## 2020-03-13 ENCOUNTER — OFFICE VISIT (OUTPATIENT)
Dept: PSYCHIATRY | Facility: CLINIC | Age: 50
End: 2020-03-13
Payer: COMMERCIAL

## 2020-03-13 VITALS — SYSTOLIC BLOOD PRESSURE: 156 MMHG | HEART RATE: 85 BPM | DIASTOLIC BLOOD PRESSURE: 93 MMHG

## 2020-03-13 DIAGNOSIS — F32.2 CURRENT SEVERE EPISODE OF MAJOR DEPRESSIVE DISORDER WITHOUT PSYCHOTIC FEATURES WITHOUT PRIOR EPISODE (H): Primary | ICD-10-CM

## 2020-03-13 ASSESSMENT — PATIENT HEALTH QUESTIONNAIRE - PHQ9: SUM OF ALL RESPONSES TO PHQ QUESTIONS 1-9: 5

## 2020-03-13 NOTE — PROGRESS NOTES
[facility] TMS Program  [address], [address]  [place] [address]  TMS Procedure Note   Aure Joy MRN# [id number]  Age: 49 year old year old YOB: 1970    Pre-Procedure:  History and Physical: Reviewed in medical record  Consent Signed by: Aure Joy  On: 1/31/2020    Clinical Narrative:  Patient tolerating treatment with no side effects.  Patient reports no change in mood.      Indications for TMS:  MDD, recurrent, severe; 4+ medication trials (from 2+ classes) ineffective; Psychotherapy ineffective.     Pre-Procedure Diagnosis:  MDD, recurrent, severe F33.2    Treatment Hx:  Treatment number this series: 30  Total lifetime treatment number: 107    Allergies   Allergen Reactions     Codeine Nausea     Other  [No Clinical Screening - See Comments] Nausea and Vomiting and Other (See Comments)     general anesthesia- uncontrolled vomitting      BP (!) 156/93 (BP Location: Right arm, Patient Position: Sitting, Cuff Size: Adult Regular)   Pulse 85     Pause for the Cause  Right patient:  Yes  Right procedure/correct coil:  Yes; rTMS; cpt 66475; F8 coil.   Earplugs in place:  Yes    Procedure  Patient was seated in procedure chair. Identity and procedure was verified. Ear plugs were placed in ears and patient-specific cap was placed on head and tightened appropriately. Ruler locations were verified. Coil was placed at treatment location and stimulator was set to parameters described below. A test train was delivered and pt tolerated train. Given pt tolerance, 60 treatment trains were delivered to the left side and 3 trains were delivered to the right side. Pt tolerated procedure with forehead movement.      Motor Threshold Determination  Distance from nasion to inion: 35.5  MT 1: 0 - 6 - 16 @ 69% on 1/29/2018  MT 2: 0 - 6 - 16 @ 69% on 2/6/2018   MT 3: 0 - 6 - 16 @ 69% on 2/13/2018   MT 4: 0 - 6 - 16 @ 69% on 2/23/2018   MT 5: 0 - 6 - 16 @ 69% on 3/2/2018   MT 6: 0 - 5.5 -  15.5(always use intersection at left side of ruler)@ 85% on 8/23/19  MT 7: 0 - 5.5 - 15.5(always use intersection at left side of ruler)@ 80% on 8/29/19  MT 8: 0 - 5.5 - 37.5(always use intersection at left side of ruler)@ 76% on 9/5/19 (right side using EMG on left hand)  MT 9: C2 (R side using EMG on L hand) 78% on 9/12/2019  MT 10: C2 (R side using EMG on L hand) 76% on 9/26/2019  MT 11: C2 (R side using EMG on L hand) 82 on 1/31/2020    LDLPFC:  Frequency: 50 Hz  Number of pulses:  3                        Number of bursts: 10  Burst frequency: 5 Hz  Cycle time: 10 sec  Total pulses delivered: 1800  Tx Loc: F3  Energy: 80% (97% MT, maximum due to stimulator limitations)  Number of Cycles: 60 trains    RDLPFC:  Frequency: 50 Hz  Number of pulses:  3                        Number of bursts: 200  Burst frequency: 5 Hz  Cycle time: 100.0   Total pulses delivered: 1800  Tx Loc: F4 (right side)  Energy: 80% (97% MT, maximum due to stimulator limitations)  Number of Cycles: 3 trains    Rating Scales:  Date MADRS IDS-SR PHQ-9   01/31/20 23  8   2/7/20  28 7   2/14/20  27 6   2/21/20  26 5   2/28/20  32 7   3/13/20  24 5             Post-Procedure Diagnosis:  MDD, recurrent, severe 296.33    Tamar Balderrama   [facility]      Plan   - Cont TMS  - ReMT      I did not see patient but remained available at the clinic throughout the TMS session    Elizabeth Gordon MD  [facility]
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
## 2020-03-16 ENCOUNTER — OFFICE VISIT (OUTPATIENT)
Dept: PSYCHIATRY | Facility: CLINIC | Age: 50
End: 2020-03-16
Payer: COMMERCIAL

## 2020-03-16 VITALS — HEART RATE: 74 BPM | DIASTOLIC BLOOD PRESSURE: 82 MMHG | SYSTOLIC BLOOD PRESSURE: 135 MMHG

## 2020-03-16 DIAGNOSIS — F33.2 SEVERE RECURRENT MAJOR DEPRESSION WITHOUT PSYCHOTIC FEATURES (H): Primary | ICD-10-CM

## 2020-03-16 ASSESSMENT — PATIENT HEALTH QUESTIONNAIRE - PHQ9: SUM OF ALL RESPONSES TO PHQ QUESTIONS 1-9: 3

## 2020-03-16 NOTE — PROGRESS NOTES
[facility] TMS Program  [address], [address]  [place] [address]  TMS Procedure Note   Aure Joy MRN# [id number]  Age: 49 year old year old YOB: 1970    Pre-Procedure:  History and Physical: Reviewed in medical record  Consent Signed by: Aure Joy  On: 1/31/2020    Clinical Narrative:  Patient tolerating treatment with no side effects.  Patient reports no change in mood.      Indications for TMS:  MDD, recurrent, severe; 4+ medication trials (from 2+ classes) ineffective; Psychotherapy ineffective.     Pre-Procedure Diagnosis:  MDD, recurrent, severe F33.2    Treatment Hx:  Treatment number this series: 31  Total lifetime treatment number: 108    Allergies   Allergen Reactions     Codeine Nausea     Other  [No Clinical Screening - See Comments] Nausea and Vomiting and Other (See Comments)     general anesthesia- uncontrolled vomitting      /82 (BP Location: Right arm, Patient Position: Sitting, Cuff Size: Adult Regular)   Pulse 74     Pause for the Cause  Right patient:  Yes  Right procedure/correct coil:  Yes; rTMS; cpt 27511; F8 coil.   Earplugs in place:  Yes    Procedure  Patient was seated in procedure chair. Identity and procedure was verified. Ear plugs were placed in ears and patient-specific cap was placed on head and tightened appropriately. Ruler locations were verified. Coil was placed at treatment location and stimulator was set to parameters described below. A test train was delivered and pt tolerated train. Given pt tolerance, 60 treatment trains were delivered to the left side and 3 trains were delivered to the right side. Pt tolerated procedure with forehead movement.      Motor Threshold Determination  Distance from nasion to inion: 35.5  MT 1: 0 - 6 - 16 @ 69% on 1/29/2018  MT 2: 0 - 6 - 16 @ 69% on 2/6/2018   MT 3: 0 - 6 - 16 @ 69% on 2/13/2018   MT 4: 0 - 6 - 16 @ 69% on 2/23/2018   MT 5: 0 - 6 - 16 @ 69% on 3/2/2018   MT 6: 0 - 5.5 - 15.5(always  use intersection at left side of ruler)@ 85% on 8/23/19  MT 7: 0 - 5.5 - 15.5(always use intersection at left side of ruler)@ 80% on 8/29/19  MT 8: 0 - 5.5 - 37.5(always use intersection at left side of ruler)@ 76% on 9/5/19 (right side using EMG on left hand)  MT 9: C2 (R side using EMG on L hand) 78% on 9/12/2019  MT 10: C2 (R side using EMG on L hand) 76% on 9/26/2019  MT 11: C2 (R side using EMG on L hand) 82 on 1/31/2020    LDLPFC:  Frequency: 50 Hz  Number of pulses:  3                        Number of bursts: 10  Burst frequency: 5 Hz  Cycle time: 10 sec  Total pulses delivered: 1800  Tx Loc: F3  Energy: 80% (97% MT, maximum due to stimulator limitations)  Number of Cycles: 60 trains    RDLPFC:  Frequency: 50 Hz  Number of pulses:  3                        Number of bursts: 200  Burst frequency: 5 Hz  Cycle time: 100.0   Total pulses delivered: 1800  Tx Loc: F4 (right side)  Energy: 80% (97% MT, maximum due to stimulator limitations)  Number of Cycles: 3 trains    Rating Scales:  Date MADRS IDS-SR PHQ-9   01/31/20 23  8   2/7/20  28 7   2/14/20  27 6   2/21/20  26 5   2/28/20  32 7   3/13/20  24 5             Post-Procedure Diagnosis:  MDD, recurrent, severe 296.33    Irma Marley   [facility]      Plan   - Cont TMS  - ReMT    I did not see patient but remained available in the clinic throughout the TMS session    Elizabeth Gordon MD  [facility]

## 2020-03-17 ENCOUNTER — OFFICE VISIT (OUTPATIENT)
Dept: PSYCHIATRY | Facility: CLINIC | Age: 50
End: 2020-03-17
Payer: COMMERCIAL

## 2020-03-17 ENCOUNTER — TELEPHONE (OUTPATIENT)
Dept: PSYCHIATRY | Facility: CLINIC | Age: 50
End: 2020-03-17

## 2020-03-17 VITALS — DIASTOLIC BLOOD PRESSURE: 82 MMHG | SYSTOLIC BLOOD PRESSURE: 125 MMHG | HEART RATE: 86 BPM

## 2020-03-17 DIAGNOSIS — F33.2 SEVERE RECURRENT MAJOR DEPRESSION WITHOUT PSYCHOTIC FEATURES (H): Primary | ICD-10-CM

## 2020-03-17 ASSESSMENT — PATIENT HEALTH QUESTIONNAIRE - PHQ9: SUM OF ALL RESPONSES TO PHQ QUESTIONS 1-9: 5

## 2020-03-17 NOTE — TELEPHONE ENCOUNTER
-Writer called patient as she had questions regarding her TMS and ketamine appointments due to recent COVID-19 issues.      -Let her know that TMS and Ketamine are considered essential services and these appointments will continue to happen.

## 2020-03-17 NOTE — PROGRESS NOTES
[facility] TMS Program  [address], [address]  [place] [address]  TMS Procedure Note   Aure Joy MRN# [id number]  Age: 49 year old year old YOB: 1970    Pre-Procedure:  History and Physical: Reviewed in medical record  Consent Signed by: Aure Joy  On: 1/31/2020    Clinical Narrative:  Patient tolerating treatment with no side effects.  Patient reports no change in mood.      Indications for TMS:  MDD, recurrent, severe; 4+ medication trials (from 2+ classes) ineffective; Psychotherapy ineffective.     Pre-Procedure Diagnosis:  MDD, recurrent, severe F33.2    Treatment Hx:  Treatment number this series: 32  Total lifetime treatment number: 109    Allergies   Allergen Reactions     Codeine Nausea     Other  [No Clinical Screening - See Comments] Nausea and Vomiting and Other (See Comments)     general anesthesia- uncontrolled vomitting      /82 (BP Location: Right arm, Patient Position: Sitting, Cuff Size: Adult Regular)   Pulse 86     Pause for the Cause  Right patient:  Yes  Right procedure/correct coil:  Yes; rTMS; cpt 79047; F8 coil.   Earplugs in place:  Yes    Procedure  Patient was seated in procedure chair. Identity and procedure was verified. Ear plugs were placed in ears and patient-specific cap was placed on head and tightened appropriately. Ruler locations were verified. Coil was placed at treatment location and stimulator was set to parameters described below. A test train was delivered and pt tolerated train. Given pt tolerance, 60 treatment trains were delivered to the left side and 3 trains were delivered to the right side. Pt tolerated procedure with forehead movement.      Motor Threshold Determination  Distance from nasion to inion: 35.5  MT 1: 0 - 6 - 16 @ 69% on 1/29/2018  MT 2: 0 - 6 - 16 @ 69% on 2/6/2018   MT 3: 0 - 6 - 16 @ 69% on 2/13/2018   MT 4: 0 - 6 - 16 @ 69% on 2/23/2018   MT 5: 0 - 6 - 16 @ 69% on 3/2/2018   MT 6: 0 - 5.5 - 15.5(always  use intersection at left side of ruler)@ 85% on 8/23/19  MT 7: 0 - 5.5 - 15.5(always use intersection at left side of ruler)@ 80% on 8/29/19  MT 8: 0 - 5.5 - 37.5(always use intersection at left side of ruler)@ 76% on 9/5/19 (right side using EMG on left hand)  MT 9: C2 (R side using EMG on L hand) 78% on 9/12/2019  MT 10: C2 (R side using EMG on L hand) 76% on 9/26/2019  MT 11: C2 (R side using EMG on L hand) 82 on 1/31/2020    LDLPFC:  Frequency: 50 Hz  Number of pulses:  3                        Number of bursts: 10  Burst frequency: 5 Hz  Cycle time: 10 sec  Total pulses delivered: 1800  Tx Loc: F3  Energy: 80% (97% MT, maximum due to stimulator limitations)  Number of Cycles: 60 trains    RDLPFC:  Frequency: 50 Hz  Number of pulses:  3                        Number of bursts: 200  Burst frequency: 5 Hz  Cycle time: 100.0 machine overheated prior to 3rd train  Total pulses delivered: 1800  Tx Loc: F4 (right side)  Energy: 80% (97% MT, maximum due to stimulator limitations)  Number of Cycles: 3 trains    Rating Scales:  Date MADRS IDS-SR PHQ-9   01/31/20 23  8   2/7/20  28 7   2/14/20  27 6   2/21/20  26 5   2/28/20  32 7   3/13/20  24 5             Post-Procedure Diagnosis:  MDD, recurrent, severe 296.33    Irma Marley   [facility]      Plan   - Cont TMS  - ReMT      I did not see the patient during/after treatment but remained available in the clinic during  treatment.    Evelyn Zamudio MD  [facility]

## 2020-03-18 ENCOUNTER — OFFICE VISIT (OUTPATIENT)
Dept: PSYCHIATRY | Facility: CLINIC | Age: 50
End: 2020-03-18
Payer: COMMERCIAL

## 2020-03-18 VITALS — DIASTOLIC BLOOD PRESSURE: 77 MMHG | HEART RATE: 88 BPM | SYSTOLIC BLOOD PRESSURE: 127 MMHG

## 2020-03-18 DIAGNOSIS — F33.2 SEVERE RECURRENT MAJOR DEPRESSION WITHOUT PSYCHOTIC FEATURES (H): Primary | ICD-10-CM

## 2020-03-18 ASSESSMENT — PATIENT HEALTH QUESTIONNAIRE - PHQ9: SUM OF ALL RESPONSES TO PHQ QUESTIONS 1-9: 4

## 2020-03-19 ENCOUNTER — OFFICE VISIT (OUTPATIENT)
Dept: PSYCHIATRY | Facility: CLINIC | Age: 50
End: 2020-03-19
Payer: COMMERCIAL

## 2020-03-19 VITALS — HEART RATE: 97 BPM | DIASTOLIC BLOOD PRESSURE: 76 MMHG | SYSTOLIC BLOOD PRESSURE: 155 MMHG

## 2020-03-19 DIAGNOSIS — F33.2 SEVERE RECURRENT MAJOR DEPRESSION WITHOUT PSYCHOTIC FEATURES (H): Primary | ICD-10-CM

## 2020-03-19 ASSESSMENT — PATIENT HEALTH QUESTIONNAIRE - PHQ9: SUM OF ALL RESPONSES TO PHQ QUESTIONS 1-9: 3

## 2020-03-19 NOTE — PROGRESS NOTES
[facility] TMS Program  [address], [address]  [place] [address]  TMS Procedure Note   Aure Joy MRN# [id number]  Age: 49 year old year old YOB: 1970    Pre-Procedure:  History and Physical: Reviewed in medical record  Consent Signed by: Aure Joy  On: 1/31/2020    Clinical Narrative:  Patient tolerating treatment with no side effects.  Patient reports no change in mood.      Indications for TMS:  MDD, recurrent, severe; 4+ medication trials (from 2+ classes) ineffective; Psychotherapy ineffective.     Pre-Procedure Diagnosis:  MDD, recurrent, severe F33.2    Treatment Hx:  Treatment number this series: 33  Total lifetime treatment number: 110    Allergies   Allergen Reactions     Codeine Nausea     Other  [No Clinical Screening - See Comments] Nausea and Vomiting and Other (See Comments)     general anesthesia- uncontrolled vomitting      /77 (BP Location: Right arm, Patient Position: Sitting, Cuff Size: Adult Regular)   Pulse 88     Pause for the Cause  Right patient:  Yes  Right procedure/correct coil:  Yes; rTMS; cpt 89785; F8 coil.   Earplugs in place:  Yes    Procedure  Patient was seated in procedure chair. Identity and procedure was verified. Ear plugs were placed in ears and patient-specific cap was placed on head and tightened appropriately. Ruler locations were verified. Coil was placed at treatment location and stimulator was set to parameters described below. A test train was delivered and pt tolerated train. Given pt tolerance, 60 treatment trains were delivered to the left side and 3 trains were delivered to the right side. Pt tolerated procedure with forehead movement.      Motor Threshold Determination  Distance from nasion to inion: 35.5  MT 1: 0 - 6 - 16 @ 69% on 1/29/2018  MT 2: 0 - 6 - 16 @ 69% on 2/6/2018   MT 3: 0 - 6 - 16 @ 69% on 2/13/2018   MT 4: 0 - 6 - 16 @ 69% on 2/23/2018   MT 5: 0 - 6 - 16 @ 69% on 3/2/2018   MT 6: 0 - 5.5 - 15.5(always  use intersection at left side of ruler)@ 85% on 8/23/19  MT 7: 0 - 5.5 - 15.5(always use intersection at left side of ruler)@ 80% on 8/29/19  MT 8: 0 - 5.5 - 37.5(always use intersection at left side of ruler)@ 76% on 9/5/19 (right side using EMG on left hand)  MT 9: C2 (R side using EMG on L hand) 78% on 9/12/2019  MT 10: C2 (R side using EMG on L hand) 76% on 9/26/2019  MT 11: C2 (R side using EMG on L hand) 82 on 1/31/2020    LDLPFC:  Frequency: 50 Hz  Number of pulses:  3                        Number of bursts: 10  Burst frequency: 5 Hz  Cycle time: 10 sec  Total pulses delivered: 1800  Tx Loc: F3  Energy: 80% (97% MT, maximum due to stimulator limitations)  Number of Cycles: 60 trains    RDLPFC:  Frequency: 50 Hz  Number of pulses:  3                        Number of bursts: 200  Burst frequency: 5 Hz  Cycle time: 100.0 machine overheated prior to 3rd train  Total pulses delivered: 1800  Tx Loc: F4 (right side)  Energy: 80% (97% MT, maximum due to stimulator limitations)  Number of Cycles: 3 trains    Rating Scales:  Date MADRS IDS-SR PHQ-9   01/31/20 23  8   2/7/20  28 7   2/14/20  27 6   2/21/20  26 5   2/28/20  32 7   3/13/20  24 5             Post-Procedure Diagnosis:  MDD, recurrent, severe 296.3    Rosa Almeida   [facility]      Plan   - Cont TMS  - ReMT    I did not see this patient but was available for potential intervention throughout the entire TMS visit.    William Rodriguez MD  [facility]

## 2020-03-19 NOTE — PROGRESS NOTES
[facility] TMS Program  [address], [address]  [place] [address]  TMS Procedure Note   Aure Joy MRN# [id number]  Age: 49 year old year old YOB: 1970    Pre-Procedure:  History and Physical: Reviewed in medical record  Consent Signed by: Aure Joy  On: 1/31/2020    Clinical Narrative:  Patient tolerating treatment with no side effects. Patient reports no changes in mood.    Indications for TMS:  MDD, recurrent, severe; 4+ medication trials (from 2+ classes) ineffective; Psychotherapy ineffective.     Pre-Procedure Diagnosis:  MDD, recurrent, severe F33.2    Treatment Hx:  Treatment number this series: 19  Total lifetime treatment number: 96    Allergies   Allergen Reactions     Codeine Nausea     Other  [No Clinical Screening - See Comments] Nausea and Vomiting and Other (See Comments)     general anesthesia- uncontrolled vomitting      BP (!) 148/85 (BP Location: Right arm, Patient Position: Sitting, Cuff Size: Adult Regular)   Pulse 84     Pause for the Cause  Right patient:  Yes  Right procedure/correct coil:  Yes; rTMS; cpt 89889; F8 coil.   Earplugs in place:  Yes    Procedure  Patient was seated in procedure chair. Identity and procedure was verified. Ear plugs were placed in ears and patient-specific cap was placed on head and tightened appropriately. Ruler locations were verified. Coil was placed at treatment location and stimulator was set to parameters described below. A test train was delivered and pt tolerated train. Given pt tolerance, 60 treatment trains were delivered to the left side and 3 trains were delivered to the right side. Pt tolerated procedure with forehead movement.      Motor Threshold Determination  Distance from nasion to inion: 35.5  MT 1: 0 - 6 - 16 @ 69% on 1/29/2018  MT 2: 0 - 6 - 16 @ 69% on 2/6/2018   MT 3: 0 - 6 - 16 @ 69% on 2/13/2018   MT 4: 0 - 6 - 16 @ 69% on 2/23/2018   MT 5: 0 - 6 - 16 @ 69% on 3/2/2018   MT 6: 0 - 5.5 - 15.5(always  use intersection at left side of ruler)@ 85% on 8/23/19  MT 7: 0 - 5.5 - 15.5(always use intersection at left side of ruler)@ 80% on 8/29/19  MT 8: 0 - 5.5 - 37.5(always use intersection at left side of ruler)@ 76% on 9/5/19 (right side using EMG on left hand)  MT 9: C2 (R side using EMG on L hand) 78% on 9/12/2019  MT 10: C2 (R side using EMG on L hand) 76% on 9/26/2019  MT 11: C2 (R side using EMG on L hand) 82 on 1/31/2020    LDLPFC:  Frequency: 50 Hz  Number of pulses:  3                        Number of bursts: 10  Burst frequency: 5 Hz  Cycle time: 10 sec  Total pulses delivered: 1800  Tx Loc: F3  Energy: 80% (97% MT, maximum due to stimulator limitations)  Number of Cycles: 60 trains    RDLPFC:  Frequency: 50 Hz  Number of pulses:  3                        Number of bursts: 200  Burst frequency: 5 Hz  Cycle time: 94.3, machine overheated prior to 2nd and 3rd  train  Total pulses delivered: 1800  Tx Loc: F4 (right side)  Energy: 80% (97% MT, maximum due to stimulator limitations)  Number of Cycles: 3 trains    Rating Scales:  Date MADRS IDS-SR PHQ-9   01/31/20 23  8   2/7/20  28 7   2/14/20  27 6   2/21/20  26 5       Post-Procedure Diagnosis:  MDD, recurrent, severe 296.33    Irma Marley  [facility]      Plan   - Cont TMS    ***(attestation)    William Rodriguez MD  [facility]

## 2020-03-19 NOTE — PROGRESS NOTES
[facility] TMS Program  [address], [address]  [place] [address]  TMS Procedure Note   Aure Joy MRN# [id number]  Age: 49 year old year old YOB: 1970    Pre-Procedure:  History and Physical: Reviewed in medical record  Consent Signed by: Aure Joy  On: 1/31/2020    Clinical Narrative:  Patient tolerating treatment with no side effects.  Patient reports no change in mood.      Indications for TMS:  MDD, recurrent, severe; 4+ medication trials (from 2+ classes) ineffective; Psychotherapy ineffective.     Pre-Procedure Diagnosis:  MDD, recurrent, severe F33.2    Treatment Hx:  Treatment number this series: 34  Total lifetime treatment number: 111    Allergies   Allergen Reactions     Codeine Nausea     Other  [No Clinical Screening - See Comments] Nausea and Vomiting and Other (See Comments)     general anesthesia- uncontrolled vomitting      BP (!) 155/76 (BP Location: Right arm, Patient Position: Sitting, Cuff Size: Adult Regular)   Pulse 97     Pause for the Cause  Right patient:  Yes  Right procedure/correct coil:  Yes; rTMS; cpt 79800; F8 coil.   Earplugs in place:  Yes    Procedure  Patient was seated in procedure chair. Identity and procedure was verified. Ear plugs were placed in ears and patient-specific cap was placed on head and tightened appropriately. Ruler locations were verified. Coil was placed at treatment location and stimulator was set to parameters described below. A test train was delivered and pt tolerated train. Given pt tolerance, 60 treatment trains were delivered to the left side and 3 trains were delivered to the right side. Pt tolerated procedure with forehead movement.      Motor Threshold Determination  Distance from nasion to inion: 35.5  MT 1: 0 - 6 - 16 @ 69% on 1/29/2018  MT 2: 0 - 6 - 16 @ 69% on 2/6/2018   MT 3: 0 - 6 - 16 @ 69% on 2/13/2018   MT 4: 0 - 6 - 16 @ 69% on 2/23/2018   MT 5: 0 - 6 - 16 @ 69% on 3/2/2018   MT 6: 0 - 5.5 -  15.5(always use intersection at left side of ruler)@ 85% on 8/23/19  MT 7: 0 - 5.5 - 15.5(always use intersection at left side of ruler)@ 80% on 8/29/19  MT 8: 0 - 5.5 - 37.5(always use intersection at left side of ruler)@ 76% on 9/5/19 (right side using EMG on left hand)  MT 9: C2 (R side using EMG on L hand) 78% on 9/12/2019  MT 10: C2 (R side using EMG on L hand) 76% on 9/26/2019  MT 11: C2 (R side using EMG on L hand) 82 on 1/31/2020    LDLPFC:  Frequency: 50 Hz  Number of pulses:  3                        Number of bursts: 10  Burst frequency: 5 Hz  Cycle time: 10 sec  Total pulses delivered: 1800  Tx Loc: F3  Energy: 80% (97% MT, maximum due to stimulator limitations)  Number of Cycles: 60 trains    RDLPFC:  Frequency: 50 Hz  Number of pulses:  3                        Number of bursts: 200  Burst frequency: 5 Hz  Cycle time: 100.0 machine overheated prior to 3rd train  Total pulses delivered: 1800  Tx Loc: F4 (right side)  Energy: 80% (97% MT, maximum due to stimulator limitations)  Number of Cycles: 3 trains    Rating Scales:  Date MADRS IDS-SR PHQ-9   01/31/20 23  8   2/7/20  28 7   2/14/20  27 6   2/21/20  26 5   2/28/20  32 7   3/13/20  24 5             Post-Procedure Diagnosis:  MDD, recurrent, severe 296.3    Irma Marley  [facility]      Plan   - Cont TMS  - ReMT      I did not see the patient during/after treatment but remained available in the clinic during  treatment.    Evelyn Zamudio MD  [facility]

## 2020-03-20 ENCOUNTER — OFFICE VISIT (OUTPATIENT)
Dept: PSYCHIATRY | Facility: CLINIC | Age: 50
End: 2020-03-20
Payer: COMMERCIAL

## 2020-03-20 VITALS — SYSTOLIC BLOOD PRESSURE: 134 MMHG | DIASTOLIC BLOOD PRESSURE: 82 MMHG | HEART RATE: 90 BPM

## 2020-03-20 DIAGNOSIS — F33.2 SEVERE RECURRENT MAJOR DEPRESSION WITHOUT PSYCHOTIC FEATURES (H): Primary | ICD-10-CM

## 2020-03-20 ASSESSMENT — PATIENT HEALTH QUESTIONNAIRE - PHQ9: SUM OF ALL RESPONSES TO PHQ QUESTIONS 1-9: 4

## 2020-03-20 NOTE — PROGRESS NOTES
[facility] TMS Program  [address], [address]  [place] [address]  TMS Procedure Note   Aure Joy MRN# [id number]  Age: 49 year old year old YOB: 1970    Pre-Procedure:  History and Physical: Reviewed in medical record  Consent Signed by: Aure Joy  On: 1/31/2020    Clinical Narrative:  Patient tolerating treatment with no side effects.  Patient reports no change in mood.      Indications for TMS:  MDD, recurrent, severe; 4+ medication trials (from 2+ classes) ineffective; Psychotherapy ineffective.     Pre-Procedure Diagnosis:  MDD, recurrent, severe F33.2    Treatment Hx:  Treatment number this series: 35  Total lifetime treatment number: 112    Allergies   Allergen Reactions     Codeine Nausea     Other  [No Clinical Screening - See Comments] Nausea and Vomiting and Other (See Comments)     general anesthesia- uncontrolled vomitting      /82 (BP Location: Right arm, Patient Position: Sitting, Cuff Size: Adult Regular)   Pulse 90     Pause for the Cause  Right patient:  Yes  Right procedure/correct coil:  Yes; rTMS; cpt 80073; F8 coil.   Earplugs in place:  Yes    Procedure  Patient was seated in procedure chair. Identity and procedure was verified. Ear plugs were placed in ears and patient-specific cap was placed on head and tightened appropriately. Ruler locations were verified. Coil was placed at treatment location and stimulator was set to parameters described below. A test train was delivered and pt tolerated train. Given pt tolerance, 60 treatment trains were delivered to the left side and 3 trains were delivered to the right side. Pt tolerated procedure with forehead movement.      Motor Threshold Determination  Distance from nasion to inion: 35.5  MT 1: 0 - 6 - 16 @ 69% on 1/29/2018  MT 2: 0 - 6 - 16 @ 69% on 2/6/2018   MT 3: 0 - 6 - 16 @ 69% on 2/13/2018   MT 4: 0 - 6 - 16 @ 69% on 2/23/2018   MT 5: 0 - 6 - 16 @ 69% on 3/2/2018   MT 6: 0 - 5.5 - 15.5(always  use intersection at left side of ruler)@ 85% on 8/23/19  MT 7: 0 - 5.5 - 15.5(always use intersection at left side of ruler)@ 80% on 8/29/19  MT 8: 0 - 5.5 - 37.5(always use intersection at left side of ruler)@ 76% on 9/5/19 (right side using EMG on left hand)  MT 9: C2 (R side using EMG on L hand) 78% on 9/12/2019  MT 10: C2 (R side using EMG on L hand) 76% on 9/26/2019  MT 11: C2 (R side using EMG on L hand) 82 on 1/31/2020    LDLPFC:  Frequency: 50 Hz  Number of pulses:  3                        Number of bursts: 10  Burst frequency: 5 Hz  Cycle time: 10 sec  Total pulses delivered: 1800  Tx Loc: F3  Energy: 80% (97% MT, maximum due to stimulator limitations)  Number of Cycles: 60 trains    RDLPFC:  Frequency: 50 Hz  Number of pulses:  3                        Number of bursts: 200  Burst frequency: 5 Hz  Cycle time: 100.0 machine overheated prior to 3rd train  Total pulses delivered: 1800  Tx Loc: F4 (right side)  Energy: 80% (97% MT, maximum due to stimulator limitations)  Number of Cycles: 3 trains    Rating Scales:  Date MADRS IDS-SR PHQ-9   01/31/20 23  8   2/7/20  28 7   2/14/20  27 6   2/21/20  26 5   2/28/20  32 7   3/13/20  24 5             Post-Procedure Diagnosis:  MDD, recurrent, severe 296.3    Irma Marley  [facility]      Plan   - Cont TMS  - ReMT    I did not see patient, but remained available in the clinic throughout the TMS session.    Elizabeth Gordon MD  [facility]

## 2020-03-23 ENCOUNTER — OFFICE VISIT (OUTPATIENT)
Dept: PSYCHIATRY | Facility: CLINIC | Age: 50
End: 2020-03-23
Payer: COMMERCIAL

## 2020-03-23 VITALS — SYSTOLIC BLOOD PRESSURE: 116 MMHG | HEART RATE: 84 BPM | DIASTOLIC BLOOD PRESSURE: 79 MMHG

## 2020-03-23 DIAGNOSIS — F33.2 SEVERE RECURRENT MAJOR DEPRESSION WITHOUT PSYCHOTIC FEATURES (H): Primary | ICD-10-CM

## 2020-03-23 ASSESSMENT — PATIENT HEALTH QUESTIONNAIRE - PHQ9: SUM OF ALL RESPONSES TO PHQ QUESTIONS 1-9: 4

## 2020-03-23 NOTE — PROGRESS NOTES
[facility] TMS Program  [address], [address]  [place] [address]  TMS Procedure Note   Aure Joy MRN# [id number]  Age: 49 year old year old YOB: 1970    Pre-Procedure:  History and Physical: Reviewed in medical record  Consent Signed by: Aure Joy  On: 1/31/2020    Clinical Narrative:  Patient tolerating treatment with no side effects.  Patient reports no change in mood.      Indications for TMS:  MDD, recurrent, severe; 4+ medication trials (from 2+ classes) ineffective; Psychotherapy ineffective.     Pre-Procedure Diagnosis:  MDD, recurrent, severe F33.2    Treatment Hx:  Treatment number this series: 36  Total lifetime treatment number: 113    Allergies   Allergen Reactions     Codeine Nausea     Other  [No Clinical Screening - See Comments] Nausea and Vomiting and Other (See Comments)     general anesthesia- uncontrolled vomitting      /79 (BP Location: Right arm, Patient Position: Sitting, Cuff Size: Adult Regular)   Pulse 84     Pause for the Cause  Right patient:  Yes  Right procedure/correct coil:  Yes; rTMS; cpt 65306; F8 coil.   Earplugs in place:  Yes    Procedure  Patient was seated in procedure chair. Identity and procedure was verified. Ear plugs were placed in ears and patient-specific cap was placed on head and tightened appropriately. Ruler locations were verified. Coil was placed at treatment location and stimulator was set to parameters described below. A test train was delivered and pt tolerated train. Given pt tolerance, 60 treatment trains were delivered to the left side and 3 trains were delivered to the right side. Pt tolerated procedure with forehead movement.      Motor Threshold Determination  Distance from nasion to inion: 35.5  MT 1: 0 - 6 - 16 @ 69% on 1/29/2018  MT 2: 0 - 6 - 16 @ 69% on 2/6/2018   MT 3: 0 - 6 - 16 @ 69% on 2/13/2018   MT 4: 0 - 6 - 16 @ 69% on 2/23/2018   MT 5: 0 - 6 - 16 @ 69% on 3/2/2018   MT 6: 0 - 5.5 - 15.5(always  use intersection at left side of ruler)@ 85% on 8/23/19  MT 7: 0 - 5.5 - 15.5(always use intersection at left side of ruler)@ 80% on 8/29/19  MT 8: 0 - 5.5 - 37.5(always use intersection at left side of ruler)@ 76% on 9/5/19 (right side using EMG on left hand)  MT 9: C2 (R side using EMG on L hand) 78% on 9/12/2019  MT 10: C2 (R side using EMG on L hand) 76% on 9/26/2019  MT 11: C2 (R side using EMG on L hand) 82 on 1/31/2020    LDLPFC:  Frequency: 50 Hz  Number of pulses:  3                        Number of bursts: 10  Burst frequency: 5 Hz  Cycle time: 10 sec  Total pulses delivered: 1800  Tx Loc: F3  Energy: 80% (97% MT, maximum due to stimulator limitations)  Number of Cycles: 60 trains    RDLPFC:  Frequency: 50 Hz  Number of pulses:  3                        Number of bursts: 200  Burst frequency: 5 Hz  Cycle time: 100.0 machine overheated prior to 3rd train  Total pulses delivered: 1800  Tx Loc: F4 (right side)  Energy: 80% (97% MT, maximum due to stimulator limitations)  Number of Cycles: 3 trains    Rating Scales:  Date MADRS IDS-SR PHQ-9   01/31/20 23  8   2/7/20  28 7   2/14/20  27 6   2/21/20  26 5   2/28/20  32 7   3/13/20  24 5             Post-Procedure Diagnosis:  MDD, recurrent, severe 296.3    Irma Marley  [facility]      Plan   - Cont TMS    I did not see patient, but remained available in the clinic throughout the TMS session.    Elizabeth Gordon MD  [facility]

## 2020-03-24 ENCOUNTER — OFFICE VISIT (OUTPATIENT)
Dept: PSYCHIATRY | Facility: CLINIC | Age: 50
End: 2020-03-24
Payer: COMMERCIAL

## 2020-03-24 VITALS — SYSTOLIC BLOOD PRESSURE: 143 MMHG | DIASTOLIC BLOOD PRESSURE: 89 MMHG | HEART RATE: 87 BPM

## 2020-03-24 DIAGNOSIS — F33.2 SEVERE RECURRENT MAJOR DEPRESSION WITHOUT PSYCHOTIC FEATURES (H): Primary | ICD-10-CM

## 2020-03-24 ASSESSMENT — PATIENT HEALTH QUESTIONNAIRE - PHQ9: SUM OF ALL RESPONSES TO PHQ QUESTIONS 1-9: 5

## 2020-03-24 NOTE — PROGRESS NOTES
[facility] TMS Program  [address], [address]  [place] [address]  TMS Procedure Note   Aure Joy MRN# [id number]  Age: 49 year old year old YOB: 1970    Pre-Procedure:  History and Physical: Reviewed in medical record  Consent Signed by: Aure Joy  On: 1/31/2020    Clinical Narrative:  Patient tolerating treatment with no side effects.  Patient reports no change in mood.      Indications for TMS:  MDD, recurrent, severe; 4+ medication trials (from 2+ classes) ineffective; Psychotherapy ineffective.     Pre-Procedure Diagnosis:  MDD, recurrent, severe F33.2    Treatment Hx:  Treatment number this series: 37  Total lifetime treatment number: 114    Allergies   Allergen Reactions     Codeine Nausea     Other  [No Clinical Screening - See Comments] Nausea and Vomiting and Other (See Comments)     general anesthesia- uncontrolled vomitting      BP (!) 143/89 (BP Location: Right arm, Patient Position: Sitting, Cuff Size: Adult Regular)   Pulse 87     Pause for the Cause  Right patient:  Yes  Right procedure/correct coil:  Yes; rTMS; cpt 99216; F8 coil.   Earplugs in place:  Yes    Procedure  Patient was seated in procedure chair. Identity and procedure was verified. Ear plugs were placed in ears and patient-specific cap was placed on head and tightened appropriately. Ruler locations were verified. Coil was placed at treatment location and stimulator was set to parameters described below. A test train was delivered and pt tolerated train. Given pt tolerance, 60 treatment trains were delivered to the left side and 3 trains were delivered to the right side. Pt tolerated procedure with forehead movement.      Motor Threshold Determination  Distance from nasion to inion: 35.5  MT 1: 0 - 6 - 16 @ 69% on 1/29/2018  MT 2: 0 - 6 - 16 @ 69% on 2/6/2018   MT 3: 0 - 6 - 16 @ 69% on 2/13/2018   MT 4: 0 - 6 - 16 @ 69% on 2/23/2018   MT 5: 0 - 6 - 16 @ 69% on 3/2/2018   MT 6: 0 - 5.5 -  15.5(always use intersection at left side of ruler)@ 85% on 8/23/19  MT 7: 0 - 5.5 - 15.5(always use intersection at left side of ruler)@ 80% on 8/29/19  MT 8: 0 - 5.5 - 37.5(always use intersection at left side of ruler)@ 76% on 9/5/19 (right side using EMG on left hand)  MT 9: C2 (R side using EMG on L hand) 78% on 9/12/2019  MT 10: C2 (R side using EMG on L hand) 76% on 9/26/2019  MT 11: C2 (R side using EMG on L hand) 82 on 1/31/2020    LDLPFC:  Frequency: 50 Hz  Number of pulses:  3                        Number of bursts: 10  Burst frequency: 5 Hz  Cycle time: 10 sec  Total pulses delivered: 1800  Tx Loc: F3  Energy: 80% (97% MT, maximum due to stimulator limitations)  Number of Cycles: 60 trains    RDLPFC:  Frequency: 50 Hz  Number of pulses:  3                        Number of bursts: 200  Burst frequency: 5 Hz  Cycle time: 100.0 machine overheated prior to 3rd train  Total pulses delivered: 1800  Tx Loc: F4 (right side)  Energy: 80% (97% MT, maximum due to stimulator limitations)  Number of Cycles: 3 trains    Rating Scales:  Date MADRS IDS-SR PHQ-9   01/31/20 23  8   2/7/20  28 7   2/14/20  27 6   2/21/20  26 5   2/28/20  32 7   3/13/20  24 5   3/24/20 26  5       Post-Procedure Diagnosis:  MDD, recurrent, severe 296.3    Irma Marley  [facility]      Plan   - Follow up as scheduled      I did not see the patient during/after treatment but remained available in the clinic during  treatment.    Evelyn Zamudio MD  [facility]

## 2020-03-26 ENCOUNTER — ALLIED HEALTH/NURSE VISIT (OUTPATIENT)
Dept: PSYCHIATRY | Facility: CLINIC | Age: 50
End: 2020-03-26
Payer: COMMERCIAL

## 2020-03-26 ENCOUNTER — TELEPHONE (OUTPATIENT)
Dept: PSYCHIATRY | Facility: CLINIC | Age: 50
End: 2020-03-26

## 2020-03-26 VITALS — HEART RATE: 75 BPM | SYSTOLIC BLOOD PRESSURE: 118 MMHG | DIASTOLIC BLOOD PRESSURE: 73 MMHG | OXYGEN SATURATION: 97 %

## 2020-03-26 DIAGNOSIS — F33.2 SEVERE RECURRENT MAJOR DEPRESSION WITHOUT PSYCHOTIC FEATURES (H): Primary | ICD-10-CM

## 2020-03-26 NOTE — PROGRESS NOTES
Aure Joy comes into clinic today at the request of ESTELA Zamudio MD Ordering Provider for Medication Management:  Spravato Admin .    Medication checked by: Eliot Fuentes RN  Prior to administration pt identified by name and : yes    Appearance: dressed casually   Mood: good, some irritabliyt   Affect: congruent to mood  Eye contact: good  Side effects: dissociatoin  Level of cooperation: cooperative  Education: none needed  Next appt: to weeks     I spent an additional 120 minutes today, in direct patient contact monitoring the patient after Spravato administration. Patient had the following issues; depression stable, vitals monitored through entire visit.      Medication provided by Pharmacy      This service provided today was under the supervising provider of the day ESTELA Zamudio MD, who was available if needed.    BEN CORDOVA, RN

## 2020-03-30 ENCOUNTER — VIRTUAL VISIT (OUTPATIENT)
Dept: PSYCHIATRY | Facility: CLINIC | Age: 50
End: 2020-03-30
Payer: COMMERCIAL

## 2020-03-30 DIAGNOSIS — F33.1 MODERATE RECURRENT MAJOR DEPRESSION (H): Primary | ICD-10-CM

## 2020-03-30 DIAGNOSIS — F40.00 AGORAPHOBIA: ICD-10-CM
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
## 2020-04-06 ENCOUNTER — VIRTUAL VISIT (OUTPATIENT)
Dept: PSYCHIATRY | Facility: CLINIC | Age: 50
End: 2020-04-06
Payer: COMMERCIAL

## 2020-04-06 DIAGNOSIS — F33.1 MODERATE RECURRENT MAJOR DEPRESSION (H): Primary | ICD-10-CM

## 2020-04-06 DIAGNOSIS — F40.00 AGORAPHOBIA: ICD-10-CM
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
## 2020-04-09 ENCOUNTER — ALLIED HEALTH/NURSE VISIT (OUTPATIENT)
Dept: PSYCHIATRY | Facility: CLINIC | Age: 50
End: 2020-04-09
Payer: COMMERCIAL

## 2020-04-09 VITALS — OXYGEN SATURATION: 97 % | SYSTOLIC BLOOD PRESSURE: 127 MMHG | DIASTOLIC BLOOD PRESSURE: 86 MMHG | HEART RATE: 86 BPM

## 2020-04-09 DIAGNOSIS — F33.1 MODERATE RECURRENT MAJOR DEPRESSION (H): Primary | ICD-10-CM

## 2020-04-09 NOTE — PROGRESS NOTES
Aure Joy comes into clinic today at the request of ESTELA Zamudio MD Ordering Provider for Medication Management:  Spravato Admin .    Medication checked by: Puneet Jane LPN   Prior to administration pt identified by name and : yes    Appearance: dressed casually    Mood: good  Affect: flat  Eye contact: good  Side effects: dissociation  Level of cooperation: cooperative  Education: none needed  Next appt: two weeks     QIDS score of 8  PCL score of 27    I spent an additional 120 minutes today, in direct patient contact monitoring the patient after Spravato administration. Patient had the following issues; depression stable, vitals monitored through entire visit today.      Medication provided by Pharmacy      This service provided today was under the supervising provider of the day ESTELA Zamudio MD, who was available if needed.    BEN CORDOVA, RN

## 2020-04-13 ENCOUNTER — VIRTUAL VISIT (OUTPATIENT)
Dept: PSYCHIATRY | Facility: CLINIC | Age: 50
End: 2020-04-13
Payer: COMMERCIAL

## 2020-04-13 DIAGNOSIS — F40.00 AGORAPHOBIA: ICD-10-CM

## 2020-04-13 DIAGNOSIS — F33.1 MODERATE RECURRENT MAJOR DEPRESSION (H): Primary | ICD-10-CM
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
## 2020-04-14 ENCOUNTER — MEDICAL CORRESPONDENCE (OUTPATIENT)
Dept: HEALTH INFORMATION MANAGEMENT | Facility: CLINIC | Age: 50
End: 2020-04-14

## 2020-04-16 ENCOUNTER — TELEPHONE (OUTPATIENT)
Dept: PSYCHIATRY | Facility: CLINIC | Age: 50
End: 2020-04-16

## 2020-04-16 ENCOUNTER — VIRTUAL VISIT (OUTPATIENT)
Dept: PSYCHIATRY | Facility: CLINIC | Age: 50
End: 2020-04-16
Payer: COMMERCIAL

## 2020-04-16 DIAGNOSIS — F33.2 SEVERE EPISODE OF RECURRENT MAJOR DEPRESSIVE DISORDER, WITHOUT PSYCHOTIC FEATURES (H): Primary | ICD-10-CM

## 2020-04-16 RX ORDER — LAMOTRIGINE 200 MG/1
300 TABLET ORAL DAILY
Qty: 45 TABLET | Refills: 1 | Status: SHIPPED | OUTPATIENT
Start: 2020-04-16 | End: 2020-06-17

## 2020-04-16 NOTE — Clinical Note
Rashi Duque,    We would like to space out Aure's esketamine treatment further, pushing to every 3 weeks for at least 3 treatments and then every 4 if mood is remaining stable.     Thanks!  Mukesh

## 2020-04-16 NOTE — PATIENT INSTRUCTIONS
- we discussed decreasing esketamine frequency to every 3weeks for at least 3 treatments to reduce frequency of post-treatment dips. If continuing to maintain mood, can further decrease to every 4 weeks

## 2020-04-16 NOTE — TELEPHONE ENCOUNTER
Evangelina Taylor MD Swanson, Melanie A, RN               Hi Dunia,     We would like to space out Aure's esketamine treatment further, pushing to every 3 weeks for at least 3 treatments and then every 4 if mood is remaining stable.     Thanks!   Mukesh
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
## 2020-04-16 NOTE — PROGRESS NOTES
"Aure Joy is a 49 year old female who is being evaluated via a billable video visit.      The patient has been notified of following:     \"This video visit will be conducted via a call between you and your physician/provider. We have found that certain health care needs can be provided without the need for an in-person physical exam.  This service lets us provide the care you need with a video conversation.  If a prescription is necessary we can send it directly to your pharmacy.  If lab work is needed we can place an order for that and you can then stop by our lab to have the test done at a later time.    Video visits are billed at different rates depending on your insurance coverage.  Please reach out to your insurance provider with any questions.    If during the course of the call the physician/provider feels a video visit is not appropriate, you will not be charged for this service.\"    Patient has given verbal consent for Video visit? Yes    How would you like to obtain your AVS? Tashia    Patient would like the video invitation sent by: Send to e-mail at: [email or web address]      Video Start Time: 9:45am   Unfortunately, video conferencing software was not working so appointment was transitioned to telephone.    Additional provider notes: see note by this provider of 4/16/2020.      Video-Visit Details    Type of service:  Video Visit    Video End Time (time video stopped): 10:15am    Originating Location (pt. Location): Home    Distant Location (provider location):  [facility]     Mode of Communication:  Video Conference via [facility]      Evelyn Zamudio MD    "

## 2020-04-17 ENCOUNTER — TELEPHONE (OUTPATIENT)
Dept: PSYCHIATRY | Facility: CLINIC | Age: 50
End: 2020-04-17

## 2020-04-17 NOTE — TELEPHONE ENCOUNTER
Central Prior Authorization Team   Phone: [phone]      PA Initiation    Medication: esketamine (SPRAVATO) 56 mg dose kit   Insurance Company: [employer] - Phone [phone] Fax [phone]  Pharmacy Filling the Rx: [facility] [id number] - [place], [place] - [facility] [address]  Filling Pharmacy Phone: [phone]  Filling Pharmacy Fax:    Start Date: 4/17/2020

## 2020-04-17 NOTE — TELEPHONE ENCOUNTER
Prior Authorization Retail Medication Request    Medication/Dose:   ICD code (if different than what is on RX):  esketamine (SPRAVATO) 56 mg dose kit    Previously Tried and Failed:    Rationale:      Insurance Name:    Insurance ID:        Pharmacy Information (if different than what is on RX)  Name:    Phone:      Will need to submit new PA  Because this current one on file will .

## 2020-04-20 ENCOUNTER — VIRTUAL VISIT (OUTPATIENT)
Dept: PSYCHIATRY | Facility: CLINIC | Age: 50
End: 2020-04-20
Payer: COMMERCIAL

## 2020-04-20 DIAGNOSIS — F40.00 AGORAPHOBIA: ICD-10-CM

## 2020-04-20 DIAGNOSIS — F33.1 MODERATE RECURRENT MAJOR DEPRESSION (H): Primary | ICD-10-CM
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
## 2020-04-23 NOTE — TELEPHONE ENCOUNTER
Spoke to insurance rep to check status on PA. Rep stated that have not received it and request to fax it back. This will be the 4th time and notified rep what was included, cover page, page with additional questions answered and office visit notes.

## 2020-04-23 NOTE — TELEPHONE ENCOUNTER
"Per call to pharmacy regarding PA approval, they were still unable to process script. Called insurance to discuss and was told that [employer] is going back to have Spravato being billed as medical benefit since it's being administered in clinic, rather than pharmacy benefit. Notified pharmacy of this and they stated they will let the clinic know this will have to be a \"buy and bill\" to be billed through medical.      Prior Authorization Approval    Authorization Effective Date: 4/17/2020  Authorization Expiration Date: 4/17/2021  Medication: esketamine (SPRAVATO) 56 mg dose kit- APPROVED  Approved Dose/Quantity:   Reference #:     Insurance Company: [employer] - Phone [phone] Fax [phone]  Expected CoPay:       CoPay Card Available:      Foundation Assistance Needed:    Which Pharmacy is filling the prescription (Not needed for infusion/clinic administered): [facility], [place] [id number] - [place], [place] - [address] [place] [address]  Pharmacy Notified: Yes-Pharmacy will notify patient when ready.  Patient Notified: No             "

## 2020-04-27 ENCOUNTER — VIRTUAL VISIT (OUTPATIENT)
Dept: PSYCHIATRY | Facility: CLINIC | Age: 50
End: 2020-04-27
Payer: COMMERCIAL

## 2020-04-27 DIAGNOSIS — F40.00 AGORAPHOBIA: ICD-10-CM

## 2020-04-27 DIAGNOSIS — F33.1 MODERATE RECURRENT MAJOR DEPRESSION (H): Primary | ICD-10-CM
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
## 2020-05-01 ENCOUNTER — ALLIED HEALTH/NURSE VISIT (OUTPATIENT)
Dept: PSYCHIATRY | Facility: CLINIC | Age: 50
End: 2020-05-01
Payer: COMMERCIAL

## 2020-05-01 VITALS — DIASTOLIC BLOOD PRESSURE: 82 MMHG | OXYGEN SATURATION: 94 % | SYSTOLIC BLOOD PRESSURE: 125 MMHG | HEART RATE: 81 BPM

## 2020-05-01 DIAGNOSIS — F33.2 SEVERE EPISODE OF RECURRENT MAJOR DEPRESSIVE DISORDER, WITHOUT PSYCHOTIC FEATURES (H): Primary | ICD-10-CM

## 2020-05-01 NOTE — PROGRESS NOTES
Aure Joy comes into clinic today at the request of ESTELA Zamudio MD Ordering Provider for Medication Management:  Spravato Admin .     Medication checked by: Estrella Fuentes RN   Prior to administration pt identified by name and : yes  Patient confirmed expected medication and dose     Appearance: dressed casually             Mood: good  Affect: flat  Eye contact: good  Side effects: none  Level of cooperation: cooperative  Education: none needed  Next appt: 21 days      QIDS score of 11     I spent an additional 120 minutes today, in direct patient contact monitoring the patient after Spravato administration. Patient had the following issues; depression stable, vitals monitored through entire visit today.        Medication provided by Pharmacy        This service provided today was under the supervising provider of the day Elizabeth Gordon MD, who was available if needed.    LOT [id number]  Exp 2021-MARY

## 2020-05-04 ENCOUNTER — VIRTUAL VISIT (OUTPATIENT)
Dept: PSYCHIATRY | Facility: CLINIC | Age: 50
End: 2020-05-04
Payer: COMMERCIAL

## 2020-05-04 DIAGNOSIS — F40.00 AGORAPHOBIA: ICD-10-CM

## 2020-05-04 DIAGNOSIS — T74.12XA: ICD-10-CM

## 2020-05-04 DIAGNOSIS — F33.1 MODERATE RECURRENT MAJOR DEPRESSION (H): Primary | ICD-10-CM

## 2020-05-04 ASSESSMENT — PATIENT HEALTH QUESTIONNAIRE - PHQ9: SUM OF ALL RESPONSES TO PHQ QUESTIONS 1-9: 6
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
## 2020-05-11 ENCOUNTER — VIRTUAL VISIT (OUTPATIENT)
Dept: PSYCHIATRY | Facility: CLINIC | Age: 50
End: 2020-05-11
Attending: PSYCHOLOGIST
Payer: COMMERCIAL

## 2020-05-11 DIAGNOSIS — F43.10 COMPLEX POSTTRAUMATIC STRESS DISORDER: Primary | ICD-10-CM

## 2020-05-18 ENCOUNTER — VIRTUAL VISIT (OUTPATIENT)
Dept: PSYCHIATRY | Facility: CLINIC | Age: 50
End: 2020-05-18
Attending: PSYCHOLOGIST
Payer: COMMERCIAL

## 2020-05-18 DIAGNOSIS — F43.10 COMPLEX POSTTRAUMATIC STRESS DISORDER: Primary | ICD-10-CM

## 2020-05-22 ENCOUNTER — ALLIED HEALTH/NURSE VISIT (OUTPATIENT)
Dept: PSYCHIATRY | Facility: CLINIC | Age: 50
End: 2020-05-22
Payer: COMMERCIAL

## 2020-05-22 VITALS — DIASTOLIC BLOOD PRESSURE: 84 MMHG | OXYGEN SATURATION: 95 % | HEART RATE: 80 BPM | SYSTOLIC BLOOD PRESSURE: 128 MMHG

## 2020-05-22 DIAGNOSIS — F33.1 MODERATE RECURRENT MAJOR DEPRESSION (H): Primary | ICD-10-CM

## 2020-05-22 ASSESSMENT — PATIENT HEALTH QUESTIONNAIRE - PHQ9: SUM OF ALL RESPONSES TO PHQ QUESTIONS 1-9: 8

## 2020-05-22 NOTE — PROGRESS NOTES
Aure Joy comes into clinic today at the request of ESTELA Zamudio MD Ordering Provider for Medication Management:  Spravato administration.    Medication checked by: Dunia Alexandra RN  Prior to administration pt identified by name and : yes    PHQ-9 Score today: 8; somewhat difficult  [id number] score: 9  Appearance: adequately groomed   Mood: depressed  Affect: flat, quiet  Eye contact: good  Side effects: none  Level of cooperation: cooperative  Education: no needs identified  Next appt: 21 days      Medication provided by Pharmacy    I spent an additional 120 minutes today, in direct patient contact monitoring the patient after Spravato administration. Patient had the following issues: none.      This service provided today was under the supervising provider of the day Tim Williamson MD, who was available if needed.    Estrella Fuentes RN

## 2020-05-28 ENCOUNTER — VIRTUAL VISIT (OUTPATIENT)
Dept: PSYCHIATRY | Facility: CLINIC | Age: 50
End: 2020-05-28
Payer: COMMERCIAL

## 2020-05-28 DIAGNOSIS — F43.10 PTSD (POST-TRAUMATIC STRESS DISORDER): ICD-10-CM

## 2020-05-28 DIAGNOSIS — F33.1 MODERATE RECURRENT MAJOR DEPRESSION (H): Primary | ICD-10-CM

## 2020-05-28 DIAGNOSIS — F40.00 AGORAPHOBIA: ICD-10-CM

## 2020-05-28 ASSESSMENT — PATIENT HEALTH QUESTIONNAIRE - PHQ9: SUM OF ALL RESPONSES TO PHQ QUESTIONS 1-9: 9
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
## 2020-05-28 NOTE — PATIENT INSTRUCTIONS
1- Increase the Es-ketamine infusion back to each 2 weeks for the next 4 weeks to control her depression and acute stressors     2- Continue Lamictal 200 mg po/day   3- Continue psychotherapy and trauma focused therapy   4- RTC in 1 month

## 2020-05-28 NOTE — PROGRESS NOTES
"Aure Joy is a 50 year old female who is being evaluated via a billable video visit.      The patient has been notified of following:     \"This video visit will be conducted via a call between you and your physician/provider. We have found that certain health care needs can be provided without the need for an in-person physical exam.  This service lets us provide the care you need with a video conversation.  If a prescription is necessary we can send it directly to your pharmacy.  If lab work is needed we can place an order for that and you can then stop by our lab to have the test done at a later time.    Video visits are billed at different rates depending on your insurance coverage.  Please reach out to your insurance provider with any questions.    If during the course of the call the physician/provider feels a video visit is not appropriate, you will not be charged for this service.\"    Patient has given verbal consent for Video visit? Yes    How would you like to obtain your AVS? MyChart    Patient would like the video invitation sent by: Text to cell phone: [phone]    Will anyone else be joining your video visit? No    Thank you  Puneet Jane LPN      Video-Visit Details    Type of service:  Video Visit    Video Start Time: 9:45am  Video End Time: 10:15am    Originating Location (pt. Location): Home    Distant Location (provider location):  [facility]     Platform used for Video Visit: Karley Zamudio MD        "

## 2020-06-01 ENCOUNTER — VIRTUAL VISIT (OUTPATIENT)
Dept: PSYCHIATRY | Facility: CLINIC | Age: 50
End: 2020-06-01
Attending: PSYCHOLOGIST
Payer: COMMERCIAL

## 2020-06-01 DIAGNOSIS — F43.10 COMPLEX POSTTRAUMATIC STRESS DISORDER: Primary | ICD-10-CM

## 2020-06-03 ENCOUNTER — TELEPHONE (OUTPATIENT)
Dept: PSYCHIATRY | Facility: CLINIC | Age: 50
End: 2020-06-03

## 2020-06-03 NOTE — TELEPHONE ENCOUNTER
On 5/18/2020 the patient signed a PHI authorizing electronic communication.  I sent this document to scanning on 6/3/2020 and kept a copy in Psychiatry until scanning is confirmed. Rashmi Leonard, CMA

## 2020-06-05 ENCOUNTER — ALLIED HEALTH/NURSE VISIT (OUTPATIENT)
Dept: PSYCHIATRY | Facility: CLINIC | Age: 50
End: 2020-06-05
Payer: COMMERCIAL

## 2020-06-05 VITALS
OXYGEN SATURATION: 97 % | SYSTOLIC BLOOD PRESSURE: 123 MMHG | HEART RATE: 82 BPM | TEMPERATURE: 98.8 F | DIASTOLIC BLOOD PRESSURE: 78 MMHG

## 2020-06-05 DIAGNOSIS — F33.1 MODERATE RECURRENT MAJOR DEPRESSION (H): Primary | ICD-10-CM

## 2020-06-05 ASSESSMENT — PATIENT HEALTH QUESTIONNAIRE - PHQ9: SUM OF ALL RESPONSES TO PHQ QUESTIONS 1-9: 10

## 2020-06-05 NOTE — PROGRESS NOTES
Aure Joy comes into clinic today at the request of ESTELA Zamudio MD Ordering Provider for Medication Management:  Spravato administration.  Today's administration is one week earlier than noted in previous charting, the schedule was increased to every 14 days for three administrations by   Medication checked by: Jael Alexandra RN  Prior to administration pt identified by name and : yes     PHQ-9 Score: 10  QIDS-SR16 score: 15  Appearance: appropriately dressed     Mood: fair  Affect: constricted  Eye contact: good  Side effects: none  Level of cooperation: cooperative  Education: no needs identified  Next appt: 14 days - to be arranged        Medication provided by Pharmacy  LOT:[id number]  EXP:     I spent an additional 120 minutes today, in direct patient contact monitoring the patient after Spravato administration. Patient had the following issues: none.      This service provided today was under the supervising provider of the day Tim Williamson MD, who was available if needed.

## 2020-06-08 ENCOUNTER — BEH TREATMENT PLAN (OUTPATIENT)
Dept: PSYCHIATRY | Facility: CLINIC | Age: 50
End: 2020-06-08

## 2020-06-08 ENCOUNTER — VIRTUAL VISIT (OUTPATIENT)
Dept: PSYCHIATRY | Facility: CLINIC | Age: 50
End: 2020-06-08
Attending: PSYCHOLOGIST
Payer: COMMERCIAL

## 2020-06-08 DIAGNOSIS — F43.10 COMPLEX POSTTRAUMATIC STRESS DISORDER: Primary | ICD-10-CM

## 2020-06-08 DIAGNOSIS — F33.1 MODERATE RECURRENT MAJOR DEPRESSION (H): ICD-10-CM
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
## 2020-06-09 PROBLEM — F43.10 COMPLEX POSTTRAUMATIC STRESS DISORDER: Status: ACTIVE | Noted: 2020-06-09

## 2020-06-09 PROBLEM — T76.12XS: Status: ACTIVE | Noted: 2020-06-09
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
## 2020-06-12 NOTE — PROGRESS NOTES
[facility]    Psychotherapy Progress Note      Date: May 18, 2020    Patient Name: Aure Joy     Patient MRN: [id number]    Provider: Amy Leyva, PhD LP    Procedure: Individual CPT psychotherapy session    Appointment Duration: 50 minutes (8:00 am to 8:50 am)    Diagnosis:   Encounter Diagnosis   Name Primary?     Complex posttraumatic stress disorder Yes      Session Content: The patient completed the first session of Cognitive Processing Therapy (CPT) for PTSD. Patient reported strong interest and commitment to completing the treatment and agreement with treatment course. Patient did share anxiety about discussing difficult life experiences during treatment. Reinforced patient for engaging in this treatment and normalized anticipatory anxiety. Other CPT interventions completed in this session are listed below.    CPT Interventions performed in this session:   Therapist described symptoms of PTSD and theory of why some people get stuck in recovery   Therapist described the cognitive theory   Therapist discussed the role of emotions in trauma recovery  Therapist briefly reviewed the index trauma  Therapist assigned practice assignment  Therapist checked patient's reactions to session and practice assignment    Homework Assigned:  Write at least 1-page Impact Statement about the cause of the traumatic event  Read handouts on PTSD symptoms and Stuck Points     Weekly PTSD Checklist (PCL-5) Score: Not completed in this session.    Risk assessment: The patient has the following risk and protective factors: Risk: Remote history of suicide attempts; current passive suicidal ideation without plan or intent but with identified means (gun); psychiatric diagnoses; current psychosocial stressors (potential divorce without currently employment/means to protect self). Protective: Responsibility to adult daughters; active engagement and motivation for  mental health treatment. Based on risk and protective factors, patient is judged to be at the following levels of acute and chronic suicide risk:  Acute Risk: Intermediate  Chronic Risk: Intermediate    Plan: Continue with CPT in two weeks.    Video- Visit Details  Type of service:  video visit for psychotherapy  Time of service: Date:  5/18/20  Video Start Time:  8:00 am        Video End Time:  8:50 am  Reason for video visit:  Patient unable to travel due to Covid-19  Originating Site (patient location):  [facility]   Location- Patient's home  Distant Site (provider location):  Remote location  Mode of Communication:  Video Conference via [email or web address]  Consent:  Patient has given verbal consent for video visit?: Yes

## 2020-06-14 DIAGNOSIS — F33.2 SEVERE EPISODE OF RECURRENT MAJOR DEPRESSIVE DISORDER, WITHOUT PSYCHOTIC FEATURES (H): ICD-10-CM

## 2020-06-15 ENCOUNTER — VIRTUAL VISIT (OUTPATIENT)
Dept: PSYCHIATRY | Facility: CLINIC | Age: 50
End: 2020-06-15
Attending: PSYCHOLOGIST
Payer: COMMERCIAL

## 2020-06-15 DIAGNOSIS — F43.10 COMPLEX POSTTRAUMATIC STRESS DISORDER: Primary | ICD-10-CM

## 2020-06-16 NOTE — PROGRESS NOTES
[facility]    Psychotherapy Progress Note      Date: Crow 15, 2020    Patient Name: Aure Joy     Patient MRN: [id number]    Provider: Amy Leyva, PhD LP    Procedure: Individual CPT session    Session Length: 60 minutes (start: 8:00 am, stop: 9:00 am).    Session Content: The patient completed the fourth session of Cognitive Processing Therapy (CPT) for PTSD. Patient did complete homework from previous session. CPT interventions performed in session are listed below. Patient also shared distress related to transgender identity and recent political issues around this as well as ongoing sense of invalidation of this identity from family members. Agreed there may be important stuck points around this issue we can address in future sessions.     Weekly PTSD Checklist (PCL-5) Score: 39 (indicating a score that is consistent with PTSD)    CPT Interventions performed in this session:   Therapist reviewed ABC Worksheets  Therapist addressed Stuck Points using Socratic Dialogue  Therapist discussed difference between blame/intent, responsibility, and unforeseeable  Therapist introduced Challenging Questions Worksheet  Therapist assigned practice assignment  Therapist checked patient's reactions to session and practice assignment    Homework Assigned:  Each day choose a Stuck Point and answer the Challenging Questions Worksheet about it     Diagnoses Treated:  Encounter Diagnosis   Name Primary?     Complex posttraumatic stress disorder Yes       Risk Assessment: The patient reported suicidal ideation without plan or intent. She has the following risk and protective factors: Risk: Remote history of suicide attempts; current passive suicidal ideation without plan or intent but with identified means (gun); psychiatric diagnoses; current psychosocial stressors (potential divorce without currently employment/means to protect self). Protective: Responsibility  to adult daughters; active engagement and motivation for mental health treatment. Based on risk and protective factors, patient is judged to be at the following levels of acute and chronic suicide risk:     Acute Risk: Intermediate  Chronic Risk: Intermediate     Plan: Continue with CPT next week.     Video- Visit Details  Type of service:  video visit for psychotherapy  Time of service:    Date:  6/15/20     Video Start Time:  8:00 am      Video End Time:  9:00 am  Reason for video visit:  Patient unable to travel due to Covid-19  Originating Site (patient location):  [facility]   Location- Patient's home  Distant Site (provider location):  Remote location  Mode of Communication:  Video Conference via [email or web address]  Consent:  Patient has given verbal consent for video visit?: Yes

## 2020-06-17 RX ORDER — LAMOTRIGINE 200 MG/1
TABLET ORAL
Qty: 45 TABLET | Refills: 0 | Status: SHIPPED | OUTPATIENT
Start: 2020-06-17 | End: 2020-07-15

## 2020-06-17 NOTE — TELEPHONE ENCOUNTER
Medication requested: LAMOTRIGINE 200MG TABLETS Take 1.5 tablets (300 mg) by mouth daily   Last refilled: 4/16/20  Qty: 45/1      Last seen: 5/28/20  RTC: 1 month  Cancel: 0  No-show: 0  Next appt: 7/30/20    Refill decision:   Refilled for 30 days per protocol.

## 2020-06-19 ENCOUNTER — ALLIED HEALTH/NURSE VISIT (OUTPATIENT)
Dept: PSYCHIATRY | Facility: CLINIC | Age: 50
End: 2020-06-19
Payer: COMMERCIAL

## 2020-06-19 VITALS — DIASTOLIC BLOOD PRESSURE: 86 MMHG | SYSTOLIC BLOOD PRESSURE: 133 MMHG | OXYGEN SATURATION: 94 % | HEART RATE: 80 BPM

## 2020-06-19 DIAGNOSIS — F33.2 SEVERE EPISODE OF RECURRENT MAJOR DEPRESSIVE DISORDER, WITHOUT PSYCHOTIC FEATURES (H): Primary | ICD-10-CM

## 2020-06-19 ASSESSMENT — PATIENT HEALTH QUESTIONNAIRE - PHQ9: SUM OF ALL RESPONSES TO PHQ QUESTIONS 1-9: 10

## 2020-06-19 NOTE — PROGRESS NOTES
Aure Joy comes into clinic today at the request of ESTELA Zamudio MD Ordering Provider for Medication Management:  spravato administration.    Medication checked by: Dunia Alexandra RN  Prior to administration pt identified by name and : yes    PHQ-9:10  TVHKTK03: 9  Appearance: casual   Mood: ok  Affect: flat depressed  Eye contact: good  Side effects: none  Level of cooperation: cooperative  Education: no needs identified  Next appt: two weeks, then every 3 weeks    Medication provided by Pharmacy    I spent an additional 120 minutes today, in direct patient contact monitoring the patient after Spravato administration. Patient had the following issues: none.    This service provided today was under the supervising provider of the day Elizabeth Gordon MD, who was available if needed.    Estrella Fuentes RN

## 2020-06-22 ENCOUNTER — VIRTUAL VISIT (OUTPATIENT)
Dept: PSYCHIATRY | Facility: CLINIC | Age: 50
End: 2020-06-22
Attending: PSYCHOLOGIST
Payer: COMMERCIAL

## 2020-06-22 DIAGNOSIS — F43.10 COMPLEX POSTTRAUMATIC STRESS DISORDER: Primary | ICD-10-CM

## 2020-06-22 NOTE — PROGRESS NOTES
"       [facility]    Psychotherapy Progress Note      Date: Jun 22, 2020    Patient Name: Auer Joy     Patient MRN: [id number]    Provider: Amy Leyva, PhD LP    Procedure: Individual CPT psychotherapy session    Appointment Duration: 55 minutes (8:00 am to 8:55 am)    Diagnosis:   Encounter Diagnosis   Name Primary?     Complex posttraumatic stress disorder Yes      Content: The patient completed the fifth session of Cognitive Processing Therapy (CPT) for PTSD. Patient did complete homework from previous session. CPT interventions performed in this session are listed below. Patient reported having a \"freeing\" feeling due to cognitive shifts she is experiencing through completing CPT homework as well as a desire to enjoy life and get better for herself rather than just to avoid her family leaving her.     Weekly PTSD Checklist (PCL-5) Score: 29 (indicating a score that is not consistent with PTSD)    CPT Interventions performed in this session:   Therapist reviewed Challenging Questions Worksheet  Therapist introduced Patterns of Problematic Thinking Worksheet  Therapist assigned practice assignment  Therapist checked patient's reactions to session and practice assignment    Homework Assigned:  Each day list a Stuck Point or example of everyday thinking in the Patterns of Problematic Thinking Worksheet    Risk Assessment: The patient has the following risk and protective factors: Risk: Remote history of suicide attempts; current passive suicidal ideation without plan or intent but with identified means (gun); psychiatric diagnoses; current psychosocial stressors (potential divorce without currently employment/means to protect self). Protective: Responsibility to adult daughters; active engagement and motivation for mental health treatment. Based on risk and protective factors, patient is judged to be at the following levels of acute and chronic " suicide risk:    Acute Risk: Intermediate  Chronic Risk: Intermediate    Plan: Continue with CPT next week.    Treatment Plan Last Reviewed: 6/8/20    Treatment Plan Next Reviewed: 9/6/20    Video- Visit Details  Type of service:  video visit for psychotherapy  Time of service:    Date:  6/22/20     Video Start Time:  8:00 am      Video End Time:  8:55 am  Reason for video visit:  Patient unable to travel due to Covid-19  Originating Site (patient location):  [facility]   Location- Patient's home  Distant Site (provider location):  Remote location  Mode of Communication:  Video Conference via [email or web address]  Consent:  Patient has given verbal consent for video visit?: Yes

## 2020-06-22 NOTE — PROGRESS NOTES
[facility]    Psychotherapy Progress Note      Date: Jun 1, 2020    Patient Name: Aure Joy     Patient MRN: [id number]    Provider: Amy Leyva, PhD LP    Procedure: Individual CPT psychotherapy session    Appointment Duration: 52 minutes (8:00 am to 8:52 am)    Diagnosis:   Encounter Diagnosis   Name Primary?     Complex posttraumatic stress disorder Yes      Content: The patient completed the second session of Cognitive Processing Therapy (CPT) for PTSD. Patient did complete homework from previous session. CPT interventions are listed below. Also discussed patient's increased dissociation over the past week which may be automatic self-protective behavior related to increased distress from approaching difficult memories from childhood; will continue to monitor on weekly basis.    Weekly PTSD Checklist (PCL-5) Score: 32 (indicating a score that is not consistent with PTSD)    CPT Interventions performed in this session:   Patient read Impact Statement out loud and therapist assisted in identifying Stuck Points   Therapist taught relationship between events, thoughts, emotions  Therapist introduced ABC Worksheets  Therapist discussed Stuck Points  Therapist assigned practice assignment  Therapist checked patient's reactions to session and practice assignment      Homework Assigned:  Monitor events, thoughts, and feelings using ABC Worksheets, daily, with at least one sheet about the traumatic event  Add newly noticed Stuck Points to the Stuck Point Log     Risk Assessment: The patient has the following risk and protective factors: Risk: Remote history of suicide attempts; current passive suicidal ideation without plan or intent but with identified means (gun); psychiatric diagnoses; current psychosocial stressors (potential divorce without currently employment/means to protect self). Protective: Responsibility to adult daughters; active  engagement and motivation for mental health treatment. Based on risk and protective factors, patient is judged to be at the following levels of acute and chronic suicide risk:    Acute Risk: Intermediate  Chronic Risk: Intermediate    Plan: Continue with CPT next week.    Video- Visit Details  Type of service:  video visit for psychotherapy  Time of service:    Date:  6/1/20     Video Start Time:  8:00 am      Video End Time:  8:52 am  Reason for video visit:  Patient unable to travel due to Covid-19  Originating Site (patient location):  [facility]   Location- Patient's home  Distant Site (provider location):  Remote location  Mode of Communication:  Video Conference via [email or web address]  Consent:  Patient has given verbal consent for video visit?: Yes

## 2020-07-01 NOTE — PROGRESS NOTES
[facility]    Psychotherapy Progress Note      Date: Jun 8, 2020    Patient Name: Aure Joy     Patient MRN: [id number]    Provider: Amy Leyva, PhD LP    Procedure: Individual CPT psychotherapy session    Appointment Duration: 58 minutes (8:00 am to 8:58 am)    Diagnosis:   Encounter Diagnoses   Name Primary?     Complex posttraumatic stress disorder Yes     Moderate recurrent major depression (H)       Session Content: The patient completed the third session of Cognitive Processing Therapy (CPT) for PTSD. Patient did complete homework from previous session. CPT interventions are listed below. Also reviewed and discussed treatment plan with patient; patient was in agreement with plan but unable to sign form due to remote format.  Discussed increased in patient's dissociation over the past week; she reported these episodes were briefer (~5 minutes) than two weeks ago. Agreed to continue to monitor over the course of treatment.     Weekly PTSD Checklist (PCL-5) Score: 31 (indicating a score that is not consistent with PTSD)    CPT Interventions Performed in This Session:   Therapist introduced concept that changing thoughts can change intensity/type of emotion experienced  Therapist used completed ABC sheets about traumatic event to challenge assimilated thoughts about index trauma  Therapist assigned practice assignment  Therapist checked patient's reactions to session and practice assignment    Homework Assigned:  Monitor events, thoughts, and feelings using ABC Worksheets, daily, about the trauma  Complete additional ABC Worksheets on day-to-day events  Add newly noticed Stuck Points to the Stuck Point Log     Risk Assessment: The patient reported suicidal ideation without plan or intent. She has the following risk and protective factors: Risk: Remote history of suicide attempts; current passive suicidal ideation without plan or intent but  with identified means (gun); psychiatric diagnoses; current psychosocial stressors (potential divorce without currently employment/means to protect self). Protective: Responsibility to adult daughters; active engagement and motivation for mental health treatment. Based on risk and protective factors, patient is judged to be at the following levels of acute and chronic suicide risk:    Acute Risk: Intermediate  Chronic Risk: Intermediate    Plan: Continue with CPT next week.    Video- Visit Details  Type of service:  video visit for psychotherapy  Time of service:    Date:  6/8/20     Video Start Time:  8:00 am      Video End Time:  8:58 am  Reason for video visit:  Patient unable to travel due to Covid-19  Originating Site (patient location):  [facility]   Location- Patient's home  Distant Site (provider location):  Remote location  Mode of Communication:  Video Conference via [email or web address]  Consent:  Patient has given verbal consent for video visit?: Yes

## 2020-07-02 ENCOUNTER — ALLIED HEALTH/NURSE VISIT (OUTPATIENT)
Dept: PSYCHIATRY | Facility: CLINIC | Age: 50
End: 2020-07-02
Payer: COMMERCIAL

## 2020-07-02 VITALS — OXYGEN SATURATION: 96 % | DIASTOLIC BLOOD PRESSURE: 78 MMHG | SYSTOLIC BLOOD PRESSURE: 129 MMHG | HEART RATE: 85 BPM

## 2020-07-02 DIAGNOSIS — F33.2 SEVERE EPISODE OF RECURRENT MAJOR DEPRESSIVE DISORDER, WITHOUT PSYCHOTIC FEATURES (H): Primary | ICD-10-CM

## 2020-07-02 ASSESSMENT — PATIENT HEALTH QUESTIONNAIRE - PHQ9: SUM OF ALL RESPONSES TO PHQ QUESTIONS 1-9: 20

## 2020-07-02 NOTE — PROGRESS NOTES
Aure Joy comes into clinic today at the request of ESTELA Zamudio MD Ordering Provider for Spravato Admin    Medication checked by: John Velazco, RN  Prior to administration pt identified by name and : yes    Appearance: dressed casually   Mood: depressed; Aure became very tearful at beginning of visit.  States that she has been getting worse lately.  She is unsure if Spravato is helping that much.  She is meeting with Dr. Zamudio on 20, but is wondering if something can be done sooner then that.  Writer will note to Dr. Zamudio for options.  Affect: flat  Eye contact: good  Side effects: sedation and dissociation  Level of cooperation: cooperative  Education: none needed  Next appt:     PCL-39  PHQ9-29  QIDS- 21    I spent an additional 120 minutes today, in direct patient contact monitoring the patient after Spravato administration. Patient had the following issues; depression is increasing, vitals monitored through entire visit.        Medication provided by Clinic      This service provided today was under the supervising provider of the day ESTELA Zamudio MD, who was available if needed.    BEN CORDOVA, RN

## 2020-07-03 ENCOUNTER — MEDICAL CORRESPONDENCE (OUTPATIENT)
Dept: HEALTH INFORMATION MANAGEMENT | Facility: CLINIC | Age: 50
End: 2020-07-03

## 2020-07-06 ENCOUNTER — VIRTUAL VISIT (OUTPATIENT)
Dept: PSYCHIATRY | Facility: CLINIC | Age: 50
End: 2020-07-06
Attending: PSYCHOLOGIST
Payer: COMMERCIAL

## 2020-07-06 DIAGNOSIS — F43.10 COMPLEX POSTTRAUMATIC STRESS DISORDER: Primary | ICD-10-CM

## 2020-07-06 NOTE — PROGRESS NOTES
"       [facility]    Psychotherapy Progress Note      Date: Jul 6, 2020    Patient Name: Aure Joy     Patient MRN: [id number]    Provider: Amy Leyva, PhD LP    Procedure: Individual CPT psychotherapy session    Appointment Duration: 61 minutes (7:58 am to 8:59 am)    Diagnosis:    Encounter Diagnosis   Name Primary?     Complex posttraumatic stress disorder Yes      Session Content: The patient completed the sixth session of Cognitive Processing Therapy (CPT) for PTSD. Patient did complete homework from previous session. CPT interventions performed in this session are listed below. Reviewed patient's responses on the PCL-5 as this is the alf point of treatment and patient endorsed significant depressive symptoms. Patient stated she does not believe her ketamine treatments are working and she reported feeling \"dead\" and that she would \"rather be sleeping\". She also reported no longer believing insights she reported in the last CPT session. Used patient's stuck point \"I'm never going to get better/not be depressed\" as example when we collaboratively worked through Challenging Beliefs Worksheet in session. In addition to CPT homework also assigned two behavioral activation activities (clearning bathroom, takeout  lunch with daughters).     Weekly PTSD Checklist (PCL-5) Score: 32 (indicating a score that is not consistent with PTSD)    CPT Interventions performed in this session:   Reviewed patient's scores on self-report measures  Therapist reviewed patient's Patterns of Problematic Thinking Worksheet  Therapist introduced Challenging Beliefs Worksheet and practiced completing it with a trauma example  Therapist assigned practice assignment    Homework Assigned:  Use Challenging Beliefs Worksheet to analyze at least one Stuck Point each day    Risk Assessment: The patient has the following risk and protective factors: Risk: Remote history of " suicide attempts; current passive suicidal ideation without plan or intent but with identified means (gun); psychiatric diagnoses; current psychosocial stressors (potential divorce without currently employment/means to protect self). Protective: Responsibility to adult daughters; active engagement and motivation for mental health treatment. Based on risk and protective factors, patient is judged to be at the following levels of acute and chronic suicide risk:    Acute Risk: Intermediate  Chronic Risk: Intermediate    Plan: Continue with CPT next week.    Treatment Plan Last Reviewed: 6/8/20    Treatment Plan Next Reviewed: 9/6/20    Video- Visit Details  Type of service:  video visit for psychotherapy  Time of service:    Date:  7/6/20     Video Start Time:  7:58 am      Video End Time:  8:59 am  Reason for video visit:  Patient unable to travel due to Covid-19  Originating Site (patient location):  [facility]   Location- Patient's home  Distant Site (provider location):  Remote location  Mode of Communication:  Video Conference via [email or web address]  Consent:  Patient has given verbal consent for video visit?: Yes

## 2020-07-07 ENCOUNTER — TELEPHONE (OUTPATIENT)
Dept: PSYCHIATRY | Facility: CLINIC | Age: 50
End: 2020-07-07

## 2020-07-07 NOTE — TELEPHONE ENCOUNTER
-Writer spoke with Dr. Zamudio regarding patient and her reported symptoms at last Spravato treatment.      -Dr. Zamudio agreed to put patient on TMS list and gave orders to continue Spravato 56 mg every 2 weeks.      -Dr. Zamudio will see patient this month to further discuss treatment options.

## 2020-07-09 NOTE — TELEPHONE ENCOUNTER
-Writer returned call to update patient on current plan with Dr. Zamudio.  She verbalized understanding. No further action needed.

## 2020-07-13 ENCOUNTER — VIRTUAL VISIT (OUTPATIENT)
Dept: PSYCHIATRY | Facility: CLINIC | Age: 50
End: 2020-07-13
Attending: PSYCHOLOGIST
Payer: COMMERCIAL

## 2020-07-13 DIAGNOSIS — F43.10 COMPLEX POSTTRAUMATIC STRESS DISORDER: Primary | ICD-10-CM

## 2020-07-13 DIAGNOSIS — F33.1 MODERATE RECURRENT MAJOR DEPRESSION (H): ICD-10-CM

## 2020-07-13 DIAGNOSIS — F33.2 SEVERE RECURRENT MAJOR DEPRESSION WITHOUT PSYCHOTIC FEATURES (H): Primary | ICD-10-CM

## 2020-07-14 NOTE — TELEPHONE ENCOUNTER
SPRAVATO, 56 MG  Last refilled: 4/16/20  Qty: 4 KIT     Last seen: 5/28/20   RTC: 1 MOS  Cancel: 0  No-show: 0  Next appt: 7/30/20  Refill pended and routed to the provider for review/determination due to   LIMITED RF / CONTINUE MED?   * 1- Increase the Es-ketamine infusion back to each 2 weeks for the next 4 weeks to control her depression and acute stressors

## 2020-07-16 ENCOUNTER — VIRTUAL VISIT (OUTPATIENT)
Dept: PSYCHIATRY | Facility: CLINIC | Age: 50
End: 2020-07-16
Payer: COMMERCIAL

## 2020-07-16 DIAGNOSIS — F33.2 SEVERE EPISODE OF RECURRENT MAJOR DEPRESSIVE DISORDER, WITHOUT PSYCHOTIC FEATURES (H): Primary | ICD-10-CM

## 2020-07-16 DIAGNOSIS — F33.2 SEVERE RECURRENT MAJOR DEPRESSION WITHOUT PSYCHOTIC FEATURES (H): ICD-10-CM

## 2020-07-16 ASSESSMENT — PATIENT HEALTH QUESTIONNAIRE - PHQ9: SUM OF ALL RESPONSES TO PHQ QUESTIONS 1-9: 19
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
## 2020-07-17 ENCOUNTER — ALLIED HEALTH/NURSE VISIT (OUTPATIENT)
Dept: PSYCHIATRY | Facility: CLINIC | Age: 50
End: 2020-07-17
Payer: COMMERCIAL

## 2020-07-17 VITALS
DIASTOLIC BLOOD PRESSURE: 80 MMHG | SYSTOLIC BLOOD PRESSURE: 145 MMHG | OXYGEN SATURATION: 96 % | TEMPERATURE: 97.8 F | HEART RATE: 65 BPM

## 2020-07-17 DIAGNOSIS — F33.2 SEVERE EPISODE OF RECURRENT MAJOR DEPRESSIVE DISORDER, WITHOUT PSYCHOTIC FEATURES (H): Primary | ICD-10-CM

## 2020-07-17 ASSESSMENT — PATIENT HEALTH QUESTIONNAIRE - PHQ9: SUM OF ALL RESPONSES TO PHQ QUESTIONS 1-9: 18

## 2020-07-17 NOTE — PROGRESS NOTES
"Aure Joy comes into clinic today at the request of ESTELA Zamudio MD Ordering Provider for Spravato Admin  This is the first of an increased dose. She will be receiving 84mg dose kit     Prior to administration pt identified by name and : yes     QIDS-SR16 score 19    Appearance: dressed casually             Mood: depressed;   Affect: blunted  Eye contact: good  Side effects: sedation and dissociation  Level of cooperation: cooperative  Education: we discussed the increased dose (increased at recent visit with )  Patient needed to use the restroom about 80 mins in to the visit. She reported she felt a \"little spacey\", and following this reported feeling a little nausea.  Blood pressure and pulse were WNL at this time.  By the end of the visit her nausea had substantially reduced, and the patient felt okay to leave    Next appt: two weeks (TBA)     Medication provided by clinic  LOT: [id number]  EXP: 2021-MAR      This service provided today was under the supervising provider of the day Tim Mckenna MD, who was available if needed     I spent an additional 120 minutes today, in direct patient contact monitoring the patient after Spravato administration. Patient had the following issues; nausea, vitals monitored through entire visit.  "

## 2020-07-20 ENCOUNTER — VIRTUAL VISIT (OUTPATIENT)
Dept: PSYCHIATRY | Facility: CLINIC | Age: 50
End: 2020-07-20
Attending: PSYCHOLOGIST
Payer: COMMERCIAL

## 2020-07-20 DIAGNOSIS — F43.10 COMPLEX POSTTRAUMATIC STRESS DISORDER: Primary | ICD-10-CM

## 2020-07-20 RX ORDER — ESKETAMINE HYDROCHLORIDE 28 MG/.2ML
SOLUTION NASAL
Qty: 2 EACH | Refills: 0 | Status: SHIPPED | OUTPATIENT
Start: 2020-07-20 | End: 2020-07-31 | Stop reason: DRUGHIGH

## 2020-07-20 NOTE — PROGRESS NOTES
[facility]    Psychotherapy Progress Note      Date: Jul 20, 2020    Patient Name: Aure Joy     Patient MRN: [id number]    Provider: Amy Leyva, PhD LP    Procedure: Individual CPT psychotherapy session    Appointment Duration: 58 minutes (8:00 am to 8:58 am)    Diagnosis:    Encounter Diagnosis   Name Primary?     Complex posttraumatic stress disorder Yes       Session Content: The patient completed the seventh session of Cognitive Processing Therapy (CPT) for PTSD. Patient did complete homework from previous session. CPT interventions performed in this session are listed below. The patient reported her ketamine treatment was helpful in giving her motivation/energy to complete CPT worksheets.    Weekly PTSD Checklist (PCL-5) Score: 40 (indicating a score that is consistent with PTSD)    CPT Interventions performed in this session:   Reviewed patient's scores on self-report measures  Therapist reviewed patient's Challenging Beliefs Worksheets   Therapist provided overview of the five themes/modules that will be discussed in remaining sessions  Therapist introduced Safety, the first of the themes  Therapist assigned practice assignment  Therapist checked patient's reactions to session and practice assignment    Homework Assigned:  Use Challenging Beliefs Worksheet to analyze at least one Stuck Point each day  Read over Safety Issues Module and consider how your beliefs were impacted by trauma    Risk Assessment: The patient has the following risk and protective factors: Risk: Remote history of suicide attempts; current passive suicidal ideation without plan or intent but with identified means (gun); psychiatric diagnoses; current psychosocial stressors (potential divorce without currently employment/means to protect self). Protective: Responsibility to adult daughters; active engagement and motivation for mental health treatment. Based on risk  and protective factors, patient is judged to be at the following levels of acute and chronic suicide risk:    Acute Risk: Intermediate  Chronic Risk: Intermediate    Plan: Continue with CPT next week.    Treatment Plan Last Reviewed: 6/8/20    Treatment Plan Next Reviewed: 9/6/20    Video- Visit Details  Type of service:  video visit for psychotherapy  Time of service:    Date:  7/20/20     Video Start Time:  8:00 am      Video End Time:  8:58 am  Reason for video visit:  Patient unable to travel due to Covid-19  Originating Site (patient location):  [facility]   Location- Patient's home  Distant Site (provider location):  Remote location  Mode of Communication:  Video Conference via [email or web address]  Consent:  Patient has given verbal consent for video visit?: Yes

## 2020-07-27 ENCOUNTER — VIRTUAL VISIT (OUTPATIENT)
Dept: PSYCHIATRY | Facility: CLINIC | Age: 50
End: 2020-07-27
Attending: PSYCHOLOGIST
Payer: COMMERCIAL

## 2020-07-27 DIAGNOSIS — F43.10 COMPLEX POSTTRAUMATIC STRESS DISORDER: Primary | ICD-10-CM

## 2020-07-28 NOTE — PROGRESS NOTES
[facility]ry [facility]    Psychotherapy Progress Note      Date: Jul 27, 2020    Patient Name: Aure Joy     Patient MRN: [id number]    Provider: Amy Leyva, PhD LP    Procedure: Individual CPT psychotherapy session    Appointment Duration: 58 minutes (8:00 am to 8:58 am)    Diagnosis:    Encounter Diagnosis   Name Primary?     Complex posttraumatic stress disorder Yes      Weekly PTSD Checklist (PCL-5) Score: 26 (indicating a score that is not consistent with PTSD).    Session Content: The patient completed the eighth session of Cognitive Processing Therapy (CPT) for PTSD. Patient did complete homework from previous session. As trust of self and others is a major issue for patient, spent time going through handout on trust together as well as completing trust start exercise. Patient shared some concern that she does not have the skills to determine whether others are trustworthy or not; encouraged her to examine possible stuck points related to this thought.    CPT Interventions performed in this session:   Therapist reviewed patient's Challenging Beliefs Worksheets related to Safety and other Stuck Points  herapist introduced Trust theme  Therapist assigned practice assignment  Therapist checked patient's reactions to session and practice assignment    Homework Assigned:  Use Challenging Beliefs Worksheet to analyze at least one Stuck Point each day and Read over trust issues module and examine how trust was impacted by trauma    Risk Assessment: The patient has the following risk and protective factors: Risk: Remote history of suicide attempts; current passive suicidal ideation without plan or intent but with identified means (gun); psychiatric diagnoses; current psychosocial stressors (potential divorce without currently employment/means to protect self). Protective: Responsibility to adult daughters; active engagement and motivation for mental health  treatment. Based on risk and protective factors, patient is judged to be at the following levels of acute and chronic suicide risk:    Acute Risk: Intermediate  Chronic Risk: Intermediate    Plan: Continue with CPT next week.    Treatment Plan Last Reviewed: 6/8/20    Treatment Plan Next Reviewed: 9/6/20    Video- Visit Details  Type of service:  video visit for psychotherapy  Time of service:    Date:  7/27/20     Video Start Time:  8:00 am      Video End Time:  8:58 am  Reason for video visit:  Patient unable to travel due to Covid-19  Originating Site (patient location):  [facility]   Location- Patient's home  Distant Site (provider location):  Remote location  Mode of Communication:  Video Conference via [employer]  Consent:  Patient has given verbal consent for video visit?: Yes

## 2020-07-31 ENCOUNTER — ALLIED HEALTH/NURSE VISIT (OUTPATIENT)
Dept: PSYCHIATRY | Facility: CLINIC | Age: 50
End: 2020-07-31
Payer: COMMERCIAL

## 2020-07-31 VITALS
OXYGEN SATURATION: 96 % | DIASTOLIC BLOOD PRESSURE: 82 MMHG | SYSTOLIC BLOOD PRESSURE: 138 MMHG | TEMPERATURE: 98.2 F | HEART RATE: 88 BPM

## 2020-07-31 DIAGNOSIS — F33.2 SEVERE EPISODE OF RECURRENT MAJOR DEPRESSIVE DISORDER, WITHOUT PSYCHOTIC FEATURES (H): Primary | ICD-10-CM

## 2020-07-31 ASSESSMENT — PATIENT HEALTH QUESTIONNAIRE - PHQ9: SUM OF ALL RESPONSES TO PHQ QUESTIONS 1-9: 17

## 2020-07-31 NOTE — PROGRESS NOTES
Aure Joy comes into clinic today at the request of ESTELA Zamudio MD Ordering Provider for Spravato Admin     Prior to administration pt identified by name and : yes     QIDS-SR16 score 16     Appearance: dressed casually             Mood: depressed;   Affect: blunted  Eye contact: good  Side effects: sedation and dissociation  Level of cooperation: cooperative  Education:none needed     Next appt: two weeks (TBA) for Spravato, 20 at 10:15am with      Medication provided by clinic  LOT: [id number]  EXP: 2021-MAY        This service provided today was under the supervising provider of the day Tim Mckenna MD, who was available if needed     I spent an additional 120 minutes today, in direct patient contact monitoring the patient after Spravato administration.

## 2020-08-03 ENCOUNTER — VIRTUAL VISIT (OUTPATIENT)
Dept: PSYCHIATRY | Facility: CLINIC | Age: 50
End: 2020-08-03
Attending: PSYCHOLOGIST
Payer: COMMERCIAL

## 2020-08-03 DIAGNOSIS — F33.2 SEVERE EPISODE OF RECURRENT MAJOR DEPRESSIVE DISORDER, WITHOUT PSYCHOTIC FEATURES (H): ICD-10-CM

## 2020-08-03 DIAGNOSIS — F43.10 COMPLEX POSTTRAUMATIC STRESS DISORDER: Primary | ICD-10-CM
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
## 2020-08-10 ENCOUNTER — VIRTUAL VISIT (OUTPATIENT)
Dept: PSYCHIATRY | Facility: CLINIC | Age: 50
End: 2020-08-10
Attending: PSYCHOLOGIST
Payer: COMMERCIAL

## 2020-08-10 DIAGNOSIS — F33.2 SEVERE EPISODE OF RECURRENT MAJOR DEPRESSIVE DISORDER, WITHOUT PSYCHOTIC FEATURES (H): ICD-10-CM

## 2020-08-10 DIAGNOSIS — F43.10 COMPLEX POSTTRAUMATIC STRESS DISORDER: Primary | ICD-10-CM

## 2020-08-11 DIAGNOSIS — F33.2 SEVERE EPISODE OF RECURRENT MAJOR DEPRESSIVE DISORDER, WITHOUT PSYCHOTIC FEATURES (H): ICD-10-CM

## 2020-08-11 NOTE — PROGRESS NOTES
[facility]    Psychotherapy Progress Note      Date: Jul 13, 2020    Patient Name: Aure Joy     Patient MRN: [id number]    Provider: Amy Leyva, PhD LP    Procedure: Individual CPT psychotherapy session - remote format    Appointment Duration: 55 minutes (8:00 am to 8:55 am)    Diagnosis:    Encounter Diagnoses   Name Primary?     Complex posttraumatic stress disorder Yes     Moderate recurrent major depression (H)         Weekly PTSD Checklist (PCL-5) Score: 34 (indicating a score that is consistent with PTSD)    Session Content: CPT interventions are described below. Patient completed behavioral activation activities but did not complete her CPT homework this week due to increased depression/feeling overwhelmed so repeated the sixth session of CPT. Addressed non-adherence issues and collaboratively completed challenging beliefs worksheet together. Reassigned homework and additional behavioral activation activities.     CPT Interventions performed in this session:   Reviewed patient's scores on self-report measures  Therapist addressed any issues of non-adherence with practice assignment  Therapist reviewed patient's Challenging Beliefs Worksheets   Therapist assigned practice assignment  Therapist checked patient's reactions to session and practice assignment    Homework Assigned:  Use Challenging Beliefs Worksheet to analyze at least one Stuck Point each day    Risk Assessment: The patient has the following risk and protective factors: Risk: Remote history of suicide attempts; current passive suicidal ideation without plan or intent but with identified means (gun); psychiatric diagnoses; current psychosocial stressors (potential divorce without currently employment/means to protect self). Protective: Responsibility to adult daughters; active engagement and motivation for mental health treatment. Based on risk and protective factors, patient  is judged to be at the following levels of acute and chronic suicide risk:    Acute Risk: Intermediate  Chronic Risk: Intermediate    Plan: Continue with CPT next week.    Treatment Plan Last Reviewed: 6/8/20    Treatment Plan Next Reviewed: 9/6/20    Video- Visit Details  Type of service:  video visit for psychotherapy  Time of service:    Date:  7/13/20     Video Start Time:  8:00 am      Video End Time:  8:55 am  Reason for video visit:  Patient unable to travel due to Covid-19  Originating Site (patient location):  [facility]   Location- Patient's home  Distant Site (provider location):  Remote location  Mode of Communication:  Video Conference via [email or web address]  Consent:  Patient has given verbal consent for video visit?: Yes

## 2020-08-13 ENCOUNTER — VIRTUAL VISIT (OUTPATIENT)
Dept: PSYCHIATRY | Facility: CLINIC | Age: 50
End: 2020-08-13
Payer: COMMERCIAL

## 2020-08-13 DIAGNOSIS — F33.2 SEVERE EPISODE OF RECURRENT MAJOR DEPRESSIVE DISORDER, WITHOUT PSYCHOTIC FEATURES (H): Primary | ICD-10-CM

## 2020-08-13 DIAGNOSIS — F40.00 AGORAPHOBIA: ICD-10-CM

## 2020-08-13 DIAGNOSIS — F43.10 COMPLEX POSTTRAUMATIC STRESS DISORDER: ICD-10-CM

## 2020-08-13 ASSESSMENT — PATIENT HEALTH QUESTIONNAIRE - PHQ9: SUM OF ALL RESPONSES TO PHQ QUESTIONS 1-9: 17
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
## 2020-08-13 NOTE — PROGRESS NOTES
"Aure Joy is a 50 year old female who is being evaluated via a billable video visit.      The patient has been notified of following:     \"This video visit will be conducted via a call between you and your physician/provider. We have found that certain health care needs can be provided without the need for an in-person physical exam.  This service lets us provide the care you need with a video conversation.  If a prescription is necessary we can send it directly to your pharmacy.  If lab work is needed we can place an order for that and you can then stop by our lab to have the test done at a later time.    Video visits are billed at different rates depending on your insurance coverage.  Please reach out to your insurance provider with any questions.    If during the course of the call the physician/provider feels a video visit is not appropriate, you will not be charged for this service.\"    Patient has given verbal consent for Video visit? Yes  How would you like to obtain your AVS? Slicehart  If you are dropped from the video visit, the video invite should be resent to: Other e-mail: CallFire   Will anyone else be joining your video visit? No      Video-Visit Details    Type of service:  Video Visit    Video Start Time: 10:15am  Video End Time: 10:50am    Originating Location (pt. Location): Home    Distant Location (provider location):  [facility]     Platform used for Video Visit: Shaji Zamudio MD        "

## 2020-08-14 ENCOUNTER — ALLIED HEALTH/NURSE VISIT (OUTPATIENT)
Dept: PSYCHIATRY | Facility: CLINIC | Age: 50
End: 2020-08-14
Payer: COMMERCIAL

## 2020-08-14 ENCOUNTER — TELEPHONE (OUTPATIENT)
Dept: PSYCHIATRY | Facility: CLINIC | Age: 50
End: 2020-08-14

## 2020-08-14 VITALS
SYSTOLIC BLOOD PRESSURE: 122 MMHG | OXYGEN SATURATION: 95 % | DIASTOLIC BLOOD PRESSURE: 80 MMHG | HEART RATE: 80 BPM | TEMPERATURE: 98.4 F

## 2020-08-14 DIAGNOSIS — F33.2 SEVERE EPISODE OF RECURRENT MAJOR DEPRESSIVE DISORDER, WITHOUT PSYCHOTIC FEATURES (H): Primary | ICD-10-CM

## 2020-08-14 NOTE — TELEPHONE ENCOUNTER
Medication requested: LAMOTRIGINE 200MG TABLETS    Last refilled: 7/15/20  Qty: 45/0  Last seen: 8/13/20 Continue lamotrigine 300 mg daily/ unsigned  RTC: not noted  Cancel: 0  No-show: 0  Next appt: 9/17/20    Refill decision:   Refill pended and routed to the provider for review/determination due to   Unsigned note 8/13/20

## 2020-08-14 NOTE — PROGRESS NOTES
Aure oJy comes into clinic today at the request of ESTELA Zamudio MD Ordering Provider for Spravato Admin    Medication checked by Jael Alexandra RN  Prior to administration pt identified by name and : yes     Appearance: dressed casually             Mood: fair   Affect: full  Eye contact: good  Side effects: sedation  Level of cooperation: good  Education:none needed     Next appt: two weeks (TBA) for Spravato,      Medication provided by clinic  LOT: [id number]  EXP: 2021-MAY        This service provided today was under the supervising provider of the day Tim Mckenna MD, who was available if needed     I spent an additional 120 minutes today, in direct patient contact monitoring the patient after Spravato administration.

## 2020-08-17 ENCOUNTER — VIRTUAL VISIT (OUTPATIENT)
Dept: PSYCHIATRY | Facility: CLINIC | Age: 50
End: 2020-08-17
Attending: PSYCHOLOGIST
Payer: COMMERCIAL

## 2020-08-17 DIAGNOSIS — F43.10 COMPLEX POSTTRAUMATIC STRESS DISORDER: Primary | ICD-10-CM

## 2020-08-17 NOTE — PROGRESS NOTES
"       [facility]    Psychotherapy Progress Note      Date: Aug 17, 2020    Patient Name: Aure Joy     Patient MRN: [id number]    Provider: Amy Leyva, PhD LP    Procedure: Individual CPT psychotherapy session    Appointment Duration: 60 minutes (8:00 am to 9:00 am)    Diagnosis:    Encounter Diagnosis   Name Primary?     Complex posttraumatic stress disorder Yes      Weekly PTSD Checklist (PCL-5) Score: 27 (indicating a score that is not consistent with PTSD).    Session Content: The patient completed the 10th session of Cognitive Processing Therapy (CPT) for PTSD. Patient did complete homework from previous session. CPT interventions completed in session are listed below. Patient stuck points around power and control were related to current relationships with her  and daughters. Patient is demonstrating increasing ability to challenge stuck points and generate more balanced alternative thoughts. Also encouraged patient to feel appropriate sadness related to not having the supportive loving upbringing she hoped while challenging stuck points \"I should be able to think normally\"/judging herself for having extreme stuck points. Will likely spend several sessions on esteem-related stuck points due to patient's longstanding and pervasive negative view of herself.     CPT Interventions performed in this session:   Reviewed patient's scores on self-report measures  Therapist reviewed patient's Challenging Beliefs Worksheets related to Power/Control  Therapist introduced Esteem theme  Therapist assigned practice assignment  Therapist checked patient's reactions to session and practice assignment    Homework Assigned:  Use Challenging Beliefs Worksheet to analyze at least one Stuck Point each day  Read over Esteem Issues module and examine how initimacy was impacted by trauma  Do one nice thing each day, \"just because\"  Offer or receive a compliment " "each day     Behavioral Observations/Mental Status: Patient arrived on time to session. Patient was appropriately groomed and dressed. Eye contact was good. Mood today was \"okay\". Observed affect was euthymic through most of session with patient becoming tearful at points when discussing difficult childhood and low self-esteem. Speech was normal in tone, rate and volume. Thought process was linear, logical, goal oriented. Patient was actively engaged in session.    Risk Assessment: The patient has the following risk and protective factors: Risk: Remote history of suicide attempts; history of passive suicidal ideation without plan or intent but with identified means (gun); psychiatric diagnoses; current psychosocial stressors (potential divorce without currently employment/means to protect self). Protective: Responsibility to adult daughters; active engagement and motivation for mental health treatment. Based on risk and protective factors, patient is judged to be at the following levels of acute and chronic suicide risk:    Acute Risk: Low  Chronic Risk: Intermediate    Plan: Continue with CPT next week.     Treatment Plan Last Reviewed: 6/8/20    Treatment Plan Next Reviewed: 9/6/20    Video- Visit Details  Type of service:  video visit for psychotherapy  Time of service:    Date:  8/17/20     Video Start Time:  8:00 am      Video End Time:  9:00 am  Reason for video visit:  Patient unable to travel due to Covid-19  Originating Site (patient location):  [facility]   Location- Patient's home  Distant Site (provider location):  Remote location  Mode of Communication:  Video Conference via [email or web address]  Consent:  Patient has given verbal consent for video visit?: Yes   "

## 2020-08-19 RX ORDER — LAMOTRIGINE 200 MG/1
TABLET ORAL
Qty: 45 TABLET | Refills: 0 | Status: SHIPPED | OUTPATIENT
Start: 2020-08-19 | End: 2020-09-17

## 2020-08-24 ENCOUNTER — VIRTUAL VISIT (OUTPATIENT)
Dept: PSYCHIATRY | Facility: CLINIC | Age: 50
End: 2020-08-24
Attending: PSYCHOLOGIST
Payer: COMMERCIAL

## 2020-08-24 DIAGNOSIS — F43.10 COMPLEX POSTTRAUMATIC STRESS DISORDER: Primary | ICD-10-CM

## 2020-08-24 DIAGNOSIS — F33.2 SEVERE EPISODE OF RECURRENT MAJOR DEPRESSIVE DISORDER, WITHOUT PSYCHOTIC FEATURES (H): ICD-10-CM

## 2020-08-24 NOTE — PROGRESS NOTES
"       [facility]    Psychotherapy Progress Note      Date: Aug 24, 2020    Patient Name: Aure Joy     Patient MRN: [id number]    Provider: Amy Leyva, PhD LP    Procedure: Individual CPT psychotherapy session    Appointment Duration: 59 minutes (8:00 am to 8:59 am)    Diagnosis:    Encounter Diagnoses   Name Primary?     Complex posttraumatic stress disorder Yes     Severe episode of recurrent major depressive disorder, without psychotic features (H)         Weekly PTSD Checklist (PCL-5) Score: 21 (indicating a score that is not consistent with PTSD).    Session Content: The patient completed the 11th session of Cognitive Processing Therapy (CPT) for PTSD. However, did not introduce new theme due to patient's esteem issues being a central of trauma/depression presentation. CPT interventions completed in session are listed below. Focused on stuck points related to worthlessness and lovability. Patient reported finding these stuck points difficult to challenge but said she is gaining some perspective and distance from thoughts which is giving her some hope.     CPT Interventions performed in this session:   Reviewed patient's scores on self-report measures  Therapist reviewed patient's Challenging Beliefs Worksheets related to Esteem    Homework Assigned:  Use Challenging Beliefs Worksheet to analyze at least one Stuck Point each day  Read over Esteem Issues module and examine how initimacy was impacted by trauma  Do one nice thing each day, \"just because\"  Offer or receive a compliment each day     Behavioral Observations/Mental Status: Patient arrived on time to session. Patient was appropriately groomed and dressed. Eye contact was good. Mood today was \"okay\". Observed affect was euthymic through most of session with patient becoming tearful at points when discussing difficult childhood and low self-esteem. Speech was normal in tone, rate and " volume. Thought process was linear, logical, goal oriented. Patient was actively engaged in session.    Risk Assessment: The patient has the following risk and protective factors: Risk: Remote history of suicide attempts; history of passive suicidal ideation without plan or intent but with identified means (gun); psychiatric diagnoses; current psychosocial stressors (potential divorce without currently employment/means to protect self). Protective: Responsibility to adult daughters; active engagement and motivation for mental health treatment. Based on risk and protective factors, patient is judged to be at the following levels of acute and chronic suicide risk:    Acute Risk: Low  Chronic Risk: Intermediate    Plan: Continue with CPT next week.     Treatment Plan Last Reviewed: 6/8/20    Treatment Plan Next Reviewed: 9/6/20    Video- Visit Details  Type of service:  video visit for psychotherapy  Time of service:    Date:  8/24/20     Video Start Time:  8:00 am      Video End Time:  8:59 am  Reason for video visit:  Patient unable to travel due to Covid-19  Originating Site (patient location):  [facility]   Location- Patient's home  Distant Site (provider location):  Remote location  Mode of Communication:  Video Conference via [email or web address]  Consent:  Patient has given verbal consent for video visit?: Yes

## 2020-08-28 ENCOUNTER — ALLIED HEALTH/NURSE VISIT (OUTPATIENT)
Dept: PSYCHIATRY | Facility: CLINIC | Age: 50
End: 2020-08-28
Payer: COMMERCIAL

## 2020-08-28 VITALS — DIASTOLIC BLOOD PRESSURE: 84 MMHG | SYSTOLIC BLOOD PRESSURE: 135 MMHG | HEART RATE: 78 BPM | OXYGEN SATURATION: 95 %

## 2020-08-28 DIAGNOSIS — F33.2 SEVERE EPISODE OF RECURRENT MAJOR DEPRESSIVE DISORDER, WITHOUT PSYCHOTIC FEATURES (H): Primary | ICD-10-CM

## 2020-08-28 NOTE — PROGRESS NOTES
Aure Joy comes into clinic today at the request of GHADA Zamudio MD Ordering Provider for Medication Management:  Spravato.    Medication checked by: Jael Alexandra RN  Prior to administration pt identified by name and : Yes    Appearance: dressed casually  Mood: good, Aure is pleasant and smiles during our conversation.   Affect: flat  Eye contact: good  Side effects: mild sedation  Level of cooperation: cooperative  Education: None needed  Next appt: In two weeks    PCL- 25  QIDS- 16    Medication provided by clinic  LOT: [id number]  EXP: 2021-MAY    I spent an additional 120 minutes today, in direct patient contact monitoring the patient after Spravato administration. Patient had the following issues, none.    This service provided today was under the supervising provider of the day Tim Williamson MD, who was available if needed.    Braden Christian RN

## 2020-08-31 ENCOUNTER — VIRTUAL VISIT (OUTPATIENT)
Dept: PSYCHIATRY | Facility: CLINIC | Age: 50
End: 2020-08-31
Attending: PSYCHOLOGIST
Payer: COMMERCIAL

## 2020-08-31 DIAGNOSIS — F43.10 COMPLEX POSTTRAUMATIC STRESS DISORDER: Primary | ICD-10-CM

## 2020-08-31 DIAGNOSIS — F33.2 SEVERE EPISODE OF RECURRENT MAJOR DEPRESSIVE DISORDER, WITHOUT PSYCHOTIC FEATURES (H): ICD-10-CM

## 2020-08-31 NOTE — PROGRESS NOTES
[facility]    Psychotherapy Progress Note      Date: Aug 10, 2020    Patient Name: Aure Joy     Patient MRN: [id number]    Provider: Amy Leyva, PhD LP    Procedure: Individual CPT psychotherapy session    Appointment Duration: 57 minutes (8:00 am to 8:57 am)    Diagnosis:    Encounter Diagnoses   Name Primary?     Complex posttraumatic stress disorder Yes     Severe episode of recurrent major depressive disorder, without psychotic features (H)         Weekly PTSD Checklist (PCL-5) Score: 21 (indicating a score that is not consistent with PTSD).    Session Content: The patient is engaged in Cognitive Processing Therapy (CPT) for PTSD. Patient did not fully complete homework from previous session on Power/Control. Discussed patient's stuck points related to this theme and collaboratively completed Challenging Beliefs Worksheet in session. Reassigned Power/Control theme to complete this week.     CPT Interventions performed in this session:   Reviewed patient's scores on self-report measures  Therapist reviewed patient's Challenging Beliefs Worksheets related to Safety and other Stuck Points  Therapist reassigned practice assignment for power/control  Therapist checked patient's reactions to session and practice assignment    Homework Assigned:  Use Challenging Beliefs Worksheet to analyze at least one Stuck Point each day with focus on Power/Control    Risk Assessment: The patient has the following risk and protective factors: Risk: Remote history of suicide attempts; current passive suicidal ideation without plan or intent but with identified means (gun); psychiatric diagnoses; current psychosocial stressors (potential divorce without currently employment/means to protect self). Protective: Responsibility to adult daughters; active engagement and motivation for mental health treatment. Based on risk and protective factors, patient is judged to  be at the following levels of acute and chronic suicide risk:    Acute Risk: Intermediate  Chronic Risk: Intermediate    Plan: Continue with CPT next week.    Treatment Plan Last Reviewed: 6/8/20    Treatment Plan Next Reviewed: 9/6/20    Video- Visit Details  Type of service:  video visit for psychotherapy  Time of service:    Date:  8/10/20     Video Start Time:  8:00 am      Video End Time:  8:57 am  Reason for video visit:  Patient unable to travel due to Covid-19  Originating Site (patient location):  [facility]   Location- Patient's home  Distant Site (provider location):  Remote location  Mode of Communication:  Video Conference via Amwell  Consent:  Patient has given verbal consent for video visit?: Yes

## 2020-08-31 NOTE — PROGRESS NOTES
"       [facility]    Psychotherapy Progress Note      Date: Aug 31, 2020    Patient Name: Aure Joy     Patient MRN: [id number]    Provider: Amy Leyva, PhD LP    Procedure: Individual CPT psychotherapy session - remote format    Appointment Duration: 53 minutes (8:00 am to 8:53 am)    Diagnosis:    Encounter Diagnoses   Name Primary?     Complex posttraumatic stress disorder Yes     Severe episode of recurrent major depressive disorder, without psychotic features (H)         Weekly PTSD Checklist (PCL-5) Score: 20 (indicating a score that is not consistent with PTSD).    Session Content: This session was a continuation of content from the 11th session of Cognitive Processing Therapy (CPT) for PTSD. Spent multiple weeks addressing esteem-related stuck points due to these beliefs being a central component of patient's trauma/depression presentation. CPT interventions completed in session are listed below. Patient did complete the homework. Patient was able to generate several alternative thoughts independently related to stuck points \"If I'm not loved there is no point in living\", \"I'm worthless\", and \"I'm broken\" and decreased belief in, and negative emotions related to these stuck points.      CPT Interventions performed in this session:   Reviewed patient's scores on self-report measures  Therapist reviewed patient's Challenging Beliefs Worksheets related to Esteem  Therapist reviewed assignments related to compliments and nice activities  Therapist introduced Intimacy theme  Therapist assigned practice assignment  Therapist checked patient's reactions to session and practice assignment    Homework Assigned:  Use Challenging Beliefs Worksheet to analyze at least one Stuck Point each day  Read over Intimacy Issues module and examine how initimacy was impacted by trauma  Continue practicing doing nice things and giving/receiving compliments  Write at " least 1-page about what you think now about why the traumatic events occurred      Behavioral Observations/Mental Status: Patient arrived on time to session. Patient was appropriately groomed and dressed. Eye contact was good. Observed affect was euthymic. Speech was normal in tone, rate and volume. Thought process was linear, logical, goal oriented. Patient was actively engaged in session.    Risk Assessment: The patient has the following risk and protective factors: Risk: Remote history of suicide attempts; history of passive suicidal ideation without plan or intent but with identified means (gun); psychiatric diagnoses; current psychosocial stressors (potential divorce without currently employment/means to protect self). Protective: Responsibility to adult daughters; active engagement and motivation for mental health treatment. Based on risk and protective factors, patient is judged to be at the following levels of acute and chronic suicide risk:    Acute Risk: Low  Chronic Risk: Intermediate    Plan: Continue with CPT.     Treatment Plan Last Reviewed: 6/8/20    Treatment Plan Next Reviewed: 9/6/20    Video- Visit Details  Type of service:  video visit for psychotherapy  Time of service:    Date:  8/31/20     Video Start Time:  8:00 am      Video End Time:  8:53 am  Reason for video visit:  Patient unable to travel due to Covid-19  Originating Site (patient location):  [facility]   Location- Patient's home  Distant Site (provider location):  Remote location  Mode of Communication:  Video Conference via Amwell  Consent:  Patient has given verbal consent for video visit?: Yes    Cheek Interpolation Flap Text: A decision was made to reconstruct the defect utilizing an interpolation axial flap and a staged reconstruction.  A telfa template was made of the defect.  This telfa template was then used to outline the Cheek Interpolation flap.  The donor area for the pedicle flap was then injected with anesthesia.  The flap was excised through the skin and subcutaneous tissue down to the layer of the underlying musculature.  The interpolation flap was carefully excised within this deep plane to maintain its blood supply.  The edges of the donor site were undermined.   The donor site was closed in a primary fashion.  The pedicle was then rotated into position and sutured.  Once the tube was sutured into place, adequate blood supply was confirmed with blanching and refill.  The pedicle was then wrapped with xeroform gauze and dressed appropriately with a telfa and gauze bandage to ensure continued blood supply and protect the attached pedicle.

## 2020-09-11 ENCOUNTER — ALLIED HEALTH/NURSE VISIT (OUTPATIENT)
Dept: PSYCHIATRY | Facility: CLINIC | Age: 50
End: 2020-09-11
Payer: COMMERCIAL

## 2020-09-11 VITALS — HEART RATE: 103 BPM | DIASTOLIC BLOOD PRESSURE: 98 MMHG | OXYGEN SATURATION: 96 % | SYSTOLIC BLOOD PRESSURE: 148 MMHG

## 2020-09-11 DIAGNOSIS — F33.2 SEVERE EPISODE OF RECURRENT MAJOR DEPRESSIVE DISORDER, WITHOUT PSYCHOTIC FEATURES (H): Primary | ICD-10-CM

## 2020-09-11 NOTE — PROGRESS NOTES
Aure Joy comes into clinic today at the request of GHADA Zamudio MD Ordering Provider for Medication Management:  Spravato.    Medication checked by: Jael Alexandra RN  Prior to administration pt identified by name and : yes    Appearance: dressed casually   Mood: good  Affect: WNL  Eye contact: good  Side effects: sedation  Level of cooperation: cooperative  Education: none needed  Next appt: in two weeks    QIDS-SR16 score: 12  PCL 5: 22      Medication provided by Clinic      This service provided today was under the supervising provider of the day Elizabeth Gordon MD, who was available if needed.    Braden Christian RN

## 2020-09-14 ENCOUNTER — VIRTUAL VISIT (OUTPATIENT)
Dept: PSYCHIATRY | Facility: CLINIC | Age: 50
End: 2020-09-14
Attending: PSYCHOLOGIST
Payer: COMMERCIAL

## 2020-09-14 ENCOUNTER — TELEPHONE (OUTPATIENT)
Dept: PSYCHIATRY | Facility: CLINIC | Age: 50
End: 2020-09-14

## 2020-09-14 DIAGNOSIS — F43.10 COMPLEX POSTTRAUMATIC STRESS DISORDER: Primary | ICD-10-CM

## 2020-09-14 NOTE — PROGRESS NOTES
"       [facility]    Psychotherapy Progress Note      Date: Sep 14, 2020    Patient Name: Aure Joy     Patient MRN: [id number]    Provider: Amy Leyva, PhD LP    Procedure: Individual CPT psychotherapy session - remote format    Appointment Duration: 52 minutes (8:00 am to 8:52 am)    Diagnosis:    Encounter Diagnosis   Name Primary?     Complex posttraumatic stress disorder Yes        Weekly PTSD Checklist (PCL-5) Score: 8 (indicating a score that is not consistent with PTSD).    Session Content: The patient completed the 12th and final session of Cognitive Processing Therapy (CPT) for PTSD. CPT interventions completed in session are listed below. Discussed patient's decrease in PTSD symptoms over the course of treatment (PCL-5 total score decrease from 32 to 8). Patient shared adaptive beliefs related to her childhood experiences in her new impact statement (e.g., recognizing she could not change her parents' actions and they were not a reflection of her self-worth). Patient also shared decreased self-directed anger, shame, and guilt and reported now experiencing lower level sadness related to her childhood. Highlighted that this sadness reflects healthy processing of childhood experiences. Patient also reported more adaptive coping in day-to-day interactions with family members and helpful mantra she came up with when she feels hurt (\"don't miss the forest for the mosquitoes\"). Discussed areas patient would still like to work on (improving self-esteem, reducing avoidance coping/excessive time spent on the computer, building healthier relationships, and working on agoraphobia). Agreed writer will consult with referring therapist and we will meet for booster session in ~3 weeks.     CPT Interventions performed in this session:   Reviewed patient's scores on self-report measures  Therapist reviewed patient's Challenging Beliefs Worksheets related to " Intimacy  herapist reviewed patient's original and new Impact Statements  Therapist reviewed patient's course of treatment and progress  Therapist and patient identified goals for the future    Homework Assigned: Continue challenging stuck points as they arise.     Behavioral Observations/Mental Status: Patient arrived on time to session. Patient was appropriately groomed and dressed. Eye contact was good. Observed affect was euthymic. Speech was normal in tone, rate and volume. Thought process was linear, logical, goal oriented. Patient was actively engaged in session.    Risk Assessment: The patient has the following risk and protective factors: Risk: Remote history of suicide attempts; history of passive suicidal ideation without plan or intent but with identified means (gun); psychiatric diagnoses; current psychosocial stressors (potential divorce without currently employment/means to protect self). Protective: Responsibility to adult daughters; active engagement and motivation for mental health treatment. Based on risk and protective factors, patient is judged to be at the following levels of acute and chronic suicide risk:    Acute Risk: Low  Chronic Risk: Intermediate    Plan: Patient has completed CPT. Will have booster session in ~3 weeks and will consult with patient's previous therapist, Dr. Lisa Varner, regarding options for brief course of CBT to work on remaining self-esteem and agoraphobia issues.      Treatment Plan Last Reviewed: 6/8/20    Treatment Plan Next Reviewed: 9/6/20    Video- Visit Details  Type of service:  video visit for psychotherapy  Time of service:    Date:  9/14/20     Video Start Time:  8:00 am      Video End Time:  8:52 am  Reason for video visit:  Patient unable to travel due to Covid-19  Originating Site (patient location):  [facility]   Location- Patient's home  Distant Site (provider location):  Remote location  Mode of Communication:  Video Conference via Amwell  Consent:   Patient has given verbal consent for video visit?: Yes

## 2020-09-14 NOTE — TELEPHONE ENCOUNTER
Arlen calling from [facility] about two monitoring form that was sent in incomplete. Information is missing.    DOS 8/28/20 missing  1. Medications  2. Discharge time  3. And if the vital signs were in acceptable range prior to discharge    DOS 9/11/20  1. If sedation resolved within 2 hrs of administration  2. commitant Medications, 3 of them were left blank, only 1 was checked yes        Call [phone] or fill in the missing information and fax back to them.   Reference # [id number]

## 2020-09-16 DIAGNOSIS — F33.2 SEVERE EPISODE OF RECURRENT MAJOR DEPRESSIVE DISORDER, WITHOUT PSYCHOTIC FEATURES (H): ICD-10-CM

## 2020-09-16 NOTE — TELEPHONE ENCOUNTER
[facility] provided the requested missing information.

## 2020-09-17 ENCOUNTER — VIRTUAL VISIT (OUTPATIENT)
Dept: PSYCHIATRY | Facility: CLINIC | Age: 50
End: 2020-09-17
Payer: COMMERCIAL

## 2020-09-17 DIAGNOSIS — F33.2 SEVERE EPISODE OF RECURRENT MAJOR DEPRESSIVE DISORDER, WITHOUT PSYCHOTIC FEATURES (H): Primary | ICD-10-CM

## 2020-09-17 RX ORDER — LAMOTRIGINE 200 MG/1
TABLET ORAL
Qty: 45 TABLET | Refills: 2 | Status: SHIPPED | OUTPATIENT
Start: 2020-09-17 | End: 2020-09-25

## 2020-09-17 ASSESSMENT — PATIENT HEALTH QUESTIONNAIRE - PHQ9: SUM OF ALL RESPONSES TO PHQ QUESTIONS 1-9: 15
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
## 2020-09-18 RX ORDER — LAMOTRIGINE 200 MG/1
TABLET ORAL
Qty: 45 TABLET | Refills: 0 | OUTPATIENT
Start: 2020-09-18

## 2020-09-25 ENCOUNTER — ALLIED HEALTH/NURSE VISIT (OUTPATIENT)
Dept: PSYCHIATRY | Facility: CLINIC | Age: 50
End: 2020-09-25
Payer: COMMERCIAL

## 2020-09-25 VITALS
SYSTOLIC BLOOD PRESSURE: 145 MMHG | OXYGEN SATURATION: 99 % | HEART RATE: 76 BPM | TEMPERATURE: 97.7 F | DIASTOLIC BLOOD PRESSURE: 88 MMHG

## 2020-09-25 DIAGNOSIS — F33.2 SEVERE EPISODE OF RECURRENT MAJOR DEPRESSIVE DISORDER, WITHOUT PSYCHOTIC FEATURES (H): ICD-10-CM

## 2020-09-25 RX ORDER — LAMOTRIGINE 200 MG/1
TABLET ORAL
Qty: 45 TABLET | Refills: 2
Start: 2020-09-25 | End: 2020-12-17

## 2020-09-25 NOTE — PROGRESS NOTES
Aure Joy comes into clinic today at the request of GHADA Zamudio MD Ordering Provider for Medication Management:  [place]rav[place].     Medication checked by: Jael Alexandra RN  Prior to administration pt identified by name and : yes     Appearance: dressed casually             Mood: good  Affect: WNL  Eye contact: good  Side effects: sedation  Level of cooperation: cooperative  Education: none needed  Next appt: in two weeks     QIDS-SR16 score: 8      Medication provided by Clinic  LOT: [id number]  EXP:         This service provided today was under the supervising provider of the day Elizabeth Gordon MD, who was available if needed.

## 2020-09-28 ENCOUNTER — VIRTUAL VISIT (OUTPATIENT)
Dept: PSYCHIATRY | Facility: CLINIC | Age: 50
End: 2020-09-28
Payer: COMMERCIAL

## 2020-09-28 DIAGNOSIS — F43.10 COMPLEX POSTTRAUMATIC STRESS DISORDER: Primary | ICD-10-CM

## 2020-09-28 DIAGNOSIS — F33.1 MODERATE RECURRENT MAJOR DEPRESSION (H): ICD-10-CM

## 2020-09-28 NOTE — PROGRESS NOTES
Clinician Contact & Progress Note  Department of Psychiatry & Behavioral Sciences  [facility]    Patient: Aure Joy (1970)     MRN: [id number]  Date of Treatment:  9/28/20  Duration of Treatment: Start Time: 10:00a; End Time: 11:00a  Provider: Lisa Varner, PhD LP     People present:   Provider: Lisa Varner, Ph.D.  Patient: Aure Joy    Telehealth Video Visit AddOn  Type of service: Telemedicine Psychotherapy for depression.  The patient's condition can be safely assessed and treated via synchronous audio and visual telemedicine encounter.    Mode of Communication:  Video Conference via [employer]  Reason for Telemedicine Visit: This patient visit was converted to a Telehealth video visit to minimize exposure to COVID-19.  Originating Site (Patient Location): Patient's home  Distant Site (Provider Location): Provider Remote Setting  Attestation: As the provider I attest to compliance with applicable laws and regulations related to telemedicine.  Consent:  The patient has verbally consented to: the potential risks and benefits of telemedicine versus in person care with special emphasis on confidentiality given family members in the home.    Current Diagnosis:  Diagnosis:   Encounter Diagnoses   Name Primary?     Complex posttraumatic stress disorder Yes     Moderate recurrent major depression (H)        Assessment (current symptoms):  PHQ 7/31/2020 8/13/2020 9/17/2020   PHQ-9 Total Score 17 17 15   Q9: Thoughts of better off dead/self-harm past 2 weeks Not at all Not at all Not at all     RAMEZ-7 SCORE 3/9/2020   Total Score 11     Patient reports improved depression and increase in activity. Has made significant progress in leaving home and engaging in activities outside the home (e.g., went on hike last weekend). More hopefull about getting a job. Patient denies suicide ideation, plan, and intent.    Session Content:    Updates since last visit with me:    With  "Amy MCINTYRE, she identified stuck points: the things that runs my emotional live:    I am worthless, not lovable, not worth being loved, I cannot cope with it, I'm vulnerable, I'm weak    I need others to take take care of me    Triggered by other people's comments     Esketamine is very helpful, reduced hopelessness, lack of pleasure, now easier to try and want to try     Treatment strategies:       Reviewed Treatment progress with  / Amy Leyva    Applied CBT strategies addressing the following topics:    Carry around a great deal of guilt from \"checking out of girls lives (slept or spent time in front of the computer for 18 hrs/day)    Healthier relationship with : doing more such as cleaning, weeding, watching TV together which makes things more equal, which make ponce more equal. Lot's of guilt about not having done things in past.     Treatment Plan:    return in 1 week for cognitive behavioral therapy with writer.    Coordinate Care with referring provider, Dr. Sarah Zamudio.     Behavioral Observations/Mental Status: Patient arrived on time to session. Patient was appropriately groomed and dressed. Eye contact was good. Observed affect was euthymic. Speech was normal in tone, rate and volume. Thought process was linear, logical, goal oriented. Patient was actively engaged in session.     Risk Assessment: The patient has the following risk and protective factors: Risk: Remote history of suicide attempts; history of passive suicidal ideation without plan or intent but with identified means (gun); psychiatric diagnoses; current psychosocial stressors (potential divorce without currently employment/means to protect self). Protective: Responsibility to adult daughters; active engagement and motivation for mental health treatment. Based on risk and protective factors, patient is judged to be at the following levels of acute and chronic suicide risk:     Acute Risk: Low    Lisa Varner, PhD LP "

## 2020-10-05 ENCOUNTER — VIRTUAL VISIT (OUTPATIENT)
Dept: PSYCHIATRY | Facility: CLINIC | Age: 50
End: 2020-10-05
Payer: COMMERCIAL

## 2020-10-05 DIAGNOSIS — F33.2 SEVERE EPISODE OF RECURRENT MAJOR DEPRESSIVE DISORDER, WITHOUT PSYCHOTIC FEATURES (H): ICD-10-CM

## 2020-10-05 DIAGNOSIS — F43.10 COMPLEX POSTTRAUMATIC STRESS DISORDER: ICD-10-CM

## 2020-10-05 DIAGNOSIS — F33.1 MODERATE RECURRENT MAJOR DEPRESSION (H): Primary | ICD-10-CM

## 2020-10-05 NOTE — PROGRESS NOTES
"Clinician Contact & Progress Note  Department of Psychiatry & Behavioral Sciences  [facility] [place]    Patient: Aure Joy (1970)     MRN: [id number]  Date of Treatment:  10/05/20  Duration of Treatment: Start Time: 10:00a; End Time: 11:00a  Provider: Lisa Varner, PhD LP     People present:   Provider: Lisa Varner, Ph.D.  Patient: Aure Joy    Telehealth Video Visit AddOn  Type of service: Telemedicine Psychotherapy for depression.  The patient's condition can be safely assessed and treated via synchronous audio and visual telemedicine encounter.    Mode of Communication:  Video Conference via [employer]  Reason for Telemedicine Visit: This patient visit was converted to a Telehealth video visit to minimize exposure to COVID-19.  Originating Site (Patient Location): Patient's home  Distant Site (Provider Location): Provider Remote Setting  Attestation: As the provider I attest to compliance with applicable laws and regulations related to telemedicine.    Current Diagnosis:  Diagnosis:   Encounter Diagnoses   Name Primary?     Moderate recurrent major depression (H) Yes     Complex posttraumatic stress disorder        Assessment (current symptoms):  PHQ 7/31/2020 8/13/2020 9/17/2020   PHQ-9 Total Score 17 17 15   Q9: Thoughts of better off dead/self-harm past 2 weeks Not at all Not at all Not at all     RAMEZ-7 SCORE 3/9/2020   Total Score 11     Patient reports doing \"really well\" right now. Patient denies suicide ideation, plan, and intent.    Session Content:    Updates: None    Treatment strategies:     Formulated Tx plan/What is left to do to for pt to become her own CBT therapist and advocate:    Techniques to work through when brain brings up the stuck points and \"poisens\" her thinking    Ability to apply them and practice to automatize the process    Explored stuck point related to upbringing:     pt had to work in [employer] where she learned the following " "schema: love = hard work    Parent role:    If you love someone, you are really hard on them    I'm supposed to be controlling    When my kids don't work hard, it's because they don't love you    Child role:    I'm only worthy of love when I do what I'm told    Show my love by working hard    Show anger by not working    Unfortunately, the rule was applied inconsistently leading to a lack of controllability. Definition of \"hard work\" changed a lot and parenting included a lot of \"gas lighting, blaming\"     Pt realized she disagrees with the schema:  - her daughter Valerie shouldn't have to earn my love by working hard    HW: challenging schema Love = Hard work; review antidote statements    Treatment Plan:    return in 1 week for cognitive behavioral therapy with writer.    Coordinate Care with referring provider, Dr. Sarah Zamudio.     Behavioral Observations/Mental Status: Patient arrived on time to session. Patient was appropriately groomed and dressed. Eye contact was good. Observed affect was euthymic. Speech was normal in tone, rate and volume. Thought process was linear, logical, goal oriented. Patient was actively engaged in session.     Risk Assessment: The patient has the following risk and protective factors: Risk: Remote history of suicide attempts; history of passive suicidal ideation without plan or intent but with identified means (gun); psychiatric diagnoses; current psychosocial stressors (potential divorce without currently employment/means to protect self). Protective: Responsibility to adult daughters; active engagement and motivation for mental health treatment. Based on risk and protective factors, patient is judged to be at the following levels of acute and chronic suicide risk:     Acute Risk: Low    Lisa Varner, PhD LP   "

## 2020-10-06 ENCOUNTER — VIRTUAL VISIT (OUTPATIENT)
Dept: PSYCHIATRY | Facility: CLINIC | Age: 50
End: 2020-10-06
Attending: PSYCHOLOGIST
Payer: COMMERCIAL

## 2020-10-06 DIAGNOSIS — F43.10 COMPLEX POSTTRAUMATIC STRESS DISORDER: Primary | ICD-10-CM

## 2020-10-06 PROCEDURE — 90834 PSYTX W PT 45 MINUTES: CPT | Mod: GT | Performed by: PSYCHOLOGIST

## 2020-10-08 RX ORDER — ESKETAMINE HYDROCHLORIDE 28 MG/.2ML
SOLUTION NASAL
Qty: 24 KIT | Refills: 0 | Status: SHIPPED | OUTPATIENT
Start: 2020-10-08 | End: 2020-12-04

## 2020-10-12 ENCOUNTER — ALLIED HEALTH/NURSE VISIT (OUTPATIENT)
Dept: PSYCHIATRY | Facility: CLINIC | Age: 50
End: 2020-10-12
Payer: COMMERCIAL

## 2020-10-12 VITALS
HEART RATE: 82 BPM | DIASTOLIC BLOOD PRESSURE: 80 MMHG | TEMPERATURE: 97.3 F | SYSTOLIC BLOOD PRESSURE: 132 MMHG | OXYGEN SATURATION: 96 %

## 2020-10-12 DIAGNOSIS — F33.2 SEVERE EPISODE OF RECURRENT MAJOR DEPRESSIVE DISORDER, WITHOUT PSYCHOTIC FEATURES (H): Primary | ICD-10-CM

## 2020-10-12 ASSESSMENT — PATIENT HEALTH QUESTIONNAIRE - PHQ9: SUM OF ALL RESPONSES TO PHQ QUESTIONS 1-9: 8

## 2020-10-12 NOTE — PROGRESS NOTES
Aure Joy comes into clinic today at the request of GHADA Zamudio MD Ordering Provider for Medication Management:  [place]rav[place].     Medication checked by: Braden Christian RN  Prior to administration pt identified by name and : yes     Appearance: dressed casually             Mood: good  Affect: WNL  Eye contact: good  Side effects: sedation  Level of cooperation: cooperative  Education: none needed  Next appt: in two weeks     QIDS-SR16 score: 6      Medication provided by Clinic  LOT: [id number]  EXP:         This service provided today was under the supervising provider of the day Elizabeth Gordon MD, who was available if needed.

## 2020-10-19 ENCOUNTER — VIRTUAL VISIT (OUTPATIENT)
Dept: PSYCHIATRY | Facility: CLINIC | Age: 50
End: 2020-10-19
Payer: COMMERCIAL

## 2020-10-19 DIAGNOSIS — F33.41 RECURRENT MAJOR DEPRESSION IN PARTIAL REMISSION (H): Primary | ICD-10-CM

## 2020-10-19 NOTE — PROGRESS NOTES
"Clinician Contact & Progress Note  Department of Psychiatry & Behavioral Sciences  [facility]    Patient: Aure Joy (1970)     MRN: [id number]  Date of Treatment:  10/19/20  Duration of Treatment: Start Time: 10:00a; End Time: 11:00a  Provider: Lisa Varner, PhD LP     People present:   Provider: Lisa Varner, Ph.D.  Patient: Aure Joy    Telehealth Video Visit AddOn  Type of service: Telemedicine Psychotherapy for depression.  The patient's condition can be safely assessed and treated via synchronous audio and visual telemedicine encounter.    Mode of Communication:  Video Conference via [employer]  Reason for Telemedicine Visit: This patient visit was converted to a Telehealth video visit to minimize exposure to COVID-19.  Originating Site (Patient Location): Patient's home  Distant Site (Provider Location): Provider Remote Setting  Attestation: As the provider I attest to compliance with applicable laws and regulations related to telemedicine.    Current Diagnosis:  Diagnosis:   Encounter Diagnosis   Name Primary?     Recurrent major depression in partial remission (H) Yes       Assessment (current symptoms):  PHQ 9/17/2020 10/12/2020 10/26/2020   PHQ-9 Total Score 15 8 9   Q9: Thoughts of better off dead/self-harm past 2 weeks Not at all Not at all Not at all     RAMEZ-7 SCORE 3/9/2020   Total Score 11     Patient reports doing \"really well\" right now. Patient denies suicide ideation, plan, and intent.    Session Content:    Updates:    Pt ready to take a break from therapy after today's session    Treatment strategies:     Review of therapy progress: pt notes significant benefit from both our work together (BA focus) and from her work with Amy Leyva, where she learned Cognitive ProcessingTherapy (trauma focus) skills    Discussed termination and relapse prevention: How to be your own therapist/CBT     Has set alarm to go off every hour, when " she takes inventory: do I need to/want to be doing what I am doing?    Reviewed scheduling activities, overcoming obstacles (especially stuckpoints / depressogenic thinking)    Distress Tolerance skills: does that make me feel good?     Video recording of positive thinking    Practicing new habits    Treatment Plan:    Mutually agreed to terminate treatment today    Invited pt to return for CBT booster sessions    Coordinate Care with referring provider, Dr. Sarah Zamudio.     Behavioral Observations/Mental Status: Patient arrived on time to session. Patient was appropriately groomed and dressed. Eye contact was good. Observed affect was euthymic. Speech was normal in tone, rate and volume. Thought process was linear, logical, goal oriented. Patient was actively engaged in session.     Risk Assessment: The patient has the following risk and protective factors: Risk: Remote history of suicide attempts; history of passive suicidal ideation without plan or intent but with identified means (gun); psychiatric diagnoses; current psychosocial stressors (potential divorce without currently employment/means to protect self). Protective: Responsibility to adult daughters; active engagement and motivation for mental health treatment. Based on risk and protective factors, patient is judged to be at the following levels of acute and chronic suicide risk:     Acute Risk: Low    Lisa Varner, PhD LP

## 2020-10-22 NOTE — PROGRESS NOTES
[facility]    Psychotherapy Progress Note      Date: Oct 6, 2020    Patient Name: Aure Joy     Patient MRN: [id number]    Provider: Amy Leyva, PhD LP    Procedure: Individual CPT psychotherapy booster session - remote format    Appointment Duration: 47 minutes (9:00 am to 9:47 am)    Diagnosis:    Encounter Diagnosis   Name Primary?     Complex posttraumatic stress disorder Yes        Weekly PTSD Checklist (PCL-5) Score: 10 (indicating a score that is not consistent with PTSD).    Session Content: This was a planned booster and treatment planning session with patient after successful completing of Cognitive Processing Therapy (CPT) for PTSD. Patient shared maintenance of gains and is continuing to challenge stuck points in relationships with  and daughters. Patient also shared she has started walking outside for enjoyment and is experiencing improved mood due to Ketamine treatments. Discussed treatment options for patient moving forward - she shared she appreciates a structured therapy approach similar to CPT. Patient also shared she feels ready to manage mental health symptoms on her own with existing skill set. Provided recommendations for self-directed CBT workbooks. Also briefly discussed strategies for patient to spend time away from her computer in more values-consistent activities.     Behavioral Observations/Mental Status: Patient arrived on time to session. Patient was appropriately groomed and dressed. Eye contact was good. Observed affect was euthymic. Speech was normal in tone, rate and volume. Thought process was linear, logical, goal oriented. Patient was actively engaged in session.    Risk Assessment: The patient has the following risk and protective factors: Risk: Remote history of suicide attempts; history of passive suicidal ideation without plan or intent but with identified means (gun); psychiatric diagnoses;  current psychosocial stressors (potential divorce without currently employment/means to protect self). Protective: Responsibility to adult daughters; active engagement and motivation for mental health treatment. Based on risk and protective factors, patient is judged to be at the following levels of acute and chronic suicide risk:    Acute Risk: Low  Chronic Risk: Intermediate    Plan: Patient has completed CPT.     Video- Visit Details  Type of service:  video visit for psychotherapy  Time of service:    Date:  10/6/20     Video Start Time:  9:00 am      Video End Time:  9:47 am  Reason for video visit:  Patient unable to travel due to Covid-19  Originating Site (patient location):  [facility]   Location- Patient's home  Distant Site (provider location):  Remote location  Mode of Communication:  Video Conference via [employer]  Consent:  Patient has given verbal consent for video visit?: Yes

## 2020-10-26 ENCOUNTER — ALLIED HEALTH/NURSE VISIT (OUTPATIENT)
Dept: PSYCHIATRY | Facility: CLINIC | Age: 50
End: 2020-10-26
Payer: COMMERCIAL

## 2020-10-26 VITALS
DIASTOLIC BLOOD PRESSURE: 84 MMHG | OXYGEN SATURATION: 97 % | SYSTOLIC BLOOD PRESSURE: 133 MMHG | HEART RATE: 80 BPM | TEMPERATURE: 99.1 F

## 2020-10-26 DIAGNOSIS — F33.1 MODERATE RECURRENT MAJOR DEPRESSION (H): Primary | ICD-10-CM

## 2020-10-26 ASSESSMENT — PATIENT HEALTH QUESTIONNAIRE - PHQ9: SUM OF ALL RESPONSES TO PHQ QUESTIONS 1-9: 9

## 2020-10-26 NOTE — PROGRESS NOTES
Aure Joy comes into clinic today at the request of GHADA Zamudio MD Ordering Provider for Medication Management:  Spravato.     Medication checked by: Braden Christian RN  Prior to administration pt identified by name and : yes     Appearance: dressed casually             Mood: good  Affect: full  Eye contact: good  Side effects: sedation, dissociation  Level of cooperation: cooperative  Education: none needed  Next appt: in two weeks     QIDS-SR16 score: 7      Medication provided by Clinic  LOT: [id number]  EXP:         This service provided today was under the supervising provider of the day Elizabeth Gordon MD, who was available if needed.

## 2020-11-07 ENCOUNTER — HEALTH MAINTENANCE LETTER (OUTPATIENT)
Age: 50
End: 2020-11-07

## 2020-11-09 ENCOUNTER — ALLIED HEALTH/NURSE VISIT (OUTPATIENT)
Dept: PSYCHIATRY | Facility: CLINIC | Age: 50
End: 2020-11-09
Payer: COMMERCIAL

## 2020-11-09 VITALS
HEART RATE: 83 BPM | TEMPERATURE: 98.7 F | OXYGEN SATURATION: 96 % | SYSTOLIC BLOOD PRESSURE: 132 MMHG | DIASTOLIC BLOOD PRESSURE: 87 MMHG

## 2020-11-09 DIAGNOSIS — F33.2 SEVERE EPISODE OF RECURRENT MAJOR DEPRESSIVE DISORDER, WITHOUT PSYCHOTIC FEATURES (H): Primary | ICD-10-CM

## 2020-11-09 ASSESSMENT — PATIENT HEALTH QUESTIONNAIRE - PHQ9: SUM OF ALL RESPONSES TO PHQ QUESTIONS 1-9: 9

## 2020-11-09 NOTE — PROGRESS NOTES
Aure Joy comes into clinic today at the request of GHADA Zamudio MD Ordering Provider for Medication Management:  Spravato.     Medication checked by: Braden Christian RN  Prior to administration pt identified by name and : yes     Appearance: dressed casually             Mood: good  Affect: full  Eye contact: good  Side effects: sedation, dissociation  Level of cooperation: cooperative  Education: none needed  Next appt: 20   Next DEEPTHI Hastings    QIDS-SR16 score: 8      Medication provided by Clinic  LOT: [id number]  EXP:         This service provided today was under the supervising provider of the day Elizabeth Gordon MD, who was available if needed.

## 2020-11-12 ENCOUNTER — VIRTUAL VISIT (OUTPATIENT)
Dept: PSYCHIATRY | Facility: CLINIC | Age: 50
End: 2020-11-12
Payer: COMMERCIAL

## 2020-11-12 DIAGNOSIS — F33.2 SEVERE EPISODE OF RECURRENT MAJOR DEPRESSIVE DISORDER, WITHOUT PSYCHOTIC FEATURES (H): Primary | ICD-10-CM

## 2020-11-12 ASSESSMENT — PATIENT HEALTH QUESTIONNAIRE - PHQ9: SUM OF ALL RESPONSES TO PHQ QUESTIONS 1-9: 9
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
## 2020-11-19 ENCOUNTER — TELEPHONE (OUTPATIENT)
Dept: PSYCHIATRY | Facility: CLINIC | Age: 50
End: 2020-11-19

## 2020-11-19 NOTE — TELEPHONE ENCOUNTER
Dr. Zamudio, An recent visit occurred on 11/12/20. Can you confirm the intent to continue the spravato as written below? Aure is coming to clinic tomorrow (12 days since last treatment)    SPRAVATO, 84 MG DOSE, 84 mg dose kit 24 kit 0 10/8/2020  No   Sig: APPLY 84 MG INTO ONE NOSTRIL AS DIRECTED EVERY 14 DAYS       Thank you, Eliot CARRILLO

## 2020-11-20 ENCOUNTER — ALLIED HEALTH/NURSE VISIT (OUTPATIENT)
Dept: PSYCHIATRY | Facility: CLINIC | Age: 50
End: 2020-11-20
Payer: COMMERCIAL

## 2020-11-20 VITALS — HEART RATE: 72 BPM | DIASTOLIC BLOOD PRESSURE: 86 MMHG | SYSTOLIC BLOOD PRESSURE: 132 MMHG | OXYGEN SATURATION: 98 %

## 2020-11-20 DIAGNOSIS — F33.2 SEVERE EPISODE OF RECURRENT MAJOR DEPRESSIVE DISORDER, WITHOUT PSYCHOTIC FEATURES (H): Primary | ICD-10-CM

## 2020-11-20 ASSESSMENT — PATIENT HEALTH QUESTIONNAIRE - PHQ9: SUM OF ALL RESPONSES TO PHQ QUESTIONS 1-9: 9

## 2020-11-20 NOTE — PROGRESS NOTES
Aure Joy comes into clinic today at the request of ESTELA Zamudio MD Ordering Provider for Medication Management:  Spravato administration and monitoring.    Medication checked by: Braden GARSIA RN  Prior to administration pt identified by name and : yes    PHQ 2020   PHQ-9 Total Score 9   Q9: Thoughts of better off dead/self-harm past 2 weeks Several days       QIDS-SR 16 score: 9  Appearance: Casual   Mood: good  Affect: congruent to mood  Eye contact: good  Side effects: none  Level of cooperation: cooperative  Education: no needs identified  Next appt: Continuing in 14 days with IM ketamine    Medication provided by Pharmacy    I spent an additional 120 minutes today, in direct patient contact monitoring the patient after Spravato administration. Patient had the following issues: none.    This service provided today was under the supervising provider of the day Elizabeth Gordon MD, who was available if needed.    Estrella Fuentes RN

## 2020-12-04 ENCOUNTER — TELEPHONE (OUTPATIENT)
Dept: PSYCHIATRY | Facility: CLINIC | Age: 50
End: 2020-12-04

## 2020-12-04 ENCOUNTER — ALLIED HEALTH/NURSE VISIT (OUTPATIENT)
Dept: PSYCHIATRY | Facility: CLINIC | Age: 50
End: 2020-12-04
Payer: COMMERCIAL

## 2020-12-04 VITALS — BODY MASS INDEX: 36.03 KG/M2 | HEIGHT: 62 IN

## 2020-12-04 VITALS — HEART RATE: 72 BPM | SYSTOLIC BLOOD PRESSURE: 132 MMHG | DIASTOLIC BLOOD PRESSURE: 86 MMHG | OXYGEN SATURATION: 99 %

## 2020-12-04 DIAGNOSIS — F33.2 SEVERE RECURRENT MAJOR DEPRESSION WITHOUT PSYCHOTIC FEATURES (H): Primary | ICD-10-CM

## 2020-12-04 RX ORDER — KETAMINE HYDROCHLORIDE 50 MG/ML
0.5 INJECTION, SOLUTION INTRAMUSCULAR; INTRAVENOUS
Qty: 0.56 ML | Refills: 0 | Status: SHIPPED | OUTPATIENT
Start: 2020-12-04 | End: 2020-12-04

## 2020-12-04 RX ORDER — KETAMINE HYDROCHLORIDE 50 MG/ML
0.5 INJECTION, SOLUTION INTRAMUSCULAR; INTRAVENOUS
Qty: 0.56 ML | Refills: 0 | Status: SHIPPED | OUTPATIENT
Start: 2020-12-04 | End: 2020-12-17

## 2020-12-04 ASSESSMENT — PATIENT HEALTH QUESTIONNAIRE - PHQ9: SUM OF ALL RESPONSES TO PHQ QUESTIONS 1-9: 7

## 2020-12-04 NOTE — PROGRESS NOTES
Aure Joy comes into clinic today at the request of ESTELA Zamudio MD Ordering Provider for Med Injection only ketamine.    Medication checked by: Estrella Fuentes RN  Prior to administration pt identified by name and : Yes    Appearance: casual   Mood: good  Affect: congruent to mood  Eye contact: good  Side effects: sedation   Level of cooperation: cooperative  Education: discussed med side effects, use, interactions, expectations and difference and similarities to spravato.  Next appt: in 14 days    QIDS-SR16 score: 9  PHQ 9 score: 7      Medication provided by Pharmacy      This service provided today was under the supervising provider of the day Tim Williamson MD, who was available if needed.    Braden Christian RN

## 2020-12-13 DIAGNOSIS — F33.2 SEVERE EPISODE OF RECURRENT MAJOR DEPRESSIVE DISORDER, WITHOUT PSYCHOTIC FEATURES (H): ICD-10-CM

## 2020-12-14 ENCOUNTER — TELEPHONE (OUTPATIENT)
Dept: PSYCHIATRY | Facility: CLINIC | Age: 50
End: 2020-12-14

## 2020-12-14 NOTE — TELEPHONE ENCOUNTER
Patient call because she received a bill for the ketamine and she check with her pharmacy and they said that it need to be as  as buy and bill.

## 2020-12-15 RX ORDER — LAMOTRIGINE 200 MG/1
TABLET ORAL
Qty: 45 TABLET | Refills: 2 | OUTPATIENT
Start: 2020-12-15

## 2020-12-15 NOTE — TELEPHONE ENCOUNTER
Writer spoke with patient, medication needs to be put under as buy and bill. Writer will call Buffy to change billing.

## 2020-12-17 ENCOUNTER — VIRTUAL VISIT (OUTPATIENT)
Dept: PSYCHIATRY | Facility: CLINIC | Age: 50
End: 2020-12-17
Payer: COMMERCIAL

## 2020-12-17 DIAGNOSIS — F33.2 SEVERE EPISODE OF RECURRENT MAJOR DEPRESSIVE DISORDER, WITHOUT PSYCHOTIC FEATURES (H): ICD-10-CM

## 2020-12-17 DIAGNOSIS — F33.2 SEVERE RECURRENT MAJOR DEPRESSION WITHOUT PSYCHOTIC FEATURES (H): ICD-10-CM

## 2020-12-17 RX ORDER — KETAMINE HYDROCHLORIDE 50 MG/ML
0.75 INJECTION, SOLUTION INTRAMUSCULAR; INTRAVENOUS
Qty: 0.56 ML | Refills: 0 | Status: SHIPPED | OUTPATIENT
Start: 2020-12-17 | End: 2020-12-24

## 2020-12-17 RX ORDER — LAMOTRIGINE 200 MG/1
TABLET ORAL
Qty: 45 TABLET | Refills: 2 | Status: SHIPPED | OUTPATIENT
Start: 2020-12-17 | End: 2020-12-24

## 2020-12-17 ASSESSMENT — PATIENT HEALTH QUESTIONNAIRE - PHQ9: SUM OF ALL RESPONSES TO PHQ QUESTIONS 1-9: 11
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
## 2020-12-18 ENCOUNTER — ALLIED HEALTH/NURSE VISIT (OUTPATIENT)
Dept: PSYCHIATRY | Facility: CLINIC | Age: 50
End: 2020-12-18
Payer: COMMERCIAL

## 2020-12-18 ENCOUNTER — TELEPHONE (OUTPATIENT)
Dept: PSYCHIATRY | Facility: CLINIC | Age: 50
End: 2020-12-18

## 2020-12-18 VITALS
HEART RATE: 79 BPM | SYSTOLIC BLOOD PRESSURE: 138 MMHG | DIASTOLIC BLOOD PRESSURE: 80 MMHG | OXYGEN SATURATION: 99 % | TEMPERATURE: 98.2 F

## 2020-12-18 DIAGNOSIS — F33.2 SEVERE RECURRENT MAJOR DEPRESSION WITHOUT PSYCHOTIC FEATURES (H): ICD-10-CM

## 2020-12-18 DIAGNOSIS — F33.2 SEVERE RECURRENT MAJOR DEPRESSION WITHOUT PSYCHOTIC FEATURES (H): Primary | ICD-10-CM

## 2020-12-18 ASSESSMENT — PATIENT HEALTH QUESTIONNAIRE - PHQ9: SUM OF ALL RESPONSES TO PHQ QUESTIONS 1-9: 12

## 2020-12-18 NOTE — TELEPHONE ENCOUNTER
Telephone order to update CAM for patient's ketamine injection. Patient now receiving 42 mg ketamine IM q 14 days.

## 2020-12-22 ENCOUNTER — OFFICE VISIT (OUTPATIENT)
Dept: PSYCHIATRY | Facility: CLINIC | Age: 50
End: 2020-12-22
Payer: COMMERCIAL

## 2020-12-22 ENCOUNTER — ALLIED HEALTH/NURSE VISIT (OUTPATIENT)
Dept: PSYCHIATRY | Facility: CLINIC | Age: 50
End: 2020-12-22
Payer: COMMERCIAL

## 2020-12-22 DIAGNOSIS — F33.2 SEVERE RECURRENT MAJOR DEPRESSION WITHOUT PSYCHOTIC FEATURES (H): Primary | ICD-10-CM

## 2020-12-22 DIAGNOSIS — F33.2 SEVERE EPISODE OF RECURRENT MAJOR DEPRESSIVE DISORDER, WITHOUT PSYCHOTIC FEATURES (H): Primary | ICD-10-CM

## 2020-12-22 ASSESSMENT — PATIENT HEALTH QUESTIONNAIRE - PHQ9: SUM OF ALL RESPONSES TO PHQ QUESTIONS 1-9: 13

## 2020-12-22 NOTE — PROGRESS NOTES
Patient is here in clinic for TMS education and consenting.  Patient will be starting TMS today on the Agilvax.  Writer and patient reviewed mechanics of TMS.  Discussed using magnetic pulses to stimulate and induce an electrical field inside the brain.  Discussed the difference between F8 and H1 coil.  Patient was able to verbalize understanding of this.  Discussed that the idea is that we are treating the DLPFC of the brain, as it has been shown by in studies that people who have depression have decreased activity in this part of the brain, the idea is that we stimulate this part of the brain to increase activity. Patient has undergone TMS treatment in our clinic before and was familiar with the education points.     Reviewed processing of mapping and how visit today would go.  Encouraged patient to communicate with staff if treatment at anytime became painful.         Patient was able to ask questions regarding procedure. Patient is aware of 10 minute late policy and video monitoring policy. Patient encouraged to communicate with treatment team. Patient had no questions and consent was signed by patient today.

## 2020-12-22 NOTE — PROGRESS NOTES
[facility] TMS Program  [address], [address]  [place] [address]  TMS Procedure Note   Aure Joy MRN# [id number]  Age: 49 year old year old YOB: 1970    Pre-Procedure:  History and Physical: Reviewed in medical record  Consent Signed by: Aure Joy  On: 12/22/2020    Clinical Narrative:  Patient presents to initiate an acute course of TMS for management of severe symptoms of depression that have not responded to conventional interventions (pharmacotherapy or psychotherapy).    Indications for TMS:  MDD, recurrent, severe; 4+ medication trials (from 2+ classes) ineffective; Psychotherapy ineffective.     Pre-Procedure Diagnosis:  MDD, recurrent, severe F33.2    Treatment Hx:  Treatment number this series: 1  Total lifetime treatment number: 115    Allergies   Allergen Reactions     Codeine Nausea     Other  [No Clinical Screening - See Comments] Nausea and Vomiting and Other (See Comments)     general anesthesia- uncontrolled vomitting      There were no vitals taken for this visit.    Pause for the Cause  Right patient:  Yes  Right procedure/correct coil:  Yes; rTMS; cpt 74990; F8 coil.   Earplugs in place:  Yes    Procedure  Patient was seated in procedure chair.  Identity and procedure was verified.  Ear plugs were placed in ears and patient-specific cap was placed on head and tightened appropriately.  Ruler locations were determined according to standard cranial landmarks. Coil was placed at C3 and stimulator was set to initial 50%. Mapping pulses were delivered and response was quantified by visual inspection.  MT was found with specific location and energy detailed below. EMG MEP was used to assist in determining treatment energy. Coil was placed at treatment location and stimulator was set to parameters described below. A test train was delivered which pt tolerated well. Given pt tolerance,  60 treatment trains were delivered, then 3 treatment trains on the opposite side.  Pt tolerated procedure well with no movement.      Motor Threshold Determination  Distance from nasion to inion: 35  Distance along circumference from midline: 6.67cm  Distance from Vertex: 9.56cm  MT 1: 0 - 6 - 16 @ 69% on 1/29/2018  MT 2: 0 - 6 - 16 @ 69% on 2/6/2018   MT 3: 0 - 6 - 16 @ 69% on 2/13/2018   MT 4: 0 - 6 - 16 @ 69% on 2/23/2018   MT 5: 0 - 6 - 16 @ 69% on 3/2/2018   MT 6: 0 - 5.5 - 15.5(always use intersection at left side of ruler)@ 85% on 8/23/19  MT 7: 0 - 5.5 - 15.5(always use intersection at left side of ruler)@ 80% on 8/29/19  MT 8: 0 - 5.5 - 37.5(always use intersection at left side of ruler)@ 76% on 9/5/19 (right side using EMG on left hand)  MT 9: C2 (R side using EMG on L hand) 78% on 9/12/2019  MT 10: C2 (R side using EMG on L hand) 76% on 9/26/2019  MT 11: C2 (R side using EMG on L hand) 82 on 1/31/2020  MT 12: C2 (R side using EMG on L Hand) 86% on 12/22/2020     LDLPFC:  Frequency: 50 Hz  Number of pulses:  3                        Number of bursts: 10  Burst frequency: 5 Hz  Cycle time: 10 sec  Total pulses delivered: 1800  Tx Loc: F3  Energy: 77% (90% MT)  Number of Cycles: 60 trains    RDLPFC:  Frequency: 50 Hz  Number of pulses:  3                        Number of bursts: 200  Burst frequency: 5 Hz  Cycle time: 89.1 sec  Total pulses delivered: 1800  Tx Loc: F4 (right side)  Energy: 77% (90% MT)  Number of Cycles: 3 trains    Rating Scales:  Date MADRS IDS-SR PHQ-9   12/22/2020 25 39 13                                           Post-Procedure Diagnosis:  MDD, recurrent, severe 296.3    Irma Marley  [facility]  Mental [facility] Neuromodulation      Plan   - Cont TMS  - Increase energy as tolerated      Stephanie Ospina, TMS Technician  [facility] Neuromodulation      I completed motor mapping and determination of the pt's motor threshold during the visit today. I was available in the clinic throughout the treatment.    Evelyn Zamudio,  MD  [facility]  Mental Health Neuromodulation

## 2020-12-23 ENCOUNTER — OFFICE VISIT (OUTPATIENT)
Dept: PSYCHIATRY | Facility: CLINIC | Age: 50
End: 2020-12-23
Payer: COMMERCIAL

## 2020-12-23 DIAGNOSIS — F33.2 SEVERE RECURRENT MAJOR DEPRESSION WITHOUT PSYCHOTIC FEATURES (H): Primary | ICD-10-CM

## 2020-12-23 ASSESSMENT — PATIENT HEALTH QUESTIONNAIRE - PHQ9: SUM OF ALL RESPONSES TO PHQ QUESTIONS 1-9: 8

## 2020-12-23 NOTE — PROGRESS NOTES
[facility] TMS Program  [address], [address]  [place] [address]  TMS Procedure Note   Aure Joy MRN# [id number]  Age: 49 year old year old YOB: 1970    Pre-Procedure:  History and Physical: Reviewed in medical record  Consent Signed by: Aure Joy  On: 12/22/2020    Clinical Narrative:  Patient tolerated 1st treatment with very minor headache.  Tolerated increase today.    Indications for TMS:  MDD, recurrent, severe; 4+ medication trials (from 2+ classes) ineffective; Psychotherapy ineffective.     Pre-Procedure Diagnosis:  MDD, recurrent, severe F33.2    Treatment Hx:  Treatment number this series: 2  Total lifetime treatment number: 116    Allergies   Allergen Reactions     Codeine Nausea     Other  [No Clinical Screening - See Comments] Nausea and Vomiting and Other (See Comments)     general anesthesia- uncontrolled vomitting      There were no vitals taken for this visit.    Pause for the Cause  Right patient:  Yes  Right procedure/correct coil:  Yes; rTMS; cpt 46256; F8 coil.   Earplugs in place:  Yes    Procedure  Patient was seated in procedure chair. Identity and procedure was verified. Ear plugs were placed in ears and patient-specific cap was placed on head and tightened appropriately. Ruler locations were verified. Coil was placed at treatment location and stimulator was set to parameters described below. A test train was delivered and pt tolerated train. Given pt tolerance, 60 treatment trains were delivered. Pt tolerated procedure with forehead movement.        Motor Threshold Determination  Distance from nasion to inion: 35  Distance along circumference from midline: 6.67cm  Distance from Vertex: 9.56cm  MT 1: 0 - 6 - 16 @ 69% on 1/29/2018  MT 2: 0 - 6 - 16 @ 69% on 2/6/2018   MT 3: 0 - 6 - 16 @ 69% on 2/13/2018   MT 4: 0 - 6 - 16 @ 69% on 2/23/2018   MT 5: 0 - 6 - 16 @ 69% on 3/2/2018   MT 6: 0 - 5.5 - 15.5(always use intersection at left side of ruler)@ 85%  on 8/23/19  MT 7: 0 - 5.5 - 15.5(always use intersection at left side of ruler)@ 80% on 8/29/19  MT 8: 0 - 5.5 - 37.5(always use intersection at left side of ruler)@ 76% on 9/5/19 (right side using EMG on left hand)  MT 9: C2 (R side using EMG on L hand) 78% on 9/12/2019  MT 10: C2 (R side using EMG on L hand) 76% on 9/26/2019  MT 11: C2 (R side using EMG on L hand) 82 on 1/31/2020  MT 12: C2 (R side using EMG on L Hand) 86% on 12/22/2020     LDLPFC:  Frequency: 50 Hz  Number of pulses:  3                        Number of bursts: 10  Burst frequency: 5 Hz  Cycle time: 10 sec  Total pulses delivered: 1800  Tx Loc: F3  Energy: 80% (93% MT) maximum due to stimulator limitations  Number of Cycles: 60 trains    RDLPFC:  Frequency: 50 Hz  Number of pulses:  3                        Number of bursts: 200  Burst frequency: 5 Hz  Cycle time: 89.1 sec  Total pulses delivered: 1800  Tx Loc: F4 (right side)  Energy: 80% (93% MT) maximum due to stimulator limitations  Number of Cycles: 3 trains    Rating Scales:  Date MADRS IDS-SR PHQ-9   12/22/2020 25 39 13                                           Post-Procedure Diagnosis:  MDD, recurrent, severe 296.3    [facility]      Plan   - Cont TMS        [facility] Neuromod[facility]    ***    Evelyn Zamudio MD  [facility] Neuromodulation

## 2020-12-24 ENCOUNTER — OFFICE VISIT (OUTPATIENT)
Dept: PSYCHIATRY | Facility: CLINIC | Age: 50
End: 2020-12-24
Payer: COMMERCIAL

## 2020-12-24 DIAGNOSIS — F33.2 SEVERE RECURRENT MAJOR DEPRESSION WITHOUT PSYCHOTIC FEATURES (H): Primary | ICD-10-CM

## 2020-12-24 RX ORDER — LAMOTRIGINE 200 MG/1
TABLET ORAL
Qty: 45 TABLET | Refills: 2 | Status: SHIPPED | OUTPATIENT
Start: 2020-12-24 | End: 2021-03-16

## 2020-12-24 RX ORDER — KETAMINE HYDROCHLORIDE 50 MG/ML
1 INJECTION, SOLUTION INTRAMUSCULAR; INTRAVENOUS
Qty: 0.56 ML | Refills: 0 | Status: SHIPPED | OUTPATIENT
Start: 2020-12-24 | End: 2021-03-22

## 2020-12-24 ASSESSMENT — PATIENT HEALTH QUESTIONNAIRE - PHQ9: SUM OF ALL RESPONSES TO PHQ QUESTIONS 1-9: 8

## 2020-12-24 NOTE — PROGRESS NOTES
[facility] TMS Program  [address], [address]  [place] [address]  TMS Procedure Note   Aure Jyo MRN# [id number]  Age: 49 year old year old YOB: 1970    Pre-Procedure:  History and Physical: Reviewed in medical record  Consent Signed by: Aure Joy  On: 12/22/2020    Clinical Narrative:  Patient tolerating treatment.      Indications for TMS:  MDD, recurrent, severe; 4+ medication trials (from 2+ classes) ineffective; Psychotherapy ineffective.     Pre-Procedure Diagnosis:  MDD, recurrent, severe F33.2    Treatment Hx:  Treatment number this series: 2  Total lifetime treatment number: 116    Allergies   Allergen Reactions     Codeine Nausea     Other  [No Clinical Screening - See Comments] Nausea and Vomiting and Other (See Comments)     general anesthesia- uncontrolled vomitting      There were no vitals taken for this visit.    Pause for the Cause  Right patient:  Yes  Right procedure/correct coil:  Yes; rTMS; cpt 17098; F8 coil.   Earplugs in place:  Yes    Procedure  Patient was seated in procedure chair. Identity and procedure was verified. Ear plugs were placed in ears and patient-specific cap was placed on head and tightened appropriately. Ruler locations were verified. Coil was placed at treatment location and stimulator was set to parameters described below. A test train was delivered and pt tolerated train. Given pt tolerance, 60 treatment trains were delivered. Pt tolerated procedure with forehead movement.        Motor Threshold Determination  Distance from nasion to inion: 35  Distance along circumference from midline: 6.67cm  Distance from Vertex: 9.56cm  MT 1: 0 - 6 - 16 @ 69% on 1/29/2018  MT 2: 0 - 6 - 16 @ 69% on 2/6/2018   MT 3: 0 - 6 - 16 @ 69% on 2/13/2018   MT 4: 0 - 6 - 16 @ 69% on 2/23/2018   MT 5: 0 - 6 - 16 @ 69% on 3/2/2018   MT 6: 0 - 5.5 - 15.5(always use intersection at left side of ruler)@ 85% on 8/23/19  MT 7: 0 - 5.5 - 15.5(always use  intersection at left side of ruler)@ 80% on 8/29/19  MT 8: 0 - 5.5 - 37.5(always use intersection at left side of ruler)@ 76% on 9/5/19 (right side using EMG on left hand)  MT 9: C2 (R side using EMG on L hand) 78% on 9/12/2019  MT 10: C2 (R side using EMG on L hand) 76% on 9/26/2019  MT 11: C2 (R side using EMG on L hand) 82 on 1/31/2020  MT 12: C2 (R side using EMG on L Hand) 86% on 12/22/2020     LDLPFC:  Frequency: 50 Hz  Number of pulses:  3                        Number of bursts: 10  Burst frequency: 5 Hz  Cycle time: 10 sec  Total pulses delivered: 1800  Tx Loc: F3  Energy: 80% (93% MT) maximum due to stimulator limitations  Number of Cycles: 60 trains    RDLPFC:  Frequency: 50 Hz  Number of pulses:  3                        Number of bursts: 200  Burst frequency: 5 Hz  Cycle time: 89.1 sec  Total pulses delivered: 1800  Tx Loc: F4 (right side)  Energy: 80% (93% MT) maximum due to stimulator limitations  Number of Cycles: 3 trains    Rating Scales:  Date MADRS IDS-SR PHQ-9   12/22/2020 25 39 13   12/24/2020  31 8                                     Post-Procedure Diagnosis:  MDD, recurrent, severe 296.3    Sophia Harris CMA  [facility] Neuromod[facility]      Plan   - Cont TMS        I did not see the patient during/after treatment but remained available in the clinic during  treatment.    Evelyn Zamudio MD  [facility] Neuromodulation

## 2020-12-28 ENCOUNTER — ALLIED HEALTH/NURSE VISIT (OUTPATIENT)
Dept: PSYCHIATRY | Facility: CLINIC | Age: 50
End: 2020-12-28
Payer: COMMERCIAL

## 2020-12-28 ENCOUNTER — OFFICE VISIT (OUTPATIENT)
Dept: PSYCHIATRY | Facility: CLINIC | Age: 50
End: 2020-12-28
Payer: COMMERCIAL

## 2020-12-28 VITALS — HEART RATE: 80 BPM | OXYGEN SATURATION: 97 % | DIASTOLIC BLOOD PRESSURE: 96 MMHG | SYSTOLIC BLOOD PRESSURE: 140 MMHG

## 2020-12-28 DIAGNOSIS — F33.2 SEVERE EPISODE OF RECURRENT MAJOR DEPRESSIVE DISORDER, WITHOUT PSYCHOTIC FEATURES (H): Primary | ICD-10-CM

## 2020-12-28 DIAGNOSIS — F33.2 SEVERE RECURRENT MAJOR DEPRESSION WITHOUT PSYCHOTIC FEATURES (H): Primary | ICD-10-CM

## 2020-12-28 ASSESSMENT — PATIENT HEALTH QUESTIONNAIRE - PHQ9: SUM OF ALL RESPONSES TO PHQ QUESTIONS 1-9: 9

## 2020-12-28 NOTE — PROGRESS NOTES
Aure Joy comes into clinic today at the request of ESTELA Zamudio MD Ordering Provider for Med Injection only Ketamine.    Medication checked by: Estrella Fuentes RN   Prior to administration pt identified by name and : Yes    Appearance: Dressed casually   Mood: Good  Affect: congruent to mood  Eye contact: Good  Side effects: Sedation, mild dissociation  Level of cooperation: Cooperative  Education: no needs identified  Next appt: in 14 days    Scores-  PHQ9: 9  [id number]:     Medication provided by Pharmacy      This service provided today was under the supervising provider of the day ESTELA Zamudio MD, who was available if needed.    Braden Christian RN

## 2020-12-28 NOTE — PROGRESS NOTES
[facility] TMS Program  [address], [address]  [place] [address]  TMS Procedure Note   Aure Joy MRN# [id number]  Age: 49 year old year old YOB: 1970    Pre-Procedure:  History and Physical: Reviewed in medical record  Consent Signed by: Aure Joy  On: 12/22/2020    Clinical Narrative:  Patient tolerating treatment.  Patient stated she had a nice holiday weekend.    Indications for TMS:  MDD, recurrent, severe; 4+ medication trials (from 2+ classes) ineffective; Psychotherapy ineffective.     Pre-Procedure Diagnosis:  MDD, recurrent, severe F33.2    Treatment Hx:  Treatment number this series: 3  Total lifetime treatment number: 117    Allergies   Allergen Reactions     Codeine Nausea     Other  [No Clinical Screening - See Comments] Nausea and Vomiting and Other (See Comments)     general anesthesia- uncontrolled vomitting      There were no vitals taken for this visit.    Pause for the Cause  Right patient:  Yes  Right procedure/correct coil:  Yes; rTMS; cpt 11340; F8 coil.   Earplugs in place:  Yes    Procedure  Patient was seated in procedure chair. Identity and procedure was verified. Ear plugs were placed in ears and patient-specific cap was placed on head and tightened appropriately. Ruler locations were verified. Coil was placed at treatment location and stimulator was set to parameters described below. A test train was delivered and pt tolerated train. Given pt tolerance, 60 treatment trains were delivered. Pt tolerated procedure with forehead movement.        Motor Threshold Determination  Distance from nasion to inion: 35  Distance along circumference from midline: 6.67cm  Distance from Vertex: 9.56cm  MT 1: 0 - 6 - 16 @ 69% on 1/29/2018  MT 2: 0 - 6 - 16 @ 69% on 2/6/2018   MT 3: 0 - 6 - 16 @ 69% on 2/13/2018   MT 4: 0 - 6 - 16 @ 69% on 2/23/2018   MT 5: 0 - 6 - 16 @ 69% on 3/2/2018   MT 6: 0 - 5.5 - 15.5(always use intersection at left side of ruler)@ 85% on  8/23/19  MT 7: 0 - 5.5 - 15.5(always use intersection at left side of ruler)@ 80% on 8/29/19  MT 8: 0 - 5.5 - 37.5(always use intersection at left side of ruler)@ 76% on 9/5/19 (right side using EMG on left hand)  MT 9: C2 (R side using EMG on L hand) 78% on 9/12/2019  MT 10: C2 (R side using EMG on L hand) 76% on 9/26/2019  MT 11: C2 (R side using EMG on L hand) 82 on 1/31/2020  MT 12: C2 (R side using EMG on L Hand) 86% on 12/22/2020     LDLPFC:  Frequency: 50 Hz  Number of pulses:  3                        Number of bursts: 10  Burst frequency: 5 Hz  Cycle time: 10 sec  Total pulses delivered: 1800  Tx Loc: F3  Energy: 80% (93% MT) maximum due to stimulator limitations  Number of Cycles: 60 trains    RDLPFC:  Frequency: 50 Hz  Number of pulses:  3                        Number of bursts: 200  Burst frequency: 5 Hz  Cycle time: 89.1 sec  Total pulses delivered: 1800  Tx Loc: F4 (right side)  Energy: 80% (93% MT) maximum due to stimulator limitations  Number of Cycles: 3 trains    Rating Scales:  Date MADRS IDS-SR PHQ-9   12/22/2020 25 39 13   12/24/2020  31 8                                     Post-Procedure Diagnosis:  MDD, recurrent, severe 296.3    Sophia Harris CMA  [facility] Neuromodulation      Plan   - Cont TMS    I did not see the patient during/after treatment but remained available in the clinic during  treatment.    Evelyn Zamudio MD  [facility] Neuromodulation

## 2020-12-29 ENCOUNTER — OFFICE VISIT (OUTPATIENT)
Dept: PSYCHIATRY | Facility: CLINIC | Age: 50
End: 2020-12-29
Payer: COMMERCIAL

## 2020-12-29 DIAGNOSIS — F33.2 SEVERE EPISODE OF RECURRENT MAJOR DEPRESSIVE DISORDER, WITHOUT PSYCHOTIC FEATURES (H): Primary | ICD-10-CM

## 2020-12-29 ASSESSMENT — PATIENT HEALTH QUESTIONNAIRE - PHQ9: SUM OF ALL RESPONSES TO PHQ QUESTIONS 1-9: 5

## 2020-12-29 NOTE — PROGRESS NOTES
[facility] TMS Program  [address], [address]  [place] [address]  TMS Procedure Note   Aure Joy MRN# [id number]  Age: 49 year old year old YOB: 1970    Pre-Procedure:  History and Physical: Reviewed in medical record  Consent Signed by: Aure Joy  On: 12/22/2020    Clinical Narrative:  Patient tolerating treatment.  Patient did not report any changes, but expressed that she was very thankful that TMS and ketamine are able to give her relief.    Indications for TMS:  MDD, recurrent, severe; 4+ medication trials (from 2+ classes) ineffective; Psychotherapy ineffective.     Pre-Procedure Diagnosis:  MDD, recurrent, severe F33.2    Treatment Hx:  Treatment number this series: 4  Total lifetime treatment number: 118    Allergies   Allergen Reactions     Codeine Nausea     Other  [No Clinical Screening - See Comments] Nausea and Vomiting and Other (See Comments)     general anesthesia- uncontrolled vomitting      There were no vitals taken for this visit.    Pause for the Cause  Right patient:  Yes  Right procedure/correct coil:  Yes; rTMS; cpt 61093; F8 coil.   Earplugs in place:  Yes    Procedure  Patient was seated in procedure chair. Identity and procedure was verified. Ear plugs were placed in ears and patient-specific cap was placed on head and tightened appropriately. Ruler locations were verified. Coil was placed at treatment location and stimulator was set to parameters described below. A test train was delivered and pt tolerated train. Given pt tolerance, 60 treatment trains were delivered. Pt tolerated procedure with forehead movement.        Motor Threshold Determination  Distance from nasion to inion: 35  Distance along circumference from midline: 6.67cm  Distance from Vertex: 9.56cm  MT 1: 0 - 6 - 16 @ 69% on 1/29/2018  MT 2: 0 - 6 - 16 @ 69% on 2/6/2018   MT 3: 0 - 6 - 16 @ 69% on 2/13/2018   MT 4: 0 - 6 - 16 @ 69% on 2/23/2018   MT 5: 0 - 6 - 16 @ 69% on 3/2/2018    MT 6: 0 - 5.5 - 15.5(always use intersection at left side of ruler)@ 85% on 8/23/19  MT 7: 0 - 5.5 - 15.5(always use intersection at left side of ruler)@ 80% on 8/29/19  MT 8: 0 - 5.5 - 37.5(always use intersection at left side of ruler)@ 76% on 9/5/19 (right side using EMG on left hand)  MT 9: C2 (R side using EMG on L hand) 78% on 9/12/2019  MT 10: C2 (R side using EMG on L hand) 76% on 9/26/2019  MT 11: C2 (R side using EMG on L hand) 82 on 1/31/2020  MT 12: C2 (R side using EMG on L Hand) 86% on 12/22/2020     LDLPFC:  Frequency: 50 Hz  Number of pulses:  3                        Number of bursts: 10  Burst frequency: 5 Hz  Cycle time: 10 sec  Total pulses delivered: 1800  Tx Loc: F3  Energy: 80% (93% MT) maximum due to stimulator limitations  Number of Cycles: 60 trains    RDLPFC:  Frequency: 50 Hz  Number of pulses:  3                        Number of bursts: 200  Burst frequency: 5 Hz  Cycle time: 89.1 sec  Total pulses delivered: 1800  Tx Loc: F4 (right side)  Energy: 80% (93% MT) maximum due to stimulator limitations  Number of Cycles: 3 trains    Rating Scales:  Date MADRS IDS-SR PHQ-9   12/22/2020 25 39 13   12/24/2020  31 8                                     Post-Procedure Diagnosis:  MDD, recurrent, severe 296.3    Stephanie Ospina, TMS Technician  [facility] Neuromodulation      Plan   - Cont TMS    I did not see the patient during/after treatment but remained available in the clinic during  treatment.    Evelyn Zamudio MD  [facility] Neuromodulation

## 2020-12-30 ENCOUNTER — OFFICE VISIT (OUTPATIENT)
Dept: PSYCHIATRY | Facility: CLINIC | Age: 50
End: 2020-12-30
Payer: COMMERCIAL

## 2020-12-30 DIAGNOSIS — F33.2 SEVERE EPISODE OF RECURRENT MAJOR DEPRESSIVE DISORDER, WITHOUT PSYCHOTIC FEATURES (H): Primary | ICD-10-CM

## 2020-12-30 ASSESSMENT — PATIENT HEALTH QUESTIONNAIRE - PHQ9: SUM OF ALL RESPONSES TO PHQ QUESTIONS 1-9: 3

## 2020-12-30 NOTE — PROGRESS NOTES
[facility] TMS Program  [address], [address]  [place] [address]  TMS Procedure Note   Aure Joy MRN# [id number]  Age: 49 year old year old YOB: 1970    Pre-Procedure:  History and Physical: Reviewed in medical record  Consent Signed by: Aure Joy  On: 12/22/2020    Clinical Narrative:  Patient tolerating treatment.  Patient did not report any changes.    Indications for TMS:  MDD, recurrent, severe; 4+ medication trials (from 2+ classes) ineffective; Psychotherapy ineffective.     Pre-Procedure Diagnosis:  MDD, recurrent, severe F33.2    Treatment Hx:  Treatment number this series: 5  Total lifetime treatment number: 119    Allergies   Allergen Reactions     Codeine Nausea     Other  [No Clinical Screening - See Comments] Nausea and Vomiting and Other (See Comments)     general anesthesia- uncontrolled vomitting      There were no vitals taken for this visit.    Pause for the Cause  Right patient:  Yes  Right procedure/correct coil:  Yes; rTMS; cpt 10677; F8 coil.   Earplugs in place:  Yes    Procedure  Patient was seated in procedure chair. Identity and procedure was verified. Ear plugs were placed in ears and patient-specific cap was placed on head and tightened appropriately. Ruler locations were verified. Coil was placed at treatment location and stimulator was set to parameters described below. A test train was delivered and pt tolerated train. Given pt tolerance, 60 treatment trains were delivered. Pt tolerated procedure with forehead movement.        Motor Threshold Determination  Distance from nasion to inion: 35  Distance along circumference from midline: 6.67cm  Distance from Vertex: 9.56cm  MT 1: 0 - 6 - 16 @ 69% on 1/29/2018  MT 2: 0 - 6 - 16 @ 69% on 2/6/2018   MT 3: 0 - 6 - 16 @ 69% on 2/13/2018   MT 4: 0 - 6 - 16 @ 69% on 2/23/2018   MT 5: 0 - 6 - 16 @ 69% on 3/2/2018   MT 6: 0 - 5.5 - 15.5(always use intersection at left side of ruler)@ 85% on 8/23/19  MT 7: 0  - 5.5 - 15.5(always use intersection at left side of ruler)@ 80% on 8/29/19  MT 8: 0 - 5.5 - 37.5(always use intersection at left side of ruler)@ 76% on 9/5/19 (right side using EMG on left hand)  MT 9: C2 (R side using EMG on L hand) 78% on 9/12/2019  MT 10: C2 (R side using EMG on L hand) 76% on 9/26/2019  MT 11: C2 (R side using EMG on L hand) 82 on 1/31/2020  MT 12: C2 (R side using EMG on L Hand) 86% on 12/22/2020     LDLPFC:  Frequency: 50 Hz  Number of pulses:  3                        Number of bursts: 10  Burst frequency: 5 Hz  Cycle time: 10 sec  Total pulses delivered: 1800  Tx Loc: F3  Energy: 80% (93% MT) maximum due to stimulator limitations  Number of Cycles: 60 trains    RDLPFC:  Frequency: 50 Hz  Number of pulses:  3                        Number of bursts: 200  Burst frequency: 5 Hz  Cycle time: 120.0sec (added time in order for machine to cool down)  Total pulses delivered: 1800  Tx Loc: F4 (right side)  Energy: 80% (93% MT) maximum due to stimulator limitations  Number of Cycles: 3 trains    Rating Scales:  Date MADRS IDS-SR PHQ-9   12/22/2020 25 39 13   12/24/2020  31 8                                     Post-Procedure Diagnosis:  MDD, recurrent, severe 296.3    Stephanie Ospina, TMS Technician  [facility] Neuromodulation      Plan   - Cont TMS    ***     Evelyn Zamudio MD  [facility] Neuromod[facility]

## 2020-12-31 ENCOUNTER — OFFICE VISIT (OUTPATIENT)
Dept: PSYCHIATRY | Facility: CLINIC | Age: 50
End: 2020-12-31
Payer: COMMERCIAL

## 2020-12-31 DIAGNOSIS — F33.2 SEVERE EPISODE OF RECURRENT MAJOR DEPRESSIVE DISORDER, WITHOUT PSYCHOTIC FEATURES (H): Primary | ICD-10-CM

## 2020-12-31 ASSESSMENT — PATIENT HEALTH QUESTIONNAIRE - PHQ9: SUM OF ALL RESPONSES TO PHQ QUESTIONS 1-9: 7

## 2020-12-31 NOTE — PROGRESS NOTES
[facility] TMS Program  [address], [address]  [place] [address]  TMS Procedure Note   Aure Joy MRN# [id number]  Age: 49 year old year old YOB: 1970    Pre-Procedure:  History and Physical: Reviewed in medical record  Consent Signed by: Aure Joy  On: 12/22/2020    Clinical Narrative:  Patient tolerating treatment.  Patient did not report any changes. She is looking forward to the long weekend.    Indications for TMS:  MDD, recurrent, severe; 4+ medication trials (from 2+ classes) ineffective; Psychotherapy ineffective.     Pre-Procedure Diagnosis:  MDD, recurrent, severe F33.2    Treatment Hx:  Treatment number this series: 6  Total lifetime treatment number: 120    Allergies   Allergen Reactions     Codeine Nausea     Other  [No Clinical Screening - See Comments] Nausea and Vomiting and Other (See Comments)     general anesthesia- uncontrolled vomitting      There were no vitals taken for this visit.    Pause for the Cause  Right patient:  Yes  Right procedure/correct coil:  Yes; rTMS; cpt 43870; F8 coil.   Earplugs in place:  Yes    Procedure  Patient was seated in procedure chair. Identity and procedure was verified. Ear plugs were placed in ears and patient-specific cap was placed on head and tightened appropriately. Ruler locations were verified. Coil was placed at treatment location and stimulator was set to parameters described below. A test train was delivered and pt tolerated train. Given pt tolerance, 60 treatment trains were delivered. Pt tolerated procedure with forehead movement.        Motor Threshold Determination  Distance from nasion to inion: 35  Distance along circumference from midline: 6.67cm  Distance from Vertex: 9.56cm  MT 1: 0 - 6 - 16 @ 69% on 1/29/2018  MT 2: 0 - 6 - 16 @ 69% on 2/6/2018   MT 3: 0 - 6 - 16 @ 69% on 2/13/2018   MT 4: 0 - 6 - 16 @ 69% on 2/23/2018   MT 5: 0 - 6 - 16 @ 69% on 3/2/2018   MT 6: 0 - 5.5 - 15.5(always use intersection at  left side of ruler)@ 85% on 8/23/19  MT 7: 0 - 5.5 - 15.5(always use intersection at left side of ruler)@ 80% on 8/29/19  MT 8: 0 - 5.5 - 37.5(always use intersection at left side of ruler)@ 76% on 9/5/19 (right side using EMG on left hand)  MT 9: C2 (R side using EMG on L hand) 78% on 9/12/2019  MT 10: C2 (R side using EMG on L hand) 76% on 9/26/2019  MT 11: C2 (R side using EMG on L hand) 82 on 1/31/2020  MT 12: C2 (R side using EMG on L Hand) 86% on 12/22/2020     LDLPFC:  Frequency: 50 Hz  Number of pulses:  3                        Number of bursts: 10  Burst frequency: 5 Hz  Cycle time: 10 sec  Total pulses delivered: 1800  Tx Loc: F3  Energy: 80% (93% MT) maximum due to stimulator limitations  Number of Cycles: 60 trains    RDLPFC:  Frequency: 50 Hz  Number of pulses:  3                        Number of bursts: 200  Burst frequency: 5 Hz  Cycle time: 140.0sec (added time in order for machine to cool down)  Total pulses delivered: 1800  Tx Loc: F4 (right side)  Energy: 80% (93% MT) maximum due to stimulator limitations  Number of Cycles: 3 trains    Rating Scales:  Date MADRS IDS-SR PHQ-9   12/22/2020 25 39 13   12/24/2020  31 8   12/30/20  31 7                               Post-Procedure Diagnosis:  MDD, recurrent, severe 296.3    Stephanie Ospina, TMS Technician  [facility] Neuromodulation      Plan   - Cont TMS      I did not see the patient during/after treatment but remained available in the clinic during  treatment.    Evelyn Zamudio MD  [facility] Neuromodulation

## 2021-01-05 ENCOUNTER — OFFICE VISIT (OUTPATIENT)
Dept: PSYCHIATRY | Facility: CLINIC | Age: 51
End: 2021-01-05
Payer: COMMERCIAL

## 2021-01-05 DIAGNOSIS — F33.2 SEVERE EPISODE OF RECURRENT MAJOR DEPRESSIVE DISORDER, WITHOUT PSYCHOTIC FEATURES (H): Primary | ICD-10-CM

## 2021-01-05 ASSESSMENT — PATIENT HEALTH QUESTIONNAIRE - PHQ9: SUM OF ALL RESPONSES TO PHQ QUESTIONS 1-9: 5

## 2021-01-05 NOTE — PROGRESS NOTES
[facility] TMS Program  [address], [address]  [place] [address]  TMS Procedure Note   Aure Joy MRN# [id number]  Age: 49 year old year old YOB: 1970    Pre-Procedure:  History and Physical: Reviewed in medical record  Consent Signed by: Aure Joy  On: 12/22/2020    Clinical Narrative:  Patient tolerating treatment.  Patient has a nice New Years with family and was in a good mood.    Indications for TMS:  MDD, recurrent, severe; 4+ medication trials (from 2+ classes) ineffective; Psychotherapy ineffective.     Pre-Procedure Diagnosis:  MDD, recurrent, severe F33.2    Treatment Hx:  Treatment number this series: 7  Total lifetime treatment number: 121    Allergies   Allergen Reactions     Codeine Nausea     Other  [No Clinical Screening - See Comments] Nausea and Vomiting and Other (See Comments)     general anesthesia- uncontrolled vomitting      There were no vitals taken for this visit.    Pause for the Cause  Right patient:  Yes  Right procedure/correct coil:  Yes; rTMS; cpt 02321; F8 coil.   Earplugs in place:  Yes    Procedure  Patient was seated in procedure chair. Identity and procedure was verified. Ear plugs were placed in ears and patient-specific cap was placed on head and tightened appropriately. Ruler locations were verified. Coil was placed at treatment location and stimulator was set to parameters described below. A test train was delivered and pt tolerated train. Given pt tolerance, 60 treatment trains were delivered. Pt tolerated procedure with forehead movement.        Motor Threshold Determination  Distance from nasion to inion: 35  Distance along circumference from midline: 6.67cm  Distance from Vertex: 9.56cm  MT 1: 0 - 6 - 16 @ 69% on 1/29/2018  MT 2: 0 - 6 - 16 @ 69% on 2/6/2018   MT 3: 0 - 6 - 16 @ 69% on 2/13/2018   MT 4: 0 - 6 - 16 @ 69% on 2/23/2018   MT 5: 0 - 6 - 16 @ 69% on 3/2/2018   MT 6: 0 - 5.5 - 15.5(always use intersection at left side of  ruler)@ 85% on 8/23/19  MT 7: 0 - 5.5 - 15.5(always use intersection at left side of ruler)@ 80% on 8/29/19  MT 8: 0 - 5.5 - 37.5(always use intersection at left side of ruler)@ 76% on 9/5/19 (right side using EMG on left hand)  MT 9: C2 (R side using EMG on L hand) 78% on 9/12/2019  MT 10: C2 (R side using EMG on L hand) 76% on 9/26/2019  MT 11: C2 (R side using EMG on L hand) 82 on 1/31/2020  MT 12: C2 (R side using EMG on L Hand) 86% on 12/22/2020     LDLPFC:  Frequency: 50 Hz  Number of pulses:  3                        Number of bursts: 10  Burst frequency: 5 Hz  Cycle time: 10 sec  Total pulses delivered: 1800  Tx Loc: F3  Energy: 80% (93% MT) maximum due to stimulator limitations  Number of Cycles: 60 trains    RDLPFC:  Frequency: 50 Hz  Number of pulses:  3                        Number of bursts: 200  Burst frequency: 5 Hz  Cycle time: 140.0sec (added time in order for machine to cool down)  Total pulses delivered: 1800  Tx Loc: F4 (right side)  Energy: 80% (93% MT) maximum due to stimulator limitations  Number of Cycles: 3 trains    Rating Scales:  Date MADRS IDS-SR PHQ-9   12/22/2020 25 39 13   12/24/2020  31 8   12/30/20  31 7                               Post-Procedure Diagnosis:  MDD, recurrent, severe 296.3    Stephanie Ospina, TMS Technician  [facility]      Plan   - Cont TMS      I did not see the patient during/after treatment but remained available in the clinic during  treatment.    Evelyn Zamudio MD  [facility]

## 2021-01-06 ENCOUNTER — OFFICE VISIT (OUTPATIENT)
Dept: PSYCHIATRY | Facility: CLINIC | Age: 51
End: 2021-01-06
Payer: COMMERCIAL

## 2021-01-06 DIAGNOSIS — F33.2 SEVERE EPISODE OF RECURRENT MAJOR DEPRESSIVE DISORDER, WITHOUT PSYCHOTIC FEATURES (H): Primary | ICD-10-CM

## 2021-01-06 ASSESSMENT — PATIENT HEALTH QUESTIONNAIRE - PHQ9: SUM OF ALL RESPONSES TO PHQ QUESTIONS 1-9: 3

## 2021-01-06 NOTE — PROGRESS NOTES
[facility] TMS Program  [address], [address]  [place] [address]  TMS Procedure Note   Aure Joy MRN# [id number]  Age: 49 year old year old YOB: 1970    Pre-Procedure:  History and Physical: Reviewed in medical record  Consent Signed by: Aure Joy  On: 12/22/2020    Clinical Narrative:  Patient tolerating treatment.  Patient was having a good day so far.    Indications for TMS:  MDD, recurrent, severe; 4+ medication trials (from 2+ classes) ineffective; Psychotherapy ineffective.     Pre-Procedure Diagnosis:  MDD, recurrent, severe F33.2    Treatment Hx:  Treatment number this series: 8  Total lifetime treatment number: 122    Allergies   Allergen Reactions     Codeine Nausea     Other  [No Clinical Screening - See Comments] Nausea and Vomiting and Other (See Comments)     general anesthesia- uncontrolled vomitting      There were no vitals taken for this visit.    Pause for the Cause  Right patient:  Yes  Right procedure/correct coil:  Yes; rTMS; cpt 40378; F8 coil.   Earplugs in place:  Yes    Procedure  Patient was seated in procedure chair. Identity and procedure was verified. Ear plugs were placed in ears and patient-specific cap was placed on head and tightened appropriately. Ruler locations were verified. Coil was placed at treatment location and stimulator was set to parameters described below. A test train was delivered and pt tolerated train. Given pt tolerance, 60 treatment trains were delivered. Pt tolerated procedure with forehead movement.        Motor Threshold Determination  Distance from nasion to inion: 35  Distance along circumference from midline: 6.67cm  Distance from Vertex: 9.56cm  MT 1: 0 - 6 - 16 @ 69% on 1/29/2018  MT 2: 0 - 6 - 16 @ 69% on 2/6/2018   MT 3: 0 - 6 - 16 @ 69% on 2/13/2018   MT 4: 0 - 6 - 16 @ 69% on 2/23/2018   MT 5: 0 - 6 - 16 @ 69% on 3/2/2018   MT 6: 0 - 5.5 - 15.5(always use intersection at left side of ruler)@ 85% on 8/23/19  MT 7:  0 - 5.5 - 15.5(always use intersection at left side of ruler)@ 80% on 8/29/19  MT 8: 0 - 5.5 - 37.5(always use intersection at left side of ruler)@ 76% on 9/5/19 (right side using EMG on left hand)  MT 9: C2 (R side using EMG on L hand) 78% on 9/12/2019  MT 10: C2 (R side using EMG on L hand) 76% on 9/26/2019  MT 11: C2 (R side using EMG on L hand) 82 on 1/31/2020  MT 12: C2 (R side using EMG on L Hand) 86% on 12/22/2020     LDLPFC:  Frequency: 50 Hz  Number of pulses:  3                        Number of bursts: 10  Burst frequency: 5 Hz  Cycle time: 10 sec  Total pulses delivered: 1800  Tx Loc: F3  Energy: 80% (93% MT) maximum due to stimulator limitations  Number of Cycles: 60 trains    RDLPFC:  Frequency: 50 Hz  Number of pulses:  3                        Number of bursts: 200  Burst frequency: 5 Hz  Cycle time: 140.0sec (added time in order for machine to cool down)  Total pulses delivered: 1800  Tx Loc: F4 (right side)  Energy: 80% (93% MT) maximum due to stimulator limitations  Number of Cycles: 3 trains    Rating Scales:  Date MADRS IDS-SR PHQ-9   12/22/2020 25 39 13   12/24/2020  31 8   12/30/20  31 7                               Post-Procedure Diagnosis:  MDD, recurrent, severe 296.3    Stephanie Ospina, TMS Technician  [facility]      Plan   - Cont TMS    I did not see patient but remained available at the clinic throughout the TMS session    Elizabeth Gordon MD  [facility]

## 2021-01-07 ENCOUNTER — OFFICE VISIT (OUTPATIENT)
Dept: PSYCHIATRY | Facility: CLINIC | Age: 51
End: 2021-01-07
Payer: COMMERCIAL

## 2021-01-07 DIAGNOSIS — F33.2 SEVERE EPISODE OF RECURRENT MAJOR DEPRESSIVE DISORDER, WITHOUT PSYCHOTIC FEATURES (H): Primary | ICD-10-CM

## 2021-01-07 ASSESSMENT — PATIENT HEALTH QUESTIONNAIRE - PHQ9: SUM OF ALL RESPONSES TO PHQ QUESTIONS 1-9: 4

## 2021-01-07 NOTE — PROGRESS NOTES
[facility] TMS Program  [address], [address]  [place] [address]  TMS Procedure Note   Aure Joy MRN# [id number]  Age: 49 year old year old YOB: 1970    Pre-Procedure:  History and Physical: Reviewed in medical record  Consent Signed by: Aure Joy  On: 12/22/2020    Clinical Narrative:  Patient tolerating treatment.  Patient was concerned about the news.  Indications for TMS:  MDD, recurrent, severe; 4+ medication trials (from 2+ classes) ineffective; Psychotherapy ineffective.     Pre-Procedure Diagnosis:  MDD, recurrent, severe F33.2    Treatment Hx:  Treatment number this series: 9  Total lifetime treatment number: 123    Allergies   Allergen Reactions     Codeine Nausea     Other  [No Clinical Screening - See Comments] Nausea and Vomiting and Other (See Comments)     general anesthesia- uncontrolled vomitting      There were no vitals taken for this visit.    Pause for the Cause  Right patient:  Yes  Right procedure/correct coil:  Yes; rTMS; cpt 51506; F8 coil.   Earplugs in place:  Yes    Procedure  Patient was seated in procedure chair. Identity and procedure was verified. Ear plugs were placed in ears and patient-specific cap was placed on head and tightened appropriately. Ruler locations were verified. Coil was placed at treatment location and stimulator was set to parameters described below. A test train was delivered and pt tolerated train. Given pt tolerance, 60 treatment trains were delivered. Pt tolerated procedure with forehead movement.        Motor Threshold Determination  Distance from nasion to inion: 35  Distance along circumference from midline: 6.67cm  Distance from Vertex: 9.56cm  MT 1: 0 - 6 - 16 @ 69% on 1/29/2018  MT 2: 0 - 6 - 16 @ 69% on 2/6/2018   MT 3: 0 - 6 - 16 @ 69% on 2/13/2018   MT 4: 0 - 6 - 16 @ 69% on 2/23/2018   MT 5: 0 - 6 - 16 @ 69% on 3/2/2018   MT 6: 0 - 5.5 - 15.5(always use intersection at left side of ruler)@ 85% on 8/23/19  MT 7: 0  - 5.5 - 15.5(always use intersection at left side of ruler)@ 80% on 8/29/19  MT 8: 0 - 5.5 - 37.5(always use intersection at left side of ruler)@ 76% on 9/5/19 (right side using EMG on left hand)  MT 9: C2 (R side using EMG on L hand) 78% on 9/12/2019  MT 10: C2 (R side using EMG on L hand) 76% on 9/26/2019  MT 11: C2 (R side using EMG on L hand) 82 on 1/31/2020  MT 12: C2 (R side using EMG on L Hand) 86% on 12/22/2020     LDLPFC:  Frequency: 50 Hz  Number of pulses:  3                        Number of bursts: 10  Burst frequency: 5 Hz  Cycle time: 10 sec  Total pulses delivered: 1800  Tx Loc: F3  Energy: 80% (93% MT) maximum due to stimulator limitations  Number of Cycles: 60 trains    RDLPFC:  Frequency: 50 Hz  Number of pulses:  3                        Number of bursts: 200  Burst frequency: 5 Hz  Cycle time: 140.0sec (added time in order for machine to cool down)  Total pulses delivered: 1800  Tx Loc: F4 (right side)  Energy: 80% (93% MT) maximum due to stimulator limitations  Number of Cycles: 3 trains    Rating Scales:  Date MADRS IDS-SR PHQ-9   12/22/2020 25 39 13   12/24/2020  31 8   12/30/20  31 7                               Post-Procedure Diagnosis:  MDD, recurrent, severe 296.3    Stephanie Ospina, TMS Technician  [facility]      Plan   - Cont TMS    I did not see the patient during/after treatment but remained available in the clinic during  treatment.    Evelyn Zamudio MD  [facility]

## 2021-01-08 ENCOUNTER — OFFICE VISIT (OUTPATIENT)
Dept: PSYCHIATRY | Facility: CLINIC | Age: 51
End: 2021-01-08
Payer: COMMERCIAL

## 2021-01-08 DIAGNOSIS — F33.2 SEVERE EPISODE OF RECURRENT MAJOR DEPRESSIVE DISORDER, WITHOUT PSYCHOTIC FEATURES (H): Primary | ICD-10-CM

## 2021-01-08 ASSESSMENT — PATIENT HEALTH QUESTIONNAIRE - PHQ9: SUM OF ALL RESPONSES TO PHQ QUESTIONS 1-9: 3

## 2021-01-08 NOTE — PROGRESS NOTES
[facility] TMS Program  [address], [address]  [place] [address]  TMS Procedure Note   Aure Joy MRN# [id number]  Age: 49 year old year old YOB: 1970    Pre-Procedure:  History and Physical: Reviewed in medical record  Consent Signed by: Aure Joy  On: 12/22/2020    Clinical Narrative:  Patient tolerating treatment.      Indications for TMS:  MDD, recurrent, severe; 4+ medication trials (from 2+ classes) ineffective; Psychotherapy ineffective.     Pre-Procedure Diagnosis:  MDD, recurrent, severe F33.2    Treatment Hx:  Treatment number this series: 10  Total lifetime treatment number: 124    Allergies   Allergen Reactions     Codeine Nausea     Other  [No Clinical Screening - See Comments] Nausea and Vomiting and Other (See Comments)     general anesthesia- uncontrolled vomitting      There were no vitals taken for this visit.    Pause for the Cause  Right patient:  Yes  Right procedure/correct coil:  Yes; rTMS; cpt 99711; F8 coil.   Earplugs in place:  Yes    Procedure  Patient was seated in procedure chair. Identity and procedure was verified. Ear plugs were placed in ears and patient-specific cap was placed on head and tightened appropriately. Ruler locations were verified. Coil was placed at treatment location and stimulator was set to parameters described below. A test train was delivered and pt tolerated train. Given pt tolerance, 60 treatment trains were delivered to the left side and 3 treatment trains were delivered to the right side. Pt tolerated procedure with forehead movement.        Motor Threshold Determination  Distance from nasion to inion: 35  Distance along circumference from midline: 6.67cm  Distance from Vertex: 9.56cm  MT 1: 0 - 6 - 16 @ 69% on 1/29/2018  MT 2: 0 - 6 - 16 @ 69% on 2/6/2018   MT 3: 0 - 6 - 16 @ 69% on 2/13/2018   MT 4: 0 - 6 - 16 @ 69% on 2/23/2018   MT 5: 0 - 6 - 16 @ 69% on 3/2/2018   MT 6: 0 - 5.5 - 15.5(always use intersection at left  side of ruler)@ 85% on 8/23/19  MT 7: 0 - 5.5 - 15.5(always use intersection at left side of ruler)@ 80% on 8/29/19  MT 8: 0 - 5.5 - 37.5(always use intersection at left side of ruler)@ 76% on 9/5/19 (right side using EMG on left hand)  MT 9: C2 (R side using EMG on L hand) 78% on 9/12/2019  MT 10: C2 (R side using EMG on L hand) 76% on 9/26/2019  MT 11: C2 (R side using EMG on L hand) 82 on 1/31/2020  MT 12: C2 (R side using EMG on L Hand) 86% on 12/22/2020     LDLPFC:  Frequency: 50 Hz  Number of pulses:  3                        Number of bursts: 10  Burst frequency: 5 Hz  Cycle time: 10 sec  Total pulses delivered: 1800  Tx Loc: F3  Energy: 80% (93% MT) maximum due to stimulator limitations  Number of Cycles: 60 trains    RDLPFC:  Frequency: 50 Hz  Number of pulses:  3                        Number of bursts: 200  Burst frequency: 5 Hz  Cycle time: 150.0sec (added time in order for machine to cool down, machine still overheated)  Total pulses delivered: 1800  Tx Loc: F4 (right side)  Energy: 80% (93% MT) maximum due to stimulator limitations  Number of Cycles: 3 trains    Rating Scales:  Date MADRS IDS-SR PHQ-9   12/22/2020 25 39 13   12/24/2020  31 8   12/30/20  31 7   1/8/2021  29 3                         Post-Procedure Diagnosis:  MDD, recurrent, severe 296.3    Irma Marley  [facility]      Plan   - Cont TMS    I did not see patient but remained available in the clinic throughout the TMS session    Elizabeth Gordon MD  [facility]

## 2021-01-11 ENCOUNTER — OFFICE VISIT (OUTPATIENT)
Dept: PSYCHIATRY | Facility: CLINIC | Age: 51
End: 2021-01-11
Payer: COMMERCIAL

## 2021-01-11 ENCOUNTER — ALLIED HEALTH/NURSE VISIT (OUTPATIENT)
Dept: PSYCHIATRY | Facility: CLINIC | Age: 51
End: 2021-01-11
Payer: COMMERCIAL

## 2021-01-11 VITALS — SYSTOLIC BLOOD PRESSURE: 128 MMHG | DIASTOLIC BLOOD PRESSURE: 80 MMHG | OXYGEN SATURATION: 97 % | HEART RATE: 83 BPM

## 2021-01-11 DIAGNOSIS — F33.2 SEVERE EPISODE OF RECURRENT MAJOR DEPRESSIVE DISORDER, WITHOUT PSYCHOTIC FEATURES (H): Primary | ICD-10-CM

## 2021-01-11 ASSESSMENT — PATIENT HEALTH QUESTIONNAIRE - PHQ9
SUM OF ALL RESPONSES TO PHQ QUESTIONS 1-9: 3
SUM OF ALL RESPONSES TO PHQ QUESTIONS 1-9: 9

## 2021-01-11 NOTE — PROGRESS NOTES
Aure Joy comes into clinic today at the request of ESTELA Zamudio MD Ordering Provider for Med Injection only Ketamine .    Procedure Prep:  Medication double check completed by: Estrella Fuentes RN   Prior to administration pt identified by name and : Yes    Pre Procedure Nursing Assessment:  Yearly MD visit occurred on: 20  Yearly height and weight on file: No  Concommitent medications with potential for interaction with ketamine: opiod, benzodiazepines, triptans, MAOIs   Last dose of medication above: N/A  Appearance: dressed casually   Mood: good  Affect: congruent to mood  Eye contact: good    PHQ 2021   PHQ-9 Total Score 3   Q9: Thoughts of better off dead/self-harm past 2 weeks Not at all     QIDDSR 16 weekly assessment: score 11. Assessment was scanned to EHR.   PCL5 weekly assessment: score N/A. Assessment was scanned to EHR.     Procedure Performed:  Nursing assessment prior to dose completed and contraindication to treatment not found. Patient was given Ketamine. See MAR for details.     Post Procedure Assessment:  Patient tolerated the treatment with the following side effects: sedation. Vital signs were monitored, see VS Flowsheet. Patient stated they felt ready to go home prior to discharge. Patient was instructed not to drive for the remainder of the day and to notify clinic if any concerns arise. Patient was discharged to self care.     Next appt: In two weeks    Medication provided by Pharmacy    This service provided today was under the supervising provider of the day Elizabeth Gordon MD, who was available if needed.    Braden Christian RN

## 2021-01-11 NOTE — PROGRESS NOTES
" [facility] TMS Program  [address], [address]  [place] [address]  TMS Procedure Note   Aure Joy MRN# [id number]  Age: 49 year old year old YOB: 1970    Pre-Procedure:  History and Physical: Reviewed in medical record  Consent Signed by: Aure Joy  On: 12/22/2020    Clinical Narrative:  Patient tolerating treatment.  Was feeling a bit \"scattered\" today. Had a good weekend.    Indications for TMS:  MDD, recurrent, severe; 4+ medication trials (from 2+ classes) ineffective; Psychotherapy ineffective.     Pre-Procedure Diagnosis:  MDD, recurrent, severe F33.2    Treatment Hx:  Treatment number this series: 11  Total lifetime treatment number: 125    Allergies   Allergen Reactions     Codeine Nausea     Other  [No Clinical Screening - See Comments] Nausea and Vomiting and Other (See Comments)     general anesthesia- uncontrolled vomitting      There were no vitals taken for this visit.    Pause for the Cause  Right patient:  Yes  Right procedure/correct coil:  Yes; rTMS; cpt 55477; F8 coil.   Earplugs in place:  Yes    Procedure  Patient was seated in procedure chair. Identity and procedure was verified. Ear plugs were placed in ears and patient-specific cap was placed on head and tightened appropriately. Ruler locations were verified. Coil was placed at treatment location and stimulator was set to parameters described below. A test train was delivered and pt tolerated train. Given pt tolerance, 60 treatment trains were delivered to the left side and 3 treatment trains were delivered to the right side. Pt tolerated procedure with forehead movement.      Motor Threshold Determination  Distance from nasion to inion: 35  Distance along circumference from midline: 6.67cm  Distance from Vertex: 9.56cm  MT 1: 0 - 6 - 16 @ 69% on 1/29/2018  MT 2: 0 - 6 - 16 @ 69% on 2/6/2018   MT 3: 0 - 6 - 16 @ 69% on 2/13/2018   MT 4: 0 - 6 - 16 @ 69% on 2/23/2018   MT 5: 0 - 6 - 16 @ 69% on 3/2/2018 "   MT 6: 0 - 5.5 - 15.5(always use intersection at left side of ruler)@ 85% on 8/23/19  MT 7: 0 - 5.5 - 15.5(always use intersection at left side of ruler)@ 80% on 8/29/19  MT 8: 0 - 5.5 - 37.5(always use intersection at left side of ruler)@ 76% on 9/5/19 (right side using EMG on left hand)  MT 9: C2 (R side using EMG on L hand) 78% on 9/12/2019  MT 10: C2 (R side using EMG on L hand) 76% on 9/26/2019  MT 11: C2 (R side using EMG on L hand) 82 on 1/31/2020  MT 12: C2 (R side using EMG on L Hand) 86% on 12/22/2020     LDLPFC:  Frequency: 50 Hz  Number of pulses:  3                        Number of bursts: 10  Burst frequency: 5 Hz  Cycle time: 10 sec  Total pulses delivered: 1800  Tx Loc: F3  Energy: 80% (93% MT) maximum due to stimulator limitations  Number of Cycles: 60 trains    RDLPFC:  Frequency: 50 Hz  Number of pulses:  3                        Number of bursts: 200  Burst frequency: 5 Hz  Cycle time: 130.0sec  Total pulses delivered: 1800  Tx Loc: F4 (right side)  Energy: 80% (93% MT) maximum due to stimulator limitations  Number of Cycles: 3 trains    Rating Scales:  Date MADRS IDS-SR PHQ-9   12/22/2020 25 39 13   12/24/2020  31 8   12/30/20  31 7   1/8/2021  29 3                         Post-Procedure Diagnosis:  MDD, recurrent, severe 296.3    Stephanie Ospina, TMS Technician  [facility]      Plan   - Cont TMS    I did not see patient but remained available in the clinic throughout the TMS session    Elizabeth Gordon MD  [facility]

## 2021-01-12 ENCOUNTER — OFFICE VISIT (OUTPATIENT)
Dept: PSYCHIATRY | Facility: CLINIC | Age: 51
End: 2021-01-12

## 2021-01-12 DIAGNOSIS — F33.2 SEVERE EPISODE OF RECURRENT MAJOR DEPRESSIVE DISORDER, WITHOUT PSYCHOTIC FEATURES (H): Primary | ICD-10-CM

## 2021-01-12 ASSESSMENT — PATIENT HEALTH QUESTIONNAIRE - PHQ9: SUM OF ALL RESPONSES TO PHQ QUESTIONS 1-9: 1

## 2021-01-12 NOTE — PROGRESS NOTES
[facility] TMS Program  [address], [address]  [place] [address]  TMS Procedure Note   Aure Joy MRN# [id number]  Age: 49 year old year old YOB: 1970    Pre-Procedure:  History and Physical: Reviewed in medical record  Consent Signed by: Aure Joy  On: 12/22/2020    Clinical Narrative:  Patient tolerating treatment.  Patient felt like the day was going by so quickly - she did not sleep well.    Indications for TMS:  MDD, recurrent, severe; 4+ medication trials (from 2+ classes) ineffective; Psychotherapy ineffective.     Pre-Procedure Diagnosis:  MDD, recurrent, severe F33.2    Treatment Hx:  Treatment number this series: 12  Total lifetime treatment number: 126    Allergies   Allergen Reactions     Codeine Nausea     Other  [No Clinical Screening - See Comments] Nausea and Vomiting and Other (See Comments)     general anesthesia- uncontrolled vomitting      There were no vitals taken for this visit.    Pause for the Cause  Right patient:  Yes  Right procedure/correct coil:  Yes; rTMS; cpt 51629; F8 coil.   Earplugs in place:  Yes    Procedure  Patient was seated in procedure chair. Identity and procedure was verified. Ear plugs were placed in ears and patient-specific cap was placed on head and tightened appropriately. Ruler locations were verified. Coil was placed at treatment location and stimulator was set to parameters described below. A test train was delivered and pt tolerated train. Given pt tolerance, 60 treatment trains were delivered to the left side and 3 treatment trains were delivered to the right side. Pt tolerated procedure with forehead movement.      Motor Threshold Determination  Distance from nasion to inion: 35  Distance along circumference from midline: 6.67cm  Distance from Vertex: 9.56cm  MT 1: 0 - 6 - 16 @ 69% on 1/29/2018  MT 2: 0 - 6 - 16 @ 69% on 2/6/2018   MT 3: 0 - 6 - 16 @ 69% on 2/13/2018   MT 4: 0 - 6 - 16 @ 69% on 2/23/2018   MT 5: 0 - 6 - 16  @ 69% on 3/2/2018   MT 6: 0 - 5.5 - 15.5(always use intersection at left side of ruler)@ 85% on 8/23/19  MT 7: 0 - 5.5 - 15.5(always use intersection at left side of ruler)@ 80% on 8/29/19  MT 8: 0 - 5.5 - 37.5(always use intersection at left side of ruler)@ 76% on 9/5/19 (right side using EMG on left hand)  MT 9: C2 (R side using EMG on L hand) 78% on 9/12/2019  MT 10: C2 (R side using EMG on L hand) 76% on 9/26/2019  MT 11: C2 (R side using EMG on L hand) 82 on 1/31/2020  MT 12: C2 (R side using EMG on L Hand) 86% on 12/22/2020     LDLPFC:  Frequency: 50 Hz  Number of pulses:  3                        Number of bursts: 10  Burst frequency: 5 Hz  Cycle time: 10 sec  Total pulses delivered: 1800  Tx Loc: F3  Energy: 80% (93% MT) maximum due to stimulator limitations  Number of Cycles: 60 trains    RDLPFC:  Frequency: 50 Hz  Number of pulses:  3                        Number of bursts: 200  Burst frequency: 5 Hz  Cycle time: 130.0sec  Total pulses delivered: 1800  Tx Loc: F4 (right side)  Energy: 80% (93% MT) maximum due to stimulator limitations  Number of Cycles: 3 trains    Rating Scales:  Date MADRS IDS-SR PHQ-9   12/22/2020 25 39 13   12/24/2020  31 8   12/30/20  31 7   1/8/2021  29 3                         Post-Procedure Diagnosis:  MDD, recurrent, severe 296.3    Stephanie Ospina, TMS Technician  [facility]      Plan   - Cont TMS      I did not see the patient during/after treatment but remained available in the clinic during  treatment.    Evelyn Zamudio MD  [facility]

## 2021-01-13 ENCOUNTER — OFFICE VISIT (OUTPATIENT)
Dept: PSYCHIATRY | Facility: CLINIC | Age: 51
End: 2021-01-13

## 2021-01-13 DIAGNOSIS — F33.2 SEVERE EPISODE OF RECURRENT MAJOR DEPRESSIVE DISORDER, WITHOUT PSYCHOTIC FEATURES (H): Primary | ICD-10-CM

## 2021-01-13 ASSESSMENT — PATIENT HEALTH QUESTIONNAIRE - PHQ9: SUM OF ALL RESPONSES TO PHQ QUESTIONS 1-9: 2

## 2021-01-13 NOTE — PROGRESS NOTES
[facility] TMS Program  [address], [address]  [place] [address]  TMS Procedure Note   Aure Joy MRN# [id number]  Age: 49 year old year old YOB: 1970    Pre-Procedure:  History and Physical: Reviewed in medical record  Consent Signed by: Aure Joy  On: 12/22/2020    Clinical Narrative:  Patient tolerating treatment.     Indications for TMS:  MDD, recurrent, severe; 4+ medication trials (from 2+ classes) ineffective; Psychotherapy ineffective.     Pre-Procedure Diagnosis:  MDD, recurrent, severe F33.2    Treatment Hx:  Treatment number this series: 13  Total lifetime treatment number: 127    Allergies   Allergen Reactions     Codeine Nausea     Other  [No Clinical Screening - See Comments] Nausea and Vomiting and Other (See Comments)     general anesthesia- uncontrolled vomitting      There were no vitals taken for this visit.    Pause for the Cause  Right patient:  Yes  Right procedure/correct coil:  Yes; rTMS; cpt 58110; F8 coil.   Earplugs in place:  Yes    Procedure  Patient was seated in procedure chair. Identity and procedure was verified. Ear plugs were placed in ears and patient-specific cap was placed on head and tightened appropriately. Ruler locations were verified. Coil was placed at treatment location and stimulator was set to parameters described below. A test train was delivered and pt tolerated train. Given pt tolerance, 60 treatment trains were delivered to the left side and 3 treatment trains were delivered to the right side. Pt tolerated procedure with forehead movement.      Motor Threshold Determination  Distance from nasion to inion: 35  Distance along circumference from midline: 6.67cm  Distance from Vertex: 9.56cm  MT 1: 0 - 6 - 16 @ 69% on 1/29/2018  MT 2: 0 - 6 - 16 @ 69% on 2/6/2018   MT 3: 0 - 6 - 16 @ 69% on 2/13/2018   MT 4: 0 - 6 - 16 @ 69% on 2/23/2018   MT 5: 0 - 6 - 16 @ 69% on 3/2/2018   MT 6: 0 - 5.5 - 15.5(always use intersection at left  side of ruler)@ 85% on 8/23/19  MT 7: 0 - 5.5 - 15.5(always use intersection at left side of ruler)@ 80% on 8/29/19  MT 8: 0 - 5.5 - 37.5(always use intersection at left side of ruler)@ 76% on 9/5/19 (right side using EMG on left hand)  MT 9: C2 (R side using EMG on L hand) 78% on 9/12/2019  MT 10: C2 (R side using EMG on L hand) 76% on 9/26/2019  MT 11: C2 (R side using EMG on L hand) 82 on 1/31/2020  MT 12: C2 (R side using EMG on L Hand) 86% on 12/22/2020     LDLPFC:  Frequency: 50 Hz  Number of pulses:  3                        Number of bursts: 10  Burst frequency: 5 Hz  Cycle time: 10 sec  Total pulses delivered: 1800  Tx Loc: F3  Energy: 80% (93% MT) maximum due to stimulator limitations  Number of Cycles: 60 trains    RDLPFC:  Frequency: 50 Hz  Number of pulses:  3                        Number of bursts: 200  Burst frequency: 5 Hz  Cycle time: 130.0sec  Total pulses delivered: 1800  Tx Loc: F4 (right side)  Energy: 80% (93% MT) maximum due to stimulator limitations  Number of Cycles: 3 trains    Rating Scales:  Date MADRS IDS-SR PHQ-9   12/22/2020 25 39 13   12/24/2020  31 8   12/30/20  31 7   1/8/2021  29 3                         Post-Procedure Diagnosis:  MDD, recurrent, severe 296.3    Irma Marley  [facility]      Plan   - Cont TMS    I did not see patient but remained available in the clinic throughout the TMS session    Elizabeth Gordon MD  [facility]

## 2021-01-14 ENCOUNTER — OFFICE VISIT (OUTPATIENT)
Dept: PSYCHIATRY | Facility: CLINIC | Age: 51
End: 2021-01-14

## 2021-01-14 DIAGNOSIS — F33.2 SEVERE EPISODE OF RECURRENT MAJOR DEPRESSIVE DISORDER, WITHOUT PSYCHOTIC FEATURES (H): Primary | ICD-10-CM

## 2021-01-14 ASSESSMENT — PATIENT HEALTH QUESTIONNAIRE - PHQ9: SUM OF ALL RESPONSES TO PHQ QUESTIONS 1-9: 3

## 2021-01-14 NOTE — PROGRESS NOTES
[facility] TMS Program  [address], [address]  [place] [address]  TMS Procedure Note   Aure Joy MRN# [id number]  Age: 49 year old year old YOB: 1970    Pre-Procedure:  History and Physical: Reviewed in medical record  Consent Signed by: Aure Joy  On: 12/22/2020    Clinical Narrative:  Patient tolerating treatment. Pt continues to have some trouble falling and staying asleep.  Aure had questions regarding taking antianxiety meds during treatment.  Suggested she reach out to Dr. Zamudio via [employer] to discuss this.    Indications for TMS:  MDD, recurrent, severe; 4+ medication trials (from 2+ classes) ineffective; Psychotherapy ineffective.     Pre-Procedure Diagnosis:  MDD, recurrent, severe F33.2    Treatment Hx:  Treatment number this series: 14  Total lifetime treatment number: 128    Allergies   Allergen Reactions     Codeine Nausea     Other  [No Clinical Screening - See Comments] Nausea and Vomiting and Other (See Comments)     general anesthesia- uncontrolled vomitting      There were no vitals taken for this visit.    Pause for the Cause  Right patient:  Yes  Right procedure/correct coil:  Yes; rTMS; cpt 61731; F8 coil.   Earplugs in place:  Yes    Procedure  Patient was seated in procedure chair. Identity and procedure was verified. Ear plugs were placed in ears and patient-specific cap was placed on head and tightened appropriately. Ruler locations were verified. Coil was placed at treatment location and stimulator was set to parameters described below. A test train was delivered and pt tolerated train. Given pt tolerance, 60 treatment trains were delivered to the left side and 3 treatment trains were delivered to the right side. Pt tolerated procedure with forehead movement.      Motor Threshold Determination  Distance from nasion to inion: 35  Distance along circumference from midline: 6.67cm  Distance from Vertex: 9.56cm  MT 1: 0 - 6 - 16 @ 69% on 1/29/2018  MT  2: 0 - 6 - 16 @ 69% on 2/6/2018   MT 3: 0 - 6 - 16 @ 69% on 2/13/2018   MT 4: 0 - 6 - 16 @ 69% on 2/23/2018   MT 5: 0 - 6 - 16 @ 69% on 3/2/2018   MT 6: 0 - 5.5 - 15.5(always use intersection at left side of ruler)@ 85% on 8/23/19  MT 7: 0 - 5.5 - 15.5(always use intersection at left side of ruler)@ 80% on 8/29/19  MT 8: 0 - 5.5 - 37.5(always use intersection at left side of ruler)@ 76% on 9/5/19 (right side using EMG on left hand)  MT 9: C2 (R side using EMG on L hand) 78% on 9/12/2019  MT 10: C2 (R side using EMG on L hand) 76% on 9/26/2019  MT 11: C2 (R side using EMG on L hand) 82 on 1/31/2020  MT 12: C2 (R side using EMG on L Hand) 86% on 12/22/2020     LDLPFC:  Frequency: 50 Hz  Number of pulses:  3                        Number of bursts: 10  Burst frequency: 5 Hz  Cycle time: 10 sec  Total pulses delivered: 1800  Tx Loc: F3  Energy: 80% (93% MT) maximum due to stimulator limitations  Number of Cycles: 60 trains    RDLPFC:  Frequency: 50 Hz  Number of pulses:  3                        Number of bursts: 200  Burst frequency: 5 Hz  Cycle time: 130.0sec  Total pulses delivered: 1800  Tx Loc: F4 (right side)  Energy: 80% (93% MT) maximum due to stimulator limitations  Number of Cycles: 3 trains    Rating Scales:  Date MADRS IDS-SR PHQ-9   12/22/2020 25 39 13   12/24/2020  31 8   12/30/20  31 7   1/8/2021  29 3                         Post-Procedure Diagnosis:  MDD, recurrent, severe 296.3    Irma Marley  [facility]      Plan   - Cont TMS      I did not see the patient during/after treatment but remained available in the clinic during  treatment.    Evelyn Zamudio MD  [facility]

## 2021-01-15 ENCOUNTER — OFFICE VISIT (OUTPATIENT)
Dept: PSYCHIATRY | Facility: CLINIC | Age: 51
End: 2021-01-15

## 2021-01-15 DIAGNOSIS — F33.2 SEVERE EPISODE OF RECURRENT MAJOR DEPRESSIVE DISORDER, WITHOUT PSYCHOTIC FEATURES (H): Primary | ICD-10-CM

## 2021-01-15 ASSESSMENT — PATIENT HEALTH QUESTIONNAIRE - PHQ9: SUM OF ALL RESPONSES TO PHQ QUESTIONS 1-9: 0

## 2021-01-15 NOTE — PROGRESS NOTES
[facility] TMS Program  [address], [address]  [place] [address]  TMS Procedure Note   Aure Joy MRN# [id number]  Age: 49 year old year old YOB: 1970    Pre-Procedure:  History and Physical: Reviewed in medical record  Consent Signed by: Aure Joy  On: 12/22/2020    Clinical Narrative:  Patient tolerating treatment. Pt had a better night of sleep last night.    Indications for TMS:  MDD, recurrent, severe; 4+ medication trials (from 2+ classes) ineffective; Psychotherapy ineffective.     Pre-Procedure Diagnosis:  MDD, recurrent, severe F33.2    Treatment Hx:  Treatment number this series: 15  Total lifetime treatment number: 129    Allergies   Allergen Reactions     Codeine Nausea     Other  [No Clinical Screening - See Comments] Nausea and Vomiting and Other (See Comments)     general anesthesia- uncontrolled vomitting      There were no vitals taken for this visit.    Pause for the Cause  Right patient:  Yes  Right procedure/correct coil:  Yes; rTMS; cpt 93107; F8 coil.   Earplugs in place:  Yes    Procedure  Patient was seated in procedure chair. Identity and procedure was verified. Ear plugs were placed in ears and patient-specific cap was placed on head and tightened appropriately. Ruler locations were verified. Coil was placed at treatment location and stimulator was set to parameters described below. A test train was delivered and pt tolerated train. Given pt tolerance, 60 treatment trains were delivered to the left side and 3 treatment trains were delivered to the right side. Pt tolerated procedure with forehead movement.      Motor Threshold Determination  Distance from nasion to inion: 35  Distance along circumference from midline: 6.67cm  Distance from Vertex: 9.56cm  MT 1: 0 - 6 - 16 @ 69% on 1/29/2018  MT 2: 0 - 6 - 16 @ 69% on 2/6/2018   MT 3: 0 - 6 - 16 @ 69% on 2/13/2018   MT 4: 0 - 6 - 16 @ 69% on 2/23/2018   MT 5: 0 - 6 - 16 @ 69% on 3/2/2018   MT 6: 0 - 5.5  - 15.5(always use intersection at left side of ruler)@ 85% on 8/23/19  MT 7: 0 - 5.5 - 15.5(always use intersection at left side of ruler)@ 80% on 8/29/19  MT 8: 0 - 5.5 - 37.5(always use intersection at left side of ruler)@ 76% on 9/5/19 (right side using EMG on left hand)  MT 9: C2 (R side using EMG on L hand) 78% on 9/12/2019  MT 10: C2 (R side using EMG on L hand) 76% on 9/26/2019  MT 11: C2 (R side using EMG on L hand) 82 on 1/31/2020  MT 12: C2 (R side using EMG on L Hand) 86% on 12/22/2020     LDLPFC:  Frequency: 50 Hz  Number of pulses:  3                        Number of bursts: 10  Burst frequency: 5 Hz  Cycle time: 10 sec  Total pulses delivered: 1800  Tx Loc: F3  Energy: 80% (93% MT) maximum due to stimulator limitations  Number of Cycles: 60 trains    RDLPFC:  Frequency: 50 Hz  Number of pulses:  3                        Number of bursts: 200  Burst frequency: 5 Hz  Cycle time: 130.0sec  Total pulses delivered: 1800  Tx Loc: F4 (right side)  Energy: 80% (93% MT) maximum due to stimulator limitations  Number of Cycles: 3 trains    Rating Scales:  Date MADRS IDS-SR PHQ-9   12/22/2020 25 39 13   12/24/2020  31 8   12/30/20  31 7   1/8/2021  29 3   1/15/2021  29 0                   Post-Procedure Diagnosis:  MDD, recurrent, severe 296.3    Irma Marley  [facility]      Plan   - Cont TMS    I did not see patient but remained available in the clinic throughout the TMS session    Elizabeth Gordon MD  [facility]

## 2021-01-18 ENCOUNTER — OFFICE VISIT (OUTPATIENT)
Dept: PSYCHIATRY | Facility: CLINIC | Age: 51
End: 2021-01-18

## 2021-01-18 DIAGNOSIS — F33.2 SEVERE EPISODE OF RECURRENT MAJOR DEPRESSIVE DISORDER, WITHOUT PSYCHOTIC FEATURES (H): Primary | ICD-10-CM

## 2021-01-18 ASSESSMENT — PATIENT HEALTH QUESTIONNAIRE - PHQ9: SUM OF ALL RESPONSES TO PHQ QUESTIONS 1-9: 2

## 2021-01-18 NOTE — PROGRESS NOTES
[facility] TMS Program  [address], [address]  [place] [address]  TMS Procedure Note   Aure Joy MRN# [id number]  Age: 49 year old year old YOB: 1970    Pre-Procedure:  History and Physical: Reviewed in medical record  Consent Signed by: Aure Joy  On: 12/22/2020    Clinical Narrative:  Patient tolerating treatment. Patient was doing well and had a nice weekend.    Indications for TMS:  MDD, recurrent, severe; 4+ medication trials (from 2+ classes) ineffective; Psychotherapy ineffective.     Pre-Procedure Diagnosis:  MDD, recurrent, severe F33.2    Treatment Hx:  Treatment number this series: 16  Total lifetime treatment number: 130    Allergies   Allergen Reactions     Codeine Nausea     Other  [No Clinical Screening - See Comments] Nausea and Vomiting and Other (See Comments)     general anesthesia- uncontrolled vomitting      There were no vitals taken for this visit.    Pause for the Cause  Right patient:  Yes  Right procedure/correct coil:  Yes; rTMS; cpt 13981; F8 coil.   Earplugs in place:  Yes    Procedure  Patient was seated in procedure chair. Identity and procedure was verified. Ear plugs were placed in ears and patient-specific cap was placed on head and tightened appropriately. Ruler locations were verified. Coil was placed at treatment location and stimulator was set to parameters described below. A test train was delivered and pt tolerated train. Given pt tolerance, 60 treatment trains were delivered to the left side and 3 treatment trains were delivered to the right side. Pt tolerated procedure with forehead movement.      Motor Threshold Determination  Distance from nasion to inion: 35  Distance along circumference from midline: 6.67cm  Distance from Vertex: 9.56cm  MT 1: 0 - 6 - 16 @ 69% on 1/29/2018  MT 2: 0 - 6 - 16 @ 69% on 2/6/2018   MT 3: 0 - 6 - 16 @ 69% on 2/13/2018   MT 4: 0 - 6 - 16 @ 69% on 2/23/2018   MT 5: 0 - 6 - 16 @ 69% on 3/2/2018   MT 6: 0 -  5.5 - 15.5(always use intersection at left side of ruler)@ 85% on 8/23/19  MT 7: 0 - 5.5 - 15.5(always use intersection at left side of ruler)@ 80% on 8/29/19  MT 8: 0 - 5.5 - 37.5(always use intersection at left side of ruler)@ 76% on 9/5/19 (right side using EMG on left hand)  MT 9: C2 (R side using EMG on L hand) 78% on 9/12/2019  MT 10: C2 (R side using EMG on L hand) 76% on 9/26/2019  MT 11: C2 (R side using EMG on L hand) 82 on 1/31/2020  MT 12: C2 (R side using EMG on L Hand) 86% on 12/22/2020     LDLPFC:  Frequency: 50 Hz  Number of pulses:  3                        Number of bursts: 10  Burst frequency: 5 Hz  Cycle time: 10 sec  Total pulses delivered: 1800  Tx Loc: F3  Energy: 80% (93% MT) maximum due to stimulator limitations  Number of Cycles: 60 trains    RDLPFC:  Frequency: 50 Hz  Number of pulses:  3                        Number of bursts: 200  Burst frequency: 5 Hz  Cycle time: 130.0sec  Total pulses delivered: 1800  Tx Loc: F4 (right side)  Energy: 80% (93% MT) maximum due to stimulator limitations  Number of Cycles: 3 trains    Rating Scales:  Date MADRS IDS-SR PHQ-9   12/22/2020 25 39 13   12/24/2020  31 8   12/30/20  31 7   1/8/2021  29 3   1/15/2021  29 0                   Post-Procedure Diagnosis:  MDD, recurrent, severe 296.3    Irma Marley  [facility]      Plan   - Cont TMS    I did not see patient but remained available in the clinic throughout the TMS session    Elizabeth Gordon MD  [facility]

## 2021-01-19 ENCOUNTER — OFFICE VISIT (OUTPATIENT)
Dept: PSYCHIATRY | Facility: CLINIC | Age: 51
End: 2021-01-19

## 2021-01-19 DIAGNOSIS — F33.2 SEVERE EPISODE OF RECURRENT MAJOR DEPRESSIVE DISORDER, WITHOUT PSYCHOTIC FEATURES (H): Primary | ICD-10-CM

## 2021-01-19 ASSESSMENT — PATIENT HEALTH QUESTIONNAIRE - PHQ9: SUM OF ALL RESPONSES TO PHQ QUESTIONS 1-9: 4

## 2021-01-19 NOTE — PROGRESS NOTES
[facility] TMS Program  [address], [address]  [place] [address]  TMS Procedure Note   Aure Joy MRN# [id number]  Age: 49 year old year old YOB: 1970    Pre-Procedure:  History and Physical: Reviewed in medical record  Consent Signed by: Aure Joy  On: 12/22/2020    Clinical Narrative:  Patient tolerating treatment. Patient was talking positively. No changes reported.    Indications for TMS:  MDD, recurrent, severe; 4+ medication trials (from 2+ classes) ineffective; Psychotherapy ineffective.     Pre-Procedure Diagnosis:  MDD, recurrent, severe F33.2    Treatment Hx:  Treatment number this series: 17  Total lifetime treatment number: 131    Allergies   Allergen Reactions     Codeine Nausea     Other  [No Clinical Screening - See Comments] Nausea and Vomiting and Other (See Comments)     general anesthesia- uncontrolled vomitting      There were no vitals taken for this visit.    Pause for the Cause  Right patient:  Yes  Right procedure/correct coil:  Yes; rTMS; cpt 25779; F8 coil.   Earplugs in place:  Yes    Procedure  Patient was seated in procedure chair. Identity and procedure was verified. Ear plugs were placed in ears and patient-specific cap was placed on head and tightened appropriately. Ruler locations were verified. Coil was placed at treatment location and stimulator was set to parameters described below. A test train was delivered and pt tolerated train. Given pt tolerance, 60 treatment trains were delivered to the left side and 3 treatment trains were delivered to the right side. Pt tolerated procedure with forehead movement.      Motor Threshold Determination  Distance from nasion to inion: 35  Distance along circumference from midline: 6.67cm  Distance from Vertex: 9.56cm  MT 1: 0 - 6 - 16 @ 69% on 1/29/2018  MT 2: 0 - 6 - 16 @ 69% on 2/6/2018   MT 3: 0 - 6 - 16 @ 69% on 2/13/2018   MT 4: 0 - 6 - 16 @ 69% on 2/23/2018   MT 5: 0 - 6 - 16 @ 69% on 3/2/2018   MT  6: 0 - 5.5 - 15.5(always use intersection at left side of ruler)@ 85% on 8/23/19  MT 7: 0 - 5.5 - 15.5(always use intersection at left side of ruler)@ 80% on 8/29/19  MT 8: 0 - 5.5 - 37.5(always use intersection at left side of ruler)@ 76% on 9/5/19 (right side using EMG on left hand)  MT 9: C2 (R side using EMG on L hand) 78% on 9/12/2019  MT 10: C2 (R side using EMG on L hand) 76% on 9/26/2019  MT 11: C2 (R side using EMG on L hand) 82 on 1/31/2020  MT 12: C2 (R side using EMG on L Hand) 86% on 12/22/2020     LDLPFC:  Frequency: 50 Hz  Number of pulses:  3                        Number of bursts: 10  Burst frequency: 5 Hz  Cycle time: 10 sec  Total pulses delivered: 1800  Tx Loc: F3  Energy: 80% (93% MT) maximum due to stimulator limitations  Number of Cycles: 60 trains    RDLPFC:  Frequency: 50 Hz  Number of pulses:  3                        Number of bursts: 200  Burst frequency: 5 Hz  Cycle time: 130.0sec  Total pulses delivered: 1800  Tx Loc: F4 (right side)  Energy: 80% (93% MT) maximum due to stimulator limitations  Number of Cycles: 3 trains    Rating Scales:  Date MADRS IDS-SR PHQ-9   12/22/2020 25 39 13   12/24/2020  31 8   12/30/20  31 7   1/8/2021  29 3   1/15/2021  29 0                   Post-Procedure Diagnosis:  MDD, recurrent, severe 296.3    Stephanie Ospina, TMS Technician  [facility]      Plan   - Cont TMS      I did not see the patient during/after treatment but remained available in the clinic during  treatment.    Evelyn Zamudio MD  [facility]

## 2021-01-20 ENCOUNTER — OFFICE VISIT (OUTPATIENT)
Dept: PSYCHIATRY | Facility: CLINIC | Age: 51
End: 2021-01-20

## 2021-01-20 DIAGNOSIS — F33.2 SEVERE EPISODE OF RECURRENT MAJOR DEPRESSIVE DISORDER, WITHOUT PSYCHOTIC FEATURES (H): Primary | ICD-10-CM

## 2021-01-20 ASSESSMENT — PATIENT HEALTH QUESTIONNAIRE - PHQ9: SUM OF ALL RESPONSES TO PHQ QUESTIONS 1-9: 3

## 2021-01-20 NOTE — PROGRESS NOTES
[facility] TMS Program  [address], [address]  [place] [address]  TMS Procedure Note   Aure oJy MRN# [id number]  Age: 49 year old year old YOB: 1970    Pre-Procedure:  History and Physical: Reviewed in medical record  Consent Signed by: Aure Joy  On: 12/22/2020    Clinical Narrative:  Patient tolerating treatment. Patient was happy about [place]. No changes reported.    Indications for TMS:  MDD, recurrent, severe; 4+ medication trials (from 2+ classes) ineffective; Psychotherapy ineffective.     Pre-Procedure Diagnosis:  MDD, recurrent, severe F33.2    Treatment Hx:  Treatment number this series: 18  Total lifetime treatment number: 132    Allergies   Allergen Reactions     Codeine Nausea     Other  [No Clinical Screening - See Comments] Nausea and Vomiting and Other (See Comments)     general anesthesia- uncontrolled vomitting      There were no vitals taken for this visit.    Pause for the Cause  Right patient:  Yes  Right procedure/correct coil:  Yes; rTMS; cpt 23774; F8 coil.   Earplugs in place:  Yes    Procedure  Patient was seated in procedure chair. Identity and procedure was verified. Ear plugs were placed in ears and patient-specific cap was placed on head and tightened appropriately. Ruler locations were verified. Coil was placed at treatment location and stimulator was set to parameters described below. A test train was delivered and pt tolerated train. Given pt tolerance, 60 treatment trains were delivered to the left side and 3 treatment trains were delivered to the right side. Pt tolerated procedure with forehead movement.      Motor Threshold Determination  Distance from nasion to inion: 35  Distance along circumference from midline: 6.67cm  Distance from Vertex: 9.56cm  MT 1: 0 - 6 - 16 @ 69% on 1/29/2018  MT 2: 0 - 6 - 16 @ 69% on 2/6/2018   MT 3: 0 - 6 - 16 @ 69% on 2/13/2018   MT 4: 0 - 6 - 16 @ 69% on 2/23/2018   MT 5: 0 - 6 - 16 @ 69% on  3/2/2018   MT 6: 0 - 5.5 - 15.5(always use intersection at left side of ruler)@ 85% on 8/23/19  MT 7: 0 - 5.5 - 15.5(always use intersection at left side of ruler)@ 80% on 8/29/19  MT 8: 0 - 5.5 - 37.5(always use intersection at left side of ruler)@ 76% on 9/5/19 (right side using EMG on left hand)  MT 9: C2 (R side using EMG on L hand) 78% on 9/12/2019  MT 10: C2 (R side using EMG on L hand) 76% on 9/26/2019  MT 11: C2 (R side using EMG on L hand) 82 on 1/31/2020  MT 12: C2 (R side using EMG on L Hand) 86% on 12/22/2020     LDLPFC:  Frequency: 50 Hz  Number of pulses:  3                        Number of bursts: 10  Burst frequency: 5 Hz  Cycle time: 10 sec  Total pulses delivered: 1800  Tx Loc: F3  Energy: 80% (93% MT) maximum due to stimulator limitations  Number of Cycles: 60 trains    RDLPFC:  Frequency: 50 Hz  Number of pulses:  3                        Number of bursts: 200  Burst frequency: 5 Hz  Cycle time: 130.0sec  Total pulses delivered: 1800  Tx Loc: F4 (right side)  Energy: 80% (93% MT) maximum due to stimulator limitations  Number of Cycles: 3 trains    Rating Scales:  Date MADRS IDS-SR PHQ-9   12/22/2020 25 39 13   12/24/2020  31 8   12/30/20  31 7   1/8/2021  29 3   1/15/2021  29 0                   Post-Procedure Diagnosis:  MDD, recurrent, severe 296.3    Stephanie Ospina, TMS Technician  [facility]      Plan   - Cont TMS    I did not see patient but remained available in the clinic throughout the TMS session    Elizabeth Gordon MD  [facility]

## 2021-01-21 ENCOUNTER — OFFICE VISIT (OUTPATIENT)
Dept: PSYCHIATRY | Facility: CLINIC | Age: 51
End: 2021-01-21

## 2021-01-21 DIAGNOSIS — F33.2 SEVERE EPISODE OF RECURRENT MAJOR DEPRESSIVE DISORDER, WITHOUT PSYCHOTIC FEATURES (H): Primary | ICD-10-CM

## 2021-01-21 ASSESSMENT — PATIENT HEALTH QUESTIONNAIRE - PHQ9: SUM OF ALL RESPONSES TO PHQ QUESTIONS 1-9: 5

## 2021-01-21 NOTE — PROGRESS NOTES
[facility] TMS Program  [address], [address]  [place] [address]  TMS Procedure Note   Aure Joy MRN# [id number]  Age: 49 year old year old YOB: 1970    Pre-Procedure:  History and Physical: Reviewed in medical record  Consent Signed by: Aure Joy  On: 12/22/2020    Clinical Narrative:  Patient tolerating treatment.  No changes reported.    Indications for TMS:  MDD, recurrent, severe; 4+ medication trials (from 2+ classes) ineffective; Psychotherapy ineffective.     Pre-Procedure Diagnosis:  MDD, recurrent, severe F33.2    Treatment Hx:  Treatment number this series: 19  Total lifetime treatment number: 133    Allergies   Allergen Reactions     Codeine Nausea     Other  [No Clinical Screening - See Comments] Nausea and Vomiting and Other (See Comments)     general anesthesia- uncontrolled vomitting      There were no vitals taken for this visit.    Pause for the Cause  Right patient:  Yes  Right procedure/correct coil:  Yes; rTMS; cpt 59114; F8 coil.   Earplugs in place:  Yes    Procedure  Patient was seated in procedure chair. Identity and procedure was verified. Ear plugs were placed in ears and patient-specific cap was placed on head and tightened appropriately. Ruler locations were verified. Coil was placed at treatment location and stimulator was set to parameters described below. A test train was delivered and pt tolerated train. Given pt tolerance, 60 treatment trains were delivered to the left side and 3 treatment trains were delivered to the right side. Pt tolerated procedure with forehead movement.      Motor Threshold Determination  Distance from nasion to inion: 35  Distance along circumference from midline: 6.67cm  Distance from Vertex: 9.56cm  MT 1: 0 - 6 - 16 @ 69% on 1/29/2018  MT 2: 0 - 6 - 16 @ 69% on 2/6/2018   MT 3: 0 - 6 - 16 @ 69% on 2/13/2018   MT 4: 0 - 6 - 16 @ 69% on 2/23/2018   MT 5: 0 - 6 - 16 @ 69% on 3/2/2018   MT 6: 0 - 5.5 - 15.5(always use  intersection at left side of ruler)@ 85% on 8/23/19  MT 7: 0 - 5.5 - 15.5(always use intersection at left side of ruler)@ 80% on 8/29/19  MT 8: 0 - 5.5 - 37.5(always use intersection at left side of ruler)@ 76% on 9/5/19 (right side using EMG on left hand)  MT 9: C2 (R side using EMG on L hand) 78% on 9/12/2019  MT 10: C2 (R side using EMG on L hand) 76% on 9/26/2019  MT 11: C2 (R side using EMG on L hand) 82 on 1/31/2020  MT 12: C2 (R side using EMG on L Hand) 86% on 12/22/2020     LDLPFC:  Frequency: 50 Hz  Number of pulses:  3                        Number of bursts: 10  Burst frequency: 5 Hz  Cycle time: 10 sec  Total pulses delivered: 1800  Tx Loc: F3  Energy: 80% (93% MT) maximum due to stimulator limitations  Number of Cycles: 60 trains    RDLPFC:  Frequency: 50 Hz  Number of pulses:  3                        Number of bursts: 200  Burst frequency: 5 Hz  Cycle time: 130.0sec  Total pulses delivered: 1800  Tx Loc: F4 (right side)  Energy: 80% (93% MT) maximum due to stimulator limitations  Number of Cycles: 3 trains    Rating Scales:  Date MADRS IDS-SR PHQ-9   12/22/2020 25 39 13   12/24/2020  31 8   12/30/20  31 7   1/8/2021  29 3   1/15/2021  29 0                   Post-Procedure Diagnosis:  MDD, recurrent, severe 296.3    Irma Marley  [facility]romod[facility]      Plan   - Cont TMS      I did not see the patient during/after treatment but remained available in the clinic during  treatment.    Evelyn Zamudio MD  [facility]romod[facility]

## 2021-01-22 ENCOUNTER — OFFICE VISIT (OUTPATIENT)
Dept: PSYCHIATRY | Facility: CLINIC | Age: 51
End: 2021-01-22
Payer: COMMERCIAL

## 2021-01-22 DIAGNOSIS — F33.2 SEVERE EPISODE OF RECURRENT MAJOR DEPRESSIVE DISORDER, WITHOUT PSYCHOTIC FEATURES (H): Primary | ICD-10-CM

## 2021-01-22 ASSESSMENT — PATIENT HEALTH QUESTIONNAIRE - PHQ9: SUM OF ALL RESPONSES TO PHQ QUESTIONS 1-9: 2

## 2021-01-22 NOTE — PROGRESS NOTES
[facility] TMS Program  [address], [address]  [place] [address]  TMS Procedure Note   Aure Joy MRN# [id number]  Age: 49 year old year old YOB: 1970    Pre-Procedure:  History and Physical: Reviewed in medical record  Consent Signed by: Aure Joy  On: 12/22/2020    Clinical Narrative:  Patient tolerating treatment.  No changes reported.    Indications for TMS:  MDD, recurrent, severe; 4+ medication trials (from 2+ classes) ineffective; Psychotherapy ineffective.     Pre-Procedure Diagnosis:  MDD, recurrent, severe F33.2    Treatment Hx:  Treatment number this series: 20  Total lifetime treatment number: 134    Allergies   Allergen Reactions     Codeine Nausea     Other  [No Clinical Screening - See Comments] Nausea and Vomiting and Other (See Comments)     general anesthesia- uncontrolled vomitting      There were no vitals taken for this visit.    Pause for the Cause  Right patient:  Yes  Right procedure/correct coil:  Yes; rTMS; cpt 33017; F8 coil.   Earplugs in place:  Yes    Procedure  Patient was seated in procedure chair. Identity and procedure was verified. Ear plugs were placed in ears and patient-specific cap was placed on head and tightened appropriately. Ruler locations were verified. Coil was placed at treatment location and stimulator was set to parameters described below. A test train was delivered and pt tolerated train. Given pt tolerance, 60 treatment trains were delivered to the left side and 3 treatment trains were delivered to the right side. Pt tolerated procedure with forehead movement.      Motor Threshold Determination  Distance from nasion to inion: 35  Distance along circumference from midline: 6.67cm  Distance from Vertex: 9.56cm  MT 1: 0 - 6 - 16 @ 69% on 1/29/2018  MT 2: 0 - 6 - 16 @ 69% on 2/6/2018   MT 3: 0 - 6 - 16 @ 69% on 2/13/2018   MT 4: 0 - 6 - 16 @ 69% on 2/23/2018   MT 5: 0 - 6 - 16 @ 69% on 3/2/2018   MT 6: 0 - 5.5 - 15.5(always use  intersection at left side of ruler)@ 85% on 8/23/19  MT 7: 0 - 5.5 - 15.5(always use intersection at left side of ruler)@ 80% on 8/29/19  MT 8: 0 - 5.5 - 37.5(always use intersection at left side of ruler)@ 76% on 9/5/19 (right side using EMG on left hand)  MT 9: C2 (R side using EMG on L hand) 78% on 9/12/2019  MT 10: C2 (R side using EMG on L hand) 76% on 9/26/2019  MT 11: C2 (R side using EMG on L hand) 82 on 1/31/2020  MT 12: C2 (R side using EMG on L Hand) 86% on 12/22/2020     LDLPFC:  Frequency: 50 Hz  Number of pulses:  3                        Number of bursts: 10  Burst frequency: 5 Hz  Cycle time: 10 sec  Total pulses delivered: 1800  Tx Loc: F3  Energy: 80% (93% MT) maximum due to stimulator limitations  Number of Cycles: 60 trains    RDLPFC:  Frequency: 50 Hz  Number of pulses:  3                        Number of bursts: 200  Burst frequency: 5 Hz  Cycle time: 130.0sec  Total pulses delivered: 1800  Tx Loc: F4 (right side)  Energy: 80% (93% MT) maximum due to stimulator limitations  Number of Cycles: 3 trains    Rating Scales:  Date MADRS IDS-SR PHQ-9   12/22/2020 25 39 13   12/24/2020  31 8   12/30/20  31 7   1/8/2021  29 3   1/15/2021  29 0   1/22/21  27 2             Post-Procedure Diagnosis:  MDD, recurrent, severe 296.3    Stephanie Ospina, TMS Technician  [facility]romod[facility]      Plan   - Cont TMS    I did not see patient but remained available in the clinic throughout the TMS session    Elizabeth Gordon MD  [facility]romod[facility]

## 2021-01-25 ENCOUNTER — ALLIED HEALTH/NURSE VISIT (OUTPATIENT)
Dept: PSYCHIATRY | Facility: CLINIC | Age: 51
End: 2021-01-25
Payer: COMMERCIAL

## 2021-01-25 ENCOUNTER — OFFICE VISIT (OUTPATIENT)
Dept: PSYCHIATRY | Facility: CLINIC | Age: 51
End: 2021-01-25
Payer: COMMERCIAL

## 2021-01-25 VITALS — DIASTOLIC BLOOD PRESSURE: 85 MMHG | SYSTOLIC BLOOD PRESSURE: 126 MMHG | OXYGEN SATURATION: 93 % | HEART RATE: 75 BPM

## 2021-01-25 DIAGNOSIS — F33.2 SEVERE EPISODE OF RECURRENT MAJOR DEPRESSIVE DISORDER, WITHOUT PSYCHOTIC FEATURES (H): Primary | ICD-10-CM

## 2021-01-25 ASSESSMENT — PATIENT HEALTH QUESTIONNAIRE - PHQ9: SUM OF ALL RESPONSES TO PHQ QUESTIONS 1-9: 3

## 2021-01-25 NOTE — PROGRESS NOTES
[facility] TMS Program  [address], [address]  [place] [address]  TMS Procedure Note   Aure Joy MRN# [id number]  Age: 49 year old year old YOB: 1970    Pre-Procedure:  History and Physical: Reviewed in medical record  Consent Signed by: Aure Joy  On: 12/22/2020    Clinical Narrative:  Patient tolerating treatment.  Patient had a good weekend. No changes reported.    Indications for TMS:  MDD, recurrent, severe; 4+ medication trials (from 2+ classes) ineffective; Psychotherapy ineffective.     Pre-Procedure Diagnosis:  MDD, recurrent, severe F33.2    Treatment Hx:  Treatment number this series: 21  Total lifetime treatment number: 135    Allergies   Allergen Reactions     Codeine Nausea     Other  [No Clinical Screening - See Comments] Nausea and Vomiting and Other (See Comments)     general anesthesia- uncontrolled vomitting      There were no vitals taken for this visit.    Pause for the Cause  Right patient:  Yes  Right procedure/correct coil:  Yes; rTMS; cpt 45864; F8 coil.   Earplugs in place:  Yes    Procedure  Patient was seated in procedure chair. Identity and procedure was verified. Ear plugs were placed in ears and patient-specific cap was placed on head and tightened appropriately. Ruler locations were verified. Coil was placed at treatment location and stimulator was set to parameters described below. A test train was delivered and pt tolerated train. Given pt tolerance, 60 treatment trains were delivered to the left side and 3 treatment trains were delivered to the right side. Pt tolerated procedure with forehead movement.      Motor Threshold Determination  Distance from nasion to inion: 35  Distance along circumference from midline: 6.67cm  Distance from Vertex: 9.56cm  MT 1: 0 - 6 - 16 @ 69% on 1/29/2018  MT 2: 0 - 6 - 16 @ 69% on 2/6/2018   MT 3: 0 - 6 - 16 @ 69% on 2/13/2018   MT 4: 0 - 6 - 16 @ 69% on 2/23/2018   MT 5: 0 - 6 - 16 @ 69% on 3/2/2018   MT 6: 0  - 5.5 - 15.5(always use intersection at left side of ruler)@ 85% on 8/23/19  MT 7: 0 - 5.5 - 15.5(always use intersection at left side of ruler)@ 80% on 8/29/19  MT 8: 0 - 5.5 - 37.5(always use intersection at left side of ruler)@ 76% on 9/5/19 (right side using EMG on left hand)  MT 9: C2 (R side using EMG on L hand) 78% on 9/12/2019  MT 10: C2 (R side using EMG on L hand) 76% on 9/26/2019  MT 11: C2 (R side using EMG on L hand) 82 on 1/31/2020  MT 12: C2 (R side using EMG on L Hand) 86% on 12/22/2020     LDLPFC:  Frequency: 50 Hz  Number of pulses:  3                        Number of bursts: 10  Burst frequency: 5 Hz  Cycle time: 10 sec  Total pulses delivered: 1800  Tx Loc: F3  Energy: 80% (93% MT) maximum due to stimulator limitations  Number of Cycles: 60 trains    RDLPFC:  Frequency: 50 Hz  Number of pulses:  3                        Number of bursts: 200  Burst frequency: 5 Hz  Cycle time: 180.0sec  Total pulses delivered: 1800  Tx Loc: F4 (right side)  Energy: 80% (93% MT) maximum due to stimulator limitations  Number of Cycles: 3 trains    Rating Scales:  Date MADRS IDS-SR PHQ-9   12/22/2020 25 39 13   12/24/2020  31 8   12/30/20  31 7   1/8/2021  29 3   1/15/2021  29 0   1/22/21  27 2             Post-Procedure Diagnosis:  MDD, recurrent, severe 296.3    Stephanie Ospina, TMS Technician  [facility]od[facility]      Plan   - Cont TMS    I did not see patient but remained available in the clinic throughout the TMS session    Elizabeth Gordon MD  [facility]od[facility]

## 2021-01-25 NOTE — PROGRESS NOTES
Aure Joy comes into clinic today at the request of ESTELA Zamudio MD Ordering Provider for Med Injection only ketamine.    Procedure Prep:  Medication double check completed by: Estrella Fuentes RN  Prior to administration pt identified by name and : Yes    Nursing Assessment:  Appearance: good   Mood: good  Affect: consistent with mood  Eye contact: good    PHQ 2021   PHQ-9 Total Score 3   Q9: Thoughts of better off dead/self-harm past 2 weeks Not at all     QIDDSR 16 weekly assessment: score 9. Assessment was scanned to EHR.   PCL5 weekly assessment: score N/A. Assessment was scanned to EHR.     Procedure Performed:  VSS and mental status WNL. Patient was given injection. See MAR for details.     Post Procedure Assessment:  Patient tolerated the treatment with the following side effects: Nausea, minor dissociation, sedation. Vital signs were monitored, see VS Flowsheet. Patient stated they felt ready to go home prior to discharge. AVS was offered to patient and patient declined. Patient was instructed not to drive for the remainder of the day and to notify clinic if any concerns arise.     Next appt: in 14 days    Medication provided by Pharmacy    This service provided today was under the supervising provider of the day Elizabeth Gordon MD, who was available if needed.    Braden Christian RN

## 2021-01-26 ENCOUNTER — OFFICE VISIT (OUTPATIENT)
Dept: PSYCHIATRY | Facility: CLINIC | Age: 51
End: 2021-01-26
Payer: COMMERCIAL

## 2021-01-26 DIAGNOSIS — F33.2 SEVERE EPISODE OF RECURRENT MAJOR DEPRESSIVE DISORDER, WITHOUT PSYCHOTIC FEATURES (H): Primary | ICD-10-CM

## 2021-01-26 ASSESSMENT — PATIENT HEALTH QUESTIONNAIRE - PHQ9: SUM OF ALL RESPONSES TO PHQ QUESTIONS 1-9: 3

## 2021-01-26 NOTE — PROGRESS NOTES
[facility] TMS Program  [address], [address]  [place] [address]  TMS Procedure Note   Aure Joy MRN# [id number]  Age: 49 year old year old YOB: 1970    Pre-Procedure:  History and Physical: Reviewed in medical record  Consent Signed by: Aure Joy  On: 12/22/2020    Clinical Narrative:  Patient tolerating treatment.  Patient was struggling with nausea after yesterday's ketamine appointment. RN notified. Otherwise, no changes reported.    Indications for TMS:  MDD, recurrent, severe; 4+ medication trials (from 2+ classes) ineffective; Psychotherapy ineffective.     Pre-Procedure Diagnosis:  MDD, recurrent, severe F33.2    Treatment Hx:  Treatment number this series: 22  Total lifetime treatment number: 136    Allergies   Allergen Reactions     Codeine Nausea     Other  [No Clinical Screening - See Comments] Nausea and Vomiting and Other (See Comments)     general anesthesia- uncontrolled vomitting      There were no vitals taken for this visit.    Pause for the Cause  Right patient:  Yes  Right procedure/correct coil:  Yes; rTMS; cpt 53775; F8 coil.   Earplugs in place:  Yes    Procedure  Patient was seated in procedure chair. Identity and procedure was verified. Ear plugs were placed in ears and patient-specific cap was placed on head and tightened appropriately. Ruler locations were verified. Coil was placed at treatment location and stimulator was set to parameters described below. A test train was delivered and pt tolerated train. Given pt tolerance, 60 treatment trains were delivered to the left side and 3 treatment trains were delivered to the right side. Pt tolerated procedure with forehead movement.      Motor Threshold Determination  Distance from nasion to inion: 35  Distance along circumference from midline: 6.67cm  Distance from Vertex: 9.56cm  MT 1: 0 - 6 - 16 @ 69% on 1/29/2018  MT 2: 0 - 6 - 16 @ 69% on 2/6/2018   MT 3: 0 - 6 - 16 @ 69% on 2/13/2018   MT 4: 0 - 6  - 16 @ 69% on 2/23/2018   MT 5: 0 - 6 - 16 @ 69% on 3/2/2018   MT 6: 0 - 5.5 - 15.5(always use intersection at left side of ruler)@ 85% on 8/23/19  MT 7: 0 - 5.5 - 15.5(always use intersection at left side of ruler)@ 80% on 8/29/19  MT 8: 0 - 5.5 - 37.5(always use intersection at left side of ruler)@ 76% on 9/5/19 (right side using EMG on left hand)  MT 9: C2 (R side using EMG on L hand) 78% on 9/12/2019  MT 10: C2 (R side using EMG on L hand) 76% on 9/26/2019  MT 11: C2 (R side using EMG on L hand) 82 on 1/31/2020  MT 12: C2 (R side using EMG on L Hand) 86% on 12/22/2020     LDLPFC:  Frequency: 50 Hz  Number of pulses:  3                        Number of bursts: 10  Burst frequency: 5 Hz  Cycle time: 10 sec  Total pulses delivered: 1800  Tx Loc: F3  Energy: 80% (93% MT) maximum due to stimulator limitations  Number of Cycles: 60 trains    RDLPFC:  Frequency: 50 Hz  Number of pulses:  3                        Number of bursts: 200  Burst frequency: 5 Hz  Cycle time: 190.0sec  Total pulses delivered: 1800  Tx Loc: F4 (right side)  Energy: 80% (93% MT) maximum due to stimulator limitations  Number of Cycles: 3 trains    Rating Scales:  Date MADRS IDS-SR PHQ-9   12/22/2020 25 39 13   12/24/2020  31 8   12/30/20  31 7   1/8/2021  29 3   1/15/2021  29 0   1/22/21  27 2             Post-Procedure Diagnosis:  MDD, recurrent, severe 296.3    Stephanie Ospina, TMS Technician  [facility] Neuromod[facility]      Plan   - Cont TMS      I did not see the patient during/after treatment but remained available in the clinic during  treatment.    Evelyn Zamudio MD  [facility] Neu[facility]od[facility]

## 2021-01-27 ENCOUNTER — OFFICE VISIT (OUTPATIENT)
Dept: PSYCHIATRY | Facility: CLINIC | Age: 51
End: 2021-01-27
Payer: COMMERCIAL

## 2021-01-27 DIAGNOSIS — F33.2 SEVERE EPISODE OF RECURRENT MAJOR DEPRESSIVE DISORDER, WITHOUT PSYCHOTIC FEATURES (H): Primary | ICD-10-CM

## 2021-01-27 ASSESSMENT — PATIENT HEALTH QUESTIONNAIRE - PHQ9: SUM OF ALL RESPONSES TO PHQ QUESTIONS 1-9: 5

## 2021-01-27 NOTE — PROGRESS NOTES
" [facility] TMS Program  [address], [address]  [place] [address]  TMS Procedure Note   Aure Joy MRN# [id number]  Age: 49 year old year old YOB: 1970    Pre-Procedure:  History and Physical: Reviewed in medical record  Consent Signed by: Aure Joy  On: 12/22/2020    Clinical Narrative:  Patient tolerating treatment.  Patient was feeling a bit \"frazzled\" today - not \"put together.\"  Indications for TMS:  MDD, recurrent, severe; 4+ medication trials (from 2+ classes) ineffective; Psychotherapy ineffective.     Pre-Procedure Diagnosis:  MDD, recurrent, severe F33.2    Treatment Hx:  Treatment number this series: 23  Total lifetime treatment number: 137    Allergies   Allergen Reactions     Codeine Nausea     Other  [No Clinical Screening - See Comments] Nausea and Vomiting and Other (See Comments)     general anesthesia- uncontrolled vomitting      There were no vitals taken for this visit.    Pause for the Cause  Right patient:  Yes  Right procedure/correct coil:  Yes; rTMS; cpt 75705; F8 coil.   Earplugs in place:  Yes    Procedure  Patient was seated in procedure chair. Identity and procedure was verified. Ear plugs were placed in ears and patient-specific cap was placed on head and tightened appropriately. Ruler locations were verified. Coil was placed at treatment location and stimulator was set to parameters described below. A test train was delivered and pt tolerated train. Given pt tolerance, 60 treatment trains were delivered to the left side and 3 treatment trains were delivered to the right side. Pt tolerated procedure with forehead movement.      Motor Threshold Determination  Distance from nasion to inion: 35  Distance along circumference from midline: 6.67cm  Distance from Vertex: 9.56cm  MT 1: 0 - 6 - 16 @ 69% on 1/29/2018  MT 2: 0 - 6 - 16 @ 69% on 2/6/2018   MT 3: 0 - 6 - 16 @ 69% on 2/13/2018   MT 4: 0 - 6 - 16 @ 69% on 2/23/2018   MT 5: 0 - 6 - 16 @ 69% on " 3/2/2018   MT 6: 0 - 5.5 - 15.5(always use intersection at left side of ruler)@ 85% on 8/23/19  MT 7: 0 - 5.5 - 15.5(always use intersection at left side of ruler)@ 80% on 8/29/19  MT 8: 0 - 5.5 - 37.5(always use intersection at left side of ruler)@ 76% on 9/5/19 (right side using EMG on left hand)  MT 9: C2 (R side using EMG on L hand) 78% on 9/12/2019  MT 10: C2 (R side using EMG on L hand) 76% on 9/26/2019  MT 11: C2 (R side using EMG on L hand) 82 on 1/31/2020  MT 12: C2 (R side using EMG on L Hand) 86% on 12/22/2020     LDLPFC:  Frequency: 50 Hz  Number of pulses:  3                        Number of bursts: 10  Burst frequency: 5 Hz  Cycle time: 10 sec  Total pulses delivered: 1800  Tx Loc: F3  Energy: 80% (93% MT) maximum due to stimulator limitations  Number of Cycles: 60 trains    RDLPFC:  Frequency: 50 Hz  Number of pulses:  3                        Number of bursts: 200  Burst frequency: 5 Hz  Cycle time: 190.0sec  Total pulses delivered: 1800  Tx Loc: F4 (right side)  Energy: 80% (93% MT) maximum due to stimulator limitations  Number of Cycles: 3 trains    Rating Scales:  Date MADRS IDS-SR PHQ-9   12/22/2020 25 39 13   12/24/2020  31 8   12/30/20  31 7   1/8/2021  29 3   1/15/2021  29 0   1/22/21  27 2             Post-Procedure Diagnosis:  MDD, recurrent, severe 296.3    Stephanie Ospina, TMS Technician  [facility] Neuromodulation      Plan   - Cont TMS    I did not see patient but remained available in the clinic throughout the TMS session    Elizabeth Gordon MD  [facility] Neuromod[facility]

## 2021-01-28 ENCOUNTER — TELEPHONE (OUTPATIENT)
Dept: PSYCHIATRY | Facility: CLINIC | Age: 51
End: 2021-01-28

## 2021-01-28 ENCOUNTER — OFFICE VISIT (OUTPATIENT)
Dept: PSYCHIATRY | Facility: CLINIC | Age: 51
End: 2021-01-28
Payer: COMMERCIAL

## 2021-01-28 DIAGNOSIS — F33.2 SEVERE EPISODE OF RECURRENT MAJOR DEPRESSIVE DISORDER, WITHOUT PSYCHOTIC FEATURES (H): Primary | ICD-10-CM

## 2021-01-28 DIAGNOSIS — R11.0 NAUSEA: Primary | ICD-10-CM

## 2021-01-28 RX ORDER — ONDANSETRON 4 MG/1
4 TABLET, ORALLY DISINTEGRATING ORAL PRN
Qty: 12 TABLET | Refills: 2 | Status: SHIPPED | OUTPATIENT
Start: 2021-01-28 | End: 2022-03-01

## 2021-01-28 ASSESSMENT — PATIENT HEALTH QUESTIONNAIRE - PHQ9: SUM OF ALL RESPONSES TO PHQ QUESTIONS 1-9: 4

## 2021-01-28 NOTE — PROGRESS NOTES
[facility] TMS Program  [address], [address]  [place] [address]  TMS Procedure Note   Aure Joy MRN# [id number]  Age: 49 year old year old YOB: 1970    Pre-Procedure:  History and Physical: Reviewed in medical record  Consent Signed by: Aure Joy  On: 12/22/2020    Clinical Narrative:  Patient tolerating treatment.  Patient couldn't believe treatment would be ending soon - she is thankful that she has found something that works for her when nothing else has.    Indications for TMS:  MDD, recurrent, severe; 4+ medication trials (from 2+ classes) ineffective; Psychotherapy ineffective.     Pre-Procedure Diagnosis:  MDD, recurrent, severe F33.2    Treatment Hx:  Treatment number this series: 24  Total lifetime treatment number: 138    Allergies   Allergen Reactions     Codeine Nausea     Other  [No Clinical Screening - See Comments] Nausea and Vomiting and Other (See Comments)     general anesthesia- uncontrolled vomitting      There were no vitals taken for this visit.    Pause for the Cause  Right patient:  Yes  Right procedure/correct coil:  Yes; rTMS; cpt 06497; F8 coil.   Earplugs in place:  Yes    Procedure  Patient was seated in procedure chair. Identity and procedure was verified. Ear plugs were placed in ears and patient-specific cap was placed on head and tightened appropriately. Ruler locations were verified. Coil was placed at treatment location and stimulator was set to parameters described below. A test train was delivered and pt tolerated train. Given pt tolerance, 60 treatment trains were delivered to the left side and 3 treatment trains were delivered to the right side. Pt tolerated procedure with forehead movement.      Motor Threshold Determination  Distance from nasion to inion: 35  Distance along circumference from midline: 6.67cm  Distance from Vertex: 9.56cm  MT 1: 0 - 6 - 16 @ 69% on 1/29/2018  MT 2: 0 - 6 - 16 @ 69% on 2/6/2018   MT 3: 0 - 6 - 16 @ 69%  on 2/13/2018   MT 4: 0 - 6 - 16 @ 69% on 2/23/2018   MT 5: 0 - 6 - 16 @ 69% on 3/2/2018   MT 6: 0 - 5.5 - 15.5(always use intersection at left side of ruler)@ 85% on 8/23/19  MT 7: 0 - 5.5 - 15.5(always use intersection at left side of ruler)@ 80% on 8/29/19  MT 8: 0 - 5.5 - 37.5(always use intersection at left side of ruler)@ 76% on 9/5/19 (right side using EMG on left hand)  MT 9: C2 (R side using EMG on L hand) 78% on 9/12/2019  MT 10: C2 (R side using EMG on L hand) 76% on 9/26/2019  MT 11: C2 (R side using EMG on L hand) 82 on 1/31/2020  MT 12: C2 (R side using EMG on L Hand) 86% on 12/22/2020     LDLPFC:  Frequency: 50 Hz  Number of pulses:  3                        Number of bursts: 10  Burst frequency: 5 Hz  Cycle time: 10 sec  Total pulses delivered: 1800  Tx Loc: F3  Energy: 80% (93% MT) maximum due to stimulator limitations  Number of Cycles: 60 trains    RDLPFC:  Frequency: 50 Hz  Number of pulses:  3                        Number of bursts: 200  Burst frequency: 5 Hz  Cycle time: 190.0sec  Total pulses delivered: 1800  Tx Loc: F4 (right side)  Energy: 80% (93% MT) maximum due to stimulator limitations  Number of Cycles: 3 trains    Rating Scales:  Date MADRS IDS-SR PHQ-9   12/22/2020 25 39 13   12/24/2020  31 8   12/30/20  31 7   1/8/2021  29 3   1/15/2021  29 0   1/22/21  27 2             Post-Procedure Diagnosis:  MDD, recurrent, severe 296.3    Stephanie Ospina, TMS Technician  [facility]od[facility]      Plan   - Cont TMS      I did not see the patient during/after treatment but remained available in the clinic during  treatment.    Evelyn Zamudio MD  [facility]

## 2021-01-28 NOTE — TELEPHONE ENCOUNTER
Pt endorsed increased nausea associated with ketamine injection. Placed order for ondansetron to be taken 30 minutes before ketamine injection. Rx sent to [facility] #[id number] in [facility].    ESTELA Zamudio MD  [facility]  Department of Psychiatry

## 2021-01-29 ENCOUNTER — OFFICE VISIT (OUTPATIENT)
Dept: PSYCHIATRY | Facility: CLINIC | Age: 51
End: 2021-01-29
Payer: COMMERCIAL

## 2021-01-29 DIAGNOSIS — F33.2 SEVERE EPISODE OF RECURRENT MAJOR DEPRESSIVE DISORDER, WITHOUT PSYCHOTIC FEATURES (H): Primary | ICD-10-CM

## 2021-01-29 ASSESSMENT — PATIENT HEALTH QUESTIONNAIRE - PHQ9: SUM OF ALL RESPONSES TO PHQ QUESTIONS 1-9: 2

## 2021-01-29 NOTE — PROGRESS NOTES
[facility] TMS Program  [address], [address]  [place] [address]  TMS Procedure Note   Aure Joy MRN# [id number]  Age: 49 year old year old YOB: 1970    Pre-Procedure:  History and Physical: Reviewed in medical record  Consent Signed by: Aure Joy  On: 12/22/2020    Clinical Narrative:  Patient tolerating treatment.  Patient discussed her daughter's college plans.      Indications for TMS:  MDD, recurrent, severe; 4+ medication trials (from 2+ classes) ineffective; Psychotherapy ineffective.     Pre-Procedure Diagnosis:  MDD, recurrent, severe F33.2    Treatment Hx:  Treatment number this series: 25  Total lifetime treatment number: 139    Allergies   Allergen Reactions     Codeine Nausea     Other  [No Clinical Screening - See Comments] Nausea and Vomiting and Other (See Comments)     general anesthesia- uncontrolled vomitting      There were no vitals taken for this visit.    Pause for the Cause  Right patient:  Yes  Right procedure/correct coil:  Yes; rTMS; cpt 85246; F8 coil.   Earplugs in place:  Yes    Procedure  Patient was seated in procedure chair. Identity and procedure was verified. Ear plugs were placed in ears and patient-specific cap was placed on head and tightened appropriately. Ruler locations were verified. Coil was placed at treatment location and stimulator was set to parameters described below. A test train was delivered and pt tolerated train. Given pt tolerance, 60 treatment trains were delivered to the left side and 3 treatment trains were delivered to the right side. Pt tolerated procedure with forehead movement.      Motor Threshold Determination  Distance from nasion to inion: 35  Distance along circumference from midline: 6.67cm  Distance from Vertex: 9.56cm  MT 1: 0 - 6 - 16 @ 69% on 1/29/2018  MT 2: 0 - 6 - 16 @ 69% on 2/6/2018   MT 3: 0 - 6 - 16 @ 69% on 2/13/2018   MT 4: 0 - 6 - 16 @ 69% on 2/23/2018   MT 5: 0 - 6 - 16 @ 69% on 3/2/2018   MT 6:  0 - 5.5 - 15.5(always use intersection at left side of ruler)@ 85% on 8/23/19  MT 7: 0 - 5.5 - 15.5(always use intersection at left side of ruler)@ 80% on 8/29/19  MT 8: 0 - 5.5 - 37.5(always use intersection at left side of ruler)@ 76% on 9/5/19 (right side using EMG on left hand)  MT 9: C2 (R side using EMG on L hand) 78% on 9/12/2019  MT 10: C2 (R side using EMG on L hand) 76% on 9/26/2019  MT 11: C2 (R side using EMG on L hand) 82 on 1/31/2020  MT 12: C2 (R side using EMG on L Hand) 86% on 12/22/2020     LDLPFC:  Frequency: 50 Hz  Number of pulses:  3                        Number of bursts: 10  Burst frequency: 5 Hz  Cycle time: 10 sec  Total pulses delivered: 1800  Tx Loc: F3  Energy: 80% (93% MT) maximum due to stimulator limitations  Number of Cycles: 60 trains    RDLPFC:  Frequency: 50 Hz  Number of pulses:  3                        Number of bursts: 200  Burst frequency: 5 Hz  Cycle time: 190.0sec  Total pulses delivered: 1800  Tx Loc: F4 (right side)  Energy: 80% (93% MT) maximum due to stimulator limitations  Number of Cycles: 3 trains    Rating Scales:  Date MADRS IDS-SR PHQ-9   12/22/2020 25 39 13   12/24/2020  31 8   12/30/20  31 7   1/8/2021  29 3   1/15/2021  29 0   1/22/21  27 2   1/29/21  23 2       Post-Procedure Diagnosis:  MDD, recurrent, severe 296.3    Irma Marley Psychometrist   [facility]      Plan   - Cont TMS    I did not see patient but remained available in the clinic throughout the TMS session      Elizabeth Gordon MD  [facility]

## 2021-02-01 ENCOUNTER — OFFICE VISIT (OUTPATIENT)
Dept: PSYCHIATRY | Facility: CLINIC | Age: 51
End: 2021-02-01
Payer: COMMERCIAL

## 2021-02-01 DIAGNOSIS — F33.2 SEVERE EPISODE OF RECURRENT MAJOR DEPRESSIVE DISORDER, WITHOUT PSYCHOTIC FEATURES (H): Primary | ICD-10-CM

## 2021-02-01 ASSESSMENT — PATIENT HEALTH QUESTIONNAIRE - PHQ9: SUM OF ALL RESPONSES TO PHQ QUESTIONS 1-9: 5

## 2021-02-01 NOTE — PROGRESS NOTES
[facility] TMS Program  [address], [address]  [place] [address]  TMS Procedure Note   Aure Joy MRN# [id number]  Age: 49 year old year old YOB: 1970    Pre-Procedure:  History and Physical: Reviewed in medical record  Consent Signed by: Aure Joy  On: 12/22/2020    Clinical Narrative:  Patient tolerating treatment.  Patient's daughter painted her room over the weekend. No changes reported.     Indications for TMS:  MDD, recurrent, severe; 4+ medication trials (from 2+ classes) ineffective; Psychotherapy ineffective.     Pre-Procedure Diagnosis:  MDD, recurrent, severe F33.2    Treatment Hx:  Treatment number this series: 26   Total lifetime treatment number: 140    Allergies   Allergen Reactions     Codeine Nausea     Other  [No Clinical Screening - See Comments] Nausea and Vomiting and Other (See Comments)     general anesthesia- uncontrolled vomitting      There were no vitals taken for this visit.    Pause for the Cause  Right patient:  Yes  Right procedure/correct coil:  Yes; rTMS; cpt 96900; F8 coil.   Earplugs in place:  Yes    Procedure  Patient was seated in procedure chair. Identity and procedure was verified. Ear plugs were placed in ears and patient-specific cap was placed on head and tightened appropriately. Ruler locations were verified. Coil was placed at treatment location and stimulator was set to parameters described below. A test train was delivered and pt tolerated train. Given pt tolerance, 60 treatment trains were delivered to the left side and 3 treatment trains were delivered to the right side. Pt tolerated procedure with forehead movement.      Motor Threshold Determination  Distance from nasion to inion: 35  Distance along circumference from midline: 6.67cm  Distance from Vertex: 9.56cm  MT 1: 0 - 6 - 16 @ 69% on 1/29/2018  MT 2: 0 - 6 - 16 @ 69% on 2/6/2018   MT 3: 0 - 6 - 16 @ 69% on 2/13/2018   MT 4: 0 - 6 - 16 @ 69% on 2/23/2018   MT 5: 0 - 6 - 16  @ 69% on 3/2/2018   MT 6: 0 - 5.5 - 15.5(always use intersection at left side of ruler)@ 85% on 8/23/19  MT 7: 0 - 5.5 - 15.5(always use intersection at left side of ruler)@ 80% on 8/29/19  MT 8: 0 - 5.5 - 37.5(always use intersection at left side of ruler)@ 76% on 9/5/19 (right side using EMG on left hand)  MT 9: C2 (R side using EMG on L hand) 78% on 9/12/2019  MT 10: C2 (R side using EMG on L hand) 76% on 9/26/2019  MT 11: C2 (R side using EMG on L hand) 82 on 1/31/2020  MT 12: C2 (R side using EMG on L Hand) 86% on 12/22/2020     LDLPFC:  Frequency: 50 Hz  Number of pulses:  3                        Number of bursts: 10  Burst frequency: 5 Hz  Cycle time: 10 sec  Total pulses delivered: 1800  Tx Loc: F3  Energy: 80% (93% MT) maximum due to stimulator limitations  Number of Cycles: 60 trains    RDLPFC:  Frequency: 50 Hz  Number of pulses:  3                        Number of bursts: 200  Burst frequency: 5 Hz  Cycle time: 190.0sec  Total pulses delivered: 1800  Tx Loc: F4 (right side)  Energy: 80% (93% MT) maximum due to stimulator limitations  Number of Cycles: 3 trains    Rating Scales:  Date MADRS IDS-SR PHQ-9   12/22/2020 25 39 13   12/24/2020  31 8   12/30/20  31 7   1/8/2021  29 3   1/15/2021  29 0   1/22/21  27 2   1/29/21  23 2       Post-Procedure Diagnosis:  MDD, recurrent, severe 296.3    Stephanie Ospina, TMS Technician  [facility]      Plan   - Cont TMS    I did not see patient but remained available in the clinic throughout the TMS session    Elizabeth Gordon MD  [facility]

## 2021-02-02 ENCOUNTER — OFFICE VISIT (OUTPATIENT)
Dept: PSYCHIATRY | Facility: CLINIC | Age: 51
End: 2021-02-02
Payer: COMMERCIAL

## 2021-02-02 DIAGNOSIS — F33.2 SEVERE EPISODE OF RECURRENT MAJOR DEPRESSIVE DISORDER, WITHOUT PSYCHOTIC FEATURES (H): Primary | ICD-10-CM

## 2021-02-02 ASSESSMENT — PATIENT HEALTH QUESTIONNAIRE - PHQ9: SUM OF ALL RESPONSES TO PHQ QUESTIONS 1-9: 3

## 2021-02-02 NOTE — PROGRESS NOTES
[facility] TMS Program  [address], [address]  [place] [address]  TMS Procedure Note   Aure Joy MRN# [id number]  Age: 49 year old year old YOB: 1970    Pre-Procedure:  History and Physical: Reviewed in medical record  Consent Signed by: Aure Joy  On: 12/22/2020    Clinical Narrative:  Patient tolerating treatment.  Patient was doing well today.    Indications for TMS:  MDD, recurrent, severe; 4+ medication trials (from 2+ classes) ineffective; Psychotherapy ineffective.     Pre-Procedure Diagnosis:  MDD, recurrent, severe F33.2    Treatment Hx:  Treatment number this series: 27  Total lifetime treatment number: 141    Allergies   Allergen Reactions     Codeine Nausea     Other  [No Clinical Screening - See Comments] Nausea and Vomiting and Other (See Comments)     general anesthesia- uncontrolled vomitting      There were no vitals taken for this visit.    Pause for the Cause  Right patient:  Yes  Right procedure/correct coil:  Yes; rTMS; cpt 73471; F8 coil.   Earplugs in place:  Yes    Procedure  Patient was seated in procedure chair. Identity and procedure was verified. Ear plugs were placed in ears and patient-specific cap was placed on head and tightened appropriately. Ruler locations were verified. Coil was placed at treatment location and stimulator was set to parameters described below. A test train was delivered and pt tolerated train. Given pt tolerance, 60 treatment trains were delivered to the left side and 3 treatment trains were delivered to the right side. Pt tolerated procedure with forehead movement.      Motor Threshold Determination  Distance from nasion to inion: 35  Distance along circumference from midline: 6.67cm  Distance from Vertex: 9.56cm  MT 1: 0 - 6 - 16 @ 69% on 1/29/2018  MT 2: 0 - 6 - 16 @ 69% on 2/6/2018   MT 3: 0 - 6 - 16 @ 69% on 2/13/2018   MT 4: 0 - 6 - 16 @ 69% on 2/23/2018   MT 5: 0 - 6 - 16 @ 69% on 3/2/2018   MT 6: 0 - 5.5 -  15.5(always use intersection at left side of ruler)@ 85% on 8/23/19  MT 7: 0 - 5.5 - 15.5(always use intersection at left side of ruler)@ 80% on 8/29/19  MT 8: 0 - 5.5 - 37.5(always use intersection at left side of ruler)@ 76% on 9/5/19 (right side using EMG on left hand)  MT 9: C2 (R side using EMG on L hand) 78% on 9/12/2019  MT 10: C2 (R side using EMG on L hand) 76% on 9/26/2019  MT 11: C2 (R side using EMG on L hand) 82 on 1/31/2020  MT 12: C2 (R side using EMG on L Hand) 86% on 12/22/2020     LDLPFC:  Frequency: 50 Hz  Number of pulses:  3                        Number of bursts: 10  Burst frequency: 5 Hz  Cycle time: 10 sec  Total pulses delivered: 1800  Tx Loc: F3  Energy: 80% (93% MT) maximum due to stimulator limitations  Number of Cycles: 60 trains    RDLPFC:  Frequency: 50 Hz  Number of pulses:  3                        Number of bursts: 200  Burst frequency: 5 Hz  Cycle time: 190.0sec  Total pulses delivered: 1800  Tx Loc: F4 (right side)  Energy: 80% (93% MT) maximum due to stimulator limitations  Number of Cycles: 3 trains    Rating Scales:  Date MADRS IDS-SR PHQ-9   12/22/2020 25 39 13   12/24/2020  31 8   12/30/20  31 7   1/8/2021  29 3   1/15/2021  29 0   1/22/21  27 2   1/29/21  23 2       Post-Procedure Diagnosis:  MDD, recurrent, severe 296.3    Stephanie Ospina, TMS Technician  [facility]      Plan   - Cont TMS      I did not see the patient during/after treatment but remained available in the clinic during  treatment.    Evelyn Zamudio MD  [facility]

## 2021-02-03 ENCOUNTER — OFFICE VISIT (OUTPATIENT)
Dept: PSYCHIATRY | Facility: CLINIC | Age: 51
End: 2021-02-03
Payer: COMMERCIAL

## 2021-02-03 DIAGNOSIS — F33.2 SEVERE EPISODE OF RECURRENT MAJOR DEPRESSIVE DISORDER, WITHOUT PSYCHOTIC FEATURES (H): Primary | ICD-10-CM

## 2021-02-03 ASSESSMENT — PATIENT HEALTH QUESTIONNAIRE - PHQ9: SUM OF ALL RESPONSES TO PHQ QUESTIONS 1-9: 2

## 2021-02-03 NOTE — PROGRESS NOTES
[facility] TMS Program  [address], [address]  [place] [address]  TMS Procedure Note   Aure Joy MRN# [id number]  Age: 49 year old year old YOB: 1970    Pre-Procedure:  History and Physical: Reviewed in medical record  Consent Signed by: Aure Joy  On: 12/22/2020    Clinical Narrative:  Patient tolerating treatment.  Patient was doing well today.    Indications for TMS:  MDD, recurrent, severe; 4+ medication trials (from 2+ classes) ineffective; Psychotherapy ineffective.     Pre-Procedure Diagnosis:  MDD, recurrent, severe F33.2    Treatment Hx:  Treatment number this series: 28  Total lifetime treatment number: 142    Allergies   Allergen Reactions     Codeine Nausea     Other  [No Clinical Screening - See Comments] Nausea and Vomiting and Other (See Comments)     general anesthesia- uncontrolled vomitting      There were no vitals taken for this visit.    Pause for the Cause  Right patient:  Yes  Right procedure/correct coil:  Yes; rTMS; cpt 11230; F8 coil.   Earplugs in place:  Yes    Procedure  Patient was seated in procedure chair. Identity and procedure was verified. Ear plugs were placed in ears and patient-specific cap was placed on head and tightened appropriately. Ruler locations were verified. Coil was placed at treatment location and stimulator was set to parameters described below. A test train was delivered and pt tolerated train. Given pt tolerance, 60 treatment trains were delivered to the left side and 3 treatment trains were delivered to the right side. Pt tolerated procedure with forehead movement.      Motor Threshold Determination  Distance from nasion to inion: 35  Distance along circumference from midline: 6.67cm  Distance from Vertex: 9.56cm  MT 1: 0 - 6 - 16 @ 69% on 1/29/2018  MT 2: 0 - 6 - 16 @ 69% on 2/6/2018   MT 3: 0 - 6 - 16 @ 69% on 2/13/2018   MT 4: 0 - 6 - 16 @ 69% on 2/23/2018   MT 5: 0 - 6 - 16 @ 69% on 3/2/2018   MT 6: 0 - 5.5 -  15.5(always use intersection at left side of ruler)@ 85% on 8/23/19  MT 7: 0 - 5.5 - 15.5(always use intersection at left side of ruler)@ 80% on 8/29/19  MT 8: 0 - 5.5 - 37.5(always use intersection at left side of ruler)@ 76% on 9/5/19 (right side using EMG on left hand)  MT 9: C2 (R side using EMG on L hand) 78% on 9/12/2019  MT 10: C2 (R side using EMG on L hand) 76% on 9/26/2019  MT 11: C2 (R side using EMG on L hand) 82 on 1/31/2020  MT 12: C2 (R side using EMG on L Hand) 86% on 12/22/2020     LDLPFC:  Frequency: 50 Hz  Number of pulses:  3                        Number of bursts: 10  Burst frequency: 5 Hz  Cycle time: 10 sec  Total pulses delivered: 1800  Tx Loc: F3  Energy: 80% (93% MT) maximum due to stimulator limitations  Number of Cycles: 60 trains    RDLPFC:  Frequency: 50 Hz  Number of pulses:  3                        Number of bursts: 200  Burst frequency: 5 Hz  Cycle time: 190.0sec  Total pulses delivered: 1800  Tx Loc: F4 (right side)  Energy: 80% (93% MT) maximum due to stimulator limitations  Number of Cycles: 3 trains    Rating Scales:  Date MADRS IDS-SR PHQ-9   12/22/2020 25 39 13   12/24/2020  31 8   12/30/20  31 7   1/8/2021  29 3   1/15/2021  29 0   1/22/21  27 2   1/29/21  23 2       Post-Procedure Diagnosis:  MDD, recurrent, severe 296.3    Irma Marley Psychometrist  [facility]      Plan   - Cont TMS    I did not see patient but remained available in the clinic throughout the TMS session      Elizabeth Gordon MD  [facility]

## 2021-02-04 ENCOUNTER — VIRTUAL VISIT (OUTPATIENT)
Dept: PSYCHIATRY | Facility: CLINIC | Age: 51
End: 2021-02-04
Payer: COMMERCIAL

## 2021-02-04 ENCOUNTER — OFFICE VISIT (OUTPATIENT)
Dept: PSYCHIATRY | Facility: CLINIC | Age: 51
End: 2021-02-04
Payer: COMMERCIAL

## 2021-02-04 DIAGNOSIS — F33.2 SEVERE EPISODE OF RECURRENT MAJOR DEPRESSIVE DISORDER, WITHOUT PSYCHOTIC FEATURES (H): Primary | ICD-10-CM

## 2021-02-04 DIAGNOSIS — F40.00 AGORAPHOBIA: ICD-10-CM

## 2021-02-04 DIAGNOSIS — D32.9 MENINGIOMA (H): ICD-10-CM

## 2021-02-04 DIAGNOSIS — F43.10 PTSD (POST-TRAUMATIC STRESS DISORDER): ICD-10-CM

## 2021-02-04 DIAGNOSIS — R11.0 NAUSEA: ICD-10-CM

## 2021-02-04 DIAGNOSIS — F43.10 COMPLEX POSTTRAUMATIC STRESS DISORDER: ICD-10-CM

## 2021-02-04 ASSESSMENT — PATIENT HEALTH QUESTIONNAIRE - PHQ9
SUM OF ALL RESPONSES TO PHQ QUESTIONS 1-9: 10
SUM OF ALL RESPONSES TO PHQ QUESTIONS 1-9: 2

## 2021-02-04 NOTE — PROGRESS NOTES
[facility] TMS Program  [address], [address]  [place] [address]  TMS Procedure Note   Aure Joy MRN# [id number]  Age: 49 year old year old YOB: 1970    Pre-Procedure:  History and Physical: Reviewed in medical record  Consent Signed by: Aure Joy  On: 12/22/2020    Clinical Narrative:  Patient tolerating treatment.  Patient was doing well today.    Indications for TMS:  MDD, recurrent, severe; 4+ medication trials (from 2+ classes) ineffective; Psychotherapy ineffective.     Pre-Procedure Diagnosis:  MDD, recurrent, severe F33.2    Treatment Hx:  Treatment number this series: 29  Total lifetime treatment number: 143    Allergies   Allergen Reactions     Codeine Nausea     Other  [No Clinical Screening - See Comments] Nausea and Vomiting and Other (See Comments)     general anesthesia- uncontrolled vomitting      There were no vitals taken for this visit.    Pause for the Cause  Right patient:  Yes  Right procedure/correct coil:  Yes; rTMS; cpt 64869; F8 coil.   Earplugs in place:  Yes    Procedure  Patient was seated in procedure chair. Identity and procedure was verified. Ear plugs were placed in ears and patient-specific cap was placed on head and tightened appropriately. Ruler locations were verified. Coil was placed at treatment location and stimulator was set to parameters described below. A test train was delivered and pt tolerated train. Given pt tolerance, 60 treatment trains were delivered to the left side and 3 treatment trains were delivered to the right side. Pt tolerated procedure with forehead movement.      Motor Threshold Determination  Distance from nasion to inion: 35  Distance along circumference from midline: 6.67cm  Distance from Vertex: 9.56cm  MT 1: 0 - 6 - 16 @ 69% on 1/29/2018  MT 2: 0 - 6 - 16 @ 69% on 2/6/2018   MT 3: 0 - 6 - 16 @ 69% on 2/13/2018   MT 4: 0 - 6 - 16 @ 69% on 2/23/2018   MT 5: 0 - 6 - 16 @ 69% on 3/2/2018   MT 6: 0 - 5.5 -  15.5(always use intersection at left side of ruler)@ 85% on 8/23/19  MT 7: 0 - 5.5 - 15.5(always use intersection at left side of ruler)@ 80% on 8/29/19  MT 8: 0 - 5.5 - 37.5(always use intersection at left side of ruler)@ 76% on 9/5/19 (right side using EMG on left hand)  MT 9: C2 (R side using EMG on L hand) 78% on 9/12/2019  MT 10: C2 (R side using EMG on L hand) 76% on 9/26/2019  MT 11: C2 (R side using EMG on L hand) 82 on 1/31/2020  MT 12: C2 (R side using EMG on L Hand) 86% on 12/22/2020     LDLPFC:  Frequency: 50 Hz  Number of pulses:  3                        Number of bursts: 10  Burst frequency: 5 Hz  Cycle time: 10 sec  Total pulses delivered: 1800  Tx Loc: F3  Energy: 80% (93% MT) maximum due to stimulator limitations  Number of Cycles: 60 trains    RDLPFC:  Frequency: 50 Hz  Number of pulses:  3                        Number of bursts: 200  Burst frequency: 5 Hz  Cycle time: 190.0sec  Total pulses delivered: 1800  Tx Loc: F4 (right side)  Energy: 80% (93% MT) maximum due to stimulator limitations  Number of Cycles: 3 trains    Rating Scales:  Date MADRS IDS-SR PHQ-9   12/22/2020 25 39 13   12/24/2020  31 8   12/30/20  31 7   1/8/2021  29 3   1/15/2021  29 0   1/22/21  27 2   1/29/21  23 2       Post-Procedure Diagnosis:  MDD, recurrent, severe 296.3    Irma Marley, Psychometrist  [facility]      Plan   - Cont TMS      I did not see the patient during/after treatment but remained available in the clinic during  treatment.    Evelyn Zamudio MD  [facility]
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
## 2021-02-05 ENCOUNTER — OFFICE VISIT (OUTPATIENT)
Dept: PSYCHIATRY | Facility: CLINIC | Age: 51
End: 2021-02-05
Payer: COMMERCIAL

## 2021-02-05 DIAGNOSIS — F33.2 SEVERE EPISODE OF RECURRENT MAJOR DEPRESSIVE DISORDER, WITHOUT PSYCHOTIC FEATURES (H): Primary | ICD-10-CM

## 2021-02-05 ASSESSMENT — PATIENT HEALTH QUESTIONNAIRE - PHQ9: SUM OF ALL RESPONSES TO PHQ QUESTIONS 1-9: 0

## 2021-02-05 NOTE — PROGRESS NOTES
[facility] TMS Program  [address], [address]  [place] [address]  TMS Procedure Note   Aure Joy MRN# [id number]  Age: 49 year old year old YOB: 1970    Pre-Procedure:  History and Physical: Reviewed in medical record  Consent Signed by: Aure Joy  On: 12/22/2020    Clinical Narrative:  Patient tolerating treatment.  Patient was doing well today.    Indications for TMS:  MDD, recurrent, severe; 4+ medication trials (from 2+ classes) ineffective; Psychotherapy ineffective.     Pre-Procedure Diagnosis:  MDD, recurrent, severe F33.2    Treatment Hx:  Treatment number this series: 30  Total lifetime treatment number: 144    Allergies   Allergen Reactions     Codeine Nausea     Other  [No Clinical Screening - See Comments] Nausea and Vomiting and Other (See Comments)     general anesthesia- uncontrolled vomitting      There were no vitals taken for this visit.    Pause for the Cause  Right patient:  Yes  Right procedure/correct coil:  Yes; rTMS; cpt 63742; F8 coil.   Earplugs in place:  Yes    Procedure  Patient was seated in procedure chair. Identity and procedure was verified. Ear plugs were placed in ears and patient-specific cap was placed on head and tightened appropriately. Ruler locations were verified. Coil was placed at treatment location and stimulator was set to parameters described below. A test train was delivered and pt tolerated train. Given pt tolerance, 60 treatment trains were delivered to the left side and 3 treatment trains were delivered to the right side. Pt tolerated procedure with forehead movement.      Motor Threshold Determination  Distance from nasion to inion: 35  Distance along circumference from midline: 6.67cm  Distance from Vertex: 9.56cm  MT 1: 0 - 6 - 16 @ 69% on 1/29/2018  MT 2: 0 - 6 - 16 @ 69% on 2/6/2018   MT 3: 0 - 6 - 16 @ 69% on 2/13/2018   MT 4: 0 - 6 - 16 @ 69% on 2/23/2018   MT 5: 0 - 6 - 16 @ 69% on 3/2/2018   MT 6: 0 - 5.5 -  15.5(always use intersection at left side of ruler)@ 85% on 8/23/19  MT 7: 0 - 5.5 - 15.5(always use intersection at left side of ruler)@ 80% on 8/29/19  MT 8: 0 - 5.5 - 37.5(always use intersection at left side of ruler)@ 76% on 9/5/19 (right side using EMG on left hand)  MT 9: C2 (R side using EMG on L hand) 78% on 9/12/2019  MT 10: C2 (R side using EMG on L hand) 76% on 9/26/2019  MT 11: C2 (R side using EMG on L hand) 82 on 1/31/2020  MT 12: C2 (R side using EMG on L Hand) 86% on 12/22/2020     LDLPFC:  Frequency: 50 Hz  Number of pulses:  3                        Number of bursts: 10  Burst frequency: 5 Hz  Cycle time: 10 sec  Total pulses delivered: 1800  Tx Loc: F3  Energy: 80% (93% MT) maximum due to stimulator limitations  Number of Cycles: 60 trains    RDLPFC:  Frequency: 50 Hz  Number of pulses:  3                        Number of bursts: 100  Burst frequency: 5 Hz  Cycle time: 70.0sec  Total pulses delivered: 1800  Tx Loc: F4 (right side)  Energy: 80% (93% MT) maximum due to stimulator limitations  Number of Cycles: 6 trains    Rating Scales:  Date MADRS IDS-SR PHQ-9   12/22/2020 25 39 13   12/24/2020  31 8   12/30/20  31 7   1/8/2021  29 3   1/15/2021  29 0   1/22/21  27 2   1/29/21  23 2   2/5/21  20 0       Post-Procedure Diagnosis:  MDD, recurrent, severe 296.3    Irma Marley Psychometrist  [facility]      Plan   - Cont TMS    I did not see patient but remained available in the clinic throughout the TMS session      Elizabeth Gordon MD  [facility]

## 2021-02-08 ENCOUNTER — ALLIED HEALTH/NURSE VISIT (OUTPATIENT)
Dept: PSYCHIATRY | Facility: CLINIC | Age: 51
End: 2021-02-08
Payer: COMMERCIAL

## 2021-02-08 ENCOUNTER — OFFICE VISIT (OUTPATIENT)
Dept: PSYCHIATRY | Facility: CLINIC | Age: 51
End: 2021-02-08
Payer: COMMERCIAL

## 2021-02-08 ENCOUNTER — TELEPHONE (OUTPATIENT)
Dept: PSYCHIATRY | Facility: CLINIC | Age: 51
End: 2021-02-08

## 2021-02-08 VITALS — OXYGEN SATURATION: 95 % | DIASTOLIC BLOOD PRESSURE: 95 MMHG | HEART RATE: 69 BPM | SYSTOLIC BLOOD PRESSURE: 138 MMHG

## 2021-02-08 DIAGNOSIS — F33.2 SEVERE EPISODE OF RECURRENT MAJOR DEPRESSIVE DISORDER, WITHOUT PSYCHOTIC FEATURES (H): Primary | ICD-10-CM

## 2021-02-08 ASSESSMENT — PATIENT HEALTH QUESTIONNAIRE - PHQ9: SUM OF ALL RESPONSES TO PHQ QUESTIONS 1-9: 0

## 2021-02-08 NOTE — PROGRESS NOTES
Aure Joy comes into clinic today at the request of ESTELA Zamudio MD Ordering Provider for Med Injection only Ketamine.    Procedure Prep:  Medication double check completed by: John Velazco RN  Prior to administration pt identified by name and : Yes    Nursing Assessment:  Appearance: dressed casually   Mood: good  Affect: consistent with mood  Eye contact: good    PHQ 2021   PHQ-9 Total Score 0   Q9: Thoughts of better off dead/self-harm past 2 weeks Not at all     QIDDSR 16 weekly assessment: score 8. Assessment was scanned to EHR.   PCL5 weekly assessment: score N/A. Assessment was scanned to EHR.     Procedure Performed:  VSS and mental status WNL. Patient was given injection. See MAR for details.     Post Procedure Assessment:  Patient tolerated the treatment with the following side effects: sedation, mild dissociation. Vital signs were monitored, see VS Flowsheet. Patient stated they felt ready to go home prior to discharge. AVS was offered to patient and patient declined. Patient was instructed not to drive for the remainder of the day and to notify clinic if any concerns arise.     Next appt: in 14 days  Medication provided by Pharmacy    This service provided today was under the supervising provider of the day Elizabeth Gordon MD, who was available if needed.    Braden Christian RN

## 2021-02-08 NOTE — PROGRESS NOTES
[facility] TMS Program  [address], [address]  [place] [address]  TMS Procedure Note   Aure Joy MRN# [id number]  Age: 49 year old year old YOB: 1970    Pre-Procedure:  History and Physical: Reviewed in medical record  Consent Signed by: Aure Joy  On: 12/22/2020    Clinical Narrative:  Patient tolerating treatment.  Patient reported having a good weekend - her family went to their cabin but she stayed home and enjoyed her time by herself (took baths, read, watched movies). She was happy about having some time to be alone.    Indications for TMS:  MDD, recurrent, severe; 4+ medication trials (from 2+ classes) ineffective; Psychotherapy ineffective.     Pre-Procedure Diagnosis:  MDD, recurrent, severe F33.2    Treatment Hx:  Treatment number this series: 31  Total lifetime treatment number: 145    Allergies   Allergen Reactions     Codeine Nausea     Other  [No Clinical Screening - See Comments] Nausea and Vomiting and Other (See Comments)     general anesthesia- uncontrolled vomitting      There were no vitals taken for this visit.    Pause for the Cause  Right patient:  Yes  Right procedure/correct coil:  Yes; rTMS; cpt 62232; F8 coil.   Earplugs in place:  Yes    Procedure  Patient was seated in procedure chair. Identity and procedure was verified. Ear plugs were placed in ears and patient-specific cap was placed on head and tightened appropriately. Ruler locations were verified. Coil was placed at treatment location and stimulator was set to parameters described below. A test train was delivered and pt tolerated train. Given pt tolerance, 60 treatment trains were delivered to the left side and 3 treatment trains were delivered to the right side. Pt tolerated procedure with forehead movement.      Motor Threshold Determination  Distance from nasion to inion: 35  Distance along circumference from midline: 6.67cm  Distance from Vertex: 9.56cm  MT 1: 0 - 6 - 16 @ 69% on  1/29/2018  MT 2: 0 - 6 - 16 @ 69% on 2/6/2018   MT 3: 0 - 6 - 16 @ 69% on 2/13/2018   MT 4: 0 - 6 - 16 @ 69% on 2/23/2018   MT 5: 0 - 6 - 16 @ 69% on 3/2/2018   MT 6: 0 - 5.5 - 15.5(always use intersection at left side of ruler)@ 85% on 8/23/19  MT 7: 0 - 5.5 - 15.5(always use intersection at left side of ruler)@ 80% on 8/29/19  MT 8: 0 - 5.5 - 37.5(always use intersection at left side of ruler)@ 76% on 9/5/19 (right side using EMG on left hand)  MT 9: C2 (R side using EMG on L hand) 78% on 9/12/2019  MT 10: C2 (R side using EMG on L hand) 76% on 9/26/2019  MT 11: C2 (R side using EMG on L hand) 82 on 1/31/2020  MT 12: C2 (R side using EMG on L Hand) 86% on 12/22/2020     LDLPFC:  Frequency: 50 Hz  Number of pulses:  3                        Number of bursts: 10  Burst frequency: 5 Hz  Cycle time: 10 sec  Total pulses delivered: 1800  Tx Loc: F3  Energy: 80% (93% MT) maximum due to stimulator limitations  Number of Cycles: 60 trains    RDLPFC:  Frequency: 50 Hz  Number of pulses:  3                        Number of bursts: 100  Burst frequency: 5 Hz  Cycle time: 70.0sec  Total pulses delivered: 1800  Tx Loc: F4 (right side)  Energy: 80% (93% MT) maximum due to stimulator limitations  Number of Cycles: 6 trains    Rating Scales:  Date MADRS IDS-SR PHQ-9   12/22/2020 25 39 13   12/24/2020  31 8   12/30/20  31 7   1/8/2021  29 3   1/15/2021  29 0   1/22/21  27 2   1/29/21  23 2   2/5/21  20 0       Post-Procedure Diagnosis:  MDD, recurrent, severe 296.3    Stephanie Ospina, TMS Technician  [facility]      Plan   - Cont TMS    I did not see patient but remained available in the clinic throughout the TMS session      Elizabeth Gordon MD  [facility]

## 2021-02-09 ENCOUNTER — OFFICE VISIT (OUTPATIENT)
Dept: PSYCHIATRY | Facility: CLINIC | Age: 51
End: 2021-02-09
Payer: COMMERCIAL

## 2021-02-09 DIAGNOSIS — F33.2 SEVERE EPISODE OF RECURRENT MAJOR DEPRESSIVE DISORDER, WITHOUT PSYCHOTIC FEATURES (H): Primary | ICD-10-CM

## 2021-02-09 ASSESSMENT — PATIENT HEALTH QUESTIONNAIRE - PHQ9: SUM OF ALL RESPONSES TO PHQ QUESTIONS 1-9: 1

## 2021-02-09 NOTE — PROGRESS NOTES
[facility] TMS Program  [address], [address]  [place] [address]  TMS Procedure Note   Aure Joy MRN# [id number]  Age: 49 year old year old YOB: 1970    Pre-Procedure:  History and Physical: Reviewed in medical record  Consent Signed by: Aure Joy  On: 12/22/2020    Clinical Narrative:  Patient tolerating treatment.  Patient reports no changes today.     Indications for TMS:  MDD, recurrent, severe; 4+ medication trials (from 2+ classes) ineffective; Psychotherapy ineffective.     Pre-Procedure Diagnosis:  MDD, recurrent, severe F33.2    Treatment Hx:  Treatment number this series: 32  Total lifetime treatment number: 146    Allergies   Allergen Reactions     Codeine Nausea     Other  [No Clinical Screening - See Comments] Nausea and Vomiting and Other (See Comments)     general anesthesia- uncontrolled vomitting      There were no vitals taken for this visit.    Pause for the Cause  Right patient:  Yes  Right procedure/correct coil:  Yes; rTMS; cpt 91201; F8 coil.   Earplugs in place:  Yes    Procedure  Patient was seated in procedure chair. Identity and procedure was verified. Ear plugs were placed in ears and patient-specific cap was placed on head and tightened appropriately. Ruler locations were verified. Coil was placed at treatment location and stimulator was set to parameters described below. A test train was delivered and pt tolerated train. Given pt tolerance, 60 treatment trains were delivered to the left side and 3 treatment trains were delivered to the right side. Pt tolerated procedure with forehead movement.      Motor Threshold Determination  Distance from nasion to inion: 35  Distance along circumference from midline: 6.67cm  Distance from Vertex: 9.56cm  MT 1: 0 - 6 - 16 @ 69% on 1/29/2018  MT 2: 0 - 6 - 16 @ 69% on 2/6/2018   MT 3: 0 - 6 - 16 @ 69% on 2/13/2018   MT 4: 0 - 6 - 16 @ 69% on 2/23/2018   MT 5: 0 - 6 - 16 @ 69% on 3/2/2018   MT 6: 0 - 5.5 -  15.5(always use intersection at left side of ruler)@ 85% on 8/23/19  MT 7: 0 - 5.5 - 15.5(always use intersection at left side of ruler)@ 80% on 8/29/19  MT 8: 0 - 5.5 - 37.5(always use intersection at left side of ruler)@ 76% on 9/5/19 (right side using EMG on left hand)  MT 9: C2 (R side using EMG on L hand) 78% on 9/12/2019  MT 10: C2 (R side using EMG on L hand) 76% on 9/26/2019  MT 11: C2 (R side using EMG on L hand) 82 on 1/31/2020  MT 12: C2 (R side using EMG on L Hand) 86% on 12/22/2020     LDLPFC:  Frequency: 50 Hz  Number of pulses:  3                        Number of bursts: 10  Burst frequency: 5 Hz  Cycle time: 10 sec  Total pulses delivered: 1800  Tx Loc: F3  Energy: 80% (93% MT) maximum due to stimulator limitations  Number of Cycles: 60 trains    RDLPFC:  Frequency: 50 Hz  Number of pulses:  3                        Number of bursts: 100  Burst frequency: 5 Hz  Cycle time: 70.0sec  Total pulses delivered: 1800  Tx Loc: F4 (right side)  Energy: 80% (93% MT) maximum due to stimulator limitations  Number of Cycles: 6 trains    Rating Scales:  Date MADRS IDS-SR PHQ-9   12/22/2020 25 39 13   12/24/2020  31 8   12/30/20  31 7   1/8/2021  29 3   1/15/2021  29 0   1/22/21  27 2   1/29/21  23 2   2/5/21  20 0       Post-Procedure Diagnosis:  MDD, recurrent, severe 296.3    Braden Christian, GERARDO  [facility]      Plan   - Cont TMS      I did not see the patient during/after treatment but remained available in the clinic during  treatment.    Evelyn Zamudio MD  [facility]

## 2021-02-10 ENCOUNTER — OFFICE VISIT (OUTPATIENT)
Dept: PSYCHIATRY | Facility: CLINIC | Age: 51
End: 2021-02-10
Payer: COMMERCIAL

## 2021-02-10 DIAGNOSIS — F33.2 SEVERE EPISODE OF RECURRENT MAJOR DEPRESSIVE DISORDER, WITHOUT PSYCHOTIC FEATURES (H): Primary | ICD-10-CM

## 2021-02-10 ASSESSMENT — PATIENT HEALTH QUESTIONNAIRE - PHQ9: SUM OF ALL RESPONSES TO PHQ QUESTIONS 1-9: 2

## 2021-02-10 NOTE — PROGRESS NOTES
[facility] TMS Program  [address], [address]  [place] [address]  TMS Procedure Note   Aure Joy MRN# [id number]  Age: 49 year old year old YOB: 1970    Pre-Procedure:  History and Physical: Reviewed in medical record  Consent Signed by: Aure Joy  On: 12/22/2020    Clinical Narrative:  Patient tolerating treatment.  Patient was doing well today.    Indications for TMS:  MDD, recurrent, severe; 4+ medication trials (from 2+ classes) ineffective; Psychotherapy ineffective.     Pre-Procedure Diagnosis:  MDD, recurrent, severe F33.2    Treatment Hx:  Treatment number this series: 33  Total lifetime treatment number: 147    Allergies   Allergen Reactions     Codeine Nausea     Other  [No Clinical Screening - See Comments] Nausea and Vomiting and Other (See Comments)     general anesthesia- uncontrolled vomitting      There were no vitals taken for this visit.    Pause for the Cause  Right patient:  Yes  Right procedure/correct coil:  Yes; rTMS; cpt 35104; F8 coil.   Earplugs in place:  Yes    Procedure  Patient was seated in procedure chair. Identity and procedure was verified. Ear plugs were placed in ears and patient-specific cap was placed on head and tightened appropriately. Ruler locations were verified. Coil was placed at treatment location and stimulator was set to parameters described below. A test train was delivered and pt tolerated train. Given pt tolerance, 60 treatment trains were delivered to the left side and 3 treatment trains were delivered to the right side. Pt tolerated procedure with forehead movement.      Motor Threshold Determination  Distance from nasion to inion: 35  Distance along circumference from midline: 6.67cm  Distance from Vertex: 9.56cm  MT 1: 0 - 6 - 16 @ 69% on 1/29/2018  MT 2: 0 - 6 - 16 @ 69% on 2/6/2018   MT 3: 0 - 6 - 16 @ 69% on 2/13/2018   MT 4: 0 - 6 - 16 @ 69% on 2/23/2018   MT 5: 0 - 6 - 16 @ 69% on 3/2/2018   MT 6: 0 - 5.5 -  15.5(always use intersection at left side of ruler)@ 85% on 8/23/19  MT 7: 0 - 5.5 - 15.5(always use intersection at left side of ruler)@ 80% on 8/29/19  MT 8: 0 - 5.5 - 37.5(always use intersection at left side of ruler)@ 76% on 9/5/19 (right side using EMG on left hand)  MT 9: C2 (R side using EMG on L hand) 78% on 9/12/2019  MT 10: C2 (R side using EMG on L hand) 76% on 9/26/2019  MT 11: C2 (R side using EMG on L hand) 82 on 1/31/2020  MT 12: C2 (R side using EMG on L Hand) 86% on 12/22/2020     LDLPFC:  Frequency: 50 Hz  Number of pulses:  3                        Number of bursts: 10  Burst frequency: 5 Hz  Cycle time: 10 sec  Total pulses delivered: 1800  Tx Loc: F3  Energy: 80% (93% MT) maximum due to stimulator limitations  Number of Cycles: 60 trains    RDLPFC:  Frequency: 50 Hz  Number of pulses:  3                        Number of bursts: 100  Burst frequency: 5 Hz  Cycle time: 70.0sec  Total pulses delivered: 1800  Tx Loc: F4 (right side)  Energy: 80% (93% MT) maximum due to stimulator limitations  Number of Cycles: 6 trains    Rating Scales:  Date MADRS IDS-SR PHQ-9   12/22/2020 25 39 13   12/24/2020  31 8   12/30/20  31 7   1/8/2021  29 3   1/15/2021  29 0   1/22/21  27 2   1/29/21  23 2   2/5/21  20 0       Post-Procedure Diagnosis:  MDD, recurrent, severe 296.3    Stephanie Ospina, TMS Technician  [facility]      Plan   - Cont TMS    I did not see patient but remained available in the clinic throughout the TMS session        Elizabeth Gordon MD  [facility]

## 2021-02-11 ENCOUNTER — OFFICE VISIT (OUTPATIENT)
Dept: PSYCHIATRY | Facility: CLINIC | Age: 51
End: 2021-02-11
Payer: COMMERCIAL

## 2021-02-11 DIAGNOSIS — F33.2 SEVERE EPISODE OF RECURRENT MAJOR DEPRESSIVE DISORDER, WITHOUT PSYCHOTIC FEATURES (H): Primary | ICD-10-CM

## 2021-02-11 ASSESSMENT — PATIENT HEALTH QUESTIONNAIRE - PHQ9: SUM OF ALL RESPONSES TO PHQ QUESTIONS 1-9: 3

## 2021-02-11 NOTE — PROGRESS NOTES
[facility] TMS Program  [address], [address]  [place] [address]  TMS Procedure Note   Aure Joy MRN# [id number]  Age: 49 year old year old YOB: 1970    Pre-Procedure:  History and Physical: Reviewed in medical record  Consent Signed by: Aure Joy  On: 12/22/2020    Clinical Narrative:  Patient tolerating treatment.  Patient was doing well today.    Indications for TMS:  MDD, recurrent, severe; 4+ medication trials (from 2+ classes) ineffective; Psychotherapy ineffective.     Pre-Procedure Diagnosis:  MDD, recurrent, severe F33.2    Treatment Hx:  Treatment number this series: 34  Total lifetime treatment number: 148    Allergies   Allergen Reactions     Codeine Nausea     Other  [No Clinical Screening - See Comments] Nausea and Vomiting and Other (See Comments)     general anesthesia- uncontrolled vomitting      There were no vitals taken for this visit.    Pause for the Cause  Right patient:  Yes  Right procedure/correct coil:  Yes; rTMS; cpt 06054; F8 coil.   Earplugs in place:  Yes    Procedure  Patient was seated in procedure chair. Identity and procedure was verified. Ear plugs were placed in ears and patient-specific cap was placed on head and tightened appropriately. Ruler locations were verified. Coil was placed at treatment location and stimulator was set to parameters described below. A test train was delivered and pt tolerated train. Given pt tolerance, 60 treatment trains were delivered to the left side and 3 treatment trains were delivered to the right side. Pt tolerated procedure with forehead movement.      Motor Threshold Determination  Distance from nasion to inion: 35  Distance along circumference from midline: 6.67cm  Distance from Vertex: 9.56cm  MT 1: 0 - 6 - 16 @ 69% on 1/29/2018  MT 2: 0 - 6 - 16 @ 69% on 2/6/2018   MT 3: 0 - 6 - 16 @ 69% on 2/13/2018   MT 4: 0 - 6 - 16 @ 69% on 2/23/2018   MT 5: 0 - 6 - 16 @ 69% on 3/2/2018   MT 6: 0 - 5.5 -  15.5(always use intersection at left side of ruler)@ 85% on 8/23/19  MT 7: 0 - 5.5 - 15.5(always use intersection at left side of ruler)@ 80% on 8/29/19  MT 8: 0 - 5.5 - 37.5(always use intersection at left side of ruler)@ 76% on 9/5/19 (right side using EMG on left hand)  MT 9: C2 (R side using EMG on L hand) 78% on 9/12/2019  MT 10: C2 (R side using EMG on L hand) 76% on 9/26/2019  MT 11: C2 (R side using EMG on L hand) 82 on 1/31/2020  MT 12: C2 (R side using EMG on L Hand) 86% on 12/22/2020     LDLPFC:  Frequency: 50 Hz  Number of pulses:  3                        Number of bursts: 10  Burst frequency: 5 Hz  Cycle time: 10 sec  Total pulses delivered: 1800  Tx Loc: F3  Energy: 80% (93% MT) maximum due to stimulator limitations  Number of Cycles: 60 trains    RDLPFC:  Frequency: 50 Hz  Number of pulses:  3                        Number of bursts: 100  Burst frequency: 5 Hz  Cycle time: 75.0sec  Total pulses delivered: 1800  Tx Loc: F4 (right side)  Energy: 80% (93% MT) maximum due to stimulator limitations  Number of Cycles: 6 trains    Rating Scales:  Date MADRS IDS-SR PHQ-9   12/22/2020 25 39 13   12/24/2020  31 8   12/30/20  31 7   1/8/2021  29 3   1/15/2021  29 0   1/22/21  27 2   1/29/21  23 2   2/5/21  20 0       Post-Procedure Diagnosis:  MDD, recurrent, severe 296.3    Irma Marley, Psychometrist  [facility]      Plan   - Cont TMS      I did not see the patient during/after treatment but remained available in the clinic during  treatment.    Evelyn Zamudio MD  [facility]

## 2021-02-12 ENCOUNTER — OFFICE VISIT (OUTPATIENT)
Dept: PSYCHIATRY | Facility: CLINIC | Age: 51
End: 2021-02-12
Payer: COMMERCIAL

## 2021-02-12 DIAGNOSIS — F33.2 SEVERE EPISODE OF RECURRENT MAJOR DEPRESSIVE DISORDER, WITHOUT PSYCHOTIC FEATURES (H): Primary | ICD-10-CM

## 2021-02-12 ASSESSMENT — PATIENT HEALTH QUESTIONNAIRE - PHQ9: SUM OF ALL RESPONSES TO PHQ QUESTIONS 1-9: 4

## 2021-02-12 NOTE — PROGRESS NOTES
[facility] TMS Program  [address], [address]  [place] [address]  TMS Procedure Note   Aure Joy MRN# [id number]  Age: 49 year old year old YOB: 1970    Pre-Procedure:  History and Physical: Reviewed in medical record  Consent Signed by: Aure Joy  On: 12/22/2020    Clinical Narrative:  Patient tolerating treatment.  Patient was doing well today.     Indications for TMS:  MDD, recurrent, severe; 4+ medication trials (from 2+ classes) ineffective; Psychotherapy ineffective.     Pre-Procedure Diagnosis:  MDD, recurrent, severe F33.2    Treatment Hx:  Treatment number this series: 35  Total lifetime treatment number: 149    Allergies   Allergen Reactions     Codeine Nausea     Other  [No Clinical Screening - See Comments] Nausea and Vomiting and Other (See Comments)     general anesthesia- uncontrolled vomitting      There were no vitals taken for this visit.    Pause for the Cause  Right patient:  Yes  Right procedure/correct coil:  Yes; rTMS; cpt 62138; F8 coil.   Earplugs in place:  Yes    Procedure  Patient was seated in procedure chair. Identity and procedure was verified. Ear plugs were placed in ears and patient-specific cap was placed on head and tightened appropriately. Ruler locations were verified. Coil was placed at treatment location and stimulator was set to parameters described below. A test train was delivered and pt tolerated train. Given pt tolerance, 60 treatment trains were delivered to the left side and 3 treatment trains were delivered to the right side. Pt tolerated procedure with forehead movement.      Motor Threshold Determination  Distance from nasion to inion: 35  Distance along circumference from midline: 6.67cm  Distance from Vertex: 9.56cm  MT 1: 0 - 6 - 16 @ 69% on 1/29/2018  MT 2: 0 - 6 - 16 @ 69% on 2/6/2018   MT 3: 0 - 6 - 16 @ 69% on 2/13/2018   MT 4: 0 - 6 - 16 @ 69% on 2/23/2018   MT 5: 0 - 6 - 16 @ 69% on 3/2/2018   MT 6: 0 - 5.5 -  15.5(always use intersection at left side of ruler)@ 85% on 8/23/19  MT 7: 0 - 5.5 - 15.5(always use intersection at left side of ruler)@ 80% on 8/29/19  MT 8: 0 - 5.5 - 37.5(always use intersection at left side of ruler)@ 76% on 9/5/19 (right side using EMG on left hand)  MT 9: C2 (R side using EMG on L hand) 78% on 9/12/2019  MT 10: C2 (R side using EMG on L hand) 76% on 9/26/2019  MT 11: C2 (R side using EMG on L hand) 82 on 1/31/2020  MT 12: C2 (R side using EMG on L Hand) 86% on 12/22/2020     LDLPFC:  Frequency: 50 Hz  Number of pulses:  3                        Number of bursts: 10  Burst frequency: 5 Hz  Cycle time: 10 sec  Total pulses delivered: 1800  Tx Loc: F3  Energy: 80% (93% MT) maximum due to stimulator limitations  Number of Cycles: 60 trains    RDLPFC:  Frequency: 50 Hz  Number of pulses:  3                        Number of bursts: 100  Burst frequency: 5 Hz  Cycle time: 60.0sec  Total pulses delivered: 1800  Tx Loc: F4 (right side)  Energy: 80% (93% MT) maximum due to stimulator limitations  Number of Cycles: 6 trains    Rating Scales:  Date MADRS IDS-SR PHQ-9   12/22/2020 25 39 13   12/24/2020  31 8   12/30/20  31 7   1/8/2021  29 3   1/15/2021  29 0   1/22/21  27 2   1/29/21  23 2   2/5/21  20 0   2/12/21  31 4       Post-Procedure Diagnosis:  MDD, recurrent, severe 296.3    Irma Marley Psychometrist  [facility]      Plan   - Cont TMS    I did not see patient but remained available in the clinic throughout the TMS session      Elizabeth Thompson MD  [facility]

## 2021-02-15 ENCOUNTER — OFFICE VISIT (OUTPATIENT)
Dept: PSYCHIATRY | Facility: CLINIC | Age: 51
End: 2021-02-15
Payer: COMMERCIAL

## 2021-02-15 DIAGNOSIS — F33.2 SEVERE EPISODE OF RECURRENT MAJOR DEPRESSIVE DISORDER, WITHOUT PSYCHOTIC FEATURES (H): Primary | ICD-10-CM

## 2021-02-15 ASSESSMENT — PATIENT HEALTH QUESTIONNAIRE - PHQ9: SUM OF ALL RESPONSES TO PHQ QUESTIONS 1-9: 2

## 2021-02-15 NOTE — PROGRESS NOTES
[facility] TMS Program  [address], [address]  [place] [address]  TMS Procedure Note   Aure Joy MRN# [id number]  Age: 49 year old year old YOB: 1970    Pre-Procedure:  History and Physical: Reviewed in medical record  Consent Signed by: Aure Joy  On: 12/22/2020    Clinical Narrative:  Patient tolerating treatment.  Aure is well today.  Met with resident prior to treatment today to answer questions she had about TMS.      Indications for TMS:  MDD, recurrent, severe; 4+ medication trials (from 2+ classes) ineffective; Psychotherapy ineffective.     Pre-Procedure Diagnosis:  MDD, recurrent, severe F33.2    Treatment Hx:  Treatment number this series: 36  Total lifetime treatment number: 150    Allergies   Allergen Reactions     Codeine Nausea     Other  [No Clinical Screening - See Comments] Nausea and Vomiting and Other (See Comments)     general anesthesia- uncontrolled vomitting      There were no vitals taken for this visit.    Pause for the Cause  Right patient:  Yes  Right procedure/correct coil:  Yes; rTMS; cpt 79767; F8 coil.   Earplugs in place:  Yes    Procedure  Patient was seated in procedure chair. Identity and procedure was verified. Ear plugs were placed in ears and patient-specific cap was placed on head and tightened appropriately. Ruler locations were verified. Coil was placed at treatment location and stimulator was set to parameters described below. A test train was delivered and pt tolerated train. Given pt tolerance, 60 treatment trains were delivered to the left side and 3 treatment trains were delivered to the right side. Pt tolerated procedure with forehead movement.      Motor Threshold Determination  Distance from nasion to inion: 35  Distance along circumference from midline: 6.67cm  Distance from Vertex: 9.56cm  MT 1: 0 - 6 - 16 @ 69% on 1/29/2018  MT 2: 0 - 6 - 16 @ 69% on 2/6/2018   MT 3: 0 - 6 - 16 @ 69% on 2/13/2018   MT 4: 0 - 6 - 16 @ 69% on  2/23/2018   MT 5: 0 - 6 - 16 @ 69% on 3/2/2018   MT 6: 0 - 5.5 - 15.5(always use intersection at left side of ruler)@ 85% on 8/23/19  MT 7: 0 - 5.5 - 15.5(always use intersection at left side of ruler)@ 80% on 8/29/19  MT 8: 0 - 5.5 - 37.5(always use intersection at left side of ruler)@ 76% on 9/5/19 (right side using EMG on left hand)  MT 9: C2 (R side using EMG on L hand) 78% on 9/12/2019  MT 10: C2 (R side using EMG on L hand) 76% on 9/26/2019  MT 11: C2 (R side using EMG on L hand) 82 on 1/31/2020  MT 12: C2 (R side using EMG on L Hand) 86% on 12/22/2020     LDLPFC:  Frequency: 50 Hz  Number of pulses:  3                        Number of bursts: 10  Burst frequency: 5 Hz  Cycle time: 10 sec  Total pulses delivered: 1800  Tx Loc: F3  Energy: 80% (93% MT) maximum due to stimulator limitations  Number of Cycles: 60 trains    RDLPFC:  Frequency: 50 Hz  Number of pulses:  3                        Number of bursts: 100  Burst frequency: 5 Hz  Cycle time: 60.0sec  Total pulses delivered: 1800  Tx Loc: F4 (right side)  Energy: 80% (93% MT) maximum due to stimulator limitations  Number of Cycles: 6 trains    Rating Scales:  Date MADRS IDS-SR PHQ-9   12/22/2020 25 39 13   12/24/2020  31 8   12/30/20  31 7   1/8/2021  29 3   1/15/2021  29 0   1/22/21  27 2   1/29/21  23 2   2/5/21  20 0   2/12/21  31 4       Post-Procedure Diagnosis:  MDD, recurrent, severe 296.3    Jael Alexandra, RN   [facility]      Plan   - Cont TMS    I did not see patient but remained available in the clinic throughout the TMS session      Elizabeth Thompson MD  [facility]

## 2021-02-22 ENCOUNTER — ALLIED HEALTH/NURSE VISIT (OUTPATIENT)
Dept: PSYCHIATRY | Facility: CLINIC | Age: 51
End: 2021-02-22
Payer: COMMERCIAL

## 2021-02-22 VITALS — DIASTOLIC BLOOD PRESSURE: 92 MMHG | SYSTOLIC BLOOD PRESSURE: 150 MMHG | HEART RATE: 80 BPM | OXYGEN SATURATION: 99 %

## 2021-02-22 DIAGNOSIS — F33.2 SEVERE EPISODE OF RECURRENT MAJOR DEPRESSIVE DISORDER, WITHOUT PSYCHOTIC FEATURES (H): Primary | ICD-10-CM

## 2021-02-22 ASSESSMENT — PATIENT HEALTH QUESTIONNAIRE - PHQ9: SUM OF ALL RESPONSES TO PHQ QUESTIONS 1-9: 9

## 2021-02-22 NOTE — PROGRESS NOTES
Aure Joy comes into clinic today at the request of ESTELA Zamudio MD Ordering Provider for Med Injection only Ketamine.    Procedure Prep:  Medication double check completed by: Eliot Fuentes RN  Prior to administration pt identified by name and : yes    Nursing Assessment:  Appearance: dressed casually   Mood: good, but anxious  Affect: congruent with mood  Eye contact: good  PHQ 2/15/2021   PHQ-9 Total Score 2   Q9: Thoughts of better off dead/self-harm past 2 weeks Not at all     QIDDSR 16 weekly assessment: score 8. Assessment was scanned to EHR.   PCL5 weekly assessment: score 19. Assessment was scanned to EHR.     Procedure Performed:  VSS and mental status WNL.  Nursing Assessment conducted prior to injection, no contraindications found. Patient was given Ketamine injection. See MAR for details.     Post Procedure Assessment:  Patient tolerated the treatment with the following side effects: dissociation. Vital signs were monitored, see VS Flowsheet. Patient stated they felt ready to go home prior to discharge. AVS was offered to patient and patient declined. Patient was instructed not to drive for the remainder of the day and to notify clinic if any concerns arise.     Next appt: 2 weeks    Medication provided by Pharmacy    This service provided today was under the supervising provider of the day Elizabeth Gordon MD, who was available if needed.    Jael Alexandra RN

## 2021-02-26 ENCOUNTER — TELEPHONE (OUTPATIENT)
Dept: PSYCHIATRY | Facility: CLINIC | Age: 51
End: 2021-02-26

## 2021-02-26 NOTE — TELEPHONE ENCOUNTER
Patient is calling because we suppose to submit a PA to her insurance company for maintenance TMS  but she said it was not done yet and if can be submitted soon.Call patient with any questions.

## 2021-02-26 NOTE — TELEPHONE ENCOUNTER
Writer spoke with Stephanie, TMS coordinator.  She is working on extra form that needed to be filled out for insurance.

## 2021-03-04 ENCOUNTER — VIRTUAL VISIT (OUTPATIENT)
Dept: PSYCHIATRY | Facility: CLINIC | Age: 51
End: 2021-03-04
Payer: COMMERCIAL

## 2021-03-04 DIAGNOSIS — F33.2 SEVERE EPISODE OF RECURRENT MAJOR DEPRESSIVE DISORDER, WITHOUT PSYCHOTIC FEATURES (H): Primary | ICD-10-CM

## 2021-03-04 DIAGNOSIS — F43.10 COMPLEX POSTTRAUMATIC STRESS DISORDER: ICD-10-CM

## 2021-03-04 DIAGNOSIS — D32.9 MENINGIOMA (H): ICD-10-CM

## 2021-03-04 DIAGNOSIS — F40.00 AGORAPHOBIA: ICD-10-CM

## 2021-03-04 ASSESSMENT — PATIENT HEALTH QUESTIONNAIRE - PHQ9: SUM OF ALL RESPONSES TO PHQ QUESTIONS 1-9: 11

## 2021-03-08 ENCOUNTER — ALLIED HEALTH/NURSE VISIT (OUTPATIENT)
Dept: PSYCHIATRY | Facility: CLINIC | Age: 51
End: 2021-03-08
Payer: COMMERCIAL

## 2021-03-08 ENCOUNTER — TELEPHONE (OUTPATIENT)
Dept: PSYCHIATRY | Facility: CLINIC | Age: 51
End: 2021-03-08

## 2021-03-08 VITALS — HEART RATE: 88 BPM | OXYGEN SATURATION: 95 % | DIASTOLIC BLOOD PRESSURE: 84 MMHG | SYSTOLIC BLOOD PRESSURE: 132 MMHG

## 2021-03-08 DIAGNOSIS — F33.2 SEVERE EPISODE OF RECURRENT MAJOR DEPRESSIVE DISORDER, WITHOUT PSYCHOTIC FEATURES (H): Primary | ICD-10-CM

## 2021-03-08 ASSESSMENT — PATIENT HEALTH QUESTIONNAIRE - PHQ9: SUM OF ALL RESPONSES TO PHQ QUESTIONS 1-9: 5

## 2021-03-08 NOTE — PROGRESS NOTES
Aure Joy comes into clinic today at the request of ESTELA Zamudio MD Ordering Provider for Med Injection only Ketamine.    Procedure Prep:  Medication double check completed by: Eliot Fuentes RN  Prior to administration pt identified by name and : yes    Nursing Assessment:  Appearance: dressed casually   Mood: good  Affect: congruent to mood  Eye contact: good  PHQ 3/8/2021   PHQ-9 Total Score 5   Q9: Thoughts of better off dead/self-harm past 2 weeks Not at all     QIDDSR 16 weekly assessment: score 10. Assessment was scanned to EHR.   PCL5 weekly assessment: score 16. Assessment was scanned to EHR.     Procedure Performed:  VSS and mental status WNL. Patient was given Ketamine injection. See MAR for details.     Post Procedure Assessment:  Patient tolerated the treatment with the following side effects: dissociation. Vital signs were monitored, see VS Flowsheet. Patient stated they felt ready to go home prior to discharge. AVS was offered to patient and patient declined. Patient was instructed not to drive for the remainder of the day and to notify clinic if any concerns arise.     Next appt: 2 weeks    Medication provided by Pharmacy    This service provided today was under the supervising provider of the day Elizabeth Gordon MD, who was available if needed.    BEN CORDOVA RN

## 2021-03-13 DIAGNOSIS — F33.2 SEVERE EPISODE OF RECURRENT MAJOR DEPRESSIVE DISORDER, WITHOUT PSYCHOTIC FEATURES (H): ICD-10-CM

## 2021-03-16 RX ORDER — LAMOTRIGINE 200 MG/1
TABLET ORAL
Qty: 45 TABLET | Refills: 0 | Status: SHIPPED | OUTPATIENT
Start: 2021-03-16 | End: 2021-04-15

## 2021-03-16 NOTE — TELEPHONE ENCOUNTER
lamoTRIgine (LAMICTAL) 200 MG   Last refilled: 12/24/21  Qty: 45 : 2  Last seen: 3/4/21  RTC: 1 MOS  Cancel: 0  No-show: 0  Next appt: 4/8/21  Refill decision: Refilled for 30 days per protocol

## 2021-03-19 DIAGNOSIS — F33.2 SEVERE RECURRENT MAJOR DEPRESSION WITHOUT PSYCHOTIC FEATURES (H): ICD-10-CM

## 2021-03-22 ENCOUNTER — ALLIED HEALTH/NURSE VISIT (OUTPATIENT)
Dept: PSYCHIATRY | Facility: CLINIC | Age: 51
End: 2021-03-22
Payer: COMMERCIAL

## 2021-03-22 VITALS — OXYGEN SATURATION: 96 % | SYSTOLIC BLOOD PRESSURE: 144 MMHG | HEART RATE: 85 BPM | DIASTOLIC BLOOD PRESSURE: 84 MMHG

## 2021-03-22 DIAGNOSIS — F33.2 SEVERE EPISODE OF RECURRENT MAJOR DEPRESSIVE DISORDER, WITHOUT PSYCHOTIC FEATURES (H): Primary | ICD-10-CM

## 2021-03-22 RX ORDER — KETAMINE HYDROCHLORIDE 50 MG/ML
INJECTION, SOLUTION INTRAMUSCULAR; INTRAVENOUS
Qty: 10 ML | Refills: 0 | Status: SHIPPED | OUTPATIENT
Start: 2021-03-22 | End: 2021-06-09

## 2021-03-22 ASSESSMENT — PATIENT HEALTH QUESTIONNAIRE - PHQ9: SUM OF ALL RESPONSES TO PHQ QUESTIONS 1-9: 14

## 2021-03-22 NOTE — PROGRESS NOTES
Aure Joy comes into clinic today at the request of ESTELA Zamudio MD Ordering Provider for Med Injection only Ketamine.    Procedure Prep:  Medication double check completed by: Eliot Fuentes RN  Prior to administration pt identified by name and : yes    Nursing Assessment:  Appearance: dressed casually   Mood: depressed  Affect: wnl  Eye contact: good  PHQ 3/22/2021   PHQ-9 Total Score 14   Q9: Thoughts of better off dead/self-harm past 2 weeks Several days     QIDDSR 16 weekly assessment: score 11. Assessment was scanned to EHR.   PCL5 weekly assessment: score 22. Assessment was scanned to EHR.     Procedure Performed:  VSS and mental status WNL. Patient was given Ketamine injection. See MAR for details.     Post Procedure Assessment:  Patient tolerated the treatment with the following side effects: dissociation. Vital signs were monitored, see VS Flowsheet. Patient stated they felt ready to go home prior to discharge. AVS was offered to patient and patient declined. Patient was instructed not to drive for the remainder of the day and to notify clinic if any concerns arise.     Next appt: 2 weeks    Medication provided by Pharmacy    This service provided today was under the supervising provider of the day Elizabeth Gordon MD, who was available if needed.    Dunia Alexandra RN

## 2021-03-27 ENCOUNTER — HEALTH MAINTENANCE LETTER (OUTPATIENT)
Age: 51
End: 2021-03-27

## 2021-04-05 ENCOUNTER — ALLIED HEALTH/NURSE VISIT (OUTPATIENT)
Dept: PSYCHIATRY | Facility: CLINIC | Age: 51
End: 2021-04-05

## 2021-04-05 VITALS — HEART RATE: 85 BPM | SYSTOLIC BLOOD PRESSURE: 143 MMHG | DIASTOLIC BLOOD PRESSURE: 90 MMHG | OXYGEN SATURATION: 97 %

## 2021-04-05 DIAGNOSIS — F33.2 SEVERE EPISODE OF RECURRENT MAJOR DEPRESSIVE DISORDER, WITHOUT PSYCHOTIC FEATURES (H): Primary | ICD-10-CM

## 2021-04-05 ASSESSMENT — PATIENT HEALTH QUESTIONNAIRE - PHQ9: SUM OF ALL RESPONSES TO PHQ QUESTIONS 1-9: 10

## 2021-04-05 NOTE — PROGRESS NOTES
Aure Joy comes into clinic today at the request of ESTELA Zamudio MD Ordering Provider for Med Injection only Ketamine.    Procedure Prep:  Medication double check completed by: John Velazco RN  Prior to administration pt identified by name and : yes    Nursing Assessment:  Appearance: dressed casually   Mood: good; states this two week period went much better  Affect: wnl  Eye contact: good  PHQ 2021   PHQ-9 Total Score 10   Q9: Thoughts of better off dead/self-harm past 2 weeks Not at all     QIDDSR 16 weekly assessment: score 10. Assessment was scanned to EHR.   PCL5 weekly assessment: score 20. Assessment was scanned to EHR.     Procedure Performed:  VSS and mental status WNL. Patient was given IM Ketamine 56 mg . See MAR for details.     Post Procedure Assessment:  Patient tolerated the treatment with the following side effects: dissociation. Vital signs were monitored, see VS Flowsheet. Patient stated they felt ready to go home prior to discharge. AVS was offered to patient and patient declined. Patient was instructed not to drive for the remainder of the day and to notify clinic if any concerns arise.     Next appt: 2 weeks    Medication provided by Pharmacy    This service provided today was under the supervising provider of the day Elizabeth Gordon MD, who was available if needed.    Dunia Alexandra RN

## 2021-04-08 ENCOUNTER — VIRTUAL VISIT (OUTPATIENT)
Dept: PSYCHIATRY | Facility: CLINIC | Age: 51
End: 2021-04-08

## 2021-04-08 DIAGNOSIS — F43.10 PTSD (POST-TRAUMATIC STRESS DISORDER): ICD-10-CM

## 2021-04-08 DIAGNOSIS — F33.2 SEVERE EPISODE OF RECURRENT MAJOR DEPRESSIVE DISORDER, WITHOUT PSYCHOTIC FEATURES (H): Primary | ICD-10-CM

## 2021-04-08 DIAGNOSIS — F40.00 AGORAPHOBIA: ICD-10-CM

## 2021-04-08 DIAGNOSIS — F43.10 COMPLEX POSTTRAUMATIC STRESS DISORDER: ICD-10-CM

## 2021-04-08 DIAGNOSIS — Z51.81 ENCOUNTER FOR THERAPEUTIC DRUG MONITORING: ICD-10-CM

## 2021-04-08 ASSESSMENT — PATIENT HEALTH QUESTIONNAIRE - PHQ9: SUM OF ALL RESPONSES TO PHQ QUESTIONS 1-9: 10

## 2021-04-08 NOTE — PROGRESS NOTES
Aure is a 50 year old who is being evaluated via a billable video visit.      How would you like to obtain your AVS? MyChart  If the video visit is dropped, the invitation should be resent by: Send to e-mail at: [email or web address]  Will anyone else be joining your video visit? No
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
## 2021-04-09 LAB
BASOPHILS # BLD AUTO: 0 10E9/L (ref 0–0.2)
BASOPHILS NFR BLD AUTO: 0.4 %
DIFFERENTIAL METHOD BLD: NORMAL
EOSINOPHIL # BLD AUTO: 0 10E9/L (ref 0–0.7)
EOSINOPHIL NFR BLD AUTO: 0.4 %
ERYTHROCYTE [DISTWIDTH] IN BLOOD BY AUTOMATED COUNT: 14.2 % (ref 10–15)
HCT VFR BLD AUTO: 41 % (ref 35–47)
HGB BLD-MCNC: 12.9 G/DL (ref 11.7–15.7)
IMM GRANULOCYTES # BLD: 0 10E9/L (ref 0–0.4)
IMM GRANULOCYTES NFR BLD: 0.4 %
LYMPHOCYTES # BLD AUTO: 2 10E9/L (ref 0.8–5.3)
LYMPHOCYTES NFR BLD AUTO: 21.7 %
MCH RBC QN AUTO: 28.2 PG (ref 26.5–33)
MCHC RBC AUTO-ENTMCNC: 31.5 G/DL (ref 31.5–36.5)
MCV RBC AUTO: 90 FL (ref 78–100)
MONOCYTES # BLD AUTO: 0.7 10E9/L (ref 0–1.3)
MONOCYTES NFR BLD AUTO: 7.3 %
NEUTROPHILS # BLD AUTO: 6.5 10E9/L (ref 1.6–8.3)
NEUTROPHILS NFR BLD AUTO: 69.8 %
NRBC # BLD AUTO: 0 10*3/UL
NRBC BLD AUTO-RTO: 0 /100
PLATELET # BLD AUTO: 288 10E9/L (ref 150–450)
RBC # BLD AUTO: 4.58 10E12/L (ref 3.8–5.2)
WBC # BLD AUTO: 9.4 10E9/L (ref 4–11)

## 2021-04-14 DIAGNOSIS — F33.2 SEVERE EPISODE OF RECURRENT MAJOR DEPRESSIVE DISORDER, WITHOUT PSYCHOTIC FEATURES (H): ICD-10-CM

## 2021-04-15 RX ORDER — LAMOTRIGINE 200 MG/1
TABLET ORAL
Qty: 45 TABLET | Refills: 0 | Status: SHIPPED | OUTPATIENT
Start: 2021-04-15 | End: 2021-05-17

## 2021-04-15 NOTE — TELEPHONE ENCOUNTER
lamoTRIgine (LAMICTAL) 200 MG  Last refilled: 3/16/21  Qty: 45    Last seen: 4/8/21  RTC: 4  weeks  Cancel: 0  No-show: 0  Next appt: 5/20/21  Refill decision: Refilled for 30 days per protocol.

## 2021-04-19 ENCOUNTER — TELEPHONE (OUTPATIENT)
Dept: PSYCHIATRY | Facility: CLINIC | Age: 51
End: 2021-04-19

## 2021-04-19 ENCOUNTER — ALLIED HEALTH/NURSE VISIT (OUTPATIENT)
Dept: PSYCHIATRY | Facility: CLINIC | Age: 51
End: 2021-04-19

## 2021-04-19 VITALS — SYSTOLIC BLOOD PRESSURE: 145 MMHG | OXYGEN SATURATION: 95 % | HEART RATE: 79 BPM | DIASTOLIC BLOOD PRESSURE: 72 MMHG

## 2021-04-19 DIAGNOSIS — F33.2 SEVERE EPISODE OF RECURRENT MAJOR DEPRESSIVE DISORDER, WITHOUT PSYCHOTIC FEATURES (H): Primary | ICD-10-CM

## 2021-04-19 ASSESSMENT — PATIENT HEALTH QUESTIONNAIRE - PHQ9: SUM OF ALL RESPONSES TO PHQ QUESTIONS 1-9: 11

## 2021-04-19 NOTE — PROGRESS NOTES
Aure Joy comes into clinic today at the request of ESTELA Zamudio MD Ordering Provider for Med Injection only ketamine.    Procedure Prep:  Medication double check completed by: Dunia WATTS RN  Prior to administration pt identified by name and : yes    Nursing Assessment:  Appearance: casually dressed   Mood: anxious  Affect: wnl  Eye contact: good  PHQ 2021   PHQ-9 Total Score 11   Q9: Thoughts of better off dead/self-harm past 2 weeks Several days     QIDDSR 16 weekly assessment: score 9. Assessment was scanned to EHR.     Procedure Performed:  VSS and mental status WNL. Patient was given ketamine. See MAR for details.     Post Procedure Assessment:  Patient tolerated the treatment with the following side effects: disociation. Vital signs were monitored, see VS Flowsheet. Patient stated they felt ready to go home prior to discharge. AVS was offered to patient and patient declined. Patient was instructed not to drive for the remainder of the day and to notify clinic if any concerns arise.     Next appt: 5/3/2021    Medication provided by Pharmacy    This service provided today was under the supervising provider of the day Elizabeth Gordon MD, who was available if needed.    Perla Workman, GERARDO

## 2021-05-03 ENCOUNTER — ALLIED HEALTH/NURSE VISIT (OUTPATIENT)
Dept: PSYCHIATRY | Facility: CLINIC | Age: 51
End: 2021-05-03

## 2021-05-03 VITALS — HEART RATE: 81 BPM | SYSTOLIC BLOOD PRESSURE: 141 MMHG | OXYGEN SATURATION: 95 % | DIASTOLIC BLOOD PRESSURE: 93 MMHG

## 2021-05-03 DIAGNOSIS — F33.2 SEVERE EPISODE OF RECURRENT MAJOR DEPRESSIVE DISORDER, WITHOUT PSYCHOTIC FEATURES (H): Primary | ICD-10-CM

## 2021-05-03 ASSESSMENT — PATIENT HEALTH QUESTIONNAIRE - PHQ9: SUM OF ALL RESPONSES TO PHQ QUESTIONS 1-9: 6

## 2021-05-03 NOTE — PROGRESS NOTES
Aure Joy comes into clinic today at the request of ESTELA Zamudio MD Ordering Provider for Med Injection only ketamine.    Procedure Prep:  Medication double check completed by: Eliot COOMBS RN  Prior to administration pt identified by name and : yes    Nursing Assessment:  Appearance: casually dressed   Mood: good  Affect: wnl  Eye contact: good  PHQ 2021   PHQ-9 Total Score 11   Q9: Thoughts of better off dead/self-harm past 2 weeks Several days     QIDDSR 16 weekly assessment: score 6. Assessment was scanned to EHR.     Procedure Performed:  VSS and mental status WNL. Patient was given ketamine. See MAR for details.     Post Procedure Assessment:  Patient tolerated the treatment with the following side effects: disociation. Vital signs were monitored, see VS Flowsheet. Patient stated they felt ready to go home prior to discharge. AVS was offered to patient and patient declined. Patient was instructed not to drive for the remainder of the day and to notify clinic if any concerns arise.     Next appt: 21    Medication provided by Pharmacy    This service provided today was under the supervising provider of the day Elizabeth MENDOZA, who was available if needed.    Perla Workman RN

## 2021-05-15 DIAGNOSIS — F33.2 SEVERE EPISODE OF RECURRENT MAJOR DEPRESSIVE DISORDER, WITHOUT PSYCHOTIC FEATURES (H): ICD-10-CM

## 2021-05-17 ENCOUNTER — ALLIED HEALTH/NURSE VISIT (OUTPATIENT)
Dept: PSYCHIATRY | Facility: CLINIC | Age: 51
End: 2021-05-17

## 2021-05-17 VITALS — OXYGEN SATURATION: 96 % | HEART RATE: 84 BPM | SYSTOLIC BLOOD PRESSURE: 132 MMHG | DIASTOLIC BLOOD PRESSURE: 85 MMHG

## 2021-05-17 DIAGNOSIS — F33.2 SEVERE RECURRENT MAJOR DEPRESSION WITHOUT PSYCHOTIC FEATURES (H): Primary | ICD-10-CM

## 2021-05-17 RX ORDER — LAMOTRIGINE 200 MG/1
TABLET ORAL
Qty: 45 TABLET | Refills: 0 | Status: SHIPPED | OUTPATIENT
Start: 2021-05-17 | End: 2021-08-19

## 2021-05-17 NOTE — TELEPHONE ENCOUNTER
lamoTRIgine (LAMICTAL) 200 MG  Last refilled: 4/15/21  Qty: 45    Last seen: 4/8/21  RTC: 4 weeks  Cancel: 0  No-show: 0  Next appt: 5/20/21  Refill decision: Refilled for 30 days per protocol.

## 2021-05-17 NOTE — PROGRESS NOTES
Aure Joy comes into clinic today at the request of ESTELA Zamudio MD Ordering Provider for Med Injection only ketamine.     Procedure Prep:  Medication double check completed by: Jael Alexandra RN  Prior to administration pt identified by name and : yes     Nursing Assessment:  Appearance: Appropriately dressed             Mood: good  Affect: full  Eye contact: good    Procedure Performed:  VSS  WNL. Injection was discussed with the patient and administered. See MAR for details.      Post Procedure Assessment:  Patient tolerated the treatment with the following side effects: disociation. Vital signs were monitored, see VS Flowsheet. Patient stated they felt ready to go home prior to discharge. AVS was offered to patient and patient declined. Patient was instructed not to drive for the remainder of the day and to notify clinic if any concerns arise.      Next appt: 21 with .  Next injection appointment to be arranged     Medications were supplied by [facility]      This service provided today was under the supervising provider of the day Elizabeth Gordon MD, who was available if needed.

## 2021-05-20 ENCOUNTER — VIRTUAL VISIT (OUTPATIENT)
Dept: PSYCHIATRY | Facility: CLINIC | Age: 51
End: 2021-05-20

## 2021-05-20 DIAGNOSIS — F40.00 AGORAPHOBIA: ICD-10-CM

## 2021-05-20 DIAGNOSIS — F33.2 SEVERE RECURRENT MAJOR DEPRESSION WITHOUT PSYCHOTIC FEATURES (H): Primary | ICD-10-CM

## 2021-05-20 DIAGNOSIS — F43.10 PTSD (POST-TRAUMATIC STRESS DISORDER): ICD-10-CM

## 2021-05-20 ASSESSMENT — PATIENT HEALTH QUESTIONNAIRE - PHQ9: SUM OF ALL RESPONSES TO PHQ QUESTIONS 1-9: 4

## 2021-05-20 NOTE — PROGRESS NOTES
Aure is a 50 year old who is being evaluated via a billable video visit.      How would you like to obtain your AVS? MyChart  If the video visit is dropped, the invitation should be resent by: Send to e-mail at: [email or web address]  Will anyone else be joining your video visit? No       Preventive Care:    Breast Cancer Screening: During our visit today, we discussed that it is recommended you receive breast cancer screening.She has an appointment scheduled for next week.    Colorectal Cancer Screening: During our visit today, we discussed that it is recommended you receive colorectal cancer screening. Patient states she had this done in November 2020.        Video Start Time: 10:15 AM     Video-Visit Details    Type of service:  Video Visit    Video End Time: 10:45 AM    Originating Location (pt. Location): Home    Distant Location (provider location):  [facility] PSYCHIATRY     Platform used for Video Visit: [employer]
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
## 2021-05-27 ENCOUNTER — ALLIED HEALTH/NURSE VISIT (OUTPATIENT)
Dept: PSYCHIATRY | Facility: CLINIC | Age: 51
End: 2021-05-27

## 2021-05-27 ENCOUNTER — TELEPHONE (OUTPATIENT)
Dept: PSYCHIATRY | Facility: CLINIC | Age: 51
End: 2021-05-27

## 2021-05-27 VITALS — SYSTOLIC BLOOD PRESSURE: 129 MMHG | DIASTOLIC BLOOD PRESSURE: 90 MMHG | HEART RATE: 77 BPM | OXYGEN SATURATION: 95 %

## 2021-05-27 DIAGNOSIS — F33.2 SEVERE EPISODE OF RECURRENT MAJOR DEPRESSIVE DISORDER, WITHOUT PSYCHOTIC FEATURES (H): ICD-10-CM

## 2021-05-27 DIAGNOSIS — F33.2 SEVERE RECURRENT MAJOR DEPRESSION WITHOUT PSYCHOTIC FEATURES (H): Primary | ICD-10-CM

## 2021-05-27 DIAGNOSIS — Z51.81 ENCOUNTER FOR THERAPEUTIC DRUG MONITORING: ICD-10-CM

## 2021-05-27 LAB
ALBUMIN SERPL-MCNC: 3.6 G/DL (ref 3.4–5)
ALP SERPL-CCNC: 81 U/L (ref 40–150)
ALT SERPL W P-5'-P-CCNC: 23 U/L (ref 0–50)
AST SERPL W P-5'-P-CCNC: 14 U/L (ref 0–45)
BILIRUB DIRECT SERPL-MCNC: <0.1 MG/DL (ref 0–0.2)
BILIRUB SERPL-MCNC: 0.2 MG/DL (ref 0.2–1.3)
PROT SERPL-MCNC: 6.8 G/DL (ref 6.8–8.8)

## 2021-05-27 ASSESSMENT — PATIENT HEALTH QUESTIONNAIRE - PHQ9: SUM OF ALL RESPONSES TO PHQ QUESTIONS 1-9: 3

## 2021-05-27 NOTE — PROGRESS NOTES
Aure Joy comes into clinic today at the request of ESTELA Zamudio MD Ordering Provider for Med Injection only ketamine.    Procedure Prep:  Medication double check completed by: Dunia WATTS RN  Prior to administration pt identified by name and : yes    Nursing Assessment:  Appearance: casually dressed   Mood: good  Affect: wnl  Eye contact: good  PHQ 2021   PHQ-9 Total Score 3   Q9: Thoughts of better off dead/self-harm past 2 weeks Not at all     QIDDSR 16 weekly assessment: score 5. Assessment was scanned to EHR.     Procedure Performed:  VSS and mental status WNL. Patient was given ketamine. See MAR for details.     Post Procedure Assessment:  Patient tolerated the treatment with the following side effects: disociation. Vital signs were monitored, see VS Flowsheet. Patient stated they felt ready to go home prior to discharge. AVS was offered to patient and patient declined. Patient was instructed not to drive for the remainder of the day and to notify clinic if any concerns arise.     Next appt: 06/10/2021    Medication provided by Pharmacy    This service provided today was under the supervising provider of the day ESTELA Zamudio MD, who was available if needed.    Perla Workman, RN

## 2021-05-27 NOTE — PROGRESS NOTES
Meds taken 9:30 am.  Lab draw 10:05 am.        Could not obtain urine at this time.    Rajesh Garcia, CMA

## 2021-05-27 NOTE — TELEPHONE ENCOUNTER
Received VORB per C. Sarah Zamudio MD to continue ketamine injections of 47.6 MG every other week (every 14 days).

## 2021-06-08 ENCOUNTER — TELEPHONE (OUTPATIENT)
Dept: PSYCHIATRY | Facility: CLINIC | Age: 51
End: 2021-06-08

## 2021-06-08 NOTE — TELEPHONE ENCOUNTER
What is the concern that needs to be addressed by a nurse? Patient called to see if it's ok for her to come in for her Ketamine on Weds if she's having a lumpectomy on Thurs?    May a detailed message be left on voicemail? yes    Date of last office visit: 5/20/21    Message routed to: [place] Psychiatry RN Pool

## 2021-06-08 NOTE — TELEPHONE ENCOUNTER
Writer returned call to patient.  She reports that she is having a lumpectomy on Thursday.  Encouraged her to call the surgeon to check with him if there are any issues with this.  Did let her know that in our previous experience we have had patients come in the day before procedures and surgery with no issues.

## 2021-06-09 ENCOUNTER — TELEPHONE (OUTPATIENT)
Dept: PSYCHIATRY | Facility: CLINIC | Age: 51
End: 2021-06-09

## 2021-06-09 ENCOUNTER — ALLIED HEALTH/NURSE VISIT (OUTPATIENT)
Dept: PSYCHIATRY | Facility: CLINIC | Age: 51
End: 2021-06-09

## 2021-06-09 VITALS — SYSTOLIC BLOOD PRESSURE: 114 MMHG | HEART RATE: 82 BPM | DIASTOLIC BLOOD PRESSURE: 76 MMHG | OXYGEN SATURATION: 94 %

## 2021-06-09 DIAGNOSIS — F33.2 SEVERE RECURRENT MAJOR DEPRESSION WITHOUT PSYCHOTIC FEATURES (H): ICD-10-CM

## 2021-06-09 DIAGNOSIS — F33.2 SEVERE RECURRENT MAJOR DEPRESSION WITHOUT PSYCHOTIC FEATURES (H): Primary | ICD-10-CM

## 2021-06-09 RX ORDER — KETAMINE HYDROCHLORIDE 50 MG/ML
INJECTION, SOLUTION INTRAMUSCULAR; INTRAVENOUS
Qty: 10 ML | Refills: 0 | Status: SHIPPED | OUTPATIENT
Start: 2021-06-09 | End: 2021-07-07

## 2021-06-09 ASSESSMENT — PATIENT HEALTH QUESTIONNAIRE - PHQ9: SUM OF ALL RESPONSES TO PHQ QUESTIONS 1-9: 13

## 2021-06-09 NOTE — TELEPHONE ENCOUNTER
Per chart review clarified with ESTELA Zamudio MD to administer Ketamine 56 MG every 14 days, as patient benefits from dose.

## 2021-06-09 NOTE — PROGRESS NOTES
Aure Joy comes into clinic today at the request of ESTELA Zamudio MD Ordering Provider for Med Injection only ketmaine.    Procedure Prep:  Medication double check completed by: Robert COOMBS RN  Prior to administration pt identified by name and : yes    Nursing Assessment:  Appearance: casually dressed   Mood: nervous, has a lumpectomy scheduled for tomorrow.  Affect: wnl  Eye contact: good  PHQ 2021   PHQ-9 Total Score 13   Q9: Thoughts of better off dead/self-harm past 2 weeks Several days     QIDDSR 16 weekly assessment: score 15. Assessment was scanned to EHR.     Procedure Performed:  VSS and mental status WNL. Patient was given ketamine. See MAR for details.     Post Procedure Assessment:  Patient tolerated the treatment with the following side effects: disociation. Vital signs were monitored, see VS Flowsheet. Patient stated they felt ready to go home prior to discharge. AVS was offered to patient and patient declined. Patient was instructed not to drive for the remainder of the day and to notify clinic if any concerns arise.     Next appt: 2021    Medication provided by Pharmacy    This service provided today was under the supervising provider of the day Tim Williamson MD, who was available if needed.    Perla Workman RN

## 2021-06-23 ENCOUNTER — ALLIED HEALTH/NURSE VISIT (OUTPATIENT)
Dept: PSYCHIATRY | Facility: CLINIC | Age: 51
End: 2021-06-23

## 2021-06-23 VITALS — SYSTOLIC BLOOD PRESSURE: 125 MMHG | DIASTOLIC BLOOD PRESSURE: 81 MMHG | OXYGEN SATURATION: 97 % | HEART RATE: 78 BPM

## 2021-06-23 DIAGNOSIS — F33.2 SEVERE RECURRENT MAJOR DEPRESSION WITHOUT PSYCHOTIC FEATURES (H): Primary | ICD-10-CM

## 2021-06-23 ASSESSMENT — PATIENT HEALTH QUESTIONNAIRE - PHQ9: SUM OF ALL RESPONSES TO PHQ QUESTIONS 1-9: 12

## 2021-06-23 NOTE — PROGRESS NOTES
Aure Joy comes into clinic today at the request of ESTELA Zamudio MD Ordering Provider for Med Injection only ketamine.    Procedure Prep:  Medication double check completed by: Dunia WATTS RN  Prior to administration pt identified by name and : yes    Nursing Assessment:  Appearance: casual   Mood: decent, recovering from lumpectomy and anxiety related to cancer scare.   Affect: wnl  Eye contact: good  PHQ 2021   PHQ-9 Total Score 13   Q9: Thoughts of better off dead/self-harm past 2 weeks Several days       Procedure Performed:  VSS and mental status WNL. Patient was given ketamine. See MAR for details.     Post Procedure Assessment:  Patient tolerated the treatment with the following side effects: disociation. Vital signs were monitored, see VS Flowsheet. Patient stated they felt ready to go home prior to discharge. AVS was offered to patient and patient declined. Patient was instructed not to drive for the remainder of the day and to notify clinic if any concerns arise.     Next appt: 2021    Medications were supplied by [facility]     This service provided today was under the supervising provider of the day Elizabeth Gordon MD, who was available if needed.    Perla Workman RN

## 2021-07-06 ENCOUNTER — ALLIED HEALTH/NURSE VISIT (OUTPATIENT)
Dept: PSYCHIATRY | Facility: CLINIC | Age: 51
End: 2021-07-06

## 2021-07-06 VITALS — HEART RATE: 86 BPM | OXYGEN SATURATION: 96 % | SYSTOLIC BLOOD PRESSURE: 139 MMHG | DIASTOLIC BLOOD PRESSURE: 86 MMHG

## 2021-07-06 DIAGNOSIS — F33.2 SEVERE RECURRENT MAJOR DEPRESSION WITHOUT PSYCHOTIC FEATURES (H): Primary | ICD-10-CM

## 2021-07-06 ASSESSMENT — PATIENT HEALTH QUESTIONNAIRE - PHQ9: SUM OF ALL RESPONSES TO PHQ QUESTIONS 1-9: 15

## 2021-07-06 NOTE — PROGRESS NOTES
Aure Joy comes into clinic today at the request of ESTELA Zamudio MD Ordering Provider for Med Injection only ketamine.    Procedure Prep:  Medication double check completed by: Dunia WATTS RN  Prior to administration pt identified by name and : yes    Nursing Assessment:  Appearance: casual   Mood: down, depressed  Affect: wnl  Eye contact: good  PHQ 2021   PHQ-9 Total Score 12   Q9: Thoughts of better off dead/self-harm past 2 weeks Not at all     QIDDSR 16 weekly assessment: score 15. Assessment was scanned to EHR.       Procedure Performed:  VSS and mental status WNL. Patient was given ketamine. See MAR for details.     Post Procedure Assessment:  Patient tolerated the treatment with the following side effects: disociation. Vital signs were monitored, see VS Flowsheet. Patient stated they felt ready to go home prior to discharge. AVS was offered to patient and patient declined . Patient was instructed not to drive for the remainder of the day and to notify clinic if any concerns arise.     Next appt: 2021    Medication provided by Pharmacy    This service provided today was under the supervising provider of the day ESTELA Zamudio MD, who was available if needed.    Perla Workman, RN

## 2021-07-07 ENCOUNTER — TELEPHONE (OUTPATIENT)
Dept: PSYCHIATRY | Facility: CLINIC | Age: 51
End: 2021-07-07

## 2021-07-07 DIAGNOSIS — F33.2 SEVERE RECURRENT MAJOR DEPRESSION WITHOUT PSYCHOTIC FEATURES (H): Primary | ICD-10-CM

## 2021-07-07 RX ORDER — KETAMINE HYDROCHLORIDE 50 MG/ML
INJECTION, SOLUTION INTRAMUSCULAR; INTRAVENOUS
Qty: 10 ML | Refills: 0 | Status: SHIPPED | OUTPATIENT
Start: 2021-07-07 | End: 2021-08-23

## 2021-07-07 NOTE — TELEPHONE ENCOUNTER
Called patient to inform her of increased dose of Ketamine of 61.6 MG every other week, as well as being added to the TMS waitlist.

## 2021-07-07 NOTE — TELEPHONE ENCOUNTER
Patient endorsed increased symptoms of depression s/p lumpectomy and breast cancer diagnosis. Discussed with nurse Dunia Alexandra. Will plan to increase ketamine dose and also to add pt to TMS waitlist for possible re-emergent MDE.    Plan:  - Increase ketamine IM to 1.1 mg/kg IBW (56 kg) = 61.6 mg every other week  - Patient is approved for another acute course of TMS (F8 BDLPFC rTMS (TBS))    ESTELA Zamudio MD  [facility]  Department of Psychiatry

## 2021-07-19 ENCOUNTER — ALLIED HEALTH/NURSE VISIT (OUTPATIENT)
Dept: PSYCHIATRY | Facility: CLINIC | Age: 51
End: 2021-07-19
Payer: COMMERCIAL

## 2021-07-19 VITALS — HEART RATE: 78 BPM | OXYGEN SATURATION: 96 % | DIASTOLIC BLOOD PRESSURE: 94 MMHG | SYSTOLIC BLOOD PRESSURE: 145 MMHG

## 2021-07-19 DIAGNOSIS — F33.2 SEVERE RECURRENT MAJOR DEPRESSION WITHOUT PSYCHOTIC FEATURES (H): Primary | ICD-10-CM

## 2021-07-19 ASSESSMENT — PATIENT HEALTH QUESTIONNAIRE - PHQ9: SUM OF ALL RESPONSES TO PHQ QUESTIONS 1-9: 8

## 2021-07-19 NOTE — PROGRESS NOTES
Aure Joy comes into clinic today at the request of ESTELA Zamudio MD Ordering Provider for Med Injection only ketamine.    Procedure Prep:  Medication double check completed by: Dunia WATTS RN  Prior to administration pt identified by name and : yes    Nursing Assessment:  Appearance: casual   Mood: good  Affect: wnl  Eye contact: good  PHQ 2021   PHQ-9 Total Score 8   Q9: Thoughts of better off dead/self-harm past 2 weeks Not at all     QIDDSR 16 weekly assessment: score 11. Assessment was scanned to EHR.       Procedure Performed:  VSS and mental status WNL. Patient was given ketamine. See MAR for details.     Post Procedure Assessment:  Patient tolerated the treatment with the following side effects: disociation. Vital signs were monitored, see VS Flowsheet. Patient stated they felt ready to go home prior to discharge. AVS was offered to patient and patient declined. Patient was instructed not to drive for the remainder of the day and to notify clinic if any concerns arise.     Next appt: 2021    Medications were supplied by [facility]     This service provided today was under the supervising provider of the day Elizabeth Gordon MD, who was available if needed.    Perla Workman RN

## 2021-07-21 ENCOUNTER — TELEPHONE (OUTPATIENT)
Dept: PSYCHIATRY | Facility: CLINIC | Age: 51
End: 2021-07-21

## 2021-07-21 NOTE — TELEPHONE ENCOUNTER
Writer received a call back from Aure.  Discussed Ketamine schedule and the tamoxifen.  Normal verbalized understanding and will take Dr. Zamudio suggestion into account when making a decision regarding the tamoxifen.

## 2021-07-21 NOTE — TELEPHONE ENCOUNTER
Medication(s) Requested: oxycodone  Last office visit: 7.27.18  Last refill: 7.27.18  Is the patient due for refill of this medication(s): Yes  PDMP review: Criteria met. Forwarded to Physician/ANABEL for signature.        Writer called patient to discuss this.  Received voicemail.  Left message asking for a return call.  Clinic number provided.

## 2021-07-21 NOTE — TELEPHONE ENCOUNTER
Writer followed up with Dr. Zamudio after last ketamine injection due to patient questions regarding schedule and starting tamoxifen hormone blocker.      Dr. Zamudio approved the 11 day interval of Ketamine injections due to patient vacation.        Dr. Zamudio also stated that starting the Tamoxifen is something that will help patient stay alive which is important.  Per Dr. Zamudio we know what works for her depression so we can treat it on our end if her depression worsens.

## 2021-07-30 ENCOUNTER — ALLIED HEALTH/NURSE VISIT (OUTPATIENT)
Dept: PSYCHIATRY | Facility: CLINIC | Age: 51
End: 2021-07-30
Payer: COMMERCIAL

## 2021-07-30 VITALS — OXYGEN SATURATION: 97 % | HEART RATE: 85 BPM | DIASTOLIC BLOOD PRESSURE: 86 MMHG | SYSTOLIC BLOOD PRESSURE: 123 MMHG

## 2021-07-30 DIAGNOSIS — F33.2 SEVERE RECURRENT MAJOR DEPRESSION WITHOUT PSYCHOTIC FEATURES (H): Primary | ICD-10-CM

## 2021-07-30 ASSESSMENT — PATIENT HEALTH QUESTIONNAIRE - PHQ9: SUM OF ALL RESPONSES TO PHQ QUESTIONS 1-9: 4

## 2021-08-10 ENCOUNTER — ALLIED HEALTH/NURSE VISIT (OUTPATIENT)
Dept: PSYCHIATRY | Facility: CLINIC | Age: 51
End: 2021-08-10

## 2021-08-10 VITALS — DIASTOLIC BLOOD PRESSURE: 81 MMHG | SYSTOLIC BLOOD PRESSURE: 128 MMHG | HEART RATE: 81 BPM | OXYGEN SATURATION: 94 %

## 2021-08-10 DIAGNOSIS — F33.2 SEVERE RECURRENT MAJOR DEPRESSION WITHOUT PSYCHOTIC FEATURES (H): Primary | ICD-10-CM

## 2021-08-10 ASSESSMENT — PATIENT HEALTH QUESTIONNAIRE - PHQ9: SUM OF ALL RESPONSES TO PHQ QUESTIONS 1-9: 4

## 2021-08-10 NOTE — PROGRESS NOTES
Aure Joy comes into clinic today at the request of ESTELA Zamudio MD Ordering Provider for Med Injection only ketamine.    Procedure Prep:  Medication double check completed by: Dunia WATTS RN  Prior to administration pt identified by name and : yes    Nursing Assessment:  Appearance: casual   Mood: good  Affect: wnl  Eye contact: good  PHQ 8/10/2021   PHQ-9 Total Score 4   Q9: Thoughts of better off dead/self-harm past 2 weeks Not at all     QIDDSR 16 weekly assessment: score 5. Assessment was scanned to EHR.     Procedure Performed:  VSS and mental status WNL. Patient was given ketamine. See MAR for details.     Post Procedure Assessment:  Patient tolerated the treatment with the following side effects: disociation. Vital signs were monitored, see VS Flowsheet. Patient stated they felt ready to go home prior to discharge. AVS was offered to patient and patient declined. Patient was instructed not to drive for the remainder of the day and to notify clinic if any concerns arise.     Next appt: 2021    Medication provided by Pharmacy    This service provided today was under the supervising provider of the day ESTELA Zamudio MD, who was available if needed.    Perla Workman, RN

## 2021-08-16 DIAGNOSIS — F33.2 SEVERE EPISODE OF RECURRENT MAJOR DEPRESSIVE DISORDER, WITHOUT PSYCHOTIC FEATURES (H): ICD-10-CM

## 2021-08-19 RX ORDER — LAMOTRIGINE 200 MG/1
TABLET ORAL
Qty: 45 TABLET | Refills: 0 | Status: SHIPPED | OUTPATIENT
Start: 2021-08-19 | End: 2021-09-09

## 2021-08-19 NOTE — TELEPHONE ENCOUNTER
Medication requested:LAMOTRIGINE 200MG TABLETS  TAKE 1 AND 1/2 TABLETS(300 MG) BY MOUTH DAILY  Last refilled: 5/17/21  Qty: 45/0      Last seen: 5/20/21  RTC: 3 months  Cancel: 0  No-show: 0  Next appt: 9/9/21    Refill decision:   Refilled for 30 days per protocol.

## 2021-08-20 DIAGNOSIS — F33.2 SEVERE RECURRENT MAJOR DEPRESSION WITHOUT PSYCHOTIC FEATURES (H): ICD-10-CM

## 2021-08-23 RX ORDER — KETAMINE HYDROCHLORIDE 50 MG/ML
INJECTION, SOLUTION INTRAMUSCULAR; INTRAVENOUS
Qty: 10 ML | Refills: 0 | Status: SHIPPED | OUTPATIENT
Start: 2021-08-23 | End: 2021-10-05

## 2021-08-24 ENCOUNTER — ALLIED HEALTH/NURSE VISIT (OUTPATIENT)
Dept: PSYCHIATRY | Facility: CLINIC | Age: 51
End: 2021-08-24

## 2021-08-24 VITALS — OXYGEN SATURATION: 94 % | DIASTOLIC BLOOD PRESSURE: 82 MMHG | HEART RATE: 72 BPM | SYSTOLIC BLOOD PRESSURE: 123 MMHG

## 2021-08-24 DIAGNOSIS — F33.2 SEVERE EPISODE OF RECURRENT MAJOR DEPRESSIVE DISORDER, WITHOUT PSYCHOTIC FEATURES (H): Primary | ICD-10-CM

## 2021-08-24 ASSESSMENT — PATIENT HEALTH QUESTIONNAIRE - PHQ9: SUM OF ALL RESPONSES TO PHQ QUESTIONS 1-9: 4

## 2021-08-24 NOTE — PROGRESS NOTES
Aure Joy comes into clinic today at the request of ESTELA Zamudio MD Ordering Provider for Med Injection only ketamine.    Procedure Prep:  Medication double check completed by: Dunia WATTS Rn  Prior to administration pt identified by name and : yes    Nursing Assessment:  Appearance: casual   Mood: good  Affect: wnl  Eye contact: good  PHQ 2021   PHQ-9 Total Score 4   Q9: Thoughts of better off dead/self-harm past 2 weeks Not at all     QIDDSR 16 weekly assessment: score 6. Assessment was scanned to EHR.     Procedure Performed:  VSS and mental status WNL. Patient was given ketamine. See MAR for details.     Post Procedure Assessment:  Patient tolerated the treatment with the following side effects: disociation. Vital signs were monitored, see VS Flowsheet. Patient stated they felt ready to go home prior to discharge. AVS was offered to patient and patient declined. Patient was instructed not to drive for the remainder of the day and to notify clinic if any concerns arise.     Next appt: 2021    Medication provided by Pharmacy    This service provided today was under the supervising provider of the day ESTELA Zamudio MD, who was available if needed.    Perla Workman, RN

## 2021-09-07 ENCOUNTER — ALLIED HEALTH/NURSE VISIT (OUTPATIENT)
Dept: PSYCHIATRY | Facility: CLINIC | Age: 51
End: 2021-09-07

## 2021-09-07 VITALS — OXYGEN SATURATION: 95 % | DIASTOLIC BLOOD PRESSURE: 79 MMHG | SYSTOLIC BLOOD PRESSURE: 133 MMHG | HEART RATE: 78 BPM

## 2021-09-07 DIAGNOSIS — F33.2 SEVERE EPISODE OF RECURRENT MAJOR DEPRESSIVE DISORDER, WITHOUT PSYCHOTIC FEATURES (H): Primary | ICD-10-CM

## 2021-09-07 ASSESSMENT — PATIENT HEALTH QUESTIONNAIRE - PHQ9: SUM OF ALL RESPONSES TO PHQ QUESTIONS 1-9: 9

## 2021-09-07 NOTE — PROGRESS NOTES
Aure Joy comes into clinic today at the request of ESTELA Zamudio MD Ordering Provider for Med Injection only ketamine.    Procedure Prep:  Medication double check completed by: Dunia WATTS RN  Prior to administration pt identified by name and : yes    Nursing Assessment:  Appearance: casual   Mood: good  Affect: wnl  Eye contact: good  PHQ 2021   PHQ-9 Total Score 4   Q9: Thoughts of better off dead/self-harm past 2 weeks Not at all     QIDDSR 16 w eekly assessment: score 9. Assessment was scanned to EHR.     Procedure Performed:  VSS and mental status WNL. Patient was given ketamine. See MAR for details.     Post Procedure Assessment:  Patient tolerated the treatment with the following side effects: disociation. Vital signs were monitored, see VS Flowsheet. Patient stated they felt ready to go home prior to discharge. AVS was offered to patient and patient declined. Patient was instructed not to drive for the remainder of the day and to notify clinic if any concerns arise.     Next appt: 2021    Medications were supplied by [facility]     This service provided today was under the supervising provider of the day ESTELA Zamudio mD, who was available if needed.    Perla Workman, RN

## 2021-09-09 ENCOUNTER — VIRTUAL VISIT (OUTPATIENT)
Dept: PSYCHIATRY | Facility: CLINIC | Age: 51
End: 2021-09-09

## 2021-09-09 DIAGNOSIS — F33.2 SEVERE EPISODE OF RECURRENT MAJOR DEPRESSIVE DISORDER, WITHOUT PSYCHOTIC FEATURES (H): Primary | ICD-10-CM

## 2021-09-09 RX ORDER — TAMOXIFEN CITRATE 20 MG/1
20 TABLET ORAL
COMMUNITY
Start: 2021-07-28 | End: 2023-09-14

## 2021-09-09 NOTE — PROGRESS NOTES
Called patient, left voicemail message to call back to complete rooming process.  Isabel Harris, CMA

## 2021-09-09 NOTE — PATIENT INSTRUCTIONS
"Today's Recommendations:  - To continue ketamine as prescribed without change from previous dose  - To follow-up with us in 3 months, hoping you would have more tolerance to tamoxifen by that time and your symptoms improve    We are specifically a university and research clinic, and there are often research studies ongoing. Some of those are clinical trials of new brain stimulation treatments. Others are what we would call \"biomarker\" studies, where we ask you to participate in some kind of measurement while you are undergoing regular treatment. You may be called by one of our clinical research coordinators about these studies. If you do not want to be contacted, please let us know. (Being called does not mean you have to be in a study.) In some cases, a clinical trial is the only way to get access to an advanced treatment that is not covered by your insurance. Usually, we will talk about this in your visit if it is an option.    Outside of this specific clinic, you might also be interested in the \"Measurement Based Care\" research study that is being conducted throughout the [facility] Department of Psychiatry and Behavioral Sciences. This provides some additional testing that might be diagnostically helpful, although we are still trying to learn how best to use it.  More information about that is at: [email or web address]    If you are interested in the study you can email, [email or web address].    Patient Education       General Information:  Our Treatment-Resistant Disorders/Interventional Psychiatry clinic is what is often called a \"consultation\" or \"tertiary care\" clinic. That means we do not see patients long-term for medication management or talk therapy. We offer thorough evaluations, recommendations about medications/therapies you may have not yet tried, and in some cases, brain stimulation or office-based treatments. If you are likely to benefit from one " of those advanced treatments, we will have talked about it today. Once that treatment is complete, we will see you once or twice afterwards to check in, and then you will return to getting ongoing psychiatric care from whomever you were seeing before you came for your evaluation with us. If for some reason you no longer have access to that clinician, we can help with a referral to our main [facility]. The only patients we see long-term are patients with implanted medical devices that require ongoing monitoring and programming.     Our recommendations almost always include some kind of cognitive-behavioral therapy (CBT) if you are not getting it already. Brain and nerve stimulation treatments work best when combined with certain talk therapies. We make these recommendations because we strongly believe that, without the therapy piece, most people will not get better, or will have limited clinical benefit. It is often difficult or inconvenient to add therapy to an already busy schedule, but it is also necessary.    It is important to remember that, like all mental health treatments, our interventional therapies are not perfect. About one third of people will not feel better after treatment, and even when they work, they do not take away the symptoms entirely.If we have recommended something above, it means we think there is a better than even chance of it working, but things are never guaranteed. This is another reason we usually recommend CBT along with our advanced treatments -- it can address the symptoms that remain after the stimulation/ketamine treatment.       While we are waiting to implement the recommendations you and I have discussed, you should know what to do if your symptoms worsen. A variety of crisis numbers/resources are available. They include:    CRISIS GENERAL NUMBERS   6-671-FIIFYGT ([phone])  OR  918     CRISIS INTERVENTION TEAM (CIT) - this is a POLICE UNIT, specially  trained.  This website has information for all of [place]'s CITs. Lays out which areas have this team.  [email or web address]  However, one can just call 911 and ask for this special team.   If police need to be called, DO ask for this team.    CRISIS MOBILE TEAMS  [and see end of this phrase*]  [facility]: 24hrs/7days:    [phone]  (Adults > 19yo)    [phone]  (Child   < 16yo)                                       FRONT DOOR (during the day could call)   [phone]    [facility] -[phone] (Adult)  [phone] (Child)     [facility] -[phone] (Adult and Child)     [facility] -[phone] ([employer] mobile crisis team; Adult and Child; 24hr)     [facility] -[phone] (Adult and Child)     CRISIS HOUSING  [facility]                           [address]              [phone]  [facility]                                [address]                       [phone]  [facility]                               [address]     [phone]   [facility]  [address]                [phone]    [place] EMERGENCY NUMBERS      Crisis Connection:                                [phone]    [facility]:     [phone]    Crisis Intervention:                                [phone] or [phone]     COPE: Mobile Team 24hrs/7days:    [phone]  (Adults > 19yo)                                                                            [phone]  (Child   < 16yo)  [facility]        [phone] 24/7 line and Mobile Team   [address] [place] [address]  DROP IN:  M-F: 8:00 am - 7:00 pm  Sat: 11:00 am - 3:00 pm   Sun: Closed     WALK-IN COUNSELING:  [facility]       [phone]     "[address]:   M, W, F:  1:00-3:00PM    M-Th:  6:30-8:30PM    TRANS and LGBT  Call @Payline at [phone]. This service is staffed by trans people .   LGBT youth <24 contemplating suicide, call the [facility] Lifeline [phone].    [facility] CONTROL CENTER  [phone]    OR  go to nearest ER    CHILD  \"[facility] has a needs assessment team who will do an intake evaluation. Based on the results of the intake they will recommended inpatient admission, partial hospitalization, intensive outpatient or outpatient care. The number is [phone]. \"    CRISIS TEXT LINE  Http://www.crisistextline.org  FREE SUPPORT AT YOUR FINGERTIPS,   Crisis Text Line serves anyone, in any type of crisis, providing access to free,  support and information via the medium people already use and trust: text. Here s how it works:  1)  Text [address]28 from anywhere in the [place], anytime, about any type of crisis.  2)  A live, trained Crisis Counselor receives the text and responds quickly.      The volunteer Crisis Counselor will help you move from a 'hot moment to a cool moment'    [facility] EMERGENCY NUMBERS      Crisis Connection:                                [phone]    [facility]:              [phone]    Crisis Intervention:                                [phone] or [phone]     COPE: Mobile Team 24hrs/7days:    [phone]  (Adults > 19yo)                                                                            [phone]  (Child   < 18yo)  [facility]        [phone]  CALL 24 hours a day.  [address] [place] [address]  DROP IN:  M-F: 8:00 am - 7:00 pm  Sat: 11:00 am - 3:00 pm   Sun: Closed    WALK-IN COUNSELIN)  [facility]                                  [phone]        [address]:                  M, W:      5:00-7:00PM       2)  [facility]" "[phone]        [place], [address]                T, Th:      6:00-8:00PM    TRANS and LGBT  Call [facility] [employer] at [phone]. This service is staffed by trans people 24/7.   LGBT youth <24 contemplating suicide, call the [facility] Lifeline [phone].    POISON CONTROL CENTER  [phone]    OR  go to nearest ER    CHILD  \"[facility] has a needs assessment team who will do an intake evaluation. Based on the results of the intake they will recommended inpatient admission, partial hospitalization, intensive outpatient or outpatient care. The number is [phone] or [phone]. \"    CRISIS TEXT LINE  Http://www.crisistextline.org  FREE SUPPORT AT YOUR FINGERTIPS, 24/7  Crisis Text Line serves anyone, in any type of crisis, providing access to free, 24/7 support and information via the medium people already use and trust: text. Here s how it works:  1)  Text [phone] from anywhere in the [place], anytime, about any type of crisis.  2)  A live, trained Crisis Counselor receives the text and responds quickly.      The volunteer Crisis Counselor will help you move from a 'hot moment to a cool moment'      * A Community Paramedic (CP) is an advanced paramedic that works to increase access to primary and preventive care and decrease use of emergency departments, which in turn decreases health care costs. Among other things, CPs may play a key role in providing follow-up services after a hospital discharge to prevent hospital readmission. CPs can provide health assessments, chronic disease monitoring and education, medication management, immunizations and vaccinations, laboratory specimen collection, hospital discharge follow-up care and minor medical procedures. CPs work under the direction of an Ambulance Medical Director.     "

## 2021-09-09 NOTE — PROGRESS NOTES
Aure is a 51 year old who is being evaluated via a billable video visit.      How would you like to obtain your AVS? MyChart  If the video visit is dropped, the invitation should be resent by: Send to e-mail at: [email or web address]  Will anyone else be joining your video visit? No       PHQ9 done on 9/7/20 and per patient no changes in mood.    Video Start Time: 10:15 AM  Video-Visit Details    Type of service:  Video Visit    Video End Time:10:45 AM    Originating Location (pt. Location): Home    Distant Location (provider location):  [facility] PSYCHIATRY     Platform used for Video Visit: LinaWell
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
## 2021-09-11 ENCOUNTER — HEALTH MAINTENANCE LETTER (OUTPATIENT)
Age: 51
End: 2021-09-11

## 2021-09-14 RX ORDER — LAMOTRIGINE 200 MG/1
TABLET ORAL
Qty: 135 TABLET | Refills: 1 | Status: SHIPPED | OUTPATIENT
Start: 2021-09-14 | End: 2021-10-01

## 2021-09-14 RX ORDER — LORAZEPAM 0.5 MG/1
0.5 TABLET ORAL EVERY 6 HOURS PRN
Qty: 30 TABLET | Refills: 0 | Status: SHIPPED | OUTPATIENT
Start: 2021-09-14 | End: 2024-07-18

## 2021-09-21 ENCOUNTER — ALLIED HEALTH/NURSE VISIT (OUTPATIENT)
Dept: PSYCHIATRY | Facility: CLINIC | Age: 51
End: 2021-09-21

## 2021-09-21 VITALS — DIASTOLIC BLOOD PRESSURE: 85 MMHG | HEART RATE: 73 BPM | SYSTOLIC BLOOD PRESSURE: 120 MMHG | OXYGEN SATURATION: 96 %

## 2021-09-21 DIAGNOSIS — F33.2 SEVERE EPISODE OF RECURRENT MAJOR DEPRESSIVE DISORDER, WITHOUT PSYCHOTIC FEATURES (H): Primary | ICD-10-CM

## 2021-09-21 ASSESSMENT — PATIENT HEALTH QUESTIONNAIRE - PHQ9: SUM OF ALL RESPONSES TO PHQ QUESTIONS 1-9: 6

## 2021-09-21 NOTE — PROGRESS NOTES
Aure Joy comes into clinic today at the request of ESTELA Zamudio MD Ordering Provider for Med Injection only ketamine.    Procedure Prep:  Medication double check completed by: Eliot COOMBS RN  Prior to administration pt identified by name and : yes    Nursing Assessment:  Appearance: casual   Mood: good  Affect: wnl  Eye contact: good  PHQ 2021   PHQ-9 Total Score 9   Q9: Thoughts of better off dead/self-harm past 2 weeks Not at all     QIDDSR 16 weekly assessment: score 7. Assessment was scanned to EHR.     Procedure Performed:  VSS and mental status WNL. Patient was given ketamine. See MAR for details.     Post Procedure Assessment:  Patient tolerated the treatment with the following side effects: disociation. Vital signs were monitored, see VS Flowsheet. Patient stated they felt ready to go home prior to discharge. AVS was offered to patient and patient declined. Patient was instructed not to drive for the remainder of the day and to notify clinic if any concerns arise.     Next appt: 10/05/2021    Medication provided by Pharmacy    This service provided today was under the supervising provider of the day ESTELA Zamudio MD, who was available if needed.    Perla Workman, GERARDO

## 2021-10-01 DIAGNOSIS — F33.2 SEVERE RECURRENT MAJOR DEPRESSION WITHOUT PSYCHOTIC FEATURES (H): ICD-10-CM

## 2021-10-04 RX ORDER — LAMOTRIGINE 150 MG/1
300 TABLET ORAL DAILY
Qty: 180 TABLET | Refills: 1 | Status: SHIPPED | OUTPATIENT
Start: 2021-10-04 | End: 2022-04-05

## 2021-10-05 ENCOUNTER — ALLIED HEALTH/NURSE VISIT (OUTPATIENT)
Dept: PSYCHIATRY | Facility: CLINIC | Age: 51
End: 2021-10-05

## 2021-10-05 VITALS — SYSTOLIC BLOOD PRESSURE: 120 MMHG | OXYGEN SATURATION: 94 % | HEART RATE: 83 BPM | DIASTOLIC BLOOD PRESSURE: 71 MMHG

## 2021-10-05 DIAGNOSIS — F33.2 SEVERE RECURRENT MAJOR DEPRESSION WITHOUT PSYCHOTIC FEATURES (H): ICD-10-CM

## 2021-10-05 DIAGNOSIS — F33.2 SEVERE RECURRENT MAJOR DEPRESSION WITHOUT PSYCHOTIC FEATURES (H): Primary | ICD-10-CM

## 2021-10-05 RX ORDER — KETAMINE HYDROCHLORIDE 50 MG/ML
INJECTION, SOLUTION INTRAMUSCULAR; INTRAVENOUS
Qty: 10 ML | OUTPATIENT
Start: 2021-10-05

## 2021-10-05 RX ORDER — KETAMINE HYDROCHLORIDE 50 MG/ML
INJECTION, SOLUTION INTRAMUSCULAR; INTRAVENOUS
Qty: 10 ML | Refills: 0 | Status: SHIPPED | OUTPATIENT
Start: 2021-10-05 | End: 2022-01-28

## 2021-10-05 ASSESSMENT — PATIENT HEALTH QUESTIONNAIRE - PHQ9: SUM OF ALL RESPONSES TO PHQ QUESTIONS 1-9: 8

## 2021-10-05 NOTE — PROGRESS NOTES
Aure Joy comes into clinic today at the request of ESTELA Zamudio MD Ordering Provider for Med Injection only ketamine.    Procedure Prep:  Medication double check completed by: Dunia WATTS Rn  Prior to administration pt identified by name and : yes    Nursing Assessment:  Appearance: casual   Mood: good  Affect: wnl  Eye contact: good  PHQ 10/5/2021   PHQ-9 Total Score 8   Q9: Thoughts of better off dead/self-harm past 2 weeks Not at all     QIDDSR 16 weekly assessment: score 10. Assessment was scanned to EHR.     Procedure Performed:  VSS and mental status WNL. Patient was given ketamine. See MAR for details.     Post Procedure Assessment:  Patient tolerated the treatment with the following side effects: disociation. Vital signs were monitored, see VS Flowsheet. Patient stated they felt ready to go home prior to discharge. AVS was offered to patient and patient declined. Patient was instructed not to drive for the remainder of the day and to notify clinic if any concerns arise.     Next appt: 10/18/2021    Medication provided by Clinic    This service provided today was under the supervising provider of the day ESTELA Zamudio MD, who was available if needed.    Perla Workman, RN

## 2021-10-19 ENCOUNTER — LAB (OUTPATIENT)
Dept: NEUROLOGY | Facility: CLINIC | Age: 51
End: 2021-10-19

## 2021-10-19 ENCOUNTER — ALLIED HEALTH/NURSE VISIT (OUTPATIENT)
Dept: PSYCHIATRY | Facility: CLINIC | Age: 51
End: 2021-10-19

## 2021-10-19 VITALS — HEART RATE: 79 BPM | OXYGEN SATURATION: 96 % | DIASTOLIC BLOOD PRESSURE: 84 MMHG | SYSTOLIC BLOOD PRESSURE: 142 MMHG

## 2021-10-19 DIAGNOSIS — F33.2 SEVERE RECURRENT MAJOR DEPRESSION WITHOUT PSYCHOTIC FEATURES (H): Primary | ICD-10-CM

## 2021-10-19 DIAGNOSIS — F33.2 SEVERE EPISODE OF RECURRENT MAJOR DEPRESSIVE DISORDER, WITHOUT PSYCHOTIC FEATURES (H): ICD-10-CM

## 2021-10-19 DIAGNOSIS — F33.2 SEVERE RECURRENT MAJOR DEPRESSION WITHOUT PSYCHOTIC FEATURES (H): ICD-10-CM

## 2021-10-19 LAB
ALBUMIN UR-MCNC: NEGATIVE MG/DL
ANION GAP SERPL CALCULATED.3IONS-SCNC: 9 MMOL/L (ref 3–14)
APPEARANCE UR: CLEAR
BACTERIA #/AREA URNS HPF: ABNORMAL /HPF
BASOPHILS # BLD AUTO: 0.1 10E3/UL (ref 0–0.2)
BASOPHILS NFR BLD AUTO: 1 %
BILIRUB UR QL STRIP: NEGATIVE
BUN SERPL-MCNC: 8 MG/DL (ref 7–30)
CALCIUM SERPL-MCNC: 8.5 MG/DL (ref 8.5–10.1)
CHLORIDE BLD-SCNC: 110 MMOL/L (ref 94–109)
CO2 SERPL-SCNC: 21 MMOL/L (ref 20–32)
COLOR UR AUTO: ABNORMAL
CREAT SERPL-MCNC: 0.67 MG/DL (ref 0.52–1.04)
EOSINOPHIL # BLD AUTO: 0.1 10E3/UL (ref 0–0.7)
EOSINOPHIL NFR BLD AUTO: 1 %
ERYTHROCYTE [DISTWIDTH] IN BLOOD BY AUTOMATED COUNT: 14 % (ref 10–15)
GFR SERPL CREATININE-BSD FRML MDRD: >90 ML/MIN/1.73M2
GLUCOSE BLD-MCNC: 154 MG/DL (ref 70–99)
GLUCOSE UR STRIP-MCNC: NEGATIVE MG/DL
HCT VFR BLD AUTO: 39.6 % (ref 35–47)
HGB BLD-MCNC: 13 G/DL (ref 11.7–15.7)
HGB UR QL STRIP: NEGATIVE
IMM GRANULOCYTES # BLD: 0 10E3/UL
IMM GRANULOCYTES NFR BLD: 0 %
KETONES UR STRIP-MCNC: NEGATIVE MG/DL
LEUKOCYTE ESTERASE UR QL STRIP: NEGATIVE
LYMPHOCYTES # BLD AUTO: 2 10E3/UL (ref 0.8–5.3)
LYMPHOCYTES NFR BLD AUTO: 21 %
MCH RBC QN AUTO: 29.7 PG (ref 26.5–33)
MCHC RBC AUTO-ENTMCNC: 32.8 G/DL (ref 31.5–36.5)
MCV RBC AUTO: 90 FL (ref 78–100)
MONOCYTES # BLD AUTO: 0.9 10E3/UL (ref 0–1.3)
MONOCYTES NFR BLD AUTO: 9 %
NEUTROPHILS # BLD AUTO: 6.7 10E3/UL (ref 1.6–8.3)
NEUTROPHILS NFR BLD AUTO: 68 %
NITRATE UR QL: NEGATIVE
NRBC # BLD AUTO: 0 10E3/UL
NRBC BLD AUTO-RTO: 0 /100
PH UR STRIP: 5 [PH] (ref 5–7)
PLATELET # BLD AUTO: 243 10E3/UL (ref 150–450)
POTASSIUM BLD-SCNC: 3.6 MMOL/L (ref 3.4–5.3)
RBC # BLD AUTO: 4.38 10E6/UL (ref 3.8–5.2)
RBC URINE: 1 /HPF
SODIUM SERPL-SCNC: 140 MMOL/L (ref 133–144)
SP GR UR STRIP: 1.01 (ref 1–1.03)
SQUAMOUS EPITHELIAL: 2 /HPF
TRANSITIONAL EPI: <1 /HPF
UROBILINOGEN UR STRIP-MCNC: NORMAL MG/DL
WBC # BLD AUTO: 9.7 10E3/UL (ref 4–11)
WBC URINE: 0 /HPF

## 2021-10-19 ASSESSMENT — PATIENT HEALTH QUESTIONNAIRE - PHQ9: SUM OF ALL RESPONSES TO PHQ QUESTIONS 1-9: 4

## 2021-10-19 NOTE — PROGRESS NOTES
Aure Joy comes into clinic today at the request of ESTELA Zamudio MD Ordering Provider for Med Injection only ketamine.    Procedure Prep:  Medication double check completed by: Dunia WATTS RN  Prior to administration pt identified by name and : yes    Nursing Assessment:  Appearance: casual   Mood: good  Affect: wnl  Eye contact: good  PHQ 10/19/2021   PHQ-9 Total Score 4   Q9: Thoughts of better off dead/self-harm past 2 weeks Not at all     QIDDSR 16 weekly assessment: score 5. Assessment was scanned to EHR.     Procedure Performed:  VSS and mental status WNL. Patient was given ketamine. See MAR for details.     Post Procedure Assessment:  Patient tolerated the treatment with the following side effects: disociation  . Vital signs were monitored, see VS Flowsheet. Patient stated they felt ready to go home prior to discharge. AVS was offered to patient and patient declined. Patient was instructed not to drive for the remainder of the day and to notify clinic if any concerns arise.     Next appt: 2021    Medication provided by Pharmacy    This service provided today was under the supervising provider of the day Owen Zamudio MD, who was available if needed.    Perla Workman, RN

## 2021-10-20 ENCOUNTER — TELEPHONE (OUTPATIENT)
Dept: PSYCHIATRY | Facility: CLINIC | Age: 51
End: 2021-10-20

## 2021-11-02 ENCOUNTER — ALLIED HEALTH/NURSE VISIT (OUTPATIENT)
Dept: PSYCHIATRY | Facility: CLINIC | Age: 51
End: 2021-11-02

## 2021-11-02 VITALS — SYSTOLIC BLOOD PRESSURE: 140 MMHG | OXYGEN SATURATION: 99 % | DIASTOLIC BLOOD PRESSURE: 80 MMHG | HEART RATE: 76 BPM

## 2021-11-02 DIAGNOSIS — F33.2 SEVERE RECURRENT MAJOR DEPRESSION WITHOUT PSYCHOTIC FEATURES (H): Primary | ICD-10-CM

## 2021-11-02 ASSESSMENT — PATIENT HEALTH QUESTIONNAIRE - PHQ9: SUM OF ALL RESPONSES TO PHQ QUESTIONS 1-9: 3

## 2021-11-02 NOTE — PROGRESS NOTES
Aure Joy comes into clinic today at the request of ESTELA Zamudio MD Ordering Provider for Med Injection only ketamine.    Procedure Prep:  Medication double check completed by: Dunia WATTS RN  Prior to administration pt identified by name and : yes    Nursing Assessment:  Appearance: casual   Mood: good, got a new job at [employer] in the [employer] starts on Monday and very excited.  Affect: wnl  Eye contact: good  PHQ 2021   PHQ-9 Total Score 3   Q9: Thoughts of better off dead/self-harm past 2 weeks Not at all     QIDDSR 16 weekly assessment: score 5. Assessment was scanned to EHR.   =.     Procedure Performed:  VSS and mental status WNL. Patient was given ketamine. See MAR for details.     Post Procedure Assessment:  Patient tolerated the treatment with the following side effects: disociation. Vital signs were monitored, see VS Flowsheet. Patient stated they felt ready to go home prior to discharge. AVS was offered to patient and patient decline. Patient was instructed not to drive for the remainder of the day and to notify clinic if any concerns arise.     Next appt: 2021    Medication provided by Pharmacy    This service provided today was under the supervising provider of the day ESTELA Zamudio MD, who was available if needed.    Perla Workman, GERARDO

## 2021-11-16 ENCOUNTER — ALLIED HEALTH/NURSE VISIT (OUTPATIENT)
Dept: PSYCHIATRY | Facility: CLINIC | Age: 51
End: 2021-11-16

## 2021-11-16 VITALS — HEART RATE: 86 BPM | SYSTOLIC BLOOD PRESSURE: 145 MMHG | OXYGEN SATURATION: 95 % | DIASTOLIC BLOOD PRESSURE: 84 MMHG

## 2021-11-16 DIAGNOSIS — F33.2 SEVERE RECURRENT MAJOR DEPRESSION WITHOUT PSYCHOTIC FEATURES (H): Primary | ICD-10-CM

## 2021-11-16 NOTE — PROGRESS NOTES
Aure Joy comes into clinic today at the request of ESTELA Zamudio MD Ordering Provider for Med Injection only ketamine.    Procedure Prep:  Medication double check completed by: Eliot COOMBS Rn  Prior to administration pt identified by name and : yes    Nursing Assessment:  Appearance: casual   Mood: good  Affect: wnl  Eye contact: good  PHQ 2021   PHQ-9 Total Score 4   Q9: Thoughts of better off dead/self-harm past 2 weeks Not at all     QIDDSR 16 weekly assessment: score 5. Assessment was scanned to EHR.       Procedure Performed:  VSS and mental status WNL. Patient was given ketamine. See MAR for details.     Post Procedure Assessment:  Patient tolerated the treatment with the following side effects: disociation. Vital signs were monitored, see VS Flowsheet. Patient stated they felt ready to go home prior to discharge. AVS was offered to patient and patient decline. Patient was instructed not to drive for the remainder of the day and to notify clinic if any concerns arise.     Next appt: 2021    Medication provided by Pharmacy    This service provided today was under the supervising provider of the day ESTELA Zamudio MD, who was available if needed.    Perla Workman, GERARDO

## 2021-11-17 ASSESSMENT — PATIENT HEALTH QUESTIONNAIRE - PHQ9: SUM OF ALL RESPONSES TO PHQ QUESTIONS 1-9: 4

## 2021-11-30 ENCOUNTER — ALLIED HEALTH/NURSE VISIT (OUTPATIENT)
Dept: PSYCHIATRY | Facility: CLINIC | Age: 51
End: 2021-11-30

## 2021-11-30 VITALS — HEART RATE: 73 BPM | SYSTOLIC BLOOD PRESSURE: 133 MMHG | DIASTOLIC BLOOD PRESSURE: 85 MMHG | OXYGEN SATURATION: 94 %

## 2021-11-30 DIAGNOSIS — F33.2 SEVERE RECURRENT MAJOR DEPRESSION WITHOUT PSYCHOTIC FEATURES (H): Primary | ICD-10-CM

## 2021-11-30 NOTE — PROGRESS NOTES
Aure Joy comes into clinic today at the request of ESTELA Zamudio MD Ordering Provider for Med Injection only ketamine.    Procedure Prep:  Medication double check completed by: Dunia WATTS RN  Prior to administration pt identified by name and : yes    Nursing Assessment:  Appearance: casual   Mood: good  Affect: wnl  Eye contact: good  PHQ 2021   PHQ-9 Total Score 5   Q9: Thoughts of better off dead/self-harm past 2 weeks Not at all     QIDDSR 16 weekly assessment: score 7. Assessment was scanned to EHR.       Procedure Performed:  VSS and mental status WNL. Patient was given ketamine. See MAR for details.     Post Procedure Assessment:  Patient tolerated the treatment with the following side effects: disociation. Vital signs were monitored, see VS Flowsheet. Patient stated they felt ready to go home prior to discharge. AVS was offered to patient and patient declined. Patient was instructed not to drive for the remainder of the day and to notify clinic if any concerns arise.     Next appt: 2021    Medications were supplied by [facility]     This service provided today was under the supervising provider of the day ESTELA Zamudio MD, who was available if needed.    Perla Workman, RN

## 2021-12-01 ASSESSMENT — PATIENT HEALTH QUESTIONNAIRE - PHQ9: SUM OF ALL RESPONSES TO PHQ QUESTIONS 1-9: 5

## 2021-12-09 NOTE — PROGRESS NOTES
[facility]MS [facility]  [address], [address]  [place] [address]  TMS Procedure Note   Aure Joy MRN# [id number]  Age: 49 year old year old YOB: 1970    Pre-Procedure:  History and Physical: Reviewed in medical record  Consent Signed by: Aure Joy  On: 1/31/2020    Clinical Narrative:  Patient tolerating treatment with no side effects. Patient report feeling better overall with TMS and ketamine together.     Indications for TMS:  MDD, recurrent, severe; 4+ medication trials (from 2+ classes) ineffective; Psychotherapy ineffective.     Pre-Procedure Diagnosis:  MDD, recurrent, severe F33.2    Treatment Hx:  Treatment number this series: 6  Total lifetime treatment number: 83    Allergies   Allergen Reactions     Codeine Nausea     Other  [No Clinical Screening - See Comments] Nausea and Vomiting and Other (See Comments)     general anesthesia- uncontrolled vomitting      BP (!) 155/83 (BP Location: Right arm, Patient Position: Sitting, Cuff Size: Adult Regular)   Pulse 80     Pause for the Cause  Right patient:  Yes  Right procedure/correct coil:  Yes; rTMS; cpt 59610; F8 coil.   Earplugs in place:  Yes    Procedure  Patient was seated in procedure chair. Identity and procedure was verified. Ear plugs were placed in ears and patient-specific cap was placed on head and tightened appropriately. Ruler locations were verified. Coil was placed at treatment location and stimulator was set to parameters described below. A test train was delivered and pt tolerated train. Given pt tolerance, 60 treatment trains were delivered to the left side and 3 trains were delivered to the right side. Pt tolerated procedure with forehead movement.      Motor Threshold Determination  Distance from nasion to inion: 35.5  MT 1: 0 - 6 - 16 @ 69% on 1/29/2018  MT 2: 0 - 6 - 16 @ 69% on 2/6/2018   MT 3: 0 - 6 - 16 @ 69% on 2/13/2018   MT 4: 0 - 6 - 16 @ 69% on 2/23/2018   MT 5: 0 - 6 - 16 @ 69% on  Spoke with mother of patient we changed consult in person to a virtual visit mom given information on how to down load the portal.   3/2/2018   MT 6: 0 - 5.5 - 15.5(always use intersection at left side of ruler)@ 85% on 8/23/19  MT 7: 0 - 5.5 - 15.5(always use intersection at left side of ruler)@ 80% on 8/29/19  MT 8: 0 - 5.5 - 37.5(always use intersection at left side of ruler)@ 76% on 9/5/19 (right side using EMG on left hand)  MT 9: C2 (R side using EMG on L hand) 78% on 9/12/2019  MT 10: C2 (R side using EMG on L hand) 76% on 9/26/2019  MT 11: C2 (R side using EMG on L hand) 82 on 1/31/2020    LDLPFC:  Frequency: 50 Hz  Number of pulses:  3                        Number of bursts: 10  Burst frequency: 5 Hz  Cycle time: 10 sec  Total pulses delivered: 1800  Tx Loc: F3  Energy: 80% (97% MT, maximum due to stimulator limitations)  Number of Cycles: 60 trains    RDLPFC:  Frequency: 50 Hz  Number of pulses:  3                        Number of bursts: 200  Burst frequency: 5 Hz  Cycle time: 120.0   Total pulses delivered: 1800  Tx Loc: F4 (right side)  Energy: 80% (97% MT, maximum due to stimulator limitations)  Number of Cycles: 3 trains    Rating Scales:  Date MADRS IDS-SR PHQ-9   01/31/20 23  8   2/7/20 28  7             Post-Procedure Diagnosis:  MDD, recurrent, severe 296.33    Irma Marley  [facility]romod[facility]      Plan   - Cont TMS      I did not see patient but remained available at the clinic throughout the TMS session     Elizabeth Gordon MD  [facility]

## 2021-12-14 ENCOUNTER — VIRTUAL VISIT (OUTPATIENT)
Dept: PSYCHIATRY | Facility: CLINIC | Age: 51
End: 2021-12-14

## 2021-12-14 ENCOUNTER — ALLIED HEALTH/NURSE VISIT (OUTPATIENT)
Dept: PSYCHIATRY | Facility: CLINIC | Age: 51
End: 2021-12-14

## 2021-12-14 ENCOUNTER — TELEPHONE (OUTPATIENT)
Dept: PSYCHIATRY | Facility: CLINIC | Age: 51
End: 2021-12-14

## 2021-12-14 VITALS — DIASTOLIC BLOOD PRESSURE: 74 MMHG | OXYGEN SATURATION: 95 % | HEART RATE: 73 BPM | SYSTOLIC BLOOD PRESSURE: 118 MMHG

## 2021-12-14 DIAGNOSIS — D32.9 MENINGIOMA (H): ICD-10-CM

## 2021-12-14 DIAGNOSIS — F43.10 PTSD (POST-TRAUMATIC STRESS DISORDER): ICD-10-CM

## 2021-12-14 DIAGNOSIS — F33.2 SEVERE RECURRENT MAJOR DEPRESSION WITHOUT PSYCHOTIC FEATURES (H): Primary | ICD-10-CM

## 2021-12-14 DIAGNOSIS — F43.10 COMPLEX POSTTRAUMATIC STRESS DISORDER: ICD-10-CM

## 2021-12-14 DIAGNOSIS — F40.00 AGORAPHOBIA: ICD-10-CM

## 2021-12-14 ASSESSMENT — PATIENT HEALTH QUESTIONNAIRE - PHQ9
SUM OF ALL RESPONSES TO PHQ QUESTIONS 1-9: 13
SUM OF ALL RESPONSES TO PHQ QUESTIONS 1-9: 13

## 2021-12-14 NOTE — PROGRESS NOTES
Aure Joy comes into clinic today at the request of ESTELA Zamudio MD Ordering Provider for Med Injection only ketamine.    Procedure Prep:  Medication double check completed by: Dunia WATTS RN  Prior to administration pt identified by name and : yes    Nursing Assessment:  Appearance: casual   Mood: good  Affect: wnl  Eye contact: good  PHQ 2021   PHQ-9 Total Score 13   Q9: Thoughts of better off dead/self-harm past 2 weeks Several days     QIDDSR 16 weekly assessment: score 15. Assessment was scanned to EHR.   .     Procedure Performed:  VSS and mental status WNL. Patient was given ketamine. See MAR for details.     Post Procedure Assessment:  Patient tolerated the treatment with the following side effects: disociation. Vital signs were monitored, see VS Flowsheet. Patient stated they felt ready to go home prior to discharge. AVS was offered to patient and patient declined. Patient was instructed not to drive for the remainder of the day and to notify clinic if any concerns arise.     Next appt: 2021    Medication provided by Pharmacy    This service provided today was under the supervising provider of the day ESTELA Zamudio MD, who was available if needed.    Perla Workman, RN

## 2021-12-14 NOTE — Clinical Note
Anahi Duque, Perla, and Greta,  Could one of you schedule a follow-up visit with me on 1/20 at 11:15.    In addition, I am placing Aure back onto the TMS waitlist.    Thanks,  Sarah
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
## 2021-12-16 NOTE — PROGRESS NOTES
[facility] TMS Program  [address], [address]  [place] [address]  TMS Procedure Note   Aure Joy MRN# [id number]  Age: 47 year old year old YOB: 1970    Pre-Procedure:  History and Physical: Reviewed in medical record  Consent Signed by: Aure Joy  On: 8/23/2019    Clinical Narrative:  Patient tolerating treatment. Patient reports a decrease in symptoms but attributes it to a placebo effect. Patient doesn't think this change will last long.     Indications for TMS:  MDD, recurrent, severe; 4+ medication trials (from 2+ classes) ineffective; Psychotherapy ineffective.     Pre-Procedure Diagnosis:  MDD, recurrent, severe F33.2    Treatment Hx:  Treatment number this series: 5  Total lifetime treatment number: 41    Allergies   Allergen Reactions     Codeine Nausea     Other  [No Clinical Screening - See Comments] Nausea and Vomiting and Other (See Comments)     general anesthesia- uncontrolled vomitting      BP (!) 151/112 (BP Location: Left arm, Patient Position: Sitting, Cuff Size: Adult Regular)   Pulse 84     Pause for the Cause  Right patient:  Yes  Right procedure/correct coil:  Yes; rTMS; cpt 75275; H1 coil.   Earplugs in place:  Yes    Procedure  Patient was seated in procedure chair. Identity and procedure was verified. Ear plugs were placed in ears and patient-specific cap was placed on head and tightened appropriately. Ruler locations were verified. Coil was placed at initial MT location and stimulator was set to initial MT. MT was retested and found as described below. Coil was placed at treatment location and stimulator was set to stimulation parameters detailed below. A test train was delivered and pt tolerated train fine. Given pt tolerance, 55 trains were delivered. Pt tolerated procedure with facial and arm movement.    Motor Threshold Determination  Distance from nasion to inion: 35.5  MT 1: 0 - 6 - 16 @ 69% on 1/29/2018  MT 2: 0 - 6 - 16 @ 69% on 2/6/2018    MT 3: 0 - 6 - 16 @ 69% on 2/13/2018   MT 4: 0 - 6 - 16 @ 69% on 2/23/2018   MT 5: 0 - 6 - 16 @ 69% on 3/2/2018   MT 6: 0 - 5.5 - 15.5(always use intersection at left side of ruler)@ 85% on 8/23/19  MT 7: 0 - 5.5 - 15.5(always use intersection at left side of ruler)@ 80% on 8/29/19      Stimulation Parameters  Frequency: 18 Hz     Train duration: 2 sec  Total pulses delivered: 1980  Inter-train interval: 20 sec  Tx Loc: 0 - eyebrows  - 14  Energy: 84% (105% MT)  Trains: 55 trains    Date MADRS IDS-SR PHQ-9   8/23/19 36 53 21                     Post-Procedure Diagnosis:  MDD, recurrent, severe F33.2    Tamar Balderrama   [facility]  Mental [facility] Neuromodulation    Plan   -Continue TMS  -Increase energy as tolerated    I completed repeat determination of the pt's motor threshold during the visit today. I was available in the clinic throughout the treatment.    Evelyn Zamudio MD  [facility]  Mental [facility] Neuromodulation       [de-identified] : 1.25.19: biopsy: no lymphoma\par \par \par PET/CT 1/30/19  IMPRESSION: Since 9/25/2017: 1. No definite sites of pathologic FDG uptake. 2. New 0.9 cm right anterior lower lobe linear nodular opacity, max 3.0 SUV, by pattern likely represents focal atelectasis. Chest CT in 3 months can be performed to assess for stability. 3. Persistent mild FDG uptake within subcentimeter bilateral hilar lymph nodes; nonspecific. 4. Mild diffuse uptake within the stomach, correlate for gastritis (max 6.3 SUV). \par \par 10/8/2019 : The patient is here for follow up. She had a skin biopsy of left periauricular region in August 2019, which was negative for lymphoma . No RT needed . Since last visit, she has no new complaints, no fever, no weight loss, no palpable masses or lymph nodes. \par \par DANIELLE METCALF                      2\par \par \par \par Addendum Report\par \par \par \par \par Addendum\par Additional levels are examined on part 2 which show scant\par epidermis with solar\par elastosis.\par \par Verified by: Jg Alicea M.D.\par (Electronic Signature)\par Reported on: 04/29/19 15:52 EDT, [address] [facility], [place] [address]\par _________________________________________________________________\par \par \par Surgical Final Report\par \par \par \par \par Final Diagnosis\par 1. Skin, left facial lateral, biopsy:\par - Actinic keratosis.\par \par 2. Skin, left facial, biopsy (#2):\par - Fibroadipose tissue, insufficient for diagnosis.\par - Additional levels pending.\par \par 3. Skin, left facial, biopsy (#3):\par - Actinic keratosis.\par \par 4. Skin, left facial medial, biopsy:\par - Actinic keratosis.\par \par Verified by: Jg Alicea M.D.\par (Electronic Signature)\par Reported on: 04/25/19 18:04 EDT, [address], [place],\[place] [address]\par _________________________________________________________________\par \par Comment\par This case was reviewed in the department and the diagnosis\par represents a consensus opinion.\par \par Clinical History\par Punch biopsy of left facial skin\par \par Specimen(s) Submitted\par 1     Punch biopsy left facial skin lateral\par 2     Punch biopsy left facial skin\par 3     Punch biopsy left facial skin\par \par \par \par \par \par DANIELLE METCALF                      2\par \par \par \par Surgical Final Report\par \par \par \par \par 4     Punch biopsy left facial skin medial\par \par Gross Description\par 1. The specimen is received in formalin, and labeled "punch\par biopsy of the lateral"\par and consists of a punch biopsy of tan skin measuring 0.1 cm in\par diameter x 0.2 cm in depth.  The specimen is submitted entirely.\par (1 block)\par \par 2. The specimen is received in formalin, and labeled "punch\par biopsy of periphery"\par and consists of a punch biopsy of tan skin measuring 0.1 cm in\par diameter x 0.2 cm in depth.  The specimen is submitted entirely.\par (1 block)\par \par 3. The specimen is received in formalin, and labeled "punch\par biopsy #3" and consists of a punch biopsy of tan skin measuring\par 0.1 cm in diameter x 0.5 cm in depth.  The specimen is submitted\par entirely. (1 block)\par \par 4. The specimen is received in formalin, and labeled "punch\par biopsy from medial"and consists of a punch biopsy of tan skin\par measuring 0.1 cm in diameter x 0.3 cm in depth.  The specimen is\par submitted entirely. (1 block)\par \par 11/30/21\par Patient is here for a follow-up visit, accompanied by daughter in law.  Patient denies fever, chills, night sweats, unintentional weight loss or bleeding.  She does report weakness and occasional dizziness, worse over the last 6 months or so.  She also reports hearing issues over the last year.  She underwent biopsy with DERM about 2 weeks ago.  Reviewed pathology results which are non-specific.  \par PATH (11.10.2021 - DERMPATH DIAGNOSTICS) INTERPRETATION:  PCR analysis of the immunoglobulin heavy and kappa chain (IGH, IGK) gene rearrangements was performed by multiplex PCR followed by capillary electrophoresis.  A positive result indicates the presence of a clonal B-cell expansion, except with immature lymphoid neoplasms where these rearrangement can be seen in other lineages.  The diagnostic sensitivity of this assay depends on the number of background B-cells in the samples, varyingfrom 1-10% clonal B-cells in a polyclonal B-cell background.  The reference range for B-cell gene rearrangments in non0-neoplastic tissues is negative.  \par \par PET/CT 12/2021\par Since January 30, 2019, persistent mild FDG uptake in bilateral hilar \par lymph nodes, nonspecific (max SUV 6 in a right hilar node, 20% increase).\par \par 2.  Persistent mild diffuse FDG uptake in the stomach, nonspecific and \par possibly inflammatory (max SUV 6.6). 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 Each time treated with RT. \par \par Biopsy on 12.5.18: left preauricular area: limited findings; can not assess the depth; differential includes pseudolymphoma or evolving lymphoproliferative disorder\par \par PMH: follicular lymphoma stage IE\par FH: colon cancer in father and brother; lymphohistocytosis in mother\par SH; no smoking

## 2021-12-27 ENCOUNTER — ALLIED HEALTH/NURSE VISIT (OUTPATIENT)
Dept: PSYCHIATRY | Facility: CLINIC | Age: 51
End: 2021-12-27

## 2021-12-27 VITALS — OXYGEN SATURATION: 94 % | SYSTOLIC BLOOD PRESSURE: 131 MMHG | HEART RATE: 81 BPM | DIASTOLIC BLOOD PRESSURE: 92 MMHG

## 2021-12-27 DIAGNOSIS — F33.2 SEVERE RECURRENT MAJOR DEPRESSION WITHOUT PSYCHOTIC FEATURES (H): Primary | ICD-10-CM

## 2021-12-27 ASSESSMENT — PATIENT HEALTH QUESTIONNAIRE - PHQ9: SUM OF ALL RESPONSES TO PHQ QUESTIONS 1-9: 10

## 2021-12-27 NOTE — PROGRESS NOTES
Aure Joy comes into clinic today at the request of ESTELA Zamudio MD Ordering Provider for Med Injection only ketamine.    Procedure Prep:  Medication double check completed by: Dunia WATTS RN  Prior to administration pt identified by name and : yes    Nursing Assessment:  Appearance: casual   Mood: good  Affect: wnl  Eye contact: good  PHQ 2021   PHQ-9 Total Score 13   Q9: Thoughts of better off dead/self-harm past 2 weeks Several days       Procedure Performed:  VSS and mental status WNL. Patient was given ketamine. See MAR for details.     Post Procedure Assessment:  Patient tolerated the treatment with the following side effects: disociation. Vital signs were monitored, see VS Flowsheet. Patient stated they felt ready to go home prior to discharge. AVS was offered to patient and patient declined. Patient was instructed not to drive for the remainder of the day and to notify clinic if any concerns arise.     Next appt: 2022    Medication provided by Pharmacy    This service provided today was under the supervising provider of the day Yuri Liu MD, who was available if needed.    Perla Workman RN

## 2022-01-01 ENCOUNTER — HEALTH MAINTENANCE LETTER (OUTPATIENT)
Age: 52
End: 2022-01-01

## 2022-01-11 ENCOUNTER — ALLIED HEALTH/NURSE VISIT (OUTPATIENT)
Dept: PSYCHIATRY | Facility: CLINIC | Age: 52
End: 2022-01-11

## 2022-01-11 VITALS — DIASTOLIC BLOOD PRESSURE: 79 MMHG | OXYGEN SATURATION: 96 % | HEART RATE: 85 BPM | SYSTOLIC BLOOD PRESSURE: 134 MMHG

## 2022-01-11 DIAGNOSIS — F33.2 SEVERE RECURRENT MAJOR DEPRESSION WITHOUT PSYCHOTIC FEATURES (H): Primary | ICD-10-CM

## 2022-01-11 ASSESSMENT — PATIENT HEALTH QUESTIONNAIRE - PHQ9: SUM OF ALL RESPONSES TO PHQ QUESTIONS 1-9: 8

## 2022-01-11 NOTE — PROGRESS NOTES
Aure Joy comes into clinic today at the request of ESTELA Zamudio MD Ordering Provider for Med Injection only ketamine.     Procedure Prep:  Medication double check completed by: Perla RODRIGUEZ RN  Prior to administration pt identified by name and : yes     Nursing Assessment:  Appearance: appropriately dressed for weather/appointment      Mood: good  Affect: full  Eye contact: good  PHQ 2021   PHQ-9 Total Score 8   Q9: Thoughts of better off dead/self-harm past 2 weeks Not at all      QIDS SR-16 score = 12 (paper copy sent to be scanned)    Procedure Performed:  VSS and mental status WNL. Patient was given ketamine. See MAR for details.      Post Procedure Assessment:  Patient tolerated the treatment with the following side effects: disociation.   Vital signs were monitored, see VS Flowsheet.   Patient stated they felt ready to go home prior to discharge.  Patient was instructed to notify clinic if any concerns arise.      Next appt:   Future Appointments   Date Time Provider Department Center   2022 11:15 AM Evelyn Zamudio MD [facility]   2022  3:30 PM [facility]   2022  3:30 PM [facility]   2022  3:30 PM [facility]        Medication provided by Pharmacy     This service provided today was under the supervising provider of the day ESTELA Zamudio MD, who was available if needed.

## 2022-01-20 ENCOUNTER — TELEPHONE (OUTPATIENT)
Dept: PSYCHIATRY | Facility: CLINIC | Age: 52
End: 2022-01-20

## 2022-01-20 ENCOUNTER — VIRTUAL VISIT (OUTPATIENT)
Dept: PSYCHIATRY | Facility: CLINIC | Age: 52
End: 2022-01-20

## 2022-01-20 DIAGNOSIS — R11.0 NAUSEA: ICD-10-CM

## 2022-01-20 DIAGNOSIS — F40.00 AGORAPHOBIA: ICD-10-CM

## 2022-01-20 DIAGNOSIS — F43.10 COMPLEX POSTTRAUMATIC STRESS DISORDER: ICD-10-CM

## 2022-01-20 DIAGNOSIS — F33.2 SEVERE RECURRENT MAJOR DEPRESSION WITHOUT PSYCHOTIC FEATURES (H): Primary | ICD-10-CM

## 2022-01-20 DIAGNOSIS — D32.9 MENINGIOMA (H): ICD-10-CM

## 2022-01-20 DIAGNOSIS — F43.10 PTSD (POST-TRAUMATIC STRESS DISORDER): ICD-10-CM

## 2022-01-20 ASSESSMENT — PATIENT HEALTH QUESTIONNAIRE - PHQ9: SUM OF ALL RESPONSES TO PHQ QUESTIONS 1-9: 8
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
## 2022-01-20 NOTE — TELEPHONE ENCOUNTER
What is the concern that needs to be addressed by a nurse? Patient would like a call back to discuss when she can set up appointments for an acute course of ketamine per  OV today.    May a detailed message be left on voicemail? yes    Date of last office visit: 01/20/2022    Message routed to: [facility].

## 2022-01-24 ENCOUNTER — ALLIED HEALTH/NURSE VISIT (OUTPATIENT)
Dept: PSYCHIATRY | Facility: CLINIC | Age: 52
End: 2022-01-24

## 2022-01-24 VITALS — SYSTOLIC BLOOD PRESSURE: 135 MMHG | DIASTOLIC BLOOD PRESSURE: 77 MMHG | HEART RATE: 86 BPM | OXYGEN SATURATION: 96 %

## 2022-01-24 DIAGNOSIS — F33.2 SEVERE RECURRENT MAJOR DEPRESSION WITHOUT PSYCHOTIC FEATURES (H): Primary | ICD-10-CM

## 2022-01-24 ASSESSMENT — PATIENT HEALTH QUESTIONNAIRE - PHQ9: SUM OF ALL RESPONSES TO PHQ QUESTIONS 1-9: 9

## 2022-01-24 NOTE — PROGRESS NOTES
Aure Joy comes into clinic today at the request of ESTELA Zamudio MD Ordering Provider for Med Injection only ketamine.    Procedure Prep:  Medication double check completed by: Eliot COOMBS RN  Prior to administration pt identified by name and : yes    Nursing Assessment:  Appearance: casual   Mood: good  Affect: wnl  Eye contact: good  PHQ 2022   PHQ-9 Total Score 9   Q9: Thoughts of better off dead/self-harm past 2 weeks Not at all     QIDDSR 16 weekly assessment: score 12. Assessment was scanned to EHR.        Procedure Performed:  VSS and mental status WNL. Patient was given ketamine. See MAR for details.     Post Procedure Assessment:  Patient tolerated the treatment with the following side effects: disociation. Vital signs were monitored, see VS Flowsheet. Patient stated they felt ready to go home prior to discharge. AVS was offered to patient and patient declined. Patient was instructed not to drive for the remainder of the day and to notify clinic if any concerns arise.     Next appt: 2022    Medication provided by Pharmacy    This service provided today was under the supervising provider of the day Alicia Chow MD, who was available if needed.    Perla Workman RN

## 2022-01-26 ENCOUNTER — ALLIED HEALTH/NURSE VISIT (OUTPATIENT)
Dept: PSYCHIATRY | Facility: CLINIC | Age: 52
End: 2022-01-26

## 2022-01-26 VITALS — HEART RATE: 83 BPM | OXYGEN SATURATION: 95 % | DIASTOLIC BLOOD PRESSURE: 87 MMHG | SYSTOLIC BLOOD PRESSURE: 119 MMHG

## 2022-01-26 DIAGNOSIS — F33.2 SEVERE RECURRENT MAJOR DEPRESSION WITHOUT PSYCHOTIC FEATURES (H): Primary | ICD-10-CM

## 2022-01-26 ASSESSMENT — PATIENT HEALTH QUESTIONNAIRE - PHQ9: SUM OF ALL RESPONSES TO PHQ QUESTIONS 1-9: 10

## 2022-01-26 NOTE — PROGRESS NOTES
Aure Joy comes into clinic today at the request of ESTELA Zamudio MD Ordering Provider for Med Injection only ketamine.    Procedure Prep:  Medication double check completed by: Dunia WATTS RN  Prior to administration pt identified by name and : yes    Nursing Assessment:  Appearance: casual   Mood: good  Affect: wnl  Eye contact: good  PHQ 2022   PHQ-9 Total Score 10   Q9: Thoughts of better off dead/self-harm past 2 weeks Several days       Procedure Performed:  VSS and mental status WNL. Patient was given ketamine. See MAR for details.     Post Procedure Assessment:  Patient tolerated the treatment with the following side effects: disociation. Vital signs were monitored, see VS Flowsheet. Patient stated they felt ready to go home prior to discharge. AVS was offered to patient and patient declined. Patient was instructed not to drive for the remainder of the day and to notify clinic if any concerns arise.     Next appt: 2022    Medication provided by Pharmacy    This service provided today was under the supervising provider of the day Elizabeth Gordon MD, who was available if needed.    Perla Workman RN

## 2022-01-28 ENCOUNTER — ALLIED HEALTH/NURSE VISIT (OUTPATIENT)
Dept: PSYCHIATRY | Facility: CLINIC | Age: 52
End: 2022-01-28

## 2022-01-28 VITALS — SYSTOLIC BLOOD PRESSURE: 141 MMHG | OXYGEN SATURATION: 95 % | HEART RATE: 78 BPM | DIASTOLIC BLOOD PRESSURE: 92 MMHG

## 2022-01-28 DIAGNOSIS — F33.2 SEVERE RECURRENT MAJOR DEPRESSION WITHOUT PSYCHOTIC FEATURES (H): ICD-10-CM

## 2022-01-28 DIAGNOSIS — F33.2 SEVERE RECURRENT MAJOR DEPRESSION WITHOUT PSYCHOTIC FEATURES (H): Primary | ICD-10-CM

## 2022-01-28 RX ORDER — KETAMINE HYDROCHLORIDE 50 MG/ML
INJECTION, SOLUTION INTRAMUSCULAR; INTRAVENOUS
Qty: 10 ML | Refills: 0 | Status: SHIPPED | OUTPATIENT
Start: 2022-01-28 | End: 2022-04-25

## 2022-01-28 NOTE — PROGRESS NOTES
Aure Joy comes into clinic today at the request of ESTELA Zamudio MD Ordering Provider for Med Injection only Ketamine.    Procedure Prep:  Medication double check completed by: John GUERRERO RN  Prior to administration pt identified by name and : yes    Nursing Assessment:  Appearance: dressed casually   Mood: ok  Affect: wnl  Eye contact: good  PHQ 2022   PHQ-9 Total Score 10   Q9: Thoughts of better off dead/self-harm past 2 weeks Several days          Procedure Performed:  VSS and mental status WNL. Patient was given Ketamine. See MAR for details.     Post Procedure Assessment:  Patient tolerated the treatment with the following side effects: dissociation and nausea. Vital signs were monitored, see VS Flowsheet. Patient stated they felt ready to go home prior to discharge. AVS was offered to patient and patient declined. Patient was instructed not to drive for the remainder of the day and to notify clinic if any concerns arise.     Next appt: 22    Medication provided by Pharmacy    This service provided today was under the supervising provider of the day Elizabeth Gordon MD, who was available if needed.    Dunia Alexandra RN

## 2022-01-31 ENCOUNTER — ALLIED HEALTH/NURSE VISIT (OUTPATIENT)
Dept: PSYCHIATRY | Facility: CLINIC | Age: 52
End: 2022-01-31

## 2022-01-31 VITALS — HEART RATE: 78 BPM | DIASTOLIC BLOOD PRESSURE: 91 MMHG | OXYGEN SATURATION: 96 % | SYSTOLIC BLOOD PRESSURE: 131 MMHG

## 2022-01-31 DIAGNOSIS — F33.2 SEVERE RECURRENT MAJOR DEPRESSION WITHOUT PSYCHOTIC FEATURES (H): Primary | ICD-10-CM

## 2022-01-31 ASSESSMENT — PATIENT HEALTH QUESTIONNAIRE - PHQ9: SUM OF ALL RESPONSES TO PHQ QUESTIONS 1-9: 10

## 2022-01-31 NOTE — PROGRESS NOTES
Aure Joy comes into clinic today at the request of ESTELA Zamudio MD Ordering Provider for Med Injection only ketamine.    Procedure Prep:  Medication double check completed by: Dunia WATTS RN  Prior to administration pt identified by name and : yes    Nursing Assessment:  Appearance: casual   Mood: better, still reports high intensity SI, patient feels safe. Concerned about the thoughts staying around long term. Discussed stressors, and reviewed safety plans.  Affect: wnl  Eye contact: good  PHQ 2022   PHQ-9 Total Score 10   Q9: Thoughts of better off dead/self-harm past 2 weeks More than half the days     QIDDSR 16 weekly assessment: score 15. Assessment was scanned to EHR.        Procedure Performed:  VSS and mental status WNL. Patient was given ketamine. See MAR for details.     Post Procedure Assessment:  Patient tolerated the treatment with the following side effects: disociation. Vital signs were monitored, see VS Flowsheet. Patient stated they felt ready to go home prior to discharge. AVS was offered to patient and patient declined. Patient was instructed not to drive for the remainder of the day and to notify clinic if any concerns arise.     Next appt: 2022    Medication provided by Pharmacy    This service provided today was under the supervising provider of the day Elizabeth Gordon MD, who was available if needed.    Perla Workman RN

## 2022-02-02 ENCOUNTER — ALLIED HEALTH/NURSE VISIT (OUTPATIENT)
Dept: PSYCHIATRY | Facility: CLINIC | Age: 52
End: 2022-02-02

## 2022-02-02 VITALS — OXYGEN SATURATION: 97 % | HEART RATE: 71 BPM | DIASTOLIC BLOOD PRESSURE: 93 MMHG | SYSTOLIC BLOOD PRESSURE: 143 MMHG

## 2022-02-02 DIAGNOSIS — F33.2 SEVERE RECURRENT MAJOR DEPRESSION WITHOUT PSYCHOTIC FEATURES (H): Primary | ICD-10-CM

## 2022-02-02 ASSESSMENT — PATIENT HEALTH QUESTIONNAIRE - PHQ9: SUM OF ALL RESPONSES TO PHQ QUESTIONS 1-9: 9

## 2022-02-02 NOTE — PROGRESS NOTES
Aure Joy comes into clinic today at the request of ESTELA Zamudio MD Ordering Provider for Med Injection only ketamine.    Procedure Prep:  Medication double check completed by: Dunia WATTS RN  Prior to administration pt identified by name and : yes    Nursing Assessment:  Appearance: casual   Mood: good, reports feeling better  Affect: wnl  Eye contact: good  PHQ 2022   PHQ-9 Total Score 9   Q9: Thoughts of better off dead/self-harm past 2 weeks Several days         Procedure Performed:  VSS and mental status WNL. Patient was given ketamine. See MAR for details.     Post Procedure Assessment:  Patient tolerated the treatment with the following side effects: disociation. Vital signs were monitored, see VS Flowsheet. Patient stated they felt ready to go home prior to discharge. AVS was offered to patient and patient declined. Patient was instructed not to drive for the remainder of the day and to notify clinic if any concerns arise.     Next appt: 2022    Medication provided by Pharmacy    This service provided today was under the supervising provider of the day Elizabeth Gordon MD, who was available if needed.    Perla Workman RN

## 2022-02-04 ENCOUNTER — ALLIED HEALTH/NURSE VISIT (OUTPATIENT)
Dept: PSYCHIATRY | Facility: CLINIC | Age: 52
End: 2022-02-04

## 2022-02-04 VITALS — OXYGEN SATURATION: 98 % | HEART RATE: 67 BPM | DIASTOLIC BLOOD PRESSURE: 90 MMHG | SYSTOLIC BLOOD PRESSURE: 124 MMHG

## 2022-02-04 DIAGNOSIS — F33.2 SEVERE RECURRENT MAJOR DEPRESSION WITHOUT PSYCHOTIC FEATURES (H): Primary | ICD-10-CM

## 2022-02-04 ASSESSMENT — PATIENT HEALTH QUESTIONNAIRE - PHQ9: SUM OF ALL RESPONSES TO PHQ QUESTIONS 1-9: 8

## 2022-02-04 NOTE — PROGRESS NOTES
Aure Joy comes into clinic today at the request of ESTELA Zamudio MD Ordering Provider for Med Injection only ketamine.    Procedure Prep:  Medication double check completed by: Eliot COOMBS RN  Prior to administration pt identified by name and : yes    Nursing Assessment:  Appearance: casual   Mood: good  Affect: wnl  Eye contact: good  PHQ 2022   PHQ-9 Total Score 8   Q9: Thoughts of better off dead/self-harm past 2 weeks Several days       Procedure Performed:  VSS and mental status WNL. Patient was given ketamine. See MAR for details.     Post Procedure Assessment:  Patient tolerated the treatment with the following side effects: disociation. Vital signs were monitored, see VS Flowsheet. Patient stated they felt ready to go home prior to discharge. AVS was offered to patient and patient declined. Patient was instructed not to drive for the remainder of the day and to notify clinic if any concerns arise.     Next appt: 2022    Medication provided by Pharmacy    This service provided today was under the supervising provider of the day Tim Williamson MD, who was available if needed.    Perla Workman RN

## 2022-02-08 ENCOUNTER — ALLIED HEALTH/NURSE VISIT (OUTPATIENT)
Dept: PSYCHIATRY | Facility: CLINIC | Age: 52
End: 2022-02-08

## 2022-02-08 VITALS — SYSTOLIC BLOOD PRESSURE: 124 MMHG | HEART RATE: 83 BPM | OXYGEN SATURATION: 97 % | DIASTOLIC BLOOD PRESSURE: 85 MMHG

## 2022-02-08 DIAGNOSIS — F33.2 SEVERE RECURRENT MAJOR DEPRESSION WITHOUT PSYCHOTIC FEATURES (H): Primary | ICD-10-CM

## 2022-02-08 ASSESSMENT — PATIENT HEALTH QUESTIONNAIRE - PHQ9: SUM OF ALL RESPONSES TO PHQ QUESTIONS 1-9: 8

## 2022-02-08 NOTE — PROGRESS NOTES
Aure Joy comes into clinic today at the request of ESTELA Zamudio MD Ordering Provider for Med Injection only ketamine.    Procedure Prep:  Medication double check completed by: Dunia WATTS RN  Prior to administration pt identified by name and : yes    Nursing Assessment:  Appearance: casual   Mood: good  Affect: wnl  Eye contact: good   PHQ 2022   PHQ-9 Total Score 8   Q9: Thoughts of better off dead/self-harm past 2 weeks Several days     QIDDSR 16 weekly assessment: score 9. Assessment was scanned to EHR.     Procedure Performed:  VSS and mental status WNL. Patient was given ketamine. See MAR for details.     Post Procedure Assessment:  Patient tolerated the treatment with the following side effects: disociation. Vital signs were monitored, see VS Flowsheet. Patient stated they felt ready to go home prior to discharge. AVS was offered to patient and patient declined. Patient was instructed not to drive for the remainder of the day and to notify clinic if any concerns arise.     Next appt: 2022    Medication provided by Pharmacy    This service provided today was under the supervising provider of the day ESTELA Zamudio MD, who was available if needed.    Perla Workman, GERARDO

## 2022-02-16 ENCOUNTER — ALLIED HEALTH/NURSE VISIT (OUTPATIENT)
Dept: PSYCHIATRY | Facility: CLINIC | Age: 52
End: 2022-02-16

## 2022-02-16 VITALS — DIASTOLIC BLOOD PRESSURE: 80 MMHG | SYSTOLIC BLOOD PRESSURE: 121 MMHG | HEART RATE: 78 BPM | OXYGEN SATURATION: 95 %

## 2022-02-16 DIAGNOSIS — F33.2 SEVERE RECURRENT MAJOR DEPRESSION WITHOUT PSYCHOTIC FEATURES (H): Primary | ICD-10-CM

## 2022-02-16 ASSESSMENT — PATIENT HEALTH QUESTIONNAIRE - PHQ9: SUM OF ALL RESPONSES TO PHQ QUESTIONS 1-9: 14

## 2022-02-16 NOTE — PROGRESS NOTES
Aure Joy comes into clinic today at the request of ESTELA Zamudio MD Ordering Provider for Med Injection only Ketamine.    Procedure Prep:  Medication double check completed by: John GUERRERO RN  Prior to administration pt identified by name and : yes    Nursing Assessment:  Appearance: dressed casually   Mood: depressed/anxious  Affect: wnl  Eye contact: good  PHQ 2022   PHQ-9 Total Score 14   Q9: Thoughts of better off dead/self-harm past 2 weeks Not at all     QIDDSR 16 weekly assessment: score 16. Assessment was scanned to EHR.       Procedure Performed:  VSS and mental status WNL. Patient was given Ketamine. See MAR for details.     Post Procedure Assessment:  Patient tolerated the treatment with the following side effects: dissociatoin. Vital signs were monitored, see VS Flowsheet. Patient stated they felt ready to go home prior to discharge. AVS was offered to patient and patient declined. Patient was instructed not to drive for the remainder of the day and to notify clinic if any concerns arise.     Next appt: 22    Medication provided by Pharmacy    This service provided today was under the supervising provider of the day Elizabeth Gordon MD, who was available if needed.    Dunia Alexandra RN

## 2022-02-22 ENCOUNTER — ALLIED HEALTH/NURSE VISIT (OUTPATIENT)
Dept: PSYCHIATRY | Facility: CLINIC | Age: 52
End: 2022-02-22

## 2022-02-22 VITALS
DIASTOLIC BLOOD PRESSURE: 90 MMHG | SYSTOLIC BLOOD PRESSURE: 137 MMHG | OXYGEN SATURATION: 96 % | RESPIRATION RATE: 96 BRPM | HEART RATE: 76 BPM

## 2022-02-22 DIAGNOSIS — F33.2 SEVERE RECURRENT MAJOR DEPRESSION WITHOUT PSYCHOTIC FEATURES (H): Primary | ICD-10-CM

## 2022-02-22 ASSESSMENT — PATIENT HEALTH QUESTIONNAIRE - PHQ9: SUM OF ALL RESPONSES TO PHQ QUESTIONS 1-9: 15

## 2022-02-22 NOTE — PROGRESS NOTES
Aure Joy comes into clinic today at the request of ESTELA Zamudio MD Ordering Provider for Med Injection only ketamine.    Procedure Prep:  Medication double check completed by: Dunia WATTS RN  Prior to administration pt identified by name and : yes    Nursing Assessment:  Appearance: casual   Mood: good  Affect: wnl  Eye contact: good  PHQ 2022   PHQ-9 Total Score 14   Q9: Thoughts of better off dead/self-harm past 2 weeks Not at all     QIDDSR 16 weekly assessment: score 14. Assessment was scanned to EHR.     Procedure Performed:  VSS and mental status WNL. Patient was given ketamine. See MAR for details.     Post Procedure Assessment:  Patient tolerated the treatment with the following side effects: disociation. Vital signs were monitored, see VS Flowsheet. Patient stated they felt ready to go home prior to discharge. AVS was offered to patient and patient declined. Patient was instructed not to drive for the remainder of the day and to notify clinic if any concerns arise.     Next appt: 2022    Medication provided by Pharmacy    This service provided today was under the supervising provider of the day ESTELA Zamudio MD, who was available if needed.    Perla Workman, RN

## 2022-03-01 ENCOUNTER — ALLIED HEALTH/NURSE VISIT (OUTPATIENT)
Dept: PSYCHIATRY | Facility: CLINIC | Age: 52
End: 2022-03-01
Payer: COMMERCIAL

## 2022-03-01 ENCOUNTER — TELEPHONE (OUTPATIENT)
Dept: PSYCHIATRY | Facility: CLINIC | Age: 52
End: 2022-03-01

## 2022-03-01 VITALS — OXYGEN SATURATION: 93 % | SYSTOLIC BLOOD PRESSURE: 121 MMHG | DIASTOLIC BLOOD PRESSURE: 85 MMHG | HEART RATE: 72 BPM

## 2022-03-01 DIAGNOSIS — F33.2 SEVERE RECURRENT MAJOR DEPRESSION WITHOUT PSYCHOTIC FEATURES (H): Primary | ICD-10-CM

## 2022-03-01 DIAGNOSIS — R11.0 NAUSEA: ICD-10-CM

## 2022-03-01 RX ORDER — ONDANSETRON 4 MG/1
4 TABLET, ORALLY DISINTEGRATING ORAL EVERY 6 HOURS PRN
Qty: 12 TABLET | Refills: 2 | Status: SHIPPED | OUTPATIENT
Start: 2022-03-01 | End: 2022-06-23

## 2022-03-01 ASSESSMENT — PATIENT HEALTH QUESTIONNAIRE - PHQ9: SUM OF ALL RESPONSES TO PHQ QUESTIONS 1-9: 9

## 2022-03-01 NOTE — PROGRESS NOTES
Aure Joy comes into clinic today at the request of ESTELA Zamudio MD Ordering Provider for Med Injection only ketamine.    Procedure Prep:  Medication double check completed by: Eliot COOMBS RN  Prior to administration pt identified by name and : yes    Nursing Assessment:  Appearance: casual   Mood: okay  Affect: wnl  Eye contact: good  PHQ 3/1/2022   PHQ-9 Total Score 9   Q9: Thoughts of better off dead/self-harm past 2 weeks Not at all     QIDDSR 16 weekly assessment: score 10. Assessment was scanned to EHR.     Procedure Performed:  VSS and mental status WNL. Patient was given ketamine. See MAR for details.     Post Procedure Assessment:  Patient tolerated the treatment with the following side effects: disocation. Vital signs were monitored, see VS Flowsheet. Patient stated they felt ready to go home prior to discharge. AVS was offered to patient and patient declined. Patient was instructed not to drive for the remainder of the day and to notify clinic if any concerns arise.     Next appt: LVM to schedule more appts as writer was not with patient before she left.     Medications access via pharmacy benefits     This service provided today was under the supervising provider of the day ESTELA Zamudio MD, who was available if needed.    Perla Workman, RN

## 2022-03-01 NOTE — CONFIDENTIAL NOTE
Last seen: 01/20/2022  RTC: unknown  Cancel: none  No-show: none  Next appt: 04/14/2022    Incoming refill from patient via phone    Medication requested: Zofran 4 MG ODT tab  Directions: take 1 tab (4 mg) by mouth as needed for nausea (30 min before ketamine injection)  Qty: 12  Last refilled: 01/28/2021    Medication refill approved per refill protocol

## 2022-03-15 ENCOUNTER — ALLIED HEALTH/NURSE VISIT (OUTPATIENT)
Dept: PSYCHIATRY | Facility: CLINIC | Age: 52
End: 2022-03-15
Payer: COMMERCIAL

## 2022-03-15 VITALS — DIASTOLIC BLOOD PRESSURE: 84 MMHG | HEART RATE: 84 BPM | OXYGEN SATURATION: 94 % | SYSTOLIC BLOOD PRESSURE: 127 MMHG

## 2022-03-15 DIAGNOSIS — F33.2 SEVERE RECURRENT MAJOR DEPRESSION WITHOUT PSYCHOTIC FEATURES (H): Primary | ICD-10-CM

## 2022-03-15 ASSESSMENT — PATIENT HEALTH QUESTIONNAIRE - PHQ9: SUM OF ALL RESPONSES TO PHQ QUESTIONS 1-9: 8

## 2022-03-15 NOTE — PROGRESS NOTES
Aure Joy comes into clinic today at the request of ESTELA Zamudio MD Ordering Provider for Med Injection only ketamine.    Procedure Prep:  Medication double check completed by: Dunia WATTS RN  Prior to administration pt identified by name and : yes    Nursing Assessment:  Appearance: casual   Mood: good  Affect: wnl  Eye contact: good  PHQ 3/15/2022   PHQ-9 Total Score 8   Q9: Thoughts of better off dead/self-harm past 2 weeks Not at all     QIDDSR 16 weekly assessment: score 8. Assessment was scanned to EHR.     Procedure Performed:  VSS and mental status WNL. Patient was given ketamine. See MAR for details.     Post Procedure Assessment:  Patient tolerated the treatment with the following side effects: disociation. Vital signs were monitored, see VS Flowsheet. Patient stated they felt ready to go home prior to discharge. AVS was offered to patient and patient declined. Patient was instructed not to drive for the remainder of the day and to notify clinic if any concerns arise.     Next appt: 2022    Medications access via pharmacy benefits     This service provided today was under the supervising provider of the day ESTELA Zamudio MD, who was available if needed.    Perla Workman, RN

## 2022-03-28 ENCOUNTER — TELEPHONE (OUTPATIENT)
Dept: PSYCHIATRY | Facility: CLINIC | Age: 52
End: 2022-03-28
Payer: COMMERCIAL

## 2022-03-29 ENCOUNTER — ALLIED HEALTH/NURSE VISIT (OUTPATIENT)
Dept: PSYCHIATRY | Facility: CLINIC | Age: 52
End: 2022-03-29
Payer: COMMERCIAL

## 2022-03-29 VITALS — HEART RATE: 92 BPM | OXYGEN SATURATION: 95 % | DIASTOLIC BLOOD PRESSURE: 79 MMHG | SYSTOLIC BLOOD PRESSURE: 117 MMHG

## 2022-03-29 DIAGNOSIS — F33.2 SEVERE RECURRENT MAJOR DEPRESSION WITHOUT PSYCHOTIC FEATURES (H): Primary | ICD-10-CM

## 2022-03-29 ASSESSMENT — PATIENT HEALTH QUESTIONNAIRE - PHQ9: SUM OF ALL RESPONSES TO PHQ QUESTIONS 1-9: 13

## 2022-03-29 NOTE — PROGRESS NOTES
Aure Joy comes into clinic today at the request of ESTELA Zamudio MD Ordering Provider for Med Injection only ketamine.    Procedure Prep:  Medication double check completed by: Dunia WATTS RN  Prior to administration pt identified by name and : yes    Nursing Assessment:  Appearance: casual   Mood: down, attended recent  of family friend.  Affect: wnl  Eye contact: good  PHQ 3/29/2022   PHQ-9 Total Score 13   Q9: Thoughts of better off dead/self-harm past 2 weeks Not at all     QIDDSR 16 weekly assessment: score 13. Assessment was scanned to EHR.     Procedure Performed:  VSS and mental status WNL. Patient was given ketamine. See MAR for details.     Post Procedure Assessment:  Patient tolerated the treatment with the following side effects: disociation. Vital signs were monitored, see VS Flowsheet. Patient stated they felt ready to go home prior to discharge. AVS was offered to patient and patient declined. Patient was instructed not to drive for the remainder of the day and to notify clinic if any concerns arise.     Next appt: 2022    Medications access via pharmacy benefits     This service provided today was under the supervising provider of the day ESTELA Zamudio MD, who was available if needed.    Perla Workman RN

## 2022-04-05 DIAGNOSIS — F33.2 SEVERE EPISODE OF RECURRENT MAJOR DEPRESSIVE DISORDER, WITHOUT PSYCHOTIC FEATURES (H): ICD-10-CM

## 2022-04-05 RX ORDER — LAMOTRIGINE 150 MG/1
300 TABLET ORAL DAILY
Qty: 180 TABLET | Refills: 0 | Status: SHIPPED | OUTPATIENT
Start: 2022-04-05 | End: 2022-06-09

## 2022-04-05 NOTE — CONFIDENTIAL NOTE
Last seen: 01/20/2022  RTC: unsure  Cancel: none  No-show: none  Next appt: 04/14/2022    Incoming refill from patient via phone    Medication requested: Lamictal 150 MG tablet   Directions: take 2 tablets (300 mg) by mouth daily  Qty: 180  Last refilled: 10/04/2021    Medication refill approved per refill protocol

## 2022-04-12 ENCOUNTER — ALLIED HEALTH/NURSE VISIT (OUTPATIENT)
Dept: PSYCHIATRY | Facility: CLINIC | Age: 52
End: 2022-04-12
Payer: COMMERCIAL

## 2022-04-12 VITALS — HEART RATE: 76 BPM | DIASTOLIC BLOOD PRESSURE: 96 MMHG | SYSTOLIC BLOOD PRESSURE: 156 MMHG

## 2022-04-12 DIAGNOSIS — F33.2 SEVERE EPISODE OF RECURRENT MAJOR DEPRESSIVE DISORDER, WITHOUT PSYCHOTIC FEATURES (H): Primary | ICD-10-CM

## 2022-04-12 ASSESSMENT — PATIENT HEALTH QUESTIONNAIRE - PHQ9: SUM OF ALL RESPONSES TO PHQ QUESTIONS 1-9: 13

## 2022-04-12 NOTE — PROGRESS NOTES
Aure Joy comes into clinic today at the request of ESTELA Zamudio MD Ordering Provider for Med Injection only ketamine.    Procedure Prep:  Medication double check completed by: Dunia WATTS RN  Prior to administration pt identified by name and : yes    Nursing Assessment:  Appearance: casual   Mood: down  Affect: wnl  Eye contact: good  PHQ 2022   PHQ-9 Total Score 13   Q9: Thoughts of better off dead/self-harm past 2 weeks Not at all     QIDDSR 16 weekly assessment: score 13. Assessment was scanned to EHR.     Procedure Performed:  VSS and mental status WNL. Patient was given ketamine. See MAR for details.     Post Procedure Assessment:  Patient tolerated the treatment with the following side effects: disociation. Vital signs were monitored, see VS Flowsheet. Patient stated they felt ready to go home prior to discharge. AVS was offered to patient and patient declined. Patient was instructed not to drive for the remainder of the day and to notify clinic if any concerns arise.     Next appt: 2022    Medications provided by Pharmacy     This service provided today was under the supervising provider of the day ESTELA Zamudio MD, who was available if needed.    Perla Workman, RN

## 2022-04-14 ENCOUNTER — VIRTUAL VISIT (OUTPATIENT)
Dept: PSYCHIATRY | Facility: CLINIC | Age: 52
End: 2022-04-14
Payer: COMMERCIAL

## 2022-04-14 DIAGNOSIS — D32.9 MENINGIOMA (H): ICD-10-CM

## 2022-04-14 DIAGNOSIS — F40.00 AGORAPHOBIA: ICD-10-CM

## 2022-04-14 DIAGNOSIS — F43.10 COMPLEX POSTTRAUMATIC STRESS DISORDER: ICD-10-CM

## 2022-04-14 DIAGNOSIS — F33.2 SEVERE EPISODE OF RECURRENT MAJOR DEPRESSIVE DISORDER, WITHOUT PSYCHOTIC FEATURES (H): Primary | ICD-10-CM

## 2022-04-14 DIAGNOSIS — F43.10 PTSD (POST-TRAUMATIC STRESS DISORDER): ICD-10-CM

## 2022-04-14 DIAGNOSIS — T76.12XS: ICD-10-CM

## 2022-04-14 RX ORDER — FLUTICASONE PROPIONATE 50 MCG
1 SPRAY, SUSPENSION (ML) NASAL DAILY
COMMUNITY

## 2022-04-14 RX ORDER — LORATADINE 10 MG/1
10 TABLET ORAL DAILY
COMMUNITY

## 2022-04-14 ASSESSMENT — PATIENT HEALTH QUESTIONNAIRE - PHQ9: SUM OF ALL RESPONSES TO PHQ QUESTIONS 1-9: 13

## 2022-04-14 NOTE — PROGRESS NOTES
Aure is a 51 year old who is being evaluated via a billable video visit.      How would you like to obtain your AVS? MyChart  If the video visit is dropped, the invitation should be resent by: Send to e-mail at: [email or web address]  Will anyone else be joining your video visit? No

## 2022-04-21 ENCOUNTER — OFFICE VISIT (OUTPATIENT)
Dept: PSYCHIATRY | Facility: CLINIC | Age: 52
End: 2022-04-21
Payer: COMMERCIAL

## 2022-04-21 ENCOUNTER — ALLIED HEALTH/NURSE VISIT (OUTPATIENT)
Dept: PSYCHIATRY | Facility: CLINIC | Age: 52
End: 2022-04-21
Payer: COMMERCIAL

## 2022-04-21 DIAGNOSIS — F33.2 SEVERE EPISODE OF RECURRENT MAJOR DEPRESSIVE DISORDER, WITHOUT PSYCHOTIC FEATURES (H): Primary | ICD-10-CM

## 2022-04-21 ASSESSMENT — PATIENT HEALTH QUESTIONNAIRE - PHQ9: SUM OF ALL RESPONSES TO PHQ QUESTIONS 1-9: 14

## 2022-04-21 NOTE — PROGRESS NOTES
TMS Education     Aure Joy MRN# [id number]  Age: 51 year old year old YOB: 1970        Patient is here in clinic for TMS education and consenting.  Patient will be starting TMS today on the [facility].  Writer and patient reviewed mechanics of TMS.  Discussed using magnetic pulses to stimulate and induce an electrical field inside the brain.  Discussed the difference between F8 and H1 coil.  Patient was able to verbalize understanding of this.  Discussed that the idea is that we are treating the DLPFC of the brain, as it has been shown by in studies that people who have depression have decreased activity in this part of the brain, the idea is that we stimulate this part of the brain to increase activity.       Reviewed processing of mapping and how visit today would go.  Encouraged patient to communicate with staff if treatment at anytime became painful.         Discussed side effects of TMS.  Side effects include headaches after treatment, hearing loss, lightheadedness/dizziness, short lived vision changes, and seizures.  Discussed ways that TMS Clinic helps with preventing these side effects.  Such as retesting motor thresholds and having patient wear ear plugs.  Instructed patient that she can take OTC pain relievers such as tylenol or IBU if she has a headache after today's treatment.  Reviewed safety concerns regarding sleep and use of illegal drugs/alcohol.        Patient was able to ask questions regarding procedure.  Patient informed of 10 minute late policy and attendance policy.  Consent was signed by patient today.

## 2022-04-21 NOTE — PROGRESS NOTES
[facility]MS [facility]  [address], [address]  [place] [address]  TMS Procedure Note   Aure Joy MRN# [id number]  Age: 49 year old year old YOB: 1970    Pre-Procedure:  History and Physical: Reviewed in medical record  Consent Signed by: Aure Joy  On: 12/22/2020    Clinical Narrative:  Patient presents to initiate an acute course of TMS for management of her symptoms of resistant depression      Indications for TMS:  MDD, recurrent, severe; 4+ medication trials (from 2+ classes) ineffective; Psychotherapy ineffective.     Pre-Procedure Diagnosis:  MDD, recurrent, severe F33.2    Treatment Hx:  Treatment number this series: 1  Total lifetime treatment number: 151    Allergies   Allergen Reactions     Codeine Nausea     Other  [No Clinical Screening - See Comments] Nausea and Vomiting and Other (See Comments)     general anesthesia- uncontrolled vomitting      There were no vitals taken for this visit.    Pause for the Cause  Right patient: Yes  Right procedure/correct coil:  Yes; rTMS; [id number]; F8 coil.   Earplugs in place:  Yes     Procedure  Patient was seated in procedure chair. Identity and procedure was verified. Ear plugs were placed in ears and patient-specific cap was placed on head and tightened appropriately. Ruler locations were verified. Bone conducting headphones were not used.  Stimulator was set to 70% (70% of MT).  Initial train was well tolerated so 60 trains were delivered on the left and three trains on the right.  Patient tolerated procedure well with no motor movement.      Motor Threshold Determination  Distance from nasion to inion: 35  Distance along circumference from midline: 6.67cm  Distance from Vertex: 9.56cm  MT 1: 0 - 6 - 16 @ 69% on 1/29/2018  MT 2: 0 - 6 - 16 @ 69% on 2/6/2018   MT 3: 0 - 6 - 16 @ 69% on 2/13/2018   MT 4: 0 - 6 - 16 @ 69% on 2/23/2018   MT 5: 0 - 6 - 16 @ 69% on 3/2/2018   MT 6: 0 - 5.5 - 15.5(always use intersection at left  side of ruler)@ 85% on 8/23/19  MT 7: 0 - 5.5 - 15.5(always use intersection at left side of ruler)@ 80% on 8/29/19  MT 8: 0 - 5.5 - 37.5(always use intersection at left side of ruler)@ 76% on 9/5/19 (right side using EMG on left hand)  MT 9: C2 (R side using EMG on L hand) 78% on 9/12/2019  MT 10: C2 (R side using EMG on L hand) 76% on 9/26/2019  MT 11: C2 (R side using EMG on L hand) 82 on 1/31/2020  MT 12: C2 (R side using EMG on L Hand) 86% on 12/22/2020     MT 13: C2 (R side) 100% on 04/21/22    LDLPFC:  Frequency: 50 Hz  Number of pulses:  3                        Number of bursts: 10  Burst frequency: 5 Hz  Cycle time: 10 sec  Total pulses delivered: 1800  Tx Loc: F3  Energy: 70% (70% MT)   Number of Cycles: 60 trains    RDLPFC:  Frequency: 50 Hz  Number of pulses:  3                        Number of bursts: 100  Burst frequency: 5 Hz  Cycle time: 60.0sec  Total pulses delivered: 1800  Tx Loc: F4 (right side)  Energy: 70% (70% MT)   Number of Cycles: 6 trains    Rating Scales:  Date MADRS IDS-SR PHQ-9   2/12/21  31 4         04/21/22 25 40 14                                           Post-Procedure Diagnosis:  MDD, recurrent, severe F33.2    Dunia Alexandra RN   [facility]      Plan   - Cont TMS  -Increase energy as tolerated     I completed motor mapping and determination of the pt's motor threshold during the visit today. I was available in the clinic throughout the treatment.    Dom Shirley MD  [facility]

## 2022-04-22 ENCOUNTER — OFFICE VISIT (OUTPATIENT)
Dept: PSYCHIATRY | Facility: CLINIC | Age: 52
End: 2022-04-22
Payer: COMMERCIAL

## 2022-04-22 DIAGNOSIS — F33.2 SEVERE EPISODE OF RECURRENT MAJOR DEPRESSIVE DISORDER, WITHOUT PSYCHOTIC FEATURES (H): Primary | ICD-10-CM

## 2022-04-22 ASSESSMENT — PATIENT HEALTH QUESTIONNAIRE - PHQ9: SUM OF ALL RESPONSES TO PHQ QUESTIONS 1-9: 8

## 2022-04-22 NOTE — PROGRESS NOTES
" [facility] TMS [facility]  [address], [address]  [place] [address]  TMS Procedure Note   Aure Joy MRN# [id number]  Age: 49 year old year old YOB: 1970    Pre-Procedure:  History and Physical: Reviewed in medical record  Consent Signed by: Aure Joy  On: 12/22/2020    Clinical Narrative:  Patient is tolerating treatment and increase. Had vivid dreams last night, but slept \"well enough\".    Indications for TMS:  MDD, recurrent, severe; 4+ medication trials (from 2+ classes) ineffective; Psychotherapy ineffective.     Pre-Procedure Diagnosis:  MDD, recurrent, severe F33.2    Treatment Hx:  Treatment number this series: 2  Total lifetime treatment number: 152    Allergies   Allergen Reactions     Codeine Nausea     Other  [No Clinical Screening - See Comments] Nausea and Vomiting and Other (See Comments)     general anesthesia- uncontrolled vomitting      There were no vitals taken for this visit.    Pause for the Cause  Right patient: Yes  Right procedure/correct coil:  Yes; rTMS; [id number]; F8 coil.   Earplugs in place:  Yes     Procedure  Patient was seated in procedure chair. Identity and procedure was verified. Ear plugs were placed in ears and patient-specific cap was placed on head and tightened appropriately. Ruler locations were verified. Bone conducting headphones were not used.  Stimulator was set to parameters below.  Initial train was well tolerated so 60 trains were delivered on the left and 6 trains on the right.  Patient tolerated procedure well with no motor movement.      Motor Threshold Determination  Distance from nasion to inion: 35  Distance along circumference from midline: 6.67cm  Distance from Vertex: 9.56cm  MT 1: 0 - 6 - 16 @ 69% on 1/29/2018  MT 2: 0 - 6 - 16 @ 69% on 2/6/2018   MT 3: 0 - 6 - 16 @ 69% on 2/13/2018   MT 4: 0 - 6 - 16 @ 69% on 2/23/2018   MT 5: 0 - 6 - 16 @ 69% on 3/2/2018   MT 6: 0 - 5.5 - 15.5(always use intersection at left side of " ruler)@ 85% on 8/23/19  MT 7: 0 - 5.5 - 15.5(always use intersection at left side of ruler)@ 80% on 8/29/19  MT 8: 0 - 5.5 - 37.5(always use intersection at left side of ruler)@ 76% on 9/5/19 (right side using EMG on left hand)  MT 9: C2 (R side using EMG on L hand) 78% on 9/12/2019  MT 10: C2 (R side using EMG on L hand) 76% on 9/26/2019  MT 11: C2 (R side using EMG on L hand) 82 on 1/31/2020  MT 12: C2 (R side using EMG on L Hand) 86% on 12/22/2020     MT 13: C2 (R side) 100% on 04/21/22    LDLPFC:  Frequency: 50 Hz  Number of pulses:  3                        Number of bursts: 10  Burst frequency: 5 Hz  Cycle time: 10 sec  Total pulses delivered: 1800  Tx Loc: F3  Energy: 75% (75% MT)   Number of Cycles: 60 trains    RDLPFC:  Frequency: 50 Hz  Number of pulses:  3                        Number of bursts: 100  Burst frequency: 5 Hz  Cycle time: 60.0sec  Total pulses delivered: 1800  Tx Loc: F4 (right side)  Energy: 75% (75% MT)   Number of Cycles: 6 trains    Rating Scales:  Date MADRS IDS-SR PHQ-9   2/12/21  31 4   04/21/22 25 40 14                                           Post-Procedure Diagnosis:  MDD, recurrent, severe F33.2    Samuel Hammond, TMS Technician  [facility]    Plan   - Cont TMS  -Increase energy as tolerated     I did not see the patient during/after treatment but remained available in the clinic during  treatment.    Dom Shirley MD  [facility]

## 2022-04-25 ENCOUNTER — OFFICE VISIT (OUTPATIENT)
Dept: PSYCHIATRY | Facility: CLINIC | Age: 52
End: 2022-04-25
Payer: COMMERCIAL

## 2022-04-25 DIAGNOSIS — F33.2 SEVERE RECURRENT MAJOR DEPRESSION WITHOUT PSYCHOTIC FEATURES (H): ICD-10-CM

## 2022-04-25 DIAGNOSIS — F33.2 SEVERE EPISODE OF RECURRENT MAJOR DEPRESSIVE DISORDER, WITHOUT PSYCHOTIC FEATURES (H): Primary | ICD-10-CM

## 2022-04-25 DIAGNOSIS — F33.2 SEVERE EPISODE OF RECURRENT MAJOR DEPRESSIVE DISORDER, WITHOUT PSYCHOTIC FEATURES (H): ICD-10-CM

## 2022-04-25 ASSESSMENT — PATIENT HEALTH QUESTIONNAIRE - PHQ9: SUM OF ALL RESPONSES TO PHQ QUESTIONS 1-9: 8

## 2022-04-25 NOTE — PROGRESS NOTES
[facility] TMS [facility]  [address], [address]  [place] [address]  TMS Procedure Note   Aure Joy MRN# [id number]  Age: 49 year old year old YOB: 1970    Pre-Procedure:  History and Physical: Reviewed in medical record  Consent Signed by: Aure Joy  On: 12/22/2020    Clinical Narrative:  Patient is tolerating treatment and increase. No patient updates to report, weekend was ok.    Indications for TMS:  MDD, recurrent, severe; 4+ medication trials (from 2+ classes) ineffective; Psychotherapy ineffective.     Pre-Procedure Diagnosis:  MDD, recurrent, severe F33.2    Treatment Hx:  Treatment number this series: 3  Total lifetime treatment number: 153    Allergies   Allergen Reactions     Codeine Nausea     Other  [No Clinical Screening - See Comments] Nausea and Vomiting and Other (See Comments)     general anesthesia- uncontrolled vomitting      There were no vitals taken for this visit.    Pause for the Cause  Right patient: Yes  Right procedure/correct coil:  Yes; rTMS; [id number]; F8 coil.   Earplugs in place:  Yes     Procedure  Patient was seated in procedure chair. Identity and procedure was verified. Ear plugs were placed in ears and patient-specific cap was placed on head and tightened appropriately. Ruler locations were verified. Bone conducting headphones were not used.  Stimulator was set to parameters below.  Initial train was well tolerated so 60 trains were delivered on the left and 6 trains on the right.  Patient tolerated procedure well with no motor movement.      Motor Threshold Determination  Distance from nasion to inion: 35  Distance along circumference from midline: 6.67cm  Distance from Vertex: 9.56cm  MT 1: 0 - 6 - 16 @ 69% on 1/29/2018  MT 2: 0 - 6 - 16 @ 69% on 2/6/2018   MT 3: 0 - 6 - 16 @ 69% on 2/13/2018   MT 4: 0 - 6 - 16 @ 69% on 2/23/2018   MT 5: 0 - 6 - 16 @ 69% on 3/2/2018   MT 6: 0 - 5.5 - 15.5(always use intersection at left side of ruler)@ 85%  on 8/23/19  MT 7: 0 - 5.5 - 15.5(always use intersection at left side of ruler)@ 80% on 8/29/19  MT 8: 0 - 5.5 - 37.5(always use intersection at left side of ruler)@ 76% on 9/5/19 (right side using EMG on left hand)  MT 9: C2 (R side using EMG on L hand) 78% on 9/12/2019  MT 10: C2 (R side using EMG on L hand) 76% on 9/26/2019  MT 11: C2 (R side using EMG on L hand) 82 on 1/31/2020  MT 12: C2 (R side using EMG on L Hand) 86% on 12/22/2020     MT 13: C2 (R side) 100% on 04/21/22    LDLPFC:  Frequency: 50 Hz  Number of pulses:  3                        Number of bursts: 10  Burst frequency: 5 Hz  Cycle time: 10 sec  Total pulses delivered: 1800  Tx Loc: F3  Energy: 80% (80% MT)   Number of Cycles: 60 trains    RDLPFC:  Frequency: 50 Hz  Number of pulses:  3                        Number of bursts: 100  Burst frequency: 5 Hz  Cycle time: 60.0sec  Total pulses delivered: 1800  Tx Loc: F4 (right side)  Energy: 80% (80% MT)   Number of Cycles: 6 trains    Rating Scales:  Date MADRS IDS-SR PHQ-9   2/12/21  31 4   04/21/22 25 40 14             Post-Procedure Diagnosis:  MDD, recurrent, severe F33.2    Samuel Hammond, TMS Technician  [facility]    Plan   - Cont TMS  -Increase energy as tolerated     I did not see the patient during/after treatment but remained available in the clinic during  treatment.    Dom Shirley MD  [facility]

## 2022-04-25 NOTE — CONFIDENTIAL NOTE
Reviewed document, request for refills, request sent to provider electronically for quicker turn around time.

## 2022-04-25 NOTE — TELEPHONE ENCOUNTER
Received Class 3,4,5 - Doctor Authorization Letter to be completed. Form saved to [employer], encounter routed.

## 2022-04-26 ENCOUNTER — ALLIED HEALTH/NURSE VISIT (OUTPATIENT)
Dept: PSYCHIATRY | Facility: CLINIC | Age: 52
End: 2022-04-26

## 2022-04-26 ENCOUNTER — OFFICE VISIT (OUTPATIENT)
Dept: PSYCHIATRY | Facility: CLINIC | Age: 52
End: 2022-04-26
Payer: COMMERCIAL

## 2022-04-26 VITALS — SYSTOLIC BLOOD PRESSURE: 134 MMHG | HEART RATE: 72 BPM | DIASTOLIC BLOOD PRESSURE: 86 MMHG

## 2022-04-26 DIAGNOSIS — F33.2 SEVERE EPISODE OF RECURRENT MAJOR DEPRESSIVE DISORDER, WITHOUT PSYCHOTIC FEATURES (H): Primary | ICD-10-CM

## 2022-04-26 RX ORDER — KETAMINE HYDROCHLORIDE 50 MG/ML
INJECTION, SOLUTION INTRAMUSCULAR; INTRAVENOUS
Qty: 10 ML | Refills: 5 | Status: SHIPPED | OUTPATIENT
Start: 2022-04-26 | End: 2022-12-27

## 2022-04-26 ASSESSMENT — PATIENT HEALTH QUESTIONNAIRE - PHQ9: SUM OF ALL RESPONSES TO PHQ QUESTIONS 1-9: 6

## 2022-04-26 NOTE — PROGRESS NOTES
Aure Joy comes into clinic today at the request of ESTELA Zamudio MD Ordering Provider for Med Injection only ketamine.    Procedure Prep:  Medication double check completed by: Dunia WATTS RN  Prior to administration pt identified by name and : yes    Nursing Assessment:  Appearance: casual   Mood: good  Affect: wnl  Eye contact: good  PHQ 2022   PHQ-9 Total Score 6   Q9: Thoughts of better off dead/self-harm past 2 weeks Not at all     QIDDSR 16 weekly assessment: score 12. Assessment was scanned to EHR.       Procedure Performed:  VSS and mental status WNL. Patient was given ketamine. See MAR for details.     Post Procedure Assessment:  Patient tolerated the treatment with the following side effects: disociation. Vital signs were monitored, see VS Flowsheet. Patient stated they felt ready to go home prior to discharge. AVS was offered to patient and patient delcined. Patient was instructed not to drive for the remainder of the day and to notify clinic if any concerns arise.     Next appt: 05/10/2022    Medications provided by Pharmacy     This service provided today was under the supervising provider of the day ESTELA Zamudio MD, who was available if needed.    Perla Workman, RN

## 2022-04-26 NOTE — PROGRESS NOTES
[facility] TMS Program  [address], [address]  [place] [address]  TMS Procedure Note   Aure Joy MRN# [id number]  Age: 49 year old year old YOB: 1970    Pre-Procedure:  History and Physical: Reviewed in medical record  Consent Signed by: Aure Joy  On: 12/22/2020    Clinical Narrative:  Patient is tolerating treatment and increase. Patient has ketamine later, and is anticipating nicer weather.    Indications for TMS:  MDD, recurrent, severe; 4+ medication trials (from 2+ classes) ineffective; Psychotherapy ineffective.     Pre-Procedure Diagnosis:  MDD, recurrent, severe F33.2    Treatment Hx:  Treatment number this series: 4  Total lifetime treatment number: 154    Allergies   Allergen Reactions     Codeine Nausea     Other  [No Clinical Screening - See Comments] Nausea and Vomiting and Other (See Comments)     general anesthesia- uncontrolled vomitting      There were no vitals taken for this visit.    Pause for the Cause  Right patient: Yes  Right procedure/correct coil:  Yes; rTMS; [id number]; F8 coil.   Earplugs in place:  Yes     Procedure  Patient was seated in procedure chair. Identity and procedure was verified. Ear plugs were placed in ears and patient-specific cap was placed on head and tightened appropriately. Ruler locations were verified. Bone conducting headphones were not used.  Stimulator was set to parameters below.  Initial train was well tolerated so 60 trains were delivered on the left and 6 trains on the right.  Patient tolerated procedure well with no motor movement.      Motor Threshold Determination  Distance from nasion to inion: 35  Distance along circumference from midline: 6.67cm  Distance from Vertex: 9.56cm  MT 1: 0 - 6 - 16 @ 69% on 1/29/2018  MT 2: 0 - 6 - 16 @ 69% on 2/6/2018   MT 3: 0 - 6 - 16 @ 69% on 2/13/2018   MT 4: 0 - 6 - 16 @ 69% on 2/23/2018   MT 5: 0 - 6 - 16 @ 69% on 3/2/2018   MT 6: 0 - 5.5 - 15.5(always use intersection at left side  of ruler)@ 85% on 8/23/19  MT 7: 0 - 5.5 - 15.5(always use intersection at left side of ruler)@ 80% on 8/29/19  MT 8: 0 - 5.5 - 37.5(always use intersection at left side of ruler)@ 76% on 9/5/19 (right side using EMG on left hand)  MT 9: C2 (R side using EMG on L hand) 78% on 9/12/2019  MT 10: C2 (R side using EMG on L hand) 76% on 9/26/2019  MT 11: C2 (R side using EMG on L hand) 82 on 1/31/2020  MT 12: C2 (R side using EMG on L Hand) 86% on 12/22/2020     MT 13: C2 (R side) 100% on 04/21/22    LDLPFC:  Frequency: 50 Hz  Number of pulses:  3                        Number of bursts: 10  Burst frequency: 5 Hz  Cycle time: 10 sec  Total pulses delivered: 1800  Tx Loc: F3  Energy: 80% (80% MT)   Number of Cycles: 60 trains    RDLPFC:  Frequency: 50 Hz  Number of pulses:  3                        Number of bursts: 100  Burst frequency: 5 Hz  Cycle time: 60.0sec  Total pulses delivered: 1800  Tx Loc: F4 (right side)  Energy: 80% (80% MT)   Number of Cycles: 6 trains    Rating Scales:  Date MADRS IDS-SR PHQ-9   2/12/21  31 4   04/21/22 25 40 14             Post-Procedure Diagnosis:  MDD, recurrent, severe F33.2    Samuel Hammond, TMS Technician  [facility]    Plan   - Cont TMS  -Increase energy as tolerated       I did not see the patient during/after treatment but remained available in the clinic during  treatment.    Evelyn Zamudio MD  [facility]

## 2022-04-27 ENCOUNTER — OFFICE VISIT (OUTPATIENT)
Dept: PSYCHIATRY | Facility: CLINIC | Age: 52
End: 2022-04-27
Payer: COMMERCIAL

## 2022-04-27 DIAGNOSIS — F33.2 SEVERE EPISODE OF RECURRENT MAJOR DEPRESSIVE DISORDER, WITHOUT PSYCHOTIC FEATURES (H): Primary | ICD-10-CM

## 2022-04-27 ASSESSMENT — PATIENT HEALTH QUESTIONNAIRE - PHQ9: SUM OF ALL RESPONSES TO PHQ QUESTIONS 1-9: 5

## 2022-04-27 NOTE — PROGRESS NOTES
[facility] TMS Program  [address], [address]  [place] [address]  TMS Procedure Note   Aure Joy MRN# [id number]  Age: 49 year old year old YOB: 1970    Pre-Procedure:  History and Physical: Reviewed in medical record  Consent Signed by: Aure Joy  On: 12/22/2020    Clinical Narrative:  Patient is tolerating treatment and increase. No patient updates to report.    Indications for TMS:  MDD, recurrent, severe; 4+ medication trials (from 2+ classes) ineffective; Psychotherapy ineffective.     Pre-Procedure Diagnosis:  MDD, recurrent, severe F33.2    Treatment Hx:  Treatment number this series: 5  Total lifetime treatment number: 155    Allergies   Allergen Reactions     Codeine Nausea     Other  [No Clinical Screening - See Comments] Nausea and Vomiting and Other (See Comments)     general anesthesia- uncontrolled vomitting      There were no vitals taken for this visit.    Pause for the Cause  Right patient: Yes  Right procedure/correct coil:  Yes; rTMS; [id number]; F8 coil.   Earplugs in place:  Yes     Procedure  Patient was seated in procedure chair. Identity and procedure was verified. Ear plugs were placed in ears and patient-specific cap was placed on head and tightened appropriately. Ruler locations were verified. Bone conducting headphones were not used.  Stimulator was set to parameters below.  Initial train was well tolerated so 60 trains were delivered on the left and 6 trains on the right.  Patient tolerated procedure well with no motor movement.      Motor Threshold Determination  Distance from nasion to inion: 35  Distance along circumference from midline: 6.67cm  Distance from Vertex: 9.56cm  MT 1: 0 - 6 - 16 @ 69% on 1/29/2018  MT 2: 0 - 6 - 16 @ 69% on 2/6/2018   MT 3: 0 - 6 - 16 @ 69% on 2/13/2018   MT 4: 0 - 6 - 16 @ 69% on 2/23/2018   MT 5: 0 - 6 - 16 @ 69% on 3/2/2018   MT 6: 0 - 5.5 - 15.5(always use intersection at left side of ruler)@ 85% on 8/23/19  MT 7:  0 - 5.5 - 15.5(always use intersection at left side of ruler)@ 80% on 8/29/19  MT 8: 0 - 5.5 - 37.5(always use intersection at left side of ruler)@ 76% on 9/5/19 (right side using EMG on left hand)  MT 9: C2 (R side using EMG on L hand) 78% on 9/12/2019  MT 10: C2 (R side using EMG on L hand) 76% on 9/26/2019  MT 11: C2 (R side using EMG on L hand) 82 on 1/31/2020  MT 12: C2 (R side using EMG on L Hand) 86% on 12/22/2020     MT 13: C2 (R side) 100% on 04/21/22    LDLPFC:  Frequency: 50 Hz  Number of pulses:  3                        Number of bursts: 10  Burst frequency: 5 Hz  Cycle time: 10 sec  Total pulses delivered: 1800  Tx Loc: F3  Energy: 80% (80% MT)   Number of Cycles: 60 trains    RDLPFC:  Frequency: 50 Hz  Number of pulses:  3                        Number of bursts: 100  Burst frequency: 5 Hz  Cycle time: 60.0sec  Total pulses delivered: 1800  Tx Loc: F4 (right side)  Energy: 80% (80% MT)   Number of Cycles: 6 trains    Rating Scales:  Date MADRS IDS-SR PHQ-9   2/12/21  31 4   04/21/22 25 40 14             Post-Procedure Diagnosis:  MDD, recurrent, severe F33.2    Samuel Hammond, TMS Technician  [facility]    Plan   - Cont TMS    I did not see the patient but remained available in the clinic throughout the TMS session.     Yuri Cole M.D.   [facility]

## 2022-04-28 ENCOUNTER — OFFICE VISIT (OUTPATIENT)
Dept: PSYCHIATRY | Facility: CLINIC | Age: 52
End: 2022-04-28
Payer: COMMERCIAL

## 2022-04-28 DIAGNOSIS — F33.2 SEVERE EPISODE OF RECURRENT MAJOR DEPRESSIVE DISORDER, WITHOUT PSYCHOTIC FEATURES (H): Primary | ICD-10-CM

## 2022-04-28 ASSESSMENT — PATIENT HEALTH QUESTIONNAIRE - PHQ9: SUM OF ALL RESPONSES TO PHQ QUESTIONS 1-9: 11

## 2022-04-28 NOTE — PROGRESS NOTES
[facility] TMS Program  [address], [address]  [place] [address]  TMS Procedure Note   Aure Joy MRN# [id number]  Age: 49 year old year old YOB: 1970    Pre-Procedure:  History and Physical: Reviewed in medical record  Consent Signed by: Aure Joy  On: 12/22/2020    Clinical Narrative:  Patient is tolerating treatment and increase. Patient had difficulties falling asleep last night.    Indications for TMS:  MDD, recurrent, severe; 4+ medication trials (from 2+ classes) ineffective; Psychotherapy ineffective.     Pre-Procedure Diagnosis:  MDD, recurrent, severe F33.2    Treatment Hx:  Treatment number this series: 6  Total lifetime treatment number: 156    Allergies   Allergen Reactions     Codeine Nausea     Other  [No Clinical Screening - See Comments] Nausea and Vomiting and Other (See Comments)     general anesthesia- uncontrolled vomitting      There were no vitals taken for this visit.    Pause for the Cause  Right patient: Yes  Right procedure/correct coil:  Yes; rTMS; [id number]; F8 coil.   Earplugs in place:  Yes     Procedure  Patient was seated in procedure chair. Identity and procedure was verified. Ear plugs were placed in ears and patient-specific cap was placed on head and tightened appropriately. Ruler locations were verified. Bone conducting headphones were not used.  Stimulator was set to parameters below.  Initial train was well tolerated so 60 trains were delivered on the left and 6 trains on the right.  Patient tolerated procedure well with no motor movement.      Motor Threshold Determination  Distance from nasion to inion: 35  Distance along circumference from midline: 6.67cm  Distance from Vertex: 9.56cm  MT 1: 0 - 6 - 16 @ 69% on 1/29/2018  MT 2: 0 - 6 - 16 @ 69% on 2/6/2018   MT 3: 0 - 6 - 16 @ 69% on 2/13/2018   MT 4: 0 - 6 - 16 @ 69% on 2/23/2018   MT 5: 0 - 6 - 16 @ 69% on 3/2/2018   MT 6: 0 - 5.5 - 15.5(always use intersection at left side of ruler)@  85% on 8/23/19  MT 7: 0 - 5.5 - 15.5(always use intersection at left side of ruler)@ 80% on 8/29/19  MT 8: 0 - 5.5 - 37.5(always use intersection at left side of ruler)@ 76% on 9/5/19 (right side using EMG on left hand)  MT 9: C2 (R side using EMG on L hand) 78% on 9/12/2019  MT 10: C2 (R side using EMG on L hand) 76% on 9/26/2019  MT 11: C2 (R side using EMG on L hand) 82 on 1/31/2020  MT 12: C2 (R side using EMG on L Hand) 86% on 12/22/2020     MT 13: C2 (R side) 100% on 04/21/22    LDLPFC:  Frequency: 50 Hz  Number of pulses:  3                        Number of bursts: 10  Burst frequency: 5 Hz  Cycle time: 10 sec  Total pulses delivered: 1800  Tx Loc: F3  Energy: 80% (80% MT)   Number of Cycles: 60 trains    RDLPFC:  Frequency: 50 Hz  Number of pulses:  3                        Number of bursts: 100  Burst frequency: 5 Hz  Cycle time: 60.0sec  Total pulses delivered: 1800  Tx Loc: F4 (right side)  Energy: 80% (80% MT)   Number of Cycles: 6 trains    Rating Scales:  Date MADRS IDS-SR PHQ-9   2/12/21  31 4   04/21/22 25 40 14             Post-Procedure Diagnosis:  MDD, recurrent, severe F33.2    Samuel Hammond, TMS Technician  [facility]    Plan   - Cont TMS      I did not see the patient during/after treatment but remained available in the clinic during  treatment.    Evelyn Zamudio MD  [facility]

## 2022-05-03 ENCOUNTER — OFFICE VISIT (OUTPATIENT)
Dept: PSYCHIATRY | Facility: CLINIC | Age: 52
End: 2022-05-03
Payer: COMMERCIAL

## 2022-05-03 DIAGNOSIS — F33.2 SEVERE EPISODE OF RECURRENT MAJOR DEPRESSIVE DISORDER, WITHOUT PSYCHOTIC FEATURES (H): Primary | ICD-10-CM

## 2022-05-03 ASSESSMENT — PATIENT HEALTH QUESTIONNAIRE - PHQ9: SUM OF ALL RESPONSES TO PHQ QUESTIONS 1-9: 7

## 2022-05-03 NOTE — PROGRESS NOTES
[facility] TMS Program  [address], [address]  [place] [address]  TMS Procedure Note   Aure Joy MRN# [id number]  Age: 49 year old year old YOB: 1970    Pre-Procedure:  History and Physical: Reviewed in medical record  Consent Signed by: Aure Joy  On: 12/22/2020    Clinical Narrative:  Patient is tolerated treatment. She reports still struggling with sleep this weekend but got 5 hours last night. She used her phone during treatment.    Indications for TMS:  MDD, recurrent, severe; 4+ medication trials (from 2+ classes) ineffective; Psychotherapy ineffective.     Pre-Procedure Diagnosis:  MDD, recurrent, severe F33.2    Treatment Hx:  Treatment number this series: 7  Total lifetime treatment number: 157    Allergies   Allergen Reactions     Codeine Nausea     Other  [No Clinical Screening - See Comments] Nausea and Vomiting and Other (See Comments)     general anesthesia- uncontrolled vomitting      There were no vitals taken for this visit.    Pause for the Cause  Right patient: Yes  Right procedure/correct coil:  Yes; rTMS; [id number]; F8 coil.   Earplugs in place:  Yes     Procedure  Patient was seated in procedure chair. Identity and procedure was verified. Ear plugs were placed in ears and patient-specific cap was placed on head and tightened appropriately. Ruler locations were verified. Bone conducting headphones were not used.  Stimulator was set to parameters below.  Initial train was well tolerated so 60 trains were delivered on the left and 6 trains on the right.  Patient tolerated procedure well with no motor movement.      Motor Threshold Determination  Distance from nasion to inion: 35  Distance along circumference from midline: 6.67cm  Distance from Vertex: 9.56cm  MT 1: 0 - 6 - 16 @ 69% on 1/29/2018  MT 2: 0 - 6 - 16 @ 69% on 2/6/2018   MT 3: 0 - 6 - 16 @ 69% on 2/13/2018   MT 4: 0 - 6 - 16 @ 69% on 2/23/2018   MT 5: 0 - 6 - 16 @ 69% on 3/2/2018   MT 6: 0 - 5.5 -  15.5(always use intersection at left side of ruler)@ 85% on 8/23/19  MT 7: 0 - 5.5 - 15.5(always use intersection at left side of ruler)@ 80% on 8/29/19  MT 8: 0 - 5.5 - 37.5(always use intersection at left side of ruler)@ 76% on 9/5/19 (right side using EMG on left hand)  MT 9: C2 (R side using EMG on L hand) 78% on 9/12/2019  MT 10: C2 (R side using EMG on L hand) 76% on 9/26/2019  MT 11: C2 (R side using EMG on L hand) 82 on 1/31/2020  MT 12: C2 (R side using EMG on L Hand) 86% on 12/22/2020     MT 13: C2 (R side) 100% on 04/21/22    LDLPFC:  Frequency: 50 Hz  Number of pulses:  3                        Number of bursts: 10  Burst frequency: 5 Hz  Cycle time: 10 sec  Total pulses delivered: 1800  Tx Loc: F3  Energy: 80% (80% MT)   Number of Cycles: 60 trains    RDLPFC:  Frequency: 50 Hz  Number of pulses:  3                        Number of bursts: 100  Burst frequency: 5 Hz  Cycle time: 60.0sec  Total pulses delivered: 1800  Tx Loc: F4 (right side)  Energy: 80% (80% MT)   Number of Cycles: 6 trains    Rating Scales:  Date MADRS IDS-SR PHQ-9   2/12/21  31 4   04/21/22 25 40 14             Post-Procedure Diagnosis:  MDD, recurrent, severe F33.2    Amy Mcgee, EMT  [facility]    Plan   - Cont TMS      I did not see the patient during/after treatment but remained available in the clinic during  treatment.    Evelyn Zamudio MD  [facility]

## 2022-05-04 ENCOUNTER — OFFICE VISIT (OUTPATIENT)
Dept: PSYCHIATRY | Facility: CLINIC | Age: 52
End: 2022-05-04
Payer: COMMERCIAL

## 2022-05-04 DIAGNOSIS — F33.2 SEVERE EPISODE OF RECURRENT MAJOR DEPRESSIVE DISORDER, WITHOUT PSYCHOTIC FEATURES (H): Primary | ICD-10-CM

## 2022-05-04 ASSESSMENT — PATIENT HEALTH QUESTIONNAIRE - PHQ9: SUM OF ALL RESPONSES TO PHQ QUESTIONS 1-9: 9

## 2022-05-04 NOTE — PROGRESS NOTES
[facility] TMS Program  [address], [address]  [place] [address]  TMS Procedure Note   Aure Joy MRN# [id number]  Age: 49 year old year old YOB: 1970    Pre-Procedure:  History and Physical: Reviewed in medical record  Consent Signed by: Aure Joy  On: 12/22/2020    Clinical Narrative:  Patient is tolerated treatment. She reports still struggling with sleep again but got 5.5 hours last night. She used her phone during treatment.    Indications for TMS:  MDD, recurrent, severe; 4+ medication trials (from 2+ classes) ineffective; Psychotherapy ineffective.     Pre-Procedure Diagnosis:  MDD, recurrent, severe F33.2    Treatment Hx:  Treatment number this series: 8  Total lifetime treatment number: 158    Allergies   Allergen Reactions     Codeine Nausea     Other  [No Clinical Screening - See Comments] Nausea and Vomiting and Other (See Comments)     general anesthesia- uncontrolled vomitting      There were no vitals taken for this visit.    Pause for the Cause  Right patient: Yes  Right procedure/correct coil:  Yes; rTMS; [id number]; F8 coil.   Earplugs in place:  Yes     Procedure  Patient was seated in procedure chair. Identity and procedure was verified. Ear plugs were placed in ears and patient-specific cap was placed on head and tightened appropriately. Ruler locations were verified. Bone conducting headphones were not used.  Stimulator was set to parameters below.  Initial train was well tolerated so 60 trains were delivered on the left and 6 trains on the right.  Patient tolerated procedure well with no motor movement.      Motor Threshold Determination  Distance from nasion to inion: 35  Distance along circumference from midline: 6.67cm  Distance from Vertex: 9.56cm  MT 1: 0 - 6 - 16 @ 69% on 1/29/2018  MT 2: 0 - 6 - 16 @ 69% on 2/6/2018   MT 3: 0 - 6 - 16 @ 69% on 2/13/2018   MT 4: 0 - 6 - 16 @ 69% on 2/23/2018   MT 5: 0 - 6 - 16 @ 69% on 3/2/2018   MT 6: 0 - 5.5 -  15.5(always use intersection at left side of ruler)@ 85% on 8/23/19  MT 7: 0 - 5.5 - 15.5(always use intersection at left side of ruler)@ 80% on 8/29/19  MT 8: 0 - 5.5 - 37.5(always use intersection at left side of ruler)@ 76% on 9/5/19 (right side using EMG on left hand)  MT 9: C2 (R side using EMG on L hand) 78% on 9/12/2019  MT 10: C2 (R side using EMG on L hand) 76% on 9/26/2019  MT 11: C2 (R side using EMG on L hand) 82 on 1/31/2020  MT 12: C2 (R side using EMG on L Hand) 86% on 12/22/2020     MT 13: C2 (R side) 100% on 04/21/22    LDLPFC:  Frequency: 50 Hz  Number of pulses:  3                        Number of bursts: 10  Burst frequency: 5 Hz  Cycle time: 10 sec  Total pulses delivered: 1800  Tx Loc: F3  Energy: 80% (80% MT)   Number of Cycles: 60 trains    RDLPFC:  Frequency: 50 Hz  Number of pulses:  3                        Number of bursts: 100  Burst frequency: 5 Hz  Cycle time: 60.0sec  Total pulses delivered: 1800  Tx Loc: F4 (right side)  Energy: 80% (80% MT)   Number of Cycles: 6 trains    Rating Scales:  Date MADRS IDS-SR PHQ-9   2/12/21  31 4   04/21/22 25 40 14             Post-Procedure Diagnosis:  MDD, recurrent, severe F33.2    Amy Mcgee, EMT  [facility]    Plan   - Cont TMS      I did not see the patient during/after treatment but remained available in the clinic during  treatment.    Elizabeth Gordon MD  [facility]

## 2022-05-05 ENCOUNTER — OFFICE VISIT (OUTPATIENT)
Dept: PSYCHIATRY | Facility: CLINIC | Age: 52
End: 2022-05-05
Payer: COMMERCIAL

## 2022-05-05 DIAGNOSIS — F33.2 SEVERE EPISODE OF RECURRENT MAJOR DEPRESSIVE DISORDER, WITHOUT PSYCHOTIC FEATURES (H): Primary | ICD-10-CM

## 2022-05-05 ASSESSMENT — PATIENT HEALTH QUESTIONNAIRE - PHQ9: SUM OF ALL RESPONSES TO PHQ QUESTIONS 1-9: 7

## 2022-05-05 NOTE — PROGRESS NOTES
[facility] TMS Program  [address], [address]  [place] [address]  TMS Procedure Note   Aure Joy MRN# [id number]  Age: 49 year old year old YOB: 1970    Pre-Procedure:  History and Physical: Reviewed in medical record  Consent Signed by: Aure Joy  On: 12/22/2020    Clinical Narrative:  Patient is tolerated treatment. She reports still struggling with sleep but got 5 hours last night. She used her phone during treatment, is happy about the sunshine.    Indications for TMS:  MDD, recurrent, severe; 4+ medication trials (from 2+ classes) ineffective; Psychotherapy ineffective.     Pre-Procedure Diagnosis:  MDD, recurrent, severe F33.2    Treatment Hx:  Treatment number this series: 9  Total lifetime treatment number: 159    Allergies   Allergen Reactions     Codeine Nausea     Other  [No Clinical Screening - See Comments] Nausea and Vomiting and Other (See Comments)     general anesthesia- uncontrolled vomitting      There were no vitals taken for this visit.    Pause for the Cause  Right patient: Yes  Right procedure/correct coil:  Yes; rTMS; [id number]; F8 coil.   Earplugs in place:  Yes     Procedure  Patient was seated in procedure chair. Identity and procedure was verified. Ear plugs were placed in ears and patient-specific cap was placed on head and tightened appropriately. Ruler locations were verified. Bone conducting headphones were not used.  Stimulator was set to parameters below.  Initial train was well tolerated so 60 trains were delivered on the left and 6 trains on the right.  Patient tolerated procedure well with no motor movement.      Motor Threshold Determination  Distance from nasion to inion: 35  Distance along circumference from midline: 6.67cm  Distance from Vertex: 9.56cm  MT 1: 0 - 6 - 16 @ 69% on 1/29/2018  MT 2: 0 - 6 - 16 @ 69% on 2/6/2018   MT 3: 0 - 6 - 16 @ 69% on 2/13/2018   MT 4: 0 - 6 - 16 @ 69% on 2/23/2018   MT 5: 0 - 6 - 16 @ 69% on 3/2/2018    MT 6: 0 - 5.5 - 15.5(always use intersection at left side of ruler)@ 85% on 8/23/19  MT 7: 0 - 5.5 - 15.5(always use intersection at left side of ruler)@ 80% on 8/29/19  MT 8: 0 - 5.5 - 37.5(always use intersection at left side of ruler)@ 76% on 9/5/19 (right side using EMG on left hand)  MT 9: C2 (R side using EMG on L hand) 78% on 9/12/2019  MT 10: C2 (R side using EMG on L hand) 76% on 9/26/2019  MT 11: C2 (R side using EMG on L hand) 82 on 1/31/2020  MT 12: C2 (R side using EMG on L Hand) 86% on 12/22/2020     MT 13: C2 (R side) 100% on 04/21/22    LDLPFC:  Frequency: 50 Hz  Number of pulses:  3                        Number of bursts: 10  Burst frequency: 5 Hz  Cycle time: 10 sec  Total pulses delivered: 1800  Tx Loc: F3  Energy: 80% (80% MT)   Number of Cycles: 60 trains    RDLPFC:  Frequency: 50 Hz  Number of pulses:  3                        Number of bursts: 100  Burst frequency: 5 Hz  Cycle time: 60.0sec  Total pulses delivered: 1800  Tx Loc: F4 (right side)  Energy: 80% (80% MT)   Number of Cycles: 6 trains    Rating Scales:  Date MADRS IDS-SR PHQ-9   2/12/21  31 4   04/21/22 25 40 14             Post-Procedure Diagnosis:  MDD, recurrent, severe F33.2    Samuel Hammond, CAMILA Technician  [facility]    Plan   - Cont TMS    I did not see the patient during/after treatment but remained available in the clinic during  treatment.    Evelyn Zamudio MD  [facility]

## 2022-05-06 ENCOUNTER — OFFICE VISIT (OUTPATIENT)
Dept: PSYCHIATRY | Facility: CLINIC | Age: 52
End: 2022-05-06
Payer: COMMERCIAL

## 2022-05-06 DIAGNOSIS — F33.2 SEVERE EPISODE OF RECURRENT MAJOR DEPRESSIVE DISORDER, WITHOUT PSYCHOTIC FEATURES (H): Primary | ICD-10-CM

## 2022-05-06 ASSESSMENT — PATIENT HEALTH QUESTIONNAIRE - PHQ9: SUM OF ALL RESPONSES TO PHQ QUESTIONS 1-9: 11

## 2022-05-06 NOTE — PROGRESS NOTES
[facility] TMS Program  [address], [address]  [place] [address]  TMS Procedure Note   Aure Joy MRN# [id number]  Age: 51 year old year old YOB: 1970    Pre-Procedure:  History and Physical: Reviewed in medical record  Consent Signed by: Aure Joy  On: 12/22/2020    Clinical Narrative:  Patient is tolerating treatment. She's hoping to speak with her children over Zoom this weekend.    Indications for TMS:  MDD, recurrent, severe; 4+ medication trials (from 2+ classes) ineffective; Psychotherapy ineffective.     Pre-Procedure Diagnosis:  MDD, recurrent, severe F33.2    Treatment Hx:  Treatment number this series: 10  Total lifetime treatment number: 160    Allergies   Allergen Reactions     Codeine Nausea     Other  [No Clinical Screening - See Comments] Nausea and Vomiting and Other (See Comments)     general anesthesia- uncontrolled vomitting      There were no vitals taken for this visit.    Pause for the Cause  Right patient: Yes  Right procedure/correct coil:  Yes; rTMS; [id number]; F8 coil.   Earplugs in place:  Yes     Procedure  Patient was seated in procedure chair. Identity and procedure was verified. Ear plugs were placed in ears and patient-specific cap was placed on head and tightened appropriately. Ruler locations were verified. Bone conducting headphones were not used.  Stimulator was set to parameters below.  Initial train was well tolerated so 60 trains were delivered on the left and 6 trains on the right.  Patient tolerated procedure well with no motor movement.      Motor Threshold Determination  Distance from nasion to inion: 35  Distance along circumference from midline: 6.67cm  Distance from Vertex: 9.56cm  MT 1: 0 - 6 - 16 @ 69% on 1/29/2018  MT 2: 0 - 6 - 16 @ 69% on 2/6/2018   MT 3: 0 - 6 - 16 @ 69% on 2/13/2018   MT 4: 0 - 6 - 16 @ 69% on 2/23/2018   MT 5: 0 - 6 - 16 @ 69% on 3/2/2018   MT 6: 0 - 5.5 - 15.5(always use intersection at left side of ruler)@  85% on 8/23/19  MT 7: 0 - 5.5 - 15.5(always use intersection at left side of ruler)@ 80% on 8/29/19  MT 8: 0 - 5.5 - 37.5(always use intersection at left side of ruler)@ 76% on 9/5/19 (right side using EMG on left hand)  MT 9: C2 (R side using EMG on L hand) 78% on 9/12/2019  MT 10: C2 (R side using EMG on L hand) 76% on 9/26/2019  MT 11: C2 (R side using EMG on L hand) 82 on 1/31/2020  MT 12: C2 (R side using EMG on L Hand) 86% on 12/22/2020     MT 13: C2 (R side) 100% on 04/21/22    LDLPFC:  Frequency: 50 Hz  Number of pulses:  3                        Number of bursts: 10  Burst frequency: 5 Hz  Cycle time: 10 sec  Total pulses delivered: 1800  Tx Loc: F3  Energy: 80% (80% MT)   Number of Cycles: 60 trains    RDLPFC:  Frequency: 50 Hz  Number of pulses:  3                        Number of bursts: 100  Burst frequency: 5 Hz  Cycle time: 60.0sec  Total pulses delivered: 1800  Tx Loc: F4 (right side)  Energy: 80% (80% MT)   Number of Cycles: 6 trains    Rating Scales:  Date MADRS IDS-SR PHQ-9   2/12/21  31 4   04/21/22 25 40 14   05/06/2022  38 11       Post-Procedure Diagnosis:  MDD, recurrent, severe F33.2    Samuel Hammond, TMS Technician  [facility]    Plan   - Cont TMS    I did not see patient but remained available in the clinic throughout the TMS session.      Elizabeth Gordon MD  [facility]

## 2022-05-09 ENCOUNTER — OFFICE VISIT (OUTPATIENT)
Dept: PSYCHIATRY | Facility: CLINIC | Age: 52
End: 2022-05-09
Payer: COMMERCIAL

## 2022-05-09 DIAGNOSIS — F33.2 SEVERE EPISODE OF RECURRENT MAJOR DEPRESSIVE DISORDER, WITHOUT PSYCHOTIC FEATURES (H): Primary | ICD-10-CM

## 2022-05-09 ASSESSMENT — PATIENT HEALTH QUESTIONNAIRE - PHQ9: SUM OF ALL RESPONSES TO PHQ QUESTIONS 1-9: 4

## 2022-05-09 NOTE — PROGRESS NOTES
[facility] TMS Program  [address], [address]  [place] [address]  TMS Procedure Note   Aure Joy MRN# [id number]  Age: 51 year old year old YOB: 1970    Pre-Procedure:  History and Physical: Reviewed in medical record  Consent Signed by: Aure Joy  On: 12/22/2020    Clinical Narrative:  Patient is tolerating treatment. She had a great weekend and got 7 hours of sleep last night which is good for her.    Indications for TMS:  MDD, recurrent, severe; 4+ medication trials (from 2+ classes) ineffective; Psychotherapy ineffective.     Pre-Procedure Diagnosis:  MDD, recurrent, severe F33.2    Treatment Hx:  Treatment number this series: 11  Total lifetime treatment number: 161    Allergies   Allergen Reactions     Codeine Nausea     Other  [No Clinical Screening - See Comments] Nausea and Vomiting and Other (See Comments)     general anesthesia- uncontrolled vomitting      There were no vitals taken for this visit.    Pause for the Cause  Right patient: Yes  Right procedure/correct coil:  Yes; rTMS; [id number]; F8 coil.   Earplugs in place:  Yes     Procedure  Patient was seated in procedure chair. Identity and procedure was verified. Ear plugs were placed in ears and patient-specific cap was placed on head and tightened appropriately. Ruler locations were verified. Bone conducting headphones were not used.  Stimulator was set to parameters below.  Initial train was well tolerated so 60 trains were delivered on the left and 6 trains on the right.  Patient tolerated procedure well with no motor movement.      Motor Threshold Determination  Distance from nasion to inion: 35  Distance along circumference from midline: 6.67cm  Distance from Vertex: 9.56cm  MT 1: 0 - 6 - 16 @ 69% on 1/29/2018  MT 2: 0 - 6 - 16 @ 69% on 2/6/2018   MT 3: 0 - 6 - 16 @ 69% on 2/13/2018   MT 4: 0 - 6 - 16 @ 69% on 2/23/2018   MT 5: 0 - 6 - 16 @ 69% on 3/2/2018   MT 6: 0 - 5.5 - 15.5(always use intersection at  left side of ruler)@ 85% on 8/23/19  MT 7: 0 - 5.5 - 15.5(always use intersection at left side of ruler)@ 80% on 8/29/19  MT 8: 0 - 5.5 - 37.5(always use intersection at left side of ruler)@ 76% on 9/5/19 (right side using EMG on left hand)  MT 9: C2 (R side using EMG on L hand) 78% on 9/12/2019  MT 10: C2 (R side using EMG on L hand) 76% on 9/26/2019  MT 11: C2 (R side using EMG on L hand) 82 on 1/31/2020  MT 12: C2 (R side using EMG on L Hand) 86% on 12/22/2020     MT 13: C2 (R side) 100% on 04/21/22    LDLPFC:  Frequency: 50 Hz  Number of pulses:  3                        Number of bursts: 10  Burst frequency: 5 Hz  Cycle time: 10 sec  Total pulses delivered: 1800  Tx Loc: F3  Energy: 80% (80% MT)   Number of Cycles: 60 trains    RDLPFC:  Frequency: 50 Hz  Number of pulses:  3                        Number of bursts: 100  Burst frequency: 5 Hz  Cycle time: 60.0sec  Total pulses delivered: 1800  Tx Loc: F4 (right side)  Energy: 80% (80% MT)   Number of Cycles: 6 trains    Rating Scales:  Date MADRS IDS-SR PHQ-9   2/12/21  31 4   04/21/22 25 40 14   05/06/2022  38 11       Post-Procedure Diagnosis:  MDD, recurrent, severe F33.2    Amy Mcgee, EMT  [facility]    Plan   - Cont TMS    I did not see patient but remained available in the clinic throughout the TMS session.      Elizabeth Gordon MD  [facility]

## 2022-05-10 ENCOUNTER — ALLIED HEALTH/NURSE VISIT (OUTPATIENT)
Dept: PSYCHIATRY | Facility: CLINIC | Age: 52
End: 2022-05-10
Payer: COMMERCIAL

## 2022-05-10 ENCOUNTER — ALLIED HEALTH/NURSE VISIT (OUTPATIENT)
Dept: PSYCHIATRY | Facility: CLINIC | Age: 52
End: 2022-05-10

## 2022-05-10 ENCOUNTER — OFFICE VISIT (OUTPATIENT)
Dept: PSYCHIATRY | Facility: CLINIC | Age: 52
End: 2022-05-10

## 2022-05-10 VITALS — HEART RATE: 83 BPM | DIASTOLIC BLOOD PRESSURE: 88 MMHG | SYSTOLIC BLOOD PRESSURE: 123 MMHG

## 2022-05-10 DIAGNOSIS — F33.2 SEVERE EPISODE OF RECURRENT MAJOR DEPRESSIVE DISORDER, WITHOUT PSYCHOTIC FEATURES (H): Primary | ICD-10-CM

## 2022-05-10 ASSESSMENT — PATIENT HEALTH QUESTIONNAIRE - PHQ9: SUM OF ALL RESPONSES TO PHQ QUESTIONS 1-9: 7

## 2022-05-10 NOTE — PROGRESS NOTES
[facility] :  Care Coordination Note     SITUATION   Aure Joy is a 51 year old adult who is receiving support for:  Clinic Care Coordination - Face To Face (TMS check in)  .    BACKGROUND     TMS check in, 12 sessions completed.    ASSESSMENT     Care Management       Care Mgmt General Assessment      Writer met with Aure today, to discuss how TMS treatment is going. Aure reports things are going well and is able to tolerate the daily treatments. She has noted that shortly after starting treatment she has reduced fibromyalgia pain, and this has helped her mood immensely. She is also receiving better night sleep and has been getting 6-7 hours compared to 4-5 she typically receives.     She is currently nearing the end of her divorce process which is sad and stressful but feels TMS is providing the resilience needed to get through this time. She is currently living on her own and her support is very limited. Her children are returning home for the summer from college within the next couple weeks and has hope this helps with feelings of loneliness.    She is feeling increase in anxiety, an example noted was yesterday while working on the TMS study her leg was bouncing and she was picking her fingernails. This is not normal behavior for her. Writer also observed more animated movement in conversation as well. Discussed grounding techniques and ways to reduce or minimize anxiety.    She has endorsed a slight increase in SI. She is fearful of these thoughts in regards she's worried about them returning for the long run. Both writer and Aure have no safety concerns at this time.               PLAN          Nursing Interventions:  Meet with Aure once 6 weeks of TMS has been completed    Follow-up plan:  Continue TMS       Perla Workman RN

## 2022-05-10 NOTE — PROGRESS NOTES
Aure Joy comes into clinic today at the request of ESTELA Zamudio MD Ordering Provider for Med Injection only ketamine.    Procedure Prep:  Medication double check completed by: Dunia WATTS RN  Prior to administration pt identified by name and : yes    Nursing Assessment:  Appearance: casual   Mood: good  Affect: wnl  Eye contact: good  PHQ 5/10/2022   PHQ-9 Total Score 7   Q9: Thoughts of better off dead/self-harm past 2 weeks Several days     QIDDSR 16 weekly assessment: score 12. Assessment was scanned to EHR.     Procedure Performed:  VSS and mental status WNL. Patient was given ketamine. See MAR for details.     Post Procedure Assessment:  Patient tolerated the treatment with the following side effects: disociaition. Vital signs were monitored, see VS Flowsheet. Patient stated they felt ready to go home prior to discharge. AVS was offered to patient and patient delcined. Patient was instructed not to drive for the remainder of the day and to notify clinic if any concerns arise.     Next appt: 2022    Medications provided by Pharmacy     This service provided today was under the supervising provider of the day ESTELA Zamudio MD, who was available if needed.    Perla Workman, RN

## 2022-05-10 NOTE — PROGRESS NOTES
[facility] TMS Program  [address], [address]  [place] [address]  TMS Procedure Note   Aure Joy MRN# [id number]  Age: 51 year old year old YOB: 1970    Pre-Procedure:  History and Physical: Reviewed in medical record  Consent Signed by: Aure Joy  On: 12/22/2020    Clinical Narrative:  Patient is tolerating treatment. No patient changes to report.    Indications for TMS:  MDD, recurrent, severe; 4+ medication trials (from 2+ classes) ineffective; Psychotherapy ineffective.     Pre-Procedure Diagnosis:  MDD, recurrent, severe F33.2    Treatment Hx:  Treatment number this series: 12  Total lifetime treatment number: 162    Allergies   Allergen Reactions     Codeine Nausea     Other  [No Clinical Screening - See Comments] Nausea and Vomiting and Other (See Comments)     general anesthesia- uncontrolled vomitting      There were no vitals taken for this visit.    Pause for the Cause  Right patient: Yes  Right procedure/correct coil:  Yes; rTMS; [id number]; F8 coil.   Earplugs in place:  Yes     Procedure  Patient was seated in procedure chair. Identity and procedure was verified. Ear plugs were placed in ears and patient-specific cap was placed on head and tightened appropriately. Ruler locations were verified. Bone conducting headphones were not used.  Stimulator was set to parameters below.  Initial train was well tolerated so 60 trains were delivered on the left and 6 trains on the right.  Patient tolerated procedure well with no motor movement.      Motor Threshold Determination  Distance from nasion to inion: 35  Distance along circumference from midline: 6.67cm  Distance from Vertex: 9.56cm  MT 1: 0 - 6 - 16 @ 69% on 1/29/2018  MT 2: 0 - 6 - 16 @ 69% on 2/6/2018   MT 3: 0 - 6 - 16 @ 69% on 2/13/2018   MT 4: 0 - 6 - 16 @ 69% on 2/23/2018   MT 5: 0 - 6 - 16 @ 69% on 3/2/2018   MT 6: 0 - 5.5 - 15.5(always use intersection at left side of ruler)@ 85% on 8/23/19  MT 7: 0 - 5.5 -  15.5(always use intersection at left side of ruler)@ 80% on 8/29/19  MT 8: 0 - 5.5 - 37.5(always use intersection at left side of ruler)@ 76% on 9/5/19 (right side using EMG on left hand)  MT 9: C2 (R side using EMG on L hand) 78% on 9/12/2019  MT 10: C2 (R side using EMG on L hand) 76% on 9/26/2019  MT 11: C2 (R side using EMG on L hand) 82 on 1/31/2020  MT 12: C2 (R side using EMG on L Hand) 86% on 12/22/2020     MT 13: C2 (R side) 100% on 04/21/22    LDLPFC:  Frequency: 50 Hz  Number of pulses:  3                        Number of bursts: 10  Burst frequency: 5 Hz  Cycle time: 10 sec  Total pulses delivered: 1800  Tx Loc: F3  Energy: 80% (80% MT)   Number of Cycles: 60 trains    RDLPFC:  Frequency: 50 Hz  Number of pulses:  3                        Number of bursts: 100  Burst frequency: 5 Hz  Cycle time: 60.0sec  Total pulses delivered: 1800  Tx Loc: F4 (right side)  Energy: 80% (80% MT)   Number of Cycles: 6 trains    Rating Scales:  Date MADRS IDS-SR PHQ-9   2/12/21  31 4   04/21/22 25 40 14   05/06/2022  38 11       Post-Procedure Diagnosis:  MDD, recurrent, severe F33.2    Samuel Hammond, TMS Technician  [facility]    Plan   - Cont TMS    I did not see the patient during/after treatment but remained available in the clinic during  treatment.    Evelyn Zamudio MD  [facility]

## 2022-05-11 ENCOUNTER — OFFICE VISIT (OUTPATIENT)
Dept: PSYCHIATRY | Facility: CLINIC | Age: 52
End: 2022-05-11
Payer: COMMERCIAL

## 2022-05-11 DIAGNOSIS — F33.2 SEVERE EPISODE OF RECURRENT MAJOR DEPRESSIVE DISORDER, WITHOUT PSYCHOTIC FEATURES (H): Primary | ICD-10-CM

## 2022-05-11 ASSESSMENT — PATIENT HEALTH QUESTIONNAIRE - PHQ9: SUM OF ALL RESPONSES TO PHQ QUESTIONS 1-9: 6

## 2022-05-11 NOTE — PROGRESS NOTES
[facility] TMS Program  [address], [address]  [place] [address]  TMS Procedure Note   Aure Joy MRN# [id number]  Age: 51 year old year old YOB: 1970    Pre-Procedure:  History and Physical: Reviewed in medical record  Consent Signed by: Aure Joy  On: 12/22/2020    Clinical Narrative:  Patient is tolerating treatment. Patient is doing well, no changes to report.    Indications for TMS:  MDD, recurrent, severe; 4+ medication trials (from 2+ classes) ineffective; Psychotherapy ineffective.     Pre-Procedure Diagnosis:  MDD, recurrent, severe F33.2    Treatment Hx:  Treatment number this series: 13  Total lifetime treatment number: 163    Allergies   Allergen Reactions     Codeine Nausea     Other  [No Clinical Screening - See Comments] Nausea and Vomiting and Other (See Comments)     general anesthesia- uncontrolled vomitting      There were no vitals taken for this visit.    Pause for the Cause  Right patient: Yes  Right procedure/correct coil:  Yes; rTMS; [id number]; F8 coil.   Earplugs in place:  Yes     Procedure  Patient was seated in procedure chair. Identity and procedure was verified. Ear plugs were placed in ears and patient-specific cap was placed on head and tightened appropriately. Ruler locations were verified. Bone conducting headphones were not used.  Stimulator was set to parameters below.  Initial train was well tolerated so 60 trains were delivered on the left and 6 trains on the right. Patient tolerated procedure well with no motor movement.      Motor Threshold Determination  Distance from nasion to inion: 35  Distance along circumference from midline: 6.67cm  Distance from Vertex: 9.56cm  MT 1: 0 - 6 - 16 @ 69% on 1/29/2018  MT 2: 0 - 6 - 16 @ 69% on 2/6/2018   MT 3: 0 - 6 - 16 @ 69% on 2/13/2018   MT 4: 0 - 6 - 16 @ 69% on 2/23/2018   MT 5: 0 - 6 - 16 @ 69% on 3/2/2018   MT 6: 0 - 5.5 - 15.5(always use intersection at left side of ruler)@ 85% on 8/23/19  MT  7: 0 - 5.5 - 15.5(always use intersection at left side of ruler)@ 80% on 8/29/19  MT 8: 0 - 5.5 - 37.5(always use intersection at left side of ruler)@ 76% on 9/5/19 (right side using EMG on left hand)  MT 9: C2 (R side using EMG on L hand) 78% on 9/12/2019  MT 10: C2 (R side using EMG on L hand) 76% on 9/26/2019  MT 11: C2 (R side using EMG on L hand) 82 on 1/31/2020  MT 12: C2 (R side using EMG on L Hand) 86% on 12/22/2020     MT 13: C2 (R side) 100% on 04/21/22    LDLPFC:  Frequency: 50 Hz  Number of pulses:  3                        Number of bursts: 10  Burst frequency: 5 Hz  Cycle time: 10 sec  Total pulses delivered: 1800  Tx Loc: F3  Energy: 80% (80% MT)   Number of Cycles: 60 trains    RDLPFC:  Frequency: 50 Hz  Number of pulses:  3                        Number of bursts: 100  Burst frequency: 5 Hz  Cycle time: 48.5sec  Total pulses delivered: 1800  Tx Loc: F4 (right side)  Energy: 80% (80% MT)   Number of Cycles: 6 trains    Rating Scales:  Date MADRS IDS-SR PHQ-9   2/12/21  31 4   04/21/22 25 40 14   05/06/2022  38 11       Post-Procedure Diagnosis:  MDD, recurrent, severe F33.2    Samuel Hammond, CAMILA Technician  [facility]    Plan   - Cont TMS  I did not see the patient but remained available in clinic throughout treatment.     Mihir Chaudhry MD  [facility]

## 2022-05-12 ENCOUNTER — OFFICE VISIT (OUTPATIENT)
Dept: PSYCHIATRY | Facility: CLINIC | Age: 52
End: 2022-05-12
Payer: COMMERCIAL

## 2022-05-12 DIAGNOSIS — F33.2 SEVERE EPISODE OF RECURRENT MAJOR DEPRESSIVE DISORDER, WITHOUT PSYCHOTIC FEATURES (H): Primary | ICD-10-CM

## 2022-05-12 ASSESSMENT — PATIENT HEALTH QUESTIONNAIRE - PHQ9: SUM OF ALL RESPONSES TO PHQ QUESTIONS 1-9: 3
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
## 2022-05-13 ENCOUNTER — OFFICE VISIT (OUTPATIENT)
Dept: PSYCHIATRY | Facility: CLINIC | Age: 52
End: 2022-05-13
Payer: COMMERCIAL

## 2022-05-13 DIAGNOSIS — F33.2 SEVERE EPISODE OF RECURRENT MAJOR DEPRESSIVE DISORDER, WITHOUT PSYCHOTIC FEATURES (H): Primary | ICD-10-CM

## 2022-05-13 ASSESSMENT — PATIENT HEALTH QUESTIONNAIRE - PHQ9: SUM OF ALL RESPONSES TO PHQ QUESTIONS 1-9: 5

## 2022-05-13 NOTE — PROGRESS NOTES
[facility] TMS Program  [address], [address]  [place] [address]  TMS Procedure Note   Aure Joy MRN# [id number]  Age: 51 year old year old YOB: 1970    Pre-Procedure:  History and Physical: Reviewed in medical record  Consent Signed by: Aure Joy  On: 12/22/2020    Clinical Narrative:  Patient is tolerating treatment. No changes reported.  Indications for TMS:  MDD, recurrent, severe; 4+ medication trials (from 2+ classes) ineffective; Psychotherapy ineffective.     Pre-Procedure Diagnosis:  MDD, recurrent, severe F33.2    Treatment Hx:  Treatment number this series: 15  Total lifetime treatment number: 165    Allergies   Allergen Reactions     Codeine Nausea     Other  [No Clinical Screening - See Comments] Nausea and Vomiting and Other (See Comments)     general anesthesia- uncontrolled vomitting      There were no vitals taken for this visit.    Pause for the Cause  Right patient: Yes  Right procedure/correct coil:  Yes; rTMS; [id number]; F8 coil.   Earplugs in place:  Yes     Procedure  Patient was seated in procedure chair. Identity and procedure was verified. Ear plugs were placed in ears and patient-specific cap was placed on head and tightened appropriately. Ruler locations were verified. Bone conducting headphones were not used.  Stimulator was set to parameters below.  Initial train was well tolerated so 60 trains were delivered on the left and 6 trains on the right. Patient tolerated procedure well with no motor movement.      Motor Threshold Determination  Distance from nasion to inion: 35  Distance along circumference from midline: 6.67cm  Distance from Vertex: 9.56cm  MT 1: 0 - 6 - 16 @ 69% on 1/29/2018  MT 2: 0 - 6 - 16 @ 69% on 2/6/2018   MT 3: 0 - 6 - 16 @ 69% on 2/13/2018   MT 4: 0 - 6 - 16 @ 69% on 2/23/2018   MT 5: 0 - 6 - 16 @ 69% on 3/2/2018   MT 6: 0 - 5.5 - 15.5(always use intersection at left side of ruler)@ 85% on 8/23/19  MT 7: 0 - 5.5 - 15.5(always  use intersection at left side of ruler)@ 80% on 8/29/19  MT 8: 0 - 5.5 - 37.5(always use intersection at left side of ruler)@ 76% on 9/5/19 (right side using EMG on left hand)  MT 9: C2 (R side using EMG on L hand) 78% on 9/12/2019  MT 10: C2 (R side using EMG on L hand) 76% on 9/26/2019  MT 11: C2 (R side using EMG on L hand) 82 on 1/31/2020  MT 12: C2 (R side using EMG on L Hand) 86% on 12/22/2020     MT 13: C2 (R side) 100% on 04/21/22    LDLPFC:  Frequency: 50 Hz  Number of pulses:  3                        Number of bursts: 10  Burst frequency: 5 Hz  Cycle time: 10 sec  Total pulses delivered: 1800  Tx Loc: F3  Energy: 80% (80% MT)   Number of Cycles: 60 trains    RDLPFC:  Frequency: 50 Hz  Number of pulses:  3                        Number of bursts: 100  Burst frequency: 5 Hz  Cycle time: 48.5sec  Total pulses delivered: 1800  Tx Loc: F4 (right side)  Energy: 80% (80% MT)   Number of Cycles: 6 trains    Rating Scales:  Date MADRS IDS-SR PHQ-9   2/12/21  31 4   04/21/22 25 40 14   05/06/2022  38 11   5/13/2022  36 5       Post-Procedure Diagnosis:  MDD, recurrent, severe F33.2    Amy Mcgee EMT  [facility]    Plan   - Cont TMS      I did not see the patient during/after treatment but remained available in the clinic during  treatment.    Evelyn Zamudio MD  [facility]

## 2022-05-16 ENCOUNTER — OFFICE VISIT (OUTPATIENT)
Dept: PSYCHIATRY | Facility: CLINIC | Age: 52
End: 2022-05-16
Payer: COMMERCIAL

## 2022-05-16 DIAGNOSIS — F33.2 SEVERE EPISODE OF RECURRENT MAJOR DEPRESSIVE DISORDER, WITHOUT PSYCHOTIC FEATURES (H): Primary | ICD-10-CM

## 2022-05-16 ASSESSMENT — PATIENT HEALTH QUESTIONNAIRE - PHQ9: SUM OF ALL RESPONSES TO PHQ QUESTIONS 1-9: 6

## 2022-05-16 NOTE — PROGRESS NOTES
[facility] TMS Program  [address], [address]  [place] [address]  TMS Procedure Note   Aure Joy MRN# [id number]  Age: 51 year old year old YOB: 1970    Pre-Procedure:  History and Physical: Reviewed in medical record  Consent Signed by: Aure Joy  On: 12/22/2020    Clinical Narrative:  Patient is tolerating treatment. Patient's son is coming to visit her later this week.    Indications for TMS:  MDD, recurrent, severe; 4+ medication trials (from 2+ classes) ineffective; Psychotherapy ineffective.     Pre-Procedure Diagnosis:  MDD, recurrent, severe F33.2    Treatment Hx:  Treatment number this series: 16  Total lifetime treatment number: 166    Allergies   Allergen Reactions     Codeine Nausea     Other  [No Clinical Screening - See Comments] Nausea and Vomiting and Other (See Comments)     general anesthesia- uncontrolled vomitting      There were no vitals taken for this visit.    Pause for the Cause  Right patient: Yes  Right procedure/correct coil:  Yes; rTMS; [id number]; F8 coil.   Earplugs in place:  Yes     Procedure  Patient was seated in procedure chair. Identity and procedure was verified. Ear plugs were placed in ears and patient-specific cap was placed on head and tightened appropriately. Ruler locations were verified. Bone conducting headphones were not used.  Stimulator was set to parameters below.  Initial train was well tolerated so 60 trains were delivered on the left and 6 trains on the right. Patient tolerated procedure well with no motor movement.      Motor Threshold Determination  Distance from nasion to inion: 35  Distance along circumference from midline: 6.67cm  Distance from Vertex: 9.56cm  MT 1: 0 - 6 - 16 @ 69% on 1/29/2018  MT 2: 0 - 6 - 16 @ 69% on 2/6/2018   MT 3: 0 - 6 - 16 @ 69% on 2/13/2018   MT 4: 0 - 6 - 16 @ 69% on 2/23/2018   MT 5: 0 - 6 - 16 @ 69% on 3/2/2018   MT 6: 0 - 5.5 - 15.5(always use intersection at left side of ruler)@ 85% on  8/23/19  MT 7: 0 - 5.5 - 15.5(always use intersection at left side of ruler)@ 80% on 8/29/19  MT 8: 0 - 5.5 - 37.5(always use intersection at left side of ruler)@ 76% on 9/5/19 (right side using EMG on left hand)  MT 9: C2 (R side using EMG on L hand) 78% on 9/12/2019  MT 10: C2 (R side using EMG on L hand) 76% on 9/26/2019  MT 11: C2 (R side using EMG on L hand) 82 on 1/31/2020  MT 12: C2 (R side using EMG on L Hand) 86% on 12/22/2020     MT 13: C2 (R side) 100% on 04/21/22    LDLPFC:  Frequency: 50 Hz  Number of pulses:  3                        Number of bursts: 10  Burst frequency: 5 Hz  Cycle time: 10 sec  Total pulses delivered: 1800  Tx Loc: F3  Energy: 80% (80% MT)   Number of Cycles: 60 trains    RDLPFC:  Frequency: 50 Hz  Number of pulses:  3                        Number of bursts: 100  Burst frequency: 5 Hz  Cycle time: 48.5sec  Total pulses delivered: 1800  Tx Loc: F4 (right side)  Energy: 80% (80% MT)   Number of Cycles: 6 trains    Rating Scales:  Date MADRS IDS-SR PHQ-9   2/12/21  31 4   04/21/22 25 40 14   05/06/2022  38 11   5/13/2022  36 5       Post-Procedure Diagnosis:  MDD, recurrent, severe F33.2    Samuel Hammond, TMS Technician  [facility]    Plan   - Cont TMS    I did not see patient but remained available in the clinic throughout the TMS session.      Elizabeth Gordon MD  [facility]

## 2022-05-17 ENCOUNTER — OFFICE VISIT (OUTPATIENT)
Dept: PSYCHIATRY | Facility: CLINIC | Age: 52
End: 2022-05-17
Payer: COMMERCIAL

## 2022-05-17 DIAGNOSIS — F33.2 SEVERE EPISODE OF RECURRENT MAJOR DEPRESSIVE DISORDER, WITHOUT PSYCHOTIC FEATURES (H): Primary | ICD-10-CM

## 2022-05-17 ASSESSMENT — PATIENT HEALTH QUESTIONNAIRE - PHQ9: SUM OF ALL RESPONSES TO PHQ QUESTIONS 1-9: 2

## 2022-05-17 NOTE — PROGRESS NOTES
[facility] TMS Program  [address], [address]  [place] [address]  TMS Procedure Note   Aure Joy MRN# [id number]  Age: 51 year old year old YOB: 1970    Pre-Procedure:  History and Physical: Reviewed in medical record  Consent Signed by: Aure Joy  On: 12/22/2020    Clinical Narrative:  Patient is tolerating treatment. Patient is doing well today, no changes to report.    Indications for TMS:  MDD, recurrent, severe; 4+ medication trials (from 2+ classes) ineffective; Psychotherapy ineffective.     Pre-Procedure Diagnosis:  MDD, recurrent, severe F33.2    Treatment Hx:  Treatment number this series: 17  Total lifetime treatment number: 167    Allergies   Allergen Reactions     Codeine Nausea     Other  [No Clinical Screening - See Comments] Nausea and Vomiting and Other (See Comments)     general anesthesia- uncontrolled vomitting      There were no vitals taken for this visit.    Pause for the Cause  Right patient: Yes  Right procedure/correct coil:  Yes; rTMS; [id number]; F8 coil.   Earplugs in place:  Yes     Procedure  Patient was seated in procedure chair. Identity and procedure was verified. Ear plugs were placed in ears and patient-specific cap was placed on head and tightened appropriately. Ruler locations were verified. Bone conducting headphones were not used.  Stimulator was set to parameters below.  Initial train was well tolerated so 60 trains were delivered on the left and 6 trains on the right. Patient tolerated procedure well with no motor movement.      Motor Threshold Determination  Distance from nasion to inion: 35  Distance along circumference from midline: 6.67cm  Distance from Vertex: 9.56cm  MT 1: 0 - 6 - 16 @ 69% on 1/29/2018  MT 2: 0 - 6 - 16 @ 69% on 2/6/2018   MT 3: 0 - 6 - 16 @ 69% on 2/13/2018   MT 4: 0 - 6 - 16 @ 69% on 2/23/2018   MT 5: 0 - 6 - 16 @ 69% on 3/2/2018   MT 6: 0 - 5.5 - 15.5(always use intersection at left side of ruler)@ 85% on  8/23/19  MT 7: 0 - 5.5 - 15.5(always use intersection at left side of ruler)@ 80% on 8/29/19  MT 8: 0 - 5.5 - 37.5(always use intersection at left side of ruler)@ 76% on 9/5/19 (right side using EMG on left hand)  MT 9: C2 (R side using EMG on L hand) 78% on 9/12/2019  MT 10: C2 (R side using EMG on L hand) 76% on 9/26/2019  MT 11: C2 (R side using EMG on L hand) 82 on 1/31/2020  MT 12: C2 (R side using EMG on L Hand) 86% on 12/22/2020     MT 13: C2 (R side) 100% on 04/21/22    LDLPFC:  Frequency: 50 Hz  Number of pulses:  3                        Number of bursts: 10  Burst frequency: 5 Hz  Cycle time: 10 sec  Total pulses delivered: 1800  Tx Loc: F3  Energy: 80% (80% MT)   Number of Cycles: 60 trains    RDLPFC:  Frequency: 50 Hz  Number of pulses:  3                        Number of bursts: 100  Burst frequency: 5 Hz  Cycle time: 48.5sec  Total pulses delivered: 1800  Tx Loc: F4 (right side)  Energy: 80% (80% MT)   Number of Cycles: 6 trains    Rating Scales:  Date MADRS IDS-SR PHQ-9   2/12/21  31 4   04/21/22 25 40 14   05/06/2022  38 11   5/13/2022  36 5       Post-Procedure Diagnosis:  MDD, recurrent, severe F33.2    Samuel Hammond, TMS Technician  [facility]    Plan   - Cont TMS    I did not see the patient during/after treatment but remained available in the clinic during  treatment.    Evelyn Zamudio MD  [facility]

## 2022-05-18 ENCOUNTER — OFFICE VISIT (OUTPATIENT)
Dept: PSYCHIATRY | Facility: CLINIC | Age: 52
End: 2022-05-18
Payer: COMMERCIAL

## 2022-05-18 DIAGNOSIS — F33.2 SEVERE EPISODE OF RECURRENT MAJOR DEPRESSIVE DISORDER, WITHOUT PSYCHOTIC FEATURES (H): Primary | ICD-10-CM

## 2022-05-18 ASSESSMENT — PATIENT HEALTH QUESTIONNAIRE - PHQ9: SUM OF ALL RESPONSES TO PHQ QUESTIONS 1-9: 4

## 2022-05-18 NOTE — PROGRESS NOTES
[facility] TMS Program  [address], [address]  [place] [address]  TMS Procedure Note   Aure Joy MRN# [id number]  Age: 51 year old year old YOB: 1970    Pre-Procedure:  History and Physical: Reviewed in medical record  Consent Signed by: Aure Joy  On: 12/22/2020    Clinical Narrative:  Patient is tolerating treatment. No patient changes to report. Patient is trying to coordinate a visit with her child, uncertain of their train arrival time.    Indications for TMS:  MDD, recurrent, severe; 4+ medication trials (from 2+ classes) ineffective; Psychotherapy ineffective.     Pre-Procedure Diagnosis:  MDD, recurrent, severe F33.2    Treatment Hx:  Treatment number this series: 18  Total lifetime treatment number: 168    Allergies   Allergen Reactions     Codeine Nausea     Other  [No Clinical Screening - See Comments] Nausea and Vomiting and Other (See Comments)     general anesthesia- uncontrolled vomitting      There were no vitals taken for this visit.    Pause for the Cause  Right patient: Yes  Right procedure/correct coil:  Yes; rTMS; [id number]; F8 coil.   Earplugs in place:  Yes     Procedure  Patient was seated in procedure chair. Identity and procedure was verified. Ear plugs were placed in ears and patient-specific cap was placed on head and tightened appropriately. Ruler locations were verified. Bone conducting headphones were not used.  Stimulator was set to parameters below.  Initial train was well tolerated so 60 trains were delivered on the left and 6 trains on the right. Patient tolerated procedure well with no motor movement.      Motor Threshold Determination  Distance from nasion to inion: 35  Distance along circumference from midline: 6.67cm  Distance from Vertex: 9.56cm  MT 1: 0 - 6 - 16 @ 69% on 1/29/2018  MT 2: 0 - 6 - 16 @ 69% on 2/6/2018   MT 3: 0 - 6 - 16 @ 69% on 2/13/2018   MT 4: 0 - 6 - 16 @ 69% on 2/23/2018   MT 5: 0 - 6 - 16 @ 69% on 3/2/2018   MT 6: 0  - 5.5 - 15.5(always use intersection at left side of ruler)@ 85% on 8/23/19  MT 7: 0 - 5.5 - 15.5(always use intersection at left side of ruler)@ 80% on 8/29/19  MT 8: 0 - 5.5 - 37.5(always use intersection at left side of ruler)@ 76% on 9/5/19 (right side using EMG on left hand)  MT 9: C2 (R side using EMG on L hand) 78% on 9/12/2019  MT 10: C2 (R side using EMG on L hand) 76% on 9/26/2019  MT 11: C2 (R side using EMG on L hand) 82 on 1/31/2020  MT 12: C2 (R side using EMG on L Hand) 86% on 12/22/2020     MT 13: C2 (R side) 100% on 04/21/22    LDLPFC:  Frequency: 50 Hz  Number of pulses:  3                        Number of bursts: 10  Burst frequency: 5 Hz  Cycle time: 10 sec  Total pulses delivered: 1800  Tx Loc: F3  Energy: 80% (80% MT)   Number of Cycles: 60 trains    RDLPFC:  Frequency: 50 Hz  Number of pulses:  3                        Number of bursts: 100  Burst frequency: 5 Hz  Cycle time: 48.5sec  Total pulses delivered: 1800  Tx Loc: F4 (right side)  Energy: 80% (80% MT)   Number of Cycles: 6 trains    Rating Scales:  Date MADRS IDS-SR PHQ-9   2/12/21  31 4   04/21/22 25 40 14   05/06/2022  38 11   5/13/2022  36 5       Post-Procedure Diagnosis:  MDD, recurrent, severe F33.2    CAMILA Talley Technician  [facility]    Plan   - Cont TMS  I did not see the patient but remained available in clinic throughout treatment.     Mihir Chaudhry MD  [facility]

## 2022-05-20 ENCOUNTER — OFFICE VISIT (OUTPATIENT)
Dept: PSYCHIATRY | Facility: CLINIC | Age: 52
End: 2022-05-20
Payer: COMMERCIAL

## 2022-05-20 DIAGNOSIS — F33.2 SEVERE EPISODE OF RECURRENT MAJOR DEPRESSIVE DISORDER, WITHOUT PSYCHOTIC FEATURES (H): Primary | ICD-10-CM

## 2022-05-20 ASSESSMENT — PATIENT HEALTH QUESTIONNAIRE - PHQ9: SUM OF ALL RESPONSES TO PHQ QUESTIONS 1-9: 5

## 2022-05-20 NOTE — PROGRESS NOTES
[facility] TMS Program  [address], [address]  [place] [address]  TMS Procedure Note   Aure Joy MRN# [id number]  Age: 51 year old year old YOB: 1970    Pre-Procedure:  History and Physical: Reviewed in medical record  Consent Signed by: Aure Joy  On: 12/22/2020    Clinical Narrative:  Patient is tolerating treatment. No patient changes to report. Patient's child arrived for their visit safely, happy to see them and visit over the weekend.    Indications for TMS:  MDD, recurrent, severe; 4+ medication trials (from 2+ classes) ineffective; Psychotherapy ineffective.     Pre-Procedure Diagnosis:  MDD, recurrent, severe F33.2    Treatment Hx:  Treatment number this series: 19  Total lifetime treatment number: 169    Allergies   Allergen Reactions     Codeine Nausea     Other  [No Clinical Screening - See Comments] Nausea and Vomiting and Other (See Comments)     general anesthesia- uncontrolled vomitting      There were no vitals taken for this visit.    Pause for the Cause  Right patient: Yes  Right procedure/correct coil:  Yes; rTMS; [id number]; F8 coil.   Earplugs in place:  Yes     Procedure  Patient was seated in procedure chair. Identity and procedure was verified. Ear plugs were placed in ears and patient-specific cap was placed on head and tightened appropriately. Ruler locations were verified. Bone conducting headphones were not used.  Stimulator was set to parameters below.  Initial train was well tolerated so 60 trains were delivered on the left and 6 trains on the right. Patient tolerated procedure well with no motor movement.      Motor Threshold Determination  Distance from nasion to inion: 35  Distance along circumference from midline: 6.67cm  Distance from Vertex: 9.56cm  MT 1: 0 - 6 - 16 @ 69% on 1/29/2018  MT 2: 0 - 6 - 16 @ 69% on 2/6/2018   MT 3: 0 - 6 - 16 @ 69% on 2/13/2018   MT 4: 0 - 6 - 16 @ 69% on 2/23/2018   MT 5: 0 - 6 - 16 @ 69% on 3/2/2018   MT 6: 0 -  5.5 - 15.5(always use intersection at left side of ruler)@ 85% on 8/23/19  MT 7: 0 - 5.5 - 15.5(always use intersection at left side of ruler)@ 80% on 8/29/19  MT 8: 0 - 5.5 - 37.5(always use intersection at left side of ruler)@ 76% on 9/5/19 (right side using EMG on left hand)  MT 9: C2 (R side using EMG on L hand) 78% on 9/12/2019  MT 10: C2 (R side using EMG on L hand) 76% on 9/26/2019  MT 11: C2 (R side using EMG on L hand) 82 on 1/31/2020  MT 12: C2 (R side using EMG on L Hand) 86% on 12/22/2020     MT 13: C2 (R side) 100% on 04/21/22    LDLPFC:  Frequency: 50 Hz  Number of pulses:  3                        Number of bursts: 10  Burst frequency: 5 Hz  Cycle time: 10 sec  Total pulses delivered: 1800  Tx Loc: F3  Energy: 80% (80% MT)   Number of Cycles: 60 trains    RDLPFC:  Frequency: 50 Hz  Number of pulses:  3                        Number of bursts: 100  Burst frequency: 5 Hz  Cycle time: 48.5sec  Total pulses delivered: 1800  Tx Loc: F4 (right side)  Energy: 80% (80% MT)   Number of Cycles: 6 trains    Rating Scales:  Date MADRS IDS-SR PHQ-9   2/12/21  31 4   04/21/22 25 40 14   05/06/2022  38 11   5/13/2022  36 5   05/20/2022  32 5       Post-Procedure Diagnosis:  MDD, recurrent, severe F33.2    CAMILA Talley Technician  [facility]    Plan   - Cont TMS    I did not see patient but remained available in the clinic throughout the TMS session.      Elizabeth Gordon MD  [facility]

## 2022-05-23 ENCOUNTER — OFFICE VISIT (OUTPATIENT)
Dept: PSYCHIATRY | Facility: CLINIC | Age: 52
End: 2022-05-23
Payer: COMMERCIAL

## 2022-05-23 DIAGNOSIS — F33.2 SEVERE EPISODE OF RECURRENT MAJOR DEPRESSIVE DISORDER, WITHOUT PSYCHOTIC FEATURES (H): Primary | ICD-10-CM

## 2022-05-23 ASSESSMENT — PATIENT HEALTH QUESTIONNAIRE - PHQ9: SUM OF ALL RESPONSES TO PHQ QUESTIONS 1-9: 3

## 2022-05-23 NOTE — PROGRESS NOTES
[facility] TMS Program  [address], [address]  [place] [address]  TMS Procedure Note   Aure Joy MRN# [id number]  Age: 51 year old year old YOB: 1970    Pre-Procedure:  History and Physical: Reviewed in medical record  Consent Signed by: Aure Joy  On: 12/22/2020    Clinical Narrative:  Patient is tolerating treatment. No patient changes to report.     Indications for TMS:  MDD, recurrent, severe; 4+ medication trials (from 2+ classes) ineffective; Psychotherapy ineffective.     Pre-Procedure Diagnosis:  MDD, recurrent, severe F33.2    Treatment Hx:  Treatment number this series: 20  Total lifetime treatment number: 170    Allergies   Allergen Reactions     Codeine Nausea     Other  [No Clinical Screening - See Comments] Nausea and Vomiting and Other (See Comments)     general anesthesia- uncontrolled vomitting      There were no vitals taken for this visit.    Pause for the Cause  Right patient: Yes  Right procedure/correct coil:  Yes; rTMS; [id number]; F8 coil.   Earplugs in place:  Yes     Procedure  Patient was seated in procedure chair. Identity and procedure was verified. Ear plugs were placed in ears and patient-specific cap was placed on head and tightened appropriately. Ruler locations were verified. Bone conducting headphones were not used.  Stimulator was set to parameters below.  Initial train was well tolerated so 60 trains were delivered on the left and 6 trains on the right. Patient tolerated procedure well with no motor movement.      Motor Threshold Determination  Distance from nasion to inion: 35  Distance along circumference from midline: 6.67cm  Distance from Vertex: 9.56cm  MT 1: 0 - 6 - 16 @ 69% on 1/29/2018  MT 2: 0 - 6 - 16 @ 69% on 2/6/2018   MT 3: 0 - 6 - 16 @ 69% on 2/13/2018   MT 4: 0 - 6 - 16 @ 69% on 2/23/2018   MT 5: 0 - 6 - 16 @ 69% on 3/2/2018   MT 6: 0 - 5.5 - 15.5(always use intersection at left side of ruler)@ 85% on 8/23/19  MT 7: 0 - 5.5 -  15.5(always use intersection at left side of ruler)@ 80% on 8/29/19  MT 8: 0 - 5.5 - 37.5(always use intersection at left side of ruler)@ 76% on 9/5/19 (right side using EMG on left hand)  MT 9: C2 (R side using EMG on L hand) 78% on 9/12/2019  MT 10: C2 (R side using EMG on L hand) 76% on 9/26/2019  MT 11: C2 (R side using EMG on L hand) 82 on 1/31/2020  MT 12: C2 (R side using EMG on L Hand) 86% on 12/22/2020     MT 13: C2 (R side) 100% on 04/21/22    LDLPFC:  Frequency: 50 Hz  Number of pulses:  3                        Number of bursts: 10  Burst frequency: 5 Hz  Cycle time: 10 sec  Total pulses delivered: 1800  Tx Loc: F3  Energy: 80% (80% MT)   Number of Cycles: 60 trains    RDLPFC:  Frequency: 50 Hz  Number of pulses:  3                        Number of bursts: 100  Burst frequency: 5 Hz  Cycle time: 48.5sec  Total pulses delivered: 1800  Tx Loc: F4 (right side)  Energy: 80% (80% MT)   Number of Cycles: 6 trains    Rating Scales:  Date MADRS IDS-SR PHQ-9   2/12/21  31 4   04/21/22 25 40 14   05/06/2022  38 11   5/13/2022  36 5   05/20/2022  32 5       Post-Procedure Diagnosis:  MDD, recurrent, severe F33.2    Samuel Hammond, CAMILA Technician  [facility]    Plan   - Cont TMS    I did not see patient but remained available in the clinic throughout the TMS session.      Elizabeth Gordon MD  [facility]

## 2022-05-24 ENCOUNTER — OFFICE VISIT (OUTPATIENT)
Dept: PSYCHIATRY | Facility: CLINIC | Age: 52
End: 2022-05-24

## 2022-05-24 ENCOUNTER — ALLIED HEALTH/NURSE VISIT (OUTPATIENT)
Dept: PSYCHIATRY | Facility: CLINIC | Age: 52
End: 2022-05-24
Payer: COMMERCIAL

## 2022-05-24 VITALS — SYSTOLIC BLOOD PRESSURE: 138 MMHG | DIASTOLIC BLOOD PRESSURE: 80 MMHG | HEART RATE: 81 BPM

## 2022-05-24 DIAGNOSIS — F33.2 SEVERE EPISODE OF RECURRENT MAJOR DEPRESSIVE DISORDER, WITHOUT PSYCHOTIC FEATURES (H): Primary | ICD-10-CM

## 2022-05-24 ASSESSMENT — PATIENT HEALTH QUESTIONNAIRE - PHQ9: SUM OF ALL RESPONSES TO PHQ QUESTIONS 1-9: 2

## 2022-05-24 NOTE — PROGRESS NOTES
Aure Joy comes into clinic today at the request of ESTELA Zamudio MD Ordering Provider for Med Injection only ketamine.    Procedure Prep:  Medication double check completed by: Dunia WATTS RN  Prior to administration pt identified by name and : yes    Nursing Assessment:  Appearance: casual   Mood: good  Affect: wnl  Eye contact: good  PHQ 2022   PHQ-9 Total Score 2   Q9: Thoughts of better off dead/self-harm past 2 weeks Not at all     QIDDSR 16 weekly assessment: score 8. Assessment was scanned to EHR.       Procedure Performed:  VSS and mental status WNL. Patient was given ketamine. See MAR for details.     Post Procedure Assessment:  Patient tolerated the treatment with the following side effects: disociation. Vital signs were monitored, see VS Flowsheet. Patient stated they felt ready to go home prior to discharge. AVS was offered to patient and patient declined. Patient was instructed not to drive for the remainder of the day and to notify clinic if any concerns arise.     Next appt: 2022    Medications provided by Pharmacy     This service provided today was under the supervising provider of the day ESTELA Zamudio MD, who was available if needed.    Perla Workman, RN

## 2022-05-24 NOTE — PROGRESS NOTES
[facility] TMS Program  [address], [address]  [place] [address]  TMS Procedure Note   Aure Joy MRN# [id number]  Age: 51 year old year old YOB: 1970    Pre-Procedure:  History and Physical: Reviewed in medical record  Consent Signed by: Aure Joy  On: 12/22/2020    Clinical Narrative:  Patient is tolerating treatment. Patient has ketamine later and plans to go out for breakfast food.    Indications for TMS:  MDD, recurrent, severe; 4+ medication trials (from 2+ classes) ineffective; Psychotherapy ineffective.     Pre-Procedure Diagnosis:  MDD, recurrent, severe F33.2    Treatment Hx:  Treatment number this series: 21  Total lifetime treatment number: 171    Allergies   Allergen Reactions     Codeine Nausea     Other  [No Clinical Screening - See Comments] Nausea and Vomiting and Other (See Comments)     general anesthesia- uncontrolled vomitting      There were no vitals taken for this visit.    Pause for the Cause  Right patient: Yes  Right procedure/correct coil:  Yes; rTMS; [id number]; F8 coil.   Earplugs in place:  Yes     Procedure  Patient was seated in procedure chair. Identity and procedure was verified. Ear plugs were placed in ears and patient-specific cap was placed on head and tightened appropriately. Ruler locations were verified. Bone conducting headphones were not used.  Stimulator was set to parameters below.  Initial train was well tolerated so 60 trains were delivered on the left and 6 trains on the right. Patient tolerated procedure well with no motor movement.      Motor Threshold Determination  Distance from nasion to inion: 35  Distance along circumference from midline: 6.67cm  Distance from Vertex: 9.56cm  MT 1: 0 - 6 - 16 @ 69% on 1/29/2018  MT 2: 0 - 6 - 16 @ 69% on 2/6/2018   MT 3: 0 - 6 - 16 @ 69% on 2/13/2018   MT 4: 0 - 6 - 16 @ 69% on 2/23/2018   MT 5: 0 - 6 - 16 @ 69% on 3/2/2018   MT 6: 0 - 5.5 - 15.5(always use intersection at left side of  ruler)@ 85% on 8/23/19  MT 7: 0 - 5.5 - 15.5(always use intersection at left side of ruler)@ 80% on 8/29/19  MT 8: 0 - 5.5 - 37.5(always use intersection at left side of ruler)@ 76% on 9/5/19 (right side using EMG on left hand)  MT 9: C2 (R side using EMG on L hand) 78% on 9/12/2019  MT 10: C2 (R side using EMG on L hand) 76% on 9/26/2019  MT 11: C2 (R side using EMG on L hand) 82 on 1/31/2020  MT 12: C2 (R side using EMG on L Hand) 86% on 12/22/2020     MT 13: C2 (R side) 100% on 04/21/22    LDLPFC:  Frequency: 50 Hz  Number of pulses:  3                        Number of bursts: 10  Burst frequency: 5 Hz  Cycle time: 10 sec  Total pulses delivered: 1800  Tx Loc: F3  Energy: 80% (80% MT)   Number of Cycles: 60 trains    RDLPFC:  Frequency: 50 Hz  Number of pulses:  3                        Number of bursts: 100  Burst frequency: 5 Hz  Cycle time: 48.5sec  Total pulses delivered: 1800  Tx Loc: F4 (right side)  Energy: 80% (80% MT)   Number of Cycles: 6 trains    Rating Scales:  Date MADRS IDS-SR PHQ-9   2/12/21  31 4   04/21/22 25 40 14   05/06/2022  38 11   5/13/2022  36 5   05/20/2022  32 5       Post-Procedure Diagnosis:  MDD, recurrent, severe F33.2    Samuel Hammond, CAMILA Technician  [facility]    Plan   - Cont TMS    I did not see the patient during/after treatment but remained available in the clinic during  treatment.    Evelyn Zamudio MD  [facility]

## 2022-05-25 ENCOUNTER — OFFICE VISIT (OUTPATIENT)
Dept: PSYCHIATRY | Facility: CLINIC | Age: 52
End: 2022-05-25
Payer: COMMERCIAL

## 2022-05-25 DIAGNOSIS — F33.2 SEVERE EPISODE OF RECURRENT MAJOR DEPRESSIVE DISORDER, WITHOUT PSYCHOTIC FEATURES (H): Primary | ICD-10-CM

## 2022-05-25 ASSESSMENT — PATIENT HEALTH QUESTIONNAIRE - PHQ9: SUM OF ALL RESPONSES TO PHQ QUESTIONS 1-9: 5

## 2022-05-25 NOTE — PROGRESS NOTES
[facility] TMS Program  [address], [address]  [place] [address]  TMS Procedure Note   Aure Joy MRN# [id number]  Age: 51 year old year old YOB: 1970    Pre-Procedure:  History and Physical: Reviewed in medical record  Consent Signed by: Aure Joy  On: 12/22/2020    Clinical Narrative:  Patient is tolerating treatment. No patient changes to report.    Indications for TMS:  MDD, recurrent, severe; 4+ medication trials (from 2+ classes) ineffective; Psychotherapy ineffective.     Pre-Procedure Diagnosis:  MDD, recurrent, severe F33.2    Treatment Hx:  Treatment number this series: 22  Total lifetime treatment number: 172    Allergies   Allergen Reactions     Codeine Nausea     Other  [No Clinical Screening - See Comments] Nausea and Vomiting and Other (See Comments)     general anesthesia- uncontrolled vomitting      There were no vitals taken for this visit.    Pause for the Cause  Right patient: Yes  Right procedure/correct coil:  Yes; rTMS; [id number]; F8 coil.   Earplugs in place:  Yes     Procedure  Patient was seated in procedure chair. Identity and procedure was verified. Ear plugs were placed in ears and patient-specific cap was placed on head and tightened appropriately. Ruler locations were verified. Bone conducting headphones were not used.  Stimulator was set to parameters below.  Initial train was well tolerated so 60 trains were delivered on the left and 6 trains on the right. Patient tolerated procedure well with no motor movement.      Motor Threshold Determination  Distance from nasion to inion: 35  Distance along circumference from midline: 6.67cm  Distance from Vertex: 9.56cm  MT 1: 0 - 6 - 16 @ 69% on 1/29/2018  MT 2: 0 - 6 - 16 @ 69% on 2/6/2018   MT 3: 0 - 6 - 16 @ 69% on 2/13/2018   MT 4: 0 - 6 - 16 @ 69% on 2/23/2018   MT 5: 0 - 6 - 16 @ 69% on 3/2/2018   MT 6: 0 - 5.5 - 15.5(always use intersection at left side of ruler)@ 85% on 8/23/19  MT 7: 0 - 5.5 -  15.5(always use intersection at left side of ruler)@ 80% on 8/29/19  MT 8: 0 - 5.5 - 37.5(always use intersection at left side of ruler)@ 76% on 9/5/19 (right side using EMG on left hand)  MT 9: C2 (R side using EMG on L hand) 78% on 9/12/2019  MT 10: C2 (R side using EMG on L hand) 76% on 9/26/2019  MT 11: C2 (R side using EMG on L hand) 82 on 1/31/2020  MT 12: C2 (R side using EMG on L Hand) 86% on 12/22/2020     MT 13: C2 (R side) 100% on 04/21/22    LDLPFC:  Frequency: 50 Hz  Number of pulses:  3                        Number of bursts: 10  Burst frequency: 5 Hz  Cycle time: 10 sec  Total pulses delivered: 1800  Tx Loc: F3  Energy: 80% (80% MT)   Number of Cycles: 60 trains    RDLPFC:  Frequency: 50 Hz  Number of pulses:  3                        Number of bursts: 100  Burst frequency: 5 Hz  Cycle time: 48.5sec  Total pulses delivered: 1800  Tx Loc: F4 (right side)  Energy: 80% (80% MT)   Number of Cycles: 6 trains    Rating Scales:  Date MADRS IDS-SR PHQ-9   2/12/21  31 4   04/21/22 25 40 14   05/06/2022  38 11   5/13/2022  36 5   05/20/2022  32 5       Post-Procedure Diagnosis:  MDD, recurrent, severe F33.2    Samuel Hammond, CAMILA Technician  [facility]    Plan   - Cont TMS    I did not see patient but remained available in the clinic throughout the TMS session.      Elizabeth Gordon MD  [facility]

## 2022-05-26 ENCOUNTER — OFFICE VISIT (OUTPATIENT)
Dept: PSYCHIATRY | Facility: CLINIC | Age: 52
End: 2022-05-26
Payer: COMMERCIAL

## 2022-05-26 DIAGNOSIS — F33.2 SEVERE EPISODE OF RECURRENT MAJOR DEPRESSIVE DISORDER, WITHOUT PSYCHOTIC FEATURES (H): Primary | ICD-10-CM

## 2022-05-26 ASSESSMENT — PATIENT HEALTH QUESTIONNAIRE - PHQ9: SUM OF ALL RESPONSES TO PHQ QUESTIONS 1-9: 10

## 2022-05-26 NOTE — PROGRESS NOTES
[facility] TMS Program  [address], [address]  [place] [address]  TMS Procedure Note   Aure Joy MRN# [id number]  Age: 51 year old year old YOB: 1970    Pre-Procedure:  History and Physical: Reviewed in medical record  Consent Signed by: Aure Joy  On: 12/22/2020    Clinical Narrative:  Patient is tolerating treatment. Patient's mood is not good today, didn't identify any particular causes.    Indications for TMS:  MDD, recurrent, severe; 4+ medication trials (from 2+ classes) ineffective; Psychotherapy ineffective.     Pre-Procedure Diagnosis:  MDD, recurrent, severe F33.2    Treatment Hx:  Treatment number this series: 23  Total lifetime treatment number: 173    Allergies   Allergen Reactions     Codeine Nausea     Other  [No Clinical Screening - See Comments] Nausea and Vomiting and Other (See Comments)     general anesthesia- uncontrolled vomitting      There were no vitals taken for this visit.    Pause for the Cause  Right patient: Yes  Right procedure/correct coil:  Yes; rTMS; [id number]; F8 coil.   Earplugs in place:  Yes     Procedure  Patient was seated in procedure chair. Identity and procedure was verified. Ear plugs were placed in ears and patient-specific cap was placed on head and tightened appropriately. Ruler locations were verified. Bone conducting headphones were not used.  Stimulator was set to parameters below.  Initial train was well tolerated so 60 trains were delivered on the left and 6 trains on the right. Patient tolerated procedure well with no motor movement.      Motor Threshold Determination  Distance from nasion to inion: 35  Distance along circumference from midline: 6.67cm  Distance from Vertex: 9.56cm  MT 1: 0 - 6 - 16 @ 69% on 1/29/2018  MT 2: 0 - 6 - 16 @ 69% on 2/6/2018   MT 3: 0 - 6 - 16 @ 69% on 2/13/2018   MT 4: 0 - 6 - 16 @ 69% on 2/23/2018   MT 5: 0 - 6 - 16 @ 69% on 3/2/2018   MT 6: 0 - 5.5 - 15.5(always use intersection at left side of  ruler)@ 85% on 8/23/19  MT 7: 0 - 5.5 - 15.5(always use intersection at left side of ruler)@ 80% on 8/29/19  MT 8: 0 - 5.5 - 37.5(always use intersection at left side of ruler)@ 76% on 9/5/19 (right side using EMG on left hand)  MT 9: C2 (R side using EMG on L hand) 78% on 9/12/2019  MT 10: C2 (R side using EMG on L hand) 76% on 9/26/2019  MT 11: C2 (R side using EMG on L hand) 82 on 1/31/2020  MT 12: C2 (R side using EMG on L Hand) 86% on 12/22/2020     MT 13: C2 (R side) 100% on 04/21/22    LDLPFC:  Frequency: 50 Hz  Number of pulses:  3                        Number of bursts: 10  Burst frequency: 5 Hz  Cycle time: 10 sec  Total pulses delivered: 1800  Tx Loc: F3  Energy: 80% (80% MT)   Number of Cycles: 60 trains    RDLPFC:  Frequency: 50 Hz  Number of pulses:  3                        Number of bursts: 100  Burst frequency: 5 Hz  Cycle time: 48.5sec  Total pulses delivered: 1800  Tx Loc: F4 (right side)  Energy: 80% (80% MT)   Number of Cycles: 6 trains    Rating Scales:  Date MADRS IDS-SR PHQ-9   2/12/21  31 4   04/21/22 25 40 14   05/06/2022  38 11   5/13/2022  36 5   05/20/2022  32 5       Post-Procedure Diagnosis:  MDD, recurrent, severe F33.2    Samuel Hammond, CAMILA Technician  [facility]    Plan   - Cont TMS    I did not see the patient during/after treatment but remained available in the clinic during  treatment.    vEelyn Zamudio MD  [facility]

## 2022-05-27 ENCOUNTER — OFFICE VISIT (OUTPATIENT)
Dept: PSYCHIATRY | Facility: CLINIC | Age: 52
End: 2022-05-27
Payer: COMMERCIAL

## 2022-05-27 DIAGNOSIS — F33.2 SEVERE EPISODE OF RECURRENT MAJOR DEPRESSIVE DISORDER, WITHOUT PSYCHOTIC FEATURES (H): Primary | ICD-10-CM

## 2022-05-27 ASSESSMENT — PATIENT HEALTH QUESTIONNAIRE - PHQ9: SUM OF ALL RESPONSES TO PHQ QUESTIONS 1-9: 10

## 2022-05-27 NOTE — PROGRESS NOTES
[facility] TMS Program  [address], [address]  [place] [address]  TMS Procedure Note   Aure Joy MRN# [id number]  Age: 51 year old year old YOB: 1970    Pre-Procedure:  History and Physical: Reviewed in medical record  Consent Signed by: Aure Joy  On: 12/22/2020    Clinical Narrative:  Patient is tolerating treatment. Patient's reports mood is still low.  Mentioned divorce when asked about precipitating factors for the sadness.    Indications for TMS:  MDD, recurrent, severe; 4+ medication trials (from 2+ classes) ineffective; Psychotherapy ineffective.     Pre-Procedure Diagnosis:  MDD, recurrent, severe F33.2    Treatment Hx:  Treatment number this series: 24  Total lifetime treatment number: 174    Allergies   Allergen Reactions     Codeine Nausea     Other  [No Clinical Screening - See Comments] Nausea and Vomiting and Other (See Comments)     general anesthesia- uncontrolled vomitting      There were no vitals taken for this visit.    Pause for the Cause  Right patient: Yes  Right procedure/correct coil:  Yes; rTMS; [id number]; F8 coil.   Earplugs in place:  Yes     Procedure  Patient was seated in procedure chair. Identity and procedure was verified. Ear plugs were placed in ears and patient-specific cap was placed on head and tightened appropriately. Ruler locations were verified. Bone conducting headphones were not used.  Stimulator was set to parameters below.  Initial train was well tolerated so 60 trains were delivered on the left and 6 trains on the right. Patient tolerated procedure well with no motor movement.      Motor Threshold Determination  Distance from nasion to inion: 35  Distance along circumference from midline: 6.67cm  Distance from Vertex: 9.56cm  MT 1: 0 - 6 - 16 @ 69% on 1/29/2018  MT 2: 0 - 6 - 16 @ 69% on 2/6/2018   MT 3: 0 - 6 - 16 @ 69% on 2/13/2018   MT 4: 0 - 6 - 16 @ 69% on 2/23/2018   MT 5: 0 - 6 - 16 @ 69% on 3/2/2018   MT 6: 0 - 5.5 -  15.5(always use intersection at left side of ruler)@ 85% on 8/23/19  MT 7: 0 - 5.5 - 15.5(always use intersection at left side of ruler)@ 80% on 8/29/19  MT 8: 0 - 5.5 - 37.5(always use intersection at left side of ruler)@ 76% on 9/5/19 (right side using EMG on left hand)  MT 9: C2 (R side using EMG on L hand) 78% on 9/12/2019  MT 10: C2 (R side using EMG on L hand) 76% on 9/26/2019  MT 11: C2 (R side using EMG on L hand) 82 on 1/31/2020  MT 12: C2 (R side using EMG on L Hand) 86% on 12/22/2020     MT 13: C2 (R side) 100% on 04/21/22    LDLPFC:  Frequency: 50 Hz  Number of pulses:  3                        Number of bursts: 10  Burst frequency: 5 Hz  Cycle time: 10 sec  Total pulses delivered: 1800  Tx Loc: F3  Energy: 80% (80% MT)   Number of Cycles: 60 trains    RDLPFC:  Frequency: 50 Hz  Number of pulses:  3                        Number of bursts: 100  Burst frequency: 5 Hz  Cycle time: 48.5sec  Total pulses delivered: 1800  Tx Loc: F4 (right side)  Energy: 80% (80% MT)   Number of Cycles: 6 trains    Rating Scales:  Date MADRS IDS-SR PHQ-9   2/12/21  31 4   04/21/22 25 40 14   05/06/2022  38 11   5/13/2022  36 5   05/20/2022  32 5   5/21/2022  44 10       Post-Procedure Diagnosis:  MDD, recurrent, severe F33.2    Irma Marley Psychometrist  [facility]    Plan   - Cont TMS    I did not see patient but remained available in the clinic throughout the TMS session.      Elizabeth Gordon MD  [facility]

## 2022-05-31 ENCOUNTER — OFFICE VISIT (OUTPATIENT)
Dept: PSYCHIATRY | Facility: CLINIC | Age: 52
End: 2022-05-31
Payer: COMMERCIAL

## 2022-05-31 DIAGNOSIS — F33.2 SEVERE EPISODE OF RECURRENT MAJOR DEPRESSIVE DISORDER, WITHOUT PSYCHOTIC FEATURES (H): Primary | ICD-10-CM

## 2022-05-31 ASSESSMENT — PATIENT HEALTH QUESTIONNAIRE - PHQ9: SUM OF ALL RESPONSES TO PHQ QUESTIONS 1-9: 10

## 2022-05-31 NOTE — PROGRESS NOTES
[facility] TMS Program  [address], [address]  [place] [address]  TMS Procedure Note   Aure Joy MRN# [id number]  Age: 51 year old year old YOB: 1970    Pre-Procedure:  History and Physical: Reviewed in medical record  Consent Signed by: Aure Joy  On: 12/22/2020    Clinical Narrative:  Patient is tolerating treatment. Patient reports mood is still low, had a quiet weekend.    Indications for TMS:  MDD, recurrent, severe; 4+ medication trials (from 2+ classes) ineffective; Psychotherapy ineffective.     Pre-Procedure Diagnosis:  MDD, recurrent, severe F33.2    Treatment Hx:  Treatment number this series: 25  Total lifetime treatment number: 175    Allergies   Allergen Reactions     Codeine Nausea     Other  [No Clinical Screening - See Comments] Nausea and Vomiting and Other (See Comments)     general anesthesia- uncontrolled vomitting      There were no vitals taken for this visit.    Pause for the Cause  Right patient: Yes  Right procedure/correct coil:  Yes; rTMS; [id number]; F8 coil.   Earplugs in place:  Yes     Procedure  Patient was seated in procedure chair. Identity and procedure was verified. Ear plugs were placed in ears and patient-specific cap was placed on head and tightened appropriately. Ruler locations were verified. Bone conducting headphones were not used.  Stimulator was set to parameters below.  Initial train was well tolerated so 60 trains were delivered on the left and 6 trains on the right. Patient tolerated procedure well with no motor movement.      Motor Threshold Determination  Distance from nasion to inion: 35  Distance along circumference from midline: 6.67cm  Distance from Vertex: 9.56cm  MT 1: 0 - 6 - 16 @ 69% on 1/29/2018  MT 2: 0 - 6 - 16 @ 69% on 2/6/2018   MT 3: 0 - 6 - 16 @ 69% on 2/13/2018   MT 4: 0 - 6 - 16 @ 69% on 2/23/2018   MT 5: 0 - 6 - 16 @ 69% on 3/2/2018   MT 6: 0 - 5.5 - 15.5(always use intersection at left side of ruler)@ 85% on  8/23/19  MT 7: 0 - 5.5 - 15.5(always use intersection at left side of ruler)@ 80% on 8/29/19  MT 8: 0 - 5.5 - 37.5(always use intersection at left side of ruler)@ 76% on 9/5/19 (right side using EMG on left hand)  MT 9: C2 (R side using EMG on L hand) 78% on 9/12/2019  MT 10: C2 (R side using EMG on L hand) 76% on 9/26/2019  MT 11: C2 (R side using EMG on L hand) 82 on 1/31/2020  MT 12: C2 (R side using EMG on L Hand) 86% on 12/22/2020     MT 13: C2 (R side) 100% on 04/21/22    LDLPFC:  Frequency: 50 Hz  Number of pulses:  3                        Number of bursts: 10  Burst frequency: 5 Hz  Cycle time: 10 sec  Total pulses delivered: 1800  Tx Loc: F3  Energy: 80% (80% MT)   Number of Cycles: 60 trains    RDLPFC:  Frequency: 50 Hz  Number of pulses:  3                        Number of bursts: 100  Burst frequency: 5 Hz  Cycle time: 48.5sec  Total pulses delivered: 1800  Tx Loc: F4 (right side)  Energy: 80% (80% MT)   Number of Cycles: 6 trains    Rating Scales:  Date MADRS IDS-SR PHQ-9   2/12/21  31 4   04/21/22 25 40 14   05/06/2022  38 11   5/13/2022  36 5   05/20/2022  32 5   5/21/2022  44 10       Post-Procedure Diagnosis:  MDD, recurrent, severe F33.2    Samuel Hammond, TMS Technician  [facility]    Plan   - Cont TMS    I did not see the patient during/after treatment but remained available in the clinic during  treatment.    Evelyn Zamudio MD  [facility]

## 2022-06-02 ENCOUNTER — OFFICE VISIT (OUTPATIENT)
Dept: PSYCHIATRY | Facility: CLINIC | Age: 52
End: 2022-06-02
Payer: COMMERCIAL

## 2022-06-02 DIAGNOSIS — F33.2 SEVERE EPISODE OF RECURRENT MAJOR DEPRESSIVE DISORDER, WITHOUT PSYCHOTIC FEATURES (H): Primary | ICD-10-CM

## 2022-06-02 ASSESSMENT — PATIENT HEALTH QUESTIONNAIRE - PHQ9: SUM OF ALL RESPONSES TO PHQ QUESTIONS 1-9: 9

## 2022-06-02 NOTE — PROGRESS NOTES
[facility] TMS Program  [address], [address]  [place] [address]  TMS Procedure Note   Aure Joy MRN# [id number]  Age: 51 year old year old YOB: 1970    Pre-Procedure:  History and Physical: Reviewed in medical record  Consent Signed by: Aure Joy  On: 12/22/2020    Clinical Narrative:  Patient is tolerating treatment. Patient feels mood might be beginning to rebound, is focusing on doing what they can do.    Indications for TMS:  MDD, recurrent, severe; 4+ medication trials (from 2+ classes) ineffective; Psychotherapy ineffective.     Pre-Procedure Diagnosis:  MDD, recurrent, severe F33.2    Treatment Hx:  Treatment number this series: 26  Total lifetime treatment number: 176    Allergies   Allergen Reactions     Codeine Nausea     Other  [No Clinical Screening - See Comments] Nausea and Vomiting and Other (See Comments)     general anesthesia- uncontrolled vomitting      There were no vitals taken for this visit.    Pause for the Cause  Right patient: Yes  Right procedure/correct coil:  Yes; rTMS; [id number]; F8 coil.   Earplugs in place:  Yes     Procedure  Patient was seated in procedure chair. Identity and procedure was verified. Ear plugs were placed in ears and patient-specific cap was placed on head and tightened appropriately. Ruler locations were verified. Bone conducting headphones were not used.  Stimulator was set to parameters below.  Initial train was well tolerated so 60 trains were delivered on the left and 6 trains on the right. Patient tolerated procedure well with no motor movement.      Motor Threshold Determination  Distance from nasion to inion: 35  Distance along circumference from midline: 6.67cm  Distance from Vertex: 9.56cm  MT 1: 0 - 6 - 16 @ 69% on 1/29/2018  MT 2: 0 - 6 - 16 @ 69% on 2/6/2018   MT 3: 0 - 6 - 16 @ 69% on 2/13/2018   MT 4: 0 - 6 - 16 @ 69% on 2/23/2018   MT 5: 0 - 6 - 16 @ 69% on 3/2/2018   MT 6: 0 - 5.5 - 15.5(always use intersection  at left side of ruler)@ 85% on 8/23/19  MT 7: 0 - 5.5 - 15.5(always use intersection at left side of ruler)@ 80% on 8/29/19  MT 8: 0 - 5.5 - 37.5(always use intersection at left side of ruler)@ 76% on 9/5/19 (right side using EMG on left hand)  MT 9: C2 (R side using EMG on L hand) 78% on 9/12/2019  MT 10: C2 (R side using EMG on L hand) 76% on 9/26/2019  MT 11: C2 (R side using EMG on L hand) 82 on 1/31/2020  MT 12: C2 (R side using EMG on L Hand) 86% on 12/22/2020     MT 13: C2 (R side) 100% on 04/21/22    LDLPFC:  Frequency: 50 Hz  Number of pulses:  3                        Number of bursts: 10  Burst frequency: 5 Hz  Cycle time: 10 sec  Total pulses delivered: 1800  Tx Loc: F3  Energy: 80% (80% MT)   Number of Cycles: 60 trains    RDLPFC:  Frequency: 50 Hz  Number of pulses:  3                        Number of bursts: 100  Burst frequency: 5 Hz  Cycle time: 48.5sec  Total pulses delivered: 1800  Tx Loc: F4 (right side)  Energy: 80% (80% MT)   Number of Cycles: 6 trains    Rating Scales:  Date MADRS IDS-SR PHQ-9   2/12/21  31 4   04/21/22 25 40 14   05/06/2022  38 11   5/13/2022  36 5   05/20/2022  32 5   5/21/2022  44 10       Post-Procedure Diagnosis:  MDD, recurrent, severe F33.2    Samuel Hammond, TMS Technician  [facility]    Plan   - Cont TMS    I did not see the patient during/after treatment but remained available in the clinic during  treatment.    Evelyn Zamudio MD  [facility]

## 2022-06-03 ENCOUNTER — OFFICE VISIT (OUTPATIENT)
Dept: PSYCHIATRY | Facility: CLINIC | Age: 52
End: 2022-06-03

## 2022-06-03 ENCOUNTER — ALLIED HEALTH/NURSE VISIT (OUTPATIENT)
Dept: PSYCHIATRY | Facility: CLINIC | Age: 52
End: 2022-06-03
Payer: COMMERCIAL

## 2022-06-03 DIAGNOSIS — F33.2 SEVERE EPISODE OF RECURRENT MAJOR DEPRESSIVE DISORDER, WITHOUT PSYCHOTIC FEATURES (H): Primary | ICD-10-CM

## 2022-06-03 ASSESSMENT — PATIENT HEALTH QUESTIONNAIRE - PHQ9: SUM OF ALL RESPONSES TO PHQ QUESTIONS 1-9: 14

## 2022-06-03 NOTE — PROGRESS NOTES
Writer called and spoke with patient.  She agreed the CPT was beneficial for her and is willing to try to start this again.  She also agreed to TMS plan of switching to 10 Hz.  Did discuss possibility of moving to brainsway, but patient reports this was torture for her, so she would like to try 10 Hz first.

## 2022-06-03 NOTE — PROGRESS NOTES
Writer spoke with Dr. Zamudio.  She has the following plan.   -Have patient start CPT therapy (writer to follow up with clinic SW on finding someone to do it with patient)  -ReMT patient on 6/7  -Switch to 10 Hz protocol with possibility of switching to brainsway if needed   -Extend TMS by 3 weeks pending insurance approval

## 2022-06-03 NOTE — Clinical Note
Aure is not doing well.  Her PHQ9 is back up to where it was when we started.  See note.  She is wondering what sort of plan we can put in place to help her.  Thanks!  Dunia

## 2022-06-03 NOTE — PROGRESS NOTES
[facility] :  Care Coordination Note     SITUATION   Aure Joy is a 52 year old adult who is receiving support for:  Clinic Care Coordination - Face To Face (6 week TMS check in )      BACKGROUND     27 Sessions of TMS completed     ASSESSMENT     Writer met with patient after TMS today.  Patient reported that she was not doing well.  Describes an increase in SI, feelings of shame/guilt, lack of motivation and lack of enjoyment.  Per patient these symptoms started shortly after her last Ketamine injection and her mood has continued to get worse from there.  She reports that she noticed it the most when she was not able to smile at cat videos and that she has had no interest in anything.  She does report that her divorce is not finalized yet, but this has been ongoing and she cannot tell if her mood is related to that.  She is very concerned that TMS and Ketamine are no longer working for her and if that is the case she does not know what she will do.  Discussed that it is possible that TMS and Ketamine are providing some sort of benefit, they just are not resolving everything.  Aure agreed that this is probably the case.  She is wondering what we can do to help get through this bout of increased depressive symptoms.  Informed her that writer will connect with Dr. Zamudio regarding next steps.               PLAN              Follow-up plan:  Message sent to Dr. Zamudio to see what next steps are possible        Dunia Alexandra RN

## 2022-06-03 NOTE — PROGRESS NOTES
[facility] TMS Program  [address], [address]  [place] [address]  TMS Procedure Note   Aure Joy MRN# [id number]  Age: 51 year old year old YOB: 1970    Pre-Procedure:  History and Physical: Reviewed in medical record  Consent Signed by: Aure Joy  On: 12/22/2020    Aure Joy comes into clinic today at the request of Evelyn Zamudio MD, ordering provider for TMS.    Clinical Narrative:  Patient is tolerating treatment. Patient reports she is not doing well.  She will be meeting with GERARDO Duque after treatment today.    Indications for TMS:  MDD, recurrent, severe; 4+ medication trials (from 2+ classes) ineffective; Psychotherapy ineffective.     Pre-Procedure Diagnosis:  MDD, recurrent, severe F33.2    Treatment Hx:  Treatment number this series: 27  Total lifetime treatment number: 177    Allergies   Allergen Reactions     Codeine Nausea     Other  [No Clinical Screening - See Comments] Nausea and Vomiting and Other (See Comments)     general anesthesia- uncontrolled vomitting      There were no vitals taken for this visit.    Pause for the Cause  Right patient: Yes  Right procedure/correct coil:  Yes; rTMS; [id number]; F8 coil.   Earplugs in place:  Yes     Procedure  Patient was seated in procedure chair. Identity and procedure was verified. Ear plugs were placed in ears and patient-specific cap was placed on head and tightened appropriately. Ruler locations were verified. Bone conducting headphones were not used.  Stimulator was set to parameters below.  Initial train was well tolerated so 60 trains were delivered on the left and 6 trains on the right. Patient tolerated procedure well with no motor movement.      Motor Threshold Determination  Distance from nasion to inion: 35  Distance along circumference from midline: 6.67cm  Distance from Vertex: 9.56cm  MT 1: 0 - 6 - 16 @ 69% on 1/29/2018  MT 2: 0 - 6 - 16 @ 69% on 2/6/2018   MT 3: 0 - 6 - 16 @ 69% on  2/13/2018   MT 4: 0 - 6 - 16 @ 69% on 2/23/2018   MT 5: 0 - 6 - 16 @ 69% on 3/2/2018   MT 6: 0 - 5.5 - 15.5(always use intersection at left side of ruler)@ 85% on 8/23/19  MT 7: 0 - 5.5 - 15.5(always use intersection at left side of ruler)@ 80% on 8/29/19  MT 8: 0 - 5.5 - 37.5(always use intersection at left side of ruler)@ 76% on 9/5/19 (right side using EMG on left hand)  MT 9: C2 (R side using EMG on L hand) 78% on 9/12/2019  MT 10: C2 (R side using EMG on L hand) 76% on 9/26/2019  MT 11: C2 (R side using EMG on L hand) 82 on 1/31/2020  MT 12: C2 (R side using EMG on L Hand) 86% on 12/22/2020     MT 13: C2 (R side) 100% on 04/21/22    LDLPFC:  Frequency: 50 Hz  Number of pulses:  3                        Number of bursts: 10  Burst frequency: 5 Hz  Cycle time: 10 sec  Total pulses delivered: 1800  Tx Loc: F3  Energy: 80% (80% MT)   Number of Cycles: 60 trains    RDLPFC:  Frequency: 50 Hz  Number of pulses:  3                        Number of bursts: 100  Burst frequency: 5 Hz  Cycle time: 48.5sec  Total pulses delivered: 1800  Tx Loc: F4 (right side)  Energy: 80% (80% MT)   Number of Cycles: 6 trains    Rating Scales:  Date MADRS IDS-SR PHQ-9   2/12/21  31 4   04/21/22 25 40 14   05/06/2022  38 11   5/13/2022  36 5   05/20/2022  32 5   5/21/2022  44 10   6/3/2022  49 14       Post-Procedure Diagnosis:  MDD, recurrent, severe F33.2    Plan   - Cont TMS    This service provided today was under the supervising provider of the day, Elizabeth Gordon MD, who was available if needed.    Irma Marley, Psychometrist  [facility]

## 2022-06-07 ENCOUNTER — ALLIED HEALTH/NURSE VISIT (OUTPATIENT)
Dept: PSYCHIATRY | Facility: CLINIC | Age: 52
End: 2022-06-07
Payer: COMMERCIAL

## 2022-06-07 ENCOUNTER — OFFICE VISIT (OUTPATIENT)
Dept: PSYCHIATRY | Facility: CLINIC | Age: 52
End: 2022-06-07

## 2022-06-07 VITALS — HEART RATE: 81 BPM | DIASTOLIC BLOOD PRESSURE: 83 MMHG | SYSTOLIC BLOOD PRESSURE: 156 MMHG

## 2022-06-07 DIAGNOSIS — F33.2 SEVERE EPISODE OF RECURRENT MAJOR DEPRESSIVE DISORDER, WITHOUT PSYCHOTIC FEATURES (H): Primary | ICD-10-CM

## 2022-06-07 ASSESSMENT — PATIENT HEALTH QUESTIONNAIRE - PHQ9: SUM OF ALL RESPONSES TO PHQ QUESTIONS 1-9: 13

## 2022-06-07 NOTE — PROGRESS NOTES
[facility] TMS Program  [address], [address]  [place] [address]  TMS Procedure Note   Aure Joy MRN# [id number]  Age: 51 year old year old YOB: 1970    Pre-Procedure:  History and Physical: Reviewed in medical record  Consent Signed by: Aure Joy  On: 12/22/2020    Clinical Narrative:  Patient is tolerating treatment. Patient is still not feeling well, met with Dr. Zamudio today for ReMT and change in treatment protocol.     Indications for TMS:  MDD, recurrent, severe; 4+ medication trials (from 2+ classes) ineffective; Psychotherapy ineffective.     Pre-Procedure Diagnosis:  MDD, recurrent, severe F33.2    Treatment Hx:  Treatment number this series: 28  Total lifetime treatment number: 178    Allergies   Allergen Reactions     Codeine Nausea     Other  [No Clinical Screening - See Comments] Nausea and Vomiting and Other (See Comments)     general anesthesia- uncontrolled vomitting      There were no vitals taken for this visit.    Pause for the Cause  Right patient:  Yes  Right procedure/correct coil:  Yes; rTMS; cpt 60740; F8 coil.   Earplugs in place:  Yes    Procedure  Patient was seated in procedure chair. Identity and procedure was verified. Ear plugs were placed in ears and patient-specific cap was placed on head and tightened appropriately. Ruler locations were verified. Coil was placed at initial MT location and stimulator was set to initial MT. MT was retested and found as described below. Coil was placed at treatment location and stimulator was set to stimulation parameters detailed below. A test train was delivered and pt tolerated train fine. Given pt tolerance, 75 trains were delivered. Pt tolerated procedure with no motor movement.  .      Motor Threshold Determination  Distance from nasion to inion: 35  Distance along circumference from midline: 6.67cm  Distance from Vertex: 9.56cm  MT 1: 0 - 6 - 16 @ 69% on 1/29/2018  MT 2: 0 - 6 - 16 @ 69% on 2/6/2018   MT  3: 0 - 6 - 16 @ 69% on 2/13/2018   MT 4: 0 - 6 - 16 @ 69% on 2/23/2018   MT 5: 0 - 6 - 16 @ 69% on 3/2/2018   MT 6: 0 - 5.5 - 15.5(always use intersection at left side of ruler)@ 85% on 8/23/19  MT 7: 0 - 5.5 - 15.5(always use intersection at left side of ruler)@ 80% on 8/29/19  MT 8: 0 - 5.5 - 37.5(always use intersection at left side of ruler)@ 76% on 9/5/19 (right side using EMG on left hand)  MT 9: C2 (R side using EMG on L hand) 78% on 9/12/2019  MT 10: C2 (R side using EMG on L hand) 76% on 9/26/2019  MT 11: C2 (R side using EMG on L hand) 82 on 1/31/2020  MT 12: C2 (R side using EMG on L Hand) 86% on 12/22/2020   MT 13: C2 (R side) 100% on 04/21/22  MT 14: C2 (R side) 86% on 6/7/22        Stimulation Parameters  Frequency: 10 Hz                              Train duration: 4 sec  Total pulses delivered: 3000  Inter-train interval: 15 sec  Tx Loc: F3  Energy: 95% (110% MT)  Trains: 75 trains    Rating Scales:  Date MADRS IDS-SR PHQ-9   2/12/21  31 4   04/21/22 25 40 14   05/06/2022  38 11   5/13/2022  36 5   05/20/2022  32 5   5/21/2022  44 10   6/3/2022  49 14       Post-Procedure Diagnosis:  MDD, recurrent, severe F33.2    Dunia Alexandra, GERARDO   [facility]    Plan   - Cont TMS    I completed repeat determination of the pt's motor threshold during the visit today. I was available in the clinic throughout the treatment.    Evelyn Zamudio MD  [facility]

## 2022-06-07 NOTE — PROGRESS NOTES
Aure Joy comes into clinic today at the request of ESTELA Zamudio MD Ordering Provider for Med Injection only ketamine.    Procedure Prep:  Medication double check completed by: Dunia WATTS RN  Prior to administration pt identified by name and : yes    Nursing Assessment:  Appearance: casual   Mood: tired.   Affect: wnl  Eye contact: good  PHQ 2022   PHQ-9 Total Score 13   Q9: Thoughts of better off dead/self-harm past 2 weeks Several days     QIDDSR 16 weekly assessment: score 16. Assessment was scanned to EHR.       Procedure Performed:  VSS and mental status WNL. Patient was given ketamine. See MAR for details.     Post Procedure Assessment:  Patient tolerated the treatment with the following side effects: disociation. Vital signs were monitored, see VS Flowsheet. Patient stated they felt ready to go home prior to discharge. AVS was offered to patient and patient declined. Patient was instructed not to drive for the remainder of the day and to notify clinic if any concerns arise.     Next appt: 2022    Medications provided by Pharmacy     This service provided today was under the supervising provider of the day ESTELA Zamudio MD, who was available if needed.    Perla Workman, GERARDO

## 2022-06-08 ENCOUNTER — OFFICE VISIT (OUTPATIENT)
Dept: PSYCHIATRY | Facility: CLINIC | Age: 52
End: 2022-06-08
Payer: COMMERCIAL

## 2022-06-08 DIAGNOSIS — F33.2 SEVERE EPISODE OF RECURRENT MAJOR DEPRESSIVE DISORDER, WITHOUT PSYCHOTIC FEATURES (H): Primary | ICD-10-CM

## 2022-06-08 ASSESSMENT — PATIENT HEALTH QUESTIONNAIRE - PHQ9: SUM OF ALL RESPONSES TO PHQ QUESTIONS 1-9: 12

## 2022-06-08 NOTE — PROGRESS NOTES
[facility] TMS Program  [address], [address]  [place] [address]  TMS Procedure Note   Aure Joy MRN# [id number]  Age: 51 year old year old YOB: 1970    Pre-Procedure:  History and Physical: Reviewed in medical record  Consent Signed by: Aure Joy  On: 12/22/2020    Clinical Narrative:  Patient is tolerating treatment.    Indications for TMS:  MDD, recurrent, severe; 4+ medication trials (from 2+ classes) ineffective; Psychotherapy ineffective.     Pre-Procedure Diagnosis:  MDD, recurrent, severe F33.2    Treatment Hx:  Treatment number this series: 29  Total lifetime treatment number: 179    Allergies   Allergen Reactions     Codeine Nausea     Other  [No Clinical Screening - See Comments] Nausea and Vomiting and Other (See Comments)     general anesthesia- uncontrolled vomitting      There were no vitals taken for this visit.    Pause for the Cause  Right patient:  Yes  Right procedure/correct coil:  Yes; rTMS; cpt 33432; H1 coil.   Earplugs in place:  Yes    Procedure  Patient was seated in procedure chair. Identity and procedure was verified. Ear plugs were placed in ears and patient-specific cap was placed on head and tightened appropriately. Ruler locations were verified. Coil was placed at treatment location and stimulator was set to parameters described below. A test train was delivered and pt tolerated train. Given pt tolerance, 75 treatment trains were delivered. Pt tolerated procedure with left facial movement.      Motor Threshold Determination  Distance from nasion to inion: 35  Distance along circumference from midline: 6.67cm  Distance from Vertex: 9.56cm  MT 1: 0 - 6 - 16 @ 69% on 1/29/2018  MT 2: 0 - 6 - 16 @ 69% on 2/6/2018   MT 3: 0 - 6 - 16 @ 69% on 2/13/2018   MT 4: 0 - 6 - 16 @ 69% on 2/23/2018   MT 5: 0 - 6 - 16 @ 69% on 3/2/2018   MT 6: 0 - 5.5 - 15.5(always use intersection at left side of ruler)@ 85% on 8/23/19  MT 7: 0 - 5.5 - 15.5(always use  intersection at left side of ruler)@ 80% on 8/29/19  MT 8: 0 - 5.5 - 37.5(always use intersection at left side of ruler)@ 76% on 9/5/19 (right side using EMG on left hand)  MT 9: C2 (R side using EMG on L hand) 78% on 9/12/2019  MT 10: C2 (R side using EMG on L hand) 76% on 9/26/2019  MT 11: C2 (R side using EMG on L hand) 82 on 1/31/2020  MT 12: C2 (R side using EMG on L Hand) 86% on 12/22/2020   MT 13: C2 (R side) 100% on 04/21/22  MT 14: C2 (R side) 86% on 6/7/22        Stimulation Parameters  Frequency: 10 Hz                              Train duration: 4 sec  Total pulses delivered: 3000  Inter-train interval: 15 sec  Tx Loc: F3  Energy: 100% (116% MT) Maximum output due to stimulator limitations  Trains: 75 trains    Rating Scales:  Date MADRS IDS-SR PHQ-9   2/12/21  31 4   04/21/22 25 40 14   05/06/2022  38 11   5/13/2022  36 5   05/20/2022  32 5   5/21/2022  44 10   6/3/2022  49 14       Post-Procedure Diagnosis:  MDD, recurrent, severe F33.2    Irma Marley Psychometrist  [facility] Neuromodulation    Plan   - Cont TMS    I did not see patient but remained available in the clinic throughout the TMS session.      Elizabeth Gordon MD  [facility] Neuromodulation

## 2022-06-09 ENCOUNTER — OFFICE VISIT (OUTPATIENT)
Dept: PSYCHIATRY | Facility: CLINIC | Age: 52
End: 2022-06-09
Payer: COMMERCIAL

## 2022-06-09 DIAGNOSIS — F33.2 SEVERE EPISODE OF RECURRENT MAJOR DEPRESSIVE DISORDER, WITHOUT PSYCHOTIC FEATURES (H): Primary | ICD-10-CM

## 2022-06-09 RX ORDER — LAMOTRIGINE 200 MG/1
400 TABLET ORAL DAILY
Qty: 60 TABLET | Refills: 2 | Status: SHIPPED | OUTPATIENT
Start: 2022-06-09 | End: 2022-09-02

## 2022-06-09 ASSESSMENT — PATIENT HEALTH QUESTIONNAIRE - PHQ9: SUM OF ALL RESPONSES TO PHQ QUESTIONS 1-9: 12

## 2022-06-09 NOTE — PROGRESS NOTES
[facility] TMS Program  [address], [address]  [place] [address]  TMS Procedure Note   Aure Joy MRN# [id number]  Age: 51 year old year old YOB: 1970    Pre-Procedure:  History and Physical: Reviewed in medical record  Consent Signed by: Aure Joy  On: 12/22/2020    Clinical Narrative:  Patient is tolerating treatment.    Indications for TMS:  MDD, recurrent, severe; 4+ medication trials (from 2+ classes) ineffective; Psychotherapy ineffective.     Pre-Procedure Diagnosis:  MDD, recurrent, severe F33.2    Treatment Hx:  Treatment number this series: 30  Total lifetime treatment number: 180    Allergies   Allergen Reactions     Codeine Nausea     Other  [No Clinical Screening - See Comments] Nausea and Vomiting and Other (See Comments)     general anesthesia- uncontrolled vomitting      There were no vitals taken for this visit.    Pause for the Cause  Right patient:  Yes  Right procedure/correct coil:  Yes; rTMS; cpt 86492; H1 coil.   Earplugs in place:  Yes    Procedure  Patient was seated in procedure chair. Identity and procedure was verified. Ear plugs were placed in ears and patient-specific cap was placed on head and tightened appropriately. Ruler locations were verified. Coil was placed at treatment location and stimulator was set to parameters described below. A test train was delivered and pt tolerated train. Given pt tolerance, 75 treatment trains were delivered. Pt tolerated procedure with left facial movement.      Motor Threshold Determination  Distance from nasion to inion: 35  Distance along circumference from midline: 6.67cm  Distance from Vertex: 9.56cm  MT 1: 0 - 6 - 16 @ 69% on 1/29/2018  MT 2: 0 - 6 - 16 @ 69% on 2/6/2018   MT 3: 0 - 6 - 16 @ 69% on 2/13/2018   MT 4: 0 - 6 - 16 @ 69% on 2/23/2018   MT 5: 0 - 6 - 16 @ 69% on 3/2/2018   MT 6: 0 - 5.5 - 15.5(always use intersection at left side of ruler)@ 85% on 8/23/19  MT 7: 0 - 5.5 - 15.5(always use  intersection at left side of ruler)@ 80% on 8/29/19  MT 8: 0 - 5.5 - 37.5(always use intersection at left side of ruler)@ 76% on 9/5/19 (right side using EMG on left hand)  MT 9: C2 (R side using EMG on L hand) 78% on 9/12/2019  MT 10: C2 (R side using EMG on L hand) 76% on 9/26/2019  MT 11: C2 (R side using EMG on L hand) 82 on 1/31/2020  MT 12: C2 (R side using EMG on L Hand) 86% on 12/22/2020   MT 13: C2 (R side) 100% on 04/21/22  MT 14: C2 (R side) 86% on 6/7/22        Stimulation Parameters  Frequency: 10 Hz                              Train duration: 4 sec  Total pulses delivered: 3000  Inter-train interval: 15 sec  Tx Loc: F3  Energy: 100% (116% MT) Maximum output due to stimulator limitations  Trains: 75 trains    Rating Scales:  Date MADRS IDS-SR PHQ-9   2/12/21  31 4   04/21/22 25 40 14   05/06/2022  38 11   5/13/2022  36 5   05/20/2022  32 5   5/21/2022  44 10   6/3/2022  49 14       Post-Procedure Diagnosis:  MDD, recurrent, severe F33.2    Irma Marley Psychometrist  [facility] Neuromodulation    Plan   - Cont TMS    I did not see the patient during/after treatment but remained available in the clinic during  treatment.    Evelyn Zamudio MD  [facility] Neuromodulation

## 2022-06-10 ENCOUNTER — TELEPHONE (OUTPATIENT)
Dept: PSYCHIATRY | Facility: CLINIC | Age: 52
End: 2022-06-10

## 2022-06-10 ENCOUNTER — OFFICE VISIT (OUTPATIENT)
Dept: PSYCHIATRY | Facility: CLINIC | Age: 52
End: 2022-06-10
Payer: COMMERCIAL

## 2022-06-10 DIAGNOSIS — F33.2 SEVERE EPISODE OF RECURRENT MAJOR DEPRESSIVE DISORDER, WITHOUT PSYCHOTIC FEATURES (H): Primary | ICD-10-CM

## 2022-06-10 ASSESSMENT — PATIENT HEALTH QUESTIONNAIRE - PHQ9: SUM OF ALL RESPONSES TO PHQ QUESTIONS 1-9: 14

## 2022-06-10 NOTE — TELEPHONE ENCOUNTER
Patient will be mapped at 3:00 [facility] and will be taking the 2 pm slot.

## 2022-06-10 NOTE — PROGRESS NOTES
[facility] TMS Program  [address], [address]  [place] [address]  TMS Procedure Note   Aure Joy MRN# [id number]  Age: 51 year old year old YOB: 1970    Pre-Procedure:  History and Physical: Reviewed in medical record  Consent Signed by: Aure Joy  On: 12/22/2020    Clinical Narrative:  Patient is tolerating treatment.    Daily Adult Stimulation Safety Questionnaire: No or Yes in past 24 hours     1) Have you discontinued or started any new medication? No  2) Have you missed any doses of your medication differently then prescribed? No  3) Have you consumed alcohol or drugs (other than prescribed)?  No  4) Did you not sleep AT ALL last night?  No  5) Has your SI changed or worsened?  No    Indications for TMS:  MDD, recurrent, severe; 4+ medication trials (from 2+ classes) ineffective; Psychotherapy ineffective.     Pre-Procedure Diagnosis:  MDD, recurrent, severe F33.2    Treatment Hx:  Treatment number this series: 31  Total lifetime treatment number: 181    Allergies   Allergen Reactions     Codeine Nausea     Other  [No Clinical Screening - See Comments] Nausea and Vomiting and Other (See Comments)     general anesthesia- uncontrolled vomitting      There were no vitals taken for this visit.    Pause for the Cause  Right patient:  Yes  Right procedure/correct coil:  Yes; rTMS; cpt 27598; H1 coil.   Earplugs in place:  Yes    Procedure  Patient was seated in procedure chair. Identity and procedure was verified. Ear plugs were placed in ears and patient-specific cap was placed on head and tightened appropriately. Ruler locations were verified. Coil was placed at treatment location and stimulator was set to parameters described below. A test train was delivered and pt tolerated train. Given pt tolerance, 75 treatment trains were delivered. Pt tolerated procedure with left facial movement.      Motor Threshold Determination  Distance from nasion to inion: 35  Distance along  circumference from midline: 6.67cm  Distance from Vertex: 9.56cm  MT 1: 0 - 6 - 16 @ 69% on 1/29/2018  MT 2: 0 - 6 - 16 @ 69% on 2/6/2018   MT 3: 0 - 6 - 16 @ 69% on 2/13/2018   MT 4: 0 - 6 - 16 @ 69% on 2/23/2018   MT 5: 0 - 6 - 16 @ 69% on 3/2/2018   MT 6: 0 - 5.5 - 15.5(always use intersection at left side of ruler)@ 85% on 8/23/19  MT 7: 0 - 5.5 - 15.5(always use intersection at left side of ruler)@ 80% on 8/29/19  MT 8: 0 - 5.5 - 37.5(always use intersection at left side of ruler)@ 76% on 9/5/19 (right side using EMG on left hand)  MT 9: C2 (R side using EMG on L hand) 78% on 9/12/2019  MT 10: C2 (R side using EMG on L hand) 76% on 9/26/2019  MT 11: C2 (R side using EMG on L hand) 82 on 1/31/2020  MT 12: C2 (R side using EMG on L Hand) 86% on 12/22/2020   MT 13: C2 (R side) 100% on 04/21/22  MT 14: C2 (R side) 86% on 6/7/22        Stimulation Parameters  Frequency: 10 Hz                              Train duration: 4 sec  Total pulses delivered: 3000  Inter-train interval: 15 sec  Tx Loc: F3  Energy: 100% (116% MT) Maximum output due to stimulator limitations  Trains: 75 trains    Rating Scales:  Date MADRS IDS-SR PHQ-9   2/12/21  31 4   04/21/22 25 40 14   05/06/2022  38 11   5/13/2022  36 5   05/20/2022  32 5   5/21/2022  44 10   6/3/2022  49 14   6/10/2022  45 14       Post-Procedure Diagnosis:  MDD, recurrent, severe F33.2    Irma Marley, Psychometrist  [facility] Neuromod[facility]    Plan   - Cont TMS    I did not see the patient during/after treatment but remained available in the clinic during  treatment.      Tim Williamson MD PhD  [facility] Neuromod[facility]

## 2022-06-14 ENCOUNTER — TELEPHONE (OUTPATIENT)
Dept: PSYCHIATRY | Facility: CLINIC | Age: 52
End: 2022-06-14

## 2022-06-14 ENCOUNTER — OFFICE VISIT (OUTPATIENT)
Dept: PSYCHIATRY | Facility: CLINIC | Age: 52
End: 2022-06-14
Payer: COMMERCIAL

## 2022-06-14 DIAGNOSIS — F33.2 SEVERE EPISODE OF RECURRENT MAJOR DEPRESSIVE DISORDER, WITHOUT PSYCHOTIC FEATURES (H): Primary | ICD-10-CM

## 2022-06-14 ASSESSMENT — PATIENT HEALTH QUESTIONNAIRE - PHQ9: SUM OF ALL RESPONSES TO PHQ QUESTIONS 1-9: 13

## 2022-06-14 NOTE — PROGRESS NOTES
[facility] TMS Program  [address], [address]  [place] [address]  TMS Procedure Note   Aure Joy MRN# [id number]  Age: 51 year old year old YOB: 1970    Pre-Procedure:  History and Physical: Reviewed in medical record  Consent Signed by: Aure Joy  On: 12/22/2020    Clinical Narrative:  Patient is tolerating treatment. Patient was re-MT onto the [employer] machine but denied treatment. No trains besides the MT were delivered today.    Daily Adult Stimulation Safety Questionnaire: No or Yes in past 24 hours     1) Have you discontinued or started any new medication? No  2) Have you missed any doses of your medication differently then prescribed? No  3) Have you consumed alcohol or drugs (other than prescribed)?  No  4) Did you not sleep AT ALL last night?  No  5) Has your SI changed or worsened?  No    Indications for TMS:  MDD, recurrent, severe; 4+ medication trials (from 2+ classes) ineffective; Psychotherapy ineffective.     Pre-Procedure Diagnosis:  MDD, recurrent, severe F33.2    Treatment Hx:  Treatment number this series: 31  Total lifetime treatment number: 181    Allergies   Allergen Reactions     Codeine Nausea     Other  [No Clinical Screening - See Comments] Nausea and Vomiting and Other (See Comments)     general anesthesia- uncontrolled vomitting      There were no vitals taken for this visit.    Pause for the Cause  Right patient:  Yes  Right procedure/correct coil:  Yes; rTMS; not billable; H1 coil.   Earplugs in place:  Yes    Procedure  Patient was seated in procedure chair. Identity and procedure was verified. Ear plugs were placed in ears and patient-specific cap was placed on head and tightened appropriately. Ruler locations were verified. Coil was placed at treatment location and stimulator was set to parameters described below. A test train was delivered and pt did not tolerate train so treatment was denied today.    Motor Threshold Determination  Distance  from nasion to inion: 35  Distance along circumference from midline: 6.67cm  Distance from Vertex: 9.56cm  MT 1: 0 - 6 - 16 @ 69% on 1/29/2018  MT 2: 0 - 6 - 16 @ 69% on 2/6/2018   MT 3: 0 - 6 - 16 @ 69% on 2/13/2018   MT 4: 0 - 6 - 16 @ 69% on 2/23/2018   MT 5: 0 - 6 - 16 @ 69% on 3/2/2018   MT 6: 0 - 5.5 - 15.5(always use intersection at left side of ruler)@ 85% on 8/23/19  MT 7: 0 - 5.5 - 15.5(always use intersection at left side of ruler)@ 80% on 8/29/19  MT 8: 0 - 5.5 - 37.5(always use intersection at left side of ruler)@ 76% on 9/5/19 (right side using EMG on left hand)  MT 9: C2 (R side using EMG on L hand) 78% on 9/12/2019  MT 10: C2 (R side using EMG on L hand) 76% on 9/26/2019  MT 11: C2 (R side using EMG on L hand) 82 on 1/31/2020  MT 12: C2 (R side using EMG on L Hand) 86% on 12/22/2020   MT 13: C2 (R side) 100% on 04/21/22  MT 14: C2 (R side) 86% on 6/7/22  MT 15: 0 - 6 - 16.5 @ 68% on 6/14/2022    Stimulation Parameters  Frequency: 18 Hz     Train duration: 2 sec  Total pulses delivered: 1980  Inter-train interval: 20 sec  Tx Loc: 0 - eyebrow - 16.5  Energy: 68% (100% MT)  Trains: 55 trains      Rating Scales:  Date MADRS IDS-SR PHQ-9   2/12/21  31 4   04/21/22 25 40 14   05/06/2022  38 11   5/13/2022  36 5   05/20/2022  32 5   5/21/2022  44 10   6/3/2022  49 14   6/10/2022  45 14       Post-Procedure Diagnosis:  MDD, recurrent, severe F33.2    Amy Mcgee, EMT  [facility]  Mental Health Neuromodulation    Plan   - No treatment given today  - ReMT on F8 Rapid2 on Th 6/14/22    I completed repeat determination of the pt's motor threshold during the visit today. Patient did not receive a full TMS treatment session today due to intolerance of H1 coil.    Evelyn Zamudio MD  [facility] Neuromodulation

## 2022-06-14 NOTE — CONFIDENTIAL NOTE
Received VORB per C. Sarah Zamudio MD to restart an acute series ( three times a week for three weeks) while doing TMS extension on the F8 inhibitory

## 2022-06-16 ENCOUNTER — OFFICE VISIT (OUTPATIENT)
Dept: PSYCHIATRY | Facility: CLINIC | Age: 52
End: 2022-06-16
Payer: COMMERCIAL

## 2022-06-16 DIAGNOSIS — F33.2 SEVERE EPISODE OF RECURRENT MAJOR DEPRESSIVE DISORDER, WITHOUT PSYCHOTIC FEATURES (H): Primary | ICD-10-CM

## 2022-06-16 ASSESSMENT — PATIENT HEALTH QUESTIONNAIRE - PHQ9: SUM OF ALL RESPONSES TO PHQ QUESTIONS 1-9: 15

## 2022-06-16 NOTE — PROGRESS NOTES
[facility] TMS Program  [address], [address]  [place] [address]  TMS Procedure Note   Aure Joy MRN# [id number]  Age: 51 year old year old YOB: 1970    Pre-Procedure:  History and Physical: Reviewed in medical record  Consent Signed by: Aure Joy  On: 12/22/2020    Clinical Narrative:  Patient is tolerating treatment. Patient was moved to inhibitory machine today.  She has concerns about doing Ketamine and TMS at the same time.  She would like to think on it and decide by tomorrow.     Daily Adult Stimulation Safety Questionnaire: No or Yes in past 24 hours     1) Have you discontinued or started any new medication? No  2) Have you missed any doses of your medication differently then prescribed? No  3) Have you consumed alcohol or drugs (other than prescribed)?  No  4) Did you not sleep AT ALL last night?  No  5) Has your SI changed or worsened?  No    Indications for TMS:  MDD, recurrent, severe; 4+ medication trials (from 2+ classes) ineffective; Psychotherapy ineffective.     Pre-Procedure Diagnosis:  MDD, recurrent, severe F33.2    Treatment Hx:  Treatment number this series: 32  Total lifetime treatment number: 182    Allergies   Allergen Reactions     Codeine Nausea     Other  [No Clinical Screening - See Comments] Nausea and Vomiting and Other (See Comments)     general anesthesia- uncontrolled vomitting      There were no vitals taken for this visit.    Pause for the Cause  Right patient:  Yes  Right procedure/correct coil:  Yes; rTMS; 47098 F8 coil.   Earplugs in place:  Yes    Procedure  Patient was seated in procedure chair. Identity and procedure was verified. Ear plugs were placed in ears and patient-specific cap was placed on head and tightened appropriately. Ruler locations were verified. Coil was placed at treatment location and stimulator was set to parameters described below. A test train was delivered and pt did not tolerate train so treatment was denied  today.    Motor Threshold Determination  Distance from nasion to inion: 35  Distance along circumference from midline: 6.67cm  Distance from Vertex: 9.56cm  MT 1: 0 - 6 - 16 @ 69% on 1/29/2018  MT 2: 0 - 6 - 16 @ 69% on 2/6/2018   MT 3: 0 - 6 - 16 @ 69% on 2/13/2018   MT 4: 0 - 6 - 16 @ 69% on 2/23/2018   MT 5: 0 - 6 - 16 @ 69% on 3/2/2018   MT 6: 0 - 5.5 - 15.5(always use intersection at left side of ruler)@ 85% on 8/23/19  MT 7: 0 - 5.5 - 15.5(always use intersection at left side of ruler)@ 80% on 8/29/19  MT 8: 0 - 5.5 - 37.5(always use intersection at left side of ruler)@ 76% on 9/5/19 (right side using EMG on left hand)  MT 9: C2 (R side using EMG on L hand) 78% on 9/12/2019  MT 10: C2 (R side using EMG on L hand) 76% on 9/26/2019  MT 11: C2 (R side using EMG on L hand) 82 on 1/31/2020  MT 12: C2 (R side using EMG on L Hand) 86% on 12/22/2020   MT 13: C2 (R side) 100% on 04/21/22  MT 14: C2 (R side) 86% on 6/7/22  MT 15: 0 - 6 - 16.5 @ 68% on 6/14/2022  MT 16: C2 (R side) 89% on 6/16/22        Stimulation Parameters  Frequency: 1.0 Hz    Train duration: 60 sec  Total pulses delivered: 3000  Inter-train interval: 1.0 sec  Tx Loc: F4  Energy: 98% (110% MT)  Trains: 50 trains      Rating Scales:  Date MADRS IDS-SR PHQ-9   2/12/21  31 4   04/21/22 25 40 14   05/06/2022  38 11   5/13/2022  36 5   05/20/2022  32 5   5/21/2022  44 10   6/3/2022  49 14   6/10/2022  45 14       Post-Procedure Diagnosis:  MDD, recurrent, severe F33.2    Dunia Alexandra GERARDO   [facility] Neuromodulation    Plan   - Cont TMS  -Patient to decide on receiving Ketamine and TMS at the same time      I completed repeat determination of the pt's motor threshold during the visit today. I was available in the clinic throughout the treatment.    Evelyn Zamudio MD  [facility] Neuromodulation

## 2022-06-17 ENCOUNTER — OFFICE VISIT (OUTPATIENT)
Dept: PSYCHIATRY | Facility: CLINIC | Age: 52
End: 2022-06-17
Payer: COMMERCIAL

## 2022-06-17 ENCOUNTER — ALLIED HEALTH/NURSE VISIT (OUTPATIENT)
Dept: PSYCHIATRY | Facility: CLINIC | Age: 52
End: 2022-06-17
Payer: COMMERCIAL

## 2022-06-17 VITALS — HEART RATE: 63 BPM | DIASTOLIC BLOOD PRESSURE: 89 MMHG | SYSTOLIC BLOOD PRESSURE: 138 MMHG | OXYGEN SATURATION: 96 %

## 2022-06-17 DIAGNOSIS — F33.2 SEVERE EPISODE OF RECURRENT MAJOR DEPRESSIVE DISORDER, WITHOUT PSYCHOTIC FEATURES (H): Primary | ICD-10-CM

## 2022-06-17 DIAGNOSIS — F33.2 SEVERE EPISODE OF RECURRENT MAJOR DEPRESSIVE DISORDER, WITHOUT PSYCHOTIC FEATURES (H): ICD-10-CM

## 2022-06-17 ASSESSMENT — PATIENT HEALTH QUESTIONNAIRE - PHQ9: SUM OF ALL RESPONSES TO PHQ QUESTIONS 1-9: 15

## 2022-06-17 NOTE — PROGRESS NOTES
Aure Joy comes into clinic today at the request of ESTELA Zamudio MD Ordering Provider for Med Injection only Ketamine.    Procedure Prep:  Medication double check completed by: Eliot COOMBS RN  Prior to administration pt identified by name and : yes    Nursing Assessment:  Appearance: dressed casually   Mood: depressed and anxious  Affect: flat  Eye contact: good  PHQ 2022   PHQ-9 Total Score 15   Q9: Thoughts of better off dead/self-harm past 2 weeks Several days     QIDDSR 16 weekly assessment: score 17. Assessment was scanned to EHR.       Procedure Performed:  VSS and mental status WNL. Patient was given Ketamine. See MAR for details.     Post Procedure Assessment:  Patient tolerated the treatment with the following side effects: dissociation. Vital signs were monitored, see VS Flowsheet. Patient stated they felt ready to go home prior to discharge. AVS was offered to patient and patient declined. Patient was instructed not to drive for the remainder of the day and to notify clinic if any concerns arise.     Next appt: 22    Medications provided by Pharmacy       This service provided today was under the supervising provider of the day Eliazbeth Gordon MD, who was available if needed.    Dunia Alexandra RN

## 2022-06-17 NOTE — PROGRESS NOTES
[facility] TMS Program  [address], [address]  [place] [address]  TMS Procedure Note   Aure Joy MRN# [id number]  Age: 51 year old year old YOB: 1970    Pre-Procedure:  History and Physical: Reviewed in medical record  Consent Signed by: Aure Joy  On: 12/22/2020    Clinical Narrative:  Patient is tolerating treatment.  Ketamine with TMS today.      Daily Adult Stimulation Safety Questionnaire: No or Yes in past 24 hours     1) Have you discontinued or started any new medication? No  2) Have you missed any doses of your medication differently then prescribed? No  3) Have you consumed alcohol or drugs (other than prescribed)?  No  4) Did you not sleep AT ALL last night?  No  5) Has your SI changed or worsened?  No    Indications for TMS:  MDD, recurrent, severe; 4+ medication trials (from 2+ classes) ineffective; Psychotherapy ineffective.     Pre-Procedure Diagnosis:  MDD, recurrent, severe F33.2    Treatment Hx:  Treatment number this series: 33  Total lifetime treatment number: 183    Allergies   Allergen Reactions     Codeine Nausea     Other  [No Clinical Screening - See Comments] Nausea and Vomiting and Other (See Comments)     general anesthesia- uncontrolled vomitting      There were no vitals taken for this visit.    Pause for the Cause  Right patient:  Yes  Right procedure/correct coil:  Yes; rTMS; 60587 F8 coil.   Earplugs in place:  Yes    Procedure  Patient was seated in procedure chair. Identity and procedure was verified. Ear plugs were placed in ears and patient-specific cap was placed on head and tightened appropriately. Ruler locations were verified. Coil was placed at treatment location and stimulator was set to parameters described below. A test train was delivered and pt did not tolerate train so treatment was denied today.    Motor Threshold Determination  Distance from nasion to inion: 35  Distance along circumference from midline: 6.67cm  Distance from  Vertex: 9.56cm  MT 1: 0 - 6 - 16 @ 69% on 1/29/2018  MT 2: 0 - 6 - 16 @ 69% on 2/6/2018   MT 3: 0 - 6 - 16 @ 69% on 2/13/2018   MT 4: 0 - 6 - 16 @ 69% on 2/23/2018   MT 5: 0 - 6 - 16 @ 69% on 3/2/2018   MT 6: 0 - 5.5 - 15.5(always use intersection at left side of ruler)@ 85% on 8/23/19  MT 7: 0 - 5.5 - 15.5(always use intersection at left side of ruler)@ 80% on 8/29/19  MT 8: 0 - 5.5 - 37.5(always use intersection at left side of ruler)@ 76% on 9/5/19 (right side using EMG on left hand)  MT 9: C2 (R side using EMG on L hand) 78% on 9/12/2019  MT 10: C2 (R side using EMG on L hand) 76% on 9/26/2019  MT 11: C2 (R side using EMG on L hand) 82 on 1/31/2020  MT 12: C2 (R side using EMG on L Hand) 86% on 12/22/2020   MT 13: C2 (R side) 100% on 04/21/22  MT 14: C2 (R side) 86% on 6/7/22  MT 15: 0 - 6 - 16.5 @ 68% on 6/14/2022  MT 16: C2 (R side) 89% on 6/16/22        Stimulation Parameters  Frequency: 1.0 Hz    Train duration: 60 sec  Total pulses delivered: 3000  Inter-train interval: 1.0 sec  Tx Loc: F4  Energy: 98% (110% MT)  Trains: 50 trains      Rating Scales:  Date MADRS IDS-SR PHQ-9   2/12/21  31 4   04/21/22 25 40 14   05/06/2022  38 11   5/13/2022  36 5   05/20/2022  32 5   5/21/2022  44 10   6/3/2022  49 14   6/10/2022  45 14       Post-Procedure Diagnosis:  MDD, recurrent, severe F33.2    Dunia Alexandra RN   [facility]  Mental [facility] Neuromodulation    Plan   - Cont TMS        I did not see patient but remained available in the clinic throughout the TMS session.      Elizabeth Gordon MD  [facility] Neuromodulation

## 2022-06-20 ENCOUNTER — ALLIED HEALTH/NURSE VISIT (OUTPATIENT)
Dept: PSYCHIATRY | Facility: CLINIC | Age: 52
End: 2022-06-20
Payer: COMMERCIAL

## 2022-06-20 ENCOUNTER — OFFICE VISIT (OUTPATIENT)
Dept: PSYCHIATRY | Facility: CLINIC | Age: 52
End: 2022-06-20

## 2022-06-20 VITALS — SYSTOLIC BLOOD PRESSURE: 135 MMHG | DIASTOLIC BLOOD PRESSURE: 90 MMHG | HEART RATE: 75 BPM | OXYGEN SATURATION: 95 %

## 2022-06-20 DIAGNOSIS — F33.2 SEVERE EPISODE OF RECURRENT MAJOR DEPRESSIVE DISORDER, WITHOUT PSYCHOTIC FEATURES (H): Primary | ICD-10-CM

## 2022-06-20 ASSESSMENT — PATIENT HEALTH QUESTIONNAIRE - PHQ9: SUM OF ALL RESPONSES TO PHQ QUESTIONS 1-9: 15

## 2022-06-20 NOTE — PROGRESS NOTES
[facility] TMS Program  [address], [address]  [place] [address]  TMS Procedure Note   Aure Joy MRN# [id number]  Age: 51 year old year old YOB: 1970    Pre-Procedure:  History and Physical: Reviewed in medical record  Consent Signed by: Aure Joy  On: 12/22/2020    Clinical Narrative:  Patient is tolerating treatment.  Ketamine with TMS today.      Daily Adult Stimulation Safety Questionnaire: No or Yes in past 24 hours     1) Have you discontinued or started any new medication? No  2) Have you missed any doses of your medication differently then prescribed? No  3) Have you consumed alcohol or drugs (other than prescribed)?  No  4) Did you not sleep AT ALL last night?  No  5) Has your SI changed or worsened?  No    Indications for TMS:  MDD, recurrent, severe; 4+ medication trials (from 2+ classes) ineffective; Psychotherapy ineffective.     Pre-Procedure Diagnosis:  MDD, recurrent, severe F33.2    Treatment Hx:  Treatment number this series: 34  Total lifetime treatment number: 184    Allergies   Allergen Reactions     Codeine Nausea     Other  [No Clinical Screening - See Comments] Nausea and Vomiting and Other (See Comments)     general anesthesia- uncontrolled vomitting      There were no vitals taken for this visit.    Pause for the Cause  Right patient:  Yes  Right procedure/correct coil:  Yes; rTMS; 51689 F8 coil.   Earplugs in place:  Yes    Procedure  Patient was seated in procedure chair. Identity and procedure was verified. Ear plugs were placed in ears and patient-specific cap was placed on head and tightened appropriately. Ruler locations were verified. Coil was placed at treatment location and stimulator was set to parameters described below. A test train was delivered and pt did not tolerate train so treatment was denied today.    Motor Threshold Determination  Distance from nasion to inion: 35  Distance along circumference from midline: 6.67cm  Distance from  Vertex: 9.56cm  MT 1: 0 - 6 - 16 @ 69% on 1/29/2018  MT 2: 0 - 6 - 16 @ 69% on 2/6/2018   MT 3: 0 - 6 - 16 @ 69% on 2/13/2018   MT 4: 0 - 6 - 16 @ 69% on 2/23/2018   MT 5: 0 - 6 - 16 @ 69% on 3/2/2018   MT 6: 0 - 5.5 - 15.5(always use intersection at left side of ruler)@ 85% on 8/23/19  MT 7: 0 - 5.5 - 15.5(always use intersection at left side of ruler)@ 80% on 8/29/19  MT 8: 0 - 5.5 - 37.5(always use intersection at left side of ruler)@ 76% on 9/5/19 (right side using EMG on left hand)  MT 9: C2 (R side using EMG on L hand) 78% on 9/12/2019  MT 10: C2 (R side using EMG on L hand) 76% on 9/26/2019  MT 11: C2 (R side using EMG on L hand) 82 on 1/31/2020  MT 12: C2 (R side using EMG on L Hand) 86% on 12/22/2020   MT 13: C2 (R side) 100% on 04/21/22  MT 14: C2 (R side) 86% on 6/7/22  MT 15: 0 - 6 - 16.5 @ 68% on 6/14/2022  MT 16: C2 (R side) 89% on 6/16/22        Stimulation Parameters  Frequency: 1.0 Hz    Train duration: 60 sec  Total pulses delivered: 3000  Inter-train interval: 1.0 sec  Tx Loc: F4  Energy: 98% (110% MT)  Trains: 50 trains      Rating Scales:  Date MADRS IDS-SR PHQ-9   2/12/21  31 4   04/21/22 25 40 14   05/06/2022  38 11   5/13/2022  36 5   05/20/2022  32 5   5/21/2022  44 10   6/3/2022  49 14   6/10/2022  45 14       Post-Procedure Diagnosis:  MDD, recurrent, severe F33.2    Dunia Alexandra RN   [facility]  Mental [facility] Neuromodulation    Plan   - Cont TMS        I did not see patient but remained available in the clinic throughout the TMS session.        Elizabeth Gordon MD  [facility] Neuromodulation

## 2022-06-20 NOTE — PROGRESS NOTES
Aure Joy comes into clinic today at the request of ESTELA Zamudio MD Ordering Provider for Med Injection only Ketamine.    Procedure Prep:  Medication double check completed by: Antonio WATTS RN  Prior to administration pt identified by name and : yes    Nursing Assessment:  Appearance: dressed casually   Mood: depressed  Affect: wnl  Eye contact: good   PHQ 2022   PHQ-9 Total Score 15   Q9: Thoughts of better off dead/self-harm past 2 weeks More than half the days          Procedure Performed:  VSS and mental status WNL. Patient was given Ketamine. See MAR for details.     Post Procedure Assessment:  Patient tolerated the treatment with the following side effects: dissociation. Vital signs were monitored, see VS Flowsheet. Patient stated they felt ready to go home prior to discharge. AVS was offered to patient and patient declined. Patient was instructed not to drive for the remainder of the day and to notify clinic if any concerns arise.     Next appt: 22    Medications provided by Pharmacy       This service provided today was under the supervising provider of the day Elizabeth Gordon MD, who was available if needed.    Dunia Alexandra RN

## 2022-06-21 ENCOUNTER — OFFICE VISIT (OUTPATIENT)
Dept: PSYCHIATRY | Facility: CLINIC | Age: 52
End: 2022-06-21

## 2022-06-21 DIAGNOSIS — F33.2 SEVERE EPISODE OF RECURRENT MAJOR DEPRESSIVE DISORDER, WITHOUT PSYCHOTIC FEATURES (H): Primary | ICD-10-CM

## 2022-06-21 ASSESSMENT — PATIENT HEALTH QUESTIONNAIRE - PHQ9: SUM OF ALL RESPONSES TO PHQ QUESTIONS 1-9: 12

## 2022-06-21 NOTE — PROGRESS NOTES
[facility] TMS Program  [address], [address]  [place] [address]  TMS Procedure Note   Aure Joy MRN# [id number]  Age: 51 year old year old YOB: 1970    Pre-Procedure:  History and Physical: Reviewed in medical record  Consent Signed by: Aure Joy  On: 12/22/2020    Clinical Narrative:  Patient is tolerating treatment. No changes reported.    Daily Adult Stimulation Safety Questionnaire: No or Yes in past 24 hours     1) Have you discontinued or started any new medication? No  2) Have you missed any doses of your medication differently then prescribed? No  3) Have you consumed alcohol or drugs (other than prescribed)?  No  4) Did you not sleep AT ALL last night?  No  5) Has your SI changed or worsened?  No    Indications for TMS:  MDD, recurrent, severe; 4+ medication trials (from 2+ classes) ineffective; Psychotherapy ineffective.     Pre-Procedure Diagnosis:  MDD, recurrent, severe F33.2    Treatment Hx:  Treatment number this series: 35  Total lifetime treatment number: 185    Allergies   Allergen Reactions     Codeine Nausea     Other  [No Clinical Screening - See Comments] Nausea and Vomiting and Other (See Comments)     general anesthesia- uncontrolled vomitting      There were no vitals taken for this visit.    Pause for the Cause  Right patient:  Yes  Right procedure/correct coil:  Yes; rTMS; 55425 F8 coil.   Earplugs in place:  Yes    Procedure  Patient was seated in procedure chair. Identity and procedure was verified. Ear plugs were placed in ears and patient-specific cap was placed on head and tightened appropriately. Ruler locations were verified. Coil was placed at treatment location and stimulator was set to parameters described below. A test train was delivered and pt did not tolerate train so treatment was denied today.    Motor Threshold Determination  Distance from nasion to inion: 35  Distance along circumference from midline: 6.67cm  Distance from Vertex:  9.56cm  MT 1: 0 - 6 - 16 @ 69% on 1/29/2018  MT 2: 0 - 6 - 16 @ 69% on 2/6/2018   MT 3: 0 - 6 - 16 @ 69% on 2/13/2018   MT 4: 0 - 6 - 16 @ 69% on 2/23/2018   MT 5: 0 - 6 - 16 @ 69% on 3/2/2018   MT 6: 0 - 5.5 - 15.5(always use intersection at left side of ruler)@ 85% on 8/23/19  MT 7: 0 - 5.5 - 15.5(always use intersection at left side of ruler)@ 80% on 8/29/19  MT 8: 0 - 5.5 - 37.5(always use intersection at left side of ruler)@ 76% on 9/5/19 (right side using EMG on left hand)  MT 9: C2 (R side using EMG on L hand) 78% on 9/12/2019  MT 10: C2 (R side using EMG on L hand) 76% on 9/26/2019  MT 11: C2 (R side using EMG on L hand) 82 on 1/31/2020  MT 12: C2 (R side using EMG on L Hand) 86% on 12/22/2020   MT 13: C2 (R side) 100% on 04/21/22  MT 14: C2 (R side) 86% on 6/7/22  MT 15: 0 - 6 - 16.5 @ 68% on 6/14/2022  MT 16: C2 (R side) 89% on 6/16/22        Stimulation Parameters  Frequency: 1.0 Hz    Train duration: 60 sec  Total pulses delivered: 3000  Inter-train interval: 1.0 sec  Tx Loc: F4  Energy: 98% (110% MT)  Trains: 50 trains      Rating Scales:  Date MADRS IDS-SR PHQ-9   2/12/21  31 4   04/21/22 25 40 14   05/06/2022  38 11   5/13/2022  36 5   05/20/2022  32 5   5/21/2022  44 10   6/3/2022  49 14   6/10/2022  45 14       Post-Procedure Diagnosis:  MDD, recurrent, severe F33.2    Amy Mcgee, EMT  [facility]  Mental [facility] Neuromodulation    Plan   - Cont TMS        I did not see the patient during/after treatment but remained available in the clinic during  treatment.    Evelyn Zamudio MD  [facility] Neuromodulation

## 2022-06-22 ENCOUNTER — OFFICE VISIT (OUTPATIENT)
Dept: PSYCHIATRY | Facility: CLINIC | Age: 52
End: 2022-06-22

## 2022-06-22 ENCOUNTER — ALLIED HEALTH/NURSE VISIT (OUTPATIENT)
Dept: PSYCHIATRY | Facility: CLINIC | Age: 52
End: 2022-06-22

## 2022-06-22 VITALS — SYSTOLIC BLOOD PRESSURE: 144 MMHG | HEART RATE: 73 BPM | DIASTOLIC BLOOD PRESSURE: 89 MMHG

## 2022-06-22 DIAGNOSIS — F33.2 SEVERE EPISODE OF RECURRENT MAJOR DEPRESSIVE DISORDER, WITHOUT PSYCHOTIC FEATURES (H): Primary | ICD-10-CM

## 2022-06-22 ASSESSMENT — PATIENT HEALTH QUESTIONNAIRE - PHQ9: SUM OF ALL RESPONSES TO PHQ QUESTIONS 1-9: 12

## 2022-06-22 NOTE — PROGRESS NOTES
[facility] TMS Program  [address], [address]  [place] [address]  TMS Procedure Note   Aure Joy MRN# [id number]  Age: 51 year old year old YOB: 1970    Pre-Procedure:  History and Physical: Reviewed in medical record  Consent Signed by: Aure Joy  On: 12/22/2020    Clinical Narrative:  Patient is tolerating treatment. She had ketamine with TMS today.    Daily Adult Stimulation Safety Questionnaire: No or Yes in past 24 hours     1) Have you discontinued or started any new medication? No  2) Have you missed any doses of your medication differently then prescribed? No  3) Have you consumed alcohol or drugs (other than prescribed)?  No  4) Did you not sleep AT ALL last night?  No  5) Has your SI changed or worsened?  No    Indications for TMS:  MDD, recurrent, severe; 4+ medication trials (from 2+ classes) ineffective; Psychotherapy ineffective.     Pre-Procedure Diagnosis:  MDD, recurrent, severe F33.2    Treatment Hx:  Treatment number this series: 36  Total lifetime treatment number: 186    Allergies   Allergen Reactions     Codeine Nausea     Other  [No Clinical Screening - See Comments] Nausea and Vomiting and Other (See Comments)     general anesthesia- uncontrolled vomitting      There were no vitals taken for this visit.    Pause for the Cause  Right patient:  Yes  Right procedure/correct coil:  Yes; rTMS; 90525 F8 coil.   Earplugs in place:  Yes    Procedure  Patient was seated in procedure chair. Identity and procedure was verified. Ear plugs were placed in ears and patient-specific cap was placed on head and tightened appropriately. Ruler locations were verified. Coil was placed at treatment location and stimulator was set to parameters described below. A test train was delivered and pt did not tolerate train so treatment was denied today.    Motor Threshold Determination  Distance from nasion to inion: 35  Distance along circumference from midline: 6.67cm  Distance  from Vertex: 9.56cm  MT 1: 0 - 6 - 16 @ 69% on 1/29/2018  MT 2: 0 - 6 - 16 @ 69% on 2/6/2018   MT 3: 0 - 6 - 16 @ 69% on 2/13/2018   MT 4: 0 - 6 - 16 @ 69% on 2/23/2018   MT 5: 0 - 6 - 16 @ 69% on 3/2/2018   MT 6: 0 - 5.5 - 15.5(always use intersection at left side of ruler)@ 85% on 8/23/19  MT 7: 0 - 5.5 - 15.5(always use intersection at left side of ruler)@ 80% on 8/29/19  MT 8: 0 - 5.5 - 37.5(always use intersection at left side of ruler)@ 76% on 9/5/19 (right side using EMG on left hand)  MT 9: C2 (R side using EMG on L hand) 78% on 9/12/2019  MT 10: C2 (R side using EMG on L hand) 76% on 9/26/2019  MT 11: C2 (R side using EMG on L hand) 82 on 1/31/2020  MT 12: C2 (R side using EMG on L Hand) 86% on 12/22/2020   MT 13: C2 (R side) 100% on 04/21/22  MT 14: C2 (R side) 86% on 6/7/22  MT 15: 0 - 6 - 16.5 @ 68% on 6/14/2022  MT 16: C2 (R side) 89% on 6/16/22        Stimulation Parameters  Frequency: 1.0 Hz    Train duration: 60 sec  Total pulses delivered: 3000  Inter-train interval: 1.0 sec  Tx Loc: F4  Energy: 98% (110% MT)  Trains: 50 trains      Rating Scales:  Date MADRS IDS-SR PHQ-9   2/12/21  31 4   04/21/22 25 40 14   05/06/2022  38 11   5/13/2022  36 5   05/20/2022  32 5   5/21/2022  44 10   6/3/2022  49 14   6/10/2022  45 14       Post-Procedure Diagnosis:  MDD, recurrent, severe F33.2    Amy Mcgee, EMT  [facility]  Mental [facility] Neuromodulation    Plan   - Cont TMS        I did not see patient but remained available in the clinic throughout the TMS session.      Elizabeth Gordon MD  [facility] Neuromodulation

## 2022-06-22 NOTE — PROGRESS NOTES
Aure Joy comes into clinic today at the request of ESTELA Zamudio MD Ordering Provider for Med Injection only ketamine.    Procedure Prep:  Medication double check completed by: Dunia WATTS RN  Prior to administration pt identified by name and : yes    Nursing Assessment:  Appearance: casual   Mood: good  Affect: wnl  Eye contact: good  PHQ 2022   PHQ-9 Total Score 12   Q9: Thoughts of better off dead/self-harm past 2 weeks Several days       Procedure Performed:  VSS and mental status WNL. Patient was given ketamine. See MAR for details.     Post Procedure Assessment:  Patient tolerated the treatment with the following side effects: disociation. Vital signs were monitored, see VS Flowsheet. Patient stated they felt ready to go home prior to discharge. AVS was offered to patient and patient declined. Patient was instructed not to drive for the remainder of the day and to notify clinic if any concerns arise.     Next appt: 2022    Medications provided by Pharmacy     This service provided today was under the supervising provider of the day ESTELA Zamudio MD, who was available if needed.    Perla Workman, GERARDO

## 2022-06-23 DIAGNOSIS — R11.0 NAUSEA: ICD-10-CM

## 2022-06-23 RX ORDER — ONDANSETRON 4 MG/1
4 TABLET, ORALLY DISINTEGRATING ORAL EVERY 6 HOURS PRN
Qty: 12 TABLET | Refills: 2 | Status: SHIPPED | OUTPATIENT
Start: 2022-06-23 | End: 2023-03-24

## 2022-06-24 ENCOUNTER — ALLIED HEALTH/NURSE VISIT (OUTPATIENT)
Dept: PSYCHIATRY | Facility: CLINIC | Age: 52
End: 2022-06-24

## 2022-06-24 ENCOUNTER — OFFICE VISIT (OUTPATIENT)
Dept: PSYCHIATRY | Facility: CLINIC | Age: 52
End: 2022-06-24

## 2022-06-24 VITALS — DIASTOLIC BLOOD PRESSURE: 90 MMHG | HEART RATE: 77 BPM | SYSTOLIC BLOOD PRESSURE: 142 MMHG

## 2022-06-24 DIAGNOSIS — F33.2 SEVERE EPISODE OF RECURRENT MAJOR DEPRESSIVE DISORDER, WITHOUT PSYCHOTIC FEATURES (H): Primary | ICD-10-CM

## 2022-06-24 ASSESSMENT — PATIENT HEALTH QUESTIONNAIRE - PHQ9: SUM OF ALL RESPONSES TO PHQ QUESTIONS 1-9: 11

## 2022-06-24 NOTE — PROGRESS NOTES
Aure Joy comes into clinic today at the request of ESTELA Zamudio MD Ordering Provider for Med Injection only ketamine.    Procedure Prep:  Medication double check completed by: Dunia WATTS RN  Prior to administration pt identified by name and : yes    Nursing Assessment:  Appearance: casual   Mood: good  Affect: wnl  Eye contact: good  PHQ 2022   PHQ-9 Total Score 12   Q9: Thoughts of better off dead/self-harm past 2 weeks More than half the days       Procedure Performed:  VSS and mental status WNL. Patient was given ketamine. See MAR for details.     Post Procedure Assessment:  Patient tolerated the treatment with the following side effects: disociation. Vital signs were monitored, see VS Flowsheet. Patient stated they felt ready to go home prior to discharge. AVS was offered to patient and patient declined. Patient was instructed not to drive for the remainder of the day and to notify clinic if any concerns arise.     Next appt: 2022    Medications provided by Pharmacy     This service provided today was under the supervising provider of the day Elizabeth Gordon MD, who was available if needed.    Perla Workman RN

## 2022-06-24 NOTE — PROGRESS NOTES
[facility] TMS Program  [address], [address]  [place] [address]  TMS Procedure Note   Aure Joy MRN# [id number]  Age: 51 year old year old YOB: 1970    Pre-Procedure:  History and Physical: Reviewed in medical record  Consent Signed by: Aure Joy  On: 12/22/2020    Clinical Narrative:  Patient is tolerating treatment. She had ketamine with TMS today again. Surgery went well despite the findings.    Daily Adult Stimulation Safety Questionnaire: No or Yes in past 24 hours     1) Have you discontinued or started any new medication? No  2) Have you missed any doses of your medication differently then prescribed? No  3) Have you consumed alcohol or drugs (other than prescribed)?  No  4) Did you not sleep AT ALL last night?  No  5) Has your SI changed or worsened?  No    Indications for TMS:  MDD, recurrent, severe; 4+ medication trials (from 2+ classes) ineffective; Psychotherapy ineffective.     Pre-Procedure Diagnosis:  MDD, recurrent, severe F33.2    Treatment Hx:  Treatment number this series: 37  Total lifetime treatment number: 187    Allergies   Allergen Reactions     Codeine Nausea     Other  [No Clinical Screening - See Comments] Nausea and Vomiting and Other (See Comments)     general anesthesia- uncontrolled vomitting      There were no vitals taken for this visit.    Pause for the Cause  Right patient:  Yes  Right procedure/correct coil:  Yes; rTMS; 20650 F8 coil.   Earplugs in place:  Yes    Procedure  Patient was seated in procedure chair. Identity and procedure was verified. Ear plugs were placed in ears and patient-specific cap was placed on head and tightened appropriately. Ruler locations were verified. Coil was placed at treatment location and stimulator was set to parameters described below. A test train was delivered and pt did not tolerate train so treatment was denied today.    Motor Threshold Determination  Distance from nasion to inion: 35  Distance along  circumference from midline: 6.67cm  Distance from Vertex: 9.56cm  MT 1: 0 - 6 - 16 @ 69% on 1/29/2018  MT 2: 0 - 6 - 16 @ 69% on 2/6/2018   MT 3: 0 - 6 - 16 @ 69% on 2/13/2018   MT 4: 0 - 6 - 16 @ 69% on 2/23/2018   MT 5: 0 - 6 - 16 @ 69% on 3/2/2018   MT 6: 0 - 5.5 - 15.5(always use intersection at left side of ruler)@ 85% on 8/23/19  MT 7: 0 - 5.5 - 15.5(always use intersection at left side of ruler)@ 80% on 8/29/19  MT 8: 0 - 5.5 - 37.5(always use intersection at left side of ruler)@ 76% on 9/5/19 (right side using EMG on left hand)  MT 9: C2 (R side using EMG on L hand) 78% on 9/12/2019  MT 10: C2 (R side using EMG on L hand) 76% on 9/26/2019  MT 11: C2 (R side using EMG on L hand) 82 on 1/31/2020  MT 12: C2 (R side using EMG on L Hand) 86% on 12/22/2020   MT 13: C2 (R side) 100% on 04/21/22  MT 14: C2 (R side) 86% on 6/7/22  MT 15: 0 - 6 - 16.5 @ 68% on 6/14/2022  MT 16: C2 (R side) 89% on 6/16/22        Stimulation Parameters  Frequency: 1.0 Hz    Train duration: 60 sec  Total pulses delivered: 3000  Inter-train interval: 1.0 sec  Tx Loc: F4  Energy: 98% (110% MT)  Trains: 50 trains      Rating Scales:  Date MADRS IDS-SR PHQ-9   2/12/21  31 4   04/21/22 25 40 14   05/06/2022  38 11   5/13/2022  36 5   05/20/2022  32 5   5/21/2022  44 10   6/3/2022  49 14   6/10/2022  45 14   6/24/22  41 11       Post-Procedure Diagnosis:  MDD, recurrent, severe F33.2    Amy Mcgee, EMT  [facility] Neuromodulation    Plan   - Cont TMS        I did not see patient but remained available in the clinic throughout the TMS session.      Elizabeth Gordon MD  [facility] Neuromodulation

## 2022-06-27 ENCOUNTER — OFFICE VISIT (OUTPATIENT)
Dept: PSYCHIATRY | Facility: CLINIC | Age: 52
End: 2022-06-27

## 2022-06-27 ENCOUNTER — ALLIED HEALTH/NURSE VISIT (OUTPATIENT)
Dept: PSYCHIATRY | Facility: CLINIC | Age: 52
End: 2022-06-27

## 2022-06-27 VITALS — SYSTOLIC BLOOD PRESSURE: 114 MMHG | HEART RATE: 67 BPM | DIASTOLIC BLOOD PRESSURE: 76 MMHG

## 2022-06-27 DIAGNOSIS — F33.2 SEVERE EPISODE OF RECURRENT MAJOR DEPRESSIVE DISORDER, WITHOUT PSYCHOTIC FEATURES (H): Primary | ICD-10-CM

## 2022-06-27 ASSESSMENT — PATIENT HEALTH QUESTIONNAIRE - PHQ9: SUM OF ALL RESPONSES TO PHQ QUESTIONS 1-9: 8

## 2022-06-27 NOTE — PROGRESS NOTES
[facility] TMS Program  [address], [address]  [place] [address]  TMS Procedure Note   Aure Joy MRN# [id number]  Age: 51 year old year old YOB: 1970    Pre-Procedure:  History and Physical: Reviewed in medical record  Consent Signed by: Aure Joy  On: 12/22/2020    Aure Joy comes into clinic today at the request of Evelyn Zamudio MD, ordering provider for TMS.    Clinical Narrative:  Patient is tolerating treatment. She had ketamine with TMS today again. She was anxious this weekend.    Daily Adult Stimulation Safety Questionnaire: No or Yes in past 24 hours     1) Have you discontinued or started any new medication? No  2) Have you missed any doses of your medication differently then prescribed? No  3) Have you consumed alcohol or drugs (other than prescribed)?  No  4) Did you not sleep AT ALL last night?  No  5) Has your SI changed or worsened?  No    Indications for TMS:  MDD, recurrent, severe; 4+ medication trials (from 2+ classes) ineffective; Psychotherapy ineffective.     Pre-Procedure Diagnosis:  MDD, recurrent, severe F33.2    Treatment Hx:  Treatment number this series: 38  Total lifetime treatment number: 188    Allergies   Allergen Reactions     Codeine Nausea     Other  [No Clinical Screening - See Comments] Nausea and Vomiting and Other (See Comments)     general anesthesia- uncontrolled vomitting      There were no vitals taken for this visit.    Pause for the Cause  Right patient:  Yes  Right procedure/correct coil:  Yes; rTMS; 44141 F8 coil.   Earplugs in place:  Yes    Procedure  Patient was seated in procedure chair. Identity and procedure was verified. Ear plugs were placed in ears and patient-specific cap was placed on head and tightened appropriately. Ruler locations were verified. Coil was placed at treatment location and stimulator was set to parameters described below. A test train was delivered and pt did not tolerate train so  treatment was denied today.    Motor Threshold Determination  Distance from nasion to inion: 35  Distance along circumference from midline: 6.67cm  Distance from Vertex: 9.56cm  MT 1: 0 - 6 - 16 @ 69% on 1/29/2018  MT 2: 0 - 6 - 16 @ 69% on 2/6/2018   MT 3: 0 - 6 - 16 @ 69% on 2/13/2018   MT 4: 0 - 6 - 16 @ 69% on 2/23/2018   MT 5: 0 - 6 - 16 @ 69% on 3/2/2018   MT 6: 0 - 5.5 - 15.5(always use intersection at left side of ruler)@ 85% on 8/23/19  MT 7: 0 - 5.5 - 15.5(always use intersection at left side of ruler)@ 80% on 8/29/19  MT 8: 0 - 5.5 - 37.5(always use intersection at left side of ruler)@ 76% on 9/5/19 (right side using EMG on left hand)  MT 9: C2 (R side using EMG on L hand) 78% on 9/12/2019  MT 10: C2 (R side using EMG on L hand) 76% on 9/26/2019  MT 11: C2 (R side using EMG on L hand) 82 on 1/31/2020  MT 12: C2 (R side using EMG on L Hand) 86% on 12/22/2020   MT 13: C2 (R side) 100% on 04/21/22  MT 14: C2 (R side) 86% on 6/7/22  MT 15: 0 - 6 - 16.5 @ 68% on 6/14/2022  MT 16: C2 (R side) 89% on 6/16/22    Stimulation Parameters  Frequency: 1.0 Hz    Train duration: 60 sec  Total pulses delivered: 3000  Inter-train interval: 1.0 sec  Tx Loc: F4  Energy: 98% (110% MT)  Trains: 50 trains    Rating Scales:  Date MADRS IDS-SR PHQ-9   2/12/21  31 4   04/21/22 25 40 14   05/06/2022  38 11   5/13/2022  36 5   05/20/2022  32 5   5/21/2022  44 10   6/3/2022  49 14   6/10/2022  45 14   6/24/22  41 11     Post-Procedure Diagnosis:  MDD, recurrent, severe F33.2    Plan   - Cont TMS    This service provided today was under the supervising provider of the day, Mihir Chaudhry MD, who was available if needed.    Amy Mcgee, EMT  [facility]  [facility] Neuromodulation

## 2022-06-27 NOTE — PROGRESS NOTES
Aure Joy comes into clinic today at the request of ESTELA Zamudio MD  Ordering Provider for Med Injection only ketamine.    Procedure Prep:  Medication double check completed by: Dunia WATTS RN  Prior to administration pt identified by name and : yes    Nursing Assessment:  Appearance: casual   Mood: good  Affect: wnl  Eye contact: good  PHQ 2022   PHQ-9 Total Score 8   Q9: Thoughts of better off dead/self-harm past 2 weeks Not at all          Procedure Performed:  VSS and mental status WNL. Patient was given ketamine. See MAR for details.     Post Procedure Assessment:  Patient tolerated the treatment with the following side effects: disociation. Vital signs were monitored, see VS Flowsheet. Patient stated they felt ready to go home prior to discharge. AVS was offered to patient and patient declined. Patient was instructed not to drive for the remainder of the day and to notify clinic if any concerns arise.     Next appt: 2022    Medications provided by Pharmacy       This service provided today was under the supervising provider of the day Mihir Chaudhry MD, who was available if needed.    Perla Workman RN

## 2022-06-28 ENCOUNTER — OFFICE VISIT (OUTPATIENT)
Dept: PSYCHIATRY | Facility: CLINIC | Age: 52
End: 2022-06-28

## 2022-06-28 DIAGNOSIS — F33.2 SEVERE EPISODE OF RECURRENT MAJOR DEPRESSIVE DISORDER, WITHOUT PSYCHOTIC FEATURES (H): Primary | ICD-10-CM

## 2022-06-28 ASSESSMENT — PATIENT HEALTH QUESTIONNAIRE - PHQ9: SUM OF ALL RESPONSES TO PHQ QUESTIONS 1-9: 7

## 2022-06-28 NOTE — PROGRESS NOTES
[facility] TMS Program  [address], [address]  [place] [address]  TMS Procedure Note   Aure Joy MRN# [id number]  Age: 51 year old year old YOB: 1970    Pre-Procedure:  History and Physical: Reviewed in medical record  Consent Signed by: Aure Joy  On: 12/22/2020    Clinical Narrative:  Patient is tolerating treatment. No changes reported. She listened to music during treatment today.    Daily Adult Stimulation Safety Questionnaire: No or Yes in past 24 hours     1) Have you discontinued or started any new medication? No  2) Have you missed any doses of your medication differently then prescribed? No  3) Have you consumed alcohol or drugs (other than prescribed)?  No  4) Did you not sleep AT ALL last night?  No  5) Has your SI changed or worsened?  No    Indications for TMS:  MDD, recurrent, severe; 4+ medication trials (from 2+ classes) ineffective; Psychotherapy ineffective.     Pre-Procedure Diagnosis:  MDD, recurrent, severe F33.2    Treatment Hx:  Treatment number this series: 39  Total lifetime treatment number: 189    Allergies   Allergen Reactions     Codeine Nausea     Other  [No Clinical Screening - See Comments] Nausea and Vomiting and Other (See Comments)     general anesthesia- uncontrolled vomitting      There were no vitals taken for this visit.    Pause for the Cause  Right patient:  Yes  Right procedure/correct coil:  Yes; rTMS; 98375 F8 coil.   Earplugs in place:  Yes    Procedure  Patient was seated in procedure chair. Identity and procedure was verified. Ear plugs were placed in ears and patient-specific cap was placed on head and tightened appropriately. Ruler locations were verified. Coil was placed at treatment location and stimulator was set to parameters described below. A test train was delivered and pt did not tolerate train so treatment was denied today.    Motor Threshold Determination  Distance from nasion to inion: 35  Distance along  circumference from midline: 6.67cm  Distance from Vertex: 9.56cm  MT 1: 0 - 6 - 16 @ 69% on 1/29/2018  MT 2: 0 - 6 - 16 @ 69% on 2/6/2018   MT 3: 0 - 6 - 16 @ 69% on 2/13/2018   MT 4: 0 - 6 - 16 @ 69% on 2/23/2018   MT 5: 0 - 6 - 16 @ 69% on 3/2/2018   MT 6: 0 - 5.5 - 15.5(always use intersection at left side of ruler)@ 85% on 8/23/19  MT 7: 0 - 5.5 - 15.5(always use intersection at left side of ruler)@ 80% on 8/29/19  MT 8: 0 - 5.5 - 37.5(always use intersection at left side of ruler)@ 76% on 9/5/19 (right side using EMG on left hand)  MT 9: C2 (R side using EMG on L hand) 78% on 9/12/2019  MT 10: C2 (R side using EMG on L hand) 76% on 9/26/2019  MT 11: C2 (R side using EMG on L hand) 82 on 1/31/2020  MT 12: C2 (R side using EMG on L Hand) 86% on 12/22/2020   MT 13: C2 (R side) 100% on 04/21/22  MT 14: C2 (R side) 86% on 6/7/22  MT 15: 0 - 6 - 16.5 @ 68% on 6/14/2022  MT 16: C2 (R side) 89% on 6/16/22        Stimulation Parameters  Frequency: 1.0 Hz    Train duration: 60 sec  Total pulses delivered: 3000  Inter-train interval: 1.0 sec  Tx Loc: F4  Energy: 98% (110% MT)  Trains: 50 trains      Rating Scales:  Date MADRS IDS-SR PHQ-9   2/12/21  31 4   04/21/22 25 40 14   05/06/2022  38 11   5/13/2022  36 5   05/20/2022  32 5   5/21/2022  44 10   6/3/2022  49 14   6/10/2022  45 14   6/24/22  41 11       Post-Procedure Diagnosis:  MDD, recurrent, severe F33.2    Amy Mcgee, EMT  [facility] Neuromodulation    Plan   - Cont TMS        I did not see the patient during/after treatment but remained available in the clinic during  treatment.    Evelyn Zamudio MD  [facility] Neuromodulation

## 2022-06-29 ENCOUNTER — ALLIED HEALTH/NURSE VISIT (OUTPATIENT)
Dept: PSYCHIATRY | Facility: CLINIC | Age: 52
End: 2022-06-29

## 2022-06-29 ENCOUNTER — OFFICE VISIT (OUTPATIENT)
Dept: PSYCHIATRY | Facility: CLINIC | Age: 52
End: 2022-06-29

## 2022-06-29 VITALS — SYSTOLIC BLOOD PRESSURE: 127 MMHG | DIASTOLIC BLOOD PRESSURE: 87 MMHG | HEART RATE: 68 BPM

## 2022-06-29 DIAGNOSIS — F33.2 SEVERE EPISODE OF RECURRENT MAJOR DEPRESSIVE DISORDER, WITHOUT PSYCHOTIC FEATURES (H): Primary | ICD-10-CM

## 2022-06-29 ASSESSMENT — PATIENT HEALTH QUESTIONNAIRE - PHQ9: SUM OF ALL RESPONSES TO PHQ QUESTIONS 1-9: 9

## 2022-06-29 NOTE — PROGRESS NOTES
Aure Joy comes into clinic today at the request of ESTELA Zamudio MD Ordering Provider for Med Injection only ketamine.    Procedure Prep:  Medication double check completed by: Dunia WATTS RN  Prior to administration pt identified by name and : yes    Nursing Assessment:  Appearance: casual   Mood: good  Affect: wnl  Eye contact: good  PHQ 2022   PHQ-9 Total Score 9   Q9: Thoughts of better off dead/self-harm past 2 weeks Not at all         Procedure Performed:  VSS and mental status WNL. Patient was given ketamine. See MAR for details.     Post Procedure Assessment:  Patient tolerated the treatment with the following side effects: disociation. Vital signs were monitored, see VS Flowsheet. Patient stated they felt ready to go home prior to discharge. AVS was offered to patient and patient declined. Patient was instructed not to drive for the remainder of the day and to notify clinic if any concerns arise.     Next appt: 2022    Medications provided by Pharmacy     This service provided today was under the supervising provider of the day Mihir Chaudhry MD, who was available if needed.    Perla Workman RN

## 2022-06-29 NOTE — PROGRESS NOTES
[facility] TMS Program  [address], [address]  [place] [address]  TMS Procedure Note   Aure Joy MRN# [id number]  Age: 51 year old year old YOB: 1970    Pre-Procedure:  History and Physical: Reviewed in medical record  Consent Signed by: Aure Joy  On: 12/22/2020    Clinical Narrative:  Patient is tolerating treatment. No changes reported. She listened to music during treatment today. Patient had ketamine with treatment today.    Daily Adult Stimulation Safety Questionnaire: No or Yes in past 24 hours     1) Have you discontinued or started any new medication? No  2) Have you missed any doses of your medication differently then prescribed? No  3) Have you consumed alcohol or drugs (other than prescribed)?  No  4) Did you not sleep AT ALL last night?  No  5) Has your SI changed or worsened?  No    Indications for TMS:  MDD, recurrent, severe; 4+ medication trials (from 2+ classes) ineffective; Psychotherapy ineffective.     Pre-Procedure Diagnosis:  MDD, recurrent, severe F33.2    Treatment Hx:  Treatment number this series: 40  Total lifetime treatment number: 190    Allergies   Allergen Reactions     Codeine Nausea     Other  [No Clinical Screening - See Comments] Nausea and Vomiting and Other (See Comments)     general anesthesia- uncontrolled vomitting      There were no vitals taken for this visit.    Pause for the Cause  Right patient:  Yes  Right procedure/correct coil:  Yes; rTMS; 87086 F8 coil.   Earplugs in place:  Yes    Procedure  Patient was seated in procedure chair. Identity and procedure was verified. Ear plugs were placed in ears and patient-specific cap was placed on head and tightened appropriately. Ruler locations were verified. Coil was placed at treatment location and stimulator was set to parameters described below. A test train was delivered and pt did not tolerate train so treatment was denied today.    Motor Threshold Determination  Distance from  nasion to inion: 35  Distance along circumference from midline: 6.67cm  Distance from Vertex: 9.56cm  MT 1: 0 - 6 - 16 @ 69% on 1/29/2018  MT 2: 0 - 6 - 16 @ 69% on 2/6/2018   MT 3: 0 - 6 - 16 @ 69% on 2/13/2018   MT 4: 0 - 6 - 16 @ 69% on 2/23/2018   MT 5: 0 - 6 - 16 @ 69% on 3/2/2018   MT 6: 0 - 5.5 - 15.5(always use intersection at left side of ruler)@ 85% on 8/23/19  MT 7: 0 - 5.5 - 15.5(always use intersection at left side of ruler)@ 80% on 8/29/19  MT 8: 0 - 5.5 - 37.5(always use intersection at left side of ruler)@ 76% on 9/5/19 (right side using EMG on left hand)  MT 9: C2 (R side using EMG on L hand) 78% on 9/12/2019  MT 10: C2 (R side using EMG on L hand) 76% on 9/26/2019  MT 11: C2 (R side using EMG on L hand) 82 on 1/31/2020  MT 12: C2 (R side using EMG on L Hand) 86% on 12/22/2020   MT 13: C2 (R side) 100% on 04/21/22  MT 14: C2 (R side) 86% on 6/7/22  MT 15: 0 - 6 - 16.5 @ 68% on 6/14/2022  MT 16: C2 (R side) 89% on 6/16/22        Stimulation Parameters  Frequency: 1.0 Hz    Train duration: 60 sec  Total pulses delivered: 3000  Inter-train interval: 1.0 sec  Tx Loc: F4  Energy: 98% (110% MT)  Trains: 50 trains      Rating Scales:  Date MADRS IDS-SR PHQ-9   2/12/21  31 4   04/21/22 25 40 14   05/06/2022  38 11   5/13/2022  36 5   05/20/2022  32 5   5/21/2022  44 10   6/3/2022  49 14   6/10/2022  45 14   6/24/22  41 11       Post-Procedure Diagnosis:  MDD, recurrent, severe F33.2    Amy Arely, EMT  [facility]  [facility] Neuromodulation    Plan   - Cont TMS        I did not see patient but remained available in the clinic throughout the TMS session.      Mihir Chaudhry MD  [facility] Neuromodulation

## 2022-06-30 ENCOUNTER — OFFICE VISIT (OUTPATIENT)
Dept: PSYCHIATRY | Facility: CLINIC | Age: 52
End: 2022-06-30

## 2022-06-30 DIAGNOSIS — F33.2 SEVERE EPISODE OF RECURRENT MAJOR DEPRESSIVE DISORDER, WITHOUT PSYCHOTIC FEATURES (H): Primary | ICD-10-CM

## 2022-06-30 ASSESSMENT — PATIENT HEALTH QUESTIONNAIRE - PHQ9: SUM OF ALL RESPONSES TO PHQ QUESTIONS 1-9: 9

## 2022-06-30 NOTE — PROGRESS NOTES
[facility] TMS Program  [address], [address]  [place] [address]  TMS Procedure Note   Aure Joy MRN# [id number]  Age: 51 year old year old YOB: 1970    Pre-Procedure:  History and Physical: Reviewed in medical record  Consent Signed by: Aure Joy  On: 12/22/2020    Clinical Narrative:  Patient is tolerating treatment. No changes reported. She listened to music during treatment today.     Daily Adult Stimulation Safety Questionnaire: No or Yes in past 24 hours     1) Have you discontinued or started any new medication? No  2) Have you missed any doses of your medication differently then prescribed? No  3) Have you consumed alcohol or drugs (other than prescribed)?  No  4) Did you not sleep AT ALL last night?  No  5) Has your SI changed or worsened?  No    Indications for TMS:  MDD, recurrent, severe; 4+ medication trials (from 2+ classes) ineffective; Psychotherapy ineffective.     Pre-Procedure Diagnosis:  MDD, recurrent, severe F33.2    Treatment Hx:  Treatment number this series: 41  Total lifetime treatment number: 191    Allergies   Allergen Reactions     Codeine Nausea     Other  [No Clinical Screening - See Comments] Nausea and Vomiting and Other (See Comments)     general anesthesia- uncontrolled vomitting      There were no vitals taken for this visit.    Pause for the Cause  Right patient:  Yes  Right procedure/correct coil:  Yes; rTMS; 61427 F8 coil.   Earplugs in place:  Yes    Procedure  Patient was seated in procedure chair. Identity and procedure was verified. Ear plugs were placed in ears and patient-specific cap was placed on head and tightened appropriately. Ruler locations were verified. Coil was placed at treatment location and stimulator was set to parameters described below. A test train was delivered and pt did not tolerate train so treatment was denied today.    Motor Threshold Determination  Distance from nasion to inion: 35  Distance along  circumference from midline: 6.67cm  Distance from Vertex: 9.56cm  MT 1: 0 - 6 - 16 @ 69% on 1/29/2018  MT 2: 0 - 6 - 16 @ 69% on 2/6/2018   MT 3: 0 - 6 - 16 @ 69% on 2/13/2018   MT 4: 0 - 6 - 16 @ 69% on 2/23/2018   MT 5: 0 - 6 - 16 @ 69% on 3/2/2018   MT 6: 0 - 5.5 - 15.5(always use intersection at left side of ruler)@ 85% on 8/23/19  MT 7: 0 - 5.5 - 15.5(always use intersection at left side of ruler)@ 80% on 8/29/19  MT 8: 0 - 5.5 - 37.5(always use intersection at left side of ruler)@ 76% on 9/5/19 (right side using EMG on left hand)  MT 9: C2 (R side using EMG on L hand) 78% on 9/12/2019  MT 10: C2 (R side using EMG on L hand) 76% on 9/26/2019  MT 11: C2 (R side using EMG on L hand) 82 on 1/31/2020  MT 12: C2 (R side using EMG on L Hand) 86% on 12/22/2020   MT 13: C2 (R side) 100% on 04/21/22  MT 14: C2 (R side) 86% on 6/7/22  MT 15: 0 - 6 - 16.5 @ 68% on 6/14/2022  MT 16: C2 (R side) 89% on 6/16/22        Stimulation Parameters  Frequency: 1.0 Hz    Train duration: 60 sec  Total pulses delivered: 3000  Inter-train interval: 1.0 sec  Tx Loc: F4  Energy: 98% (110% MT)  Trains: 50 trains      Rating Scales:  Date MADRS IDS-SR PHQ-9   2/12/21  31 4   04/21/22 25 40 14   05/06/2022  38 11   5/13/2022  36 5   05/20/2022  32 5   5/21/2022  44 10   6/3/2022  49 14   6/10/2022  45 14   6/24/22  41 11       Post-Procedure Diagnosis:  MDD, recurrent, severe F33.2    Amy Mcgee, EMT  [facility] Neuromodulation    Plan   - Cont TMS      I did not see the patient during/after treatment but remained available in the clinic during  treatment.    Evelyn Zamudio MD  [facility] Neuromodulation

## 2022-07-01 ENCOUNTER — TELEPHONE (OUTPATIENT)
Dept: PSYCHIATRY | Facility: CLINIC | Age: 52
End: 2022-07-01

## 2022-07-01 ENCOUNTER — ALLIED HEALTH/NURSE VISIT (OUTPATIENT)
Dept: PSYCHIATRY | Facility: CLINIC | Age: 52
End: 2022-07-01

## 2022-07-01 ENCOUNTER — OFFICE VISIT (OUTPATIENT)
Dept: PSYCHIATRY | Facility: CLINIC | Age: 52
End: 2022-07-01

## 2022-07-01 VITALS — SYSTOLIC BLOOD PRESSURE: 118 MMHG | HEART RATE: 80 BPM | OXYGEN SATURATION: 96 % | DIASTOLIC BLOOD PRESSURE: 81 MMHG

## 2022-07-01 DIAGNOSIS — F33.2 SEVERE EPISODE OF RECURRENT MAJOR DEPRESSIVE DISORDER, WITHOUT PSYCHOTIC FEATURES (H): Primary | ICD-10-CM

## 2022-07-01 ASSESSMENT — PATIENT HEALTH QUESTIONNAIRE - PHQ9: SUM OF ALL RESPONSES TO PHQ QUESTIONS 1-9: 7

## 2022-07-01 NOTE — PROGRESS NOTES
Aure Joy comes into clinic today at the request of ESTELA Zamudio MD Ordering Provider for Med Injection only Ketamine.    Procedure Prep:  Medication double check completed by: Perla WATTS RN  Prior to administration pt identified by name and : yes    Nursing Assessment:  Appearance: dressed casually   Mood: good  Affect: wnl  Eye contact: good  PHQ 2022   PHQ-9 Total Score 7   Q9: Thoughts of better off dead/self-harm past 2 weeks Not at all         Procedure Performed:  VSS and mental status WNL. Patient was given Ketamine. See MAR for details.     Post Procedure Assessment:  Patient tolerated the treatment with the following side effects: dissociation. Vital signs were monitored, see VS Flowsheet. Patient stated they felt ready to go home prior to discharge. AVS was offered to patient and patient declined. Patient was instructed not to drive for the remainder of the day and to notify clinic if any concerns arise.     Next appt: TBD    Medications provided by Pharmacy       This service provided today was under the supervising provider of the day Tim Williamson MD, who was available if needed.    Dunia [place] RN

## 2022-07-01 NOTE — PROGRESS NOTES
[facility] TMS Program  [address], [address]  [place] [address]  TMS Procedure Note   Aure Joy MRN# [id number]  Age: 51 year old year old YOB: 1970    Pre-Procedure:  History and Physical: Reviewed in medical record  Consent Signed by: Aure Joy  On: 12/22/2020    Clinical Narrative:  Patient is tolerating treatment. Ketamine with TMS today     Daily Adult Stimulation Safety Questionnaire: No or Yes in past 24 hours     1) Have you discontinued or started any new medication? No  2) Have you missed any doses of your medication differently then prescribed? No  3) Have you consumed alcohol or drugs (other than prescribed)?  No  4) Did you not sleep AT ALL last night?  No  5) Has your SI changed or worsened?  No    Indications for TMS:  MDD, recurrent, severe; 4+ medication trials (from 2+ classes) ineffective; Psychotherapy ineffective.     Pre-Procedure Diagnosis:  MDD, recurrent, severe F33.2    Treatment Hx:  Treatment number this series: 42  Total lifetime treatment number: 192    Allergies   Allergen Reactions     Codeine Nausea     Other  [No Clinical Screening - See Comments] Nausea and Vomiting and Other (See Comments)     general anesthesia- uncontrolled vomitting      There were no vitals taken for this visit.    Pause for the Cause  Right patient:  Yes  Right procedure/correct coil:  Yes; rTMS; 46493 F8 coil.   Earplugs in place:  Yes    Procedure  Patient was seated in procedure chair. Identity and procedure was verified. Ear plugs were placed in ears and patient-specific cap was placed on head and tightened appropriately. Ruler locations were verified. Coil was placed at treatment location and stimulator was set to parameters described below. A test train was delivered and pt did not tolerate train so treatment was denied today.    Motor Threshold Determination  Distance from nasion to inion: 35  Distance along circumference from midline: 6.67cm  Distance from  Vertex: 9.56cm  MT 1: 0 - 6 - 16 @ 69% on 1/29/2018  MT 2: 0 - 6 - 16 @ 69% on 2/6/2018   MT 3: 0 - 6 - 16 @ 69% on 2/13/2018   MT 4: 0 - 6 - 16 @ 69% on 2/23/2018   MT 5: 0 - 6 - 16 @ 69% on 3/2/2018   MT 6: 0 - 5.5 - 15.5(always use intersection at left side of ruler)@ 85% on 8/23/19  MT 7: 0 - 5.5 - 15.5(always use intersection at left side of ruler)@ 80% on 8/29/19  MT 8: 0 - 5.5 - 37.5(always use intersection at left side of ruler)@ 76% on 9/5/19 (right side using EMG on left hand)  MT 9: C2 (R side using EMG on L hand) 78% on 9/12/2019  MT 10: C2 (R side using EMG on L hand) 76% on 9/26/2019  MT 11: C2 (R side using EMG on L hand) 82 on 1/31/2020  MT 12: C2 (R side using EMG on L Hand) 86% on 12/22/2020   MT 13: C2 (R side) 100% on 04/21/22  MT 14: C2 (R side) 86% on 6/7/22  MT 15: 0 - 6 - 16.5 @ 68% on 6/14/2022  MT 16: C2 (R side) 89% on 6/16/22        Stimulation Parameters  Frequency: 1.0 Hz    Train duration: 60 sec  Total pulses delivered: 3000  Inter-train interval: 1.0 sec  Tx Loc: F4  Energy: 98% (110% MT)  Trains: 50 trains      Rating Scales:  Date MADRS IDS-SR PHQ-9   2/12/21  31 4   04/21/22 25 40 14   05/06/2022  38 11   5/13/2022  36 5   05/20/2022  32 5   5/21/2022  44 10   6/3/2022  49 14   6/10/2022  45 14   6/24/22  41 11   7/1/22  36 7       Post-Procedure Diagnosis:  MDD, recurrent, severe F33.2    Dunia [place] RN  [facility] Neuromodulation    Plan   - Cont TMS        I did not see the patient during/after treatment but remained available in the clinic during  treatment.      Tim Williamson MD  [facility] Neuromodulation

## 2022-07-05 ENCOUNTER — OFFICE VISIT (OUTPATIENT)
Dept: PSYCHIATRY | Facility: CLINIC | Age: 52
End: 2022-07-05

## 2022-07-05 ENCOUNTER — ALLIED HEALTH/NURSE VISIT (OUTPATIENT)
Dept: PSYCHIATRY | Facility: CLINIC | Age: 52
End: 2022-07-05
Payer: MEDICAID

## 2022-07-05 VITALS — DIASTOLIC BLOOD PRESSURE: 86 MMHG | OXYGEN SATURATION: 97 % | HEART RATE: 82 BPM | SYSTOLIC BLOOD PRESSURE: 135 MMHG

## 2022-07-05 DIAGNOSIS — F33.2 SEVERE EPISODE OF RECURRENT MAJOR DEPRESSIVE DISORDER, WITHOUT PSYCHOTIC FEATURES (H): Primary | ICD-10-CM

## 2022-07-05 ASSESSMENT — PATIENT HEALTH QUESTIONNAIRE - PHQ9: SUM OF ALL RESPONSES TO PHQ QUESTIONS 1-9: 11

## 2022-07-05 NOTE — PROGRESS NOTES
Aure Joy comes into clinic today at the request of ESTELA Zamudio MD Ordering Provider for Med Injection only Ketamine.    Procedure Prep:  Medication double check completed by: Perla WATTS RN  Prior to administration pt identified by name and : yes    Nursing Assessment:  Appearance: dressed casually   Mood: depressed; SI came back over the weekend.  She wanted to know if she went to the hospital would be notified; informed her if it was in the [employer] system we would be.  She stated her safety plan is to go to [place] should she not feel that she can remain safe  Affect: wnl  Eye contact: good  PHQ 2022   PHQ-9 Total Score 11   Q9: Thoughts of better off dead/self-harm past 2 weeks More than half the days         Procedure Performed:  VSS and mental status WNL. Patient was given Ketamine. See MAR for details.     Post Procedure Assessment:  Patient tolerated the treatment with the following side effects: dissociation. Vital signs were monitored, see VS Flowsheet. Patient stated they felt ready to go home prior to discharge. AVS was offered to patient and patient declined. Patient was instructed not to drive for the remainder of the day and to notify clinic if any concerns arise.     Next appt: 22    Medications provided by Pharmacy       This service provided today was under the supervising provider of the day ESTELA Zamudio MD, who was available if needed.    Dunia [place] RN

## 2022-07-05 NOTE — PROGRESS NOTES
[facility] TMS Program  [address], [address]  [place] [address]  TMS Procedure Note   Aure Joy MRN# [id number]  Age: 51 year old year old YOB: 1970    Pre-Procedure:  History and Physical: Reviewed in medical record  Consent Signed by: Aure Joy  On: 12/22/2020    Clinical Narrative:  Patient is tolerating treatment. Ketamine with TMS today     Daily Adult Stimulation Safety Questionnaire: No or Yes in past 24 hours     1) Have you discontinued or started any new medication? No  2) Have you missed any doses of your medication differently then prescribed? No  3) Have you consumed alcohol or drugs (other than prescribed)?  No  4) Did you not sleep AT ALL last night?  No  5) Has your SI changed or worsened?  No    Indications for TMS:  MDD, recurrent, severe; 4+ medication trials (from 2+ classes) ineffective; Psychotherapy ineffective.     Pre-Procedure Diagnosis:  MDD, recurrent, severe F33.2    Treatment Hx:  Treatment number this series: 43  Total lifetime treatment number: 193    Allergies   Allergen Reactions     Codeine Nausea     Other  [No Clinical Screening - See Comments] Nausea and Vomiting and Other (See Comments)     general anesthesia- uncontrolled vomitting      There were no vitals taken for this visit.    Pause for the Cause  Right patient:  Yes  Right procedure/correct coil:  Yes; rTMS; 48160 F8 coil.   Earplugs in place:  Yes    Procedure  Patient was seated in procedure chair. Identity and procedure was verified. Ear plugs were placed in ears and patient-specific cap was placed on head and tightened appropriately. Ruler locations were verified. Coil was placed at treatment location and stimulator was set to parameters described below. A test train was delivered and pt did not tolerate train so treatment was denied today.    Motor Threshold Determination  Distance from nasion to inion: 35  Distance along circumference from midline: 6.67cm  Distance from  Vertex: 9.56cm  MT 1: 0 - 6 - 16 @ 69% on 1/29/2018  MT 2: 0 - 6 - 16 @ 69% on 2/6/2018   MT 3: 0 - 6 - 16 @ 69% on 2/13/2018   MT 4: 0 - 6 - 16 @ 69% on 2/23/2018   MT 5: 0 - 6 - 16 @ 69% on 3/2/2018   MT 6: 0 - 5.5 - 15.5(always use intersection at left side of ruler)@ 85% on 8/23/19  MT 7: 0 - 5.5 - 15.5(always use intersection at left side of ruler)@ 80% on 8/29/19  MT 8: 0 - 5.5 - 37.5(always use intersection at left side of ruler)@ 76% on 9/5/19 (right side using EMG on left hand)  MT 9: C2 (R side using EMG on L hand) 78% on 9/12/2019  MT 10: C2 (R side using EMG on L hand) 76% on 9/26/2019  MT 11: C2 (R side using EMG on L hand) 82 on 1/31/2020  MT 12: C2 (R side using EMG on L Hand) 86% on 12/22/2020   MT 13: C2 (R side) 100% on 04/21/22  MT 14: C2 (R side) 86% on 6/7/22  MT 15: 0 - 6 - 16.5 @ 68% on 6/14/2022  MT 16: C2 (R side) 89% on 6/16/22        Stimulation Parameters  Frequency: 1.0 Hz    Train duration: 60 sec  Total pulses delivered: 3000  Inter-train interval: 1.0 sec  Tx Loc: F4  Energy: 98% (110% MT)  Trains: 50 trains      Rating Scales:  Date MADRS IDS-SR PHQ-9   2/12/21  31 4   04/21/22 25 40 14   05/06/2022  38 11   5/13/2022  36 5   05/20/2022  32 5   5/21/2022  44 10   6/3/2022  49 14   6/10/2022  45 14   6/24/22  41 11   7/1/22  36 7       Post-Procedure Diagnosis:  MDD, recurrent, severe F33.2    Dunia Alexandra RN  [facility]    Plan   - Cont TMS        I did not see the patient during/after treatment but remained available in the clinic during  treatment.    Evelyn Zamudio MD  [facility]

## 2022-07-06 ENCOUNTER — OFFICE VISIT (OUTPATIENT)
Dept: PSYCHIATRY | Facility: CLINIC | Age: 52
End: 2022-07-06

## 2022-07-06 DIAGNOSIS — F33.2 SEVERE EPISODE OF RECURRENT MAJOR DEPRESSIVE DISORDER, WITHOUT PSYCHOTIC FEATURES (H): Primary | ICD-10-CM

## 2022-07-06 ASSESSMENT — PATIENT HEALTH QUESTIONNAIRE - PHQ9: SUM OF ALL RESPONSES TO PHQ QUESTIONS 1-9: 14

## 2022-07-06 NOTE — PROGRESS NOTES
[facility] TMS Program  [address], [address]  [place] [address]  TMS Procedure Note   Aure Joy MRN# [id number]  Age: 51 year old year old YOB: 1970    Pre-Procedure:  History and Physical: Reviewed in medical record  Consent Signed by: Aure Joy  On: 12/22/2020    Clinical Narrative:  Patient is tolerating treatment.  Patient reports not doing well.  SI continues, but with no plan.  Safety plan reviewed-patient will present to either [facility] or [facility] should she not be able to keep herself safe.  She states that her family is going out of town next week which could be triggering her, as she will be alone when they are gone.      Daily Adult Stimulation Safety Questionnaire: No or Yes in past 24 hours     1) Have you discontinued or started any new medication? No  2) Have you missed any doses of your medication differently then prescribed? No  3) Have you consumed alcohol or drugs (other than prescribed)?  No  4) Did you not sleep AT ALL last night?  No  5) Has your SI changed or worsened?  No    Indications for TMS:  MDD, recurrent, severe; 4+ medication trials (from 2+ classes) ineffective; Psychotherapy ineffective.     Pre-Procedure Diagnosis:  MDD, recurrent, severe F33.2    Treatment Hx:  Treatment number this series: 44  Total lifetime treatment number: 194    Allergies   Allergen Reactions     Codeine Nausea     Other  [No Clinical Screening - See Comments] Nausea and Vomiting and Other (See Comments)     general anesthesia- uncontrolled vomitting      There were no vitals taken for this visit.    Pause for the Cause  Right patient:  Yes  Right procedure/correct coil:  Yes; rTMS; 38808 F8 coil.   Earplugs in place:  Yes    Procedure  Patient was seated in procedure chair. Identity and procedure was verified. Ear plugs were placed in ears and patient-specific cap was placed on head and tightened appropriately. Ruler locations were verified. Coil was placed at  treatment location and stimulator was set to parameters described below. A test train was delivered and pt did not tolerate train so treatment was denied today.    Motor Threshold Determination  Distance from nasion to inion: 35  Distance along circumference from midline: 6.67cm  Distance from Vertex: 9.56cm  MT 1: 0 - 6 - 16 @ 69% on 1/29/2018  MT 2: 0 - 6 - 16 @ 69% on 2/6/2018   MT 3: 0 - 6 - 16 @ 69% on 2/13/2018   MT 4: 0 - 6 - 16 @ 69% on 2/23/2018   MT 5: 0 - 6 - 16 @ 69% on 3/2/2018   MT 6: 0 - 5.5 - 15.5(always use intersection at left side of ruler)@ 85% on 8/23/19  MT 7: 0 - 5.5 - 15.5(always use intersection at left side of ruler)@ 80% on 8/29/19  MT 8: 0 - 5.5 - 37.5(always use intersection at left side of ruler)@ 76% on 9/5/19 (right side using EMG on left hand)  MT 9: C2 (R side using EMG on L hand) 78% on 9/12/2019  MT 10: C2 (R side using EMG on L hand) 76% on 9/26/2019  MT 11: C2 (R side using EMG on L hand) 82 on 1/31/2020  MT 12: C2 (R side using EMG on L Hand) 86% on 12/22/2020   MT 13: C2 (R side) 100% on 04/21/22  MT 14: C2 (R side) 86% on 6/7/22  MT 15: 0 - 6 - 16.5 @ 68% on 6/14/2022  MT 16: C2 (R side) 89% on 6/16/22        Stimulation Parameters  Frequency: 1.0 Hz    Train duration: 60 sec  Total pulses delivered: 3000  Inter-train interval: 1.0 sec  Tx Loc: F4  Energy: 98% (110% MT)  Trains: 50 trains      Rating Scales:  Date MADRS IDS-SR PHQ-9   2/12/21  31 4   04/21/22 25 40 14   05/06/2022  38 11   5/13/2022  36 5   05/20/2022  32 5   5/21/2022  44 10   6/3/2022  49 14   6/10/2022  45 14   6/24/22  41 11   7/1/22  36 7       Post-Procedure Diagnosis:  MDD, recurrent, severe F33.2    Dunia Alexandra RN  [facility]    Plan   - Cont TMS  I did not see the patient but remained available in the clinic throughout the TMS session.     Yuri Cole M.D.   [facility]

## 2022-07-07 ENCOUNTER — OFFICE VISIT (OUTPATIENT)
Dept: PSYCHIATRY | Facility: CLINIC | Age: 52
End: 2022-07-07

## 2022-07-07 DIAGNOSIS — F33.2 SEVERE EPISODE OF RECURRENT MAJOR DEPRESSIVE DISORDER, WITHOUT PSYCHOTIC FEATURES (H): Primary | ICD-10-CM

## 2022-07-07 ASSESSMENT — PATIENT HEALTH QUESTIONNAIRE - PHQ9: SUM OF ALL RESPONSES TO PHQ QUESTIONS 1-9: 10

## 2022-07-07 NOTE — PROGRESS NOTES
[facility] TMS Program  [address], [address]  [place] [address]  TMS Procedure Note   Aure Joy MRN# [id number]  Age: 51 year old year old YOB: 1970    Pre-Procedure:  History and Physical: Reviewed in medical record  Consent Signed by: Aure Joy  On: 12/22/2020    Clinical Narrative:  Patient is tolerating treatment. She reports doing better today.    Daily Adult Stimulation Safety Questionnaire: No or Yes in past 24 hours     1) Have you discontinued or started any new medication? No  2) Have you missed any doses of your medication differently then prescribed? No  3) Have you consumed alcohol or drugs (other than prescribed)?  No  4) Did you not sleep AT ALL last night?  No  5) Has your SI changed or worsened?  No    Indications for TMS:  MDD, recurrent, severe; 4+ medication trials (from 2+ classes) ineffective; Psychotherapy ineffective.     Pre-Procedure Diagnosis:  MDD, recurrent, severe F33.2    Treatment Hx:  Treatment number this series: 45  Total lifetime treatment number: 195    Allergies   Allergen Reactions     Codeine Nausea     Other  [No Clinical Screening - See Comments] Nausea and Vomiting and Other (See Comments)     general anesthesia- uncontrolled vomitting      There were no vitals taken for this visit.    Pause for the Cause  Right patient:  Yes  Right procedure/correct coil:  Yes; rTMS; 96591 F8 coil.   Earplugs in place:  Yes    Procedure  Patient was seated in procedure chair. Identity and procedure was verified. Ear plugs were placed in ears and patient-specific cap was placed on head and tightened appropriately. Ruler locations were verified. Coil was placed at treatment location and stimulator was set to parameters described below. A test train was delivered and pt did not tolerate train so treatment was denied today.    Motor Threshold Determination  Distance from nasion to inion: 35  Distance along circumference from midline: 6.67cm  Distance  from Vertex: 9.56cm  MT 1: 0 - 6 - 16 @ 69% on 1/29/2018  MT 2: 0 - 6 - 16 @ 69% on 2/6/2018   MT 3: 0 - 6 - 16 @ 69% on 2/13/2018   MT 4: 0 - 6 - 16 @ 69% on 2/23/2018   MT 5: 0 - 6 - 16 @ 69% on 3/2/2018   MT 6: 0 - 5.5 - 15.5(always use intersection at left side of ruler)@ 85% on 8/23/19  MT 7: 0 - 5.5 - 15.5(always use intersection at left side of ruler)@ 80% on 8/29/19  MT 8: 0 - 5.5 - 37.5(always use intersection at left side of ruler)@ 76% on 9/5/19 (right side using EMG on left hand)  MT 9: C2 (R side using EMG on L hand) 78% on 9/12/2019  MT 10: C2 (R side using EMG on L hand) 76% on 9/26/2019  MT 11: C2 (R side using EMG on L hand) 82 on 1/31/2020  MT 12: C2 (R side using EMG on L Hand) 86% on 12/22/2020   MT 13: C2 (R side) 100% on 04/21/22  MT 14: C2 (R side) 86% on 6/7/22  MT 15: 0 - 6 - 16.5 @ 68% on 6/14/2022  MT 16: C2 (R side) 89% on 6/16/22        Stimulation Parameters  Frequency: 1.0 Hz    Train duration: 60 sec  Total pulses delivered: 3000  Inter-train interval: 1.0 sec  Tx Loc: F4  Energy: 98% (110% MT)  Trains: 50 trains      Rating Scales:  Date MADRS IDS-SR PHQ-9   2/12/21  31 4   04/21/22 25 40 14   05/06/2022  38 11   5/13/2022  36 5   05/20/2022  32 5   5/21/2022  44 10   6/3/2022  49 14   6/10/2022  45 14   6/24/22  41 11   7/1/22  36 7       Post-Procedure Diagnosis:  MDD, recurrent, severe F33.2    Amy Mcgee, EMT  [facility]    Plan   - Cont TMS    I did not see the patient during/after treatment but remained available in the clinic during  treatment.    Evelyn Zamudio MD  [facility]

## 2022-07-08 ENCOUNTER — OFFICE VISIT (OUTPATIENT)
Dept: PSYCHIATRY | Facility: CLINIC | Age: 52
End: 2022-07-08

## 2022-07-08 DIAGNOSIS — F33.2 SEVERE EPISODE OF RECURRENT MAJOR DEPRESSIVE DISORDER, WITHOUT PSYCHOTIC FEATURES (H): Primary | ICD-10-CM

## 2022-07-08 ASSESSMENT — PATIENT HEALTH QUESTIONNAIRE - PHQ9: SUM OF ALL RESPONSES TO PHQ QUESTIONS 1-9: 13

## 2022-07-08 NOTE — PROGRESS NOTES
[facility] TMS Program  [address], [address]  [place] [address]  TMS Procedure Note   Aure Joy MRN# [id number]  Age: 51 year old year old YOB: 1970    Pre-Procedure:  History and Physical: Reviewed in medical record  Consent Signed by: Aure Joy  On: 12/22/2020    Clinical Narrative:  Patient is tolerating treatment. She reports doing even better today.    Daily Adult Stimulation Safety Questionnaire: No or Yes in past 24 hours     1) Have you discontinued or started any new medication? No  2) Have you missed any doses of your medication differently then prescribed? No  3) Have you consumed alcohol or drugs (other than prescribed)?  No  4) Did you not sleep AT ALL last night?  No  5) Has your SI changed or worsened?  No    Indications for TMS:  MDD, recurrent, severe; 4+ medication trials (from 2+ classes) ineffective; Psychotherapy ineffective.     Pre-Procedure Diagnosis:  MDD, recurrent, severe F33.2    Treatment Hx:  Treatment number this series: 46  Total lifetime treatment number: 196    Allergies   Allergen Reactions     Codeine Nausea     Other  [No Clinical Screening - See Comments] Nausea and Vomiting and Other (See Comments)     general anesthesia- uncontrolled vomitting      There were no vitals taken for this visit.    Pause for the Cause  Right patient:  Yes  Right procedure/correct coil:  Yes; rTMS; 30638 F8 coil.   Earplugs in place:  Yes    Procedure  Patient was seated in procedure chair. Identity and procedure was verified. Ear plugs were placed in ears and patient-specific cap was placed on head and tightened appropriately. Ruler locations were verified. Coil was placed at treatment location and stimulator was set to parameters described below. A test train was delivered and pt did not tolerate train so treatment was denied today.    Motor Threshold Determination  Distance from nasion to inion: 35  Distance along circumference from midline:  6.67cm  Distance from Vertex: 9.56cm  MT 1: 0 - 6 - 16 @ 69% on 1/29/2018  MT 2: 0 - 6 - 16 @ 69% on 2/6/2018   MT 3: 0 - 6 - 16 @ 69% on 2/13/2018   MT 4: 0 - 6 - 16 @ 69% on 2/23/2018   MT 5: 0 - 6 - 16 @ 69% on 3/2/2018   MT 6: 0 - 5.5 - 15.5(always use intersection at left side of ruler)@ 85% on 8/23/19  MT 7: 0 - 5.5 - 15.5(always use intersection at left side of ruler)@ 80% on 8/29/19  MT 8: 0 - 5.5 - 37.5(always use intersection at left side of ruler)@ 76% on 9/5/19 (right side using EMG on left hand)  MT 9: C2 (R side using EMG on L hand) 78% on 9/12/2019  MT 10: C2 (R side using EMG on L hand) 76% on 9/26/2019  MT 11: C2 (R side using EMG on L hand) 82 on 1/31/2020  MT 12: C2 (R side using EMG on L Hand) 86% on 12/22/2020   MT 13: C2 (R side) 100% on 04/21/22  MT 14: C2 (R side) 86% on 6/7/22  MT 15: 0 - 6 - 16.5 @ 68% on 6/14/2022  MT 16: C2 (R side) 89% on 6/16/22        Stimulation Parameters  Frequency: 1.0 Hz    Train duration: 60 sec  Total pulses delivered: 3000  Inter-train interval: 1.0 sec  Tx Loc: F4  Energy: 98% (110% MT)  Trains: 50 trains      Rating Scales:  Date MADRS IDS-SR PHQ-9   2/12/21  31 4   04/21/22 25 40 14   05/06/2022  38 11   5/13/2022  36 5   05/20/2022  32 5   5/21/2022  44 10   6/3/2022  49 14   6/10/2022  45 14   6/24/22  41 11   7/1/22  36 7   7/8/2022  45 13       Post-Procedure Diagnosis:  MDD, recurrent, severe F33.2    Amy Arely, EMT  [facility]    Plan   - Cont TMS  I did not see the patient but remained available in the clinic throughout the TMS session.     Yuri Cole MD  [facility]

## 2022-07-12 ENCOUNTER — OFFICE VISIT (OUTPATIENT)
Dept: PSYCHIATRY | Facility: CLINIC | Age: 52
End: 2022-07-12

## 2022-07-12 ENCOUNTER — ALLIED HEALTH/NURSE VISIT (OUTPATIENT)
Dept: PSYCHIATRY | Facility: CLINIC | Age: 52
End: 2022-07-12

## 2022-07-12 VITALS — DIASTOLIC BLOOD PRESSURE: 95 MMHG | SYSTOLIC BLOOD PRESSURE: 140 MMHG | HEART RATE: 79 BPM

## 2022-07-12 VITALS — HEART RATE: 78 BPM | DIASTOLIC BLOOD PRESSURE: 81 MMHG | SYSTOLIC BLOOD PRESSURE: 133 MMHG

## 2022-07-12 DIAGNOSIS — F33.2 SEVERE EPISODE OF RECURRENT MAJOR DEPRESSIVE DISORDER, WITHOUT PSYCHOTIC FEATURES (H): Primary | ICD-10-CM

## 2022-07-12 ASSESSMENT — PATIENT HEALTH QUESTIONNAIRE - PHQ9: SUM OF ALL RESPONSES TO PHQ QUESTIONS 1-9: 7

## 2022-07-12 NOTE — PROGRESS NOTES
Aure Joy comes into clinic today at the request of ESTELA Zamudio MD Ordering Provider for Med Injection only Ketamine.    Procedure Prep:  Medication double check completed by: Perla WATTS RN  Prior to administration pt identified by name and : yes    Nursing Assessment:  Appearance: dressed casually   Mood: good  Affect: wnl  Eye contact: good  PHQ 2022   PHQ-9 Total Score 13   Q9: Thoughts of better off dead/self-harm past 2 weeks Several days         Procedure Performed:  VSS and mental status WNL. Patient was given Ketamine. See MAR for details.     Post Procedure Assessment:  Patient tolerated the treatment with the following side effects: dissociation. Vital signs were monitored, see VS Flowsheet. Patient stated they felt ready to go home prior to discharge. AVS was offered to patient and patient declined. Patient was instructed not to drive for the remainder of the day and to notify clinic if any concerns arise.     Next appt: 22    Medications provided by Pharmacy     This service provided today was under the supervising provider of the day Tim Williamson MD, who was available if needed.    Dunia Alexandra RN

## 2022-07-13 ENCOUNTER — OFFICE VISIT (OUTPATIENT)
Dept: PSYCHIATRY | Facility: CLINIC | Age: 52
End: 2022-07-13

## 2022-07-13 DIAGNOSIS — F33.2 SEVERE EPISODE OF RECURRENT MAJOR DEPRESSIVE DISORDER, WITHOUT PSYCHOTIC FEATURES (H): Primary | ICD-10-CM

## 2022-07-13 ASSESSMENT — PATIENT HEALTH QUESTIONNAIRE - PHQ9: SUM OF ALL RESPONSES TO PHQ QUESTIONS 1-9: 4

## 2022-07-13 NOTE — PROGRESS NOTES
[facility] TMS Program  [address], [address]  [place] [address]  TMS Procedure Note   Aure Joy MRN# [id number]  Age: 51 year old year old YOB: 1970    Pre-Procedure:  History and Physical: Reviewed in medical record  Consent Signed by: Aure Joy  On: 12/22/2020    Clinical Narrative:  Patient is tolerating treatment. She reports doing even better today.    Daily Adult Stimulation Safety Questionnaire: No or Yes in past 24 hours     1) Have you discontinued or started any new medication? No  2) Have you missed any doses of your medication differently then prescribed? No  3) Have you consumed alcohol or drugs (other than prescribed)?  No  4) Did you not sleep AT ALL last night?  No  5) Has your SI changed or worsened?  No    Indications for TMS:  MDD, recurrent, severe; 4+ medication trials (from 2+ classes) ineffective; Psychotherapy ineffective.     Pre-Procedure Diagnosis:  MDD, recurrent, severe F33.2    Treatment Hx:  Treatment number this series: 47  Total lifetime treatment number: 197    Allergies   Allergen Reactions     Codeine Nausea     Other  [No Clinical Screening - See Comments] Nausea and Vomiting and Other (See Comments)     general anesthesia- uncontrolled vomitting      BP (!) 140/95   Pulse 79     Pause for the Cause  Right patient:  Yes  Right procedure/correct coil:  Yes; rTMS; 75340 F8 coil.   Earplugs in place:  Yes    Procedure  Patient was seated in procedure chair. Identity and procedure was verified. Ear plugs were placed in ears and patient-specific cap was placed on head and tightened appropriately. Ruler locations were verified. Coil was placed at treatment location and stimulator was set to parameters described below. A test train was delivered and pt did not tolerate train so treatment was denied today.    Motor Threshold Determination  Distance from nasion to inion: 35  Distance along circumference from midline: 6.67cm  Distance from Vertex:  9.56cm  MT 1: 0 - 6 - 16 @ 69% on 1/29/2018  MT 2: 0 - 6 - 16 @ 69% on 2/6/2018   MT 3: 0 - 6 - 16 @ 69% on 2/13/2018   MT 4: 0 - 6 - 16 @ 69% on 2/23/2018   MT 5: 0 - 6 - 16 @ 69% on 3/2/2018   MT 6: 0 - 5.5 - 15.5(always use intersection at left side of ruler)@ 85% on 8/23/19  MT 7: 0 - 5.5 - 15.5(always use intersection at left side of ruler)@ 80% on 8/29/19  MT 8: 0 - 5.5 - 37.5(always use intersection at left side of ruler)@ 76% on 9/5/19 (right side using EMG on left hand)  MT 9: C2 (R side using EMG on L hand) 78% on 9/12/2019  MT 10: C2 (R side using EMG on L hand) 76% on 9/26/2019  MT 11: C2 (R side using EMG on L hand) 82 on 1/31/2020  MT 12: C2 (R side using EMG on L Hand) 86% on 12/22/2020   MT 13: C2 (R side) 100% on 04/21/22  MT 14: C2 (R side) 86% on 6/7/22  MT 15: 0 - 6 - 16.5 @ 68% on 6/14/2022  MT 16: C2 (R side) 89% on 6/16/22        Stimulation Parameters  Frequency: 1.0 Hz    Train duration: 60 sec  Total pulses delivered: 3000  Inter-train interval: 1.0 sec  Tx Loc: F4  Energy: 98% (110% MT)  Trains: 50 trains      Rating Scales:  Date MADRS IDS-SR PHQ-9   2/12/21  31 4   04/21/22 25 40 14   05/06/2022  38 11   5/13/2022  36 5   05/20/2022  32 5   5/21/2022  44 10   6/3/2022  49 14   6/10/2022  45 14   6/24/22  41 11   7/1/22  36 7   7/8/2022  45 13       Post-Procedure Diagnosis:  MDD, recurrent, severe F33.2    Isabel Marquez, [facility]    Plan   - Cont TMS      I did not see the patient but remained available in the clinic throughout the TMS session.     Yuri Cole M.D.   [facility]

## 2022-07-13 NOTE — PROGRESS NOTES
[facility] TMS Program  [address], [address]  [place] [address]  TMS Procedure Note   Aure Joy MRN# [id number]  Age: 51 year old year old YOB: 1970    Pre-Procedure:  History and Physical: Reviewed in medical record  Consent Signed by: Aure Joy  On: 12/22/2020    Clinical Narrative:  Patient is tolerating treatment. No changes reported.    Daily Adult Stimulation Safety Questionnaire: No or Yes in past 24 hours     1) Have you discontinued or started any new medication? No  2) Have you missed any doses of your medication differently then prescribed? No  3) Have you consumed alcohol or drugs (other than prescribed)?  No  4) Did you not sleep AT ALL last night?  No  5) Has your SI changed or worsened?  No    Indications for TMS:  MDD, recurrent, severe; 4+ medication trials (from 2+ classes) ineffective; Psychotherapy ineffective.     Pre-Procedure Diagnosis:  MDD, recurrent, severe F33.2    Treatment Hx:  Treatment number this series: 48  Total lifetime treatment number: 198    Allergies   Allergen Reactions     Codeine Nausea     Other  [No Clinical Screening - See Comments] Nausea and Vomiting and Other (See Comments)     general anesthesia- uncontrolled vomitting      There were no vitals taken for this visit.    Pause for the Cause  Right patient:  Yes  Right procedure/correct coil:  Yes; rTMS; 35900 F8 coil.   Earplugs in place:  Yes    Procedure  Patient was seated in procedure chair. Identity and procedure was verified. Ear plugs were placed in ears and patient-specific cap was placed on head and tightened appropriately. Ruler locations were verified. Coil was placed at treatment location and stimulator was set to parameters described below. A test train was delivered and pt did not tolerate train so treatment was denied today.    Motor Threshold Determination  Distance from nasion to inion: 35  Distance along circumference from midline: 6.67cm  Distance from Vertex:  9.56cm  MT 1: 0 - 6 - 16 @ 69% on 1/29/2018  MT 2: 0 - 6 - 16 @ 69% on 2/6/2018   MT 3: 0 - 6 - 16 @ 69% on 2/13/2018   MT 4: 0 - 6 - 16 @ 69% on 2/23/2018   MT 5: 0 - 6 - 16 @ 69% on 3/2/2018   MT 6: 0 - 5.5 - 15.5(always use intersection at left side of ruler)@ 85% on 8/23/19  MT 7: 0 - 5.5 - 15.5(always use intersection at left side of ruler)@ 80% on 8/29/19  MT 8: 0 - 5.5 - 37.5(always use intersection at left side of ruler)@ 76% on 9/5/19 (right side using EMG on left hand)  MT 9: C2 (R side using EMG on L hand) 78% on 9/12/2019  MT 10: C2 (R side using EMG on L hand) 76% on 9/26/2019  MT 11: C2 (R side using EMG on L hand) 82 on 1/31/2020  MT 12: C2 (R side using EMG on L Hand) 86% on 12/22/2020   MT 13: C2 (R side) 100% on 04/21/22  MT 14: C2 (R side) 86% on 6/7/22  MT 15: 0 - 6 - 16.5 @ 68% on 6/14/2022  MT 16: C2 (R side) 89% on 6/16/22        Stimulation Parameters  Frequency: 1.0 Hz    Train duration: 60 sec  Total pulses delivered: 3000  Inter-train interval: 1.0 sec  Tx Loc: F4  Energy: 98% (110% MT)  Trains: 50 trains      Rating Scales:  Date MADRS IDS-SR PHQ-9   2/12/21  31 4   04/21/22 25 40 14   05/06/2022  38 11   5/13/2022  36 5   05/20/2022  32 5   5/21/2022  44 10   6/3/2022  49 14   6/10/2022  45 14   6/24/22  41 11   7/1/22  36 7   7/8/2022  45 13       Post-Procedure Diagnosis:  MDD, recurrent, severe F33.2    GLORIA Lott  [facility]    Plan   - Cont TMS    I did not see the patient but remained available in the clinic throughout the TMS session.     Yuri Cole MD  [facility]

## 2022-07-14 ENCOUNTER — OFFICE VISIT (OUTPATIENT)
Dept: PSYCHIATRY | Facility: CLINIC | Age: 52
End: 2022-07-14

## 2022-07-14 DIAGNOSIS — F33.2 SEVERE EPISODE OF RECURRENT MAJOR DEPRESSIVE DISORDER, WITHOUT PSYCHOTIC FEATURES (H): Primary | ICD-10-CM

## 2022-07-14 ASSESSMENT — PATIENT HEALTH QUESTIONNAIRE - PHQ9: SUM OF ALL RESPONSES TO PHQ QUESTIONS 1-9: 6

## 2022-07-14 NOTE — PROGRESS NOTES
[facility] TMS Program  [address], [address]  [place] [address]  TMS Procedure Note   Aure Joy MRN# [id number]  Age: 51 year old year old YOB: 1970    Pre-Procedure:  History and Physical: Reviewed in medical record  Consent Signed by: Aure Joy  On: 12/22/2020    Aure Joy comes into clinic today at the request of Evelyn Zamudio MD, ordering provider for TMS.    Clinical Narrative:  Patient is tolerating treatment. No changes reported.    Daily Adult Stimulation Safety Questionnaire: No or Yes in past 24 hours     1) Have you discontinued or started any new medication? No  2) Have you missed any doses of your medication differently then prescribed? No  3) Have you consumed alcohol or drugs (other than prescribed)?  No  4) Did you not sleep AT ALL last night?  No  5) Has your SI changed or worsened?  No    Indications for TMS:  MDD, recurrent, severe; 4+ medication trials (from 2+ classes) ineffective; Psychotherapy ineffective.     Pre-Procedure Diagnosis:  MDD, recurrent, severe F33.2    Treatment Hx:  Treatment number this series: 49  Total lifetime treatment number: 199    Allergies   Allergen Reactions     Codeine Nausea     Other  [No Clinical Screening - See Comments] Nausea and Vomiting and Other (See Comments)     general anesthesia- uncontrolled vomitting      There were no vitals taken for this visit.    Pause for the Cause  Right patient:  Yes  Right procedure/correct coil:  Yes; rTMS; 13097 F8 coil.   Earplugs in place:  Yes    Procedure  Patient was seated in procedure chair. Identity and procedure was verified. Ear plugs were placed in ears and patient-specific cap was placed on head and tightened appropriately. Ruler locations were verified. Coil was placed at treatment location and stimulator was set to parameters described below. A test train was delivered and pt did not tolerate train so treatment was denied today.    Motor Threshold  Determination  Distance from nasion to inion: 35  Distance along circumference from midline: 6.67cm  Distance from Vertex: 9.56cm  MT 1: 0 - 6 - 16 @ 69% on 1/29/2018  MT 2: 0 - 6 - 16 @ 69% on 2/6/2018   MT 3: 0 - 6 - 16 @ 69% on 2/13/2018   MT 4: 0 - 6 - 16 @ 69% on 2/23/2018   MT 5: 0 - 6 - 16 @ 69% on 3/2/2018   MT 6: 0 - 5.5 - 15.5(always use intersection at left side of ruler)@ 85% on 8/23/19  MT 7: 0 - 5.5 - 15.5(always use intersection at left side of ruler)@ 80% on 8/29/19  MT 8: 0 - 5.5 - 37.5(always use intersection at left side of ruler)@ 76% on 9/5/19 (right side using EMG on left hand)  MT 9: C2 (R side using EMG on L hand) 78% on 9/12/2019  MT 10: C2 (R side using EMG on L hand) 76% on 9/26/2019  MT 11: C2 (R side using EMG on L hand) 82 on 1/31/2020  MT 12: C2 (R side using EMG on L Hand) 86% on 12/22/2020   MT 13: C2 (R side) 100% on 04/21/22  MT 14: C2 (R side) 86% on 6/7/22  MT 15: 0 - 6 - 16.5 @ 68% on 6/14/2022  MT 16: C2 (R side) 89% on 6/16/22        Stimulation Parameters  Frequency: 1.0 Hz    Train duration: 60 sec  Total pulses delivered: 3000  Inter-train interval: 1.0 sec  Tx Loc: F4  Energy: 98% (110% MT)  Trains: 50 trains      Rating Scales:  Date MADRS IDS-SR PHQ-9   2/12/21  31 4   04/21/22 25 40 14   05/06/2022  38 11   5/13/2022  36 5   05/20/2022  32 5   5/21/2022  44 10   6/3/2022  49 14   6/10/2022  45 14   6/24/22  41 11   7/1/22  36 7   7/8/2022  45 13       Post-Procedure Diagnosis:  MDD, recurrent, severe F33.2    Plan   - Cont TMS    This service provided today was under the supervising provider of the day, Mihir Chaudhry MD, who was available if needed.     Amy Mcgee, EMT  [facility]  Mental [facility] Neuromodulation

## 2022-07-15 ENCOUNTER — OFFICE VISIT (OUTPATIENT)
Dept: PSYCHIATRY | Facility: CLINIC | Age: 52
End: 2022-07-15

## 2022-07-15 DIAGNOSIS — F33.2 SEVERE EPISODE OF RECURRENT MAJOR DEPRESSIVE DISORDER, WITHOUT PSYCHOTIC FEATURES (H): Primary | ICD-10-CM

## 2022-07-15 ASSESSMENT — PATIENT HEALTH QUESTIONNAIRE - PHQ9: SUM OF ALL RESPONSES TO PHQ QUESTIONS 1-9: 6

## 2022-07-15 NOTE — PROGRESS NOTES
[facility] TMS Program  [address], [address]  [place] [address]  TMS Procedure Note   Aure Joy MRN# [id number]  Age: 51 year old year old YOB: 1970    Pre-Procedure:  History and Physical: Reviewed in medical record  Consent Signed by: Aure Joy  On: 12/22/2020    Clinical Narrative:  Patient is tolerating treatment. Reports feeling a bit better.    Daily Adult Stimulation Safety Questionnaire: No or Yes in past 24 hours     1) Have you discontinued or started any new medication? No  2) Have you missed any doses of your medication differently then prescribed? No  3) Have you consumed alcohol or drugs (other than prescribed)?  No  4) Did you not sleep AT ALL last night?  No  5) Has your SI changed or worsened?  No    Indications for TMS:  MDD, recurrent, severe; 4+ medication trials (from 2+ classes) ineffective; Psychotherapy ineffective.     Pre-Procedure Diagnosis:  MDD, recurrent, severe F33.2    Treatment Hx:  Treatment number this series: 50  Total lifetime treatment number: 200    Allergies   Allergen Reactions     Codeine Nausea     Other  [No Clinical Screening - See Comments] Nausea and Vomiting and Other (See Comments)     general anesthesia- uncontrolled vomitting      There were no vitals taken for this visit.    Pause for the Cause  Right patient:  Yes  Right procedure/correct coil:  Yes; rTMS; 14260 F8 coil.   Earplugs in place:  Yes    Procedure  Patient was seated in procedure chair. Identity and procedure was verified. Ear plugs were placed in ears and patient-specific cap was placed on head and tightened appropriately. Ruler locations were verified. Coil was placed at treatment location and stimulator was set to parameters described below. A test train was delivered and pt did not tolerate train so treatment was denied today.    Motor Threshold Determination  Distance from nasion to inion: 35  Distance along circumference from midline: 6.67cm  Distance from  Vertex: 9.56cm  MT 1: 0 - 6 - 16 @ 69% on 1/29/2018  MT 2: 0 - 6 - 16 @ 69% on 2/6/2018   MT 3: 0 - 6 - 16 @ 69% on 2/13/2018   MT 4: 0 - 6 - 16 @ 69% on 2/23/2018   MT 5: 0 - 6 - 16 @ 69% on 3/2/2018   MT 6: 0 - 5.5 - 15.5(always use intersection at left side of ruler)@ 85% on 8/23/19  MT 7: 0 - 5.5 - 15.5(always use intersection at left side of ruler)@ 80% on 8/29/19  MT 8: 0 - 5.5 - 37.5(always use intersection at left side of ruler)@ 76% on 9/5/19 (right side using EMG on left hand)  MT 9: C2 (R side using EMG on L hand) 78% on 9/12/2019  MT 10: C2 (R side using EMG on L hand) 76% on 9/26/2019  MT 11: C2 (R side using EMG on L hand) 82 on 1/31/2020  MT 12: C2 (R side using EMG on L Hand) 86% on 12/22/2020   MT 13: C2 (R side) 100% on 04/21/22  MT 14: C2 (R side) 86% on 6/7/22  MT 15: 0 - 6 - 16.5 @ 68% on 6/14/2022  MT 16: C2 (R side) 89% on 6/16/22        Stimulation Parameters  Frequency: 1.0 Hz    Train duration: 60 sec  Total pulses delivered: 3000  Inter-train interval: 1.0 sec  Tx Loc: F4  Energy: 98% (110% MT)  Trains: 50 trains      Rating Scales:  Date MADRS IDS-SR PHQ-9   2/12/21  31 4   04/21/22 25 40 14   05/06/2022  38 11   5/13/2022  36 5   05/20/2022  32 5   5/21/2022  44 10   6/3/2022  49 14   6/10/2022  45 14   6/24/22  41 11   7/1/22  36 7   7/8/2022  45 13   7/15/2022  32 6       Post-Procedure Diagnosis:  MDD, recurrent, severe F33.2    Amy Mcgee, EMT  [facility]  Mental [facility] Neuromodulation    Plan   - Cont TMS    I did not see the patient but remained available in the clinic throughout the TMS session.     Yuri Cole MD  [facility] Neuromodulation

## 2022-07-18 ENCOUNTER — OFFICE VISIT (OUTPATIENT)
Dept: PSYCHIATRY | Facility: CLINIC | Age: 52
End: 2022-07-18

## 2022-07-18 DIAGNOSIS — F33.2 SEVERE EPISODE OF RECURRENT MAJOR DEPRESSIVE DISORDER, WITHOUT PSYCHOTIC FEATURES (H): Primary | ICD-10-CM

## 2022-07-18 ASSESSMENT — PATIENT HEALTH QUESTIONNAIRE - PHQ9: SUM OF ALL RESPONSES TO PHQ QUESTIONS 1-9: 11

## 2022-07-18 NOTE — PROGRESS NOTES
[facility] TMS Program  [address], [address]  [place] [address]  TMS Procedure Note   Aure Joy MRN# [id number]  Age: 51 year old year old YOB: 1970    Pre-Procedure:  History and Physical: Reviewed in medical record  Consent Signed by: Aure Joy  On: 12/22/2020    Clinical Narrative:  Patient is tolerating treatment. Reports having a bad weekend but proud she made it here today.    Daily Adult Stimulation Safety Questionnaire: No or Yes in past 24 hours     1) Have you discontinued or started any new medication? No  2) Have you missed any doses of your medication differently then prescribed? No  3) Have you consumed alcohol or drugs (other than prescribed)?  No  4) Did you not sleep AT ALL last night?  No  5) Has your SI changed or worsened?  No    Indications for TMS:  MDD, recurrent, severe; 4+ medication trials (from 2+ classes) ineffective; Psychotherapy ineffective.     Pre-Procedure Diagnosis:  MDD, recurrent, severe F33.2    Treatment Hx:  Treatment number this series: 51  Total lifetime treatment number: 201    Allergies   Allergen Reactions     Codeine Nausea     Other  [No Clinical Screening - See Comments] Nausea and Vomiting and Other (See Comments)     general anesthesia- uncontrolled vomitting      There were no vitals taken for this visit.    Pause for the Cause  Right patient:  Yes  Right procedure/correct coil:  Yes; rTMS; 12985 F8 coil.   Earplugs in place:  Yes    Procedure  Patient was seated in procedure chair. Identity and procedure was verified. Ear plugs were placed in ears and patient-specific cap was placed on head and tightened appropriately. Ruler locations were verified. Coil was placed at treatment location and stimulator was set to parameters described below. A test train was delivered and pt did not tolerate train so treatment was denied today.    Motor Threshold Determination  Distance from nasion to inion: 35  Distance along circumference  from midline: 6.67cm  Distance from Vertex: 9.56cm  MT 1: 0 - 6 - 16 @ 69% on 1/29/2018  MT 2: 0 - 6 - 16 @ 69% on 2/6/2018   MT 3: 0 - 6 - 16 @ 69% on 2/13/2018   MT 4: 0 - 6 - 16 @ 69% on 2/23/2018   MT 5: 0 - 6 - 16 @ 69% on 3/2/2018   MT 6: 0 - 5.5 - 15.5(always use intersection at left side of ruler)@ 85% on 8/23/19  MT 7: 0 - 5.5 - 15.5(always use intersection at left side of ruler)@ 80% on 8/29/19  MT 8: 0 - 5.5 - 37.5(always use intersection at left side of ruler)@ 76% on 9/5/19 (right side using EMG on left hand)  MT 9: C2 (R side using EMG on L hand) 78% on 9/12/2019  MT 10: C2 (R side using EMG on L hand) 76% on 9/26/2019  MT 11: C2 (R side using EMG on L hand) 82 on 1/31/2020  MT 12: C2 (R side using EMG on L Hand) 86% on 12/22/2020   MT 13: C2 (R side) 100% on 04/21/22  MT 14: C2 (R side) 86% on 6/7/22  MT 15: 0 - 6 - 16.5 @ 68% on 6/14/2022  MT 16: C2 (R side) 89% on 6/16/22        Stimulation Parameters  Frequency: 1.0 Hz    Train duration: 60 sec  Total pulses delivered: 3000  Inter-train interval: 1.0 sec  Tx Loc: F4  Energy: 98% (110% MT)  Trains: 50 trains      Rating Scales:  Date MADRS IDS-SR PHQ-9   2/12/21  31 4   04/21/22 25 40 14   05/06/2022  38 11   5/13/2022  36 5   05/20/2022  32 5   5/21/2022  44 10   6/3/2022  49 14   6/10/2022  45 14   6/24/22  41 11   7/1/22  36 7   7/8/2022  45 13   7/15/2022  32 6       Post-Procedure Diagnosis:  MDD, recurrent, severe F33.2    Amy Mcgee, EMT  [facility] Neuromodulation    Plan   - Cont TMS    I did not see the patient but remained available in the clinic throughout the TMS session.     Yuri Cole MD  [facility] Neuromodulation

## 2022-07-19 ENCOUNTER — ALLIED HEALTH/NURSE VISIT (OUTPATIENT)
Dept: PSYCHIATRY | Facility: CLINIC | Age: 52
End: 2022-07-19
Payer: MEDICAID

## 2022-07-19 ENCOUNTER — OFFICE VISIT (OUTPATIENT)
Dept: PSYCHIATRY | Facility: CLINIC | Age: 52
End: 2022-07-19

## 2022-07-19 VITALS — HEART RATE: 69 BPM | SYSTOLIC BLOOD PRESSURE: 124 MMHG | DIASTOLIC BLOOD PRESSURE: 85 MMHG

## 2022-07-19 DIAGNOSIS — F33.2 SEVERE EPISODE OF RECURRENT MAJOR DEPRESSIVE DISORDER, WITHOUT PSYCHOTIC FEATURES (H): Primary | ICD-10-CM

## 2022-07-19 ASSESSMENT — PATIENT HEALTH QUESTIONNAIRE - PHQ9: SUM OF ALL RESPONSES TO PHQ QUESTIONS 1-9: 11

## 2022-07-19 NOTE — PROGRESS NOTES
[facility] TMS Program  [address], [address]  [place] [address]  TMS Procedure Note   Aure Joy MRN# [id number]  Age: 51 year old year old YOB: 1970    Pre-Procedure:  History and Physical: Reviewed in medical record  Consent Signed by: Aure Joy  On: 12/22/2020    Clinical Narrative:  Patient is tolerating treatment. Patient's last treatment today. Question 15 on IDS-SR left blank. Patient had ketamine during treatment. She states that TMS has been really helpful in fighting all external events happening despite what her scores reflect.     Daily Adult Stimulation Safety Questionnaire: No or Yes in past 24 hours     1) Have you discontinued or started any new medication? No  2) Have you missed any doses of your medication differently then prescribed? No  3) Have you consumed alcohol or drugs (other than prescribed)?  No  4) Did you not sleep AT ALL last night?  No  5) Has your SI changed or worsened?  No    Indications for TMS:  MDD, recurrent, severe; 4+ medication trials (from 2+ classes) ineffective; Psychotherapy ineffective.     Pre-Procedure Diagnosis:  MDD, recurrent, severe F33.2    Treatment Hx:  Treatment number this series: 52  Total lifetime treatment number: 202    Allergies   Allergen Reactions     Codeine Nausea     Other  [No Clinical Screening - See Comments] Nausea and Vomiting and Other (See Comments)     general anesthesia- uncontrolled vomitting      There were no vitals taken for this visit.    Pause for the Cause  Right patient:  Yes  Right procedure/correct coil:  Yes; rTMS; 22371 F8 coil.   Earplugs in place:  Yes    Procedure  Patient was seated in procedure chair. Identity and procedure was verified. Ear plugs were placed in ears and patient-specific cap was placed on head and tightened appropriately. Ruler locations were verified. Coil was placed at treatment location and stimulator was set to parameters described below. A test train was delivered  and pt did not tolerate train so treatment was denied today.    Motor Threshold Determination  Distance from nasion to inion: 35  Distance along circumference from midline: 6.67cm  Distance from Vertex: 9.56cm  MT 1: 0 - 6 - 16 @ 69% on 1/29/2018  MT 2: 0 - 6 - 16 @ 69% on 2/6/2018   MT 3: 0 - 6 - 16 @ 69% on 2/13/2018   MT 4: 0 - 6 - 16 @ 69% on 2/23/2018   MT 5: 0 - 6 - 16 @ 69% on 3/2/2018   MT 6: 0 - 5.5 - 15.5(always use intersection at left side of ruler)@ 85% on 8/23/19  MT 7: 0 - 5.5 - 15.5(always use intersection at left side of ruler)@ 80% on 8/29/19  MT 8: 0 - 5.5 - 37.5(always use intersection at left side of ruler)@ 76% on 9/5/19 (right side using EMG on left hand)  MT 9: C2 (R side using EMG on L hand) 78% on 9/12/2019  MT 10: C2 (R side using EMG on L hand) 76% on 9/26/2019  MT 11: C2 (R side using EMG on L hand) 82 on 1/31/2020  MT 12: C2 (R side using EMG on L Hand) 86% on 12/22/2020   MT 13: C2 (R side) 100% on 04/21/22  MT 14: C2 (R side) 86% on 6/7/22  MT 15: 0 - 6 - 16.5 @ 68% on 6/14/2022  MT 16: C2 (R side) 89% on 6/16/22        Stimulation Parameters  Frequency: 1.0 Hz    Train duration: 60 sec  Total pulses delivered: 3000  Inter-train interval: 1.0 sec  Tx Loc: F4  Energy: 98% (110% MT)  Trains: 50 trains      Rating Scales:  Date MADRS IDS-SR PHQ-9   2/12/21  31 4   04/21/22 25 40 14   05/06/2022  38 11   5/13/2022  36 5   05/20/2022  32 5   5/21/2022  44 10   6/3/2022  49 14   6/10/2022  45 14   6/24/22  41 11   7/1/22  36 7   7/8/2022  45 13   7/15/2022  32 6   7/19/2022 26 40 11       Post-Procedure Diagnosis:  MDD, recurrent, severe F33.2    Amy Mcgee, EMT  [facility] Neuromodulation    Plan   - Cont TMS    I did not see the patient during/after treatment but remained available in the clinic during  treatment.    Evelyn Zamudio MD  [facility] Neuromodulation

## 2022-07-19 NOTE — PROGRESS NOTES
Aure Joy comes into clinic today at the request of ESTELA Zamudio MD Ordering Provider for Med Injection only Ketamine.    Procedure Prep:  Medication double check completed by: John GUERRERO RN  Prior to administration pt identified by name and : yes    Nursing Assessment:  Appearance: dressed casually   Mood: depressed  Affect: flat  Eye contact: good  PHQ 2022   PHQ-9 Total Score 11   Q9: Thoughts of better off dead/self-harm past 2 weeks More than half the days     QIDDSR 16 weekly assessment: score 16. Assessment was scanned to EHR.       Procedure Performed:  VSS and mental status WNL. Patient was given Ketamine. See MAR for details.     Post Procedure Assessment:  Patient tolerated the treatment with the following side effects: dissociation. Vital signs were monitored, see VS Flowsheet. Patient stated they felt ready to go home prior to discharge. AVS was offered to patient and patient declined. Patient was instructed not to drive for the remainder of the day and to notify clinic if any concerns arise.     Next appt: 22    Medications provided by Pharmacy       This service provided today was under the supervising provider of the day ESTELA Zamudio MD, who was available if needed.    Dunia Alexandra RN

## 2022-07-21 ENCOUNTER — OFFICE VISIT (OUTPATIENT)
Dept: PSYCHIATRY | Facility: CLINIC | Age: 52
End: 2022-07-21
Payer: MEDICAID

## 2022-07-21 VITALS
DIASTOLIC BLOOD PRESSURE: 80 MMHG | HEIGHT: 62 IN | WEIGHT: 188 LBS | SYSTOLIC BLOOD PRESSURE: 144 MMHG | TEMPERATURE: 97.2 F | BODY MASS INDEX: 34.6 KG/M2 | HEART RATE: 93 BPM

## 2022-07-21 DIAGNOSIS — D32.9 MENINGIOMA (H): ICD-10-CM

## 2022-07-21 DIAGNOSIS — F33.2 SEVERE EPISODE OF RECURRENT MAJOR DEPRESSIVE DISORDER, WITHOUT PSYCHOTIC FEATURES (H): Primary | ICD-10-CM

## 2022-07-21 DIAGNOSIS — F43.10 PTSD (POST-TRAUMATIC STRESS DISORDER): ICD-10-CM

## 2022-07-21 DIAGNOSIS — F40.00 AGORAPHOBIA: ICD-10-CM

## 2022-07-21 ASSESSMENT — PAIN SCALES - GENERAL: PAINLEVEL: NO PAIN (0)

## 2022-07-21 ASSESSMENT — PATIENT HEALTH QUESTIONNAIRE - PHQ9: SUM OF ALL RESPONSES TO PHQ QUESTIONS 1-9: 17
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
## 2022-07-26 ENCOUNTER — TELEPHONE (OUTPATIENT)
Dept: PSYCHIATRY | Facility: CLINIC | Age: 52
End: 2022-07-26

## 2022-07-26 ENCOUNTER — ALLIED HEALTH/NURSE VISIT (OUTPATIENT)
Dept: PSYCHIATRY | Facility: CLINIC | Age: 52
End: 2022-07-26

## 2022-07-26 VITALS — DIASTOLIC BLOOD PRESSURE: 86 MMHG | SYSTOLIC BLOOD PRESSURE: 131 MMHG | HEART RATE: 84 BPM | OXYGEN SATURATION: 95 %

## 2022-07-26 DIAGNOSIS — F33.2 SEVERE EPISODE OF RECURRENT MAJOR DEPRESSIVE DISORDER, WITHOUT PSYCHOTIC FEATURES (H): Primary | ICD-10-CM

## 2022-07-26 ASSESSMENT — PATIENT HEALTH QUESTIONNAIRE - PHQ9: SUM OF ALL RESPONSES TO PHQ QUESTIONS 1-9: 15

## 2022-07-26 NOTE — PROGRESS NOTES
Aure Joy comes into clinic today at the request of ESTELA Zamudio MD Ordering Provider for Med Injection only Ketamine.    Procedure Prep:  Medication double check completed by: John COLE RN  Prior to administration pt identified by name and : yes    Nursing Assessment:  Appearance: dressed casually   Mood: depressed  Affect: wnl  Eye contact: good  PHQ 2022   PHQ-9 Total Score 15   Q9: Thoughts of better off dead/self-harm past 2 weeks More than half the days     QIDDSR 16 weekly assessment: score 17. Assessment was scanned to EHR.       Procedure Performed:  VSS and mental status WNL. Patient was given Ketamine. See MAR for details.     Post Procedure Assessment:  Patient tolerated the treatment with the following side effects: dissociatio. Vital signs were monitored, see VS Flowsheet. Patient stated they felt ready to go home prior to discharge. AVS was offered to patient and patient declined. Patient was instructed not to drive for the remainder of the day and to notify clinic if any concerns arise.     Next appt: 22    Medications provided by Pharmacy       This service provided today was under the supervising provider of the day ESTELA Zamudio MD, who was available if needed.    Dunia Alexandra RN

## 2022-08-02 ENCOUNTER — ALLIED HEALTH/NURSE VISIT (OUTPATIENT)
Dept: PSYCHIATRY | Facility: CLINIC | Age: 52
End: 2022-08-02
Payer: MEDICAID

## 2022-08-02 VITALS — OXYGEN SATURATION: 98 % | DIASTOLIC BLOOD PRESSURE: 88 MMHG | HEART RATE: 77 BPM | SYSTOLIC BLOOD PRESSURE: 126 MMHG

## 2022-08-02 DIAGNOSIS — F33.2 SEVERE EPISODE OF RECURRENT MAJOR DEPRESSIVE DISORDER, WITHOUT PSYCHOTIC FEATURES (H): Primary | ICD-10-CM

## 2022-08-02 ASSESSMENT — PATIENT HEALTH QUESTIONNAIRE - PHQ9: SUM OF ALL RESPONSES TO PHQ QUESTIONS 1-9: 15

## 2022-08-02 NOTE — PROGRESS NOTES
Aure Joy comes into clinic today at the request of ESTELA Zamudio MD Ordering Provider for Med Injection only Ketamine.    Procedure Prep:  Medication double check completed by: Eliot COOMBS RN  Prior to administration pt identified by name and : yes    Nursing Assessment:  Appearance: dressed casually   Mood: depressed  Affect: flat  Eye contact: good   PHQ 2022   PHQ-9 Total Score 15   Q9: Thoughts of better off dead/self-harm past 2 weeks More than half the days     QIDDSR 16 weekly assessment: score 17. Assessment was scanned to EHR.       Procedure Performed:  VSS and mental status WNL. Patient was given Ketamine. See MAR for details.     Post Procedure Assessment:  Patient tolerated the treatment with the following side effects: dissociation. Vital signs were monitored, see VS Flowsheet. Patient stated they felt ready to go home prior to discharge. AVS was offered to patient and patient declined. Patient was instructed not to drive for the remainder of the day and to notify clinic if any concerns arise.     Next appt: 22    Medications provided by Pharmacy       This service provided today was under the supervising provider of the day ESTELA Zamudio MD, who was available if needed.    Dunia Alexandra RN

## 2022-08-09 ENCOUNTER — ALLIED HEALTH/NURSE VISIT (OUTPATIENT)
Dept: PSYCHIATRY | Facility: CLINIC | Age: 52
End: 2022-08-09

## 2022-08-09 VITALS — HEART RATE: 76 BPM | DIASTOLIC BLOOD PRESSURE: 81 MMHG | SYSTOLIC BLOOD PRESSURE: 123 MMHG

## 2022-08-09 DIAGNOSIS — F33.2 SEVERE EPISODE OF RECURRENT MAJOR DEPRESSIVE DISORDER, WITHOUT PSYCHOTIC FEATURES (H): Primary | ICD-10-CM

## 2022-08-09 ASSESSMENT — PATIENT HEALTH QUESTIONNAIRE - PHQ9: SUM OF ALL RESPONSES TO PHQ QUESTIONS 1-9: 14

## 2022-08-09 NOTE — PROGRESS NOTES
Aure Joy comes into clinic today at the request of ESTELA Zamudio MD Ordering Provider for Med Injection only Ketamine.    Procedure Prep:  Medication double check completed by: John GUERRERO RN  Prior to administration pt identified by name and : yes    Nursing Assessment:  Appearance: dressed casually   Mood: better; SI is lower this week  Affect: wnl  Eye contact: good  PHQ 2022   PHQ-9 Total Score 15   Q9: Thoughts of better off dead/self-harm past 2 weeks More than half the days     QIDDSR 16 weekly assessment: score 17. Assessment was scanned to EHR.       Procedure Performed:  VSS and mental status WNL. Patient was given Ketamine. See MAR for details.     Post Procedure Assessment:  Patient tolerated the treatment with the following side effects: dissociation. Vital signs were monitored, see VS Flowsheet. Patient stated they felt ready to go home prior to discharge. AVS was offered to patient and patient declined. Patient was instructed not to drive for the remainder of the day and to notify clinic if any concerns arise.     Next appt: 22    Medications provided by Pharmacy       This service provided today was under the supervising provider of the day ESTELA Zamudio MD, who was available if needed.    Dunia Alexandra RN

## 2022-08-16 ENCOUNTER — ALLIED HEALTH/NURSE VISIT (OUTPATIENT)
Dept: PSYCHIATRY | Facility: CLINIC | Age: 52
End: 2022-08-16

## 2022-08-16 VITALS — DIASTOLIC BLOOD PRESSURE: 81 MMHG | HEART RATE: 80 BPM | SYSTOLIC BLOOD PRESSURE: 118 MMHG | OXYGEN SATURATION: 98 %

## 2022-08-16 DIAGNOSIS — F33.2 SEVERE EPISODE OF RECURRENT MAJOR DEPRESSIVE DISORDER, WITHOUT PSYCHOTIC FEATURES (H): Primary | ICD-10-CM

## 2022-08-16 ASSESSMENT — PATIENT HEALTH QUESTIONNAIRE - PHQ9: SUM OF ALL RESPONSES TO PHQ QUESTIONS 1-9: 12

## 2022-08-16 NOTE — PROGRESS NOTES
Aure Joy comes into clinic today at the request of ESTELA Zamudio MD Ordering Provider for Med Injection only Ketamine.    Procedure Prep:  Medication double check completed by: Eliot COOMBS RN  Prior to administration pt identified by name and : yes    Nursing Assessment:  Appearance: dressed casaully   Mood: ok  Affect: wnl  Eye contact: good  PHQ 2022   PHQ-9 Total Score 12   Q9: Thoughts of better off dead/self-harm past 2 weeks Several days     QIDDSR 16 weekly assessment: score 14. Assessment was scanned to EHR.       Procedure Performed:  VSS and mental status WNL. Patient was given Ketamine. See MAR for details.     Post Procedure Assessment:  Patient tolerated the treatment with the following side effects: dissociation. Vital signs were monitored, see VS Flowsheet. Patient stated they felt ready to go home prior to discharge. AVS was offered to patient and patient declined. Patient was instructed not to drive for the remainder of the day and to notify clinic if any concerns arise.     Next appt: 22    Medications provided by Pharmacy       This service provided today was under the supervising provider of the day ESTELA Zamudio MD, who was available if needed.    Dunia Alexandra RN

## 2022-08-23 ENCOUNTER — ALLIED HEALTH/NURSE VISIT (OUTPATIENT)
Dept: PSYCHIATRY | Facility: CLINIC | Age: 52
End: 2022-08-23
Payer: MEDICAID

## 2022-08-23 VITALS — HEART RATE: 86 BPM | DIASTOLIC BLOOD PRESSURE: 78 MMHG | SYSTOLIC BLOOD PRESSURE: 121 MMHG

## 2022-08-23 DIAGNOSIS — F33.2 SEVERE EPISODE OF RECURRENT MAJOR DEPRESSIVE DISORDER, WITHOUT PSYCHOTIC FEATURES (H): Primary | ICD-10-CM

## 2022-08-23 ASSESSMENT — PATIENT HEALTH QUESTIONNAIRE - PHQ9: SUM OF ALL RESPONSES TO PHQ QUESTIONS 1-9: 13

## 2022-08-23 NOTE — PROGRESS NOTES
Aure Joy comes into clinic today at the request of ESTELA Zamudio MD Ordering Provider for Med Injection only Ketamine.    Procedure Prep:  Medication double check completed by: John GUERRERO RN  Prior to administration pt identified by name and : yes    Nursing Assessment:  Appearance: dressed casually   Mood: ok  Affect: wnl  Eye contact: good  PHQ 2022   PHQ-9 Total Score 13   Q9: Thoughts of better off dead/self-harm past 2 weeks Not at all     QIDDSR 16 weekly assessment: score 15. Assessment was scanned to EHR.       Procedure Performed:  VSS and mental status WNL. Patient was given Ketamine. See MAR for details.     Post Procedure Assessment:  Patient tolerated the treatment with the following side effects: dissociatoin. Vital signs were monitored, see VS Flowsheet. Patient stated they felt ready to go home prior to discharge. AVS was offered to patient and patient declined. Patient was instructed not to drive for the remainder of the day and to notify clinic if any concerns arise.     Next appt: 22    Medications provided by Pharmacy       This service provided today was under the supervising provider of the day ESTELA Zamudio MD, who was available if needed.    Dunia Alexandra RN

## 2022-08-30 ENCOUNTER — ALLIED HEALTH/NURSE VISIT (OUTPATIENT)
Dept: PSYCHIATRY | Facility: CLINIC | Age: 52
End: 2022-08-30
Payer: MEDICAID

## 2022-08-30 VITALS — HEART RATE: 77 BPM | DIASTOLIC BLOOD PRESSURE: 82 MMHG | SYSTOLIC BLOOD PRESSURE: 128 MMHG

## 2022-08-30 DIAGNOSIS — F33.2 SEVERE EPISODE OF RECURRENT MAJOR DEPRESSIVE DISORDER, WITHOUT PSYCHOTIC FEATURES (H): Primary | ICD-10-CM

## 2022-08-30 ASSESSMENT — PATIENT HEALTH QUESTIONNAIRE - PHQ9: SUM OF ALL RESPONSES TO PHQ QUESTIONS 1-9: 12

## 2022-08-30 NOTE — PROGRESS NOTES
Aure Joy comes into clinic today at the request of ESTELA Zamudio MD Ordering Provider for Med Injection only Ketamine.    Procedure Prep:  Medication double check completed by: John GUERRERO RN  Prior to administration pt identified by name and : yes    Nursing Assessment:  Appearance: dressed casually  Mood: ok  Affect: wnl  Eye contact: good  PHQ 2022   PHQ-9 Total Score 12   Q9: Thoughts of better off dead/self-harm past 2 weeks Not at all     QIDDSR 16 weekly assessment: score 14. Assessment was scanned to EHR.       Procedure Performed:  VSS and mental status WNL. Patient was given Ketamine. See MAR for details.     Post Procedure Assessment:  Patient tolerated the treatment with the following side effects: dissociation. Vital signs were monitored, see VS Flowsheet. Patient stated they felt ready to go home prior to discharge. AVS was offered to patient and patient declined. Patient was instructed not to drive for the remainder of the day and to notify clinic if any concerns arise.     Next appt: 22    Medications provided by Pharmacy       This service provided today was under the supervising provider of the day Tim Williamson MD, who was available if needed.    Dunia Alexandra RN

## 2022-08-31 DIAGNOSIS — F33.2 SEVERE EPISODE OF RECURRENT MAJOR DEPRESSIVE DISORDER, WITHOUT PSYCHOTIC FEATURES (H): ICD-10-CM

## 2022-09-02 NOTE — TELEPHONE ENCOUNTER
Medication requested: lamotrigine 200 mg tablet  Last refilled: 8-4-22  Qty: 60  Last written rx:6-9-22 for 60t w/2 rf    Last clinic note:   - Continue Lamotrigine at 300mg daily (90-day rx +1 refill sent 09/09/21)    Last seen: 7-21-22  RTC: 2 months  Cancel: 1  No-show: 0  Next appt: 9-15-22    Refill decision:   Refill pended and routed to the  RN/provider for review/determination due to last clinic note med dose does not match current med list   1 cancel appt

## 2022-09-06 ENCOUNTER — ALLIED HEALTH/NURSE VISIT (OUTPATIENT)
Dept: PSYCHIATRY | Facility: CLINIC | Age: 52
End: 2022-09-06
Payer: COMMERCIAL

## 2022-09-06 VITALS — SYSTOLIC BLOOD PRESSURE: 137 MMHG | DIASTOLIC BLOOD PRESSURE: 92 MMHG | HEART RATE: 76 BPM

## 2022-09-06 DIAGNOSIS — F33.2 SEVERE EPISODE OF RECURRENT MAJOR DEPRESSIVE DISORDER, WITHOUT PSYCHOTIC FEATURES (H): Primary | ICD-10-CM

## 2022-09-06 RX ORDER — LAMOTRIGINE 200 MG/1
TABLET ORAL
Qty: 60 TABLET | Refills: 0 | Status: SHIPPED | OUTPATIENT
Start: 2022-09-06 | End: 2022-09-15

## 2022-09-06 ASSESSMENT — PATIENT HEALTH QUESTIONNAIRE - PHQ9: SUM OF ALL RESPONSES TO PHQ QUESTIONS 1-9: 12

## 2022-09-06 NOTE — PROGRESS NOTES
Aure Joy comes into clinic today at the request of ESTELA Zamudio MD Ordering Provider for Med Injection only Ketamine.    Procedure Prep:  Medication double check completed by: John GUERRERO RN  Prior to administration pt identified by name and : yes    Nursing Assessment:  Appearance: dressed casually   Mood: good  Affect: wnl  Eye contact: good  PHQ 2022   PHQ-9 Total Score 12   Q9: Thoughts of better off dead/self-harm past 2 weeks Several days     QIDDSR 16 weekly assessment: score 14. Assessment was scanned to EHR.        Procedure Performed:  VSS and mental status WNL. Patient was given Ketamine. See MAR for details.     Post Procedure Assessment:  Patient tolerated the treatment with the following side effects: dissociation. Vital signs were monitored, see VS Flowsheet. Patient stated they felt ready to go home prior to discharge. AVS was offered to patient and patient declined. Patient was instructed not to drive for the remainder of the day and to notify clinic if any concerns arise.     Next appt: 22    Medications provided by Pharmacy       This service provided today was under the supervising provider of the day ESTELA Zamudio MD, who was available if needed.    Dunia Alexandra RN

## 2022-09-13 ENCOUNTER — ALLIED HEALTH/NURSE VISIT (OUTPATIENT)
Dept: PSYCHIATRY | Facility: CLINIC | Age: 52
End: 2022-09-13
Payer: COMMERCIAL

## 2022-09-13 VITALS — SYSTOLIC BLOOD PRESSURE: 120 MMHG | HEART RATE: 91 BPM | DIASTOLIC BLOOD PRESSURE: 86 MMHG

## 2022-09-13 DIAGNOSIS — F33.2 SEVERE EPISODE OF RECURRENT MAJOR DEPRESSIVE DISORDER, WITHOUT PSYCHOTIC FEATURES (H): Primary | ICD-10-CM

## 2022-09-13 ASSESSMENT — PATIENT HEALTH QUESTIONNAIRE - PHQ9: SUM OF ALL RESPONSES TO PHQ QUESTIONS 1-9: 13

## 2022-09-13 NOTE — PROGRESS NOTES
Aure Joy comes into clinic today at the request of ESTELA Zamudio MD Ordering Provider for Med Injection only Ketamine.    Procedure Prep:  Medication double check completed by: John GUERRERO RN  Prior to administration pt identified by name and : yes    Nursing Assessment:  Appearance: dressed casually   Mood: ok  Affect: wnl  Eye contact: good  PHQ 2022   PHQ-9 Total Score 13   Q9: Thoughts of better off dead/self-harm past 2 weeks Several days     QIDDSR 16 weekly assessment: score 14. Assessment was scanned to EHR.       Procedure Performed:  VSS and mental status WNL. Patient was given Ketamine. See MAR for details.     Post Procedure Assessment:  Patient tolerated the treatment with the following side effects: dissociation. Vital signs were monitored, see VS Flowsheet. Patient stated they felt ready to go home prior to discharge. AVS was offered to patient and patient declined. Patient was instructed not to drive for the remainder of the day and to notify clinic if any concerns arise.     Next appt: 22    Medications provided by Pharmacy       This service provided today was under the supervising provider of the day ESTELA Zamudio MD, who was available if needed.    Dunia Alexandra RN

## 2022-09-15 ENCOUNTER — OFFICE VISIT (OUTPATIENT)
Dept: PSYCHIATRY | Facility: CLINIC | Age: 52
End: 2022-09-15

## 2022-09-15 VITALS
HEART RATE: 93 BPM | WEIGHT: 188 LBS | HEIGHT: 62 IN | BODY MASS INDEX: 34.6 KG/M2 | TEMPERATURE: 97.7 F | DIASTOLIC BLOOD PRESSURE: 78 MMHG | SYSTOLIC BLOOD PRESSURE: 133 MMHG

## 2022-09-15 DIAGNOSIS — F33.2 SEVERE EPISODE OF RECURRENT MAJOR DEPRESSIVE DISORDER, WITHOUT PSYCHOTIC FEATURES (H): ICD-10-CM

## 2022-09-15 RX ORDER — LAMOTRIGINE 200 MG/1
TABLET ORAL
Qty: 60 TABLET | Refills: 3 | Status: SHIPPED | OUTPATIENT
Start: 2022-09-15 | End: 2023-02-08
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
## 2022-09-20 ENCOUNTER — ALLIED HEALTH/NURSE VISIT (OUTPATIENT)
Dept: PSYCHIATRY | Facility: CLINIC | Age: 52
End: 2022-09-20

## 2022-09-20 ENCOUNTER — TELEPHONE (OUTPATIENT)
Dept: PSYCHIATRY | Facility: CLINIC | Age: 52
End: 2022-09-20

## 2022-09-20 VITALS — SYSTOLIC BLOOD PRESSURE: 138 MMHG | HEART RATE: 88 BPM | DIASTOLIC BLOOD PRESSURE: 90 MMHG

## 2022-09-20 DIAGNOSIS — F33.2 SEVERE EPISODE OF RECURRENT MAJOR DEPRESSIVE DISORDER, WITHOUT PSYCHOTIC FEATURES (H): Primary | ICD-10-CM

## 2022-09-20 ASSESSMENT — PATIENT HEALTH QUESTIONNAIRE - PHQ9: SUM OF ALL RESPONSES TO PHQ QUESTIONS 1-9: 16

## 2022-09-20 NOTE — PROGRESS NOTES
Aure Joy comes into clinic today at the request of ESTELA Zamudio MD Ordering Provider for Med Injection only Ketamine.    Procedure Prep:  Medication double check completed by: John GUERRERO RN  Prior to administration pt identified by name and : yes    Nursing Assessment:  Appearance: dressed casually   Mood: depressed, SI has come back   Affect: wnl  Eye contact: good  PHQ 2022   PHQ-9 Total Score 13   Q9: Thoughts of better off dead/self-harm past 2 weeks Several days     QIDDSR 16 weekly assessment: score 18. Assessment was scanned to EHR.       Procedure Performed:  VSS and mental status WNL. Patient was given Ketamine. See MAR for details.     Post Procedure Assessment:  Patient tolerated the treatment with the following side effects: dissociation. Vital signs were monitored, see VS Flowsheet. Patient stated they felt ready to go home prior to discharge. AVS was offered to patient and patient decoined. Patient was instructed not to drive for the remainder of the day and to notify clinic if any concerns arise.     Next appt: 22    Medications provided by Pharmacy       This service provided today was under the supervising provider of the day ESTELA Zamudio MD, who was available if needed.    Jael Alexandra RN

## 2022-09-27 ENCOUNTER — ALLIED HEALTH/NURSE VISIT (OUTPATIENT)
Dept: PSYCHIATRY | Facility: CLINIC | Age: 52
End: 2022-09-27
Payer: COMMERCIAL

## 2022-09-27 VITALS — OXYGEN SATURATION: 93 % | HEART RATE: 85 BPM | DIASTOLIC BLOOD PRESSURE: 90 MMHG | SYSTOLIC BLOOD PRESSURE: 142 MMHG

## 2022-09-27 DIAGNOSIS — F33.2 SEVERE EPISODE OF RECURRENT MAJOR DEPRESSIVE DISORDER, WITHOUT PSYCHOTIC FEATURES (H): Primary | ICD-10-CM

## 2022-09-27 ASSESSMENT — PATIENT HEALTH QUESTIONNAIRE - PHQ9: SUM OF ALL RESPONSES TO PHQ QUESTIONS 1-9: 16

## 2022-09-27 NOTE — PROGRESS NOTES
Aure Joy comes into clinic today at the request of Paris Zamudio MD Ordering Provider for Med Injection only intramuscular ketamine.    Procedure Prep:  Medication double check completed by: Estrella TEAGUE RN.  Prior to administration pt identified by name and : Yes    Nursing Assessment:  Appearance: dressed and groomed appropriately   Mood: depressed  Affect: constricted  Eye contact: moderate  PHQ 2022   PHQ-9 Total Score 16   Q9: Thoughts of better off dead/self-harm past 2 weeks More than half the days     QIDDSR 16 weekly assessment: score 16. Assessment was scanned to EHR.        Procedure Performed:  VSS and mental status WNL. Patient was given intramuscular ketamine. See MAR for details.     Post Procedure Assessment:  Patient tolerated the treatment with the following side effects: dissociation. Vital signs were monitored, see VS Flowsheet. Patient stated they felt ready to go home prior to discharge. Patient was instructed not to drive for the remainder of the day and to notify clinic if any concerns arise.     Next appt: 10/4/2022    Medication supplied by pharmacy    This service provided today was under the supervising provider of the day Owen Zamudio, who was available if needed.    John Velazco RN

## 2022-10-04 ENCOUNTER — ALLIED HEALTH/NURSE VISIT (OUTPATIENT)
Dept: PSYCHIATRY | Facility: CLINIC | Age: 52
End: 2022-10-04

## 2022-10-04 VITALS — HEART RATE: 89 BPM | DIASTOLIC BLOOD PRESSURE: 89 MMHG | SYSTOLIC BLOOD PRESSURE: 150 MMHG

## 2022-10-04 DIAGNOSIS — F33.2 SEVERE EPISODE OF RECURRENT MAJOR DEPRESSIVE DISORDER, WITHOUT PSYCHOTIC FEATURES (H): Primary | ICD-10-CM

## 2022-10-04 ASSESSMENT — PATIENT HEALTH QUESTIONNAIRE - PHQ9: SUM OF ALL RESPONSES TO PHQ QUESTIONS 1-9: 16

## 2022-10-04 NOTE — PROGRESS NOTES
Aure Joy comes into clinic today at the request of ESTELA Zamudio MD Ordering Provider for Med Injection only Ketamine.    Procedure Prep:  Medication double check completed by: John GUERRERO RN  Prior to administration pt identified by name and : yes    Nursing Assessment:  Appearance: dressed casually   Mood: depressed  Affect: wnl  Eye contact: good  PHQ 10/4/2022   PHQ-9 Total Score 16   Q9: Thoughts of better off dead/self-harm past 2 weeks More than half the days     QIDDSR 16 weekly assessment: score 17. Assessment was scanned to EHR.       Procedure Performed:  VSS and mental status WNL. Patient was given Ketamine. See MAR for details.     Post Procedure Assessment:  Patient tolerated the treatment with the following side effects: dissociation. Vital signs were monitored, see VS Flowsheet. Patient stated they felt ready to go home prior to discharge. AVS was offered to patient and patient declined. Patient was instructed not to drive for the remainder of the day and to notify clinic if any concerns arise.     Next appt: 10/11/22    Medications provided by Pharmacy       This service provided today was under the supervising provider of the day ESTELA Zamudio MD, who was available if needed.    Jael Alexandra RN

## 2022-10-11 ENCOUNTER — ALLIED HEALTH/NURSE VISIT (OUTPATIENT)
Dept: PSYCHIATRY | Facility: CLINIC | Age: 52
End: 2022-10-11
Payer: COMMERCIAL

## 2022-10-11 VITALS — DIASTOLIC BLOOD PRESSURE: 89 MMHG | HEART RATE: 90 BPM | SYSTOLIC BLOOD PRESSURE: 131 MMHG

## 2022-10-11 DIAGNOSIS — F33.2 SEVERE EPISODE OF RECURRENT MAJOR DEPRESSIVE DISORDER, WITHOUT PSYCHOTIC FEATURES (H): Primary | ICD-10-CM

## 2022-10-11 ASSESSMENT — PATIENT HEALTH QUESTIONNAIRE - PHQ9: SUM OF ALL RESPONSES TO PHQ QUESTIONS 1-9: 15

## 2022-10-11 NOTE — PROGRESS NOTES
Aure Joy comes into clinic today at the request of Owen Zamudio MD Ordering Provider for Med Injection only Ketamine.    Procedure Prep:  Medication double check completed by: Eliot COOMBS RN  Prior to administration pt identified by name and : Yes    Nursing Assessment:  Appearance: Casual Clothing     Mood: Anxious and depressed   Affect: Wnl    Eye contact: Good  PHQ 10/4/2022   PHQ-9 Total Score 16   Q9: Thoughts of better off dead/self-harm past 2 weeks More than half the days     QIDDSR 16 weekly assessment: score 16. Assessment was scanned to EHR.       Procedure Performed:  VSS and mental status WNL. Patient was given Ketamine. See MAR for details.     Post Procedure Assessment:  Patient tolerated the treatment with the following side effects: Dissociative. Vital signs were monitored, see VS Flowsheet. Patient stated they felt ready to go home prior to discharge. AVS was offered to patient and patient refused. Patient was instructed not to drive for the remainder of the day and to notify clinic if any concerns arise.     Next appt: 10/18/22 1530    Medications provided by Pharmacy     This service provided today was under the supervising provider of the day ESTELA Zamudio MD, who was available if needed.      BEN CORDOVA, RN

## 2022-10-18 ENCOUNTER — ALLIED HEALTH/NURSE VISIT (OUTPATIENT)
Dept: PSYCHIATRY | Facility: CLINIC | Age: 52
End: 2022-10-18
Payer: COMMERCIAL

## 2022-10-18 VITALS — HEART RATE: 76 BPM | SYSTOLIC BLOOD PRESSURE: 141 MMHG | DIASTOLIC BLOOD PRESSURE: 88 MMHG

## 2022-10-18 DIAGNOSIS — F33.2 SEVERE EPISODE OF RECURRENT MAJOR DEPRESSIVE DISORDER, WITHOUT PSYCHOTIC FEATURES (H): Primary | ICD-10-CM

## 2022-10-18 ASSESSMENT — PATIENT HEALTH QUESTIONNAIRE - PHQ9: SUM OF ALL RESPONSES TO PHQ QUESTIONS 1-9: 14

## 2022-10-18 NOTE — PROGRESS NOTES
Aure Joy comes into clinic today at the request of Owen Zamudio Ordering Provider for Med Injection only Ketamine.    Procedure Prep:  Medication double check completed by: Perla SUE RN   Prior to administration pt identified by name and : Yes    Nursing Assessment:  Appearance: well groomed   Mood: okay, looking forward to [employer] tonight   Affect: WNL  Eye contact: good   PHQ 10/18/2022   PHQ-9 Total Score 14   Q9: Thoughts of better off dead/self-harm past 2 weeks More than half the days     QIDDSR 16 weekly assessment: score 19. Assessment was scanned to EHR.       Procedure Performed:  VSS and mental status WNL. Patient was given ketamine. See MAR for details.     Post Procedure Assessment:  Patient tolerated the treatment with the following side effects: Dissociative. Vital signs were monitored, see VS Flowsheet. Patient stated they felt ready to go home prior to discharge. AVS was offered to patient and patient denied. Patient was instructed not to drive for the remainder of the day and to notify clinic if any concerns arise.     Next appt: 10/25/22 1430     Medications provided by Pharmacy     This service provided today was under the supervising provider of the day ESTELA Zamudio, who was available if needed.    BEN CORDOVA, RN

## 2022-10-25 ENCOUNTER — ALLIED HEALTH/NURSE VISIT (OUTPATIENT)
Dept: PSYCHIATRY | Facility: CLINIC | Age: 52
End: 2022-10-25
Payer: COMMERCIAL

## 2022-10-25 VITALS — SYSTOLIC BLOOD PRESSURE: 113 MMHG | DIASTOLIC BLOOD PRESSURE: 82 MMHG | HEART RATE: 80 BPM

## 2022-10-25 DIAGNOSIS — F33.2 SEVERE EPISODE OF RECURRENT MAJOR DEPRESSIVE DISORDER, WITHOUT PSYCHOTIC FEATURES (H): Primary | ICD-10-CM

## 2022-10-25 ASSESSMENT — PATIENT HEALTH QUESTIONNAIRE - PHQ9: SUM OF ALL RESPONSES TO PHQ QUESTIONS 1-9: 14

## 2022-10-25 NOTE — PROGRESS NOTES
Attempted to make contact again, no answer and unable to leave voicemail as mailbox is full.   Aure Joy comes into clinic today at the request of ESTELA Zamudio Ordering Provider for Med Injection only Ketamine.    Procedure Prep:  Medication double check completed by: Jael Alexandra RN   Prior to administration pt identified by name and : Yes     Nursing Assessment:  Appearance: Well groomed   Mood: okay   Affect: WNL  Eye contact: Good   PHQ 10/25/2022   PHQ-9 Total Score 14   Q9: Thoughts of better off dead/self-harm past 2 weeks More than half the days     QIDDSR 16 weekly assessment: score 16. Assessment was scanned to EHR.     Procedure Performed:  VSS and mental status WNL. Patient was given ketamine. See MAR for details.     Post Procedure Assessment:  Patient tolerated the treatment with the following side effects: dissociation. Vital signs were monitored, see VS Flowsheet. Patient stated they felt ready to go home prior to discharge. AVS was offered to patient and patient declined. Patient was instructed not to drive for the remainder of the day and to notify clinic if any concerns arise.     Next appt:  4405    Medications provided by Pharmacy     This service provided today was under the supervising provider of the day ESTELA Zamudio MD, who was available if needed.        Perla Robbins, RN

## 2022-11-01 ENCOUNTER — ALLIED HEALTH/NURSE VISIT (OUTPATIENT)
Dept: PSYCHIATRY | Facility: CLINIC | Age: 52
End: 2022-11-01
Payer: COMMERCIAL

## 2022-11-01 VITALS — DIASTOLIC BLOOD PRESSURE: 78 MMHG | HEART RATE: 88 BPM | SYSTOLIC BLOOD PRESSURE: 127 MMHG

## 2022-11-01 DIAGNOSIS — F33.2 SEVERE EPISODE OF RECURRENT MAJOR DEPRESSIVE DISORDER, WITHOUT PSYCHOTIC FEATURES (H): Primary | ICD-10-CM

## 2022-11-01 ASSESSMENT — PATIENT HEALTH QUESTIONNAIRE - PHQ9: SUM OF ALL RESPONSES TO PHQ QUESTIONS 1-9: 11

## 2022-11-01 NOTE — PROGRESS NOTES
Aure Joy comes into clinic today at the request of ESTELA Zamudio Ordering Provider for Med Injection only Ketamine.    Procedure Prep:  Medication double check completed by: Jael ng  Prior to administration pt identified by name and : yes    Nursing Assessment:  Appearance: well groomed   Mood: neutral to bright  Affect: WNL  Eye contact: Good  PHQ 2022   PHQ-9 Total Score 11   Q9: Thoughts of better off dead/self-harm past 2 weeks More than half the days     QIDDSR 16 weekly assessment: score 16. Assessment was scanned to EHR.   Procedure Performed:  VSS and mental status WNL. Patient was given Ketamine. See MAR for details.     Post Procedure Assessment:  Patient tolerated the treatment with the following side effects: Dissociation. Vital signs were monitored, see VS Flowsheet. Patient stated they felt ready to go home prior to discharge. AVS was offered to patient and patient declined. Patient was instructed not to drive for the remainder of the day and to notify clinic if any concerns arise.     Next appt: 2022 1530    Medications provided by Pharmacy     This service provided today was under the supervising provider of the day ESTELA Zamudio, who was available if needed.    Perla Robbins RN

## 2022-11-08 ENCOUNTER — ALLIED HEALTH/NURSE VISIT (OUTPATIENT)
Dept: PSYCHIATRY | Facility: CLINIC | Age: 52
End: 2022-11-08
Payer: COMMERCIAL

## 2022-11-08 VITALS — DIASTOLIC BLOOD PRESSURE: 72 MMHG | HEART RATE: 85 BPM | SYSTOLIC BLOOD PRESSURE: 122 MMHG

## 2022-11-08 DIAGNOSIS — F33.2 SEVERE EPISODE OF RECURRENT MAJOR DEPRESSIVE DISORDER, WITHOUT PSYCHOTIC FEATURES (H): Primary | ICD-10-CM

## 2022-11-08 ASSESSMENT — PATIENT HEALTH QUESTIONNAIRE - PHQ9: SUM OF ALL RESPONSES TO PHQ QUESTIONS 1-9: 10

## 2022-11-08 NOTE — PROGRESS NOTES
Aure Joy comes into clinic today at the request of ESTELA Zamudio MD Ordering Provider for Med Injection only Ketamine.    Procedure Prep:  Medication double check completed by: Jael Alexandra RN   Prior to administration pt identified by name and : Yes    Nursing Assessment:  Appearance: Well groomed   Mood: nuetral  Affect: WNL  Eye contact: Good  PHQ 2022   PHQ-9 Total Score 10   Q9: Thoughts of better off dead/self-harm past 2 weeks Several days     QIDDSR 16 weekly assessment: score 15. Assessment was scanned to EHR.       Procedure Performed:  VSS and mental status WNL. Patient was given ketamine. See MAR for details.     Post Procedure Assessment:  Patient tolerated the treatment with the following side effects: dissociation. Vital signs were monitored, see VS Flowsheet. Patient stated they felt ready to go home prior to discharge. AVS was offered to patient and patient declined. Patient was instructed not to drive for the remainder of the day and to notify clinic if any concerns arise.     Next appt: 11/15 1530    Medications provided by Pharmacy     This service provided today was under the supervising provider of the day ESTELA Zamudio MD, who was available if needed.    Perla Robbins, RN

## 2022-11-15 ENCOUNTER — ALLIED HEALTH/NURSE VISIT (OUTPATIENT)
Dept: PSYCHIATRY | Facility: CLINIC | Age: 52
End: 2022-11-15
Payer: COMMERCIAL

## 2022-11-15 VITALS — SYSTOLIC BLOOD PRESSURE: 139 MMHG | DIASTOLIC BLOOD PRESSURE: 89 MMHG | HEART RATE: 82 BPM

## 2022-11-15 DIAGNOSIS — F33.2 SEVERE EPISODE OF RECURRENT MAJOR DEPRESSIVE DISORDER, WITHOUT PSYCHOTIC FEATURES (H): Primary | ICD-10-CM

## 2022-11-15 ASSESSMENT — PATIENT HEALTH QUESTIONNAIRE - PHQ9: SUM OF ALL RESPONSES TO PHQ QUESTIONS 1-9: 11

## 2022-11-15 NOTE — PROGRESS NOTES
Aure Joy comes into clinic today at the request of ESTELA Zamudio Ordering Provider for Med Injection only ketamine.    Procedure Prep:  Medication double check completed by: Estrella COOMBS RN   Prior to administration pt identified by name and : yes    Nursing Assessment:  Appearance: well groomed   Mood: depressed  Affect: WNL  Eye contact: good  PHQ 11/15/2022   PHQ-9 Total Score 11   Q9: Thoughts of better off dead/self-harm past 2 weeks Several days     QIDDSR 16 weekly assessment: score 16. Assessment was scanned to EHR.       Procedure Performed:  VSS and mental status WNL. Patient was given ketamine. See MAR for details.     Post Procedure Assessment:  Patient tolerated the treatment with the following side effects: dissociation. Vital signs were monitored, see VS Flowsheet. Patient stated they felt ready to go home prior to discharge. AVS was offered to patient and patient declined. Patient was instructed not to drive for the remainder of the day and to notify clinic if any concerns arise.     Next appt:  1400    Medications provided by Pharmacy     This service provided today was under the supervising provider of the day ESETLA Zamudio, who was available if needed.    Perla Robbins RN

## 2022-11-22 ENCOUNTER — ALLIED HEALTH/NURSE VISIT (OUTPATIENT)
Dept: PSYCHIATRY | Facility: CLINIC | Age: 52
End: 2022-11-22
Payer: COMMERCIAL

## 2022-11-22 VITALS — DIASTOLIC BLOOD PRESSURE: 80 MMHG | HEART RATE: 85 BPM | SYSTOLIC BLOOD PRESSURE: 140 MMHG

## 2022-11-22 DIAGNOSIS — F33.2 SEVERE EPISODE OF RECURRENT MAJOR DEPRESSIVE DISORDER, WITHOUT PSYCHOTIC FEATURES (H): Primary | ICD-10-CM

## 2022-11-22 ASSESSMENT — PATIENT HEALTH QUESTIONNAIRE - PHQ9: SUM OF ALL RESPONSES TO PHQ QUESTIONS 1-9: 11

## 2022-11-22 NOTE — PROGRESS NOTES
Aure Joy comes into clinic today at the request of ESTELA Zamudio Ordering Provider for Med Injection only ketamine.    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 Assessment was scanned to EHR.   Procedure Performed:  VSS and mental status WNL. Patient was given ketamine. See MAR for details.     Post Procedure Assessment:  Patient tolerated the treatment with the following side effects: dissociation. Vital signs were monitored, see VS Flowsheet. Patient stated they felt ready to go home prior to discharge. AVS was offered to patient and patient declined. Patient was instructed not to drive for the remainder of the day and to notify clinic if any concerns arise.     Next appt:  0930    Medications provided by Pharmacy     This service provided today was under the supervising provider of the day ESTELA Zamudio RN, who was available if needed.    Perla Robbins, RN

## 2022-11-28 ENCOUNTER — ALLIED HEALTH/NURSE VISIT (OUTPATIENT)
Dept: PSYCHIATRY | Facility: CLINIC | Age: 52
End: 2022-11-28
Payer: COMMERCIAL

## 2022-11-28 VITALS — HEART RATE: 82 BPM | SYSTOLIC BLOOD PRESSURE: 134 MMHG | DIASTOLIC BLOOD PRESSURE: 84 MMHG

## 2022-11-28 DIAGNOSIS — F33.2 SEVERE EPISODE OF RECURRENT MAJOR DEPRESSIVE DISORDER, WITHOUT PSYCHOTIC FEATURES (H): Primary | ICD-10-CM

## 2022-11-28 ASSESSMENT — PATIENT HEALTH QUESTIONNAIRE - PHQ9: SUM OF ALL RESPONSES TO PHQ QUESTIONS 1-9: 10

## 2022-11-28 NOTE — PROGRESS NOTES
Aure Joy comes into clinic today at the request of ESTELA Zamudio MD Ordering Provider for Med Injection only Ketamine.    Procedure Prep:  Medication double check completed by: Jael Alexandra RN   Prior to administration pt identified by name and : yes    Nursing Assessment:  Appearance: well groomed   Mood: neutral  Affect: WNL  Eye contact: good  PHQ 2022   PHQ-9 Total Score 10   Q9: Thoughts of better off dead/self-harm past 2 weeks Several days     QIDDSR 16 weekly assessment: score 15. Assessment was scanned to EHR.     Procedure Performed:  VSS and mental status WNL. Patient was given ketamine. See MAR for details.     Post Procedure Assessment:  Patient tolerated the treatment with the following side effects: dissociation. Vital signs were monitored, see VS Flowsheet. Patient stated they felt ready to go home prior to discharge. AVS was offered to patient and patient declined. Patient was instructed not to drive for the remainder of the day and to notify clinic if any concerns arise.     Next appt:  0930     Medications provided by Pharmacy     This service provided today was under the supervising provider of the day LOTUS Gordon MD, who was available if needed.    Perla Robbins RN

## 2022-12-05 ENCOUNTER — ALLIED HEALTH/NURSE VISIT (OUTPATIENT)
Dept: PSYCHIATRY | Facility: CLINIC | Age: 52
End: 2022-12-05
Payer: COMMERCIAL

## 2022-12-05 VITALS — SYSTOLIC BLOOD PRESSURE: 113 MMHG | HEART RATE: 82 BPM | DIASTOLIC BLOOD PRESSURE: 78 MMHG

## 2022-12-05 DIAGNOSIS — F33.2 SEVERE EPISODE OF RECURRENT MAJOR DEPRESSIVE DISORDER, WITHOUT PSYCHOTIC FEATURES (H): Primary | ICD-10-CM

## 2022-12-05 ASSESSMENT — PATIENT HEALTH QUESTIONNAIRE - PHQ9: SUM OF ALL RESPONSES TO PHQ QUESTIONS 1-9: 9

## 2022-12-05 NOTE — PROGRESS NOTES
Aure Joy comes into clinic today at the request of ESTELA Zamudio Ordering Provider for Med Injection only ketamine.    Procedure Prep:  Medication double check completed by: Jael Alexandra RN   Prior to administration pt identified by name and : yes    Nursing Assessment:  Appearance: casual clothing   Mood: Good  Affect: wnl  Eye contact: good  PHQ 2022   PHQ-9 Total Score 9   Q9: Thoughts of better off dead/self-harm past 2 weeks Not at all     QIDDSR 16 weekly assessment: score 12. Assessment was scanned to EHR.   Procedure Performed:  VSS and mental status WNL. Patient was given ketamine. See MAR for details.     Post Procedure Assessment:  Patient tolerated the treatment with the following side effects: dissociation. Vital signs were monitored, see VS Flowsheet. Patient stated they felt ready to go home prior to discharge. AVS was offered to patient and patient declined. Patient was instructed not to drive for the remainder of the day and to notify clinic if any concerns arise.     Next appt:  0930    Medications provided by Pharmacy     This service provided today was under the supervising provider of the day LOTUS Gordon, who was available if needed.    Perla Robbins RN

## 2022-12-11 ENCOUNTER — HEALTH MAINTENANCE LETTER (OUTPATIENT)
Age: 52
End: 2022-12-11

## 2022-12-12 ENCOUNTER — ALLIED HEALTH/NURSE VISIT (OUTPATIENT)
Dept: PSYCHIATRY | Facility: CLINIC | Age: 52
End: 2022-12-12
Payer: COMMERCIAL

## 2022-12-12 VITALS — HEART RATE: 83 BPM | DIASTOLIC BLOOD PRESSURE: 81 MMHG | SYSTOLIC BLOOD PRESSURE: 123 MMHG

## 2022-12-12 DIAGNOSIS — F33.2 SEVERE EPISODE OF RECURRENT MAJOR DEPRESSIVE DISORDER, WITHOUT PSYCHOTIC FEATURES (H): Primary | ICD-10-CM

## 2022-12-12 ASSESSMENT — PATIENT HEALTH QUESTIONNAIRE - PHQ9: SUM OF ALL RESPONSES TO PHQ QUESTIONS 1-9: 11

## 2022-12-12 NOTE — PROGRESS NOTES
Aure Joy comes into clinic today at the request of ESTELA Zamudio Ordering Provider for Med Injection only Ketamine.    Procedure Prep:  Medication double check completed by: Jael Alexandra RN   Prior to administration pt identified by name and : yes    Nursing Assessment:  Appearance: casual clothing   Mood: neutral  Affect: wnl  Eye contact: good  PHQ 2022   PHQ-9 Total Score 11   Q9: Thoughts of better off dead/self-harm past 2 weeks Several days     QIDDSR 16 weekly assessment: score 13. Assessment was scanned to EHR.   Procedure Performed:  VSS and mental status WNL. Patient was given ketamine. See MAR for details.     Post Procedure Assessment:  Patient tolerated the treatment with the following side effects: dissociation. Vital signs were monitored, see VS Flowsheet. Patient stated they felt ready to go home prior to discharge. AVS was offered to patient and patient declined. Patient was instructed not to drive for the remainder of the day and to notify clinic if any concerns arise.     Next appt:  0930    Medications provided by Pharmacy     This service provided today was under the supervising provider of the day LOTUS Gordon MD, who was available if needed.    Perla Robbins RN

## 2022-12-19 ENCOUNTER — ALLIED HEALTH/NURSE VISIT (OUTPATIENT)
Dept: PSYCHIATRY | Facility: CLINIC | Age: 52
End: 2022-12-19
Payer: COMMERCIAL

## 2022-12-19 VITALS — DIASTOLIC BLOOD PRESSURE: 79 MMHG | HEART RATE: 73 BPM | SYSTOLIC BLOOD PRESSURE: 128 MMHG

## 2022-12-19 DIAGNOSIS — F33.2 SEVERE EPISODE OF RECURRENT MAJOR DEPRESSIVE DISORDER, WITHOUT PSYCHOTIC FEATURES (H): Primary | ICD-10-CM

## 2022-12-19 ASSESSMENT — PATIENT HEALTH QUESTIONNAIRE - PHQ9: SUM OF ALL RESPONSES TO PHQ QUESTIONS 1-9: 9

## 2022-12-19 NOTE — PROGRESS NOTES
Aure Joy comes into clinic today at the request of ESTELA Zamudio Ordering Provider for Med Injection only Ketamine.    Procedure Prep:  Medication double check completed by: Jael ng RN   Prior to administration pt identified by name and : yes    Nursing Assessment:  Appearance: casual clothing   Mood: neutral  Affect: wnl  Eye contact: good  PHQ 2022   PHQ-9 Total Score 9   Q9: Thoughts of better off dead/self-harm past 2 weeks Not at all     QIDDSR 16 weekly assessment: score 13. Assessment was scanned to EHR.       Procedure Performed:  VSS and mental status WNL. Patient was given ketamine. See MAR for details.     Post Procedure Assessment:  Patient tolerated the treatment with the following side effects: dissociation. Vital signs were monitored, see VS Flowsheet. Patient stated they felt ready to go home prior to discharge. AVS was offered to patient and patient declined. Patient was instructed not to drive for the remainder of the day and to notify clinic if any concerns arise.     Next appt:  0930    Medications provided by Pharmacy     This service provided today was under the supervising provider of the day LOTUS Gordon MD, who was available if needed.    Perla Robbins RN

## 2022-12-26 ENCOUNTER — ALLIED HEALTH/NURSE VISIT (OUTPATIENT)
Dept: PSYCHIATRY | Facility: CLINIC | Age: 52
End: 2022-12-26
Payer: COMMERCIAL

## 2022-12-26 VITALS — HEART RATE: 80 BPM | DIASTOLIC BLOOD PRESSURE: 74 MMHG | SYSTOLIC BLOOD PRESSURE: 113 MMHG

## 2022-12-26 DIAGNOSIS — F33.2 SEVERE RECURRENT MAJOR DEPRESSION WITHOUT PSYCHOTIC FEATURES (H): ICD-10-CM

## 2022-12-26 DIAGNOSIS — F33.2 SEVERE EPISODE OF RECURRENT MAJOR DEPRESSIVE DISORDER, WITHOUT PSYCHOTIC FEATURES (H): Primary | ICD-10-CM

## 2022-12-26 ASSESSMENT — PATIENT HEALTH QUESTIONNAIRE - PHQ9: SUM OF ALL RESPONSES TO PHQ QUESTIONS 1-9: 10

## 2022-12-26 NOTE — PROGRESS NOTES
Aure Joy comes into clinic today at the request of ESTELA Zamudio MD Ordering Provider for Med Injection only Ketamine.    Procedure Prep:  Medication double check completed by: Eliot COOMBS RN  Prior to administration pt identified by name and : yes    Nursing Assessment:  Appearance: casual clothing   Mood: nuetral  Affect: wnl  Eye contact: good  PHQ 2022   PHQ-9 Total Score 10   Q9: Thoughts of better off dead/self-harm past 2 weeks Not at all     QIDDSR 16 weekly assessment: score 13. Assessment was scanned to EHR.     Procedure Performed:  VSS and mental status WNL. Patient was given ketamine. See MAR for details.     Post Procedure Assessment:  Patient tolerated the treatment with the following side effects: dissociation. Vital signs were monitored, see VS Flowsheet. Patient stated they felt ready to go home prior to discharge. AVS was offered to patient and patient declined. Patient was instructed not to drive for the remainder of the day and to notify clinic if any concerns arise.     Next appt:  0930    Medications provided by Pharmacy     This service provided today was under the supervising provider of the day LOTUS Gordon MD, who was available if needed.    Perla Robbins RN

## 2022-12-27 RX ORDER — KETAMINE HYDROCHLORIDE 50 MG/ML
INJECTION, SOLUTION INTRAMUSCULAR; INTRAVENOUS
Qty: 10 ML | Refills: 5 | Status: SHIPPED | OUTPATIENT
Start: 2022-12-27 | End: 2023-03-29

## 2022-12-28 ENCOUNTER — TELEPHONE (OUTPATIENT)
Dept: PSYCHIATRY | Facility: CLINIC | Age: 52
End: 2022-12-28

## 2022-12-28 DIAGNOSIS — F33.2 SEVERE RECURRENT MAJOR DEPRESSION WITHOUT PSYCHOTIC FEATURES (H): Primary | ICD-10-CM

## 2022-12-28 NOTE — TELEPHONE ENCOUNTER
Due to new protocol for lab monitoring for Ketamine patients, patient is due for the following labs/procedures  -CBC  -BMP  -UDS  -UA  -Hepatic Panel   -EKG

## 2023-01-02 ENCOUNTER — ALLIED HEALTH/NURSE VISIT (OUTPATIENT)
Dept: PSYCHIATRY | Facility: CLINIC | Age: 53
End: 2023-01-02
Payer: COMMERCIAL

## 2023-01-02 VITALS — HEART RATE: 90 BPM | DIASTOLIC BLOOD PRESSURE: 92 MMHG | SYSTOLIC BLOOD PRESSURE: 132 MMHG

## 2023-01-02 DIAGNOSIS — F33.2 SEVERE RECURRENT MAJOR DEPRESSION WITHOUT PSYCHOTIC FEATURES (H): Primary | ICD-10-CM

## 2023-01-02 ASSESSMENT — PATIENT HEALTH QUESTIONNAIRE - PHQ9: SUM OF ALL RESPONSES TO PHQ QUESTIONS 1-9: 12

## 2023-01-02 NOTE — PROGRESS NOTES
Aure Joy comes into clinic today at the request of ESTELA Zamudio MD Ordering Provider for Med Injection only Ketamine.    Procedure Prep:  Medication double check completed by: Jael Alexandra RN  Prior to administration pt identified by name and : yes    Nursing Assessment:  Appearance: casual clothing   Mood: neutral  Affect: wnl  Eye contact: good  PHQ 2023   PHQ-9 Total Score 12   Q9: Thoughts of better off dead/self-harm past 2 weeks Several days     QIDDSR 16 weekly assessment: score 15. Assessment was scanned to EHR.     Procedure Performed:  VSS and mental status WNL. Patient was given ketamine. See MAR for details.     Post Procedure Assessment:  Patient tolerated the treatment with the following side effects: dissociation. Vital signs were monitored, see VS Flowsheet. Patient stated they felt ready to go home prior to discharge. AVS was offered to patient and patient declined. Patient was instructed not to drive for the remainder of the day and to notify clinic if any concerns arise.     Next appt: 1/10     Medications provided by Pharmacy     This service provided today was under the supervising provider of the day LOTUS Gordon MD, who was available if needed.    Perla Robbins RN

## 2023-01-09 ENCOUNTER — ALLIED HEALTH/NURSE VISIT (OUTPATIENT)
Dept: PSYCHIATRY | Facility: CLINIC | Age: 53
End: 2023-01-09
Payer: COMMERCIAL

## 2023-01-09 VITALS — SYSTOLIC BLOOD PRESSURE: 135 MMHG | DIASTOLIC BLOOD PRESSURE: 83 MMHG | HEART RATE: 76 BPM

## 2023-01-09 DIAGNOSIS — F33.2 SEVERE RECURRENT MAJOR DEPRESSION WITHOUT PSYCHOTIC FEATURES (H): Primary | ICD-10-CM

## 2023-01-09 ASSESSMENT — PATIENT HEALTH QUESTIONNAIRE - PHQ9: SUM OF ALL RESPONSES TO PHQ QUESTIONS 1-9: 12

## 2023-01-09 NOTE — PROGRESS NOTES
Aure Joy comes into clinic today at the request of ESTELA Zamudio MD Ordering Provider for Med Injection only Ketamine.    Procedure Prep:  Medication double check completed by: Jael Alexandra RN   Prior to administration pt identified by name and : yes    Nursing Assessment:  Appearance: casual clothing   Mood: good  Affect: wnl  Eye contact: good  PHQ 2023   PHQ-9 Total Score 12   Q9: Thoughts of better off dead/self-harm past 2 weeks Not at all     QIDDSR 16 weekly assessment: score 14. Assessment was scanned to EHR.       Procedure Performed:  VSS and mental status WNL. Patient was given ketamine. See MAR for details.     Post Procedure Assessment:  Patient tolerated the treatment with the following side effects: dissociation. Vital signs were monitored, see VS Flowsheet. Patient stated they felt ready to go home prior to discharge. AVS was offered to patient and patient declined. Patient was instructed not to drive for the remainder of the day and to notify clinic if any concerns arise.     Next appt:     Medications provided by Pharmacy     This service provided today was under the supervising provider of the day Dom Shirley MD, who was available if needed.    Perla Robbins RN

## 2023-01-19 ENCOUNTER — OFFICE VISIT (OUTPATIENT)
Dept: PSYCHIATRY | Facility: CLINIC | Age: 53
End: 2023-01-19
Payer: COMMERCIAL

## 2023-01-19 ENCOUNTER — ALLIED HEALTH/NURSE VISIT (OUTPATIENT)
Dept: PSYCHIATRY | Facility: CLINIC | Age: 53
End: 2023-01-19
Payer: COMMERCIAL

## 2023-01-19 VITALS — TEMPERATURE: 97.2 F | DIASTOLIC BLOOD PRESSURE: 82 MMHG | HEART RATE: 81 BPM | SYSTOLIC BLOOD PRESSURE: 132 MMHG

## 2023-01-19 VITALS — DIASTOLIC BLOOD PRESSURE: 77 MMHG | SYSTOLIC BLOOD PRESSURE: 109 MMHG | HEART RATE: 83 BPM

## 2023-01-19 DIAGNOSIS — F43.10 PTSD (POST-TRAUMATIC STRESS DISORDER): ICD-10-CM

## 2023-01-19 DIAGNOSIS — D32.9 MENINGIOMA (H): ICD-10-CM

## 2023-01-19 DIAGNOSIS — F33.2 SEVERE RECURRENT MAJOR DEPRESSION WITHOUT PSYCHOTIC FEATURES (H): Primary | ICD-10-CM

## 2023-01-19 DIAGNOSIS — F43.10 COMPLEX POSTTRAUMATIC STRESS DISORDER: ICD-10-CM

## 2023-01-19 DIAGNOSIS — R11.0 NAUSEA: ICD-10-CM

## 2023-01-19 DIAGNOSIS — F40.00 AGORAPHOBIA: ICD-10-CM

## 2023-01-19 ASSESSMENT — PATIENT HEALTH QUESTIONNAIRE - PHQ9
SUM OF ALL RESPONSES TO PHQ QUESTIONS 1-9: 13
SUM OF ALL RESPONSES TO PHQ QUESTIONS 1-9: 13

## 2023-01-19 NOTE — PROGRESS NOTES
Aure Joy comes into clinic today at the request of ESTELA Zamudio MD Ordering Provider for Med Injection only Ketamine.    Procedure Prep:  Medication double check completed by: Jael Alexandra RN  Prior to administration pt identified by name and : yes    Nursing Assessment:  Appearance: casual clothing   Mood: nuetral  Affect: wnl  Eye contact: good  PHQ 2023   PHQ-9 Total Score 13   Q9: Thoughts of better off dead/self-harm past 2 weeks Several days     QIDDSR 16 weekly assessment: score 15. Assessment was scanned to EHR.     Procedure Performed:  VSS and mental status WNL. Patient was given ketamine. See MAR for details.     Post Procedure Assessment:  Patient tolerated the treatment with the following side effects: dissociation. Vital signs were monitored, see VS Flowsheet. Patient stated they felt ready to go home prior to discharge. AVS was offered to patient and patient declined. Patient was instructed not to drive for the remainder of the day and to notify clinic if any concerns arise.     Next appt:     Medications provided by Pharmacy     This service provided today was under the supervising provider of the day ESTELA Zamudio MD, who was available if needed.    Perla Robbins, RN
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
## 2023-01-26 ENCOUNTER — TELEPHONE (OUTPATIENT)
Dept: PSYCHIATRY | Facility: CLINIC | Age: 53
End: 2023-01-26

## 2023-01-26 ENCOUNTER — ALLIED HEALTH/NURSE VISIT (OUTPATIENT)
Dept: PSYCHIATRY | Facility: CLINIC | Age: 53
End: 2023-01-26
Payer: COMMERCIAL

## 2023-01-26 VITALS — HEART RATE: 95 BPM | DIASTOLIC BLOOD PRESSURE: 82 MMHG | SYSTOLIC BLOOD PRESSURE: 128 MMHG

## 2023-01-26 DIAGNOSIS — F33.2 SEVERE RECURRENT MAJOR DEPRESSION WITHOUT PSYCHOTIC FEATURES (H): Primary | ICD-10-CM

## 2023-01-26 ASSESSMENT — PATIENT HEALTH QUESTIONNAIRE - PHQ9: SUM OF ALL RESPONSES TO PHQ QUESTIONS 1-9: 13

## 2023-01-26 NOTE — PROGRESS NOTES
Aure Joy comes into clinic today at the request of ESTELA Zamudio MD Ordering Provider for Med Injection only Ketamine.    Procedure Prep:  Medication double check completed by: Jael Alexandra RN  Prior to administration pt identified by name and : yes    Nursing Assessment:  Appearance: casual clothing   Mood: nuetral  Affect: wnl  Eye contact: good  PHQ 2023   PHQ-9 Total Score 13   Q9: Thoughts of better off dead/self-harm past 2 weeks Several days     QIDDSR 16 weekly assessment: score 16. Assessment was scanned to EHR.     Procedure Performed:  VSS and mental status WNL. Patient was given ketamine. See MAR for details.     Post Procedure Assessment:  Patient tolerated the treatment with the following side effects: dissociation. Vital signs were monitored, see VS Flowsheet. Patient stated they felt ready to go home prior to discharge. AVS was offered to patient and patient declined. Patient was instructed not to drive for the remainder of the day and to notify clinic if any concerns arise.     Next appt: 2/2    Medications provided by Pharmacy     This service provided today was under the supervising provider of the day ESTELA Zamudio MD, who was available if needed.    Perla Robbins, RN

## 2023-02-02 ENCOUNTER — ALLIED HEALTH/NURSE VISIT (OUTPATIENT)
Dept: PSYCHIATRY | Facility: CLINIC | Age: 53
End: 2023-02-02
Payer: COMMERCIAL

## 2023-02-02 VITALS — DIASTOLIC BLOOD PRESSURE: 82 MMHG | SYSTOLIC BLOOD PRESSURE: 117 MMHG | HEART RATE: 80 BPM

## 2023-02-02 DIAGNOSIS — F33.2 SEVERE RECURRENT MAJOR DEPRESSION WITHOUT PSYCHOTIC FEATURES (H): Primary | ICD-10-CM

## 2023-02-02 ASSESSMENT — PATIENT HEALTH QUESTIONNAIRE - PHQ9: SUM OF ALL RESPONSES TO PHQ QUESTIONS 1-9: 13

## 2023-02-02 NOTE — PROGRESS NOTES
Aure Joy comes into clinic today at the request of ESTELA Zamudio MD Ordering Provider for Med Injection only Ketamine.    Procedure Prep:  Medication double check completed by: John GUERRERO  Prior to administration pt identified by name and : yes    Nursing Assessment:  Appearance: casual clothing   Mood: nuetral  Affect: wnl  Eye contact: good  PHQ 2023   PHQ-9 Total Score 13   Q9: Thoughts of better off dead/self-harm past 2 weeks Several days     QIDDSR 16 weekly assessment: score 14. Assessment was scanned to EHR.     Procedure Performed:  VSS and mental status WNL. Patient was given ketamine. See MAR for details.     Post Procedure Assessment:  Patient tolerated the treatment with the following side effects: dissociation. Vital signs were monitored, see VS Flowsheet. Patient stated they felt ready to go home prior to discharge. AVS was offered to patient and patient declined. Patient was instructed not to drive for the remainder of the day and to notify clinic if any concerns arise.     Next appt:     Medications provided by Pharmacy     This service provided today was under the supervising provider of the day ESTELA Zamudio MD, who was available if needed.    Perla Robbins RN

## 2023-02-03 DIAGNOSIS — F33.2 SEVERE EPISODE OF RECURRENT MAJOR DEPRESSIVE DISORDER, WITHOUT PSYCHOTIC FEATURES (H): ICD-10-CM

## 2023-02-06 NOTE — TELEPHONE ENCOUNTER
lamoTRIgine (LAMICTAL) 200 MG  Last refilled: 9/15/22  Qty: 60 : 3    Last seen: 1/19/23  RTC: 3 MOS  Cancel: 0  No-show: 0  Next appt: 4/13/23  Refill decision: 1. Last note / Unsigned   2. Dose clarification   *Continue Lamotrigine at 300mg daily

## 2023-02-08 RX ORDER — LAMOTRIGINE 200 MG/1
TABLET ORAL
Qty: 60 TABLET | Refills: 3 | Status: SHIPPED | OUTPATIENT
Start: 2023-02-08 | End: 2023-06-07

## 2023-02-09 ENCOUNTER — ALLIED HEALTH/NURSE VISIT (OUTPATIENT)
Dept: PSYCHIATRY | Facility: CLINIC | Age: 53
End: 2023-02-09
Payer: COMMERCIAL

## 2023-02-09 VITALS — DIASTOLIC BLOOD PRESSURE: 71 MMHG | HEART RATE: 80 BPM | SYSTOLIC BLOOD PRESSURE: 111 MMHG

## 2023-02-09 DIAGNOSIS — F33.2 SEVERE EPISODE OF RECURRENT MAJOR DEPRESSIVE DISORDER, WITHOUT PSYCHOTIC FEATURES (H): Primary | ICD-10-CM

## 2023-02-09 ASSESSMENT — PATIENT HEALTH QUESTIONNAIRE - PHQ9: SUM OF ALL RESPONSES TO PHQ QUESTIONS 1-9: 12

## 2023-02-09 NOTE — PROGRESS NOTES
Aure Joy comes into clinic today at the request of Owen Zamudio MD Ordering Provider for Med Injection only Ketamine.    Procedure Prep:  Medication double check completed by: Jael Alexandra RN  Prior to administration pt identified by name and : yes    Nursing Assessment:  Appearance: casual clothing   Mood: nuetral  Affect: wnl  Eye contact: good  PHQ 2023   PHQ-9 Total Score 12   Q9: Thoughts of better off dead/self-harm past 2 weeks Several days     QIDDSR 16 weekly assessment: score 14. Assessment was scanned to EHR.   Procedure Performed:  VSS and mental status WNL. Patient was given ketamine. See MAR for details.     Post Procedure Assessment:  Patient tolerated the treatment with the following side effects: dissociation. Vital signs were monitored, see VS Flowsheet. Patient stated they felt ready to go home prior to discharge. AVS was offered to patient and patient declined. Patient was instructed not to drive for the remainder of the day and to notify clinic if any concerns arise.     Next appt:     Medications provided by Pharmacy     This service provided today was under the supervising provider of the day ESTELA Zamudio MD, who was available if needed.    Perla Robbins, RN

## 2023-02-16 ENCOUNTER — ALLIED HEALTH/NURSE VISIT (OUTPATIENT)
Dept: PSYCHIATRY | Facility: CLINIC | Age: 53
End: 2023-02-16
Payer: COMMERCIAL

## 2023-02-16 DIAGNOSIS — F33.2 SEVERE EPISODE OF RECURRENT MAJOR DEPRESSIVE DISORDER, WITHOUT PSYCHOTIC FEATURES (H): Primary | ICD-10-CM

## 2023-02-16 ASSESSMENT — PATIENT HEALTH QUESTIONNAIRE - PHQ9: SUM OF ALL RESPONSES TO PHQ QUESTIONS 1-9: 13

## 2023-02-16 NOTE — PROGRESS NOTES
Aure Joy comes into clinic today at the request of ESTELA Zamudio MD Ordering Provider for Med Injection only ketamine.    Procedure Prep:  Medication double check completed by: Jael Alexandra RN  Prior to administration pt identified by name and : yes    Nursing Assessment:  Appearance: casual clothing   Mood: nuetral  Affect: wnl  Eye contact: good  PHQ 2023   PHQ-9 Total Score 13   Q9: Thoughts of better off dead/self-harm past 2 weeks Several days     QIDDSR 16 weekly assessment: score 17. Assessment was scanned to EHR.   VSS and mental status WNL. Patient was given ketamine. See MAR for details.     Post Procedure Assessment:  Patient tolerated the treatment with the following side effects: dissociation. Vital signs were monitored, see VS Flowsheet. Patient stated they felt ready to go home prior to discharge. AVS was offered to patient and patient declined. Patient was instructed not to drive for the remainder of the day and to notify clinic if any concerns arise.     Next appt:     Medications provided by Pharmacy     This service provided today was under the supervising provider of the day ESTELA Zamudio MD, who was available if needed.    Perla Robbins, GERARDO

## 2023-02-23 ENCOUNTER — TELEPHONE (OUTPATIENT)
Dept: PSYCHIATRY | Facility: CLINIC | Age: 53
End: 2023-02-23

## 2023-02-23 VITALS
WEIGHT: 188.05 LBS | HEART RATE: 82 BPM | SYSTOLIC BLOOD PRESSURE: 125 MMHG | BODY MASS INDEX: 34.61 KG/M2 | DIASTOLIC BLOOD PRESSURE: 81 MMHG | HEIGHT: 62 IN

## 2023-02-23 DIAGNOSIS — F33.2 SEVERE RECURRENT MAJOR DEPRESSION WITHOUT PSYCHOTIC FEATURES (H): Primary | ICD-10-CM

## 2023-02-24 ENCOUNTER — ALLIED HEALTH/NURSE VISIT (OUTPATIENT)
Dept: PSYCHIATRY | Facility: CLINIC | Age: 53
End: 2023-02-24
Payer: COMMERCIAL

## 2023-02-24 VITALS — SYSTOLIC BLOOD PRESSURE: 122 MMHG | DIASTOLIC BLOOD PRESSURE: 82 MMHG | HEART RATE: 90 BPM

## 2023-02-24 DIAGNOSIS — F33.2 SEVERE EPISODE OF RECURRENT MAJOR DEPRESSIVE DISORDER, WITHOUT PSYCHOTIC FEATURES (H): Primary | ICD-10-CM

## 2023-02-24 ASSESSMENT — PATIENT HEALTH QUESTIONNAIRE - PHQ9: SUM OF ALL RESPONSES TO PHQ QUESTIONS 1-9: 11

## 2023-02-24 NOTE — PROGRESS NOTES
Aure Joy comes into clinic today at the request of ESTELA Zamudio MD Ordering Provider for Med Injection only Ketamine.    Procedure Prep:  Medication double check completed by: Jael Alexandra RN  Prior to administration pt identified by name and : yes    Nursing Assessment:  Appearance: casual clothing   Mood: nuetral  Affect: wnl  Eye contact: good  PHQ 2023   PHQ-9 Total Score 11   Q9: Thoughts of better off dead/self-harm past 2 weeks Not at all     QIDDSR 16 weekly assessment: score 15. Assessment was scanned to EHR.       Procedure Performed:  VSS and mental status WNL. Patient was given ketamine. See MAR for details.     Post Procedure Assessment:  Patient tolerated the treatment with the following side effects: dissociation. Vital signs were monitored, see VS Flowsheet. Patient stated they felt ready to go home prior to discharge. AVS was offered to patient and patient declined. Patient was instructed not to drive for the remainder of the day and to notify clinic if any concerns arise.     Next appt: 3.2    Medications provided by Pharmacy     This service provided today was under the supervising provider of the day DYLLAN Williamson MD, who was available if needed.    Perla Robbins, RN

## 2023-03-02 ENCOUNTER — ALLIED HEALTH/NURSE VISIT (OUTPATIENT)
Dept: PSYCHIATRY | Facility: CLINIC | Age: 53
End: 2023-03-02
Payer: COMMERCIAL

## 2023-03-02 VITALS — DIASTOLIC BLOOD PRESSURE: 83 MMHG | SYSTOLIC BLOOD PRESSURE: 124 MMHG | HEART RATE: 76 BPM

## 2023-03-02 DIAGNOSIS — F33.2 SEVERE EPISODE OF RECURRENT MAJOR DEPRESSIVE DISORDER, WITHOUT PSYCHOTIC FEATURES (H): Primary | ICD-10-CM

## 2023-03-02 ASSESSMENT — PATIENT HEALTH QUESTIONNAIRE - PHQ9: SUM OF ALL RESPONSES TO PHQ QUESTIONS 1-9: 12

## 2023-03-02 NOTE — PROGRESS NOTES
Aure Joy comes into clinic today at the request of ESTELA Zamudio MD Ordering Provider for Med Injection only ketamine.    Procedure Prep:  Medication double check completed by: Eliot COOMBS RN  Prior to administration pt identified by name and : yes    Nursing Assessment:  Appearance: casual clothing   Mood: nuetral  Affect: wnl  Eye contact: good  PHQ 3/2/2023   PHQ-9 Total Score 12   Q9: Thoughts of better off dead/self-harm past 2 weeks Several days     QIDDSR 16 weekly assessment: score 17. Assessment was scanned to EHR.       Procedure Performed:  VSS and mental status WNL. Patient was given ketamine. See MAR for details.     Post Procedure Assessment:  Patient tolerated the treatment with the following side effects: dissociation. Vital signs were monitored, see VS Flowsheet. Patient stated they felt ready to go home prior to discharge. AVS was offered to patient and patient declined. Patient was instructed not to drive for the remainder of the day and to notify clinic if any concerns arise.     Next apt: 3/9    Medications provided by Pharmacy     This service provided today was under the supervising provider of the day ESTELA Zamudio MD, who was available if needed.    Perla Robbins, RN

## 2023-03-02 NOTE — PROGRESS NOTES
Aure Joy comes into clinic today at the request of ESTELA Zamudio md Ordering Provider for Med Injection only ketamine.    Procedure Prep:  Medication double check completed by: Dunia WATTS RN  Prior to administration pt identified by name and : yes    Nursing Assessment:  Appearance: casual   Mood: good  Affect: wnl  Eye contact: good  PHQ 2021   PHQ-9 Total Score 8   Q9: Thoughts of better off dead/self-harm past 2 weeks Not at all     QIDDSR 16 weekly assessment: score 6. Assessment was scanned to EHR.       Procedure Performed:  VSS and mental status WNL. Patient was given ketamine. See MAR for details.     Post Procedure Assessment:  Patient tolerated the treatment with the following side effects: disociation. Vital signs were monitored, see VS Flowsheet. Patient stated they felt ready to go home prior to discharge. AVS was offered to patient and patient declined. Patient was instructed not to drive for the remainder of the day and to notify clinic if any concerns arise.     Next appt: 08/10/2021    Medication provided by Pharmacy    This service provided today was under the supervising provider of the day Elizabeth Gordon MD, who was available if needed.    Perla Workman, GERARDO   no pain, swelling or deformity of joints

## 2023-03-09 ENCOUNTER — ALLIED HEALTH/NURSE VISIT (OUTPATIENT)
Dept: PSYCHIATRY | Facility: CLINIC | Age: 53
End: 2023-03-09
Payer: COMMERCIAL

## 2023-03-09 VITALS — HEART RATE: 90 BPM | SYSTOLIC BLOOD PRESSURE: 116 MMHG | DIASTOLIC BLOOD PRESSURE: 76 MMHG

## 2023-03-09 DIAGNOSIS — F33.2 SEVERE EPISODE OF RECURRENT MAJOR DEPRESSIVE DISORDER, WITHOUT PSYCHOTIC FEATURES (H): Primary | ICD-10-CM

## 2023-03-09 ASSESSMENT — PATIENT HEALTH QUESTIONNAIRE - PHQ9: SUM OF ALL RESPONSES TO PHQ QUESTIONS 1-9: 8

## 2023-03-16 ENCOUNTER — ALLIED HEALTH/NURSE VISIT (OUTPATIENT)
Dept: PSYCHIATRY | Facility: CLINIC | Age: 53
End: 2023-03-16
Payer: COMMERCIAL

## 2023-03-16 VITALS — DIASTOLIC BLOOD PRESSURE: 82 MMHG | SYSTOLIC BLOOD PRESSURE: 130 MMHG | HEART RATE: 76 BPM

## 2023-03-16 DIAGNOSIS — F33.2 SEVERE EPISODE OF RECURRENT MAJOR DEPRESSIVE DISORDER, WITHOUT PSYCHOTIC FEATURES (H): Primary | ICD-10-CM

## 2023-03-16 ASSESSMENT — PATIENT HEALTH QUESTIONNAIRE - PHQ9: SUM OF ALL RESPONSES TO PHQ QUESTIONS 1-9: 14

## 2023-03-16 NOTE — PROGRESS NOTES
Aure Joy comes into clinic today at the request of ESTELA Zamudio MD Ordering Provider for Med Injection only Ketamine.    Procedure Prep:  Medication double check completed by: Jael Alexandra RN  Prior to administration pt identified by name and : yes    Nursing Assessment:  Appearance: casual clothing   Mood: nuetral  Affect: wnl  Eye contact: good  PHQ 3/16/2023   PHQ-9 Total Score 14   Q9: Thoughts of better off dead/self-harm past 2 weeks More than half the days     QIDDSR 16 weekly assessment: score 18. Assessment was scanned to EHR.     Procedure Performed:  VSS and mental status WNL. Patient was given ketamine. See MAR for details.     Post Procedure Assessment:  Patient tolerated the treatment with the following side effects: dissociation. Vital signs were monitored, see VS Flowsheet. Patient stated they felt ready to go home prior to discharge. AVS was offered to patient and patient declined. Patient was instructed not to drive for the remainder of the day and to notify clinic if any concerns arise.     Next appt: 3/23    Medications provided by Pharmacy     This service provided today was under the supervising provider of the day ESTELA Zamudio MD, who was available if needed.    Perla Robbins, RN

## 2023-03-23 ENCOUNTER — ALLIED HEALTH/NURSE VISIT (OUTPATIENT)
Dept: PSYCHIATRY | Facility: CLINIC | Age: 53
End: 2023-03-23
Payer: COMMERCIAL

## 2023-03-23 VITALS — SYSTOLIC BLOOD PRESSURE: 118 MMHG | HEART RATE: 90 BPM | DIASTOLIC BLOOD PRESSURE: 72 MMHG

## 2023-03-23 DIAGNOSIS — F33.2 SEVERE EPISODE OF RECURRENT MAJOR DEPRESSIVE DISORDER, WITHOUT PSYCHOTIC FEATURES (H): Primary | ICD-10-CM

## 2023-03-23 DIAGNOSIS — R11.0 NAUSEA: ICD-10-CM

## 2023-03-23 ASSESSMENT — PATIENT HEALTH QUESTIONNAIRE - PHQ9: SUM OF ALL RESPONSES TO PHQ QUESTIONS 1-9: 20

## 2023-03-23 NOTE — PROGRESS NOTES
Aure Joy comes into clinic today at the request of ESTELA Zamudio MD Ordering Provider for Med Injection only Ketamine.    Procedure Prep:  Medication double check completed by: Jael Alexandra RN  Prior to administration pt identified by name and : yes    Nursing Assessment:  Appearance: casual clothing   Mood: neutral  Affect: wnl  Eye contact: good  PHQ 3/23/2023   PHQ-9 Total Score 20   Q9: Thoughts of better off dead/self-harm past 2 weeks Nearly every day     QIDDSR 16 weekly assessment: score 19. Assessment was scanned to EHR.     Procedure Performed:  VSS and mental status WNL. Patient was given ketamine. See MAR for details.     Post Procedure Assessment:  Patient tolerated the treatment with the following side effects: dissociation. Vital signs were monitored, see VS Flowsheet. Patient stated they felt ready to go home prior to discharge. AVS was offered to patient and patient declined. Patient was instructed not to drive for the remainder of the day and to notify clinic if any concerns arise. Patient endorsed active suicidal thoughts. Patient denied current plan, means, or intent.  Patient stated that they have had increased stressors including breaking and spraining both wrists and starting a job. Patient and writer created a plan on what patient is going to do the rest of day to keep patient busy and distraction is a main coping skill for patient. Patient is seeing their therapist today as well.      Next appt: 3/30    Medications provided by Pharmacy     This service provided today was under the supervising provider of the day ESTELA Zamudio MD, who was available if needed.    Perla Robbnis RN

## 2023-03-24 RX ORDER — ONDANSETRON 4 MG/1
TABLET, ORALLY DISINTEGRATING ORAL
Qty: 12 TABLET | Refills: 0 | Status: SHIPPED | OUTPATIENT
Start: 2023-03-24

## 2023-03-24 NOTE — TELEPHONE ENCOUNTER
[facility] 4MG  Last refilled: 6/23/22  Qty: 12: 2    Last seen: 1/19/23  RTC: 3 MOS  Cancel: 0  No-show: 0  Next appt: 3/30/23

## 2023-03-27 DIAGNOSIS — F33.2 SEVERE RECURRENT MAJOR DEPRESSION WITHOUT PSYCHOTIC FEATURES (H): ICD-10-CM

## 2023-03-29 RX ORDER — KETAMINE HYDROCHLORIDE 50 MG/ML
INJECTION, SOLUTION INTRAMUSCULAR; INTRAVENOUS
Qty: 10 ML | Refills: 5 | Status: SHIPPED | OUTPATIENT
Start: 2023-03-29 | End: 2023-06-30

## 2023-03-30 ENCOUNTER — ALLIED HEALTH/NURSE VISIT (OUTPATIENT)
Dept: PSYCHIATRY | Facility: CLINIC | Age: 53
End: 2023-03-30
Payer: COMMERCIAL

## 2023-03-30 ENCOUNTER — OFFICE VISIT (OUTPATIENT)
Dept: PSYCHIATRY | Facility: CLINIC | Age: 53
End: 2023-03-30
Payer: COMMERCIAL

## 2023-03-30 VITALS — SYSTOLIC BLOOD PRESSURE: 112 MMHG | HEART RATE: 90 BPM | DIASTOLIC BLOOD PRESSURE: 80 MMHG

## 2023-03-30 VITALS — BODY MASS INDEX: 33.46 KG/M2 | WEIGHT: 183 LBS

## 2023-03-30 DIAGNOSIS — D32.9 MENINGIOMA (H): ICD-10-CM

## 2023-03-30 DIAGNOSIS — F33.2 SEVERE RECURRENT MAJOR DEPRESSION WITHOUT PSYCHOTIC FEATURES (H): Primary | ICD-10-CM

## 2023-03-30 DIAGNOSIS — R45.851 SUICIDAL IDEATION: ICD-10-CM

## 2023-03-30 DIAGNOSIS — F43.10 COMPLEX POSTTRAUMATIC STRESS DISORDER: ICD-10-CM

## 2023-03-30 DIAGNOSIS — F40.00 AGORAPHOBIA: ICD-10-CM

## 2023-03-30 DIAGNOSIS — F43.10 PTSD (POST-TRAUMATIC STRESS DISORDER): ICD-10-CM

## 2023-03-30 ASSESSMENT — PATIENT HEALTH QUESTIONNAIRE - PHQ9
SUM OF ALL RESPONSES TO PHQ QUESTIONS 1-9: 19
SUM OF ALL RESPONSES TO PHQ QUESTIONS 1-9: 19
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
## 2023-03-30 NOTE — PROGRESS NOTES
Aure Joy comes into clinic today at the request of ESTELA Zamudio MD Ordering Provider for Med Injection only Ketamine.    Procedure Prep:  Medication double check completed by: Jael Alexandra RN  Prior to administration pt identified by name and : yes    Nursing Assessment:  Appearance: casual clothing    Mood: nuetral  Affect: wnl  Eye contact: good  PHQ 3/23/2023   PHQ-9 Total Score 20   Q9: Thoughts of better off dead/self-harm past 2 weeks Nearly every day     QIDDSR 16 weekly assessment: score 18. Assessment was scanned to EHR.       Procedure Performed:  VSS and mental status WNL. Patient was given ketamine. See MAR for details.     Post Procedure Assessment:  Patient tolerated the treatment with the following side effects: dissociation. Vital signs were monitored, see VS Flowsheet. Patient stated they felt ready to go home prior to discharge. AVS was offered to patient and patient declined. Patient was instructed not to drive for the remainder of the day and to notify clinic if any concerns arise.     Next appt:     Medications provided by Pharmacy     This service provided today was under the supervising provider of the day ESTELA Zamudio MD, who was available if needed.    Perla Robbins, RN

## 2023-03-30 NOTE — PATIENT INSTRUCTIONS
Plan for today:  -recommend another sleep study  -plan for Dr. Zamudio to touch base with therapist  -acute course of ketamine - will have our RN call you  -will inquire about options for dog training that may be helpful  -follow-up with Dr. Zamudio in about 4 weeks, will call you

## 2023-03-31 ENCOUNTER — TELEPHONE (OUTPATIENT)
Dept: PSYCHIATRY | Facility: CLINIC | Age: 53
End: 2023-03-31

## 2023-03-31 NOTE — TELEPHONE ENCOUNTER
Received VORB from Dr. Zamudio to do an acute course of IM Ketamine 6 injections MWF at 1.1 mg/kg.

## 2023-04-03 ENCOUNTER — ALLIED HEALTH/NURSE VISIT (OUTPATIENT)
Dept: PSYCHIATRY | Facility: CLINIC | Age: 53
End: 2023-04-03
Payer: COMMERCIAL

## 2023-04-03 VITALS — HEART RATE: 90 BPM | SYSTOLIC BLOOD PRESSURE: 112 MMHG | DIASTOLIC BLOOD PRESSURE: 72 MMHG

## 2023-04-03 DIAGNOSIS — F33.2 SEVERE RECURRENT MAJOR DEPRESSION WITHOUT PSYCHOTIC FEATURES (H): Primary | ICD-10-CM

## 2023-04-03 ASSESSMENT — PATIENT HEALTH QUESTIONNAIRE - PHQ9: SUM OF ALL RESPONSES TO PHQ QUESTIONS 1-9: 19

## 2023-04-03 NOTE — PROGRESS NOTES
Aure Joy comes into clinic today at the request of ESTELA Zamudio MD Ordering Provider for Med Injection only ketamine.    Procedure Prep:  Medication double check completed by: Jael Alexandra RN  Prior to administration pt identified by name and : yes    Nursing Assessment:  Appearance: casual clothing   Mood: nuetral  Affect: wnl  Eye contact: good      3/30/2023    11:00 AM   PHQ   PHQ-9 Total Score 19   Q9: Thoughts of better off dead/self-harm past 2 weeks Nearly every day     QIDDSR 16 weekly assessment: score 19. Assessment was scanned to EHR.       Procedure Performed:  VSS and mental status WNL. Patient was given ketamine. See MAR for details.     Post Procedure Assessment:  Patient tolerated the treatment with the following side effects: dissociation. Vital signs were monitored, see VS Flowsheet. Patient stated they felt ready to go home prior to discharge. AVS was offered to patient and patient declined. Patient was instructed not to drive for the remainder of the day and to notify clinic if any concerns arise.     Next appt: 4/5    Medications provided by Pharmacy     This service provided today was under the supervising provider of the day LOTUS Gordon MD, who was available if needed.    ePrla Robbins RN

## 2023-04-05 ENCOUNTER — ALLIED HEALTH/NURSE VISIT (OUTPATIENT)
Dept: PSYCHIATRY | Facility: CLINIC | Age: 53
End: 2023-04-05
Payer: COMMERCIAL

## 2023-04-05 VITALS — DIASTOLIC BLOOD PRESSURE: 72 MMHG | SYSTOLIC BLOOD PRESSURE: 112 MMHG | HEART RATE: 82 BPM

## 2023-04-05 DIAGNOSIS — F33.2 SEVERE RECURRENT MAJOR DEPRESSION WITHOUT PSYCHOTIC FEATURES (H): Primary | ICD-10-CM

## 2023-04-05 NOTE — PROGRESS NOTES
Aure Joy comes into clinic today at the request of ESTELA Zamudio  Ordering Provider for Med Injection only ketamine.    Procedure Prep:  Medication double check completed by: brigid Alexandra RN  Prior to administration pt identified by name and : yes    Nursing Assessment:  Appearance: casual clothing   Mood: nuetral  Affect: wnl  Eye contact: good      4/3/2023    11:00 AM   PHQ   PHQ-9 Total Score 19   Q9: Thoughts of better off dead/self-harm past 2 weeks Nearly every day         Procedure Performed:  VSS and mental status WNL. Patient was given ketamine. See MAR for details.     Post Procedure Assessment:  Patient tolerated the treatment with the following side effects: dissociation. Vital signs were monitored, see VS Flowsheet. Patient stated they felt ready to go home prior to discharge. AVS was offered to patient and patient declined. Patient was instructed not to drive for the remainder of the day and to notify clinic if any concerns arise.     Next appt:     Medications provided by Pharmacy     This service provided today was under the supervising provider of the day DYLLAN Chaudhry MD, who was available if needed.    Perla Robbnis RN

## 2023-04-07 ENCOUNTER — ALLIED HEALTH/NURSE VISIT (OUTPATIENT)
Dept: PSYCHIATRY | Facility: CLINIC | Age: 53
End: 2023-04-07
Payer: COMMERCIAL

## 2023-04-07 VITALS — DIASTOLIC BLOOD PRESSURE: 74 MMHG | HEART RATE: 82 BPM | SYSTOLIC BLOOD PRESSURE: 110 MMHG

## 2023-04-07 DIAGNOSIS — F33.2 SEVERE RECURRENT MAJOR DEPRESSION WITHOUT PSYCHOTIC FEATURES (H): Primary | ICD-10-CM

## 2023-04-07 NOTE — PROGRESS NOTES
Aure Joy comes into clinic today at the request of ESTELA Zamudio MD Ordering Provider for Med Injection only ketamine.    Procedure Prep:  Medication double check completed by: Jael Alexandra RN  Prior to administration pt identified by name and : yes    Nursing Assessment:  Appearance: casual clothing   Mood: neutral  Affect: wnl  Eye contact: good      4/3/2023    11:00 AM   PHQ   PHQ-9 Total Score 19   Q9: Thoughts of better off dead/self-harm past 2 weeks Nearly every day         Procedure Performed:  VSS and mental status WNL. Patient was given ketamine. See MAR for details.     Post Procedure Assessment:  Patient tolerated the treatment with the following side effects: dissociation. Vital signs were monitored, see VS Flowsheet. Patient stated they felt ready to go home prior to discharge. AVS was offered to patient and patient declined. Patient was instructed not to drive for the remainder of the day and to notify clinic if any concerns arise.     Next appt:     Medications provided by Pharmacy     This service provided today was under the supervising provider of the day LOTUS Gordon MD, who was available if needed.    Perla Robbins RN

## 2023-04-10 ENCOUNTER — ALLIED HEALTH/NURSE VISIT (OUTPATIENT)
Dept: PSYCHIATRY | Facility: CLINIC | Age: 53
End: 2023-04-10
Payer: COMMERCIAL

## 2023-04-10 VITALS — SYSTOLIC BLOOD PRESSURE: 114 MMHG | DIASTOLIC BLOOD PRESSURE: 72 MMHG | HEART RATE: 92 BPM

## 2023-04-10 DIAGNOSIS — F33.2 SEVERE RECURRENT MAJOR DEPRESSION WITHOUT PSYCHOTIC FEATURES (H): Primary | ICD-10-CM

## 2023-04-10 ASSESSMENT — PATIENT HEALTH QUESTIONNAIRE - PHQ9: SUM OF ALL RESPONSES TO PHQ QUESTIONS 1-9: 14

## 2023-04-10 NOTE — PROGRESS NOTES
Aure Joy comes into clinic today at the request of ESTELA Zamudio MD Ordering Provider for Med Injection only ketamine.    Procedure Prep:  Medication double check completed by: John GUERRERO RN  Prior to administration pt identified by name and : yes    Nursing Assessment:  Appearance: casual clothing   Mood: nuetral  Affect: wnl  Eye contact: good      4/3/2023    11:00 AM   PHQ   PHQ-9 Total Score 19   Q9: Thoughts of better off dead/self-harm past 2 weeks Nearly every day     QIDDSR 16 weekly assessment: score 15. Assessment was scanned to EHR.       Procedure Performed:  VSS and mental status WNL. Patient was given ketamine. See MAR for details.     Post Procedure Assessment:  Patient tolerated the treatment with the following side effects: dissociation. Vital signs were monitored, see VS Flowsheet. Patient stated they felt ready to go home prior to discharge. AVS was offered to patient and patient declined. Patient was instructed not to drive for the remainder of the day and to notify clinic if any concerns arise.     Next appt:     Medications provided by Pharmacy     This service provided today was under the supervising provider of the day LOTUS Gordon MD, who was available if needed.    Perla Robbins RN

## 2023-04-11 RX ORDER — KETAMINE HYDROCHLORIDE 100 MG/ML
1.1 INJECTION, SOLUTION INTRAMUSCULAR; INTRAVENOUS WEEKLY
Status: DISCONTINUED | OUTPATIENT
Start: 2023-04-11 | End: 2023-06-08

## 2023-04-12 ENCOUNTER — ALLIED HEALTH/NURSE VISIT (OUTPATIENT)
Dept: PSYCHIATRY | Facility: CLINIC | Age: 53
End: 2023-04-12
Payer: COMMERCIAL

## 2023-04-12 VITALS — HEART RATE: 82 BPM | SYSTOLIC BLOOD PRESSURE: 111 MMHG | DIASTOLIC BLOOD PRESSURE: 71 MMHG

## 2023-04-12 DIAGNOSIS — F33.2 SEVERE RECURRENT MAJOR DEPRESSION WITHOUT PSYCHOTIC FEATURES (H): Primary | ICD-10-CM

## 2023-04-12 RX ADMIN — KETAMINE HYDROCHLORIDE 62 MG: 100 INJECTION, SOLUTION INTRAMUSCULAR; INTRAVENOUS at 10:49

## 2023-04-12 NOTE — PROGRESS NOTES
Aure Joy comes into clinic today at the request of ESTELA Zamudio MD Ordering Provider for Med Injection only Ketamine.    Procedure Prep:  Medication double check completed by: Jael Alexandra RN  Prior to administration pt identified by name and : yes    Nursing Assessment:  Appearance: casual clothing   Mood: nuetral  Affect: wnl  Eye contact: good      4/10/2023    11:03 AM   PHQ   PHQ-9 Total Score 14   Q9: Thoughts of better off dead/self-harm past 2 weeks More than half the days       Procedure Performed:  VSS and mental status WNL. Patient was given ketamine. See MAR for details.     Post Procedure Assessment:  Patient tolerated the treatment with the following side effects: dissociation. Vital signs were monitored, see VS Flowsheet. Patient stated they felt ready to go home prior to discharge. AVS was offered to patient and patient declined. Patient was instructed not to drive for the remainder of the day and to notify clinic if any concerns arise.     Next appt:     Medications provided by Pharmacy     This service provided today was under the supervising provider of the day LOTUS Gordon MD, who was available if needed.    Perla Robbins RN

## 2023-04-14 ENCOUNTER — ALLIED HEALTH/NURSE VISIT (OUTPATIENT)
Dept: PSYCHIATRY | Facility: CLINIC | Age: 53
End: 2023-04-14
Payer: COMMERCIAL

## 2023-04-14 VITALS — DIASTOLIC BLOOD PRESSURE: 69 MMHG | HEART RATE: 70 BPM | SYSTOLIC BLOOD PRESSURE: 110 MMHG

## 2023-04-14 DIAGNOSIS — F33.2 SEVERE RECURRENT MAJOR DEPRESSION WITHOUT PSYCHOTIC FEATURES (H): Primary | ICD-10-CM

## 2023-04-14 RX ADMIN — KETAMINE HYDROCHLORIDE 62 MG: 100 INJECTION, SOLUTION INTRAMUSCULAR; INTRAVENOUS at 10:26

## 2023-04-14 NOTE — PROGRESS NOTES
Aure Joy comes into clinic today at the request of ESTELA Elmore MD Ordering Provider for Med Injection only ketamine.    Procedure Prep:  Medication double check completed by: Jael Alexandra RN  Prior to administration pt identified by name and : yes    Nursing Assessment:  Appearance: casual clothing   Mood: neutral  Affect: wnl  Eye contact: good      4/10/2023    11:03 AM   PHQ   PHQ-9 Total Score 14   Q9: Thoughts of better off dead/self-harm past 2 weeks More than half the days       Procedure Performed:  VSS and mental status WNL. Patient was given ketamine. See MAR for details.     Post Procedure Assessment:  Patient tolerated the treatment with the following side effects: dissociation. Vital signs were monitored, see VS Flowsheet. Patient stated they felt ready to go home prior to discharge. AVS was offered to patient and patient declined. Patient was instructed not to drive for the remainder of the day and to notify clinic if any concerns arise.     Next appt:     Medications provided by Pharmacy     This service provided today was under the supervising provider of the day LOTUS Gordon MD, who was available if needed.    Perla Robbins RN

## 2023-04-20 ENCOUNTER — ALLIED HEALTH/NURSE VISIT (OUTPATIENT)
Dept: PSYCHIATRY | Facility: CLINIC | Age: 53
End: 2023-04-20
Payer: COMMERCIAL

## 2023-04-20 VITALS — HEART RATE: 92 BPM | SYSTOLIC BLOOD PRESSURE: 127 MMHG | DIASTOLIC BLOOD PRESSURE: 87 MMHG

## 2023-04-20 DIAGNOSIS — F33.2 SEVERE RECURRENT MAJOR DEPRESSION WITHOUT PSYCHOTIC FEATURES (H): Primary | ICD-10-CM

## 2023-04-20 RX ADMIN — KETAMINE HYDROCHLORIDE 62 MG: 100 INJECTION, SOLUTION INTRAMUSCULAR; INTRAVENOUS at 10:02

## 2023-04-20 ASSESSMENT — PATIENT HEALTH QUESTIONNAIRE - PHQ9: SUM OF ALL RESPONSES TO PHQ QUESTIONS 1-9: 12

## 2023-04-20 NOTE — PROGRESS NOTES
Aure Joy comes into clinic today at the request of ESTELA Zamudio MD Ordering Provider for Med Injection only ketamine.    Procedure Prep:  Medication double check completed by: brigid Alexandra RN  Prior to administration pt identified by name and : yes    Nursing Assessment:  Appearance: casual clothing   Mood: neutral  Affect: wnl  Eye contact: good      2023    10:03 AM   PHQ   PHQ-9 Total Score 12   Q9: Thoughts of better off dead/self-harm past 2 weeks Several days     QIDDSR 16 weekly assessment: score 15. Assessment was scanned to EHR.     Procedure Performed:  VSS and mental status WNL. Patient was given ketamine. See MAR for details.     Post Procedure Assessment:  Patient tolerated the treatment with the following side effects: dissociation. Vital signs were monitored, see VS Flowsheet. Patient stated they felt ready to go home prior to discharge. AVS was offered to patient and patient declined. Patient was instructed not to drive for the remainder of the day and to notify clinic if any concerns arise.     Next appt:     Medications provided by Pharmacy     This service provided today was under the supervising provider of the day SETELA Zamudio MD, who was available if needed.    Perla Robbins, RN

## 2023-04-24 NOTE — PROGRESS NOTES
[facility] TMS Program  [address], [address]  [place] [address]  TMS Procedure Note   Aure Joy MRN# [id number]  Age: 49 year old year old YOB: 1970    Pre-Procedure:  History and Physical: Reviewed in medical record  Consent Signed by: Aure Joy  On: 1/31/2020    Clinical Narrative:  Patient tolerating treatment with no side effects.  Patient reports no change in mood.      Indications for TMS:  MDD, recurrent, severe; 4+ medication trials (from 2+ classes) ineffective; Psychotherapy ineffective.     Pre-Procedure Diagnosis:  MDD, recurrent, severe F33.2    Treatment Hx:  Treatment number this series: 25  Total lifetime treatment number: 102    Allergies   Allergen Reactions     Codeine Nausea     Other  [No Clinical Screening - See Comments] Nausea and Vomiting and Other (See Comments)     general anesthesia- uncontrolled vomitting      BP (!) 147/83 (BP Location: Right arm, Patient Position: Sitting, Cuff Size: Adult Regular)   Pulse 81     Pause for the Cause  Right patient:  Yes  Right procedure/correct coil:  Yes; rTMS; cpt 40857; F8 coil.   Earplugs in place:  Yes    Procedure  Patient was seated in procedure chair. Identity and procedure was verified. Ear plugs were placed in ears and patient-specific cap was placed on head and tightened appropriately. Ruler locations were verified. Coil was placed at treatment location and stimulator was set to parameters described below. A test train was delivered and pt tolerated train. Given pt tolerance, 60 treatment trains were delivered to the left side and 3 trains were delivered to the right side. Pt tolerated procedure with forehead movement.      Motor Threshold Determination  Distance from nasion to inion: 35.5  MT 1: 0 - 6 - 16 @ 69% on 1/29/2018  MT 2: 0 - 6 - 16 @ 69% on 2/6/2018   MT 3: 0 - 6 - 16 @ 69% on 2/13/2018   MT 4: 0 - 6 - 16 @ 69% on 2/23/2018   MT 5: 0 - 6 - 16 @ 69% on 3/2/2018   MT 6: 0 - 5.5 -  15.5(always use intersection at left side of ruler)@ 85% on 8/23/19  MT 7: 0 - 5.5 - 15.5(always use intersection at left side of ruler)@ 80% on 8/29/19  MT 8: 0 - 5.5 - 37.5(always use intersection at left side of ruler)@ 76% on 9/5/19 (right side using EMG on left hand)  MT 9: C2 (R side using EMG on L hand) 78% on 9/12/2019  MT 10: C2 (R side using EMG on L hand) 76% on 9/26/2019  MT 11: C2 (R side using EMG on L hand) 82 on 1/31/2020    LDLPFC:  Frequency: 50 Hz  Number of pulses:  3                        Number of bursts: 10  Burst frequency: 5 Hz  Cycle time: 10 sec  Total pulses delivered: 1800  Tx Loc: F3  Energy: 80% (97% MT, maximum due to stimulator limitations)  Number of Cycles: 60 trains    RDLPFC:  Frequency: 50 Hz  Number of pulses:  3                        Number of bursts: 200  Burst frequency: 5 Hz  Cycle time: 100.0   Total pulses delivered: 1800  Tx Loc: F4 (right side)  Energy: 80% (97% MT, maximum due to stimulator limitations)  Number of Cycles: 3 trains    Rating Scales:  Date MADRS IDS-SR PHQ-9   01/31/20 23  8   2/7/20  28 7   2/14/20  27 6   2/21/20  26 5   2/28/20  32 7                   Post-Procedure Diagnosis:  MDD, recurrent, severe 296.33    Jael Alexandra RN   [facility] Neuromodulation      Plan   - Cont TMS  - ReMT      I did not see the patient during/after treatment but remained available in the clinic during  treatment.    Evelyn Zamudio MD  [facility] Neuromod[facility]     Methotrexate Counseling:  Patient counseled regarding adverse effects of methotrexate including but not limited to nausea, vomiting, abnormalities in liver function tests. Patients may develop mouth sores, rash, diarrhea, and abnormalities in blood counts. The patient understands that monitoring is required including LFT's and blood counts.  There is a rare possibility of scarring of the liver and lung problems that can occur when taking methotrexate. Persistent nausea, loss of appetite, pale stools, dark urine, cough, and shortness of breath should be reported immediately. Patient advised to discontinue methotrexate treatment at least three months before attempting to become pregnant.  I discussed the need for folate supplements while taking methotrexate.  These supplements can decrease side effects during methotrexate treatment. The patient verbalized understanding of the proper use and possible adverse effects of methotrexate.  All of the patient's questions and concerns were addressed.

## 2023-04-27 ENCOUNTER — ALLIED HEALTH/NURSE VISIT (OUTPATIENT)
Dept: PSYCHIATRY | Facility: CLINIC | Age: 53
End: 2023-04-27
Payer: COMMERCIAL

## 2023-04-27 VITALS — DIASTOLIC BLOOD PRESSURE: 75 MMHG | HEART RATE: 76 BPM | SYSTOLIC BLOOD PRESSURE: 110 MMHG

## 2023-04-27 DIAGNOSIS — F33.2 SEVERE RECURRENT MAJOR DEPRESSION WITHOUT PSYCHOTIC FEATURES (H): Primary | ICD-10-CM

## 2023-04-27 RX ADMIN — KETAMINE HYDROCHLORIDE 62 MG: 100 INJECTION, SOLUTION INTRAMUSCULAR; INTRAVENOUS at 11:06

## 2023-04-27 ASSESSMENT — PATIENT HEALTH QUESTIONNAIRE - PHQ9: SUM OF ALL RESPONSES TO PHQ QUESTIONS 1-9: 12

## 2023-04-27 NOTE — PROGRESS NOTES
Aure Joy comes into clinic today at the request of Owen Zamudio MD Ordering Provider for Med Injection only ketamine.    Procedure Prep:  Medication double check completed by: Jael Alexandra RN  Prior to administration pt identified by name and : yes    Nursing Assessment:  Appearance: casual clothing   Mood: nuetral  Affect: wnl  Eye contact: good      2023    10:03 AM   PHQ   PHQ-9 Total Score 12   Q9: Thoughts of better off dead/self-harm past 2 weeks Several days     QIDDSR 16 weekly assessment: score 15. Assessment was scanned to EHR.       Procedure Performed:  VSS and mental status WNL. Patient was given ketamine. See MAR for details.     Post Procedure Assessment:  Patient tolerated the treatment with the following side effects: dissociation. Vital signs were monitored, see VS Flowsheet. Patient stated they felt ready to go home prior to discharge. AVS was offered to patient and patient declined. Patient was instructed not to drive for the remainder of the day and to notify clinic if any concerns arise.     Next appt:         This service provided today was under the supervising provider of the day LOTUS Grodon MD, who was available if needed.    Perla Robbins RN

## 2023-05-04 ENCOUNTER — ALLIED HEALTH/NURSE VISIT (OUTPATIENT)
Dept: PSYCHIATRY | Facility: CLINIC | Age: 53
End: 2023-05-04
Payer: COMMERCIAL

## 2023-05-04 VITALS — DIASTOLIC BLOOD PRESSURE: 78 MMHG | SYSTOLIC BLOOD PRESSURE: 110 MMHG | HEART RATE: 86 BPM

## 2023-05-04 DIAGNOSIS — F33.2 SEVERE RECURRENT MAJOR DEPRESSION WITHOUT PSYCHOTIC FEATURES (H): Primary | ICD-10-CM

## 2023-05-04 RX ADMIN — KETAMINE HYDROCHLORIDE 62 MG: 100 INJECTION, SOLUTION INTRAMUSCULAR; INTRAVENOUS at 10:09

## 2023-05-04 ASSESSMENT — PATIENT HEALTH QUESTIONNAIRE - PHQ9: SUM OF ALL RESPONSES TO PHQ QUESTIONS 1-9: 10

## 2023-05-04 NOTE — PROGRESS NOTES
Aure Joy comes into clinic today at the request of ESTELA Zamudio MD Ordering Provider for Med Injection only ketamine.    Procedure Prep:  Medication double check completed by: Jael Alexandra RN  Prior to administration pt identified by name and : yes    Nursing Assessment:  Appearance: casual clothing   Mood: nuetral  Affect: wnl  Eye contact: good      2023    10:10 AM   PHQ   PHQ-9 Total Score 10   Q9: Thoughts of better off dead/self-harm past 2 weeks Several days     QIDDSR 16 weekly assessment: score 11. Assessment was scanned to EHR.       Procedure Performed:  VSS and mental status WNL. Patient was given ketamine. See MAR for details.     Post Procedure Assessment:  Patient tolerated the treatment with the following side effects: dissociation. Vital signs were monitored, see VS Flowsheet. Patient stated they felt ready to go home prior to discharge. AVS was offered to patient and patient declined . Patient was instructed not to drive for the remainder of the day and to notify clinic if any concerns arise.     Next appt: May 11th    Medications were supplied by Clinic     This service provided today was under the supervising provider of the day Owen Zamudio MD, who was available if needed.    Perla Robbins, RN

## 2023-05-11 ENCOUNTER — ALLIED HEALTH/NURSE VISIT (OUTPATIENT)
Dept: PSYCHIATRY | Facility: CLINIC | Age: 53
End: 2023-05-11
Payer: COMMERCIAL

## 2023-05-11 VITALS — HEART RATE: 90 BPM | SYSTOLIC BLOOD PRESSURE: 112 MMHG | DIASTOLIC BLOOD PRESSURE: 72 MMHG

## 2023-05-11 DIAGNOSIS — F33.2 SEVERE RECURRENT MAJOR DEPRESSION WITHOUT PSYCHOTIC FEATURES (H): Primary | ICD-10-CM

## 2023-05-11 RX ADMIN — KETAMINE HYDROCHLORIDE 62 MG: 100 INJECTION, SOLUTION INTRAMUSCULAR; INTRAVENOUS at 10:10

## 2023-05-11 ASSESSMENT — PATIENT HEALTH QUESTIONNAIRE - PHQ9: SUM OF ALL RESPONSES TO PHQ QUESTIONS 1-9: 8

## 2023-05-11 NOTE — PROGRESS NOTES
Aure Joy comes into clinic today at the request of ESETLA Elmore MD Ordering Provider for Med Injection only ketamine.    Procedure Prep:  Medication double check completed by: Jael Alexandra RN  Prior to administration pt identified by name and : yes    Nursing Assessment:  Appearance: casual clothing   Mood: good  Affect: wnl  Eye contact: good      2023    10:10 AM   PHQ   PHQ-9 Total Score 10   Q9: Thoughts of better off dead/self-harm past 2 weeks Several days     QIDDSR 16 weekly assessment: score 11. Assessment was scanned to EHR.       Procedure Performed:  VSS and mental status WNL. Patient was given ketamine. See MAR for details.     Post Procedure Assessment:  Patient tolerated the treatment with the following side effects: dissociation. Vital signs were monitored, see VS Flowsheet. Patient stated they felt ready to go home prior to discharge. AVS was offered to patient and patient declined. Patient was instructed not to drive for the remainder of the day and to notify clinic if any concerns arise.     Next appt: May 18th    Medications provided by Clinic    This service provided today was under the supervising provider of the day ESTELA Elmore MD, who was available if needed.    Perla Robbins, RN

## 2023-05-18 ENCOUNTER — ALLIED HEALTH/NURSE VISIT (OUTPATIENT)
Dept: PSYCHIATRY | Facility: CLINIC | Age: 53
End: 2023-05-18
Payer: COMMERCIAL

## 2023-05-18 VITALS — SYSTOLIC BLOOD PRESSURE: 112 MMHG | HEART RATE: 90 BPM | DIASTOLIC BLOOD PRESSURE: 86 MMHG

## 2023-05-18 DIAGNOSIS — F33.2 SEVERE RECURRENT MAJOR DEPRESSION WITHOUT PSYCHOTIC FEATURES (H): Primary | ICD-10-CM

## 2023-05-18 RX ADMIN — KETAMINE HYDROCHLORIDE 62 MG: 100 INJECTION, SOLUTION INTRAMUSCULAR; INTRAVENOUS at 09:58

## 2023-05-18 ASSESSMENT — PATIENT HEALTH QUESTIONNAIRE - PHQ9: SUM OF ALL RESPONSES TO PHQ QUESTIONS 1-9: 9

## 2023-05-18 NOTE — PROGRESS NOTES
Aure Joy comes into clinic today at the request of ESTELA Elmore MD Ordering Provider for Med Injection only ketamine.    Procedure Prep:  Medication double check completed by: Jael Alexandra RN  Prior to administration pt identified by name and : yes    Nursing Assessment:  Appearance: casual clothign   Mood: good  Affect: wnl  Eye contact: good      2023    10:10 AM   PHQ   PHQ-9 Total Score 8   Q9: Thoughts of better off dead/self-harm past 2 weeks Not at all     .     Procedure Performed:  VSS and mental status WNL. Patient was given ketamine. See MAR for details.     Post Procedure Assessment:  Patient tolerated the treatment with the following side effects: dissociation. Vital signs were monitored, see VS Flowsheet. Patient stated they felt ready to go home prior to discharge. AVS was offered to patient and patient declined. Patient was instructed not to drive for the remainder of the day and to notify clinic if any concerns arise.     Next appt:     Medications were supplied by Clinic     This service provided today was under the supervising provider of the day ESTELA Elmore MD, who was available if needed.    Perla Robbins, RN

## 2023-05-25 ENCOUNTER — ALLIED HEALTH/NURSE VISIT (OUTPATIENT)
Dept: PSYCHIATRY | Facility: CLINIC | Age: 53
End: 2023-05-25
Payer: COMMERCIAL

## 2023-05-25 VITALS — HEART RATE: 76 BPM | DIASTOLIC BLOOD PRESSURE: 82 MMHG | SYSTOLIC BLOOD PRESSURE: 112 MMHG

## 2023-05-25 DIAGNOSIS — F33.2 SEVERE RECURRENT MAJOR DEPRESSION WITHOUT PSYCHOTIC FEATURES (H): Primary | ICD-10-CM

## 2023-05-25 ASSESSMENT — PATIENT HEALTH QUESTIONNAIRE - PHQ9: SUM OF ALL RESPONSES TO PHQ QUESTIONS 1-9: 15

## 2023-05-25 NOTE — PROGRESS NOTES
Aure Joy comes into clinic today at the request of ESTELA Zamudio MD Ordering Provider for Med Injection only ketamine.    Procedure Prep:  Medication double check completed by: Jael Alexandra RN  Prior to administration pt identified by name and : yes    Nursing Assessment:  Appearance: casual clothing   Mood: okay  Affect: wnl  Eye contact: good      2023    10:06 AM   PHQ   PHQ-9 Total Score 15   Q9: Thoughts of better off dead/self-harm past 2 weeks Several days     QIDDSR 16 weekly assessment: score 18. Assessment was scanned to EHR.     Procedure Performed:  VSS and mental status WNL. Patient was given ketamine. See MAR for details.     Post Procedure Assessment:  Patient tolerated the treatment with the following side effects: dissociation. Vital signs were monitored, see VS Flowsheet. Patient stated they felt ready to go home prior to discharge. AVS was offered to patient and patient declined. Patient was instructed not to drive for the remainder of the day and to notify clinic if any concerns arise.     Next appt:     Medications were supplied by Clinic     This service provided today was under the supervising provider of the day ESTELA Zamudio MD, who was available if needed.    Perla Robbins, RN

## 2023-06-01 ENCOUNTER — ALLIED HEALTH/NURSE VISIT (OUTPATIENT)
Dept: PSYCHIATRY | Facility: CLINIC | Age: 53
End: 2023-06-01
Payer: COMMERCIAL

## 2023-06-01 VITALS — SYSTOLIC BLOOD PRESSURE: 130 MMHG | HEART RATE: 72 BPM | DIASTOLIC BLOOD PRESSURE: 85 MMHG

## 2023-06-01 DIAGNOSIS — F33.2 SEVERE RECURRENT MAJOR DEPRESSION WITHOUT PSYCHOTIC FEATURES (H): Primary | ICD-10-CM

## 2023-06-01 ASSESSMENT — PATIENT HEALTH QUESTIONNAIRE - PHQ9: SUM OF ALL RESPONSES TO PHQ QUESTIONS 1-9: 13

## 2023-06-01 NOTE — PROGRESS NOTES
Aure Joy comes into clinic today at the request of ESTELA Elmore MD Ordering Provider for Med Injection only ketamine.    Procedure Prep:  Medication double check completed by: brigid Alexandra RN  Prior to administration pt identified by name and : yes    Nursing Assessment:  Appearance: casual clothing   Mood: nuetral  Affect: wnl  Eye contact: good      2023    10:07 AM   PHQ   PHQ-9 Total Score 13   Q9: Thoughts of better off dead/self-harm past 2 weeks Several days     QIDDSR 16 weekly assessment: score 16. Assessment was scanned to EHR.     Procedure Performed:  VSS and mental status WNL. Patient was given ketamine. See MAR for details.     Post Procedure Assessment:  Patient tolerated the treatment with the following side effects: dissociation. Vital signs were monitored, see VS Flowsheet. Patient stated they felt ready to go home prior to discharge. AVS was offered to patient and patient declined. Patient was instructed not to drive for the remainder of the day and to notify clinic if any concerns arise.     Next appt: Next thursday    Medications were supplied by Clinic     This service provided today was under the supervising provider of the day ESTELA Elmore MD, who was available if needed.    Perla Robbins, RN

## 2023-06-05 DIAGNOSIS — F33.2 SEVERE EPISODE OF RECURRENT MAJOR DEPRESSIVE DISORDER, WITHOUT PSYCHOTIC FEATURES (H): ICD-10-CM

## 2023-06-07 RX ORDER — LAMOTRIGINE 200 MG/1
TABLET ORAL
Qty: 60 TABLET | Refills: 1 | Status: SHIPPED | OUTPATIENT
Start: 2023-06-07 | End: 2023-08-04

## 2023-06-07 NOTE — TELEPHONE ENCOUNTER
lamoTRIgine (LAMICTAL) 200 MG   Last refilled: 2/8/23  Qty: 60 : 3    Last seen: 3/30/23  RTC: 7  Cancel: 0  No-show: 0  Next appt: 7/27/23

## 2023-06-08 ENCOUNTER — ALLIED HEALTH/NURSE VISIT (OUTPATIENT)
Dept: PSYCHIATRY | Facility: CLINIC | Age: 53
End: 2023-06-08
Payer: COMMERCIAL

## 2023-06-08 VITALS — DIASTOLIC BLOOD PRESSURE: 72 MMHG | SYSTOLIC BLOOD PRESSURE: 112 MMHG | HEART RATE: 92 BPM

## 2023-06-08 DIAGNOSIS — F33.2 SEVERE EPISODE OF RECURRENT MAJOR DEPRESSIVE DISORDER, WITHOUT PSYCHOTIC FEATURES (H): Primary | ICD-10-CM

## 2023-06-08 ASSESSMENT — PATIENT HEALTH QUESTIONNAIRE - PHQ9: SUM OF ALL RESPONSES TO PHQ QUESTIONS 1-9: 13

## 2023-06-08 NOTE — PROGRESS NOTES
Aure Joy comes into clinic today at the request of ESTELA Elmore Ordering Provider for Med Injection only ketamine.    Procedure Prep:  Medication double check completed by: Jael Alexandra Rn  Prior to administration pt identified by name and : yes    Nursing Assessment:  Appearance: casual clothing   Mood: nuetral  Affect: wnl  Eye contact: good      2023    10:00 AM   PHQ   PHQ-9 Total Score 13   Q9: Thoughts of better off dead/self-harm past 2 weeks Not at all     QIDDSR 16 weekly assessment: score 15. Assessment was scanned to EHR.     Procedure Performed:  VSS and mental status WNL. Patient was given ketamine. See MAR for details.     Post Procedure Assessment:  Patient tolerated the treatment with the following side effects: dissociation. Vital signs were monitored, see VS Flowsheet. Patient stated they felt ready to go home prior to discharge. AVS was offered to patient and patient declined. Patient was instructed not to drive for the remainder of the day and to notify clinic if any concerns arise.     Next appt: Thursday     Medications were supplied by Clinic     This service provided today was under the supervising provider of the day ESTELA Elmore MD, who was available if needed.    Perla Robbins RN

## 2023-06-15 ENCOUNTER — ALLIED HEALTH/NURSE VISIT (OUTPATIENT)
Dept: PSYCHIATRY | Facility: CLINIC | Age: 53
End: 2023-06-15
Payer: COMMERCIAL

## 2023-06-15 VITALS — SYSTOLIC BLOOD PRESSURE: 112 MMHG | HEART RATE: 92 BPM | DIASTOLIC BLOOD PRESSURE: 82 MMHG

## 2023-06-15 DIAGNOSIS — F33.2 SEVERE EPISODE OF RECURRENT MAJOR DEPRESSIVE DISORDER, WITHOUT PSYCHOTIC FEATURES (H): Primary | ICD-10-CM

## 2023-06-15 ASSESSMENT — PATIENT HEALTH QUESTIONNAIRE - PHQ9: SUM OF ALL RESPONSES TO PHQ QUESTIONS 1-9: 10

## 2023-06-15 NOTE — PROGRESS NOTES
Aure Joy comes into clinic today at the request of ESTELA Elmore MD Ordering Provider for Med Injection only ketamine.    Procedure Prep:  Medication double check completed by: Eliot COOMBS RN  Prior to administration pt identified by name and : yes    Nursing Assessment:  Appearance: casual clothing   Mood: neutral  Affect: wnl  Eye contact: good      6/15/2023     9:52 AM   PHQ   PHQ-9 Total Score 10   Q9: Thoughts of better off dead/self-harm past 2 weeks Not at all     Procedure Performed:  VSS and mental status WNL. Patient was given ketamine. See MAR for details.     Post Procedure Assessment:  Patient tolerated the treatment with the following side effects: dissociation. Vital signs were monitored, see VS Flowsheet. Patient stated they felt ready to go home prior to discharge. AVS was offered to patient and patient declined. Patient was instructed not to drive for the remainder of the day and to notify clinic if any concerns arise.     Next appt: thursday    Medications were supplied by Clinic     This service provided today was under the supervising provider of the day ESTELA Elmore MD, who was available if needed.    Perla Robbins RN

## 2023-06-22 ENCOUNTER — ALLIED HEALTH/NURSE VISIT (OUTPATIENT)
Dept: PSYCHIATRY | Facility: CLINIC | Age: 53
End: 2023-06-22
Payer: COMMERCIAL

## 2023-06-22 VITALS — DIASTOLIC BLOOD PRESSURE: 77 MMHG | SYSTOLIC BLOOD PRESSURE: 118 MMHG | HEART RATE: 72 BPM

## 2023-06-22 DIAGNOSIS — F33.2 SEVERE EPISODE OF RECURRENT MAJOR DEPRESSIVE DISORDER, WITHOUT PSYCHOTIC FEATURES (H): Primary | ICD-10-CM

## 2023-06-22 ASSESSMENT — PATIENT HEALTH QUESTIONNAIRE - PHQ9: SUM OF ALL RESPONSES TO PHQ QUESTIONS 1-9: 12

## 2023-06-22 NOTE — PROGRESS NOTES
Aure Joy comes into clinic today at the request of ESTELA Zamudio MD Ordering Provider for Med Injection only ketamine.    Procedure Prep:  Medication double check completed by: Jael Alexandra Rn  Prior to administration pt identified by name and : yes    Nursing Assessment:  Appearance: casual clothing   Mood: nuetral  Affect: wnl  Eye contact: good      2023    10:58 AM   PHQ   PHQ-9 Total Score 12   Q9: Thoughts of better off dead/self-harm past 2 weeks Several days     QIDDSR 16 weekly assessment: score 16. Assessment was scanned to EHR.       Procedure Performed:  VSS and mental status WNL. Patient was given ketamine. See MAR for details.     Post Procedure Assessment:  Patient tolerated the treatment with the following side effects: dissociation. Vital signs were monitored, see VS Flowsheet. Patient stated they felt ready to go home prior to discharge. AVS was offered to patient and patient declined. Patient was instructed not to drive for the remainder of the day and to notify clinic if any concerns arise.     Next appt: Thursday    Medications were supplied by Clinic     This service provided today was under the supervising provider of the day ESTELA Zamudio MD, who was available if needed.    Perla Robbins, RN

## 2023-06-29 ENCOUNTER — ALLIED HEALTH/NURSE VISIT (OUTPATIENT)
Dept: PSYCHIATRY | Facility: CLINIC | Age: 53
End: 2023-06-29
Payer: COMMERCIAL

## 2023-06-29 VITALS — DIASTOLIC BLOOD PRESSURE: 86 MMHG | HEART RATE: 72 BPM | SYSTOLIC BLOOD PRESSURE: 131 MMHG

## 2023-06-29 DIAGNOSIS — F33.2 SEVERE EPISODE OF RECURRENT MAJOR DEPRESSIVE DISORDER, WITHOUT PSYCHOTIC FEATURES (H): Primary | ICD-10-CM

## 2023-06-29 ASSESSMENT — PATIENT HEALTH QUESTIONNAIRE - PHQ9: SUM OF ALL RESPONSES TO PHQ QUESTIONS 1-9: 12

## 2023-06-29 NOTE — PROGRESS NOTES
Aure Joy comes into clinic today at the request of ESTELA Zamudio MD Ordering Provider for Med Injection only Ketamine.    Procedure Prep:  Medication double check completed by: John GUERRERO RN  Prior to administration pt identified by name and : yes    Nursing Assessment:  Appearance: dressed casually   Mood: ok  Affect: wnl  Eye contact: good      2023    10:10 AM   PHQ   PHQ-9 Total Score 12   Q9: Thoughts of better off dead/self-harm past 2 weeks Several days     QIDDSR 16 weekly assessment: score 15. Assessment was scanned to EHR.       Procedure Performed:  VSS and mental status WNL. Patient was given Ketamine. See MAR for details.     Post Procedure Assessment:  Patient tolerated the treatment with the following side effects: dissociation. Vital signs were monitored, see VS Flowsheet. Patient stated they felt ready to go home prior to discharge. AVS was offered to patient and patient declined. Patient was instructed not to drive for the remainder of the day and to notify clinic if any concerns arise.     Next appt: 23    Medications were supplied by Clinic       This service provided today was under the supervising provider of the day Tim Williamson MD, who was available if needed.    Jael Alexandra RN

## 2023-07-06 ENCOUNTER — OFFICE VISIT (OUTPATIENT)
Dept: PSYCHIATRY | Facility: CLINIC | Age: 53
End: 2023-07-06
Payer: COMMERCIAL

## 2023-07-06 ENCOUNTER — ALLIED HEALTH/NURSE VISIT (OUTPATIENT)
Dept: PSYCHIATRY | Facility: CLINIC | Age: 53
End: 2023-07-06
Payer: COMMERCIAL

## 2023-07-06 VITALS — HEART RATE: 72 BPM | DIASTOLIC BLOOD PRESSURE: 73 MMHG | SYSTOLIC BLOOD PRESSURE: 111 MMHG

## 2023-07-06 DIAGNOSIS — F33.2 SEVERE RECURRENT MAJOR DEPRESSION WITHOUT PSYCHOTIC FEATURES (H): Primary | ICD-10-CM

## 2023-07-06 DIAGNOSIS — F43.10 COMPLEX POSTTRAUMATIC STRESS DISORDER: ICD-10-CM

## 2023-07-06 DIAGNOSIS — R39.9 LOWER URINARY TRACT SYMPTOMS (LUTS): ICD-10-CM

## 2023-07-06 DIAGNOSIS — F33.2 SEVERE EPISODE OF RECURRENT MAJOR DEPRESSIVE DISORDER, WITHOUT PSYCHOTIC FEATURES (H): Primary | ICD-10-CM

## 2023-07-06 DIAGNOSIS — F33.2 SEVERE EPISODE OF RECURRENT MAJOR DEPRESSIVE DISORDER, WITHOUT PSYCHOTIC FEATURES (H): ICD-10-CM

## 2023-07-06 ASSESSMENT — PATIENT HEALTH QUESTIONNAIRE - PHQ9: SUM OF ALL RESPONSES TO PHQ QUESTIONS 1-9: 10

## 2023-07-06 NOTE — PROGRESS NOTES
Aure Joy comes into clinic today at the request of ESTELA Zamudio MD Ordering Provider for Med Injection only ketamine.    Procedure Prep:  Medication double check completed by: Jael Alexandra RN  Prior to administration pt identified by name and : yes    Nursing Assessment:  Appearance: casual clothing   Mood: nuetral  Affect: wnl  Eye contact: good      2023    10:26 AM   PHQ   PHQ-9 Total Score 10   Q9: Thoughts of better off dead/self-harm past 2 weeks Several days     QIDDSR 16 weekly assessment: score 14. Assessment was scanned to EHR.     Procedure Performed:  VSS and mental status WNL. Patient was given ketamine. See MAR for details.     Post Procedure Assessment:  Patient tolerated the treatment with the following side effects: dissociation. Vital signs were monitored, see VS Flowsheet. Patient stated they felt ready to go home prior to discharge. AVS was offered to patient and patient declined. Patient was instructed not to drive for the remainder of the day and to notify clinic if any concerns arise.     Next appt: thursday    Medications were supplied by Clinic     This service provided today was under the supervising provider of the day ESTELA Zamudio MD, who was available if needed.    Perla Robbins, RN
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
## 2023-07-13 ENCOUNTER — TELEPHONE (OUTPATIENT)
Dept: PSYCHIATRY | Facility: CLINIC | Age: 53
End: 2023-07-13
Payer: COMMERCIAL

## 2023-07-13 ENCOUNTER — ALLIED HEALTH/NURSE VISIT (OUTPATIENT)
Dept: PSYCHIATRY | Facility: CLINIC | Age: 53
End: 2023-07-13
Payer: COMMERCIAL

## 2023-07-13 VITALS — DIASTOLIC BLOOD PRESSURE: 82 MMHG | SYSTOLIC BLOOD PRESSURE: 120 MMHG | HEART RATE: 65 BPM

## 2023-07-13 DIAGNOSIS — F33.2 SEVERE RECURRENT MAJOR DEPRESSION WITHOUT PSYCHOTIC FEATURES (H): Primary | ICD-10-CM

## 2023-07-13 ASSESSMENT — PATIENT HEALTH QUESTIONNAIRE - PHQ9: SUM OF ALL RESPONSES TO PHQ QUESTIONS 1-9: 14

## 2023-07-13 NOTE — PROGRESS NOTES
Aure Joy comes into clinic today at the request of ESTELA elmore MD Ordering Provider for Med Injection only ketamine.    Procedure Prep:  Medication double check completed by: Jael Alexandra RN  Prior to administration pt identified by name and : yes    Nursing Assessment:  Appearance: casual clothing   Mood: nuetral  Affect: wnl  Eye contact: good      2023    11:17 AM   PHQ   PHQ-9 Total Score 14   Q9: Thoughts of better off dead/self-harm past 2 weeks More than half the days     QIDDSR 16 weekly assessment: score 16. Assessment was scanned to EHR.        Procedure Performed:  VSS and mental status WNL. Patient was given ketamine. See MAR for details.     Post Procedure Assessment:  Patient tolerated the treatment with the following side effects: dissociation. Vital signs were monitored, see VS Flowsheet. Patient stated they felt ready to go home prior to discharge. AVS was offered to patient and patient declined. Patient was instructed not to drive for the remainder of the day and to notify clinic if any concerns arise.     Next appt: thursday    Medications were supplied by Clinic     This service provided today was under the supervising provider of the day Owen Elmore MD, who was available if needed.    Perla Robbins, RN

## 2023-07-18 ENCOUNTER — VIRTUAL VISIT (OUTPATIENT)
Dept: UROLOGY | Facility: CLINIC | Age: 53
End: 2023-07-18
Attending: PSYCHIATRY & NEUROLOGY
Payer: COMMERCIAL

## 2023-07-18 ENCOUNTER — OFFICE VISIT (OUTPATIENT)
Dept: PSYCHIATRY | Facility: CLINIC | Age: 53
End: 2023-07-18
Payer: COMMERCIAL

## 2023-07-18 DIAGNOSIS — F33.9 EPISODE OF RECURRENT MAJOR DEPRESSIVE DISORDER, UNSPECIFIED DEPRESSION EPISODE SEVERITY (H): Primary | ICD-10-CM

## 2023-07-18 DIAGNOSIS — R39.9 LOWER URINARY TRACT SYMPTOMS (LUTS): ICD-10-CM

## 2023-07-18 PROCEDURE — 99204 OFFICE O/P NEW MOD 45 MIN: CPT | Mod: VID | Performed by: PHYSICIAN ASSISTANT

## 2023-07-18 RX ORDER — KETAMINE HYDROCHLORIDE 10 MG/ML
0.1 INJECTION, SOLUTION INTRAMUSCULAR; INTRAVENOUS WEEKLY
COMMUNITY

## 2023-07-18 NOTE — LETTER
7/18/2023       RE: Aure Joy  [address]  [place] [address]     Dear Colleague,    Thank you for referring your patient, Aure Joy, to the [facility] at [facility]. Please see a copy of my visit note below.    Aure is a 53 year old who is being evaluated via a billable video visit.        MY CHART       How would you like to obtain your AVS? MyChart  If the video visit is dropped, the invitation should be resent by: Other e-mail: MY CHART  Will anyone else be joining your video visit? No    Sharon Santos CMA      Video-Visit Details    Type of service:  Video Visit   Video Start Time: 12:12 PM  Video End Time:12:17 PM    Originating Location (pt. Location): Home    Distant Location (provider location):  On-site  Platform used for Video Visit: Shaji     CC:  LUTS    HPI:  Aure Joy is a pleasant 53 year old adult who presents in consultation from Dr. Zamudio for evaluation of the above. Symptoms started in April/May. She is needing to lean forward to complete her urination, requiring 2nd voids, can leak with coughing, nocturia x 3-4. Has large fibroids.      Currently on Tamoxifen for breast cancer treatment.     Recently stopped drinking a lot of soda.     Has had Ketamine treatments over the past 3 years.     Past Medical History:   Diagnosis Date    Cancer (H)     brain tumor    Complication of anesthesia     Depressive disorder     Hearing loss     Migraines        Past Surgical History:   Procedure Laterality Date    CHOLECYSTECTOMY      COLONOSCOPY      SOFT TISSUE SURGERY      fatty lumps removed       Social History     Socioeconomic History    Marital status:      Spouse name: Not on file    Number of children: Not on file    Years of education: Not on file    Highest education level: Not on file   Occupational History    Not on file   Tobacco Use    Smoking status: Never     Smokeless tobacco: Never   Vaping Use    Vaping Use: Never used   Substance and Sexual Activity    Alcohol use: No    Drug use: No    Sexual activity: Not Currently   Other Topics Concern    Parent/sibling w/ CABG, MI or angioplasty before 65F 55M? Not Asked   Social History Narrative    Not on file     Social Determinants of Health     Financial Resource Strain: Not on file   Food Insecurity: Not on file   Transportation Needs: Not on file   Physical Activity: Not on file   Stress: Not on file   Social Connections: Not on file   Intimate Partner Violence: Not on file   Housing Stability: Not on file       Family History   Problem Relation Age of Onset    Diabetes Father     Thyroid Disease Sister     Depression Sister     Anxiety Disorder Sister        Allergies   Allergen Reactions    Codeine Nausea    Other  [No Clinical Screening - See Comments] Nausea and Vomiting and Other (See Comments)     general anesthesia- uncontrolled vomitting       Current Outpatient Medications   Medication    fluticasone (FLONASE) 50 MCG/ACT nasal spray    ketamine (KETALAR) 10 MG/ML injection    lamoTRIgine (LAMICTAL) 200 MG tablet    loratadine (CLARITIN) 10 MG tablet    LORazepam (ATIVAN) 0.5 MG tablet    ondansetron (ZOFRAN ODT) 4 MG ODT tab    cholecalciferol 25 MCG (1000 UT) TABS    tamoxifen (NOLVADEX) 20 MG tablet     Current Facility-Administered Medications   Medication    NONFORMULARY 1.1 mg/kg (Order-Specific)         PEx:   not currently breastfeeding.    PSYCH: NAD  EYES: EOMI  MOUTH: MMM  NEURO: AAO x3    A/P: Aure Joy is a 53 year old adult with sense of incomplete emptying, BETI  -UA/PVR with nurse  -PFPT eval  -Exam and poss cysto with Dr. Rasta Gutierrez, PA-C  [facility]        26 minutes spent on the date of the encounter doing chart review, review of outside records, review of test results, interpretation of tests, patient visit and documentation

## 2023-07-20 ENCOUNTER — ALLIED HEALTH/NURSE VISIT (OUTPATIENT)
Dept: PSYCHIATRY | Facility: CLINIC | Age: 53
End: 2023-07-20
Payer: COMMERCIAL

## 2023-07-20 VITALS — SYSTOLIC BLOOD PRESSURE: 116 MMHG | DIASTOLIC BLOOD PRESSURE: 86 MMHG | HEART RATE: 73 BPM

## 2023-07-20 DIAGNOSIS — F33.2 SEVERE RECURRENT MAJOR DEPRESSION WITHOUT PSYCHOTIC FEATURES (H): Primary | ICD-10-CM

## 2023-07-20 ASSESSMENT — PATIENT HEALTH QUESTIONNAIRE - PHQ9: SUM OF ALL RESPONSES TO PHQ QUESTIONS 1-9: 13

## 2023-07-20 NOTE — PROGRESS NOTES
Aure Joy comes into clinic today at the request of ESTELA Zamudio MD Ordering Provider for Med Injection only Ketamine.    Procedure Prep:  Medication double check completed by: John GUERRERO RN  Prior to administration pt identified by name and : yes    Nursing Assessment:  Appearance: dressed casually   Mood: ok  Affect: wnl  Eye contact: good      2023     9:52 AM   PHQ   PHQ-9 Total Score 13   Q9: Thoughts of better off dead/self-harm past 2 weeks Several days     QIDDSR 16 weekly assessment: score 15. Assessment was scanned to EHR.       Procedure Performed:  VSS and mental status WNL. Patient was given Ketamine. See MAR for details.     Post Procedure Assessment:  Patient tolerated the treatment with the following side effects: dissociation. Vital signs were monitored, see VS Flowsheet. Patient stated they felt ready to go home prior to discharge. AVS was offered to patient and patient declined. Patient was instructed not to drive for the remainder of the day and to notify clinic if any concerns arise.     Next appt: 23    Medications were supplied by Clinic       This service provided today was under the supervising provider of the day ESTELA Zamudio MD, who was available if needed.    Jael Alexandra, RN

## 2023-07-25 ENCOUNTER — OFFICE VISIT (OUTPATIENT)
Dept: PSYCHIATRY | Facility: CLINIC | Age: 53
End: 2023-07-25
Payer: COMMERCIAL

## 2023-07-25 DIAGNOSIS — F33.1 MODERATE EPISODE OF RECURRENT MAJOR DEPRESSIVE DISORDER (H): Primary | ICD-10-CM

## 2023-07-25 NOTE — PROGRESS NOTES
Interventional Psychiatry Program   Group  [facility]      Patient: Aure Joy (1970)     MRN: [id number]  Date:  7/18/23  Diagnosis: Major depressive disorder    Number of Participants: 6  Group Time: 90 minutes  Group Format: Hybrid, in-person and Amwell  Facilitators: NIRALI Tracey      The treatment resistant depression (TRD) group is based on the cognitive behavioral therapy model that uses psychoeducation, cognitive restructuring, problem solving techniques, and social skills.       Diagnostic criteria for group participation: Major depressive disorder, bipolar disorder and current patient in TRD program      Group Topic for Today: Introductions, group agreements and guidelines established, mindfulness for depression, skills training, process and support      Description: Active Participant   Aure was present in person, and engaged in the discussion with other patients about group agreements, MDD, TRD.  Aure's emotional presentation was open and cooperative. Aure's mood was Euthymic, and Aure Joy's affect was Appropriate  Bright .     Plan: Continue with group goals to minimize impact of depressive symptoms and maintain stability

## 2023-07-27 ENCOUNTER — ALLIED HEALTH/NURSE VISIT (OUTPATIENT)
Dept: PSYCHIATRY | Facility: CLINIC | Age: 53
End: 2023-07-27
Payer: COMMERCIAL

## 2023-07-27 VITALS — HEART RATE: 86 BPM | DIASTOLIC BLOOD PRESSURE: 79 MMHG | SYSTOLIC BLOOD PRESSURE: 128 MMHG

## 2023-07-27 DIAGNOSIS — F33.2 SEVERE RECURRENT MAJOR DEPRESSION WITHOUT PSYCHOTIC FEATURES (H): Primary | ICD-10-CM

## 2023-07-27 ASSESSMENT — PATIENT HEALTH QUESTIONNAIRE - PHQ9: SUM OF ALL RESPONSES TO PHQ QUESTIONS 1-9: 11

## 2023-07-27 NOTE — PROGRESS NOTES
Aure Joy comes into clinic today at the request of ESTELA Elmore MD Ordering Provider for Med Injection only Ketamine.    Procedure Prep:  Medication double check completed by: Dunia Alexandra RN  Prior to administration pt identified by name and : yes    Nursing Assessment:  Appearance: casual clothing   Mood: nuetral  Affect: wnl  Eye contact: good      2023    10:40 AM   PHQ   PHQ-9 Total Score 11   Q9: Thoughts of better off dead/self-harm past 2 weeks Not at all     QIDDSR 16 weekly assessment: score 13. Assessment was scanned to EHR.     Procedure Performed:  VSS and mental status WNL. Patient was given ketamine. See MAR for details.     Post Procedure Assessment:  Patient tolerated the treatment with the following side effects: dissociation. Vital signs were monitored, see VS Flowsheet. Patient stated they felt ready to go home prior to discharge. AVS was offered to patient and patient declined. Patient was instructed not to drive for the remainder of the day and to notify clinic if any concerns arise.     Next appt: Friday    Medications were supplied by Clinic     This service provided today was under the supervising provider of the day Dom ellsworth MD, who was available if needed.    Perla Robbins RN

## 2023-08-01 ENCOUNTER — OFFICE VISIT (OUTPATIENT)
Dept: PSYCHIATRY | Facility: CLINIC | Age: 53
End: 2023-08-01
Payer: COMMERCIAL

## 2023-08-01 DIAGNOSIS — F33.3 SEVERE EPISODE OF RECURRENT MAJOR DEPRESSIVE DISORDER, WITH PSYCHOTIC FEATURES (H): Primary | ICD-10-CM

## 2023-08-02 DIAGNOSIS — F33.2 SEVERE EPISODE OF RECURRENT MAJOR DEPRESSIVE DISORDER, WITHOUT PSYCHOTIC FEATURES (H): ICD-10-CM

## 2023-08-03 ENCOUNTER — ALLIED HEALTH/NURSE VISIT (OUTPATIENT)
Dept: PSYCHIATRY | Facility: CLINIC | Age: 53
End: 2023-08-03
Payer: COMMERCIAL

## 2023-08-03 VITALS — DIASTOLIC BLOOD PRESSURE: 85 MMHG | SYSTOLIC BLOOD PRESSURE: 138 MMHG | HEART RATE: 92 BPM

## 2023-08-03 DIAGNOSIS — F33.2 SEVERE EPISODE OF RECURRENT MAJOR DEPRESSIVE DISORDER, WITHOUT PSYCHOTIC FEATURES (H): Primary | ICD-10-CM

## 2023-08-03 ASSESSMENT — PATIENT HEALTH QUESTIONNAIRE - PHQ9: SUM OF ALL RESPONSES TO PHQ QUESTIONS 1-9: 10

## 2023-08-03 NOTE — PROGRESS NOTES
Aure Joy comes into clinic today at the request of ESTELA Zamudio MD Ordering Provider for Med Injection only Ketamine .    Procedure Prep:  Medication double check completed by: Jael Alexandra RN  Prior to administration pt identified by name and : yes    Nursing Assessment:  Appearance: casual clothing   Mood: neutral  Affect: wnl  Eye contact: good      8/3/2023    10:24 AM   PHQ   PHQ-9 Total Score 10   Q9: Thoughts of better off dead/self-harm past 2 weeks Not at all     QIDDSR 16 weekly assessment: score 12. Assessment was scanned to EHR.        Procedure Performed:  VSS and mental status WNL. Patient was given ketamine. See MAR for details.     Post Procedure Assessment:  Patient tolerated the treatment with the following side effects: dissociation. Vital signs were monitored, see VS Flowsheet. Patient stated they felt ready to go home prior to discharge. AVS was offered to patient and patient declined. Patient was instructed not to drive for the remainder of the day and to notify clinic if any concerns arise.     Next appt: 2 weeks (Thursday)    Medications were supplied by Clinic      This service provided today was under the supervising provider of the day ESTELA Zamudio MD, who was available if needed.    Perla Robbins, RN

## 2023-08-04 RX ORDER — LAMOTRIGINE 200 MG/1
TABLET ORAL
Qty: 60 TABLET | Refills: 1 | Status: SHIPPED | OUTPATIENT
Start: 2023-08-04 | End: 2023-10-09

## 2023-08-04 NOTE — TELEPHONE ENCOUNTER
Medication requested:  lamotrigine 200 mg tablet   Last refilled: 6/7/23  Qty: 60/1      Last seen: 7/6/23  RTC: 3 months  Cancel: 7/27- reschedule needed  No-show: 0  Next appt: 10/12/23    Refill decision:   Refill pended and routed to the provider for review/determination due to clarify dosage:        - Continue Lamotrigine at 300mg daily per unsigned note 7/6/23  -Per med list: lamoTRIgine (LAMICTAL) 200 MG tablet TAKE TWO (2) TABLETS BY MOUTH EVERY DAY

## 2023-08-08 ENCOUNTER — OFFICE VISIT (OUTPATIENT)
Dept: PSYCHIATRY | Facility: CLINIC | Age: 53
End: 2023-08-08
Payer: COMMERCIAL

## 2023-08-08 DIAGNOSIS — F33.41 RECURRENT MAJOR DEPRESSIVE DISORDER, IN PARTIAL REMISSION (H): Primary | ICD-10-CM

## 2023-08-09 NOTE — PROGRESS NOTES
PHYSICAL THERAPY EVALUATION  Type of Visit: Evaluation    See electronic medical record for Abuse and Falls Screening details.    Subjective       Presenting condition or subjective complaint: issues urinating. urinary incontinence with coughing. Ongoing for years. April 2023. Lost a lot of weight and started noticing increased urination, leaning forward to empty bladder and oily film in urine.  Date of onset: 07/18/23 (referral)    Relevant medical history: Depression; Fibromyalgia; History of fractures; Incontinence; Menopause; Migraines or headaches; Overweight; Pain at night or rest   Dates & types of surgery: Lumpectomy 1990 and 2021, gall bladder removal 2001, [id number], 1995, and D&C 2022.    Prior diagnostic imaging/testing results:       Prior therapy history for the same diagnosis, illness or injury: No        Living Environment  Social support:     Type of home:     Stairs to enter the home:         Ramp:     Stairs inside the home:         Help at home:    Equipment owned:       Employment: Yes [employer]  Hobbies/Interests: Walking, reading    Patient goals for therapy: sleep through the night, not worry when coughing    Pain assessment: See objective evaluation for additional pain details     Objective      PELVIC EVALUATION  ADDITIONAL HISTORY:  Sex assigned at birth: Female  Gender identity: Non-Binary/Genderqueer/Gender nonconforming    Pronouns: They/Them/Theirs      Bladder History:  Feels bladder filling: Yes  Triggers for feeling of inability to wait to go to the bathroom: No    How long can you wait to urinate: 10-15 min; 2-3 hours in between bathroom breaks.  Gets up at night to urinate: Yes 2-5  Can stop the flow of urine when urinating: Sometimes  Volume of urine usually released: Medium   Other issues: Dribbling after urinating (when leaning forwards  helps to empty bladder. occasional dribbles after)  Number of bladder infections in last 12 months:    Fluid intake per day: 24 oz 48 oz     Medications taken for bladder: No     Activities causing urine leak: Cough; Sneeze (laughing) laughing - more siginificant leakage.  Amount of urine typically leaked: a little  Pads used to help with leaking: Yes I use this many pads per day: 2 - always - only wears a pad when she has a cold.      Bowel History:  Frequency of bowel movement: depends can be up to 9 days in between BM - has IBS  Consistency of stool:      Ignores the urge to defecate: No  Other bowel issues: Straining to have bowel movement  Length of time spent trying to have a bowel movement:      Sexual Function History:  Sexual orientation: Asexual    Sexually active: No  Lubrication used:      Pelvic pain:      Pain or difficulty with orgasms/erection/ejaculation:      State of menopause:    Hormone medications: Yes Tamoxifen - stopped taking it a few weeks ago due to hot flashes    Are you currently pregnant: No, Number of previous pregnancies: 5, Number of deliveries: 2, If you have delivered before, did you have any of these issues during delivery: Tearing; Episiotomy; Vaginal delivery, Have you been diagnosed with pelvic prolapse or abdominal separation: No, Do you get regular exercise: No, I do this type of exercise: walking, Have you tried pelvic floor strengthening exercises for 4 weeks: No, Do you have any history of trauma that is relevant to your care that you d like to share: No    Discussed reason for referral regarding pelvic health needs and external/internal pelvic floor muscle examination with patient/guardian.  Opportunity provided to ask questions and verbal consent for assessment and intervention was given.    POSTURE: Standing Posture: Lordosis decreased  LUMBAR SCREEN: AROM WFL  Pain at parker PSIS, iliac crests - pain alleviates with lumbar flexion   HIP SCREEN: ROM pain with IR parker L>R: ER limited 25% parker; flexion limited 25% parker   Strength:   Pain: - none + mild ++ moderate +++ severe  Strength Scale: 0-5/5 Left Right   Hip  Flexion 4- 4-   Hip Extension 4 4   Hip Abduction 4 4   Hip Internal Rotation 4- 4-   Hip External Rotation 4 4      Functional Strength Testing: Double Leg Squat: Anterior knee translation, Increased trunk lean, Improper use of glutes/hips, and Able to perform full deep squat without pain  SLS: decreased balance parker; trendelenburg parker     LE FLEXIBILITY:  Min restriction HS parker; mod restriction quad parker; hip flexor mod restriction parker; decreased anterior hip mobility parker     BREATHING SYMMETRY: Decreased rib cage mobility    PELVIC EXAM  External Visual Inspection:  At rest: Elevated perineal body    Integumentary:   Introitus: Unremarkable      Internal Digital Palpation:  Per Vagina:  Tenderness  Tone: high  Digital Muscle Performance: P (Power): 2/5  Improved with vc for PF mechanics   Compensations: Gluts, Breath holding  Relaxation Post-Contraction: inability to hold relaxation - not voluntary   Tra coordination - good PF activation - mod cues to for TrA activation   Cough: PF lengthening     Pelvic Organ Prolapse:   Bear down : PF contraction;     ABDOMINAL ASSESSMENT    Abdominal Activation/Strength: SLR: pain in low back with L LE ;      Assessment & Plan   CLINICAL IMPRESSIONS  Medical Diagnosis: Lower urinary tract symptoms (LUTS)    Treatment Diagnosis: Lower urinary tract symptoms (LUTS), stress urinary incontinence   Impression/Assessment: Patient is a 53 year old adult with pelvic complaints.  The following significant findings have been identified: Pain, Decreased ROM/flexibility, Decreased strength, Decreased proprioception, Decreased activity tolerance, and Impaired posture. These impairments interfere with their ability to perform self care tasks and household mobility as compared to previous level of function.     Clinical Decision Making (Complexity):  Clinical Presentation: Stable/Uncomplicated  Clinical Presentation Rationale: based on medical and personal factors listed in PT  evaluation  Clinical Decision Making (Complexity): Low complexity    PLAN OF CARE  Treatment Interventions:  Interventions: Manual Therapy, Neuromuscular Re-education, Therapeutic Activity, Therapeutic Exercise, Self-Care/Home Management    Long Term Goals     PT Goal 1  Goal Identifier: LTG 1  Goal Description: Patient will decrease PF tension in order to improve bladder emptying with urination.  Rationale: to maximize safety and independence with performance of ADLs and functional tasks (continence throughout the day)  Target Date: 11/11/23      Frequency of Treatment: every other week  Duration of Treatment: 12 weeks    Recommended Referrals to Other Professionals: Physical Therapy  Education Assessment:   Learner/Method: Patient;No Barriers to Learning  Education Comments: PF anatomy, results from assessment and goals for POC and HEP    Risks and benefits of evaluation/treatment have been explained.   Patient/Family/caregiver agrees with Plan of Care.     Evaluation Time:     PT Eval, Low Complexity Minutes (13392): 30     Signing Clinician: Sarahi John PT      [facility]                                                                                   OUTPATIENT PHYSICAL THERAPY      PLAN OF TREATMENT FOR OUTPATIENT REHABILITATION   Patient's Last Name, First Name, TRIPPMAYITO JoyAure YOB: 1970   Provider's Name   [facility]   Medical Record No.  [id number]     Onset Date: 07/18/23 (referral)  Start of Care Date: 08/14/23     Medical Diagnosis:  Lower urinary tract symptoms (LUTS)      PT Treatment Diagnosis:  Lower urinary tract symptoms (LUTS), stress urinary incontinence Plan of Treatment  Frequency/Duration: every other week/ 12 weeks    Certification date from 08/14/23 to 11/11/23         See note for plan of treatment details and functional goals     Sarahi John, RASHEED                         I CERTIFY THE NEED FOR  THESE SERVICES FURNISHED UNDER        THIS PLAN OF TREATMENT AND WHILE UNDER MY CARE     (Physician attestation of this document indicates review and certification of the therapy plan).                Referring Provider:  Aranza Gutierrez      Initial Assessment  See Epic Evaluation- Start of Care Date: 08/14/23

## 2023-08-10 ENCOUNTER — ALLIED HEALTH/NURSE VISIT (OUTPATIENT)
Dept: UROLOGY | Facility: CLINIC | Age: 53
End: 2023-08-10
Payer: COMMERCIAL

## 2023-08-10 DIAGNOSIS — R39.9 LOWER URINARY TRACT SYMPTOMS (LUTS): Primary | ICD-10-CM

## 2023-08-10 LAB
ALBUMIN UR-MCNC: NEGATIVE MG/DL
APPEARANCE UR: CLEAR
BILIRUB UR QL STRIP: NEGATIVE
COLOR UR AUTO: YELLOW
GLUCOSE UR STRIP-MCNC: NEGATIVE MG/DL
HGB UR QL STRIP: ABNORMAL
KETONES UR STRIP-MCNC: NEGATIVE MG/DL
LEUKOCYTE ESTERASE UR QL STRIP: NEGATIVE
NITRATE UR QL: NEGATIVE
PH UR STRIP: 5.5 [PH] (ref 5–7)
SP GR UR STRIP: 1.02 (ref 1–1.03)
UROBILINOGEN UR STRIP-ACNC: 0.2 E.U./DL

## 2023-08-10 PROCEDURE — 99207 PR NO CHARGE NURSE ONLY: CPT

## 2023-08-10 PROCEDURE — 81003 URINALYSIS AUTO W/O SCOPE: CPT | Mod: QW

## 2023-08-10 PROCEDURE — 87086 URINE CULTURE/COLONY COUNT: CPT

## 2023-08-10 NOTE — PROGRESS NOTES
Aure Joy comes into clinic today at the request of Aranza Gutierrez PAC Ordering Provider for Cathed UAUC.  This service provided today was under the supervising provider of the day , who was available if needed.  Patient was cathed with 14 [place] catheter urine sent to lab for UA/UC.  post void residual 15 ml  Rosa Calhoun LPN

## 2023-08-12 LAB — BACTERIA UR CULT: NORMAL

## 2023-08-12 NOTE — PROGRESS NOTES
Interventional Psychiatry Program   Group  [facility]      Patient: Aure Joy (1970)     MRN: [id number]  Date:  7/25/23  Diagnosis: Major depressive disorder    Number of Participants: 5  Group Time: 90 minutes  Group Format: Hybrid, in-person and Amwell  Facilitators: NIRALI Tracey      The treatment resistant depression (TRD) group is based on the cognitive behavioral therapy model that uses psychoeducation, cognitive restructuring, problem solving techniques, and social skills.       Diagnostic criteria for group participation: Major depressive disorder, bipolar disorder and current patient in TRD program      Group Topic for Today: Check-in, goal update and setting, patient led mindfulness, skills training, process and support       Description: Active Participant  Aure was present in person, provided and led mindfulness exercise, contributed to discussion, reported and set goals for coming week. Aure's emotional presentation was open and cooperative. Aure's mood was Euthymic, and her affect was Appropriate  Bright .     Plan: Continue with group goals to minimize impact of depressive symptoms and maintain stability    Treatment Resistant Depression Program (Group)  Outpatient Treatment Plan Summary    Date completed: 7/25/2023  90-day Review Date: 10/25/2023     Date of Initial Service: 7/18/2023    Date of INTIAL Treatment Plan: 7/25/2023          DSM-V DIAGNOSIS:    Major depressive disorder    CURRENT SYMPTOMS and circumstances that substantiate the diagnosis:   Depressed mood, lack of motivation, anhedonia     How symptoms and/or behaviors are affecting level of functioning:   Employment, interpersonal relationships, self-care affected      TREATMENT  PLAN:          SYMPTOMS; PROBLEMS   MEASURABLE GOALS;    FUNCTIONAL IMPROVEMENT INTERVENTIONS + WHO WILL PROVIDE;   GAINS MADE DISCHARGE CRITERIA   Depression: depressed mood and low energy   Improve and manage  symptoms of depression Group CBT with an emphasis on social skills, psychoeducation, and recovery.    Gains Made: n/a Complete eight week group       Frequency of Sessions:  weekly    Expected duration of treatment:  8 sessions

## 2023-08-14 ENCOUNTER — THERAPY VISIT (OUTPATIENT)
Dept: PHYSICAL THERAPY | Facility: CLINIC | Age: 53
End: 2023-08-14
Attending: PHYSICIAN ASSISTANT
Payer: COMMERCIAL

## 2023-08-14 DIAGNOSIS — R39.9 LOWER URINARY TRACT SYMPTOMS (LUTS): ICD-10-CM

## 2023-08-14 DIAGNOSIS — N39.3 SUI (STRESS URINARY INCONTINENCE, FEMALE): Primary | ICD-10-CM

## 2023-08-14 PROCEDURE — 97530 THERAPEUTIC ACTIVITIES: CPT | Mod: GP

## 2023-08-14 PROCEDURE — 97112 NEUROMUSCULAR REEDUCATION: CPT | Mod: GP

## 2023-08-14 PROCEDURE — 97161 PT EVAL LOW COMPLEX 20 MIN: CPT | Mod: GP

## 2023-08-14 PROCEDURE — 97140 MANUAL THERAPY 1/> REGIONS: CPT | Mod: GP

## 2023-08-15 ENCOUNTER — OFFICE VISIT (OUTPATIENT)
Dept: PSYCHIATRY | Facility: CLINIC | Age: 53
End: 2023-08-15
Payer: COMMERCIAL

## 2023-08-15 ENCOUNTER — TELEPHONE (OUTPATIENT)
Dept: PSYCHIATRY | Facility: CLINIC | Age: 53
End: 2023-08-15

## 2023-08-15 DIAGNOSIS — F33.1 MODERATE EPISODE OF RECURRENT MAJOR DEPRESSIVE DISORDER (H): Primary | ICD-10-CM

## 2023-08-15 NOTE — TELEPHONE ENCOUNTER
Writer returned call to patient.  She reports that there has always been a question if she bipolar or not.     Aure states that on 8/3 she went to bed feeling very depressed and suicidal, however woke up on 8/4 feeling great.         States that she is spending a lot of money went out and bought a bunch of clothes and bought expensive tickets to the [employer].  She states that she does have this money however it is investment money that she has been saving.  But her thought is I might as well enjoy it.  She reports that if she didn't have a job she would go out and get one today.  Feels like at this time she is going to make and feels like she can do life.  She does state that she is sleeping at night and has not felt like she has had a decreased need for sleep.      She spoke with her therapist about this and he also felt that she was hypomanic.        In light of this she is wondering if she should come to her Ketamine appointment on 8/17.  Her last injection was on 8/3/23

## 2023-08-15 NOTE — TELEPHONE ENCOUNTER
Writer spoke with Dr. Zamudio.  She would like to cancel her Ketamine injection on Thursday.  She would also like patient to put some measures in place to not spend money.

## 2023-08-15 NOTE — TELEPHONE ENCOUNTER
What is the concern that needs to be addressed by a nurse? Patient thinks she having manic episode and wants to know if she should still come in for treatment     May a detailed message be left on voicemail? yes    Date of last office visit:     Message routed to: mincep

## 2023-08-15 NOTE — TELEPHONE ENCOUNTER
Writer called patient to discuss this.      Let her know that we will cancel her appointment this Thursday for Ketamine.  We also discussed putting a plan in place.  We discussed that she will not spend over $50 unless she talks to her therapist.  Patient felt like she could stick to this plan.        Also let writer know that Dr. Zamudio is looking at her medications to see if we can make a change to help level things a bit.  Patient verbalized understanding and had no further questions.

## 2023-08-21 ENCOUNTER — THERAPY VISIT (OUTPATIENT)
Dept: PHYSICAL THERAPY | Facility: CLINIC | Age: 53
End: 2023-08-21
Attending: PHYSICIAN ASSISTANT
Payer: COMMERCIAL

## 2023-08-21 DIAGNOSIS — N39.3 SUI (STRESS URINARY INCONTINENCE, FEMALE): ICD-10-CM

## 2023-08-21 DIAGNOSIS — R39.9 LOWER URINARY TRACT SYMPTOMS (LUTS): Primary | ICD-10-CM

## 2023-08-21 PROCEDURE — 97530 THERAPEUTIC ACTIVITIES: CPT | Mod: GP

## 2023-08-21 PROCEDURE — 97112 NEUROMUSCULAR REEDUCATION: CPT | Mod: 59

## 2023-08-22 ENCOUNTER — TELEPHONE (OUTPATIENT)
Dept: PSYCHIATRY | Facility: CLINIC | Age: 53
End: 2023-08-22

## 2023-08-22 ENCOUNTER — OFFICE VISIT (OUTPATIENT)
Dept: PSYCHIATRY | Facility: CLINIC | Age: 53
End: 2023-08-22
Payer: COMMERCIAL

## 2023-08-22 DIAGNOSIS — F30.8 HYPOMANIA (H): Primary | ICD-10-CM

## 2023-08-22 DIAGNOSIS — F33.2 SEVERE EPISODE OF RECURRENT MAJOR DEPRESSIVE DISORDER, WITHOUT PSYCHOTIC FEATURES (H): Primary | ICD-10-CM

## 2023-08-22 NOTE — TELEPHONE ENCOUNTER
Well known to. Aware of admit.  Please contact me if pulmonary care is needed     Writer spoke with Dr. Zamudio.  She is still working on a plan.

## 2023-08-22 NOTE — TELEPHONE ENCOUNTER
What is the concern that needs to be addressed by a nurse? Patient calling back to follow up on new treatment plan that was mentioned last week.    May a detailed message be left on voicemail?     Date of last office visit: 7/25/23    Message routed to: Psych RN Pool

## 2023-08-23 ENCOUNTER — OFFICE VISIT (OUTPATIENT)
Dept: UROLOGY | Facility: CLINIC | Age: 53
End: 2023-08-23
Payer: COMMERCIAL

## 2023-08-23 ENCOUNTER — MYC MEDICAL ADVICE (OUTPATIENT)
Dept: PSYCHIATRY | Facility: CLINIC | Age: 53
End: 2023-08-23

## 2023-08-23 VITALS
SYSTOLIC BLOOD PRESSURE: 112 MMHG | BODY MASS INDEX: 31.65 KG/M2 | OXYGEN SATURATION: 98 % | HEIGHT: 62 IN | DIASTOLIC BLOOD PRESSURE: 72 MMHG | WEIGHT: 172 LBS | HEART RATE: 79 BPM

## 2023-08-23 DIAGNOSIS — N39.3 SUI (STRESS URINARY INCONTINENCE, FEMALE): ICD-10-CM

## 2023-08-23 DIAGNOSIS — R39.9 LOWER URINARY TRACT SYMPTOMS (LUTS): Primary | ICD-10-CM

## 2023-08-23 DIAGNOSIS — F33.2 SEVERE EPISODE OF RECURRENT MAJOR DEPRESSIVE DISORDER, WITHOUT PSYCHOTIC FEATURES (H): ICD-10-CM

## 2023-08-23 DIAGNOSIS — F33.2 SEVERE EPISODE OF RECURRENT MAJOR DEPRESSIVE DISORDER, WITHOUT PSYCHOTIC FEATURES (H): Primary | ICD-10-CM

## 2023-08-23 LAB
ALBUMIN UR-MCNC: NEGATIVE MG/DL
APPEARANCE UR: CLEAR
BILIRUB UR QL STRIP: NEGATIVE
COLOR UR AUTO: YELLOW
GLUCOSE UR STRIP-MCNC: NEGATIVE MG/DL
HGB UR QL STRIP: NEGATIVE
KETONES UR STRIP-MCNC: NEGATIVE MG/DL
LEUKOCYTE ESTERASE UR QL STRIP: NEGATIVE
NITRATE UR QL: NEGATIVE
PH UR STRIP: 5 [PH] (ref 5–7)
SP GR UR STRIP: >=1.03 (ref 1–1.03)
UROBILINOGEN UR STRIP-ACNC: 0.2 E.U./DL

## 2023-08-23 PROCEDURE — 81003 URINALYSIS AUTO W/O SCOPE: CPT | Mod: QW | Performed by: UROLOGY

## 2023-08-23 PROCEDURE — 99212 OFFICE O/P EST SF 10 MIN: CPT | Mod: 25 | Performed by: UROLOGY

## 2023-08-23 PROCEDURE — 52000 CYSTOURETHROSCOPY: CPT | Performed by: UROLOGY

## 2023-08-23 RX ORDER — ARIPIPRAZOLE 15 MG/1
15 TABLET ORAL DAILY
Qty: 30 TABLET | Refills: 3 | Status: SHIPPED | OUTPATIENT
Start: 2023-08-23 | End: 2023-09-07

## 2023-08-23 ASSESSMENT — PAIN SCALES - GENERAL: PAINLEVEL: NO PAIN (0)

## 2023-08-23 NOTE — PROGRESS NOTES
"August 23, 2023    Return visit    Patient returns today for follow up  to evaluate her bladder given her LUTS in the setting of history of ketamine treatments by psychiatry.  She is just started PFPT. She denies any changes in her health since last visit.    /72 (BP Location: Right arm, Patient Position: Sitting)   Pulse 79   Ht 1.575 m (5' 2\")   Wt 78 kg (172 lb)   SpO2 98%   BMI 31.46 kg/m    She is comfortable, in no distress, non-labored breathing.  Abdomen is soft, non-tender, non-distended.  Normal external female genitalia.  Negative CST.  Pelvic exam is unremarkable     Cystoscopy Note: After informed consent was obtained patient was prepped and draped in the standard fashion.  The flexible cystoscope was inserted into a normal appearing urethral meatus.  The urothelium was carefully examined and there were no tumors, masses, stones, foreign bodies, or other urothelial abnormalities noted.  Bilateral ureteral orifices were noted in the normal orthotopic position and both effluxed clear urine.  The cystoscope was retroflexed and the bladder neck was unremarkable.  The urethra was carefully examined upon removing the cystoscope and was unremarkable.  Patient tolerated the procedure without complications noted.      A/P: 53 year old F with BETI, LUTS, depression    Normal cystoscopy    Work with PFPT    RTC 6 months to see Corrine, sooner if needed    15 minutes were spent today on the date of the encounter in reviewing the EMR, direct patient care, coordination of care and documentation in addition to the cystoscopy procedure    Peri Magdaleno MD MPH  (she/her/hers)   of Urology  [facility]      CC  Patient Care Team:  Stephy Valentin PA-C as PCP - General (Physician Assistant)  Amy Leyva, PhD LP (PSYCHOLOGIST CLINICAL)  Lisa Varner, PhD LP as Referring Physician (Psychology)  Evelyn Zamudio MD as Assigned Behavioral Health Provider  Ibrahima, " Jael DUNLAP RN as Specialty Care Coordinator (Psychiatry)  Aranza Gutierrez PA-C as Physician Assistant (Urology)  Aranza Gutierrez PA-C as Assigned OBGYN Provider

## 2023-08-23 NOTE — LETTER
"8/23/2023       RE: Aure Joy  [address]  [place] [address]     Dear Colleague,    Thank you for referring your patient, Aure Joy, to the [facility] [place] [facility]. Please see a copy of my visit note below.    August 23, 2023    Return visit    Patient returns today for follow up  to evaluate her bladder given her LUTS in the setting of history of ketamine treatments by psychiatry.  She is just started PFPT. She denies any changes in her health since last visit.    /72 (BP Location: Right arm, Patient Position: Sitting)   Pulse 79   Ht 1.575 m (5' 2\")   Wt 78 kg (172 lb)   SpO2 98%   BMI 31.46 kg/m    She is comfortable, in no distress, non-labored breathing.  Abdomen is soft, non-tender, non-distended.  Normal external female genitalia.  Negative CST.  Pelvic exam is unremarkable     Cystoscopy Note: After informed consent was obtained patient was prepped and draped in the standard fashion.  The flexible cystoscope was inserted into a normal appearing urethral meatus.  The urothelium was carefully examined and there were no tumors, masses, stones, foreign bodies, or other urothelial abnormalities noted.  Bilateral ureteral orifices were noted in the normal orthotopic position and both effluxed clear urine.  The cystoscope was retroflexed and the bladder neck was unremarkable.  The urethra was carefully examined upon removing the cystoscope and was unremarkable.  Patient tolerated the procedure without complications noted.      A/P: 53 year old F with BETI, LUTS, depression    Normal cystoscopy    Work with PFPT    RTC 6 months to see Corrine, sooner if needed    15 minutes were spent today on the date of the encounter in reviewing the EMR, direct patient care, coordination of care and documentation in addition to the cystoscopy procedure    Peri Magdaleno MD MPH  (she/her/hers)   of " Urology  [facility]      CC  Patient Care Team:  Stephy Valentin PA-C as PCP - General (Physician Assistant)  Amy Leyva, PhD LP (PSYCHOLOGIST CLINICAL)  Lisa Varner, PhD LP as Referring Physician (Psychology)  Evelyn Zamudio MD as Assigned Behavioral Health Provider  Jael Alexandra, RN as Specialty Care Coordinator (Psychiatry)  Aranza Gutierrez PA-C as Physician Assistant (Urology)  Aranza Gutierrez PA-C as Assigned OBGYN Provider

## 2023-08-23 NOTE — NURSING NOTE
Chief Complaint   Patient presents with    Lower urinary symptoms     In office cystoscopy    Prior to the start of the procedure and with procedural staff participation, I verbally confirmed the patient s identity using two indicators, relevant allergies, that the procedure was appropriate and matched the consent or emergent situation, and that the correct equipment/implants were available. Immediately prior to starting the procedure I conducted the Time Out with the procedural staff and re-confirmed the patient s name, procedure, and site/side. I have wiped the patient off with the povidone-Iodine solution, draped them,  and instilled sterile water into the bladder. (The Joint Commission universal protocol was followed.)  Yes    Sedation (Moderate or Deep): None   Karen Floyd LPN

## 2023-08-23 NOTE — TELEPHONE ENCOUNTER
Writer called and spoke with patient.  She verbalized understanding of the plan and states that she has been on Abilify before.     She asked for resources to learn more about bipolar disorder; as she does not want to just google it.  Writer will look into finding a good resource for and follow up.     Also scheduled a follow up phone call sussy 2 weeks to check in and see how she is doing.

## 2023-08-23 NOTE — PATIENT INSTRUCTIONS
"Websites with free information:    [employer] patient website: www.voicesforpfd.org    Total Control Program: www.totalcontrolprogram.com    Continue the PT    Return to see Corrine in about 6 months    It was a pleasure meeting with you today.  Thank you for allowing me and my team the privilege of caring for you today.  YOU are the reason we are here, and I truly hope we provided you with the excellent service you deserve.  Please let us know if there is anything else we can do for you so that we can be sure you are leaving completely satisfied with your care experience.      AFTER YOUR CYSTOSCOPY  ?  ?  You have just completed a cystoscopy, or \"cysto\", which allowed your physician to learn more about your bladder (or to remove a stent placed after surgery). We suggest that you continue to avoid caffeine, fruit juice, and alcohol for the next 24 hours, however, you are encouraged to return to your normal activities.  ?  ?  A few things that are considered normal after your cystoscopy:  ?  * small amount of bleeding (or spotting) that clears within the next 24 hours  ?  * slight burning sensation with urination  ?  * sensation of needing to void (urinate) more frequently  ?  * the feeling of \"air\" in your urine  ?  * mild discomfort that is relieved with Tylenol    * bladder spasms  ?  ?  ?  Please contact our office promptly if you:  ?  * develop a fever above 101 degrees  ?  * are unable to urinate  ?  * develop bright red blood that does not stop  ?  * experience severe pain or swelling  ?  ?  ?  And of course, please contact our office with any concerns or questions [phone].  "

## 2023-08-29 ENCOUNTER — OFFICE VISIT (OUTPATIENT)
Dept: PSYCHIATRY | Facility: CLINIC | Age: 53
End: 2023-08-29
Payer: COMMERCIAL

## 2023-08-29 DIAGNOSIS — F30.8 HYPOMANIA (H): Primary | ICD-10-CM

## 2023-09-01 ENCOUNTER — OFFICE VISIT (OUTPATIENT)
Dept: PSYCHIATRY | Facility: CLINIC | Age: 53
End: 2023-09-01
Payer: COMMERCIAL

## 2023-09-01 DIAGNOSIS — F33.2 SEVERE EPISODE OF RECURRENT MAJOR DEPRESSIVE DISORDER, WITHOUT PSYCHOTIC FEATURES (H): Primary | ICD-10-CM

## 2023-09-01 DIAGNOSIS — F43.10 COMPLEX POSTTRAUMATIC STRESS DISORDER: ICD-10-CM

## 2023-09-01 RX ORDER — RISPERIDONE 1 MG/1
TABLET ORAL
Qty: 60 TABLET | Refills: 0 | Status: SHIPPED | OUTPATIENT
Start: 2023-09-01 | End: 2023-10-04

## 2023-09-01 ASSESSMENT — PATIENT HEALTH QUESTIONNAIRE - PHQ9: SUM OF ALL RESPONSES TO PHQ QUESTIONS 1-9: 11

## 2023-09-01 NOTE — PROGRESS NOTES
"Clinician Contact & Progress Note  Department of Psychiatry & Behavioral Sciences  [facility]    Patient: Aure Joy (1970)     MRN: [id number]  Date of Treatment:  Sep 1, 2023  Duration of Treatment: Start Time: 9:35am End Time: 10:30am  Provider: Jose Grady, Ph.D., L.P.    People present:   Provider: Jose Grady, Ph.D., L.P.  Patient: Aure Joy  Others: n/a    Diagnoses: (per chart)  Major depressive disorder, severe, recurrent, without psychotic features  Complex posttraumatic stress disorder      Assessment (current symptoms):      7/27/2023    10:40 AM 8/3/2023    10:24 AM 9/1/2023    10:38 AM   PHQ   PHQ-9 Total Score 11 10 11   Q9: Thoughts of better off dead/self-harm past 2 weeks Not at all Not at all Not at all         3/9/2020    10:00 AM   RAMEZ-7 SCORE   Total Score 11         Session content:    Patient presented for initial psychotherapy visit with writer in Treatment Resistant Depression program. Began with confirmation of patient's identity with two sources of information. Writer reviewed issues of informed consent with patient, patient agreed verbally to continue with visit.    Asked to expound on their motivation for attending today's visit the patient explained their experience of what they describe as a \"hypomanic state\". Patient described experiencing urges for suicide on August 2 and waking up on August 3 with a sense of clarity of approaching goals and a feeling of disinhibition as well as a lack of suicidal ideation. They noted that in the last month they spent considerably more money than they usually would on things like clothes, eating out, and trips. For example they noted spending $500 on food and eating out where is usually they would spend around $300. They also endorsed spending money on gadgets like buying a new iPad and getting a tattoo. However in conversation they noted that the do not regret any of the purchases or decisions they " "made but they are looking to be more intentional and sustainable with their choices and hoping to focus on this in therapy with writer.     Patient shared that they began working in therapy in January of this year with a therapist with specialization in trauma, meeting weekly. They noted that addressing \"self-hate\" has been particularly helpful for them. They stated that they will meet with this therapist on Tuesday of the upcoming week and discuss how frequently they would continue to meet.      Treatment Plan/ Disposition:   Next appointment: to be scheduled by pt  JEREMIE with other (eg., psychiatric) treatment providers       Mental Status Exam:  Alertness: alert  and oriented  Appearance: adequately groomed  Behavior/Demeanor: cooperative, pleasant, and calm, with good  eye contact   Speech: normal  Language: intact. Preferred language identified as English.  Psychomotor: normal or unremarkable  Mood: stable, euthymic  Affect: generally  calm ; was congruent to mood; was congruent to content  Thought Process/Associations: unremarkable  Thought Content:  unremarkable  -Suicidal ideation: denies SI, denies intent, and denies plan  -Homicidal Ideation: denies  Perception:  intact  Insight: good  Judgment: good  Cognition: does  appear grossly intact      Billing for \"Interactive Complexity\"?    No    Jose Grady, PhD LP    "

## 2023-09-01 NOTE — TELEPHONE ENCOUNTER
Writer spoke with Dr. Zamudio.      She would like to add risperidone 1 mg for three days then go up to 2 mg.  She has concerns about decreasing the abilify quickly as it could make things worse and in lower doses abilify can be activating.  She is hoping that with the risperidone it will calm patient's mind down and then we can start to taper the abilify at a later date.

## 2023-09-05 ENCOUNTER — OFFICE VISIT (OUTPATIENT)
Dept: PSYCHIATRY | Facility: CLINIC | Age: 53
End: 2023-09-05
Payer: COMMERCIAL

## 2023-09-05 DIAGNOSIS — F31.81 BIPOLAR 2 DISORDER (H): Primary | ICD-10-CM

## 2023-09-07 ENCOUNTER — VIRTUAL VISIT (OUTPATIENT)
Dept: PSYCHIATRY | Facility: CLINIC | Age: 53
End: 2023-09-07
Payer: COMMERCIAL

## 2023-09-07 DIAGNOSIS — F33.2 SEVERE EPISODE OF RECURRENT MAJOR DEPRESSIVE DISORDER, WITHOUT PSYCHOTIC FEATURES (H): Primary | ICD-10-CM

## 2023-09-07 RX ORDER — ARIPIPRAZOLE 2 MG/1
2 TABLET ORAL DAILY
Qty: 30 TABLET | Refills: 0 | Status: SHIPPED | OUTPATIENT
Start: 2023-09-07 | End: 2023-09-22

## 2023-09-07 RX ORDER — ARIPIPRAZOLE 10 MG/1
10 TABLET ORAL DAILY
Qty: 30 TABLET | Refills: 0 | Status: SHIPPED | OUTPATIENT
Start: 2023-09-07 | End: 2023-09-22

## 2023-09-07 NOTE — PROGRESS NOTES
Interventional Psychiatry Program   Group  [facility] Psychiatry [facility]      Patient: Aure Joy (1970)     MRN: [id number]  Date:  8/08/23  Diagnosis: Major depressive disorder    Number of Participants: 5  Group Time: 90 minutes  Group Format: Hybrid, in-person and Amwell  Facilitators: NIRALI Tracey      The treatment resistant depression (TRD) group is based on the cognitive behavioral therapy model that uses psychoeducation, cognitive restructuring, problem solving techniques, and social skills.       Diagnostic criteria for group participation: Major depressive disorder, bipolar disorder and current patient in TRD program      Group Topic for Today: Check-in, goal update and setting, mindfulness, skills training, process and support       Description: Active Participant  Aure was present in person, participated in mindfulness exercise, contributed to discussion, reported , asked for support and validation, and set goals for coming week. Aure's emotional presentation was open and cooperative. Aure's mood was Euthymic, and her affect was Appropriate  Bright .     Plan: Continue with group goals to minimize impact of depressive symptoms and maintain stability    Treatment Resistant Depression Program (Group)  Outpatient Treatment Plan Summary    Date completed: 7/25/2023  90-day Review Date: 10/25/2023     Date of Initial Service: 7/18/2023    Date of INTIAL Treatment Plan: 7/25/2023          DSM-V DIAGNOSIS:    Major depressive disorder    CURRENT SYMPTOMS and circumstances that substantiate the diagnosis:   Depressed mood, lack of motivation, anhedonia     How symptoms and/or behaviors are affecting level of functioning:   Employment, interpersonal relationships, self-care affected      TREATMENT  PLAN:          SYMPTOMS; PROBLEMS   MEASURABLE GOALS;    FUNCTIONAL IMPROVEMENT INTERVENTIONS + WHO WILL PROVIDE;   GAINS MADE DISCHARGE CRITERIA   Depression: depressed mood and low  energy   Improve and manage symptoms of depression Group CBT with an emphasis on social skills, psychoeducation, and recovery.    Gains Made: n/a Complete eight week group       Frequency of Sessions:  weekly    Expected duration of treatment:  8 sessions

## 2023-09-07 NOTE — PROGRESS NOTES
Writer called patient to discuss medication change.  Received voicemail.  Left message asking for a return call.  Clinic number provided.

## 2023-09-07 NOTE — PROGRESS NOTES
Writer received call back from patient.  We reviewed new directions for Abilify.  She verbalized understanding and had no further questions.

## 2023-09-07 NOTE — PROGRESS NOTES
Interventional Psychiatry Program   Group  [facility] Psychiatry [facility]      Patient: Aure Joy (1970)     MRN: [id number]  Date:  8/01/23  Diagnosis: Major depressive disorder    Number of Participants: 5  Group Time: 90 minutes  Group Format: Hybrid, in-person and Amwell  Facilitators: NIRALI Tracey      The treatment resistant depression (TRD) group is based on the cognitive behavioral therapy model that uses psychoeducation, cognitive restructuring, problem solving techniques, and social skills.       Diagnostic criteria for group participation: Major depressive disorder, bipolar disorder and current patient in TRD program      Group Topic for Today: Check-in, goal update and setting, mindfulness, skills training, process and support       Description: Active Participant  Aure was present in person, participated in mindfulness exercise, contributed to discussion, reported and set goals for coming week. Aure's emotional presentation was open and cooperative. Aure's mood was Euthymic, and her affect was Appropriate  Bright .     Plan: Continue with group goals to minimize impact of depressive symptoms and maintain stability    Treatment Resistant Depression Program (Group)  Outpatient Treatment Plan Summary    Date completed: 7/25/2023  90-day Review Date: 10/25/2023     Date of Initial Service: 7/18/2023    Date of INTIAL Treatment Plan: 7/25/2023          DSM-V DIAGNOSIS:    Major depressive disorder    CURRENT SYMPTOMS and circumstances that substantiate the diagnosis:   Depressed mood, lack of motivation, anhedonia     How symptoms and/or behaviors are affecting level of functioning:   Employment, interpersonal relationships, self-care affected      TREATMENT  PLAN:          SYMPTOMS; PROBLEMS   MEASURABLE GOALS;    FUNCTIONAL IMPROVEMENT INTERVENTIONS + WHO WILL PROVIDE;   GAINS MADE DISCHARGE CRITERIA   Depression: depressed mood and low energy   Improve and manage symptoms of  depression Group CBT with an emphasis on social skills, psychoeducation, and recovery.    Gains Made: n/a Complete eight week group       Frequency of Sessions:  weekly    Expected duration of treatment:  8 sessions

## 2023-09-07 NOTE — PROGRESS NOTES
"[facility]:  Care Coordination Note     SITUATION   Aure Joy is a 53 year old adult who is receiving support for:  Clinic Care Coordination - Follow-up (Phone check in )      BACKGROUND     Phone check in on medication changes     ASSESSMENT     Aure reports that she has been having issues since starting the Abilify.  She feels like she cannot write anything down because her body is charged with electricity.  She reports that she described this to her daughter and they said that is what they feel like when they are having problems with their ADHD.  She reports feeling irritable and angry; states that her dog ate her apple pen that goes with her Ipad and she was \"infused with anger.\"  She also notes that when she was working she has been adding a \"1\" onto numbers, she gave the example that someone owed $14 so she said the total was $one14.  States she also has difficulty finding words and notes that at times she is shaking.  She also notes that she at times is stuttering.      She reports in regards to her mood she does not feel as hypomanic any more.  States she is still spending more money then normal.  However someone asked her to go to [place] and instead of just saying yes like she would in the past couple of weeks she actually thought about it and said it was not a good idea.  She reports not feeling as optimistic or as happy, but doesn't feel depressed either.  However she did note that she cried a lot of Monday, however it did not feel like a depression cry.  She states that she is sleeping about 4 hours a night from 4am to 8am and usually feels semi rested.        She does note that she stopped taking her tamoxifen either towards the end of July or the middle she does not recall.      She is wondering even if she is not hypomanic does she still need to take meds.  We discussed this a bit, but she will also discuss with Dr. Zamudio next month.     Her goal is to try to get off the Abilify as she " feels that it is causing most of the issues she is having.  She is not noticing any s/e from the risperidone and is now on 2 mg of it.              PLAN          Nursing Interventions:  education on medications      Follow-up plan:  Writer to follow up with Dr. Zamudio on next steps.         Jael Alexandra RN

## 2023-09-08 NOTE — PROGRESS NOTES
Interventional Psychiatry Program   Group  [facility] Psychiatry [facility]      Patient: Aure Joy (1970)     MRN: [id number]  Date:  8/15/23  Diagnosis: Major depressive disorder    Number of Participants: 5  Group Time: 90 minutes  Group Format: Hybrid, in-person and Amwell  Facilitators: NIRALI Tracey      The treatment resistant depression (TRD) group is based on the cognitive behavioral therapy model that uses psychoeducation, cognitive restructuring, problem solving techniques, and social skills.       Diagnostic criteria for group participation: Major depressive disorder, bipolar disorder and current patient in TRD program      Group Topic for Today: Check-in, goal update and setting, mindfulness, skills training, process and support       Description: Active Participant  Aure was present in person, participated in mindfulness exercise, contributed to discussion, and set goals for coming week. Aure's emotional presentation was open and cooperative. Aure's mood was Euthymic, and her affect was Appropriate  Bright .     Plan: Continue with group goals to minimize impact of depressive symptoms and maintain stability    Treatment Resistant Depression Program (Group)  Outpatient Treatment Plan Summary    Date completed: 7/25/2023  90-day Review Date: 10/25/2023     Date of Initial Service: 7/18/2023    Date of INTIAL Treatment Plan: 7/25/2023          DSM-V DIAGNOSIS:    Major depressive disorder    CURRENT SYMPTOMS and circumstances that substantiate the diagnosis:   Depressed mood, lack of motivation, anhedonia     How symptoms and/or behaviors are affecting level of functioning:   Employment, interpersonal relationships, self-care affected      TREATMENT  PLAN:          SYMPTOMS; PROBLEMS   MEASURABLE GOALS;    FUNCTIONAL IMPROVEMENT INTERVENTIONS + WHO WILL PROVIDE;   GAINS MADE DISCHARGE CRITERIA   Depression: depressed mood and low energy   Improve and manage symptoms of  depression Group CBT with an emphasis on social skills, psychoeducation, and recovery.    Gains Made: n/a Complete eight week group       Frequency of Sessions:  weekly    Expected duration of treatment:  8 sessions

## 2023-09-08 NOTE — PROGRESS NOTES
Interventional Psychiatry Program   Group  [facility] Psychiatry [facility]      Patient: Aure Joy (1970)     MRN: [id number]  Date:  8/22/23  Diagnosis: Major depressive disorder    Number of Participants: 5  Group Time: 90 minutes  Group Format: Hybrid, in-person and Amwell  Facilitators: NIRALI Tracey      The treatment resistant depression (TRD) group is based on the cognitive behavioral therapy model that uses psychoeducation, cognitive restructuring, problem solving techniques, and social skills.       Diagnostic criteria for group participation: Major depressive disorder, bipolar disorder and current patient in TRD program      Group Topic for Today: Check-in, goal update and setting, mindfulness, skills training, process and support       Description: Active Participant  Aure was present in person, participated in mindfulness exercise, and asked for time from the group. Aure reports she is in a hypomanic state currenty and adds that bipolar has been considered by providers in the past. Presentation was open and cooperative. Aure's mood was Euthymic, and her affect was Appropriate  Bright .     Plan: Continue with group goals to minimize impact of depressive symptoms and maintain stability    Treatment Resistant Depression Program (Group)  Outpatient Treatment Plan Summary    Date completed: 7/25/2023  90-day Review Date: 10/25/2023     Date of Initial Service: 7/18/2023    Date of INTIAL Treatment Plan: 7/25/2023          DSM-V DIAGNOSIS:    Major depressive disorder    CURRENT SYMPTOMS and circumstances that substantiate the diagnosis:   Depressed mood, lack of motivation, anhedonia     How symptoms and/or behaviors are affecting level of functioning:   Employment, interpersonal relationships, self-care affected      TREATMENT  PLAN:          SYMPTOMS; PROBLEMS   MEASURABLE GOALS;    FUNCTIONAL IMPROVEMENT INTERVENTIONS + WHO WILL PROVIDE;   GAINS MADE DISCHARGE CRITERIA    Depression: depressed mood and low energy   Improve and manage symptoms of depression Group CBT with an emphasis on social skills, psychoeducation, and recovery.    Gains Made: n/a Complete eight week group       Frequency of Sessions:  weekly    Expected duration of treatment:  8 sessions

## 2023-09-12 NOTE — PROGRESS NOTES
Interventional Psychiatry Program   Group  [facility] Psychiatry [facility]      Patient: Aure Joy (1970)     MRN: [id number]  Date:  9/05/23  Diagnosis: Major depressive disorder    Number of Participants: 5  Group Time: 90 minutes  Group Format: Hybrid, in-person and Amwell  Facilitators: NIRALI Tracey      The treatment resistant depression (TRD) group is based on the cognitive behavioral therapy model that uses psychoeducation, cognitive restructuring, problem solving techniques, and social skills.       Diagnostic criteria for group participation: Major depressive disorder, bipolar disorder and current patient in TRD program      Group Topic for Today: Check-in, goal update and setting, mindfulness, skills training, process and support       Description: Active Participant  Aure was present in person, participated in mindfulness exercise, and asked for time from the group. Aure provided update on hypomania symptoms and shared her experiences from the last week. Presentation was open and cooperative. Aure's mood was euthymic, with no observed pressured speech or physical agitation. Her affect was Appropriate  Bright .     Plan: Continue with group goals to minimize impact of depressive symptoms and maintain stability    Treatment Resistant Depression Program (Group)  Outpatient Treatment Plan Summary    Date completed: 7/25/2023  90-day Review Date: 10/25/2023     Date of Initial Service: 7/18/2023    Date of INTIAL Treatment Plan: 7/25/2023          DSM-V DIAGNOSIS:    Major depressive disorder    CURRENT SYMPTOMS and circumstances that substantiate the diagnosis:   Depressed mood, lack of motivation, anhedonia     How symptoms and/or behaviors are affecting level of functioning:   Employment, interpersonal relationships, self-care affected      TREATMENT  PLAN:          SYMPTOMS; PROBLEMS   MEASURABLE GOALS;    FUNCTIONAL IMPROVEMENT INTERVENTIONS + WHO WILL PROVIDE;   GAINS MADE  DISCHARGE CRITERIA   Depression: depressed mood and low energy   Improve and manage symptoms of depression Group CBT with an emphasis on social skills, psychoeducation, and recovery.    Gains Made: increase in  connection with peers Complete eight week group       Frequency of Sessions:  weekly    Expected duration of treatment:  8 sessions

## 2023-09-14 ENCOUNTER — VIRTUAL VISIT (OUTPATIENT)
Dept: PSYCHIATRY | Facility: CLINIC | Age: 53
End: 2023-09-14
Payer: COMMERCIAL

## 2023-09-14 DIAGNOSIS — F33.2 SEVERE EPISODE OF RECURRENT MAJOR DEPRESSIVE DISORDER, WITHOUT PSYCHOTIC FEATURES (H): Primary | ICD-10-CM

## 2023-09-14 NOTE — PROGRESS NOTES
[facility] :  Care Coordination Note     SITUATION   Aure Joy is a 53 year old adult who is receiving support for:  Clinic Care Coordination - Follow-up (Weekly check in )      BACKGROUND     Weekly Check in     ASSESSMENT     Aure states that she is feeling better, she reports having a good day yesterday where she felt like she was able to do more things and make some phone calls she had been putting off.  She feels like over the past couple of days she feels about 50% better.  She reports a decrease in the akathisia she had been experiencing last week.  She states that she is no longer having issues finding words and is no longer putting the number 1 in front of things, however she did take a couple of weeks off from work so she has not been in a situation where she will say numbers out loud.  However she feels like she would not do it even if she was at work.  She states that she is no longer stuttering as well.  She notes that she is fatigued and is only sleeping about 4-5 hours a night.  She is taking a couple of naps during the day as she feels she cannot make it through the day without them.  She reports that her mood is pretty even, however she states that at least once a day she feels sad usually for about 30 minutes and often cries.  She notes that it is a general feeling of sadness.  She states her irritability is better as well.     One thing she find concerning is that she notes on her fitbit that when she stands up her bpm goes up 25-30.  She states that this has been uncomfortable, however she has been able to walk around the block yesterday and today.  She is wondering what could be causing this.              PLAN          Nursing Interventions: therapeutic listening       Follow-up plan:  Continue weekly check ins       Jael Alexandra RN

## 2023-09-15 ENCOUNTER — OFFICE VISIT (OUTPATIENT)
Dept: PSYCHIATRY | Facility: CLINIC | Age: 53
End: 2023-09-15
Payer: COMMERCIAL

## 2023-09-15 DIAGNOSIS — F33.2 SEVERE EPISODE OF RECURRENT MAJOR DEPRESSIVE DISORDER, WITHOUT PSYCHOTIC FEATURES (H): Primary | ICD-10-CM

## 2023-09-15 DIAGNOSIS — F43.10 COMPLEX POSTTRAUMATIC STRESS DISORDER: ICD-10-CM

## 2023-09-15 ASSESSMENT — PATIENT HEALTH QUESTIONNAIRE - PHQ9: SUM OF ALL RESPONSES TO PHQ QUESTIONS 1-9: 16

## 2023-09-15 NOTE — PROGRESS NOTES
Clinician Contact & Progress Note  Department of Psychiatry & Behavioral Sciences  [facility]    Patient: Aure Joy (1970)     MRN: [id number]  Date of Treatment:  Sep 15, 2023  Duration of Treatment: Start Time: 10:35am End Time: 11:30am  Provider: oJse Grady, Ph.D., L.P.    People present:   Provider: Jose Grady, Ph.D., L.P.  Patient: Aure Joy  Others: n/a    Diagnoses: (per chart)  Major depressive disorder, severe, recurrent, without psychotic features  Complex posttraumatic stress disorder      Assessment (current symptoms):      8/3/2023    10:24 AM 9/1/2023    10:38 AM 9/15/2023    10:37 AM   PHQ   PHQ-9 Total Score 10 11 16   Q9: Thoughts of better off dead/self-harm past 2 weeks Not at all Not at all Not at all         3/9/2020    10:00 AM   RAMEZ-7 SCORE   Total Score 11     OASIS: 10/20  ODSIS: 9/20      Session content:    Patient presented for follow up psychotherapy visit with writer in Treatment Resistant Depression program.     Began today's session writer checked in on patient's plans for therapy after they discussed things with their current therapist.  Patient expressed that they want to continue to meet regularly with their current therapist. Patient and writer made a plan to meet every other week to offset other healthcare appointments.    Writer introduced the unified protocol today including an overview of what is meant by an emotion disorder, as well as an overview of the chapters contained in the unified protocol. patient asked questions which demonstrated their engagement. We made a plan for patient to read the first two chapters and two schedule follow-up appointments with the .        Treatment Plan/ Disposition:   Next appointment: to be scheduled by pt  JEREMIE with other (eg., psychiatric) treatment providers       Mental Status Exam:  Alertness: alert  and oriented  Appearance: adequately groomed  Behavior/Demeanor:  "cooperative, pleasant, and calm, with good  eye contact   Speech: normal  Language: intact. Preferred language identified as English.  Psychomotor: normal or unremarkable  Mood: stable, euthymic  Affect: generally  calm ; was congruent to mood; was congruent to content  Thought Process/Associations: unremarkable  Thought Content:  unremarkable  -Suicidal ideation: denies SI, denies intent, and denies plan  -Homicidal Ideation: denies  Perception:  intact  Insight: good  Judgment: good  Cognition: does  appear grossly intact      Billing for \"Interactive Complexity\"?    No    Jose Grady, PhD LP  "

## 2023-09-21 ENCOUNTER — VIRTUAL VISIT (OUTPATIENT)
Dept: PSYCHIATRY | Facility: CLINIC | Age: 53
End: 2023-09-21
Payer: COMMERCIAL

## 2023-09-21 DIAGNOSIS — F33.2 SEVERE EPISODE OF RECURRENT MAJOR DEPRESSIVE DISORDER, WITHOUT PSYCHOTIC FEATURES (H): Primary | ICD-10-CM

## 2023-09-21 NOTE — PROGRESS NOTES
"[facility] :  Care Coordination Note     SITUATION   Aure Joy is a 53 year old adult who is receiving support for:  Care Coordination Follow-up.    BACKGROUND     Weekly phone check in     ASSESSMENT     Patient reports that her week has been \"crap.\"  Patient reports that she has to put her dog down, her dog attacked other dogs and has heart worm.  She has called a bunch of shelters to see if someone could take the dog, however no one is taking animals right now.  She reports feeling sadness and shame about the whole situation.  However she states that even with all of this her mood in general is holding up well.  Feels like it is pretty even.        She has two main complaints.  She is reporting physical exhaustion.  States that she cannot even walk around [employer] without sitting down.  States that physically she feels like she is exerting herself just from walking or lifting something.  Denies SOB.  She also continues to have word finding difficulties-these have gotten better, but still not back to where she was before the medication changes.         PLAN          Nursing Interventions: Encouraged follow up with PCP to rule out any physical causes of physical intolerance    Follow-up plan:  Continue weekly check ins         Jael Alexandra RN   "

## 2023-09-22 RX ORDER — ARIPIPRAZOLE 10 MG/1
10 TABLET ORAL DAILY
Qty: 30 TABLET | Refills: 0 | Status: SHIPPED | OUTPATIENT
Start: 2023-09-22 | End: 2023-10-12

## 2023-09-22 NOTE — PROGRESS NOTES
Writer spoke with Dr. Zamudio.  She does not believe the issues with exhaustion are related to the Abilify, and agreed that patient should get evaluated by PCP.  She would also like to continue decrease Abilify to address the word finding difficulties.  Received VORB from Dr. Zamudio to decrease abilify to 10 mg daily.  Singulex message sent to patient.

## 2023-09-26 ENCOUNTER — OFFICE VISIT (OUTPATIENT)
Dept: PSYCHIATRY | Facility: CLINIC | Age: 53
End: 2023-09-26
Payer: COMMERCIAL

## 2023-09-26 DIAGNOSIS — F31.81 BIPOLAR 2 DISORDER (H): Primary | ICD-10-CM

## 2023-09-29 ENCOUNTER — TELEPHONE (OUTPATIENT)
Dept: PSYCHIATRY | Facility: CLINIC | Age: 53
End: 2023-09-29

## 2023-09-29 NOTE — TELEPHONE ENCOUNTER
"Writer called after pt late cancelled, pt answered phone and stated that \"my back went out\" and they were hoping to get to urgent care and \"have them give me something to knock me out\".    They declined a change to a virtual visit for their appointment. Writer reminded will be gone next week and next scheduled appt on 10/13. Pt confirmed.    Jose Grady, PhD LP on 9/29/2023 at 9:45 AM    "

## 2023-10-02 ENCOUNTER — THERAPY VISIT (OUTPATIENT)
Dept: PHYSICAL THERAPY | Facility: CLINIC | Age: 53
End: 2023-10-02
Payer: COMMERCIAL

## 2023-10-02 DIAGNOSIS — F33.2 SEVERE EPISODE OF RECURRENT MAJOR DEPRESSIVE DISORDER, WITHOUT PSYCHOTIC FEATURES (H): ICD-10-CM

## 2023-10-02 DIAGNOSIS — R39.9 LOWER URINARY TRACT SYMPTOMS (LUTS): Primary | ICD-10-CM

## 2023-10-02 DIAGNOSIS — N39.3 SUI (STRESS URINARY INCONTINENCE, FEMALE): ICD-10-CM

## 2023-10-02 PROCEDURE — 97112 NEUROMUSCULAR REEDUCATION: CPT | Mod: GP

## 2023-10-02 PROCEDURE — 97110 THERAPEUTIC EXERCISES: CPT | Mod: GP

## 2023-10-02 NOTE — PROGRESS NOTES
10/02/23 0500   Appointment Info   Signing clinician's name / credentials Sarahi John, PT, DPT   Total/Authorized Visits 6 (PT)   Visits Used 3   Medical Diagnosis Lower urinary tract symptoms (LUTS)   PT Tx Diagnosis Lower urinary tract symptoms (LUTS), stress urinary incontinence   Quick Adds Pelvic Consent;Certification   Progress Note/Certification   Start of Care Date 08/14/23   Onset of illness/injury or Date of Surgery 07/18/23  (referral)   Therapy Frequency 1x/month   Predicted Duration 3 months   Certification date from 10/02/23   Certification date to 12/30/23   Progress Note Due Date 12/02/23   Progress Note Completed Date 10/02/23       Present No   PT Goal 1   Goal Identifier LTG 1   Goal Description Patient will decrease PF tension in order to itime in between voids to 2 hours.   Rationale to maximize safety and independence with performance of ADLs and functional tasks  (continence throughout the day)   Goal Progress Has not had issues with leakage or needing to lean forward to urinate. Drinking less overall. Still  going every 30-45 min. during the day; nighttime 1-2x. improved   Target Date 12/30/23   Subjective Report   Subjective Report Patient reports her back went out. Has not gotten better yet. Has not done her HEP lately. Would like to go over HEP. Is having carpal and cubital tunnel release as well and would like modifications for that as well.   Objective Measures   Objective Measures Objective Measure 1   Objective Measure 1   Objective Measure min rib cage expansion with DBE   Details focused on supine and sitting positions for PF retraining; has made progress thus far with bladder retraining, PF isolation and relaxation;   Treatment Interventions (PT)   Interventions Therapeutic Procedure/Exercise;Therapeutic Activity;Neuromuscular Re-education;Manual Therapy   Therapeutic Procedure/Exercise   Therapeutic Procedures: strength, endurance, ROM, flexibillity  minutes (00257) 10   PTRx Ther Proc 1 Pelvic Floor Muscle Strengthening Elevator    PTRx Ther Proc 1 - Details 3 sec contract 3 sec relax; x 10; emphasis on relaxation; Education on practicing this in supine sitting or standing depending on how back feels   PTRx Ther Proc 2 Supine Butterfly   PTRx Ther Proc 2 - Details foam roll under legs to improve relaxation - this is more comfortable; hold with DBE    Skilled Intervention see above   Patient Response/Progress tolerated well; reports stretch in adducots with butterfly position   Therapeutic Activity   Therapeutic Activities: dynamic activities to improve functional performance minutes (98649) 5   Ther Act 1 Bladder and fluid education   Ther Act 1 - Details in depth discussion on bladder habits; pt notes has decreased caffeine, therapist encouraging decreased use continued; also discussed increasing fluid intake up to 1.5-2 hours during the day;   Skilled Intervention see above   Patient Response/Progress patient agrees to plan of increasing water intake   Neuromuscular Re-education   Neuromuscular re-ed of mvmt, balance, coord, kinesthetic sense, posture, proprioception minutes (56081) 10   PTRx Neuro Re-ed 1 Diaphragmatic Breathing   PTRx Neuro Re-ed 1 - Details vc abdominal expansion with inhalation; cued for longer inhale/exhale; tc at ribs to improve expansion; tolerated well;   PTRx Neuro Re-ed 2 Sit to Stand Pelvic Floor    PTRx Neuro Re-ed 2 - Details x 10; difficulty holding with eccentric phase. education on using books to increase seat height for decresed difficulty; lowering seat for increased difficulty   Skilled Intervention PF downtraining   Patient Response/Progress tolerated upright ans supine exercises well   Education   Learner/Method Patient;No Barriers to Learning   Plan   Home program print ptrx   Updates to plan of care removed HEP in quadruped due to upcoming wrist procedure;   Plan for next session assess tolerance to increased fluid  consumption   Comments   Pelvic Health Informed Consent Statement Discussed with patient/guardian reason for referral regarding pelvic health needs and external/internal pelvic floor muscle examination.  Opportunity provided to ask questions and verbal consent for assessment and intervention was given.   Total Session Time   Timed Code Treatment Minutes 25   Total Treatment Time (sum of timed and untimed services) 25           [facility]                                                                                   OUTPATIENT PHYSICAL THERAPY    PLAN OF TREATMENT FOR OUTPATIENT REHABILITATION   Patient's Last Name, First Name, Aure East YOB: 1970   Provider's Name   [facility]   Medical Record No.  [id number]     Onset Date: 07/18/23 (referral)  Start of Care Date: 08/14/23     Medical Diagnosis:  Lower urinary tract symptoms (LUTS)      PT Treatment Diagnosis:  Lower urinary tract symptoms (LUTS), stress urinary incontinence Plan of Treatment  Frequency/Duration: (P) 1x/month/ (P) 3 months    Certification date from (P) 10/02/23 to (P) 12/30/23         See note for plan of treatment details and functional goals     Sarahi John, PT                         I CERTIFY THE NEED FOR THESE SERVICES FURNISHED UNDER        THIS PLAN OF TREATMENT AND WHILE UNDER MY CARE     (Physician attestation of this document indicates review and certification of the therapy plan).                Referring Provider:  Aranza Gutierrez      Initial Assessment  See Epic Evaluation- Start of Care Date: 08/14/23            PLAN  Decreasing frequency due to patient progressing well and providing more time to work on HEP working towards independence with HEP and PT goals.       Beginning/End Dates of Progress Note Reporting Period:  (P) 10/02/23 to 10/02/2023    Referring Provider:  Aranza Gutierrez

## 2023-10-03 ENCOUNTER — OFFICE VISIT (OUTPATIENT)
Dept: PSYCHIATRY | Facility: CLINIC | Age: 53
End: 2023-10-03
Payer: COMMERCIAL

## 2023-10-03 DIAGNOSIS — F33.2 SEVERE EPISODE OF RECURRENT MAJOR DEPRESSIVE DISORDER, WITHOUT PSYCHOTIC FEATURES (H): ICD-10-CM

## 2023-10-03 DIAGNOSIS — F31.81 BIPOLAR 2 DISORDER (H): Primary | ICD-10-CM

## 2023-10-03 NOTE — TELEPHONE ENCOUNTER
"Medication requested:   risperidone 1 mg tablet   Last refilled: 9/1/23  Qty: 60/0      Last seen: 7/6/23  RTC: 3 months  Cancel: 7/27/23  No-show: none  Next appt: 10/12/23    Refill decision:    Per PSY nurse chart note 9/7/3, patient is now taking risperidone 2 mg daily, last SIG 9/1/23 \"Take 1 tablet (1mg) by mouth daily for three days then increase to 2 tablets (2mg) daily \"    "

## 2023-10-04 RX ORDER — RISPERIDONE 1 MG/1
2 TABLET ORAL DAILY
Qty: 60 TABLET | Refills: 0 | Status: SHIPPED | OUTPATIENT
Start: 2023-10-04 | End: 2023-10-12

## 2023-10-05 NOTE — TELEPHONE ENCOUNTER
Medication requested:   lamoTRIgine (LAMICTAL) 200 MG tablet   Last refilled: 8/4/23  Qty: 60/1       Last seen: 7/6/23  RTC: 3 months  Cancel: 7/27/23  No-show: none  Next appt: 10/12/23     Refill decision:   Refill pended and routed to the provider for review/determination due to   Cancel x 1  Scheduled for follow-up 10/12/23

## 2023-10-06 NOTE — PROGRESS NOTES
Interventional Psychiatry Program   Group  [facility] Psychiatry [facility]      Patient: Aure Joy (1970)     MRN: [id number]  Date:  9/26/23  Diagnosis: 296.89 Bipolar II Disorder mild     Number of Participants: 4  Group Time: 90 minutes  Group Format: Hybrid, in-person and Amwell  Facilitators: INRALI Tracey      The treatment resistant depression (TRD) group is based on the cognitive behavioral therapy model that uses psychoeducation, cognitive restructuring, problem solving techniques, and social skills.       Diagnostic criteria for group participation: Major depressive disorder, bipolar disorder and current patient in TRD program      Group Topic for Today: Introductions, group structure and process, mindfulness, skills       Description: Active Participant   Aure was active and engaged in the discussion with other patients and shared with the group.  Aure's emotional presentation was open and cooperative. Aure's mood was Depressed  Normal, and Aure Joy's affect was Appropriate .     Plan: Continue with group goals to minimize impact of depressive symptoms, increase social connectedness, and maintain stability

## 2023-10-09 RX ORDER — LAMOTRIGINE 200 MG/1
400 TABLET ORAL DAILY
Qty: 60 TABLET | Refills: 0 | Status: SHIPPED | OUTPATIENT
Start: 2023-10-09 | End: 2023-10-12

## 2023-10-10 NOTE — PROGRESS NOTES
Interventional Psychiatry Program   Group  [facility] Psychiatry [facility]      Patient: Aure Joy (1970)     MRN: [id number]  Date:  10/03/23  Diagnosis: 296.89 Bipolar II Disorder mild     Number of Participants: 5  Group Time: 90 minutes  Group Format: Hybrid, in-person and Amwell  Facilitators: NIRALI Tracey      The treatment resistant depression (TRD) group is based on the cognitive behavioral therapy model that uses psychoeducation, cognitive restructuring, problem solving techniques, and social skills.       Diagnostic criteria for group participation: Major depressive disorder, bipolar disorder and current patient in TRD program      Group Topic for Today: Building neural pathways, symptom management, mindfulness, skills       Description: Active Participant   Aure was active and engaged in the discussion with other patients and shared with the group.  Aure's emotional presentation was open and cooperative. Aure's mood was Depressed  Normal, and Aure Jyo's affect was Appropriate .     Plan: Continue with group goals to minimize impact of depressive symptoms, increase social connectedness, and maintain stability    Treatment Resistant Depression Program (Group)    Patient: Aure Joy (1970)     MRN: [id number]  Today's Date: 10.10.2023  Next 90-day Review Date: 1.10.2024     Date of Initial Service: 9.26.2023    Date of INTIAL Treatment Plan: 10.10.2023          DSM-V DIAGNOSIS:   Bipolar 2 disorder    CURRENT SYMPTOMS and circumstances that substantiate the diagnosis:   Depressed mood, hypomania, impulsivity, anhedonia    How symptoms and/or behaviors are affecting level of functioning:   Impulsive decisions about money affecting finances, low and mixed mood, limited social connections, low self-worth    TREATMENT  PLAN:          SYMPTOMS; PROBLEMS   MEASURABLE GOALS;    FUNCTIONAL IMPROVEMENT INTERVENTIONS + WHO WILL PROVIDE;   GAINS MADE DISCHARGE  CRITERIA   Depression: depressed mood, anhedonia, agitation, impulsitity   Decrease symptoms of depression, decrease symptoms of mixed episode, social connectedness Group CBT with an emphasis on social skills, psychoeducation, symptoms management Eight week session        Frequency of Sessions:  weekly    Expected duration of treatment: 8 weeks

## 2023-10-12 ENCOUNTER — OFFICE VISIT (OUTPATIENT)
Dept: PSYCHIATRY | Facility: CLINIC | Age: 53
End: 2023-10-12
Payer: COMMERCIAL

## 2023-10-12 VITALS
TEMPERATURE: 97.3 F | DIASTOLIC BLOOD PRESSURE: 84 MMHG | SYSTOLIC BLOOD PRESSURE: 137 MMHG | HEART RATE: 94 BPM | WEIGHT: 178 LBS | BODY MASS INDEX: 32.56 KG/M2

## 2023-10-12 DIAGNOSIS — F31.81 BIPOLAR 2 DISORDER (H): ICD-10-CM

## 2023-10-12 DIAGNOSIS — R11.0 NAUSEA: ICD-10-CM

## 2023-10-12 DIAGNOSIS — F33.2 SEVERE EPISODE OF RECURRENT MAJOR DEPRESSIVE DISORDER, WITHOUT PSYCHOTIC FEATURES (H): ICD-10-CM

## 2023-10-12 DIAGNOSIS — F43.10 COMPLEX POSTTRAUMATIC STRESS DISORDER: ICD-10-CM

## 2023-10-12 DIAGNOSIS — E55.9 VITAMIN D DEFICIENCY: Primary | ICD-10-CM

## 2023-10-12 RX ORDER — LAMOTRIGINE 200 MG/1
400 TABLET ORAL DAILY
Qty: 60 TABLET | Refills: 0 | Status: SHIPPED | OUTPATIENT
Start: 2023-10-12 | End: 2023-12-09

## 2023-10-12 RX ORDER — RISPERIDONE 0.5 MG/1
1.5 TABLET ORAL DAILY
Qty: 90 TABLET | Refills: 1 | Status: SHIPPED | OUTPATIENT
Start: 2023-10-12 | End: 2023-12-14

## 2023-10-12 RX ORDER — ARIPIPRAZOLE 10 MG/1
10 TABLET ORAL DAILY
Qty: 30 TABLET | Refills: 1 | Status: SHIPPED | OUTPATIENT
Start: 2023-10-12 | End: 2023-12-03

## 2023-10-12 ASSESSMENT — PATIENT HEALTH QUESTIONNAIRE - PHQ9: SUM OF ALL RESPONSES TO PHQ QUESTIONS 1-9: 18

## 2023-10-12 ASSESSMENT — PAIN SCALES - GENERAL: PAINLEVEL: MILD PAIN (3)

## 2023-10-12 NOTE — PATIENT INSTRUCTIONS
**For crisis resources, please see the information at the end of this document**   Patient Education    Thank you for coming to the [facility].     Lab Testing:  If you had lab testing today and your results are reassuring or normal they will be mailed to you or sent through [employer] within 7 days. If the lab tests need quick action we will call you with the results. The phone number we will call with results is # [phone]. If this is not the best number please call our clinic and change the number.     Medication Refills:  If you need any refills please call your pharmacy and they will contact us. Our fax number for refills is [phone].   Three business days of notice are needed for general medication refill requests.   Five business days of notice are needed for controlled substance refill requests.   If you need to change to a different pharmacy, please contact the new pharmacy directly. The new pharmacy will help you get your medications transferred.     Contact Us:  Please call [phone] during business hours (8-5:00 M-F).   If you have medication related questions after clinic hours, or on the weekend, please call [phone].     Financial Assistance [phone]   Medical Records [phone]       MENTAL HEALTH CRISIS RESOURCES:  For a emergency help, please call 911 or go to the nearest Emergency Department.     Emergency Walk-In Options:   EmPATH Unit @ [facility] ([place]): [phone] - Specialized mental health emergency area designed to be calming  [facility] ([place]): [phone]  [facility] ([place]): [phone]  [facility] ([place]): [phone]    [facility] Crisis Information:   [place]: [phone]  [place]: [phone]  [place] ([place]) - Adult: [phone]     Child: [phone]  [place] - Adult: [phone]     Child: [phone]  [place]: [phone]  List of all [facility]  resources:   https://[place].gov/dhs/people-we-serve/adults/health-care/mental-health/resources/crisis-contacts.jsp    National Crisis Information:   Crisis Text Line: Text  [place]  to 001722  Suicide & Crisis Lifeline: 988  National Suicide Prevention Lifeline: 0-913-838-TALK (3-636-544-5102)       For online chat options, visit https://suicidepreventionlifeline.org/chat/  Poison Control Center: 0-841-497-2939  Trans Lifeline: 9-351-501-9796 - Hotline for transgender people of all ages  [employer]: 6-001-150-3903 - Hotline for LGBT youth     For Non-Emergency Support:   Fast Tracker: Mental Health & Substance Use Disorder Resources -   https://www.[employer].org/
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
## 2023-10-13 ENCOUNTER — OFFICE VISIT (OUTPATIENT)
Dept: PSYCHIATRY | Facility: CLINIC | Age: 53
End: 2023-10-13
Payer: COMMERCIAL

## 2023-10-13 DIAGNOSIS — F33.2 SEVERE EPISODE OF RECURRENT MAJOR DEPRESSIVE DISORDER, WITHOUT PSYCHOTIC FEATURES (H): Primary | ICD-10-CM

## 2023-10-13 DIAGNOSIS — F43.10 COMPLEX POSTTRAUMATIC STRESS DISORDER: ICD-10-CM

## 2023-10-13 NOTE — PROGRESS NOTES
"Clinician Contact & Progress Note  Department of Psychiatry & Behavioral Sciences  [facility]    Patient: Aure Joy (1970)     MRN: [id number]  Date of Treatment:  Oct 13, 2023  Duration of Treatment: Start Time: 1:31pm End Time: 2:28pm  Provider: Jose Grady, Ph.D., L.P.    People present:   Provider: Jose Grady, Ph.D., L.P.  Patient: Aure Joy  Others: n/a    Diagnoses: (per chart)  Major depressive disorder, severe, recurrent, without psychotic features  Complex posttraumatic stress disorder      Assessment (current symptoms):      9/1/2023    10:38 AM 9/15/2023    10:37 AM 10/12/2023     9:00 AM   PHQ   PHQ-9 Total Score 11 16 18   Q9: Thoughts of better off dead/self-harm past 2 weeks Not at all Not at all Not at all         3/9/2020    10:00 AM   RAMEZ-7 SCORE   Total Score 11         Session content:    Patient presented for follow up psychotherapy visit with writer in Treatment Resistant Depression program.     Today pt updated that their depression has been worse since we met last (9.15.23). They endorsed hyper-somnia, being in bed \"twelve plus hours per day\". As an example they described last night going to bed at 9:30pm, waking at 3 briefly, getting out of bed at 7 for a bit, and then going back to bed until 10:30am - then going back to bed at 12noon until needing to get up for today's appointment. Asked what is good about being in bed they noted the comfort, \"escape, and not thinking\". We had a discussion about what gets pushed out of pt's life when they are spending more time in bed.    Writer talked about the idea of distress tolerance, or 'getting through it without making it worse' as applied to pt's situation. We came up with a list of ways pt can focus on maintaining fundamentals that help them to not feel worse. Pt made a goal of staying out of bed between 10am and 6pm. They noted that around 1 they often are tempted to go to bed, so watching TV " "in the living room with a comfy chair might be a good alternative (get help moving chair from bedroom to living room). They also shared that reading is something they can do instead if they need a sedate activity. For more active things to do pt noted going for a walk around the block - writer added just walking to the mailbox is good too.    We came up with washing hair every other day, and showering twice weekly as self-care goals. Meals twice a day, and a video chat with one of their kids daily are also part of pt's goals for maintaining.    Pt will work on the above and report back in two weeks with progress.      Treatment Plan/ Disposition:   Next appointment: every other Friday  JEREMIE with other (eg., psychiatric) treatment providers       Mental Status Exam:  Alertness: alert  and oriented  Appearance: adequately groomed  Behavior/Demeanor: cooperative, pleasant, and calm, with good  eye contact   Speech: normal  Language: intact. Preferred language identified as English.  Psychomotor: normal or unremarkable  Mood: stable, somewhat depressed  Affect: generally  calm ; was congruent to mood; was congruent to content  Thought Process/Associations: unremarkable  Thought Content:  unremarkable  -Suicidal ideation: denies SI, denies intent, and denies plan  -Homicidal Ideation: denies  Perception:  intact  Insight: good  Judgment: good  Cognition: does  appear grossly intact      Billing for \"Interactive Complexity\"?    No    Jose Grady, PhD LP  "

## 2023-10-16 ENCOUNTER — TELEPHONE (OUTPATIENT)
Dept: PSYCHIATRY | Facility: CLINIC | Age: 53
End: 2023-10-16

## 2023-10-16 DIAGNOSIS — F33.2 SEVERE EPISODE OF RECURRENT MAJOR DEPRESSIVE DISORDER, WITHOUT PSYCHOTIC FEATURES (H): ICD-10-CM

## 2023-10-16 NOTE — TELEPHONE ENCOUNTER
What is the concern that needs to be addressed by a nurse? Patient is requesting a call back to schedule appointments for TMS and Injection .     May a detailed message be left on voicemail? Yes       Message routed to: me Psychiatry

## 2023-10-17 NOTE — TELEPHONE ENCOUNTER
Writer called patient back.        Discussed that for TMS the technicians are working on authorization and will call her to schedule once they receive it.        Scheduled a series of 6 Ketamine injections Per VORB from Dr. Zamudio stating acute series of 6 injections followed by weekly injections.      Patient will schedule weekly injections when patient comes in for acute series.

## 2023-10-18 ENCOUNTER — ALLIED HEALTH/NURSE VISIT (OUTPATIENT)
Dept: PSYCHIATRY | Facility: CLINIC | Age: 53
End: 2023-10-18
Payer: COMMERCIAL

## 2023-10-18 VITALS — HEART RATE: 76 BPM | SYSTOLIC BLOOD PRESSURE: 130 MMHG | DIASTOLIC BLOOD PRESSURE: 85 MMHG

## 2023-10-18 DIAGNOSIS — F33.2 SEVERE EPISODE OF RECURRENT MAJOR DEPRESSIVE DISORDER, WITHOUT PSYCHOTIC FEATURES (H): Primary | ICD-10-CM

## 2023-10-18 ASSESSMENT — PATIENT HEALTH QUESTIONNAIRE - PHQ9: SUM OF ALL RESPONSES TO PHQ QUESTIONS 1-9: 19

## 2023-10-18 NOTE — PROGRESS NOTES
Aure Joy comes into clinic today at the request of ESTELA Zamudio MD Ordering Provider for Med Injection only ketamine .    Procedure Prep:  Medication double check completed by: brigid ng RN  Prior to administration pt identified by name and : yes    Nursing Assessment:  Appearance: casual clothing   Mood: neutral to flat  Affect: wnl  Eye contact: okay      10/18/2023     1:10 PM   PHQ   PHQ-9 Total Score 20   Q9: Thoughts of better off dead/self-harm past 2 weeks More than half the days     QIDDSR 16 weekly assessment: score 18. Assessment was scanned to EHR.     Procedure Performed:  VSS and mental status WNL. Patient was given ketamine. See MAR for details.     Post Procedure Assessment:  Patient tolerated the treatment with the following side effects: dissociation. Vital signs were monitored, see VS Flowsheet. Patient stated they felt ready to go home prior to discharge. AVS was offered to patient and patient declined. Patient was instructed not to drive for the remainder of the day and to notify clinic if any concerns arise.     Next appt: friday    Medications were supplied by Clinic      This service provided today was under the supervising provider of the day LOTUS Gordon MD, who was available if needed.    Perla Robbins RN

## 2023-10-20 ENCOUNTER — ALLIED HEALTH/NURSE VISIT (OUTPATIENT)
Dept: PSYCHIATRY | Facility: CLINIC | Age: 53
End: 2023-10-20
Payer: COMMERCIAL

## 2023-10-20 VITALS — HEART RATE: 81 BPM | DIASTOLIC BLOOD PRESSURE: 71 MMHG | SYSTOLIC BLOOD PRESSURE: 111 MMHG

## 2023-10-20 DIAGNOSIS — F33.2 SEVERE EPISODE OF RECURRENT MAJOR DEPRESSIVE DISORDER, WITHOUT PSYCHOTIC FEATURES (H): Primary | ICD-10-CM

## 2023-10-20 NOTE — PROGRESS NOTES
Aure Joy comes into clinic today at the request of ESTELA Zamudio MD Ordering Provider for Med Injection only ketamine .    Procedure Prep:  Medication double check completed by: brigid ng RN  Prior to administration pt identified by name and : yes    Nursing Assessment:  Appearance: casual clothing   Mood: neutral to flat  Affect: wnl  Eye contact: okay      10/18/2023     1:10 PM   PHQ   PHQ-9 Total Score 19   Q9: Thoughts of better off dead/self-harm past 2 weeks Several days     QIDDSR 16 weekly assessment: score 18. Assessment was scanned to EHR.     Procedure Performed:  VSS and mental status WNL. Patient was given ketamine. See MAR for details.     Post Procedure Assessment:  Patient tolerated the treatment with the following side effects: dissociation. Vital signs were monitored, see VS Flowsheet. Patient stated they felt ready to go home prior to discharge. AVS was offered to patient and patient declined. Patient was instructed not to drive for the remainder of the day and to notify clinic if any concerns arise.     Next appt: Monday     Medications were supplied by Clinic      This service provided today was under the supervising provider of the day LOTUS Gordon MD, who was available if needed.    Perla Robbins RN

## 2023-10-23 ENCOUNTER — ALLIED HEALTH/NURSE VISIT (OUTPATIENT)
Dept: PSYCHIATRY | Facility: CLINIC | Age: 53
End: 2023-10-23
Payer: COMMERCIAL

## 2023-10-23 VITALS — HEART RATE: 86 BPM | SYSTOLIC BLOOD PRESSURE: 112 MMHG | DIASTOLIC BLOOD PRESSURE: 78 MMHG

## 2023-10-23 DIAGNOSIS — F33.2 SEVERE EPISODE OF RECURRENT MAJOR DEPRESSIVE DISORDER, WITHOUT PSYCHOTIC FEATURES (H): Primary | ICD-10-CM

## 2023-10-23 ASSESSMENT — PATIENT HEALTH QUESTIONNAIRE - PHQ9: SUM OF ALL RESPONSES TO PHQ QUESTIONS 1-9: 17

## 2023-10-23 NOTE — PROGRESS NOTES
Aure Joy comes into clinic today at the request of ESTELA Zamudio MD Ordering Provider for Med Injection only ketamine .    Procedure Prep:  Medication double check completed by: brigid ng RN  Prior to administration pt identified by name and : yes    Nursing Assessment:  Appearance: casual clothing   Mood: neutral to flat  Affect: wnl  Eye contact: okay      10/23/2023     9:38 AM   PHQ   PHQ-9 Total Score 17   Q9: Thoughts of better off dead/self-harm past 2 weeks Not at all     QIDDSR 16 weekly assessment: score 19. Assessment was scanned to EHR.     Procedure Performed:  VSS and mental status WNL. Patient was given ketamine. See MAR for details.     Post Procedure Assessment:  Patient tolerated the treatment with the following side effects: dissociation. Vital signs were monitored, see VS Flowsheet. Patient stated they felt ready to go home prior to discharge. AVS was offered to patient and patient declined. Patient was instructed not to drive for the remainder of the day and to notify clinic if any concerns arise.     Next appt: Monday     Medications were supplied by Clinic      This service provided today was under the supervising provider of the day LOTUS Gordon MD, who was available if needed.    Perla Robbins RN

## 2023-10-25 ENCOUNTER — ALLIED HEALTH/NURSE VISIT (OUTPATIENT)
Dept: PSYCHIATRY | Facility: CLINIC | Age: 53
End: 2023-10-25
Payer: COMMERCIAL

## 2023-10-25 VITALS — SYSTOLIC BLOOD PRESSURE: 140 MMHG | DIASTOLIC BLOOD PRESSURE: 82 MMHG | HEART RATE: 86 BPM

## 2023-10-25 DIAGNOSIS — F33.2 SEVERE EPISODE OF RECURRENT MAJOR DEPRESSIVE DISORDER, WITHOUT PSYCHOTIC FEATURES (H): Primary | ICD-10-CM

## 2023-10-25 NOTE — PROGRESS NOTES
Aure Joy comes into clinic today at the request of ESTELA Zamudio MD Ordering Provider for Med Injection only ketamine .    Procedure Prep:  Medication double check completed by: brigid ng RN  Prior to administration pt identified by name and : yes    Nursing Assessment:  Appearance: casual clothing   Mood: neutral to flat  Affect: wnl  Eye contact: okay      10/23/2023     9:38 AM   PHQ   PHQ-9 Total Score 17   Q9: Thoughts of better off dead/self-harm past 2 weeks Not at all     QIDDSR 16 weekly assessment: score 19. Assessment was scanned to EHR.     Procedure Performed:  VSS and mental status WNL. Patient was given ketamine. See MAR for details.     Post Procedure Assessment:  Patient tolerated the treatment with the following side effects: dissociation. Vital signs were monitored, see VS Flowsheet. Patient stated they felt ready to go home prior to discharge. AVS was offered to patient and patient declined. Patient was instructed not to drive for the remainder of the day and to notify clinic if any concerns arise.     Next appt: Monday     Medications were supplied by Clinic      This service provided today was under the supervising provider of the day GERMÁN Chaudhry MD, who was available if needed.    Perla Robbins RN

## 2023-10-27 ENCOUNTER — OFFICE VISIT (OUTPATIENT)
Dept: PSYCHIATRY | Facility: CLINIC | Age: 53
End: 2023-10-27
Payer: COMMERCIAL

## 2023-10-27 ENCOUNTER — ALLIED HEALTH/NURSE VISIT (OUTPATIENT)
Dept: PSYCHIATRY | Facility: CLINIC | Age: 53
End: 2023-10-27
Payer: COMMERCIAL

## 2023-10-27 VITALS — HEART RATE: 81 BPM | SYSTOLIC BLOOD PRESSURE: 127 MMHG | DIASTOLIC BLOOD PRESSURE: 87 MMHG

## 2023-10-27 DIAGNOSIS — F33.2 SEVERE EPISODE OF RECURRENT MAJOR DEPRESSIVE DISORDER, WITHOUT PSYCHOTIC FEATURES (H): Primary | ICD-10-CM

## 2023-10-27 DIAGNOSIS — F43.10 COMPLEX POSTTRAUMATIC STRESS DISORDER: ICD-10-CM

## 2023-10-27 NOTE — PROGRESS NOTES
"Clinician Contact & Progress Note  Department of Psychiatry & Behavioral Sciences  [facility]    Patient: Aure Joy (1970)     MRN: [id number]  Date of Treatment:  Oct 27, 2023  Duration of Treatment: Start Time: 1:31pm End Time: 2:31pm  Provider: Jose Grady, Ph.D., L.P.    People present:   Provider: Jose Grady, Ph.D., L.P.  Patient: Aure Joy  Others: n/a    Diagnoses: (per chart)  Major depressive disorder, severe, recurrent, without psychotic features  Complex posttraumatic stress disorder      Assessment (current symptoms):      10/12/2023     9:00 AM 10/18/2023     1:10 PM 10/23/2023     9:38 AM   PHQ   PHQ-9 Total Score 18 19 17   Q9: Thoughts of better off dead/self-harm past 2 weeks Not at all Several days Not at all         3/9/2020    10:00 AM   RAMEZ-7 SCORE   Total Score 11         Session content:    Patient presented for follow up psychotherapy visit with writer in Treatment Resistant Depression program.     Today focused on pt's continued difficulty with back pain and continued depression. Writer gave some psychoeducation on things to keep in mind with managing pain (e.g., mindfulness and pain activity cycles). We also delved into shame pt experiences about their pain (e.g. \"if I were more fit I wouldn't be feeling this, or I would have higher pain tolerance\"). This led to broader conversation about shame - with pt recognizing shame stemming from childhood experiences. Writer reminded pt of the DBT skill of acting opposite to shame, we talked about what that might look like in interacting with medical providers. Pt made a plan for doing that in the upcoming week while using DEAR MAN and COPE AHEAD skills (interpersonal effectiveness and emotion regulation skills from DBT).    Confirmed plan for follow up in two weeks.      Treatment Plan/ Disposition:   Next appointment: every other Friday  JEREMIE with other (eg., psychiatric) treatment providers " "      Mental Status Exam:  Alertness: alert  and oriented  Appearance: adequately groomed  Behavior/Demeanor: cooperative, pleasant, and calm, with good  eye contact   Speech: normal  Language: intact. Preferred language identified as English.  Psychomotor: normal or unremarkable  Mood: stable, depressed  Affect: generally  calm ; was congruent to mood; was congruent to content  Thought Process/Associations: unremarkable  Thought Content:  unremarkable  -Suicidal ideation: denies SI, denies intent, and denies plan  -Homicidal Ideation: denies  Perception:  intact  Insight: good  Judgment: good  Cognition: does  appear grossly intact      Billing for \"Interactive Complexity\"?    No    Jose Grady, PhD LP  "

## 2023-10-27 NOTE — PROGRESS NOTES
Aure Joy comes into clinic today at the request of ESTELA Zamudio MD Ordering Provider for Med Injection only ketamine .    Procedure Prep:  Medication double check completed by: brigid ng RN  Prior to administration pt identified by name and : yes    Nursing Assessment:  Appearance: casual clothing   Mood: neutral to flat  Affect: wnl  Eye contact: okay      10/23/2023     9:38 AM   PHQ   PHQ-9 Total Score 17   Q9: Thoughts of better off dead/self-harm past 2 weeks Not at all     QIDDSR 16 weekly assessment: score 19. Assessment was scanned to EHR.     Procedure Performed:  VSS and mental status WNL. Patient was given ketamine. See MAR for details.     Post Procedure Assessment:  Patient tolerated the treatment with the following side effects: dissociation. Vital signs were monitored, see VS Flowsheet. Patient stated they felt ready to go home prior to discharge. AVS was offered to patient and patient declined. Patient was instructed not to drive for the remainder of the day and to notify clinic if any concerns arise.     Next appt: Monday     Medications were supplied by Clinic      This service provided today was under the supervising provider of the day GERMÁN Williamson MD, who was available if needed.    Perla Robbins RN

## 2023-11-27 ENCOUNTER — ALLIED HEALTH/NURSE VISIT (OUTPATIENT)
Dept: PSYCHIATRY | Facility: CLINIC | Age: 53
End: 2023-11-27
Payer: COMMERCIAL

## 2023-11-27 VITALS — SYSTOLIC BLOOD PRESSURE: 136 MMHG | DIASTOLIC BLOOD PRESSURE: 82 MMHG | HEART RATE: 81 BPM

## 2023-11-27 DIAGNOSIS — F33.2 SEVERE EPISODE OF RECURRENT MAJOR DEPRESSIVE DISORDER, WITHOUT PSYCHOTIC FEATURES (H): Primary | ICD-10-CM

## 2023-11-27 ASSESSMENT — PATIENT HEALTH QUESTIONNAIRE - PHQ9: SUM OF ALL RESPONSES TO PHQ QUESTIONS 1-9: 24

## 2023-11-27 NOTE — PROGRESS NOTES
Aure Joy comes into clinic today at the request of ESTELA Zamudio MD Ordering Provider for Med Injection only ketamine .    Procedure Prep:  Medication double check completed by: brigid ng RN  Prior to administration pt identified by name and : yes    Nursing Assessment:  Appearance: casual clothing   Mood: neutral to flat  Affect: wnl  Eye contact: okay      2023     9:29 AM   PHQ   PHQ-9 Total Score 24   Q9: Thoughts of better off dead/self-harm past 2 weeks Several days     QIDDSR 16 weekly assessment: score 23. Assessment was scanned to EHR.     Procedure Performed:  VSS and mental status WNL. Patient was given ketamine. See MAR for details.     Post Procedure Assessment:  Patient tolerated the treatment with the following side effects: dissociation. Vital signs were monitored, see VS Flowsheet. Patient stated they felt ready to go home prior to discharge. AVS was offered to patient and patient declined. Patient was instructed not to drive for the remainder of the day and to notify clinic if any concerns arise.     Next appt: Monday     Medications were supplied by Clinic      This service provided today was under the supervising provider of the day GERMÁN Williamson MD, who was available if needed.    Perla Robbins RN

## 2023-12-03 ENCOUNTER — MYC REFILL (OUTPATIENT)
Dept: PSYCHIATRY | Facility: CLINIC | Age: 53
End: 2023-12-03

## 2023-12-03 DIAGNOSIS — F33.2 SEVERE EPISODE OF RECURRENT MAJOR DEPRESSIVE DISORDER, WITHOUT PSYCHOTIC FEATURES (H): ICD-10-CM

## 2023-12-04 ENCOUNTER — ALLIED HEALTH/NURSE VISIT (OUTPATIENT)
Dept: PSYCHIATRY | Facility: CLINIC | Age: 53
End: 2023-12-04
Payer: COMMERCIAL

## 2023-12-04 VITALS — HEART RATE: 101 BPM | DIASTOLIC BLOOD PRESSURE: 92 MMHG | SYSTOLIC BLOOD PRESSURE: 142 MMHG

## 2023-12-04 DIAGNOSIS — F33.2 SEVERE EPISODE OF RECURRENT MAJOR DEPRESSIVE DISORDER, WITHOUT PSYCHOTIC FEATURES (H): Primary | ICD-10-CM

## 2023-12-04 RX ORDER — ARIPIPRAZOLE 10 MG/1
10 TABLET ORAL DAILY
Qty: 30 TABLET | Refills: 1 | Status: SHIPPED | OUTPATIENT
Start: 2023-12-04 | End: 2024-01-31

## 2023-12-04 ASSESSMENT — PATIENT HEALTH QUESTIONNAIRE - PHQ9: SUM OF ALL RESPONSES TO PHQ QUESTIONS 1-9: 20

## 2023-12-04 NOTE — PROGRESS NOTES
Aure Joy comes into clinic today at the request of Dr Zamudio Ordering Provider for Med Injection only ketamine .    Procedure Prep:  Medication double check completed by: Perla Robbins RN  Prior to administration pt identified by name and : Yes    Nursing Assessment:  Appearance: Casually dressed   Mood: Fine  Affect: WNL  Eye contact: Good      2023     9:18 AM   PHQ   PHQ-9 Total Score 20   Q9: Thoughts of better off dead/self-harm past 2 weeks Not at all     QIDDSR 16 weekly assessment: score 18. Assessment was scanned to EHR.        Procedure Performed:  VSS and mental status WNL. Patient was given ketamine. See MAR for details.     Post Procedure Assessment:  Patient tolerated the treatment with the following side effects: dissociation. Vital signs were monitored, see VS Flowsheet. Patient stated they felt ready to go home prior to discharge. AVS was offered to patient and patient declined. Patient was instructed not to drive for the remainder of the day and to notify clinic if any concerns arise.     Next appt: 23    Medications were supplied by Clinic      This service provided today was under the supervising provider of the day Dr Shirley, who was available if needed.      Joan Donnelly RN

## 2023-12-04 NOTE — TELEPHONE ENCOUNTER
Last seen: 10 12 23  RTC: 3 months  Cancel: no   No-show: no   Next appt: March 2024    Incoming refill from patient via My chart    Medication requested: ARIPiprazole (ABILIFY) 10 MG tablet   Directions: 1 tablet (10 mg) by mouth daily - Oral   Qty: 30  Last refilled: 10/12/24    Medication refill approved per refill protocol

## 2023-12-09 ENCOUNTER — MYC REFILL (OUTPATIENT)
Dept: PSYCHIATRY | Facility: CLINIC | Age: 53
End: 2023-12-09

## 2023-12-09 DIAGNOSIS — F33.2 SEVERE EPISODE OF RECURRENT MAJOR DEPRESSIVE DISORDER, WITHOUT PSYCHOTIC FEATURES (H): ICD-10-CM

## 2023-12-11 ENCOUNTER — ALLIED HEALTH/NURSE VISIT (OUTPATIENT)
Dept: PSYCHIATRY | Facility: CLINIC | Age: 53
End: 2023-12-11
Payer: COMMERCIAL

## 2023-12-11 VITALS — HEART RATE: 90 BPM | SYSTOLIC BLOOD PRESSURE: 121 MMHG | DIASTOLIC BLOOD PRESSURE: 80 MMHG

## 2023-12-11 DIAGNOSIS — F33.2 SEVERE EPISODE OF RECURRENT MAJOR DEPRESSIVE DISORDER, WITHOUT PSYCHOTIC FEATURES (H): Primary | ICD-10-CM

## 2023-12-11 ASSESSMENT — PATIENT HEALTH QUESTIONNAIRE - PHQ9: SUM OF ALL RESPONSES TO PHQ QUESTIONS 1-9: 19

## 2023-12-11 NOTE — PROGRESS NOTES
Aure Joy comes into clinic today at the request of Dr Zamudio Ordering Provider for medication injection only ketamine.    Procedure Prep:  Medication double check completed by: Perla Robbins RN  Prior to administration pt identified by name and : Yes    Nursing Assessment:  Appearance: Casually dressed   Mood: Fine  Affect: WNL/Neutral  Eye contact: Good      2023     9:20 AM   PHQ   PHQ-9 Total Score 19   Q9: Thoughts of better off dead/self-harm past 2 weeks Not at all     QIDDSR 16 weekly assessment: score 18. Assessment was scanned to EHR.     Procedure Performed:  VSS and mental status WNL. Patient was given ketamine. See MAR for details.     Post Procedure Assessment:  Patient tolerated the treatment with the following side effects: dissociation. Vital signs were monitored, see VS Flowsheet. Patient stated they felt ready to go home prior to discharge. AVS was offered to patient and patient declined. Patient was instructed not to drive for the remainder of the day and to notify clinic if any concerns arise.     Next appt: Monday    Medications were supplied by Clinic       This service provided today was under the supervising provider of the day Dr Gordon, who was available if needed.        Joan Donnelly RN

## 2023-12-11 NOTE — TELEPHONE ENCOUNTER
Last seen: 10/12/23  RTC: 3 months  Cancel: no  No-show: no  Next appt: 3/28    Incoming refill from patient via my chart    Medication requested: lamoTRIgine (LAMICTAL) 200 MG tablet   Directions: Route: Take 2 tablets (400 mg) by mouth daily - Oral   Qty: 60  Last refilled: 10/12    Medication refill not approved per refill protocol due to note not being signed

## 2023-12-12 RX ORDER — LAMOTRIGINE 200 MG/1
400 TABLET ORAL DAILY
Qty: 60 TABLET | Refills: 1 | Status: SHIPPED | OUTPATIENT
Start: 2023-12-12 | End: 2024-02-06

## 2023-12-12 NOTE — TELEPHONE ENCOUNTER
Writer called patient back. Informed patient that refill is just waiting for Dr Bhatt signature and writer will reach out to Dr Zamudio to make sure it is signed by the end of the day today. Pt verbalized understanding.     Joan Donnelly RN on 12/12/2023 at 1:18 PM

## 2023-12-13 ENCOUNTER — MYC MEDICAL ADVICE (OUTPATIENT)
Dept: PSYCHIATRY | Facility: CLINIC | Age: 53
End: 2023-12-13

## 2023-12-13 DIAGNOSIS — F33.2 SEVERE EPISODE OF RECURRENT MAJOR DEPRESSIVE DISORDER, WITHOUT PSYCHOTIC FEATURES (H): ICD-10-CM

## 2023-12-14 RX ORDER — RISPERIDONE 0.5 MG/1
1 TABLET ORAL DAILY
Qty: 60 TABLET | Refills: 1 | Status: SHIPPED | OUTPATIENT
Start: 2023-12-14 | End: 2024-01-23

## 2023-12-14 NOTE — TELEPHONE ENCOUNTER
Writer spoke with Dr. Zamudio.  Received VORB from Dr. Zamudio to decrease does of risperidone by 0.5 mg.  So patient will be taking 1 mg going forward.

## 2023-12-18 ENCOUNTER — ALLIED HEALTH/NURSE VISIT (OUTPATIENT)
Dept: PSYCHIATRY | Facility: CLINIC | Age: 53
End: 2023-12-18
Payer: COMMERCIAL

## 2023-12-18 VITALS — SYSTOLIC BLOOD PRESSURE: 112 MMHG | DIASTOLIC BLOOD PRESSURE: 77 MMHG | HEART RATE: 91 BPM

## 2023-12-18 DIAGNOSIS — F33.2 SEVERE EPISODE OF RECURRENT MAJOR DEPRESSIVE DISORDER, WITHOUT PSYCHOTIC FEATURES (H): Primary | ICD-10-CM

## 2023-12-18 ASSESSMENT — PATIENT HEALTH QUESTIONNAIRE - PHQ9: SUM OF ALL RESPONSES TO PHQ QUESTIONS 1-9: 18

## 2023-12-18 NOTE — PROGRESS NOTES
Aure Joy comes into clinic today at the request of Dr Zamudio Ordering Provider for Med Injection only ketamine .    Procedure Prep:  Medication double check completed by: Perla Robbins RN  Prior to administration pt identified by name and : Yes    Nursing Assessment:  Appearance: Casual   Mood: Fine  Affect: WNL  Eye contact: Good      2023     9:20 AM   PHQ   PHQ-9 Total Score 18   Q9: Thoughts of better off dead/self-harm past 2 weeks Not at all     QIDDSR 16 weekly assessment: score 14. Assessment was scanned to EHR.       Procedure Performed:  VSS and mental status WNL. Patient was given ketamine. See MAR for details.     Post Procedure Assessment:  Patient tolerated the treatment with the following side effects: dissociation. Vital signs were monitored, see VS Flowsheet. Patient stated they felt ready to go home prior to discharge. AVS was offered to patient and patient declined. Patient was instructed not to drive for the remainder of the day and to notify clinic if any concerns arise.     Next appt:     Tuesday    This service provided today was under the supervising provider of the day Dr Chaudhry, who was available if needed.        Joan Donnelly RN

## 2023-12-26 ENCOUNTER — ALLIED HEALTH/NURSE VISIT (OUTPATIENT)
Dept: PSYCHIATRY | Facility: CLINIC | Age: 53
End: 2023-12-26
Payer: COMMERCIAL

## 2023-12-26 VITALS — DIASTOLIC BLOOD PRESSURE: 87 MMHG | SYSTOLIC BLOOD PRESSURE: 124 MMHG | HEART RATE: 87 BPM

## 2023-12-26 DIAGNOSIS — F33.2 SEVERE EPISODE OF RECURRENT MAJOR DEPRESSIVE DISORDER, WITHOUT PSYCHOTIC FEATURES (H): Primary | ICD-10-CM

## 2023-12-26 ASSESSMENT — PATIENT HEALTH QUESTIONNAIRE - PHQ9: SUM OF ALL RESPONSES TO PHQ QUESTIONS 1-9: 16

## 2023-12-26 NOTE — PROGRESS NOTES
Aure Joy comes into clinic today at the request of Dr Zamudio Ordering Provider for Med Injection only ketamine .    Procedure Prep:  Medication double check completed by: Perla Robbins RN  Prior to administration pt identified by name and : Yes    Nursing Assessment:  Appearance: Casual   Mood: Fine  Affect: WNL  Eye contact: Good      2023     9:25 AM   PHQ   PHQ-9 Total Score 16   Q9: Thoughts of better off dead/self-harm past 2 weeks Not at all     QIDDSR 16 weekly assessment: score 16. Assessment was scanned to EHR.     Procedure Performed:  VSS and mental status WNL. Patient was given ketamine. See MAR for details.     Post Procedure Assessment:  Patient tolerated the treatment with the following side effects: dissociation. Vital signs were monitored, see VS Flowsheet. Patient stated they felt ready to go home prior to discharge. AVS was offered to patient and patient declined. Patient was instructed not to drive for the remainder of the day and to notify clinic if any concerns arise.     Next appt:     Next Tuesday    This service provided today was under the supervising provider of the day Dr Williamson, who was available if needed.        Joan Donnelly RN

## 2023-12-28 NOTE — PROGRESS NOTES
[facility] TMS Program  [address], [address]  [place] [address]  TMS Procedure Note   Aure Joy MRN# [id number]  Age: 49 year old year old YOB: 1970    Pre-Procedure:  History and Physical: Reviewed in medical record  Consent Signed by: Aure Joy  On: 1/31/2020    Clinical Narrative:  Patient tolerating treatment with no side effects. Patient reports no changes in mood.    Indications for TMS:  MDD, recurrent, severe; 4+ medication trials (from 2+ classes) ineffective; Psychotherapy ineffective.     Pre-Procedure Diagnosis:  MDD, recurrent, severe F33.2    Treatment Hx:  Treatment number this series: 18  Total lifetime treatment number: 95    Allergies   Allergen Reactions     Codeine Nausea     Other  [No Clinical Screening - See Comments] Nausea and Vomiting and Other (See Comments)     general anesthesia- uncontrolled vomitting      BP (!) 148/85 (BP Location: Right arm, Patient Position: Sitting, Cuff Size: Adult Regular)   Pulse 84     Pause for the Cause  Right patient:  Yes  Right procedure/correct coil:  Yes; rTMS; cpt 51236; F8 coil.   Earplugs in place:  Yes    Procedure  Patient was seated in procedure chair. Identity and procedure was verified. Ear plugs were placed in ears and patient-specific cap was placed on head and tightened appropriately. Ruler locations were verified. Coil was placed at treatment location and stimulator was set to parameters described below. A test train was delivered and pt tolerated train. Given pt tolerance, 60 treatment trains were delivered to the left side and 3 trains were delivered to the right side. Pt tolerated procedure with forehead movement.      Motor Threshold Determination  Distance from nasion to inion: 35.5  MT 1: 0 - 6 - 16 @ 69% on 1/29/2018  MT 2: 0 - 6 - 16 @ 69% on 2/6/2018   MT 3: 0 - 6 - 16 @ 69% on 2/13/2018   MT 4: 0 - 6 - 16 @ 69% on 2/23/2018   MT 5: 0 - 6 - 16 @ 69% on 3/2/2018   MT 6: 0 - 5.5 - 15.5(always  use intersection at left side of ruler)@ 85% on 8/23/19  MT 7: 0 - 5.5 - 15.5(always use intersection at left side of ruler)@ 80% on 8/29/19  MT 8: 0 - 5.5 - 37.5(always use intersection at left side of ruler)@ 76% on 9/5/19 (right side using EMG on left hand)  MT 9: C2 (R side using EMG on L hand) 78% on 9/12/2019  MT 10: C2 (R side using EMG on L hand) 76% on 9/26/2019  MT 11: C2 (R side using EMG on L hand) 82 on 1/31/2020    LDLPFC:  Frequency: 50 Hz  Number of pulses:  3                        Number of bursts: 10  Burst frequency: 5 Hz  Cycle time: 10 sec  Total pulses delivered: 1800  Tx Loc: F3  Energy: 80% (97% MT, maximum due to stimulator limitations)  Number of Cycles: 60 trains    RDLPFC:  Frequency: 50 Hz  Number of pulses:  3                        Number of bursts: 200  Burst frequency: 5 Hz  Cycle time: 94.3, machine overheated prior to 2nd and 3rd  train  Total pulses delivered: 1800  Tx Loc: F4 (right side)  Energy: 80% (97% MT, maximum due to stimulator limitations)  Number of Cycles: 3 trains    Rating Scales:  Date MADRS IDS-SR PHQ-9   01/31/20 23  8   2/7/20  28 7   2/14/20  27 6   2/21/20  26 5       Post-Procedure Diagnosis:  MDD, recurrent, severe 296.33    Irma Marley  [facility]romod[facility]      Plan   - Cont TMS    I did not see this patient but was available for potential intervention throughout the entire TMS visit.    William Rodriguez MD  [facility]     None Done

## 2024-01-02 ENCOUNTER — ALLIED HEALTH/NURSE VISIT (OUTPATIENT)
Dept: PSYCHIATRY | Facility: CLINIC | Age: 54
End: 2024-01-02
Payer: COMMERCIAL

## 2024-01-02 VITALS — SYSTOLIC BLOOD PRESSURE: 141 MMHG | DIASTOLIC BLOOD PRESSURE: 85 MMHG | HEART RATE: 87 BPM

## 2024-01-02 DIAGNOSIS — F33.2 SEVERE EPISODE OF RECURRENT MAJOR DEPRESSIVE DISORDER, WITHOUT PSYCHOTIC FEATURES (H): Primary | ICD-10-CM

## 2024-01-02 ASSESSMENT — PATIENT HEALTH QUESTIONNAIRE - PHQ9: SUM OF ALL RESPONSES TO PHQ QUESTIONS 1-9: 17

## 2024-01-02 NOTE — PROGRESS NOTES
Aure Joy comes into clinic today at the request of Dr Zamudio Ordering Provider for Med Injection only ketamine .    Procedure Prep:  Medication double check completed by: Perla Robbins RN  Prior to administration pt identified by name and : Yes    Nursing Assessment:  Appearance: Casual   Mood: Fine  Affect: WNL  Eye contact: Good      2024     9:14 AM   PHQ   PHQ-9 Total Score 17   Q9: Thoughts of better off dead/self-harm past 2 weeks Not at all     QIDDSR 16 weekly assessment: score 18. Assessment was scanned to EHR.     Procedure Performed:  VSS and mental status WNL. Patient was given ketamine. See MAR for details.     Post Procedure Assessment:  Patient tolerated the treatment with the following side effects: dissociation. Vital signs were monitored, see VS Flowsheet. Patient stated they felt ready to go home prior to discharge. AVS was offered to patient and patient declined. Patient was instructed not to drive for the remainder of the day and to notify clinic if any concerns arise.     Next appt: Monday    Medications were supplied by Clinic       This service provided today was under the supervising provider of the day Dr Williamson, who was available if needed.      Joan Donnelly RN

## 2024-01-08 ENCOUNTER — ALLIED HEALTH/NURSE VISIT (OUTPATIENT)
Dept: PSYCHIATRY | Facility: CLINIC | Age: 54
End: 2024-01-08
Payer: COMMERCIAL

## 2024-01-08 VITALS — SYSTOLIC BLOOD PRESSURE: 121 MMHG | HEART RATE: 91 BPM | DIASTOLIC BLOOD PRESSURE: 80 MMHG

## 2024-01-08 DIAGNOSIS — F33.2 SEVERE EPISODE OF RECURRENT MAJOR DEPRESSIVE DISORDER, WITHOUT PSYCHOTIC FEATURES (H): Primary | ICD-10-CM

## 2024-01-08 ASSESSMENT — PATIENT HEALTH QUESTIONNAIRE - PHQ9: SUM OF ALL RESPONSES TO PHQ QUESTIONS 1-9: 17

## 2024-01-08 NOTE — PROGRESS NOTES
Aure Joy comes into clinic today at the request of Dr Zamudio Ordering Provider for Med Injection only ketamine .    Procedure Prep:  Medication double check completed by: Perla Robbins RN  Prior to administration pt identified by name and : Yes    Nursing Assessment:  Appearance: Casual   Mood: Fine  Affect: WNL  Eye contact: Good      2024     9:13 AM   PHQ   PHQ-9 Total Score 17   Q9: Thoughts of better off dead/self-harm past 2 weeks Not at all     QIDDSR 16 weekly assessment: score 17. Assessment was scanned to EHR.     Procedure Performed:  VSS and mental status WNL. Patient was given ketamine. See MAR for details.     Post Procedure Assessment:  Patient tolerated the treatment with the following side effects: dissociation. Vital signs were monitored, see VS Flowsheet. Patient stated they felt ready to go home prior to discharge. AVS was offered to patient and patient declined. Patient was instructed not to drive for the remainder of the day and to notify clinic if any concerns arise.     Next appt: Monday    Medications were supplied by Clinic       This service provided today was under the supervising provider of the day Dr Shirley, who was available if needed.        Joan Donnelly RN

## 2024-01-15 ENCOUNTER — ALLIED HEALTH/NURSE VISIT (OUTPATIENT)
Dept: PSYCHIATRY | Facility: CLINIC | Age: 54
End: 2024-01-15
Payer: COMMERCIAL

## 2024-01-15 VITALS — SYSTOLIC BLOOD PRESSURE: 132 MMHG | HEART RATE: 99 BPM | DIASTOLIC BLOOD PRESSURE: 84 MMHG

## 2024-01-15 DIAGNOSIS — F33.2 SEVERE EPISODE OF RECURRENT MAJOR DEPRESSIVE DISORDER, WITHOUT PSYCHOTIC FEATURES (H): Primary | ICD-10-CM

## 2024-01-15 ASSESSMENT — PATIENT HEALTH QUESTIONNAIRE - PHQ9: SUM OF ALL RESPONSES TO PHQ QUESTIONS 1-9: 16

## 2024-01-15 NOTE — PROGRESS NOTES
Aure Joy comes into clinic today at the request of Dr. Zamudio Ordering Provider for Med Injection only ketamine .    Procedure Prep:  Medication double check completed by: Perla Robbins RN  Prior to administration pt identified by name and : Yes    Nursing Assessment:  Appearance: Casual   Mood: Fine  Affect: WNL  Eye contact: Good      1/15/2024     9:11 AM   PHQ   PHQ-9 Total Score 16   Q9: Thoughts of better off dead/self-harm past 2 weeks Not at all     QIDDSR 16 weekly assessment: score 15. Assessment was scanned to EHR.     Procedure Performed:  VSS and mental status WNL. Patient was given ketamine. See MAR for details.     Post Procedure Assessment:  Patient tolerated the treatment with the following side effects: dissociation. Vital signs were monitored, see VS Flowsheet. Patient stated they felt ready to go home prior to discharge. AVS was offered to patient and patient declined. Patient was instructed not to drive for the remainder of the day and to notify clinic if any concerns arise.     Next appt: Monday    Medications were supplied by Clinic       This service provided today was under the supervising provider of the day Dr. Shirley, who was available if needed.        Joan Donnelly RN

## 2024-01-22 ENCOUNTER — ALLIED HEALTH/NURSE VISIT (OUTPATIENT)
Dept: PSYCHIATRY | Facility: CLINIC | Age: 54
End: 2024-01-22
Payer: COMMERCIAL

## 2024-01-22 ENCOUNTER — MYC MEDICAL ADVICE (OUTPATIENT)
Dept: PSYCHIATRY | Facility: CLINIC | Age: 54
End: 2024-01-22

## 2024-01-22 ENCOUNTER — TELEPHONE (OUTPATIENT)
Dept: PSYCHIATRY | Facility: CLINIC | Age: 54
End: 2024-01-22

## 2024-01-22 VITALS — SYSTOLIC BLOOD PRESSURE: 117 MMHG | HEART RATE: 80 BPM | DIASTOLIC BLOOD PRESSURE: 83 MMHG

## 2024-01-22 DIAGNOSIS — F33.2 SEVERE EPISODE OF RECURRENT MAJOR DEPRESSIVE DISORDER, WITHOUT PSYCHOTIC FEATURES (H): ICD-10-CM

## 2024-01-22 DIAGNOSIS — F33.2 SEVERE EPISODE OF RECURRENT MAJOR DEPRESSIVE DISORDER, WITHOUT PSYCHOTIC FEATURES (H): Primary | ICD-10-CM

## 2024-01-22 DIAGNOSIS — G24.01 TARDIVE DYSKINESIA: Primary | ICD-10-CM

## 2024-01-22 ASSESSMENT — PATIENT HEALTH QUESTIONNAIRE - PHQ9: SUM OF ALL RESPONSES TO PHQ QUESTIONS 1-9: 13

## 2024-01-22 NOTE — PROGRESS NOTES
Aure Joy comes into clinic today at the request of Dr. Zamudio Ordering Provider for Med Injection only ketamine .    Procedure Prep:  Medication double check completed by: Perla Robbins RN  Prior to administration pt identified by name and : Yes    Nursing Assessment:  Appearance: Casual   Mood: Fine  Affect: WNL  Eye contact: Good      2024     9:13 AM   PHQ   PHQ-9 Total Score 13   Q9: Thoughts of better off dead/self-harm past 2 weeks Not at all     QIDDSR 16 weekly assessment: score 15. Assessment was scanned to EHR.     Procedure Performed:  VSS and mental status WNL. Patient was given ketamine. See MAR for details.     Post Procedure Assessment:  Patient tolerated the treatment with the following side effects: dissociation. Vital signs were monitored, see VS Flowsheet. Patient stated they felt ready to go home prior to discharge. AVS was offered to patient and patient declined. Patient was instructed not to drive for the remainder of the day and to notify clinic if any concerns arise.     Next appt: Monday    Medications were supplied by Clinic       This service provided today was under the supervising provider of the day Dr. Shirley, who was available if needed.        Joan Donnelly RN

## 2024-01-23 RX ORDER — RISPERIDONE 0.25 MG/1
0.25 TABLET ORAL DAILY
Qty: 30 TABLET | Refills: 0 | Status: SHIPPED | OUTPATIENT
Start: 2024-01-23 | End: 2024-02-12

## 2024-01-23 RX ORDER — VALBENAZINE 40 MG/1
40 CAPSULE ORAL DAILY
Qty: 30 CAPSULE | Refills: 0 | Status: SHIPPED | OUTPATIENT
Start: 2024-01-23 | End: 2024-02-28

## 2024-01-23 NOTE — TELEPHONE ENCOUNTER
Received VORB from Dr. Zamudio to enter prescription for Ingrezza 40 mg due to reports of chin trembling with risperidone.     Also received VORB to enter script for 0.25 mg risperidone daily.

## 2024-01-25 ENCOUNTER — OFFICE VISIT (OUTPATIENT)
Dept: PSYCHIATRY | Facility: CLINIC | Age: 54
End: 2024-01-25
Payer: COMMERCIAL

## 2024-01-25 VITALS
BODY MASS INDEX: 30.55 KG/M2 | HEART RATE: 84 BPM | HEIGHT: 62 IN | WEIGHT: 166 LBS | DIASTOLIC BLOOD PRESSURE: 85 MMHG | SYSTOLIC BLOOD PRESSURE: 126 MMHG | TEMPERATURE: 97.3 F

## 2024-01-25 DIAGNOSIS — F33.2 SEVERE RECURRENT MAJOR DEPRESSION WITHOUT PSYCHOTIC FEATURES (H): Primary | ICD-10-CM

## 2024-01-25 LAB
FERRITIN SERPL-MCNC: 96 NG/ML (ref 11–409)
IRON BINDING CAPACITY (ROCHE): 313 UG/DL (ref 240–430)
IRON SATN MFR SERPL: 22 % (ref 15–46)
IRON SERPL-MCNC: 68 UG/DL (ref 37–157)

## 2024-01-25 ASSESSMENT — ANXIETY QUESTIONNAIRES
3. WORRYING TOO MUCH ABOUT DIFFERENT THINGS: NEARLY EVERY DAY
7. FEELING AFRAID AS IF SOMETHING AWFUL MIGHT HAPPEN: NEARLY EVERY DAY
GAD7 TOTAL SCORE: 15
6. BECOMING EASILY ANNOYED OR IRRITABLE: MORE THAN HALF THE DAYS
GAD7 TOTAL SCORE: 15
5. BEING SO RESTLESS THAT IT IS HARD TO SIT STILL: NOT AT ALL
2. NOT BEING ABLE TO STOP OR CONTROL WORRYING: NEARLY EVERY DAY
1. FEELING NERVOUS, ANXIOUS, OR ON EDGE: MORE THAN HALF THE DAYS

## 2024-01-25 ASSESSMENT — PATIENT HEALTH QUESTIONNAIRE - PHQ9: 5. POOR APPETITE OR OVEREATING: MORE THAN HALF THE DAYS
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
## 2024-01-29 ENCOUNTER — TELEPHONE (OUTPATIENT)
Dept: PSYCHIATRY | Facility: CLINIC | Age: 54
End: 2024-01-29

## 2024-01-29 ENCOUNTER — ALLIED HEALTH/NURSE VISIT (OUTPATIENT)
Dept: PSYCHIATRY | Facility: CLINIC | Age: 54
End: 2024-01-29
Payer: COMMERCIAL

## 2024-01-29 VITALS — SYSTOLIC BLOOD PRESSURE: 136 MMHG | HEART RATE: 80 BPM | DIASTOLIC BLOOD PRESSURE: 82 MMHG

## 2024-01-29 DIAGNOSIS — F33.2 SEVERE EPISODE OF RECURRENT MAJOR DEPRESSIVE DISORDER, WITHOUT PSYCHOTIC FEATURES (H): ICD-10-CM

## 2024-01-29 DIAGNOSIS — F33.2 SEVERE RECURRENT MAJOR DEPRESSION WITHOUT PSYCHOTIC FEATURES (H): Primary | ICD-10-CM

## 2024-01-29 ASSESSMENT — PATIENT HEALTH QUESTIONNAIRE - PHQ9
10. IF YOU CHECKED OFF ANY PROBLEMS, HOW DIFFICULT HAVE THESE PROBLEMS MADE IT FOR YOU TO DO YOUR WORK, TAKE CARE OF THINGS AT HOME, OR GET ALONG WITH OTHER PEOPLE: EXTREMELY DIFFICULT
SUM OF ALL RESPONSES TO PHQ QUESTIONS 1-9: 18
SUM OF ALL RESPONSES TO PHQ QUESTIONS 1-9: 18

## 2024-01-29 NOTE — PROGRESS NOTES
Aure Joy comes into clinic today at the request of Dr. Zamudio Ordering Provider for Med Injection only ketamine .    Procedure Prep:  Medication double check completed by: Perla Robbins RN  Prior to administration pt identified by name and : Yes    Nursing Assessment:  Appearance: Casual   Mood: Fine  Affect: WNL  Eye contact: Good      2024     9:08 AM   PHQ   PHQ-9 Total Score 18   Q9: Thoughts of better off dead/self-harm past 2 weeks Not at all     QIDDSR 16 weekly assessment: score 14. Assessment was scanned to EHR.     Procedure Performed:  VSS and mental status WNL. Patient was given ketamine. See MAR for details.     Post Procedure Assessment:  Patient tolerated the treatment with the following side effects: dissociation. Vital signs were monitored, see VS Flowsheet. Patient stated they felt ready to go home prior to discharge. AVS was offered to patient and patient declined. Patient was instructed not to drive for the remainder of the day and to notify clinic if any concerns arise.     Next appt: Wednesday    Medications were supplied by Clinic       This service provided today was under the supervising provider of the day Dr. Shirley, who was available if needed.        Joan Donnelly RN

## 2024-01-29 NOTE — TELEPHONE ENCOUNTER
CAROLYN per Dr. Zamudio to begin acute IM ketamine series for 3x/week for 6 doses. Decreasing dose from 1.1mg/kg to 1.0mg/kg.

## 2024-01-31 ENCOUNTER — ALLIED HEALTH/NURSE VISIT (OUTPATIENT)
Dept: PSYCHIATRY | Facility: CLINIC | Age: 54
End: 2024-01-31
Payer: COMMERCIAL

## 2024-01-31 ENCOUNTER — MYC REFILL (OUTPATIENT)
Dept: PSYCHIATRY | Facility: CLINIC | Age: 54
End: 2024-01-31

## 2024-01-31 VITALS — HEART RATE: 86 BPM | SYSTOLIC BLOOD PRESSURE: 112 MMHG | DIASTOLIC BLOOD PRESSURE: 81 MMHG

## 2024-01-31 DIAGNOSIS — F33.2 SEVERE EPISODE OF RECURRENT MAJOR DEPRESSIVE DISORDER, WITHOUT PSYCHOTIC FEATURES (H): ICD-10-CM

## 2024-01-31 DIAGNOSIS — F33.2 SEVERE EPISODE OF RECURRENT MAJOR DEPRESSIVE DISORDER, WITHOUT PSYCHOTIC FEATURES (H): Primary | ICD-10-CM

## 2024-01-31 ASSESSMENT — PATIENT HEALTH QUESTIONNAIRE - PHQ9
SUM OF ALL RESPONSES TO PHQ QUESTIONS 1-9: 18
10. IF YOU CHECKED OFF ANY PROBLEMS, HOW DIFFICULT HAVE THESE PROBLEMS MADE IT FOR YOU TO DO YOUR WORK, TAKE CARE OF THINGS AT HOME, OR GET ALONG WITH OTHER PEOPLE: EXTREMELY DIFFICULT
SUM OF ALL RESPONSES TO PHQ QUESTIONS 1-9: 18

## 2024-01-31 NOTE — PROGRESS NOTES
Answers submitted by the patient for this visit:  Patient Health Questionnaire (Submitted on 2024)  If you checked off any problems, how difficult have these problems made it for you to do your work, take care of things at home, or get along with other people?: Extremely difficult  PHQ9 TOTAL SCORE: 18  Aure Joy comes into clinic today at the request of Dr. Zamudio Ordering Provider for Med Injection only ketamine .    Procedure Prep:  Medication double check completed by: Teodora Donnelly RN  Prior to administration pt identified by name and : Yes    Nursing Assessment:  Appearance: Casual   Mood: Fine  Affect: WNL  Eye contact: Good      2024     9:11 AM   PHQ   PHQ-9 Total Score 18   Q9: Thoughts of better off dead/self-harm past 2 weeks Not at all     QIDDSR 16 weekly assessment: score 14. Assessment was scanned to EHR.     Procedure Performed:  VSS and mental status WNL. Patient was given ketamine. See MAR for details.     Post Procedure Assessment:  Patient tolerated the treatment with the following side effects: dissociation. Vital signs were monitored, see VS Flowsheet. Patient stated they felt ready to go home prior to discharge. AVS was offered to patient and patient declined. Patient was instructed not to drive for the remainder of the day and to notify clinic if any concerns arise.     Next appt: Wednesday    Medications were supplied by Clinic       This service provided today was under the supervising provider of the day Dr. Shirley, who was available if needed.        Perla Robbins RN

## 2024-02-02 ENCOUNTER — ALLIED HEALTH/NURSE VISIT (OUTPATIENT)
Dept: PSYCHIATRY | Facility: CLINIC | Age: 54
End: 2024-02-02
Payer: COMMERCIAL

## 2024-02-02 VITALS — SYSTOLIC BLOOD PRESSURE: 120 MMHG | DIASTOLIC BLOOD PRESSURE: 80 MMHG | HEART RATE: 86 BPM

## 2024-02-02 DIAGNOSIS — F33.2 SEVERE EPISODE OF RECURRENT MAJOR DEPRESSIVE DISORDER, WITHOUT PSYCHOTIC FEATURES (H): Primary | ICD-10-CM

## 2024-02-02 RX ORDER — ARIPIPRAZOLE 10 MG/1
10 TABLET ORAL DAILY
Qty: 30 TABLET | Refills: 1 | Status: SHIPPED | OUTPATIENT
Start: 2024-02-02 | End: 2024-04-02

## 2024-02-02 ASSESSMENT — PATIENT HEALTH QUESTIONNAIRE - PHQ9
SUM OF ALL RESPONSES TO PHQ QUESTIONS 1-9: 15
SUM OF ALL RESPONSES TO PHQ QUESTIONS 1-9: 15
10. IF YOU CHECKED OFF ANY PROBLEMS, HOW DIFFICULT HAVE THESE PROBLEMS MADE IT FOR YOU TO DO YOUR WORK, TAKE CARE OF THINGS AT HOME, OR GET ALONG WITH OTHER PEOPLE: EXTREMELY DIFFICULT

## 2024-02-02 NOTE — PROGRESS NOTES
Answers submitted by the patient for this visit:  Patient Health Questionnaire (Submitted on 2024)  If you checked off any problems, how difficult have these problems made it for you to do your work, take care of things at home, or get along with other people?: Extremely difficult  PHQ9 TOTAL SCORE: 15  Answers submitted by the patient for this visit:  Patient Health Questionnaire (Submitted on 2024)  If you checked off any problems, how difficult have these problems made it for you to do your work, take care of things at home, or get along with other people?: Extremely difficult  PHQ9 TOTAL SCORE: 18  Aure Joy comes into clinic today at the request of Dr. Zamudio Ordering Provider for Med Injection only ketamine .    Procedure Prep:  Medication double check completed by: Jael Alexandra RN  Prior to administration pt identified by name and : Yes    Nursing Assessment:  Appearance: Casual   Mood: Fine  Affect: WNL  Eye contact: Good      2024     9:05 AM   PHQ   PHQ-9 Total Score 15   Q9: Thoughts of better off dead/self-harm past 2 weeks Not at all     QIDDSR 16 weekly assessment: score 14. Assessment was scanned to EHR.     Procedure Performed:  VSS and mental status WNL. Patient was given ketamine. See MAR for details.     Post Procedure Assessment:  Patient tolerated the treatment with the following side effects: dissociation. Vital signs were monitored, see VS Flowsheet. Patient stated they felt ready to go home prior to discharge. AVS was offered to patient and patient declined. Patient was instructed not to drive for the remainder of the day and to notify clinic if any concerns arise.     Next appt: Wednesday    Medications were supplied by Clinic       This service provided today was under the supervising provider of the day Dr. Shirley, who was available if needed.        Perla Robbins RN

## 2024-02-05 ENCOUNTER — ALLIED HEALTH/NURSE VISIT (OUTPATIENT)
Dept: PSYCHIATRY | Facility: CLINIC | Age: 54
End: 2024-02-05
Payer: COMMERCIAL

## 2024-02-05 VITALS — DIASTOLIC BLOOD PRESSURE: 80 MMHG | SYSTOLIC BLOOD PRESSURE: 106 MMHG | HEART RATE: 86 BPM

## 2024-02-05 DIAGNOSIS — F33.2 SEVERE EPISODE OF RECURRENT MAJOR DEPRESSIVE DISORDER, WITHOUT PSYCHOTIC FEATURES (H): Primary | ICD-10-CM

## 2024-02-05 NOTE — PROGRESS NOTES
Aure Joy comes into clinic today at the request of Dr. Zamudio Ordering Provider for Med Injection only ketamine .    Procedure Prep:  Medication double check completed by: Jael Alexandra RN  Prior to administration pt identified by name and : Yes    Nursing Assessment:  Appearance: Casual   Mood: Fine  Affect: WNL  Eye contact: Good      2024     9:07 AM   PHQ   PHQ-9 Total Score 15   Q9: Thoughts of better off dead/self-harm past 2 weeks Not at all     QIDDSR 16 weekly assessment: score 14. Assessment was scanned to EHR.     Procedure Performed:  VSS and mental status WNL. Patient was given ketamine. See MAR for details.     Post Procedure Assessment:  Patient tolerated the treatment with the following side effects: dissociation. Vital signs were monitored, see VS Flowsheet. Patient stated they felt ready to go home prior to discharge. AVS was offered to patient and patient declined. Patient was instructed not to drive for the remainder of the day and to notify clinic if any concerns arise.     Next appt: Wednesday    Medications were supplied by Clinic       This service provided today was under the supervising provider of the day Dr. Shirley, who was available if needed.        Perla Robbins RN  Answers submitted by the patient for this visit:  Patient Health Questionnaire (Submitted on 2024)  If you checked off any problems, how difficult have these problems made it for you to do your work, take care of things at home, or get along with other people?: Extremely difficult  PHQ9 TOTAL SCORE: 15

## 2024-02-06 ENCOUNTER — MYC REFILL (OUTPATIENT)
Dept: PSYCHIATRY | Facility: CLINIC | Age: 54
End: 2024-02-06

## 2024-02-06 DIAGNOSIS — F33.2 SEVERE EPISODE OF RECURRENT MAJOR DEPRESSIVE DISORDER, WITHOUT PSYCHOTIC FEATURES (H): ICD-10-CM

## 2024-02-07 ENCOUNTER — ALLIED HEALTH/NURSE VISIT (OUTPATIENT)
Dept: PSYCHIATRY | Facility: CLINIC | Age: 54
End: 2024-02-07
Payer: COMMERCIAL

## 2024-02-07 VITALS — DIASTOLIC BLOOD PRESSURE: 81 MMHG | HEART RATE: 79 BPM | SYSTOLIC BLOOD PRESSURE: 115 MMHG

## 2024-02-07 DIAGNOSIS — F33.2 SEVERE EPISODE OF RECURRENT MAJOR DEPRESSIVE DISORDER, WITHOUT PSYCHOTIC FEATURES (H): Primary | ICD-10-CM

## 2024-02-07 NOTE — PROGRESS NOTES
Aure Joy comes into clinic today at the request of Dr. Zamudio Ordering Provider for Med Injection only ketamine .    Procedure Prep:  Medication double check completed by: Perla Robbins RN  Prior to administration pt identified by name and : Yes    Nursing Assessment:  Appearance: Casual   Mood: Fine  Affect: WNL  Eye contact: Good      2024     9:07 AM   PHQ   PHQ-9 Total Score 15   Q9: Thoughts of better off dead/self-harm past 2 weeks Not at all     QIDDSR 16 weekly assessment: score (done previously in week). Assessment was scanned to EHR.     Procedure Performed:  VSS and mental status WNL. Patient was given ketamine. See MAR for details.     Post Procedure Assessment:  Patient tolerated the treatment with the following side effects: dissociation. Vital signs were monitored, see VS Flowsheet. Patient stated they felt ready to go home prior to discharge. AVS was offered to patient and patient declined. Patient was instructed not to drive for the remainder of the day and to notify clinic if any concerns arise.     Next appt: Wednesday    Medications were supplied by Clinic       This service provided today was under the supervising provider of the day Dr. Shirley, who was available if needed.        Joan Donnelly RN

## 2024-02-09 ENCOUNTER — ALLIED HEALTH/NURSE VISIT (OUTPATIENT)
Dept: PSYCHIATRY | Facility: CLINIC | Age: 54
End: 2024-02-09
Payer: COMMERCIAL

## 2024-02-09 ENCOUNTER — TELEPHONE (OUTPATIENT)
Dept: PSYCHIATRY | Facility: CLINIC | Age: 54
End: 2024-02-09

## 2024-02-09 VITALS — SYSTOLIC BLOOD PRESSURE: 110 MMHG | DIASTOLIC BLOOD PRESSURE: 76 MMHG | HEART RATE: 83 BPM

## 2024-02-09 DIAGNOSIS — F33.2 SEVERE EPISODE OF RECURRENT MAJOR DEPRESSIVE DISORDER, WITHOUT PSYCHOTIC FEATURES (H): ICD-10-CM

## 2024-02-09 DIAGNOSIS — F33.2 SEVERE EPISODE OF RECURRENT MAJOR DEPRESSIVE DISORDER, WITHOUT PSYCHOTIC FEATURES (H): Primary | ICD-10-CM

## 2024-02-09 RX ORDER — LAMOTRIGINE 200 MG/1
400 TABLET ORAL DAILY
Qty: 60 TABLET | Refills: 1 | Status: SHIPPED | OUTPATIENT
Start: 2024-02-09 | End: 2024-04-02

## 2024-02-09 NOTE — PROGRESS NOTES
Aure Joy comes into clinic today at the request of Dr. Zamudio Ordering Provider for Med Injection only ketamine .    Procedure Prep:  Medication double check completed by: John Velazco RN  Prior to administration pt identified by name and : Yes    Nursing Assessment:  Appearance: Casual   Mood: Fine  Affect: WNL  Eye contact: Good      2024     9:07 AM   PHQ   PHQ-9 Total Score 15   Q9: Thoughts of better off dead/self-harm past 2 weeks Not at all     QIDDSR 16 weekly assessment: score (done previously in week). Assessment was scanned to EHR.     Procedure Performed:  VSS and mental status WNL. Patient was given ketamine. See MAR for details.     Post Procedure Assessment:  Patient tolerated the treatment with the following side effects: dissociation. Vital signs were monitored, see VS Flowsheet. Patient stated they felt ready to go home prior to discharge. AVS was offered to patient and patient declined. Patient was instructed not to drive for the remainder of the day and to notify clinic if any concerns arise.     Next appt: Monday    Medications were supplied by Clinic       This service provided today was under the supervising provider of the day Dr. Shirley, who was available if needed.        Joan Donnelly RN

## 2024-02-09 NOTE — TELEPHONE ENCOUNTER
IM ketamine order renewed for end of acute injection series. Patient will receive weekly IM ketamine at 1.0mg/kg

## 2024-02-12 ENCOUNTER — ALLIED HEALTH/NURSE VISIT (OUTPATIENT)
Dept: PSYCHIATRY | Facility: CLINIC | Age: 54
End: 2024-02-12
Payer: COMMERCIAL

## 2024-02-12 ENCOUNTER — MYC REFILL (OUTPATIENT)
Dept: PSYCHIATRY | Facility: CLINIC | Age: 54
End: 2024-02-12

## 2024-02-12 VITALS — HEART RATE: 79 BPM | SYSTOLIC BLOOD PRESSURE: 125 MMHG | DIASTOLIC BLOOD PRESSURE: 85 MMHG

## 2024-02-12 DIAGNOSIS — F33.2 SEVERE EPISODE OF RECURRENT MAJOR DEPRESSIVE DISORDER, WITHOUT PSYCHOTIC FEATURES (H): Primary | ICD-10-CM

## 2024-02-12 DIAGNOSIS — F33.2 SEVERE EPISODE OF RECURRENT MAJOR DEPRESSIVE DISORDER, WITHOUT PSYCHOTIC FEATURES (H): ICD-10-CM

## 2024-02-12 ASSESSMENT — PATIENT HEALTH QUESTIONNAIRE - PHQ9
10. IF YOU CHECKED OFF ANY PROBLEMS, HOW DIFFICULT HAVE THESE PROBLEMS MADE IT FOR YOU TO DO YOUR WORK, TAKE CARE OF THINGS AT HOME, OR GET ALONG WITH OTHER PEOPLE: EXTREMELY DIFFICULT
SUM OF ALL RESPONSES TO PHQ QUESTIONS 1-9: 14
SUM OF ALL RESPONSES TO PHQ QUESTIONS 1-9: 14

## 2024-02-12 NOTE — PROGRESS NOTES
Answers submitted by the patient for this visit:  Patient Health Questionnaire (Submitted on 2024)  If you checked off any problems, how difficult have these problems made it for you to do your work, take care of things at home, or get along with other people?: Extremely difficult  PHQ9 TOTAL SCORE: 14    Aure Joy comes into clinic today at the request of Dr. Zamudio Ordering Provider for Med Injection only ketamine .    Procedure Prep:  Medication double check completed by: Teodora Donnelly RN  Prior to administration pt identified by name and : Yes    Nursing Assessment:  Appearance: Casual   Mood: Fine  Affect: WNL  Eye contact: Good      2024     9:05 AM   PHQ   PHQ-9 Total Score 14   Q9: Thoughts of better off dead/self-harm past 2 weeks Not at all     QIDDSR 16 weekly assessment: score 14. Assessment was scanned to EHR.     Procedure Performed:  VSS and mental status WNL. Patient was given ketamine. See MAR for details.     Post Procedure Assessment:  Patient tolerated the treatment with the following side effects: dissociation. Vital signs were monitored, see VS Flowsheet. Patient stated they felt ready to go home prior to discharge. AVS was offered to patient and patient declined. Patient was instructed not to drive for the remainder of the day and to notify clinic if any concerns arise.     Next appt: Wednesday    Medications were supplied by Clinic       This service provided today was under the supervising provider of the day Dr. Shirley, who was available if needed.        Perla Robbins RN

## 2024-02-15 RX ORDER — RISPERIDONE 0.25 MG/1
0.25 TABLET ORAL DAILY
Qty: 30 TABLET | Refills: 1 | Status: SHIPPED | OUTPATIENT
Start: 2024-02-15 | End: 2024-02-23

## 2024-02-19 ENCOUNTER — ALLIED HEALTH/NURSE VISIT (OUTPATIENT)
Dept: PSYCHIATRY | Facility: CLINIC | Age: 54
End: 2024-02-19
Payer: COMMERCIAL

## 2024-02-19 VITALS — DIASTOLIC BLOOD PRESSURE: 82 MMHG | HEART RATE: 81 BPM | SYSTOLIC BLOOD PRESSURE: 119 MMHG

## 2024-02-19 DIAGNOSIS — F33.2 SEVERE EPISODE OF RECURRENT MAJOR DEPRESSIVE DISORDER, WITHOUT PSYCHOTIC FEATURES (H): Primary | ICD-10-CM

## 2024-02-19 ASSESSMENT — PATIENT HEALTH QUESTIONNAIRE - PHQ9: SUM OF ALL RESPONSES TO PHQ QUESTIONS 1-9: 13

## 2024-02-19 NOTE — PROGRESS NOTES
Aure Joy comes into clinic today at the request of Dr. Zamudio Ordering Provider for Med Injection only ketamine .    Procedure Prep:  Medication double check completed by: Perla Robbins RN  Prior to administration pt identified by name and : Yes    Nursing Assessment:  Appearance: Casual   Mood: Fine  Affect: WNL  Eye contact: Good      2024     9:32 AM   PHQ   PHQ-9 Total Score 13   Q9: Thoughts of better off dead/self-harm past 2 weeks Not at all     QIDDSR 16 weekly assessment: score 16. Assessment was scanned to EHR.     Procedure Performed:  VSS and mental status WNL. Patient was given ketamine. See MAR for details.     Post Procedure Assessment:  Patient tolerated the treatment with the following side effects: dissociation. Vital signs were monitored, see VS Flowsheet. Patient stated they felt ready to go home prior to discharge. AVS was offered to patient and patient declined. Patient was instructed not to drive for the remainder of the day and to notify clinic if any concerns arise.     Next appt: Monday    Medications were supplied by Clinic       This service provided today was under the supervising provider of the day Dr. Shirley, who was available if needed.      Joan Donnelly RN

## 2024-02-26 ENCOUNTER — ALLIED HEALTH/NURSE VISIT (OUTPATIENT)
Dept: PSYCHIATRY | Facility: CLINIC | Age: 54
End: 2024-02-26
Payer: COMMERCIAL

## 2024-02-26 VITALS — SYSTOLIC BLOOD PRESSURE: 113 MMHG | DIASTOLIC BLOOD PRESSURE: 76 MMHG | HEART RATE: 86 BPM

## 2024-02-26 DIAGNOSIS — F33.2 SEVERE EPISODE OF RECURRENT MAJOR DEPRESSIVE DISORDER, WITHOUT PSYCHOTIC FEATURES (H): Primary | ICD-10-CM

## 2024-02-26 ASSESSMENT — PATIENT HEALTH QUESTIONNAIRE - PHQ9
10. IF YOU CHECKED OFF ANY PROBLEMS, HOW DIFFICULT HAVE THESE PROBLEMS MADE IT FOR YOU TO DO YOUR WORK, TAKE CARE OF THINGS AT HOME, OR GET ALONG WITH OTHER PEOPLE: VERY DIFFICULT
SUM OF ALL RESPONSES TO PHQ QUESTIONS 1-9: 15
SUM OF ALL RESPONSES TO PHQ QUESTIONS 1-9: 15

## 2024-02-26 NOTE — PROGRESS NOTES
Aure Joy comes into clinic today at the request of Dr. Zamudio Ordering Provider for Med Injection only ketamine .    Procedure Prep:  Medication double check completed by: Teodora Donnelly RN  Prior to administration pt identified by name and : Yes    Nursing Assessment:  Appearance: Casual   Mood: Fine  Affect: WNL  Eye contact: Good      2024     8:47 AM   PHQ   PHQ-9 Total Score 15   Q9: Thoughts of better off dead/self-harm past 2 weeks Not at all     QIDDSR 16 weekly assessment: score 13 Assessment was scanned to EHR.     Procedure Performed:  VSS and mental status WNL. Patient was given ketamine. See MAR for details.     Post Procedure Assessment:  Patient tolerated the treatment with the following side effects: dissociation. Vital signs were monitored, see VS Flowsheet. Patient stated they felt ready to go home prior to discharge. AVS was offered to patient and patient declined. Patient was instructed not to drive for the remainder of the day and to notify clinic if any concerns arise.     Next appt: Wednesday    Medications were supplied by Clinic       This service provided today was under the supervising provider of the day Dr. Shirley, who was available if needed.        Perla Robbins RN    Answers submitted by the patient for this visit:  Patient Health Questionnaire (Submitted on 2024)  If you checked off any problems, how difficult have these problems made it for you to do your work, take care of things at home, or get along with other people?: Very difficult  PHQ9 TOTAL SCORE: 15

## 2024-02-28 ENCOUNTER — TELEPHONE (OUTPATIENT)
Dept: PSYCHIATRY | Facility: CLINIC | Age: 54
End: 2024-02-28

## 2024-02-28 NOTE — TELEPHONE ENCOUNTER
Prior Authorization Retail Medication Request    Medication/Dose: Ingrezza 40MG Capsules  Diagnosis and ICD code (if different than what is on RX):    New/renewal/insurance change PA/secondary ins. PA:  Previously Tried and Failed:  See Chart  Rationale:  See Chart    Insurance   Primary:   Insurance ID:      Secondary (if applicable):  Insurance ID:      Pharmacy Information (if different than what is on RX)  Name:    Phone:    Fax:

## 2024-03-04 ENCOUNTER — ALLIED HEALTH/NURSE VISIT (OUTPATIENT)
Dept: PSYCHIATRY | Facility: CLINIC | Age: 54
End: 2024-03-04
Payer: COMMERCIAL

## 2024-03-04 VITALS — SYSTOLIC BLOOD PRESSURE: 111 MMHG | DIASTOLIC BLOOD PRESSURE: 77 MMHG | HEART RATE: 69 BPM

## 2024-03-04 DIAGNOSIS — F33.2 SEVERE EPISODE OF RECURRENT MAJOR DEPRESSIVE DISORDER, WITHOUT PSYCHOTIC FEATURES (H): Primary | ICD-10-CM

## 2024-03-04 ASSESSMENT — PATIENT HEALTH QUESTIONNAIRE - PHQ9
10. IF YOU CHECKED OFF ANY PROBLEMS, HOW DIFFICULT HAVE THESE PROBLEMS MADE IT FOR YOU TO DO YOUR WORK, TAKE CARE OF THINGS AT HOME, OR GET ALONG WITH OTHER PEOPLE: VERY DIFFICULT
SUM OF ALL RESPONSES TO PHQ QUESTIONS 1-9: 14
SUM OF ALL RESPONSES TO PHQ QUESTIONS 1-9: 14

## 2024-03-04 NOTE — PROGRESS NOTES
Aure Joy comes into clinic today at the request of Dr. Zamudio Ordering Provider for Med Injection only ketamine .    Procedure Prep:  Medication double check completed by: Jael Alexandra RN  Prior to administration pt identified by name and : Yes    Nursing Assessment:  Appearance: Casual   Mood: Fine  Affect: WNL  Eye contact: Good      3/4/2024     9:04 AM   PHQ   PHQ-9 Total Score 14   Q9: Thoughts of better off dead/self-harm past 2 weeks Not at all     QIDDSR 16 weekly assessment: score 15. Assessment was scanned to EHR.     Procedure Performed:  VSS and mental status WNL. Patient was given ketamine. See MAR for details.     Post Procedure Assessment:  Patient tolerated the treatment with the following side effects: dissociation. Vital signs were monitored, see VS Flowsheet. Patient stated they felt ready to go home prior to discharge. AVS was offered to patient and patient declined. Patient was instructed not to drive for the remainder of the day and to notify clinic if any concerns arise.     Next appt: Monday    Medications were supplied by Clinic       This service provided today was under the supervising provider of the day Dr. Shirley, who was available if needed.      Joan Donnelly RN    Answers submitted by the patient for this visit:  Patient Health Questionnaire (Submitted on 3/4/2024)  If you checked off any problems, how difficult have these problems made it for you to do your work, take care of things at home, or get along with other people?: Very difficult  PHQ9 TOTAL SCORE: 14

## 2024-03-11 ENCOUNTER — ALLIED HEALTH/NURSE VISIT (OUTPATIENT)
Dept: PSYCHIATRY | Facility: CLINIC | Age: 54
End: 2024-03-11
Payer: COMMERCIAL

## 2024-03-11 VITALS — SYSTOLIC BLOOD PRESSURE: 121 MMHG | HEART RATE: 86 BPM | DIASTOLIC BLOOD PRESSURE: 78 MMHG

## 2024-03-11 DIAGNOSIS — F33.2 SEVERE EPISODE OF RECURRENT MAJOR DEPRESSIVE DISORDER, WITHOUT PSYCHOTIC FEATURES (H): Primary | ICD-10-CM

## 2024-03-11 ASSESSMENT — PATIENT HEALTH QUESTIONNAIRE - PHQ9
SUM OF ALL RESPONSES TO PHQ QUESTIONS 1-9: 14
SUM OF ALL RESPONSES TO PHQ QUESTIONS 1-9: 14

## 2024-03-11 NOTE — PROGRESS NOTES
Answers submitted by the patient for this visit:  Patient Health Questionnaire (Submitted on 3/11/2024)  PHQ9 TOTAL SCORE: 14    Aure Joy comes into clinic today at the request of Dr. Zamudio Ordering Provider for Med Injection only ketamine .    Procedure Prep:  Medication double check completed by: Jael Alexandra RN  Prior to administration pt identified by name and : Yes    Nursing Assessment:  Appearance: Casual   Mood: Fine  Affect: WNL  Eye contact: Good      3/11/2024     9:05 AM   PHQ   PHQ-9 Total Score 14   Q9: Thoughts of better off dead/self-harm past 2 weeks Not at all     QIDDSR 16 weekly assessment: score 15 Assessment was scanned to EHR.     Procedure Performed:  VSS and mental status WNL. Patient was given ketamine. See MAR for details.     Post Procedure Assessment:  Patient tolerated the treatment with the following side effects: dissociation. Vital signs were monitored, see VS Flowsheet. Patient stated they felt ready to go home prior to discharge. AVS was offered to patient and patient declined. Patient was instructed not to drive for the remainder of the day and to notify clinic if any concerns arise.     Next appt: Wednesday    Medications were supplied by Clinic       This service provided today was under the supervising provider of the day Dr. Elizabeth Gordon, who was available if needed.        Perla Robbins RN

## 2024-03-12 NOTE — TELEPHONE ENCOUNTER
PA Initiation    Medication: INGREZZA 40 MG PO CAPS  Insurance Company: [employer] - Phone [phone] Fax [phone]  Pharmacy Filling the Rx: [id number] - [place], [place] - [address]  Filling Pharmacy Phone: [phone]  Filling Pharmacy Fax: [phone]  Start Date: 3/12/2024

## 2024-03-13 NOTE — TELEPHONE ENCOUNTER
PRIOR AUTHORIZATION DENIED    Medication: INGREZZA 40 MG PO CAPS    Insurance Company: [employer] - Phone [phone] Fax [phone]    Denial Date: 3/12/2024    Denial Reason(s):       Appeal Information:

## 2024-03-18 ENCOUNTER — ALLIED HEALTH/NURSE VISIT (OUTPATIENT)
Dept: PSYCHIATRY | Facility: CLINIC | Age: 54
End: 2024-03-18
Payer: COMMERCIAL

## 2024-03-18 VITALS — DIASTOLIC BLOOD PRESSURE: 74 MMHG | HEART RATE: 71 BPM | SYSTOLIC BLOOD PRESSURE: 110 MMHG

## 2024-03-18 DIAGNOSIS — F33.2 SEVERE EPISODE OF RECURRENT MAJOR DEPRESSIVE DISORDER, WITHOUT PSYCHOTIC FEATURES (H): Primary | ICD-10-CM

## 2024-03-18 ASSESSMENT — PATIENT HEALTH QUESTIONNAIRE - PHQ9
SUM OF ALL RESPONSES TO PHQ QUESTIONS 1-9: 16
SUM OF ALL RESPONSES TO PHQ QUESTIONS 1-9: 16
10. IF YOU CHECKED OFF ANY PROBLEMS, HOW DIFFICULT HAVE THESE PROBLEMS MADE IT FOR YOU TO DO YOUR WORK, TAKE CARE OF THINGS AT HOME, OR GET ALONG WITH OTHER PEOPLE: VERY DIFFICULT

## 2024-03-18 NOTE — PROGRESS NOTES
Aure Joy comes into clinic today at the request of Dr. Zamudio Ordering Provider for Med Injection only ketamine .    Procedure Prep:  Medication double check completed by: Perla Robbins RN  Prior to administration pt identified by name and : Yes    Nursing Assessment:  Appearance: Casual   Mood: Fine  Affect: WNL  Eye contact: Good      3/18/2024     9:04 AM   PHQ   PHQ-9 Total Score 16   Q9: Thoughts of better off dead/self-harm past 2 weeks Not at all     QIDDSR 16 weekly assessment: score 15. Assessment was scanned to EHR.     Procedure Performed:  VSS and mental status WNL. Patient was given ketamine. See MAR for details.     Post Procedure Assessment:  Patient tolerated the treatment with the following side effects: dissociation. Vital signs were monitored, see VS Flowsheet. Patient stated they felt ready to go home prior to discharge. AVS was offered to patient and patient declined. Patient was instructed not to drive for the remainder of the day and to notify clinic if any concerns arise.     Next appt: Monday    Medications were supplied by Clinic       This service provided today was under the supervising provider of the day Dr. LOTUS Gordon, who was available if needed.      Joan Donnelly RN    Answers submitted by the patient for this visit:  Patient Health Questionnaire (Submitted on 3/18/2024)  If you checked off any problems, how difficult have these problems made it for you to do your work, take care of things at home, or get along with other people?: Very difficult  PHQ9 TOTAL SCORE: 16

## 2024-03-25 ENCOUNTER — ALLIED HEALTH/NURSE VISIT (OUTPATIENT)
Dept: PSYCHIATRY | Facility: CLINIC | Age: 54
End: 2024-03-25
Payer: COMMERCIAL

## 2024-03-25 VITALS — DIASTOLIC BLOOD PRESSURE: 81 MMHG | HEART RATE: 86 BPM | SYSTOLIC BLOOD PRESSURE: 121 MMHG

## 2024-03-25 DIAGNOSIS — F33.2 SEVERE EPISODE OF RECURRENT MAJOR DEPRESSIVE DISORDER, WITHOUT PSYCHOTIC FEATURES (H): Primary | ICD-10-CM

## 2024-03-25 ASSESSMENT — PATIENT HEALTH QUESTIONNAIRE - PHQ9
10. IF YOU CHECKED OFF ANY PROBLEMS, HOW DIFFICULT HAVE THESE PROBLEMS MADE IT FOR YOU TO DO YOUR WORK, TAKE CARE OF THINGS AT HOME, OR GET ALONG WITH OTHER PEOPLE: VERY DIFFICULT
SUM OF ALL RESPONSES TO PHQ QUESTIONS 1-9: 16
SUM OF ALL RESPONSES TO PHQ QUESTIONS 1-9: 16

## 2024-03-25 NOTE — PROGRESS NOTES
Answers submitted by the patient for this visit:  Patient Health Questionnaire (Submitted on 3/25/2024)  If you checked off any problems, how difficult have these problems made it for you to do your work, take care of things at home, or get along with other people?: Very difficult  PHQ9 TOTAL SCORE: 16      Aure Joy comes into clinic today at the request of Dr. Zamudio Ordering Provider for Med Injection only ketamine .    Procedure Prep:  Medication double check completed by: Jael Alexandra RN  Prior to administration pt identified by name and : Yes    Nursing Assessment:  Appearance: Casual   Mood: Fine  Affect: WNL  Eye contact: Good      3/25/2024     8:57 AM   PHQ   PHQ-9 Total Score 16   Q9: Thoughts of better off dead/self-harm past 2 weeks Not at all     QIDDSR 16 weekly assessment: score 15 Assessment was scanned to EHR.     Procedure Performed:  VSS and mental status WNL. Patient was given ketamine. See MAR for details.     Post Procedure Assessment:  Patient tolerated the treatment with the following side effects: dissociation. Vital signs were monitored, see VS Flowsheet. Patient stated they felt ready to go home prior to discharge. AVS was offered to patient and patient declined. Patient was instructed not to drive for the remainder of the day and to notify clinic if any concerns arise.     Next appt: Wednesday    Medications were supplied by Clinic       This service provided today was under the supervising provider of the day Dr. Elizabeth Gordon, who was available if needed.        Perla Robbins RN

## 2024-03-29 ENCOUNTER — TELEPHONE (OUTPATIENT)
Dept: PSYCHIATRY | Facility: CLINIC | Age: 54
End: 2024-03-29

## 2024-03-29 NOTE — TELEPHONE ENCOUNTER
Received call back from patient.  Let her know that we need to do an AIMS to appeal denial.  She verbalized understanding and will come in next week to do it.

## 2024-03-29 NOTE — TELEPHONE ENCOUNTER
Writer attempted to call patient to discuss Ingrezza denial and the need for an appointment with Dr. Zamudio to do an AIMS.  Received voicemail, left message asking for a return call.  Clinic number provided.

## 2024-04-01 ENCOUNTER — ALLIED HEALTH/NURSE VISIT (OUTPATIENT)
Dept: PSYCHIATRY | Facility: CLINIC | Age: 54
End: 2024-04-01
Payer: COMMERCIAL

## 2024-04-01 VITALS — DIASTOLIC BLOOD PRESSURE: 68 MMHG | HEART RATE: 69 BPM | SYSTOLIC BLOOD PRESSURE: 105 MMHG

## 2024-04-01 DIAGNOSIS — F33.2 SEVERE EPISODE OF RECURRENT MAJOR DEPRESSIVE DISORDER, WITHOUT PSYCHOTIC FEATURES (H): Primary | ICD-10-CM

## 2024-04-01 ASSESSMENT — PATIENT HEALTH QUESTIONNAIRE - PHQ9
SUM OF ALL RESPONSES TO PHQ QUESTIONS 1-9: 15
10. IF YOU CHECKED OFF ANY PROBLEMS, HOW DIFFICULT HAVE THESE PROBLEMS MADE IT FOR YOU TO DO YOUR WORK, TAKE CARE OF THINGS AT HOME, OR GET ALONG WITH OTHER PEOPLE: VERY DIFFICULT
SUM OF ALL RESPONSES TO PHQ QUESTIONS 1-9: 15

## 2024-04-01 NOTE — PROGRESS NOTES
Aure Joy comes into clinic today at the request of Dr. Zamudio Ordering Provider for Med Injection only ketamine .    Procedure Prep:  Medication double check completed by: Perla Robbins RN  Prior to administration pt identified by name and : Yes    Nursing Assessment:  Appearance: Casual   Mood: Fine  Affect: WNL  Eye contact: Good      2024     9:06 AM   PHQ   PHQ-9 Total Score 15   Q9: Thoughts of better off dead/self-harm past 2 weeks Not at all     QIDDSR 16 weekly assessment: score 14. Assessment was scanned to EHR.     Procedure Performed:  VSS and mental status WNL. Patient was given ketamine. See MAR for details.     Post Procedure Assessment:  Patient tolerated the treatment with the following side effects: dissociation. Vital signs were monitored, see VS Flowsheet. Patient stated they felt ready to go home prior to discharge. AVS was offered to patient and patient declined. Patient was instructed not to drive for the remainder of the day and to notify clinic if any concerns arise.     Next appt: Monday    Medications were supplied by Clinic       This service provided today was under the supervising provider of the day Dr. LOTUS Gordon, who was available if needed.      Joan Donnelly RN    Answers submitted by the patient for this visit:  Patient Health Questionnaire (Submitted on 2024)  If you checked off any problems, how difficult have these problems made it for you to do your work, take care of things at home, or get along with other people?: Very difficult  PHQ9 TOTAL SCORE: 15

## 2024-04-02 ENCOUNTER — MYC REFILL (OUTPATIENT)
Dept: PSYCHIATRY | Facility: CLINIC | Age: 54
End: 2024-04-02

## 2024-04-02 DIAGNOSIS — F33.2 SEVERE EPISODE OF RECURRENT MAJOR DEPRESSIVE DISORDER, WITHOUT PSYCHOTIC FEATURES (H): ICD-10-CM

## 2024-04-02 NOTE — TELEPHONE ENCOUNTER
Last seen: 1/25/24  RTC:   Cancel: none  No-show: 3/28/24  Next appt: 4/4/24    Incoming refill from patient via mychart    Medication requested: Lamotrigine 200 mg   Directions: Take 2 tablets by mouth daily   Qty: 60  Last refilled: 2/9/24 with one refill     Medication requested: Abilify 10 mg   Directions: take 1 tablet by mouth daily   Qty: 30  Last refilled: 2/2/24 with one refill     Routed to provider; last visit note unsigned.

## 2024-04-04 ENCOUNTER — OFFICE VISIT (OUTPATIENT)
Dept: PSYCHIATRY | Facility: CLINIC | Age: 54
End: 2024-04-04
Payer: COMMERCIAL

## 2024-04-04 VITALS — SYSTOLIC BLOOD PRESSURE: 117 MMHG | DIASTOLIC BLOOD PRESSURE: 79 MMHG | TEMPERATURE: 97.3 F | HEART RATE: 76 BPM

## 2024-04-04 DIAGNOSIS — G24.01 TARDIVE DYSKINESIA: ICD-10-CM

## 2024-04-04 DIAGNOSIS — R11.0 NAUSEA: ICD-10-CM

## 2024-04-04 DIAGNOSIS — F33.2 SEVERE RECURRENT MAJOR DEPRESSION WITHOUT PSYCHOTIC FEATURES (H): ICD-10-CM

## 2024-04-04 DIAGNOSIS — F33.2 SEVERE EPISODE OF RECURRENT MAJOR DEPRESSIVE DISORDER, WITHOUT PSYCHOTIC FEATURES (H): Primary | ICD-10-CM

## 2024-04-04 DIAGNOSIS — F43.10 COMPLEX POSTTRAUMATIC STRESS DISORDER: ICD-10-CM
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
## 2024-04-08 ENCOUNTER — ALLIED HEALTH/NURSE VISIT (OUTPATIENT)
Dept: PSYCHIATRY | Facility: CLINIC | Age: 54
End: 2024-04-08
Payer: COMMERCIAL

## 2024-04-08 VITALS — SYSTOLIC BLOOD PRESSURE: 124 MMHG | DIASTOLIC BLOOD PRESSURE: 78 MMHG | HEART RATE: 86 BPM

## 2024-04-08 DIAGNOSIS — F33.2 SEVERE EPISODE OF RECURRENT MAJOR DEPRESSIVE DISORDER, WITHOUT PSYCHOTIC FEATURES (H): Primary | ICD-10-CM

## 2024-04-08 ASSESSMENT — PATIENT HEALTH QUESTIONNAIRE - PHQ9: SUM OF ALL RESPONSES TO PHQ QUESTIONS 1-9: 17

## 2024-04-09 RX ORDER — LAMOTRIGINE 200 MG/1
400 TABLET ORAL DAILY
Qty: 60 TABLET | Refills: 1 | Status: SHIPPED | OUTPATIENT
Start: 2024-04-09 | End: 2024-05-31

## 2024-04-09 RX ORDER — ARIPIPRAZOLE 10 MG/1
10 TABLET ORAL DAILY
Qty: 30 TABLET | Refills: 1 | Status: SHIPPED | OUTPATIENT
Start: 2024-04-09 | End: 2024-05-31

## 2024-04-15 ENCOUNTER — ALLIED HEALTH/NURSE VISIT (OUTPATIENT)
Dept: PSYCHIATRY | Facility: CLINIC | Age: 54
End: 2024-04-15
Payer: COMMERCIAL

## 2024-04-15 VITALS — SYSTOLIC BLOOD PRESSURE: 112 MMHG | HEART RATE: 82 BPM | DIASTOLIC BLOOD PRESSURE: 72 MMHG

## 2024-04-15 DIAGNOSIS — F33.2 SEVERE EPISODE OF RECURRENT MAJOR DEPRESSIVE DISORDER, WITHOUT PSYCHOTIC FEATURES (H): Primary | ICD-10-CM

## 2024-04-15 ASSESSMENT — PATIENT HEALTH QUESTIONNAIRE - PHQ9: SUM OF ALL RESPONSES TO PHQ QUESTIONS 1-9: 16

## 2024-04-15 NOTE — PROGRESS NOTES
Aure Joy comes into clinic today at the request of Dr. Zamudio Ordering Provider for Med Injection only ketamine .    Procedure Prep:  Medication double check completed by: Jael Alexandra RN  Prior to administration pt identified by name and : Yes    Nursing Assessment:  Appearance: Casual   Mood: Fine  Affect: WNL  Eye contact: Good      2024     9:14 AM   PHQ   PHQ-9 Total Score 17   Q9: Thoughts of better off dead/self-harm past 2 weeks Not at all     QIDDSR 16 weekly assessment: score 16 Assessment was scanned to EHR.     Procedure Performed:  VSS and mental status WNL. Patient was given ketamine. See MAR for details.     Post Procedure Assessment:  Patient tolerated the treatment with the following side effects: dissociation. Vital signs were monitored, see VS Flowsheet. Patient stated they felt ready to go home prior to discharge. AVS was offered to patient and patient declined. Patient was instructed not to drive for the remainder of the day and to notify clinic if any concerns arise.     Next appt: Wednesday    Medications were supplied by Clinic       This service provided today was under the supervising provider of the day Dr. Elizabeth Gordon, who was available if needed.        Perla Robbins RN

## 2024-04-15 NOTE — PROGRESS NOTES
Ketamine Education     Aure Joy MRN# [id number]  Age: 53 year old year old YOB: 1970        Patient is here in clinic for Ketamine education and consenting.  Patient is undergoing Intramuscular ketamine injections for the treatment of their depression.  Writer and patient discussed the synaptogenic model of chronic stress and how it is believed that ketamine works to treat depression in this model.  We discussed side effects of ketamine such as; hallucinations, nausea/vomiting, dizziness, increased heart rate/blood pressure, motor skill changes, injection site pain, injection site skin reactions, sedation, dissociation, allergic reactions, arrhythmias, and cystitis of bladder/other bladder or kidney related issues.  We discussed that patient should continue to follow recommendations of psychiatrist.  We discussed the requirement of lab monitoring for continued ketamine treatment.  We also discussed that if patient is planning to become pregnant at any time this needs to be shared with treatment team.  We reviewed the importance of disclosing all medication changes to treatment team.  We discussed driving restrictions the day of treatment.    Patient was informed of clinic expectations of 10 minute late policy and following the direction of RN's during treatment.     Patient verbalized understanding of everything above and signed consent today.                  Jael Alexandra RN

## 2024-04-19 ENCOUNTER — TELEPHONE (OUTPATIENT)
Dept: PSYCHIATRY | Facility: CLINIC | Age: 54
End: 2024-04-19

## 2024-04-22 ENCOUNTER — ALLIED HEALTH/NURSE VISIT (OUTPATIENT)
Dept: PSYCHIATRY | Facility: CLINIC | Age: 54
End: 2024-04-22
Payer: COMMERCIAL

## 2024-04-22 VITALS — HEART RATE: 70 BPM | SYSTOLIC BLOOD PRESSURE: 120 MMHG | DIASTOLIC BLOOD PRESSURE: 72 MMHG

## 2024-04-22 DIAGNOSIS — F33.2 SEVERE EPISODE OF RECURRENT MAJOR DEPRESSIVE DISORDER, WITHOUT PSYCHOTIC FEATURES (H): Primary | ICD-10-CM

## 2024-04-22 NOTE — PROGRESS NOTES
Aure Joy comes into clinic today at the request of Dr. Zamudio Ordering Provider for Med Injection only ketamine .    Procedure Prep:  Medication double check completed by: Jael Alexandra RN  Prior to administration pt identified by name and : Yes    Nursing Assessment:  Appearance: Casual   Mood: Fine  Affect: WNL  Eye contact: Good      2024     9:06 AM   PHQ   PHQ-9 Total Score 15   Q9: Thoughts of better off dead/self-harm past 2 weeks Not at all     QIDDSR 16 weekly assessment: score 15. Assessment was scanned to EHR.     Procedure Performed:  VSS and mental status WNL. Patient was given ketamine. See MAR for details.     Post Procedure Assessment:  Patient tolerated the treatment with the following side effects: dissociation. Vital signs were monitored, see VS Flowsheet. Patient stated they felt ready to go home prior to discharge. AVS was offered to patient and patient declined. Patient was instructed not to drive for the remainder of the day and to notify clinic if any concerns arise.     Next appt: Monday    Medications were supplied by Clinic       This service provided today was under the supervising provider of the day Dr. LOTUS Gordon, who was available if needed.      Joan Donnelly RN    Answers submitted by the patient for this visit:  Patient Health Questionnaire (Submitted on 2024)  If you checked off any problems, how difficult have these problems made it for you to do your work, take care of things at home, or get along with other people?: Extremely difficult  PHQ9 TOTAL SCORE: 15

## 2024-04-29 ENCOUNTER — ALLIED HEALTH/NURSE VISIT (OUTPATIENT)
Dept: PSYCHIATRY | Facility: CLINIC | Age: 54
End: 2024-04-29
Payer: COMMERCIAL

## 2024-04-29 VITALS — DIASTOLIC BLOOD PRESSURE: 81 MMHG | SYSTOLIC BLOOD PRESSURE: 118 MMHG | HEART RATE: 86 BPM

## 2024-04-29 DIAGNOSIS — F33.2 SEVERE EPISODE OF RECURRENT MAJOR DEPRESSIVE DISORDER, WITHOUT PSYCHOTIC FEATURES (H): Primary | ICD-10-CM

## 2024-04-29 ASSESSMENT — PATIENT HEALTH QUESTIONNAIRE - PHQ9: SUM OF ALL RESPONSES TO PHQ QUESTIONS 1-9: 15

## 2024-04-29 NOTE — PROGRESS NOTES
Aure Joy comes into clinic today at the request of Dr. Zamudio Ordering Provider for Med Injection only ketamine .    Procedure Prep:  Medication double check completed by:Teodora Donnelly RN  Prior to administration pt identified by name and : Yes    Nursing Assessment:  Appearance: Casual   Mood: Fine  Affect: WNL  Eye contact: Good      2024     9:10 AM   PHQ   PHQ-9 Total Score 15   Q9: Thoughts of better off dead/self-harm past 2 weeks Not at all     QIDDSR 16 weekly assessment: score 16 Assessment was scanned to EHR.     Procedure Performed:  VSS and mental status WNL. Patient was given ketamine. See MAR for details.     Post Procedure Assessment:  Patient tolerated the treatment with the following side effects: dissociation. Vital signs were monitored, see VS Flowsheet. Patient stated they felt ready to go home prior to discharge. AVS was offered to patient and patient declined. Patient was instructed not to drive for the remainder of the day and to notify clinic if any concerns arise.     Next appt: Wednesday    Medications were supplied by Clinic       This service provided today was under the supervising provider of the day Dr. Elizabeth Gordon, who was available if needed.        Perla Robbins RN

## 2024-04-30 ENCOUNTER — OFFICE VISIT (OUTPATIENT)
Dept: PSYCHIATRY | Facility: CLINIC | Age: 54
End: 2024-04-30
Payer: COMMERCIAL

## 2024-04-30 DIAGNOSIS — F33.2 SEVERE EPISODE OF RECURRENT MAJOR DEPRESSIVE DISORDER, WITHOUT PSYCHOTIC FEATURES (H): Primary | ICD-10-CM

## 2024-04-30 ASSESSMENT — PATIENT HEALTH QUESTIONNAIRE - PHQ9: SUM OF ALL RESPONSES TO PHQ QUESTIONS 1-9: 15

## 2024-04-30 NOTE — PROGRESS NOTES
[facility] TMS Program  [address], [address]  [place] [address]  TMS Procedure Note   Aure Joy MRN# [id number]  Age: 53 year old   YOB: 1970    Aure Joy comes into clinic today at the request ofESTELA MD, ordering provider for TMS treatments.     Pre-Procedure:  History and Physical: Reviewed in medical record  Consent signed by: Aure Joy on: 12/22/2020    Clinical Narrative:  Patient tolerated treatment. First day of treatment.     Indications for TMS:  MDD, recurrent, severe; 4+ medication trials (from 2+ classes) ineffective; Psychotherapy ineffective.     Pre-Procedure Diagnosis:  MDD, recurrent, severe F33.2    Treatment Hx:  Treatment number this series: 1  Total lifetime treatment number: 203    Allergies   Allergen Reactions    Codeine Nausea    Lithium GI Disturbance     Cramping, nausea, diarrhea    Other  [No Clinical Screening - See Comments] Nausea and Vomiting and Other (See Comments)     general anesthesia- uncontrolled vomitting      There were no vitals taken for this visit.    Pause for the Cause:  Right patient:  Yes  Right procedure/correct coil:  Yes; rTMS; 73163; F8 coil.   Earplugs in place:  Yes    Procedure:  Patient was seated in procedure chair. Identity and procedure was verified. Ear plugs were placed in ears and patient-specific cap was placed on head and tightened appropriately. Ruler locations were verified. Coil was placed at treatment location and stimulator was set to parameters described below. A test train was delivered and pt did not tolerate train so treatment was denied today.    Motor Threshold Determination  Distance from nasion to inion: 35  Distance along circumference from midline: 6.67cm  Distance from Vertex: 9.56cm  MT 1: 0 - 6 - 16 @ 69% on 1/29/2018  MT 2: 0 - 6 - 16 @ 69% on 2/6/2018   MT 3: 0 - 6 - 16 @ 69% on 2/13/2018   MT 4: 0 - 6 - 16 @ 69% on 2/23/2018   MT 5: 0 - 6 - 16 @ 69% on 3/2/2018   MT 6: 0 - 5.5  - 15.5(always use intersection at left side of ruler)@ 85% on 8/23/19  MT 7: 0 - 5.5 - 15.5(always use intersection at left side of ruler)@ 80% on 8/29/19  MT 8: 0 - 5.5 - 37.5(always use intersection at left side of ruler)@ 76% on 9/5/19 (right side using EMG on left hand)  MT 9: C2 (R side using EMG on L hand) 78% on 9/12/2019  MT 10: C2 (R side using EMG on L hand) 76% on 9/26/2019  MT 11: C2 (R side using EMG on L hand) 82 on 1/31/2020  MT 12: C2 (R side using EMG on L Hand) 86% on 12/22/2020   MT 13: C2 (R side) 100% on 04/21/22  MT 14: C2 (R side) 86% on 6/7/22  MT 15: 0 - 6 - 16.5 @ 68% on 6/14/2022  MT 16: C2 (R side) 89% on 6/16/22  MT 17: F3 @ 91 on 4/30/2024    LDLPFC:  Frequency: 50 Hz  Number of pulses:  3                        Number of bursts: 10  Burst frequency: 5 Hz  Cycle time: 10 sec  Total pulses delivered: 1800  Tx Loc: F3  Energy: 73% (80% MT)   Number of Cycles: 60 trains     RDLPFC:  Frequency: 50 Hz  Number of pulses:  3                        Number of bursts: 200  Burst frequency: 5 Hz  Cycle time: 190.0sec  Total pulses delivered: 1800  Tx Loc: F4 (right side)  Energy: 73% (80% MT)   Number of Cycles: 3 trains    Rating Scales:  Date MADRS QIDS PHQ-9   4/30/2024 30 15 15                                           Plan   -Continue TMS treatments  - Increase energy as tolerated     This service provided today was under the supervising provider of the day, ESTELA Zamudio MD, who redetermined motor threshold and was available if needed.     Imelda Davis, TMS Technician  [facility]

## 2024-05-01 ENCOUNTER — OFFICE VISIT (OUTPATIENT)
Dept: PSYCHIATRY | Facility: CLINIC | Age: 54
End: 2024-05-01
Payer: COMMERCIAL

## 2024-05-01 DIAGNOSIS — F33.2 SEVERE EPISODE OF RECURRENT MAJOR DEPRESSIVE DISORDER, WITHOUT PSYCHOTIC FEATURES (H): Primary | ICD-10-CM

## 2024-05-01 ASSESSMENT — PATIENT HEALTH QUESTIONNAIRE - PHQ9
10. IF YOU CHECKED OFF ANY PROBLEMS, HOW DIFFICULT HAVE THESE PROBLEMS MADE IT FOR YOU TO DO YOUR WORK, TAKE CARE OF THINGS AT HOME, OR GET ALONG WITH OTHER PEOPLE: VERY DIFFICULT
SUM OF ALL RESPONSES TO PHQ QUESTIONS 1-9: 16
10. IF YOU CHECKED OFF ANY PROBLEMS, HOW DIFFICULT HAVE THESE PROBLEMS MADE IT FOR YOU TO DO YOUR WORK, TAKE CARE OF THINGS AT HOME, OR GET ALONG WITH OTHER PEOPLE: VERY DIFFICULT

## 2024-05-01 NOTE — PROGRESS NOTES
[facility] TMS Program  [address], [address]  [place] [address]  TMS Procedure Note   Aure Joy MRN# [id number]  Age: 53 year old   YOB: 1970    Aure Joy comes into clinic today at the request ofESTELA MD, ordering provider for TMS treatments.     Pre-Procedure:  History and Physical: Reviewed in medical record  Consent signed by: Aure Joy on: 12/22/2020    Clinical Narrative:  Patient tolerated treatment. No changes reported.     Indications for TMS:  MDD, recurrent, severe; 4+ medication trials (from 2+ classes) ineffective; Psychotherapy ineffective.     Pre-Procedure Diagnosis:  MDD, recurrent, severe F33.2    Treatment Hx:  Treatment number this series: 2  Total lifetime treatment number: 204    Allergies   Allergen Reactions    Codeine Nausea    Lithium GI Disturbance     Cramping, nausea, diarrhea    Other  [No Clinical Screening - See Comments] Nausea and Vomiting and Other (See Comments)     general anesthesia- uncontrolled vomitting      There were no vitals taken for this visit.    Pause for the Cause:  Right patient:  Yes  Right procedure/correct coil:  Yes; rTMS; 67873; F8 coil.   Earplugs in place:  Yes    Procedure:  Patient was seated in procedure chair. Identity and procedure was verified. Ear plugs were placed in ears and patient-specific cap was placed on head and tightened appropriately. Ruler locations were verified. Coil was placed at treatment location and stimulator was set to parameters described below. A test train was delivered and pt did not tolerate train so treatment was denied today.    Motor Threshold Determination  Distance from nasion to inion: 35  Distance along circumference from midline: 6.67cm  Distance from Vertex: 9.56cm  MT 1: 0 - 6 - 16 @ 69% on 1/29/2018  MT 2: 0 - 6 - 16 @ 69% on 2/6/2018   MT 3: 0 - 6 - 16 @ 69% on 2/13/2018   MT 4: 0 - 6 - 16 @ 69% on 2/23/2018   MT 5: 0 - 6 - 16 @ 69% on 3/2/2018   MT 6: 0 - 5.5 -  15.5(always use intersection at left side of ruler)@ 85% on 8/23/19  MT 7: 0 - 5.5 - 15.5(always use intersection at left side of ruler)@ 80% on 8/29/19  MT 8: 0 - 5.5 - 37.5(always use intersection at left side of ruler)@ 76% on 9/5/19 (right side using EMG on left hand)  MT 9: C2 (R side using EMG on L hand) 78% on 9/12/2019  MT 10: C2 (R side using EMG on L hand) 76% on 9/26/2019  MT 11: C2 (R side using EMG on L hand) 82 on 1/31/2020  MT 12: C2 (R side using EMG on L Hand) 86% on 12/22/2020   MT 13: C2 (R side) 100% on 04/21/22  MT 14: C2 (R side) 86% on 6/7/22  MT 15: 0 - 6 - 16.5 @ 68% on 6/14/2022  MT 16: C2 (R side) 89% on 6/16/22  MT 17: F3 @ 91 on 4/30/2024    LDLPFC:  Frequency: 50 Hz  Number of pulses:  3                        Number of bursts: 10  Burst frequency: 5 Hz  Cycle time: 10 sec  Total pulses delivered: 1800  Tx Loc: F3  Energy: 73% (80% MT)   Number of Cycles: 60 trains     RDLPFC:  Frequency: 50 Hz  Number of pulses:  3                        Number of bursts: 200  Burst frequency: 5 Hz  Cycle time: 190.0sec  Total pulses delivered: 1800  Tx Loc: F4 (right side)  Energy: 73% (80% MT)   Number of Cycles: 3 trains    Rating Scales:  Date MADRS QIDS PHQ-9   4/30/2024 30 15 15                                           Plan   -Continue TMS treatments  - Increase energy as tolerated     This service provided today was under the supervising provider of the day, Elizabeth Gordon MD, who was available if needed.     Imelda Davis, TMS Technician  [facility] Neuromodulation

## 2024-05-02 ENCOUNTER — OFFICE VISIT (OUTPATIENT)
Dept: PSYCHIATRY | Facility: CLINIC | Age: 54
End: 2024-05-02
Payer: COMMERCIAL

## 2024-05-02 DIAGNOSIS — F33.2 SEVERE EPISODE OF RECURRENT MAJOR DEPRESSIVE DISORDER, WITHOUT PSYCHOTIC FEATURES (H): Primary | ICD-10-CM

## 2024-05-02 ASSESSMENT — PATIENT HEALTH QUESTIONNAIRE - PHQ9: SUM OF ALL RESPONSES TO PHQ QUESTIONS 1-9: 16

## 2024-05-02 NOTE — PROGRESS NOTES
[facility]  [address], [address]  [place] [address]  TMS Procedure Note   Aure Joy MRN# [id number]  Age: 53 year old   YOB: 1970    Aure Joy comes into clinic today at the request ofESTELA MD, ordering provider for TMS treatments.     Pre-Procedure:  History and Physical: Reviewed in medical record  Consent signed by: Aure Joy on: 12/22/2020    Clinical Narrative:  Patient tolerated treatment. Tolerated increase to 85% today.    Indications for TMS:  MDD, recurrent, severe; 4+ medication trials (from 2+ classes) ineffective; Psychotherapy ineffective.     Pre-Procedure Diagnosis:  MDD, recurrent, severe F33.2    Treatment Hx:  Treatment number this series: 3  Total lifetime treatment number: 205    Allergies   Allergen Reactions    Codeine Nausea    Lithium GI Disturbance     Cramping, nausea, diarrhea    Other  [No Clinical Screening - See Comments] Nausea and Vomiting and Other (See Comments)     general anesthesia- uncontrolled vomitting      There were no vitals taken for this visit.    Pause for the Cause:  Right patient:  Yes  Right procedure/correct coil:  Yes; rTMS; 54308; F8 coil.   Earplugs in place:  Yes    Procedure:  Patient was seated in procedure chair. Identity and procedure was verified. Ear plugs were placed in ears and patient-specific cap was placed on head and tightened appropriately. Ruler locations were verified. Coil was placed at treatment location and stimulator was set to parameters described below. A test train was delivered and pt did not tolerate train so treatment was denied today.    Motor Threshold Determination  Distance from nasion to inion: 35  Distance along circumference from midline: 6.67cm  Distance from Vertex: 9.56cm  Cap to Nasion: 4cm  MT 1: 0 - 6 - 16 @ 69% on 1/29/2018  MT 2: 0 - 6 - 16 @ 69% on 2/6/2018   MT 3: 0 - 6 - 16 @ 69% on 2/13/2018   MT 4: 0 - 6 - 16 @ 69% on 2/23/2018   MT 5: 0 - 6 - 16 @ 69%  on 3/2/2018   MT 6: 0 - 5.5 - 15.5(always use intersection at left side of ruler)@ 85% on 8/23/19  MT 7: 0 - 5.5 - 15.5(always use intersection at left side of ruler)@ 80% on 8/29/19  MT 8: 0 - 5.5 - 37.5(always use intersection at left side of ruler)@ 76% on 9/5/19 (right side using EMG on left hand)  MT 9: C2 (R side using EMG on L hand) 78% on 9/12/2019  MT 10: C2 (R side using EMG on L hand) 76% on 9/26/2019  MT 11: C2 (R side using EMG on L hand) 82 on 1/31/2020  MT 12: C2 (R side using EMG on L Hand) 86% on 12/22/2020   MT 13: C2 (R side) 100% on 04/21/22  MT 14: C2 (R side) 86% on 6/7/22  MT 15: 0 - 6 - 16.5 @ 68% on 6/14/2022  MT 16: C2 (R side) 89% on 6/16/22  MT 17: F3 @ 91 on 4/30/2024    LDLPFC:  Frequency: 50 Hz  Number of pulses:  3                        Number of bursts: 10  Burst frequency: 5 Hz  Cycle time: 10 sec  Total pulses delivered: 1800  Tx Loc: F3  Energy: 77% (85% MT)   Number of Cycles: 60 trains     RDLPFC:  Frequency: 50 Hz  Number of pulses:  3                        Number of bursts: 200  Burst frequency: 5 Hz  Cycle time: 190.0sec  Total pulses delivered: 1800  Tx Loc: F4 (right side)  Energy: 77% (85% MT)   Number of Cycles: 3 trains    Rating Scales:  Date MADRS QIDS PHQ-9   4/30/2024 30 15 15                                           Plan   -Continue TMS treatments  - Increase energy as tolerated     This service provided today was under the supervising provider of the day, Mihir Chaudhry MD, who was available if needed.     Neli Smith, TMS Technician  [facility] Neuromodulation

## 2024-05-03 ENCOUNTER — OFFICE VISIT (OUTPATIENT)
Dept: PSYCHIATRY | Facility: CLINIC | Age: 54
End: 2024-05-03
Payer: COMMERCIAL

## 2024-05-03 DIAGNOSIS — F33.2 SEVERE EPISODE OF RECURRENT MAJOR DEPRESSIVE DISORDER, WITHOUT PSYCHOTIC FEATURES (H): Primary | ICD-10-CM

## 2024-05-03 ASSESSMENT — PATIENT HEALTH QUESTIONNAIRE - PHQ9: SUM OF ALL RESPONSES TO PHQ QUESTIONS 1-9: 15

## 2024-05-03 NOTE — PROGRESS NOTES
[facility]  [address], [address]  [place] [address]  TMS Procedure Note   Aure Joy MRN# [id number]  Age: 53 year old   YOB: 1970    Aure Joy comes into clinic today at the request ofESTELA MD, ordering provider for TMS treatments.     Pre-Procedure:  History and Physical: Reviewed in medical record  Consent signed by: Aure Joy on: 12/22/2020    Clinical Narrative:  Patient tolerated treatment. Tolerated increase to 90% today.    Indications for TMS:  MDD, recurrent, severe; 4+ medication trials (from 2+ classes) ineffective; Psychotherapy ineffective.     Pre-Procedure Diagnosis:  MDD, recurrent, severe F33.2    Treatment Hx:  Treatment number this series: 4  Total lifetime treatment number: 206    Allergies   Allergen Reactions    Codeine Nausea    Lithium GI Disturbance     Cramping, nausea, diarrhea    Other  [No Clinical Screening - See Comments] Nausea and Vomiting and Other (See Comments)     general anesthesia- uncontrolled vomitting      There were no vitals taken for this visit.    Pause for the Cause:  Right patient:  Yes  Right procedure/correct coil:  Yes; rTMS; 36729; F8 coil.   Earplugs in place:  Yes    Procedure:  Patient was seated in procedure chair. Identity and procedure was verified. Ear plugs were placed in ears and patient-specific cap was placed on head and tightened appropriately. Ruler locations were verified. Coil was placed at treatment location and stimulator was set to parameters described below. A test train was delivered and pt did not tolerate train so treatment was denied today.    Motor Threshold Determination  Distance from nasion to inion: 35  Distance along circumference from midline: 6.67cm  Distance from Vertex: 9.56cm  Cap to Nasion: 4cm  MT 1: 0 - 6 - 16 @ 69% on 1/29/2018  MT 2: 0 - 6 - 16 @ 69% on 2/6/2018   MT 3: 0 - 6 - 16 @ 69% on 2/13/2018   MT 4: 0 - 6 - 16 @ 69% on 2/23/2018   MT 5: 0 - 6 - 16 @ 69%  on 3/2/2018   MT 6: 0 - 5.5 - 15.5(always use intersection at left side of ruler)@ 85% on 8/23/19  MT 7: 0 - 5.5 - 15.5(always use intersection at left side of ruler)@ 80% on 8/29/19  MT 8: 0 - 5.5 - 37.5(always use intersection at left side of ruler)@ 76% on 9/5/19 (right side using EMG on left hand)  MT 9: C2 (R side using EMG on L hand) 78% on 9/12/2019  MT 10: C2 (R side using EMG on L hand) 76% on 9/26/2019  MT 11: C2 (R side using EMG on L hand) 82 on 1/31/2020  MT 12: C2 (R side using EMG on L Hand) 86% on 12/22/2020   MT 13: C2 (R side) 100% on 04/21/22  MT 14: C2 (R side) 86% on 6/7/22  MT 15: 0 - 6 - 16.5 @ 68% on 6/14/2022  MT 16: C2 (R side) 89% on 6/16/22  MT 17: F3 @ 91 on 4/30/2024    LDLPFC:  Frequency: 50 Hz  Number of pulses:  3                        Number of bursts: 10  Burst frequency: 5 Hz  Cycle time: 10 sec  Total pulses delivered: 1800  Tx Loc: F3  Energy: 80% (90% MT)   Number of Cycles: 60 trains     RDLPFC:  Frequency: 50 Hz  Number of pulses:  3                        Number of bursts: 200  Burst frequency: 5 Hz  Cycle time: 190.0sec  Total pulses delivered: 1800  Tx Loc: F4 (right side)  Energy: 80% (90% MT)   Number of Cycles: 3 trains    Rating Scales:  Date MADRS QIDS PHQ-9   4/30/2024 30 15 15   5/3/24  17 15                                     Plan   -Continue TMS treatments  - Increase energy as tolerated     This service provided today was under the supervising provider of the day, Dom Shirley MD, who was available if needed.     Neli Smith, TMS Technician  [facility] Neuromodulation

## 2024-05-06 ENCOUNTER — ALLIED HEALTH/NURSE VISIT (OUTPATIENT)
Dept: PSYCHIATRY | Facility: CLINIC | Age: 54
End: 2024-05-06
Payer: COMMERCIAL

## 2024-05-06 ENCOUNTER — OFFICE VISIT (OUTPATIENT)
Dept: PSYCHIATRY | Facility: CLINIC | Age: 54
End: 2024-05-06
Payer: COMMERCIAL

## 2024-05-06 VITALS — DIASTOLIC BLOOD PRESSURE: 82 MMHG | SYSTOLIC BLOOD PRESSURE: 113 MMHG | HEART RATE: 71 BPM

## 2024-05-06 DIAGNOSIS — F33.2 SEVERE EPISODE OF RECURRENT MAJOR DEPRESSIVE DISORDER, WITHOUT PSYCHOTIC FEATURES (H): Primary | ICD-10-CM

## 2024-05-06 ASSESSMENT — PATIENT HEALTH QUESTIONNAIRE - PHQ9: SUM OF ALL RESPONSES TO PHQ QUESTIONS 1-9: 16

## 2024-05-06 NOTE — PROGRESS NOTES
Aure Joy comes into clinic today at the request of Dr. Zamudio Ordering Provider for Med Injection only ketamine .    Procedure Prep:  Medication double check completed by: Perla Robbins RN  Prior to administration pt identified by name and : Yes    Nursing Assessment:  Appearance: Casual   Mood: Fine  Affect: WNL  Eye contact: Good      2024     9:21 AM   PHQ   PHQ-9 Total Score 16   Q9: Thoughts of better off dead/self-harm past 2 weeks Not at all     QIDDSR 16 weekly assessment: score 15. Assessment was scanned to EHR.     Procedure Performed:  VSS and mental status WNL. Patient was given ketamine. See MAR for details.     Post Procedure Assessment:  Patient tolerated the treatment with the following side effects: dissociation. Vital signs were monitored, see VS Flowsheet. Patient stated they felt ready to go home prior to discharge. AVS was offered to patient and patient declined. Patient was instructed not to drive for the remainder of the day and to notify clinic if any concerns arise.     Next appt: Monday    Medications were supplied by Clinic       This service provided today was under the supervising provider of the day ESTELA Valdivia MD, who was available if needed.      Joan Donnelly RN

## 2024-05-06 NOTE — PROGRESS NOTES
[facility]  [address], [address]  [place] [address]  TMS Procedure Note   Aure Joy MRN# [id number]  Age: 53 year old   YOB: 1970    Aure Joy comes into clinic today at the request ofESTELA MD, ordering provider for TMS treatments.     Pre-Procedure:  History and Physical: Reviewed in medical record  Consent signed by: Aure Joy on: 12/22/2020    Clinical Narrative:  Patient tolerated treatment. Had nice weekend.    Indications for TMS:  MDD, recurrent, severe; 4+ medication trials (from 2+ classes) ineffective; Psychotherapy ineffective.     Pre-Procedure Diagnosis:  MDD, recurrent, severe F33.2    Treatment Hx:  Treatment number this series: 5  Total lifetime treatment number: 207    Allergies   Allergen Reactions    Codeine Nausea    Lithium GI Disturbance     Cramping, nausea, diarrhea    Other  [No Clinical Screening - See Comments] Nausea and Vomiting and Other (See Comments)     general anesthesia- uncontrolled vomitting      There were no vitals taken for this visit.    Pause for the Cause:  Right patient:  Yes  Right procedure/correct coil:  Yes; rTMS; 55240; F8 coil.   Earplugs in place:  Yes    Procedure:  Patient was seated in procedure chair. Identity and procedure was verified. Ear plugs were placed in ears and patient-specific cap was placed on head and tightened appropriately. Ruler locations were verified. Coil was placed at treatment location and stimulator was set to parameters described below. A test train was delivered and pt did not tolerate train so treatment was denied today.    Motor Threshold Determination  Distance from nasion to inion: 35  Distance along circumference from midline: 6.67cm  Distance from Vertex: 9.56cm  Cap to Nasion: 4cm  MT 1: 0 - 6 - 16 @ 69% on 1/29/2018  MT 2: 0 - 6 - 16 @ 69% on 2/6/2018   MT 3: 0 - 6 - 16 @ 69% on 2/13/2018   MT 4: 0 - 6 - 16 @ 69% on 2/23/2018   MT 5: 0 - 6 - 16 @ 69% on 3/2/2018    MT 6: 0 - 5.5 - 15.5(always use intersection at left side of ruler)@ 85% on 8/23/19  MT 7: 0 - 5.5 - 15.5(always use intersection at left side of ruler)@ 80% on 8/29/19  MT 8: 0 - 5.5 - 37.5(always use intersection at left side of ruler)@ 76% on 9/5/19 (right side using EMG on left hand)  MT 9: C2 (R side using EMG on L hand) 78% on 9/12/2019  MT 10: C2 (R side using EMG on L hand) 76% on 9/26/2019  MT 11: C2 (R side using EMG on L hand) 82 on 1/31/2020  MT 12: C2 (R side using EMG on L Hand) 86% on 12/22/2020   MT 13: C2 (R side) 100% on 04/21/22  MT 14: C2 (R side) 86% on 6/7/22  MT 15: 0 - 6 - 16.5 @ 68% on 6/14/2022  MT 16: C2 (R side) 89% on 6/16/22  MT 17: F3 @ 91 on 4/30/2024    LDLPFC:  Frequency: 50 Hz  Number of pulses:  3                        Number of bursts: 10  Burst frequency: 5 Hz  Cycle time: 10 sec  Total pulses delivered: 1800  Tx Loc: F3  Energy: 80% (90% MT)   Number of Cycles: 60 trains     RDLPFC:  Frequency: 50 Hz  Number of pulses:  3                        Number of bursts: 200  Burst frequency: 5 Hz  Cycle time: 190.0sec  Total pulses delivered: 1800  Tx Loc: F4 (right side)  Energy: 80% (90% MT)   Number of Cycles: 3 trains    Rating Scales:  Date MADRS QIDS PHQ-9   4/30/2024 30 15 15   5/3/24  17 15                                     Plan   -Continue TMS treatments  - Increase energy as tolerated     This service provided today was under the supervising provider of the day, Cash Valdivia MD, who was available if needed.     Neli Smith, TMS Technician  [facility] Neuromodulation

## 2024-05-07 ENCOUNTER — OFFICE VISIT (OUTPATIENT)
Dept: PSYCHIATRY | Facility: CLINIC | Age: 54
End: 2024-05-07
Payer: COMMERCIAL

## 2024-05-07 DIAGNOSIS — F33.2 SEVERE EPISODE OF RECURRENT MAJOR DEPRESSIVE DISORDER, WITHOUT PSYCHOTIC FEATURES (H): Primary | ICD-10-CM

## 2024-05-07 ASSESSMENT — PATIENT HEALTH QUESTIONNAIRE - PHQ9: SUM OF ALL RESPONSES TO PHQ QUESTIONS 1-9: 13

## 2024-05-07 NOTE — PROGRESS NOTES
[facility]  [address], [address]  [place] [address]  TMS Procedure Note   Aure Joy MRN# [id number]  Age: 53 year old   YOB: 1970    Aure Joy comes into clinic today at the request ofESTELA MD, ordering provider for TMS treatments.     Pre-Procedure:  History and Physical: Reviewed in medical record  Consent signed by: Aure Joy on: 12/22/2020    Clinical Narrative:  Patient tolerated treatment. No changes reported.    Indications for TMS:  MDD, recurrent, severe; 4+ medication trials (from 2+ classes) ineffective; Psychotherapy ineffective.     Pre-Procedure Diagnosis:  MDD, recurrent, severe F33.2    Treatment Hx:  Treatment number this series: 6  Total lifetime treatment number: 208    Allergies   Allergen Reactions    Codeine Nausea    Lithium GI Disturbance     Cramping, nausea, diarrhea    Other  [No Clinical Screening - See Comments] Nausea and Vomiting and Other (See Comments)     general anesthesia- uncontrolled vomitting      There were no vitals taken for this visit.    Pause for the Cause:  Right patient:  Yes  Right procedure/correct coil:  Yes; rTMS; 38771; F8 coil.   Earplugs in place:  Yes    Procedure:  Patient was seated in procedure chair. Identity and procedure was verified. Ear plugs were placed in ears and patient-specific cap was placed on head and tightened appropriately. Ruler locations were verified. Coil was placed at treatment location and stimulator was set to parameters described below. A test train was delivered and pt did not tolerate train so treatment was denied today.    Motor Threshold Determination  Distance from nasion to inion: 35  Distance along circumference from midline: 6.67cm  Distance from Vertex: 9.56cm  Cap to Nasion: 4cm  MT 1: 0 - 6 - 16 @ 69% on 1/29/2018  MT 2: 0 - 6 - 16 @ 69% on 2/6/2018   MT 3: 0 - 6 - 16 @ 69% on 2/13/2018   MT 4: 0 - 6 - 16 @ 69% on 2/23/2018   MT 5: 0 - 6 - 16 @ 69% on 3/2/2018    MT 6: 0 - 5.5 - 15.5(always use intersection at left side of ruler)@ 85% on 8/23/19  MT 7: 0 - 5.5 - 15.5(always use intersection at left side of ruler)@ 80% on 8/29/19  MT 8: 0 - 5.5 - 37.5(always use intersection at left side of ruler)@ 76% on 9/5/19 (right side using EMG on left hand)  MT 9: C2 (R side using EMG on L hand) 78% on 9/12/2019  MT 10: C2 (R side using EMG on L hand) 76% on 9/26/2019  MT 11: C2 (R side using EMG on L hand) 82 on 1/31/2020  MT 12: C2 (R side using EMG on L Hand) 86% on 12/22/2020   MT 13: C2 (R side) 100% on 04/21/22  MT 14: C2 (R side) 86% on 6/7/22  MT 15: 0 - 6 - 16.5 @ 68% on 6/14/2022  MT 16: C2 (R side) 89% on 6/16/22  MT 17: F3 @ 91 on 4/30/2024    LDLPFC:  Frequency: 50 Hz  Number of pulses:  3                        Number of bursts: 10  Burst frequency: 5 Hz  Cycle time: 10 sec  Total pulses delivered: 1800  Tx Loc: F3  Energy: 80% (90% MT)   Number of Cycles: 60 trains     RDLPFC:  Frequency: 50 Hz  Number of pulses:  3                        Number of bursts: 200  Burst frequency: 5 Hz  Cycle time: 190.0sec  Total pulses delivered: 1800  Tx Loc: F4 (right side)  Energy: 80% (90% MT)   Number of Cycles: 3 trains    Rating Scales:  Date MADRS QIDS PHQ-9   4/30/2024 30 15 15   5/3/24  17 15                                     Plan   - Continue TMS treatments  - Increase energy as tolerated     This service provided today was under the supervising provider of the day, Tim Williamson MD, who was available if needed.     Imelda Davis, TMS Technician  [facility] Neuromodulation

## 2024-05-08 ENCOUNTER — OFFICE VISIT (OUTPATIENT)
Dept: PSYCHIATRY | Facility: CLINIC | Age: 54
End: 2024-05-08
Payer: COMMERCIAL

## 2024-05-08 DIAGNOSIS — F33.2 SEVERE EPISODE OF RECURRENT MAJOR DEPRESSIVE DISORDER, WITHOUT PSYCHOTIC FEATURES (H): Primary | ICD-10-CM

## 2024-05-08 ASSESSMENT — PATIENT HEALTH QUESTIONNAIRE - PHQ9: SUM OF ALL RESPONSES TO PHQ QUESTIONS 1-9: 14

## 2024-05-08 NOTE — PROGRESS NOTES
[facility] TMS Program  [address], [address]  [place] [address]  TMS Procedure Note   Aure Joy MRN# [id number]  Age: 53 year old   YOB: 1970    Aure Joy comes into clinic today at the request ofESTELA MD, ordering provider for TMS treatments.     Pre-Procedure:  History and Physical: Reviewed in medical record  Consent signed by: Aure Joy on: 12/22/2020    Clinical Narrative:  Patient tolerated treatment. Talked about upcoming birthday plans.    Indications for TMS:  MDD, recurrent, severe; 4+ medication trials (from 2+ classes) ineffective; Psychotherapy ineffective.     Pre-Procedure Diagnosis:  MDD, recurrent, severe F33.2    Treatment Hx:  Treatment number this series: 7  Total lifetime treatment number: 209    Allergies   Allergen Reactions    Codeine Nausea    Lithium GI Disturbance     Cramping, nausea, diarrhea    Other  [No Clinical Screening - See Comments] Nausea and Vomiting and Other (See Comments)     general anesthesia- uncontrolled vomitting      There were no vitals taken for this visit.    Pause for the Cause:  Right patient:  Yes  Right procedure/correct coil:  Yes; rTMS; 46139; F8 coil.   Earplugs in place:  Yes    Procedure:  Patient was seated in procedure chair. Identity and procedure was verified. Ear plugs were placed in ears and patient-specific cap was placed on head and tightened appropriately. Ruler locations were verified. Coil was placed at treatment location and stimulator was set to parameters described below. A test train was delivered and pt did not tolerate train so treatment was denied today.    Motor Threshold Determination  Distance from nasion to inion: 35  Distance along circumference from midline: 6.67cm  Distance from Vertex: 9.56cm  Cap to Nasion: 4cm  MT 1: 0 - 6 - 16 @ 69% on 1/29/2018  MT 2: 0 - 6 - 16 @ 69% on 2/6/2018   MT 3: 0 - 6 - 16 @ 69% on 2/13/2018   MT 4: 0 - 6 - 16 @ 69% on 2/23/2018   MT 5: 0 - 6 - 16 @  69% on 3/2/2018   MT 6: 0 - 5.5 - 15.5(always use intersection at left side of ruler)@ 85% on 8/23/19  MT 7: 0 - 5.5 - 15.5(always use intersection at left side of ruler)@ 80% on 8/29/19  MT 8: 0 - 5.5 - 37.5(always use intersection at left side of ruler)@ 76% on 9/5/19 (right side using EMG on left hand)  MT 9: C2 (R side using EMG on L hand) 78% on 9/12/2019  MT 10: C2 (R side using EMG on L hand) 76% on 9/26/2019  MT 11: C2 (R side using EMG on L hand) 82 on 1/31/2020  MT 12: C2 (R side using EMG on L Hand) 86% on 12/22/2020   MT 13: C2 (R side) 100% on 04/21/22  MT 14: C2 (R side) 86% on 6/7/22  MT 15: 0 - 6 - 16.5 @ 68% on 6/14/2022  MT 16: C2 (R side) 89% on 6/16/22  MT 17: F3 @ 91 on 4/30/2024    LDLPFC:  Frequency: 50 Hz  Number of pulses:  3                        Number of bursts: 10  Burst frequency: 5 Hz  Cycle time: 10 sec  Total pulses delivered: 1800  Tx Loc: F3  Energy: 86% (95% MT)   Number of Cycles: 60 trains     RDLPFC:  Frequency: 50 Hz  Number of pulses:  3                        Number of bursts: 200  Burst frequency: 5 Hz  Cycle time: 190.0sec  Total pulses delivered: 1800  Tx Loc: F4 (right side)  Energy: 86% (95% MT)   Number of Cycles: 3 trains    Rating Scales:  Date MADRS QIDS PHQ-9   4/30/2024 30 15 15   5/3/24  17 15                                     Plan   - Continue TMS treatments  - Increase energy as tolerated     This service provided today was under the supervising provider of the day, Dom Shirley MD, who was available if needed.     Imelda Davis, TMS Technician  [facility] Neuromodulation

## 2024-05-09 ENCOUNTER — OFFICE VISIT (OUTPATIENT)
Dept: PSYCHIATRY | Facility: CLINIC | Age: 54
End: 2024-05-09
Payer: COMMERCIAL

## 2024-05-09 DIAGNOSIS — F33.2 SEVERE EPISODE OF RECURRENT MAJOR DEPRESSIVE DISORDER, WITHOUT PSYCHOTIC FEATURES (H): Primary | ICD-10-CM

## 2024-05-09 ASSESSMENT — PATIENT HEALTH QUESTIONNAIRE - PHQ9: SUM OF ALL RESPONSES TO PHQ QUESTIONS 1-9: 12

## 2024-05-09 NOTE — PROGRESS NOTES
[facility] TMS Program  [address], [address]  [place] [address]  TMS Procedure Note   Aure Joy MRN# [id number]  Age: 53 year old   YOB: 1970    Aure oJy comes into clinic today at the request ofESTELA MD, ordering provider for TMS treatments.     Pre-Procedure:  History and Physical: Reviewed in medical record  Consent signed by: Aure Joy on: 12/22/2020    Clinical Narrative:  Patient tolerated treatment.     Indications for TMS:  MDD, recurrent, severe; 4+ medication trials (from 2+ classes) ineffective; Psychotherapy ineffective.     Pre-Procedure Diagnosis:  MDD, recurrent, severe F33.2    Treatment Hx:  Treatment number this series: 8  Total lifetime treatment number: 210    Allergies   Allergen Reactions    Codeine Nausea    Lithium GI Disturbance     Cramping, nausea, diarrhea    Other  [No Clinical Screening - See Comments] Nausea and Vomiting and Other (See Comments)     general anesthesia- uncontrolled vomitting      There were no vitals taken for this visit.    Pause for the Cause:  Right patient:  Yes  Right procedure/correct coil:  Yes; rTMS; 53662; F8 coil.   Earplugs in place:  Yes    Procedure:  Patient was seated in procedure chair. Identity and procedure was verified. Ear plugs were placed in ears and patient-specific cap was placed on head and tightened appropriately. Ruler locations were verified. Coil was placed at treatment location and stimulator was set to parameters described below. A test train was delivered and pt did not tolerate train so treatment was denied today.    Motor Threshold Determination  Distance from nasion to inion: 35  Distance along circumference from midline: 6.67cm  Distance from Vertex: 9.56cm  Cap to Nasion: 4cm  MT 1: 0 - 6 - 16 @ 69% on 1/29/2018  MT 2: 0 - 6 - 16 @ 69% on 2/6/2018   MT 3: 0 - 6 - 16 @ 69% on 2/13/2018   MT 4: 0 - 6 - 16 @ 69% on 2/23/2018   MT 5: 0 - 6 - 16 @ 69% on 3/2/2018   MT 6: 0 - 5.5 -  15.5(always use intersection at left side of ruler)@ 85% on 8/23/19  MT 7: 0 - 5.5 - 15.5(always use intersection at left side of ruler)@ 80% on 8/29/19  MT 8: 0 - 5.5 - 37.5(always use intersection at left side of ruler)@ 76% on 9/5/19 (right side using EMG on left hand)  MT 9: C2 (R side using EMG on L hand) 78% on 9/12/2019  MT 10: C2 (R side using EMG on L hand) 76% on 9/26/2019  MT 11: C2 (R side using EMG on L hand) 82 on 1/31/2020  MT 12: C2 (R side using EMG on L Hand) 86% on 12/22/2020   MT 13: C2 (R side) 100% on 04/21/22  MT 14: C2 (R side) 86% on 6/7/22  MT 15: 0 - 6 - 16.5 @ 68% on 6/14/2022  MT 16: C2 (R side) 89% on 6/16/22  MT 17: F3 @ 91 on 4/30/2024    LDLPFC:  Frequency: 50 Hz  Number of pulses:  3                        Number of bursts: 10  Burst frequency: 5 Hz  Cycle time: 10 sec  Total pulses delivered: 1800  Tx Loc: F3  Energy: 86% (95% MT)   Number of Cycles: 60 trains     RDLPFC:  Frequency: 50 Hz  Number of pulses:  3                        Number of bursts: 200  Burst frequency: 5 Hz  Cycle time: 190.0sec  Total pulses delivered: 1800  Tx Loc: F4 (right side)  Energy: 86% (95% MT)   Number of Cycles: 3 trains    Rating Scales:  Date MADRS QIDS PHQ-9   4/30/2024 30 15 15   5/3/24  17 15                                     Plan   - Continue TMS treatments  - Increase energy as tolerated     This service provided today was under the supervising provider of the day, Dom Shirley MD, who was available if needed.     Neli Smith, TMS Technician  [facility] Neuromodulation

## 2024-05-10 ENCOUNTER — OFFICE VISIT (OUTPATIENT)
Dept: PSYCHIATRY | Facility: CLINIC | Age: 54
End: 2024-05-10
Payer: COMMERCIAL

## 2024-05-10 DIAGNOSIS — F33.2 SEVERE EPISODE OF RECURRENT MAJOR DEPRESSIVE DISORDER, WITHOUT PSYCHOTIC FEATURES (H): Primary | ICD-10-CM

## 2024-05-10 ASSESSMENT — PATIENT HEALTH QUESTIONNAIRE - PHQ9
SUM OF ALL RESPONSES TO PHQ QUESTIONS 1-9: 13
SUM OF ALL RESPONSES TO PHQ QUESTIONS 1-9: 13
10. IF YOU CHECKED OFF ANY PROBLEMS, HOW DIFFICULT HAVE THESE PROBLEMS MADE IT FOR YOU TO DO YOUR WORK, TAKE CARE OF THINGS AT HOME, OR GET ALONG WITH OTHER PEOPLE: VERY DIFFICULT

## 2024-05-10 NOTE — PROGRESS NOTES
[facility]  [address], [address]  [place] [address]  TMS Procedure Note   Aure Joy MRN# [id number]  Age: 53 year old   YOB: 1970    Aure Joy comes into clinic today at the request ofESTELA MD, ordering provider for TMS treatments.     Pre-Procedure:  History and Physical: Reviewed in medical record  Consent signed by: Aure Joy on: 12/22/2020    Clinical Narrative:  Patient tolerated treatment. Working all weekend.    Indications for TMS:  MDD, recurrent, severe; 4+ medication trials (from 2+ classes) ineffective; Psychotherapy ineffective.     Pre-Procedure Diagnosis:  MDD, recurrent, severe F33.2    Treatment Hx:  Treatment number this series: 9  Total lifetime treatment number: 211    Allergies   Allergen Reactions    Codeine Nausea    Lithium GI Disturbance     Cramping, nausea, diarrhea    Other  [No Clinical Screening - See Comments] Nausea and Vomiting and Other (See Comments)     general anesthesia- uncontrolled vomitting      There were no vitals taken for this visit.    Pause for the Cause:  Right patient:  Yes  Right procedure/correct coil:  Yes; rTMS; 80508; F8 coil.   Earplugs in place:  Yes    Procedure:  Patient was seated in procedure chair. Identity and procedure was verified. Ear plugs were placed in ears and patient-specific cap was placed on head and tightened appropriately. Ruler locations were verified. Coil was placed at treatment location and stimulator was set to parameters described below. A test train was delivered and pt did not tolerate train so treatment was denied today.    Motor Threshold Determination  Distance from nasion to inion: 35  Distance along circumference from midline: 6.67cm  Distance from Vertex: 9.56cm  Cap to Nasion: 4cm  MT 1: 0 - 6 - 16 @ 69% on 1/29/2018  MT 2: 0 - 6 - 16 @ 69% on 2/6/2018   MT 3: 0 - 6 - 16 @ 69% on 2/13/2018   MT 4: 0 - 6 - 16 @ 69% on 2/23/2018   MT 5: 0 - 6 - 16 @ 69% on 3/2/2018    MT 6: 0 - 5.5 - 15.5(always use intersection at left side of ruler)@ 85% on 8/23/19  MT 7: 0 - 5.5 - 15.5(always use intersection at left side of ruler)@ 80% on 8/29/19  MT 8: 0 - 5.5 - 37.5(always use intersection at left side of ruler)@ 76% on 9/5/19 (right side using EMG on left hand)  MT 9: C2 (R side using EMG on L hand) 78% on 9/12/2019  MT 10: C2 (R side using EMG on L hand) 76% on 9/26/2019  MT 11: C2 (R side using EMG on L hand) 82 on 1/31/2020  MT 12: C2 (R side using EMG on L Hand) 86% on 12/22/2020   MT 13: C2 (R side) 100% on 04/21/22  MT 14: C2 (R side) 86% on 6/7/22  MT 15: 0 - 6 - 16.5 @ 68% on 6/14/2022  MT 16: C2 (R side) 89% on 6/16/22  MT 17: F3 @ 91 on 4/30/2024    LDLPFC:  Frequency: 50 Hz  Number of pulses:  3                        Number of bursts: 10  Burst frequency: 5 Hz  Cycle time: 10 sec  Total pulses delivered: 1800  Tx Loc: F3  Energy: 86% (95% MT)   Number of Cycles: 60 trains     RDLPFC:  Frequency: 50 Hz  Number of pulses:  3                        Number of bursts: 200  Burst frequency: 5 Hz  Cycle time: 190.0sec  Total pulses delivered: 1800  Tx Loc: F4 (right side)  Energy: 86% (95% MT)   Number of Cycles: 3 trains    Rating Scales:  Date MADRS QIDS PHQ-9   4/30/2024 30 15 15   5/3/24  17 15   5/10/24  16 13                               Plan   - Continue TMS treatments  - Increase energy as tolerated     This service provided today was under the supervising provider of the day, Elizabeth Gordon MD, who was available if needed.     Imelda Davis, TMS Technician  [facility] Neuromodulation

## 2024-05-13 ENCOUNTER — OFFICE VISIT (OUTPATIENT)
Dept: PSYCHIATRY | Facility: CLINIC | Age: 54
End: 2024-05-13
Payer: COMMERCIAL

## 2024-05-13 ENCOUNTER — ALLIED HEALTH/NURSE VISIT (OUTPATIENT)
Dept: PSYCHIATRY | Facility: CLINIC | Age: 54
End: 2024-05-13
Payer: COMMERCIAL

## 2024-05-13 VITALS — DIASTOLIC BLOOD PRESSURE: 70 MMHG | SYSTOLIC BLOOD PRESSURE: 111 MMHG | HEART RATE: 81 BPM

## 2024-05-13 DIAGNOSIS — F33.2 SEVERE EPISODE OF RECURRENT MAJOR DEPRESSIVE DISORDER, WITHOUT PSYCHOTIC FEATURES (H): Primary | ICD-10-CM

## 2024-05-13 ASSESSMENT — PATIENT HEALTH QUESTIONNAIRE - PHQ9
SUM OF ALL RESPONSES TO PHQ QUESTIONS 1-9: 16
SUM OF ALL RESPONSES TO PHQ QUESTIONS 1-9: 16
10. IF YOU CHECKED OFF ANY PROBLEMS, HOW DIFFICULT HAVE THESE PROBLEMS MADE IT FOR YOU TO DO YOUR WORK, TAKE CARE OF THINGS AT HOME, OR GET ALONG WITH OTHER PEOPLE: EXTREMELY DIFFICULT
10. IF YOU CHECKED OFF ANY PROBLEMS, HOW DIFFICULT HAVE THESE PROBLEMS MADE IT FOR YOU TO DO YOUR WORK, TAKE CARE OF THINGS AT HOME, OR GET ALONG WITH OTHER PEOPLE: EXTREMELY DIFFICULT
SUM OF ALL RESPONSES TO PHQ QUESTIONS 1-9: 16
10. IF YOU CHECKED OFF ANY PROBLEMS, HOW DIFFICULT HAVE THESE PROBLEMS MADE IT FOR YOU TO DO YOUR WORK, TAKE CARE OF THINGS AT HOME, OR GET ALONG WITH OTHER PEOPLE: EXTREMELY DIFFICULT
SUM OF ALL RESPONSES TO PHQ QUESTIONS 1-9: 16

## 2024-05-13 NOTE — PROGRESS NOTES
[facility]  [address], [address]  [place] [address]  TMS Procedure Note   Aure Joy MRN# [id number]  Age: 53 year old   YOB: 1970    Aure Joy comes into clinic today at the request ofESTELA MD, ordering provider for TMS treatments.     Pre-Procedure:  History and Physical: Reviewed in medical record  Consent signed by: Aure Joy on: 12/22/2020    Clinical Narrative:  Patient tolerated treatment. Had very busy weekend at work.    Indications for TMS:  MDD, recurrent, severe; 4+ medication trials (from 2+ classes) ineffective; Psychotherapy ineffective.     Pre-Procedure Diagnosis:  MDD, recurrent, severe F33.2    Treatment Hx:  Treatment number this series: 10  Total lifetime treatment number: 212    Allergies   Allergen Reactions    Codeine Nausea    Lithium GI Disturbance     Cramping, nausea, diarrhea    Other  [No Clinical Screening - See Comments] Nausea and Vomiting and Other (See Comments)     general anesthesia- uncontrolled vomitting      There were no vitals taken for this visit.    Pause for the Cause:  Right patient:  Yes  Right procedure/correct coil:  Yes; rTMS; 20825; F8 coil.   Earplugs in place:  Yes    Procedure:  Patient was seated in procedure chair. Identity and procedure was verified. Ear plugs were placed in ears and patient-specific cap was placed on head and tightened appropriately. Ruler locations were verified. Coil was placed at treatment location and stimulator was set to parameters described below. A test train was delivered and pt did not tolerate train so treatment was denied today.    Motor Threshold Determination  Distance from nasion to inion: 35  Distance along circumference from midline: 6.67cm  Distance from Vertex: 9.56cm  Cap to Nasion: 4cm  MT 1: 0 - 6 - 16 @ 69% on 1/29/2018  MT 2: 0 - 6 - 16 @ 69% on 2/6/2018   MT 3: 0 - 6 - 16 @ 69% on 2/13/2018   MT 4: 0 - 6 - 16 @ 69% on 2/23/2018   MT 5: 0 - 6 - 16 @ 69%  on 3/2/2018   MT 6: 0 - 5.5 - 15.5(always use intersection at left side of ruler)@ 85% on 8/23/19  MT 7: 0 - 5.5 - 15.5(always use intersection at left side of ruler)@ 80% on 8/29/19  MT 8: 0 - 5.5 - 37.5(always use intersection at left side of ruler)@ 76% on 9/5/19 (right side using EMG on left hand)  MT 9: C2 (R side using EMG on L hand) 78% on 9/12/2019  MT 10: C2 (R side using EMG on L hand) 76% on 9/26/2019  MT 11: C2 (R side using EMG on L hand) 82 on 1/31/2020  MT 12: C2 (R side using EMG on L Hand) 86% on 12/22/2020   MT 13: C2 (R side) 100% on 04/21/22  MT 14: C2 (R side) 86% on 6/7/22  MT 15: 0 - 6 - 16.5 @ 68% on 6/14/2022  MT 16: C2 (R side) 89% on 6/16/22  MT 17: F3 @ 91 on 4/30/2024    LDLPFC:  Frequency: 50 Hz  Number of pulses:  3                        Number of bursts: 10  Burst frequency: 5 Hz  Cycle time: 10 sec  Total pulses delivered: 1800  Tx Loc: F3  Energy: 86% (95% MT)   Number of Cycles: 60 trains     RDLPFC:  Frequency: 50 Hz  Number of pulses:  3                        Number of bursts: 200  Burst frequency: 5 Hz  Cycle time: 190.0sec  Total pulses delivered: 1800  Tx Loc: F4 (right side)  Energy: 86% (95% MT)   Number of Cycles: 3 trains    Rating Scales:  Date MADRS QIDS PHQ-9   4/30/2024 30 15 15   5/3/24  17 15   5/10/24  16 13                               Plan   - Continue TMS treatments  - Increase energy as tolerated     This service provided today was under the supervising provider of the day, Elizabeth Gordon MD, who was available if needed.     Neli Smith, TMS Technician  [facility] Neuromodulation

## 2024-05-13 NOTE — PROGRESS NOTES
Aure Joy comes into clinic today at the request of Dr. Zamudio Ordering Provider for Med Injection only ketamine .    Procedure Prep:  Medication double check completed by:Teodora Donnelly RN  Prior to administration pt identified by name and : Yes    Nursing Assessment:  Appearance: Casual   Mood: Fine  Affect: WNL  Eye contact: Good      2024     8:29 AM   PHQ   PHQ-9 Total Score 16    16    16   Q9: Thoughts of better off dead/self-harm past 2 weeks Several days   F/U: Thoughts of suicide or self-harm No   F/U: Safety concerns No     QIDDSR 16 weekly assessment: score 16 Assessment was scanned to EHR.     Procedure Performed:  VSS and mental status WNL. Patient was given ketamine. See MAR for details.     Post Procedure Assessment:  Patient tolerated the treatment with the following side effects: dissociation. Vital signs were monitored, see VS Flowsheet. Patient stated they felt ready to go home prior to discharge. AVS was offered to patient and patient declined. Patient was instructed not to drive for the remainder of the day and to notify clinic if any concerns arise.     Next appt: Wednesday    Medications were supplied by Clinic       This service provided today was under the supervising provider of the day Dr. Elizabeth Gordon, who was available if needed.        Perla Robbins RN        Answers submitted by the patient for this visit:  Patient Health Questionnaire (Submitted on 2024)  If you checked off any problems, how difficult have these problems made it for you to do your work, take care of things at home, or get along with other people?: Extremely difficult  PHQ9 TOTAL SCORE: 16

## 2024-05-14 ENCOUNTER — OFFICE VISIT (OUTPATIENT)
Dept: PSYCHIATRY | Facility: CLINIC | Age: 54
End: 2024-05-14
Payer: COMMERCIAL

## 2024-05-14 DIAGNOSIS — F33.2 SEVERE EPISODE OF RECURRENT MAJOR DEPRESSIVE DISORDER, WITHOUT PSYCHOTIC FEATURES (H): Primary | ICD-10-CM

## 2024-05-14 NOTE — PROGRESS NOTES
[facility] TMS Program  [address], [address]  [place] [address]  TMS Procedure Note   Aure Joy MRN# [id number]  Age: 53 year old   YOB: 1970    Aure Joy comes into clinic today at the request ofESTELA MD, ordering provider for TMS treatments.     Pre-Procedure:  History and Physical: Reviewed in medical record  Consent signed by: Aure Joy on: 12/22/2020    Clinical Narrative:  Patient tolerated treatment. No changes reported.    Indications for TMS:  MDD, recurrent, severe; 4+ medication trials (from 2+ classes) ineffective; Psychotherapy ineffective.     Pre-Procedure Diagnosis:  MDD, recurrent, severe F33.2    Treatment Hx:  Treatment number this series: 11  Total lifetime treatment number: 213    Allergies   Allergen Reactions    Codeine Nausea    Lithium GI Disturbance     Cramping, nausea, diarrhea    Other  [No Clinical Screening - See Comments] Nausea and Vomiting and Other (See Comments)     general anesthesia- uncontrolled vomitting      There were no vitals taken for this visit.    Pause for the Cause:  Right patient:  Yes  Right procedure/correct coil:  Yes; rTMS; 91729; F8 coil.   Earplugs in place:  Yes    Procedure:  Patient was seated in procedure chair. Identity and procedure was verified. Ear plugs were placed in ears and patient-specific cap was placed on head and tightened appropriately. Ruler locations were verified. Coil was placed at treatment location and stimulator was set to parameters described below. A test train was delivered and pt did not tolerate train so treatment was denied today.    Motor Threshold Determination  Distance from nasion to inion: 35  Distance along circumference from midline: 6.67cm  Distance from Vertex: 9.56cm  Cap to Nasion: 4cm  MT 1: 0 - 6 - 16 @ 69% on 1/29/2018  MT 2: 0 - 6 - 16 @ 69% on 2/6/2018   MT 3: 0 - 6 - 16 @ 69% on 2/13/2018   MT 4: 0 - 6 - 16 @ 69% on 2/23/2018   MT 5: 0 - 6 - 16 @ 69% on 3/2/2018    MT 6: 0 - 5.5 - 15.5(always use intersection at left side of ruler)@ 85% on 8/23/19  MT 7: 0 - 5.5 - 15.5(always use intersection at left side of ruler)@ 80% on 8/29/19  MT 8: 0 - 5.5 - 37.5(always use intersection at left side of ruler)@ 76% on 9/5/19 (right side using EMG on left hand)  MT 9: C2 (R side using EMG on L hand) 78% on 9/12/2019  MT 10: C2 (R side using EMG on L hand) 76% on 9/26/2019  MT 11: C2 (R side using EMG on L hand) 82 on 1/31/2020  MT 12: C2 (R side using EMG on L Hand) 86% on 12/22/2020   MT 13: C2 (R side) 100% on 04/21/22  MT 14: C2 (R side) 86% on 6/7/22  MT 15: 0 - 6 - 16.5 @ 68% on 6/14/2022  MT 16: C2 (R side) 89% on 6/16/22  MT 17: F3 @ 91 on 4/30/2024    LDLPFC:  Frequency: 50 Hz  Number of pulses:  3                        Number of bursts: 10  Burst frequency: 5 Hz  Cycle time: 10 sec  Total pulses delivered: 1800  Tx Loc: F3  Energy: 86% (95% MT)   Number of Cycles: 60 trains     RDLPFC:  Frequency: 50 Hz  Number of pulses:  3                        Number of bursts: 200  Burst frequency: 5 Hz  Cycle time: 190.0sec  Total pulses delivered: 1800  Tx Loc: F4 (right side)  Energy: 86% (95% MT)   Number of Cycles: 3 trains    Rating Scales:  Date MADRS QIDS PHQ-9   4/30/2024 30 15 15   5/3/24  17 15   5/10/24  16 13                               Plan   - Continue TMS treatments  - Increase energy as tolerated     This service provided today was under the supervising provider of the day, Cash Valdivia MD, who was available if needed.     Imelda Davis, TMS Technician  [facility] Neuromodulation

## 2024-05-15 ENCOUNTER — OFFICE VISIT (OUTPATIENT)
Dept: PSYCHIATRY | Facility: CLINIC | Age: 54
End: 2024-05-15
Payer: COMMERCIAL

## 2024-05-15 DIAGNOSIS — F33.2 SEVERE EPISODE OF RECURRENT MAJOR DEPRESSIVE DISORDER, WITHOUT PSYCHOTIC FEATURES (H): Primary | ICD-10-CM

## 2024-05-15 ASSESSMENT — PATIENT HEALTH QUESTIONNAIRE - PHQ9
SUM OF ALL RESPONSES TO PHQ QUESTIONS 1-9: 14
SUM OF ALL RESPONSES TO PHQ QUESTIONS 1-9: 14
10. IF YOU CHECKED OFF ANY PROBLEMS, HOW DIFFICULT HAVE THESE PROBLEMS MADE IT FOR YOU TO DO YOUR WORK, TAKE CARE OF THINGS AT HOME, OR GET ALONG WITH OTHER PEOPLE: EXTREMELY DIFFICULT
SUM OF ALL RESPONSES TO PHQ QUESTIONS 1-9: 14
SUM OF ALL RESPONSES TO PHQ QUESTIONS 1-9: 14
10. IF YOU CHECKED OFF ANY PROBLEMS, HOW DIFFICULT HAVE THESE PROBLEMS MADE IT FOR YOU TO DO YOUR WORK, TAKE CARE OF THINGS AT HOME, OR GET ALONG WITH OTHER PEOPLE: EXTREMELY DIFFICULT

## 2024-05-15 NOTE — PROGRESS NOTES
[facility] TMS Program  [address], [address]  [place] [address]  TMS Procedure Note   Aure Joy MRN# [id number]  Age: 53 year old   YOB: 1970    Aure Joy comes into clinic today at the request ofESTELA MD, ordering provider for TMS treatments.     Pre-Procedure:  History and Physical: Reviewed in medical record  Consent signed by: Aure Joy on: 12/22/2020    Clinical Narrative:  Patient tolerated treatment. No changes reported.    Indications for TMS:  MDD, recurrent, severe; 4+ medication trials (from 2+ classes) ineffective; Psychotherapy ineffective.     Pre-Procedure Diagnosis:  MDD, recurrent, severe F33.2    Treatment Hx:  Treatment number this series: 12  Total lifetime treatment number: 214    Allergies   Allergen Reactions    Codeine Nausea    Lithium GI Disturbance     Cramping, nausea, diarrhea    Other  [No Clinical Screening - See Comments] Nausea and Vomiting and Other (See Comments)     general anesthesia- uncontrolled vomitting      There were no vitals taken for this visit.    Pause for the Cause:  Right patient:  Yes  Right procedure/correct coil:  Yes; rTMS; 16403; F8 coil.   Earplugs in place:  Yes    Procedure:  Patient was seated in procedure chair. Identity and procedure was verified. Ear plugs were placed in ears and patient-specific cap was placed on head and tightened appropriately. Ruler locations were verified. Coil was placed at treatment location and stimulator was set to parameters described below. A test train was delivered and pt did not tolerate train so treatment was denied today.    Motor Threshold Determination  Distance from nasion to inion: 35  Distance along circumference from midline: 6.67cm  Distance from Vertex: 9.56cm  Cap to Nasion: 4cm  MT 1: 0 - 6 - 16 @ 69% on 1/29/2018  MT 2: 0 - 6 - 16 @ 69% on 2/6/2018   MT 3: 0 - 6 - 16 @ 69% on 2/13/2018   MT 4: 0 - 6 - 16 @ 69% on 2/23/2018   MT 5: 0 - 6 - 16 @ 69% on 3/2/2018    MT 6: 0 - 5.5 - 15.5(always use intersection at left side of ruler)@ 85% on 8/23/19  MT 7: 0 - 5.5 - 15.5(always use intersection at left side of ruler)@ 80% on 8/29/19  MT 8: 0 - 5.5 - 37.5(always use intersection at left side of ruler)@ 76% on 9/5/19 (right side using EMG on left hand)  MT 9: C2 (R side using EMG on L hand) 78% on 9/12/2019  MT 10: C2 (R side using EMG on L hand) 76% on 9/26/2019  MT 11: C2 (R side using EMG on L hand) 82 on 1/31/2020  MT 12: C2 (R side using EMG on L Hand) 86% on 12/22/2020   MT 13: C2 (R side) 100% on 04/21/22  MT 14: C2 (R side) 86% on 6/7/22  MT 15: 0 - 6 - 16.5 @ 68% on 6/14/2022  MT 16: C2 (R side) 89% on 6/16/22  MT 17: F3 @ 91 on 4/30/2024    LDLPFC:  Frequency: 50 Hz  Number of pulses:  3                        Number of bursts: 10  Burst frequency: 5 Hz  Cycle time: 10 sec  Total pulses delivered: 1800  Tx Loc: F3  Energy: 86% (95% MT)   Number of Cycles: 60 trains     RDLPFC:  Frequency: 50 Hz  Number of pulses:  3                        Number of bursts: 200  Burst frequency: 5 Hz  Cycle time: 190.0sec  Total pulses delivered: 1800  Tx Loc: F4 (right side)  Energy: 86% (95% MT)   Number of Cycles: 3 trains    Rating Scales:  Date MADRS QIDS PHQ-9   4/30/2024 30 15 15   5/3/24  17 15   5/10/24  16 13                               Plan   - Continue TMS treatments  - Increase energy as tolerated     This service provided today was under the supervising provider of the day, Mihir Chaudhry MD, who was available if needed.     Imelda Davis, TMS Technician  [facility] Neuromodulation

## 2024-05-16 ENCOUNTER — OFFICE VISIT (OUTPATIENT)
Dept: PSYCHIATRY | Facility: CLINIC | Age: 54
End: 2024-05-16
Payer: COMMERCIAL

## 2024-05-16 DIAGNOSIS — F33.2 SEVERE EPISODE OF RECURRENT MAJOR DEPRESSIVE DISORDER, WITHOUT PSYCHOTIC FEATURES (H): Primary | ICD-10-CM

## 2024-05-16 NOTE — PROGRESS NOTES
[facility]  [address], [address]  [place] [address]  TMS Procedure Note   Aure Joy MRN# [id number]  Age: 53 year old   YOB: 1970    Aure Joy comes into clinic today at the request ofESTELA MD, ordering provider for TMS treatments.     Pre-Procedure:  History and Physical: Reviewed in medical record  Consent signed by: Aure Joy on: 12/22/2020    Clinical Narrative:  Patient tolerated treatment. Patient tolerated increase to 100% today.    Indications for TMS:  MDD, recurrent, severe; 4+ medication trials (from 2+ classes) ineffective; Psychotherapy ineffective.     Pre-Procedure Diagnosis:  MDD, recurrent, severe F33.2    Treatment Hx:  Treatment number this series: 13  Total lifetime treatment number: 215    Allergies   Allergen Reactions    Codeine Nausea    Lithium GI Disturbance     Cramping, nausea, diarrhea    Other  [No Clinical Screening - See Comments] Nausea and Vomiting and Other (See Comments)     general anesthesia- uncontrolled vomitting      There were no vitals taken for this visit.    Pause for the Cause:  Right patient:  Yes  Right procedure/correct coil:  Yes; rTMS; 56165; F8 coil.   Earplugs in place:  Yes    Procedure:  Patient was seated in procedure chair. Identity and procedure was verified. Ear plugs were placed in ears and patient-specific cap was placed on head and tightened appropriately. Ruler locations were verified. Coil was placed at treatment location and stimulator was set to parameters described below. A test train was delivered and pt did not tolerate train so treatment was denied today.    Motor Threshold Determination  Distance from nasion to inion: 35  Distance along circumference from midline: 6.67cm  Distance from Vertex: 9.56cm  Cap to Nasion: 4cm  MT 1: 0 - 6 - 16 @ 69% on 1/29/2018  MT 2: 0 - 6 - 16 @ 69% on 2/6/2018   MT 3: 0 - 6 - 16 @ 69% on 2/13/2018   MT 4: 0 - 6 - 16 @ 69% on 2/23/2018   MT 5: 0 - 6 -  16 @ 69% on 3/2/2018   MT 6: 0 - 5.5 - 15.5(always use intersection at left side of ruler)@ 85% on 8/23/19  MT 7: 0 - 5.5 - 15.5(always use intersection at left side of ruler)@ 80% on 8/29/19  MT 8: 0 - 5.5 - 37.5(always use intersection at left side of ruler)@ 76% on 9/5/19 (right side using EMG on left hand)  MT 9: C2 (R side using EMG on L hand) 78% on 9/12/2019  MT 10: C2 (R side using EMG on L hand) 76% on 9/26/2019  MT 11: C2 (R side using EMG on L hand) 82 on 1/31/2020  MT 12: C2 (R side using EMG on L Hand) 86% on 12/22/2020   MT 13: C2 (R side) 100% on 04/21/22  MT 14: C2 (R side) 86% on 6/7/22  MT 15: 0 - 6 - 16.5 @ 68% on 6/14/2022  MT 16: C2 (R side) 89% on 6/16/22  MT 17: F3 @ 91 on 4/30/2024    LDLPFC:  Frequency: 50 Hz  Number of pulses:  3                        Number of bursts: 10  Burst frequency: 5 Hz  Cycle time: 10 sec  Total pulses delivered: 1800  Tx Loc: F3  Energy: 91% (100% MT)   Number of Cycles: 60 trains     RDLPFC:  Frequency: 50 Hz  Number of pulses:  3                        Number of bursts: 200  Burst frequency: 5 Hz  Cycle time: 190.0sec  Total pulses delivered: 1800  Tx Loc: F4 (right side)  Energy: 91% (100% MT)   Number of Cycles: 3 trains    Rating Scales:  Date MADRS QIDS PHQ-9   4/30/2024 30 15 15   5/3/24  17 15   5/10/24  16 13                               Plan   - Continue TMS treatments  - Increase energy as tolerated     This service provided today was under the supervising provider of the jhon, ESTELA Zamudio MD, who was available if needed.     Neli Smith, TMS Technician  [facility] Neuromodulation

## 2024-05-17 ENCOUNTER — OFFICE VISIT (OUTPATIENT)
Dept: PSYCHIATRY | Facility: CLINIC | Age: 54
End: 2024-05-17
Payer: COMMERCIAL

## 2024-05-17 DIAGNOSIS — F33.2 SEVERE EPISODE OF RECURRENT MAJOR DEPRESSIVE DISORDER, WITHOUT PSYCHOTIC FEATURES (H): Primary | ICD-10-CM

## 2024-05-17 ASSESSMENT — PATIENT HEALTH QUESTIONNAIRE - PHQ9
10. IF YOU CHECKED OFF ANY PROBLEMS, HOW DIFFICULT HAVE THESE PROBLEMS MADE IT FOR YOU TO DO YOUR WORK, TAKE CARE OF THINGS AT HOME, OR GET ALONG WITH OTHER PEOPLE: VERY DIFFICULT
SUM OF ALL RESPONSES TO PHQ QUESTIONS 1-9: 11
SUM OF ALL RESPONSES TO PHQ QUESTIONS 1-9: 11

## 2024-05-17 NOTE — PROGRESS NOTES
[facility] TMS Program  [address], [address]  [place] [address]  TMS Procedure Note   Aure Joy MRN# [id number]  Age: 53 year old   YOB: 1970    Aure Joy comes into clinic today at the request ofESTELA MD, ordering provider for TMS treatments.     Pre-Procedure:  History and Physical: Reviewed in medical record  Consent signed by: Aure Joy on: 12/22/2020    Clinical Narrative:  Patient tolerated treatment. Her kid comes home from college tomorrow for the summer. Has big family dinner planned.    Indications for TMS:  MDD, recurrent, severe; 4+ medication trials (from 2+ classes) ineffective; Psychotherapy ineffective.     Pre-Procedure Diagnosis:  MDD, recurrent, severe F33.2    Treatment Hx:  Treatment number this series: 14  Total lifetime treatment number: 216    Allergies   Allergen Reactions    Codeine Nausea    Lithium GI Disturbance     Cramping, nausea, diarrhea    Other  [No Clinical Screening - See Comments] Nausea and Vomiting and Other (See Comments)     general anesthesia- uncontrolled vomitting      There were no vitals taken for this visit.    Pause for the Cause:  Right patient:  Yes  Right procedure/correct coil:  Yes; rTMS; 44568; F8 coil.   Earplugs in place:  Yes    Procedure:  Patient was seated in procedure chair. Identity and procedure was verified. Ear plugs were placed in ears and patient-specific cap was placed on head and tightened appropriately. Ruler locations were verified. Coil was placed at treatment location and stimulator was set to parameters described below. A test train was delivered and pt did not tolerate train so treatment was denied today.    Motor Threshold Determination  Distance from nasion to inion: 35  Distance along circumference from midline: 6.67cm  Distance from Vertex: 9.56cm  Cap to Nasion: 4cm  MT 1: 0 - 6 - 16 @ 69% on 1/29/2018  MT 2: 0 - 6 - 16 @ 69% on 2/6/2018   MT 3: 0 - 6 - 16 @ 69% on 2/13/2018   MT 4:  0 - 6 - 16 @ 69% on 2/23/2018   MT 5: 0 - 6 - 16 @ 69% on 3/2/2018   MT 6: 0 - 5.5 - 15.5(always use intersection at left side of ruler)@ 85% on 8/23/19  MT 7: 0 - 5.5 - 15.5(always use intersection at left side of ruler)@ 80% on 8/29/19  MT 8: 0 - 5.5 - 37.5(always use intersection at left side of ruler)@ 76% on 9/5/19 (right side using EMG on left hand)  MT 9: C2 (R side using EMG on L hand) 78% on 9/12/2019  MT 10: C2 (R side using EMG on L hand) 76% on 9/26/2019  MT 11: C2 (R side using EMG on L hand) 82 on 1/31/2020  MT 12: C2 (R side using EMG on L Hand) 86% on 12/22/2020   MT 13: C2 (R side) 100% on 04/21/22  MT 14: C2 (R side) 86% on 6/7/22  MT 15: 0 - 6 - 16.5 @ 68% on 6/14/2022  MT 16: C2 (R side) 89% on 6/16/22  MT 17: F3 @ 91 on 4/30/2024    LDLPFC:  Frequency: 50 Hz  Number of pulses:  3                        Number of bursts: 10  Burst frequency: 5 Hz  Cycle time: 10 sec  Total pulses delivered: 1800  Tx Loc: F3  Energy: 91% (100% MT)   Number of Cycles: 60 trains     RDLPFC:  Frequency: 50 Hz  Number of pulses:  3                        Number of bursts: 200  Burst frequency: 5 Hz  Cycle time: 190.0sec  Total pulses delivered: 1800  Tx Loc: F4 (right side)  Energy: 91% (100% MT)   Number of Cycles: 3 trains    Rating Scales:  Date MADRS QIDS PHQ-9   4/30/2024 30 15 15   5/3/24  17 15   5/10/24  16 13   5/17/24  15 11                         Plan   - Continue TMS treatments  - Increase energy as tolerated     This service provided today was under the supervising provider of the day, Elizabeth Gordon MD, who was available if needed.     Neli Smith, TMS Technician  [facility] Neuromodulation

## 2024-05-20 ENCOUNTER — ALLIED HEALTH/NURSE VISIT (OUTPATIENT)
Dept: PSYCHIATRY | Facility: CLINIC | Age: 54
End: 2024-05-20
Payer: COMMERCIAL

## 2024-05-20 ENCOUNTER — OFFICE VISIT (OUTPATIENT)
Dept: PSYCHIATRY | Facility: CLINIC | Age: 54
End: 2024-05-20
Payer: COMMERCIAL

## 2024-05-20 VITALS — HEART RATE: 86 BPM | DIASTOLIC BLOOD PRESSURE: 69 MMHG | SYSTOLIC BLOOD PRESSURE: 107 MMHG

## 2024-05-20 DIAGNOSIS — F33.2 SEVERE EPISODE OF RECURRENT MAJOR DEPRESSIVE DISORDER, WITHOUT PSYCHOTIC FEATURES (H): Primary | ICD-10-CM

## 2024-05-20 ASSESSMENT — PATIENT HEALTH QUESTIONNAIRE - PHQ9: SUM OF ALL RESPONSES TO PHQ QUESTIONS 1-9: 17

## 2024-05-20 NOTE — PROGRESS NOTES
[facility] TMS Program  [address], [address]  [place] [address]  TMS Procedure Note   Aure Joy MRN# [id number]  Age: 53 year old   YOB: 1970    Aure Joy comes into clinic today at the request ofESTELA MD, ordering provider for TMS treatments.     Pre-Procedure:  History and Physical: Reviewed in medical record  Consent signed by: Aure Joy on: 12/22/2020    Clinical Narrative:  Patient tolerated treatment. Really enjoyed dinner with her kids over the weekend. Happy they are back home for summer.    Indications for TMS:  MDD, recurrent, severe; 4+ medication trials (from 2+ classes) ineffective; Psychotherapy ineffective.     Pre-Procedure Diagnosis:  MDD, recurrent, severe F33.2    Treatment Hx:  Treatment number this series: 15  Total lifetime treatment number: 217    Allergies   Allergen Reactions    Codeine Nausea    Lithium GI Disturbance     Cramping, nausea, diarrhea    Other  [No Clinical Screening - See Comments] Nausea and Vomiting and Other (See Comments)     general anesthesia- uncontrolled vomitting      There were no vitals taken for this visit.    Pause for the Cause:  Right patient:  Yes  Right procedure/correct coil:  Yes; rTMS; 66712; F8 coil.   Earplugs in place:  Yes    Procedure:  Patient was seated in procedure chair. Identity and procedure was verified. Ear plugs were placed in ears and patient-specific cap was placed on head and tightened appropriately. Ruler locations were verified. Coil was placed at treatment location and stimulator was set to parameters described below. A test train was delivered and patient tolerated so 60 trains were delivered. Patient tolerated treatment well.    Motor Threshold Determination  Distance from nasion to inion: 35  Distance along circumference from midline: 6.67cm  Distance from Vertex: 9.56cm  Cap to Nasion: 4cm  MT 1: 0 - 6 - 16 @ 69% on 1/29/2018  MT 2: 0 - 6 - 16 @ 69% on 2/6/2018   MT 3: 0 - 6 - 16  @ 69% on 2/13/2018   MT 4: 0 - 6 - 16 @ 69% on 2/23/2018   MT 5: 0 - 6 - 16 @ 69% on 3/2/2018   MT 6: 0 - 5.5 - 15.5(always use intersection at left side of ruler)@ 85% on 8/23/19  MT 7: 0 - 5.5 - 15.5(always use intersection at left side of ruler)@ 80% on 8/29/19  MT 8: 0 - 5.5 - 37.5(always use intersection at left side of ruler)@ 76% on 9/5/19 (right side using EMG on left hand)  MT 9: C2 (R side using EMG on L hand) 78% on 9/12/2019  MT 10: C2 (R side using EMG on L hand) 76% on 9/26/2019  MT 11: C2 (R side using EMG on L hand) 82 on 1/31/2020  MT 12: C2 (R side using EMG on L Hand) 86% on 12/22/2020   MT 13: C2 (R side) 100% on 04/21/22  MT 14: C2 (R side) 86% on 6/7/22  MT 15: 0 - 6 - 16.5 @ 68% on 6/14/2022  MT 16: C2 (R side) 89% on 6/16/22  MT 17: F3 @ 91 on 4/30/2024    LDLPFC:  Frequency: 50 Hz  Number of pulses:  3                        Number of bursts: 10  Burst frequency: 5 Hz  Cycle time: 10 sec  Total pulses delivered: 1800  Tx Loc: F3  Energy: 91% (100% MT) -max output  Number of Cycles: 60 trains     RDLPFC:  Frequency: 50 Hz  Number of pulses:  3                        Number of bursts: 200  Burst frequency: 5 Hz  Cycle time: 190.0sec  Total pulses delivered: 1800  Tx Loc: F4 (right side)  Energy: 91% (100% MT) -max output  Number of Cycles: 3 trains    Rating Scales:  Date MADRS QIDS PHQ-9   4/30/2024 30 15 15   5/3/24  17 15   5/10/24  16 13   5/17/24  15 11                         Plan   - Continue TMS treatments       This service provided today was under the supervising provider of the day, Elizabeth Gordon MD, who was available if needed.     Neli Smith, TMS Technician  [facility] Neuromodulation

## 2024-05-20 NOTE — PROGRESS NOTES
Aure Joy comes into clinic today at the request of Dr. Zamudio Ordering Provider for Med Injection only ketamine .    Procedure Prep:  Medication double check completed by:Teodora Donnelly RN  Prior to administration pt identified by name and : Yes    Nursing Assessment:  Appearance: Casual   Mood: Fine  Affect: WNL  Eye contact: Good      2024     9:50 AM   PHQ   PHQ-9 Total Score 11   Q9: Thoughts of better off dead/self-harm past 2 weeks Several days   F/U: Thoughts of suicide or self-harm No   F/U: Safety concerns No     QIDDSR 16 weekly assessment: score 14 Assessment was scanned to EHR.     Procedure Performed:  VSS and mental status WNL. Patient was given ketamine. See MAR for details.     Post Procedure Assessment:  Patient tolerated the treatment with the following side effects: dissociation. Vital signs were monitored, see VS Flowsheet. Patient stated they felt ready to go home prior to discharge. AVS was offered to patient and patient declined. Patient was instructed not to drive for the remainder of the day and to notify clinic if any concerns arise.     Next appt: Wednesday    Medications were supplied by Clinic       This service provided today was under the supervising provider of the day Dr. Elizabeth Gordon, who was available if needed.        Perla Robbins RN

## 2024-05-22 ENCOUNTER — OFFICE VISIT (OUTPATIENT)
Dept: PSYCHIATRY | Facility: CLINIC | Age: 54
End: 2024-05-22
Payer: COMMERCIAL

## 2024-05-22 DIAGNOSIS — F33.2 SEVERE EPISODE OF RECURRENT MAJOR DEPRESSIVE DISORDER, WITHOUT PSYCHOTIC FEATURES (H): Primary | ICD-10-CM

## 2024-05-22 ASSESSMENT — PATIENT HEALTH QUESTIONNAIRE - PHQ9
SUM OF ALL RESPONSES TO PHQ QUESTIONS 1-9: 14
10. IF YOU CHECKED OFF ANY PROBLEMS, HOW DIFFICULT HAVE THESE PROBLEMS MADE IT FOR YOU TO DO YOUR WORK, TAKE CARE OF THINGS AT HOME, OR GET ALONG WITH OTHER PEOPLE: VERY DIFFICULT
SUM OF ALL RESPONSES TO PHQ QUESTIONS 1-9: 14
10. IF YOU CHECKED OFF ANY PROBLEMS, HOW DIFFICULT HAVE THESE PROBLEMS MADE IT FOR YOU TO DO YOUR WORK, TAKE CARE OF THINGS AT HOME, OR GET ALONG WITH OTHER PEOPLE: VERY DIFFICULT

## 2024-05-22 NOTE — PROGRESS NOTES
[facility] TMS Program  [address], [address]  [place] [address]  TMS Procedure Note   Aure Joy MRN# [id number]  Age: 53 year old   YOB: 1970    Aure Joy comes into clinic today at the request ofESTELA MD, ordering provider for TMS treatments.     Pre-Procedure:  History and Physical: Reviewed in medical record  Consent signed by: Aure Joy on: 12/22/2020    Clinical Narrative:  Patient tolerated treatment. Worked last night.    Indications for TMS:  MDD, recurrent, severe; 4+ medication trials (from 2+ classes) ineffective; Psychotherapy ineffective.     Pre-Procedure Diagnosis:  MDD, recurrent, severe F33.2    Treatment Hx:  Treatment number this series: 16  Total lifetime treatment number: 218    Allergies   Allergen Reactions    Codeine Nausea    Lithium GI Disturbance     Cramping, nausea, diarrhea    Other  [No Clinical Screening - See Comments] Nausea and Vomiting and Other (See Comments)     general anesthesia- uncontrolled vomitting      There were no vitals taken for this visit.    Pause for the Cause:  Right patient:  Yes  Right procedure/correct coil:  Yes; rTMS; 41335; F8 coil.   Earplugs in place:  Yes    Procedure:  Patient was seated in procedure chair. Identity and procedure was verified. Ear plugs were placed in ears and patient-specific cap was placed on head and tightened appropriately. Ruler locations were verified. Coil was placed at treatment location and stimulator was set to parameters described below. A test train was delivered and patient tolerated so 60 trains were delivered. Patient tolerated treatment well.    Motor Threshold Determination  Distance from nasion to inion: 35  Distance along circumference from midline: 6.67cm  Distance from Vertex: 9.56cm  Cap to Nasion: 4cm  MT 1: 0 - 6 - 16 @ 69% on 1/29/2018  MT 2: 0 - 6 - 16 @ 69% on 2/6/2018   MT 3: 0 - 6 - 16 @ 69% on 2/13/2018   MT 4: 0 - 6 - 16 @ 69% on 2/23/2018   MT 5: 0 - 6  - 16 @ 69% on 3/2/2018   MT 6: 0 - 5.5 - 15.5(always use intersection at left side of ruler)@ 85% on 8/23/19  MT 7: 0 - 5.5 - 15.5(always use intersection at left side of ruler)@ 80% on 8/29/19  MT 8: 0 - 5.5 - 37.5(always use intersection at left side of ruler)@ 76% on 9/5/19 (right side using EMG on left hand)  MT 9: C2 (R side using EMG on L hand) 78% on 9/12/2019  MT 10: C2 (R side using EMG on L hand) 76% on 9/26/2019  MT 11: C2 (R side using EMG on L hand) 82 on 1/31/2020  MT 12: C2 (R side using EMG on L Hand) 86% on 12/22/2020   MT 13: C2 (R side) 100% on 04/21/22  MT 14: C2 (R side) 86% on 6/7/22  MT 15: 0 - 6 - 16.5 @ 68% on 6/14/2022  MT 16: C2 (R side) 89% on 6/16/22  MT 17: F3 @ 91% on 4/30/2024    LDLPFC:  Frequency: 50 Hz  Number of pulses:  3                        Number of bursts: 10  Burst frequency: 5 Hz  Cycle time: 10 sec  Total pulses delivered: 1800  Tx Loc: F3  Energy: 91% (100% MT) -max output  Number of Cycles: 60 trains     RDLPFC:  Frequency: 50 Hz  Number of pulses:  3                        Number of bursts: 200  Burst frequency: 5 Hz  Cycle time: 190.0sec  Total pulses delivered: 1800  Tx Loc: F4 (right side)  Energy: 91% (100% MT) -max output  Number of Cycles: 3 trains    Rating Scales:  Date MADRS QIDS PHQ-9   4/30/2024 30 15 15   5/3/24  17 15   5/10/24  16 13   5/17/24  15 11                         Plan   - Continue TMS treatments       This service provided today was under the supervising provider of the day, Elizabeth Gorodn MD, who was available if needed.     Imelda Davis, TMS Technician  [facility] Neuromodulation

## 2024-05-23 ENCOUNTER — OFFICE VISIT (OUTPATIENT)
Dept: PSYCHIATRY | Facility: CLINIC | Age: 54
End: 2024-05-23
Payer: COMMERCIAL

## 2024-05-23 DIAGNOSIS — F33.2 SEVERE EPISODE OF RECURRENT MAJOR DEPRESSIVE DISORDER, WITHOUT PSYCHOTIC FEATURES (H): Primary | ICD-10-CM

## 2024-05-23 NOTE — PROGRESS NOTES
[facility] TMS Program  [address], [address]  [place] [address]  TMS Procedure Note   Aure Joy MRN# [id number]  Age: 53 year old   YOB: 1970    Aure Joy comes into clinic today at the request ofESTELA MD, ordering provider for TMS treatments.     Pre-Procedure:  History and Physical: Reviewed in medical record  Consent signed by: Aure Joy on: 12/22/2020    Clinical Narrative:  Patient tolerated treatment. Patients Birthday today! Going to breakfast with their kid after treatment.    Indications for TMS:  MDD, recurrent, severe; 4+ medication trials (from 2+ classes) ineffective; Psychotherapy ineffective.     Pre-Procedure Diagnosis:  MDD, recurrent, severe F33.2    Treatment Hx:  Treatment number this series: 17  Total lifetime treatment number: 219    Allergies   Allergen Reactions    Codeine Nausea    Lithium GI Disturbance     Cramping, nausea, diarrhea    Other  [No Clinical Screening - See Comments] Nausea and Vomiting and Other (See Comments)     general anesthesia- uncontrolled vomitting      There were no vitals taken for this visit.    Pause for the Cause:  Right patient:  Yes  Right procedure/correct coil:  Yes; rTMS; 56069; F8 coil.   Earplugs in place:  Yes    Procedure:  Patient was seated in procedure chair. Identity and procedure was verified. Ear plugs were placed in ears and patient-specific cap was placed on head and tightened appropriately. Ruler locations were verified. Coil was placed at treatment location and stimulator was set to parameters described below. A test train was delivered and patient tolerated so 60 trains were delivered. Patient tolerated treatment well.    Motor Threshold Determination  Distance from nasion to inion: 35  Distance along circumference from midline: 6.67cm  Distance from Vertex: 9.56cm  Cap to Nasion: 4cm  MT 1: 0 - 6 - 16 @ 69% on 1/29/2018  MT 2: 0 - 6 - 16 @ 69% on 2/6/2018   MT 3: 0 - 6 - 16 @ 69% on  2/13/2018   MT 4: 0 - 6 - 16 @ 69% on 2/23/2018   MT 5: 0 - 6 - 16 @ 69% on 3/2/2018   MT 6: 0 - 5.5 - 15.5(always use intersection at left side of ruler)@ 85% on 8/23/19  MT 7: 0 - 5.5 - 15.5(always use intersection at left side of ruler)@ 80% on 8/29/19  MT 8: 0 - 5.5 - 37.5(always use intersection at left side of ruler)@ 76% on 9/5/19 (right side using EMG on left hand)  MT 9: C2 (R side using EMG on L hand) 78% on 9/12/2019  MT 10: C2 (R side using EMG on L hand) 76% on 9/26/2019  MT 11: C2 (R side using EMG on L hand) 82 on 1/31/2020  MT 12: C2 (R side using EMG on L Hand) 86% on 12/22/2020   MT 13: C2 (R side) 100% on 04/21/22  MT 14: C2 (R side) 86% on 6/7/22  MT 15: 0 - 6 - 16.5 @ 68% on 6/14/2022  MT 16: C2 (R side) 89% on 6/16/22  MT 17: F3 @ 91% on 4/30/2024    LDLPFC:  Frequency: 50 Hz  Number of pulses:  3                        Number of bursts: 10  Burst frequency: 5 Hz  Cycle time: 10 sec  Total pulses delivered: 1800  Tx Loc: F3  Energy: 80% (90% MT) -max output  Number of Cycles: 60 trains     RDLPFC:  Frequency: 50 Hz  Number of pulses:  3                        Number of bursts: 200  Burst frequency: 5 Hz  Cycle time: 190.0sec  Total pulses delivered: 1800  Tx Loc: F4 (right side)  Energy: 80% (90% MT) -max output  Number of Cycles: 3 trains    Rating Scales:  Date MADRS QIDS PHQ-9   4/30/2024 30 15 15   5/3/24  17 15   5/10/24  16 13   5/17/24  15 11                         Plan   - Continue TMS treatments       This service provided today was under the supervising provider of the day, ESTELA Zamudio MD, who was available if needed.     Neli Smith, TMS Technician  [facility]  [facility] Neuromodulation

## 2024-05-24 ENCOUNTER — OFFICE VISIT (OUTPATIENT)
Dept: PSYCHIATRY | Facility: CLINIC | Age: 54
End: 2024-05-24
Payer: COMMERCIAL

## 2024-05-24 DIAGNOSIS — F33.2 SEVERE EPISODE OF RECURRENT MAJOR DEPRESSIVE DISORDER, WITHOUT PSYCHOTIC FEATURES (H): Primary | ICD-10-CM

## 2024-05-24 ASSESSMENT — PATIENT HEALTH QUESTIONNAIRE - PHQ9: SUM OF ALL RESPONSES TO PHQ QUESTIONS 1-9: 16
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
## 2024-05-28 ENCOUNTER — OFFICE VISIT (OUTPATIENT)
Dept: PSYCHIATRY | Facility: CLINIC | Age: 54
End: 2024-05-28
Payer: COMMERCIAL

## 2024-05-28 ENCOUNTER — ALLIED HEALTH/NURSE VISIT (OUTPATIENT)
Dept: PSYCHIATRY | Facility: CLINIC | Age: 54
End: 2024-05-28
Payer: COMMERCIAL

## 2024-05-28 VITALS — HEART RATE: 76 BPM | SYSTOLIC BLOOD PRESSURE: 111 MMHG | DIASTOLIC BLOOD PRESSURE: 78 MMHG

## 2024-05-28 DIAGNOSIS — F33.2 SEVERE EPISODE OF RECURRENT MAJOR DEPRESSIVE DISORDER, WITHOUT PSYCHOTIC FEATURES (H): Primary | ICD-10-CM

## 2024-05-28 ASSESSMENT — PATIENT HEALTH QUESTIONNAIRE - PHQ9: SUM OF ALL RESPONSES TO PHQ QUESTIONS 1-9: 18

## 2024-05-28 NOTE — PROGRESS NOTES
Aure Joy comes into clinic today at the request of Dr. Zamudio Ordering Provider for Med Injection only ketamine .    Procedure Prep:  Medication double check completed by: Perla Robbins RN  Prior to administration pt identified by name and : Yes    Nursing Assessment:  Appearance: Casual   Mood: Okay.   Affect: WNL  Eye contact: Good      2024     9:28 AM   PHQ   PHQ-9 Total Score 18   Q9: Thoughts of better off dead/self-harm past 2 weeks Not at all     QIDDSR 16 weekly assessment: score 17. Assessment was scanned to EHR.     Procedure Performed:  VSS and mental status WNL. Patient was given ketamine. See MAR for details.     Post Procedure Assessment:  Patient tolerated the treatment with the following side effects: dissociation. Vital signs were monitored, see VS Flowsheet. Patient stated they felt ready to go home prior to discharge. AVS was offered to patient and patient declined. Patient was instructed not to drive for the remainder of the day and to notify clinic if any concerns arise.     Next appt: Monday    Medications were supplied by Clinic       This service provided today was under the supervising provider of the day DYLLAN Williamson who was available if needed.      Joan Donnelly RN

## 2024-05-28 NOTE — PROGRESS NOTES
[facility] TMS Program  [address], [address]  [place] [address]  TMS Procedure Note   Aure Joy MRN# [id number]  Age: 54 year old   YOB: 1970    Aure Joy comes into clinic today at the request ofESTELA MD, ordering provider for TMS treatments.     Pre-Procedure:  History and Physical: Reviewed in medical record  Consent signed by: Aure Joy on: 12/22/2020    Clinical Narrative:  Patient tolerated treatment. Had ketamine treatment this morning.    Indications for TMS:  MDD, recurrent, severe; 4+ medication trials (from 2+ classes) ineffective; Psychotherapy ineffective.     Pre-Procedure Diagnosis:  MDD, recurrent, severe F33.2    Treatment Hx:  Treatment number this series: 19  Total lifetime treatment number: 221    Allergies   Allergen Reactions    Codeine Nausea    Lithium GI Disturbance     Cramping, nausea, diarrhea    Other  [No Clinical Screening - See Comments] Nausea and Vomiting and Other (See Comments)     general anesthesia- uncontrolled vomitting      There were no vitals taken for this visit.    Pause for the Cause:  Right patient:  Yes  Right procedure/correct coil:  Yes; rTMS; 23968; F8 coil.   Earplugs in place:  Yes    Procedure:  Patient was seated in procedure chair. Identity and procedure was verified. Ear plugs were placed in ears and patient-specific cap was placed on head and tightened appropriately. Ruler locations were verified. Coil was placed at treatment location and stimulator was set to parameters described below. A test train was delivered and patient tolerated so 60 trains were delivered. Patient tolerated treatment well.    Motor Threshold Determination  Distance from nasion to inion: 35  Distance along circumference from midline: 6.67cm  Distance from Vertex: 9.56cm  Cap to Nasion: 4cm  MT 1: 0 - 6 - 16 @ 69% on 1/29/2018  MT 2: 0 - 6 - 16 @ 69% on 2/6/2018   MT 3: 0 - 6 - 16 @ 69% on 2/13/2018   MT 4: 0 - 6 - 16 @ 69% on  2/23/2018   MT 5: 0 - 6 - 16 @ 69% on 3/2/2018   MT 6: 0 - 5.5 - 15.5(always use intersection at left side of ruler)@ 85% on 8/23/19  MT 7: 0 - 5.5 - 15.5(always use intersection at left side of ruler)@ 80% on 8/29/19  MT 8: 0 - 5.5 - 37.5(always use intersection at left side of ruler)@ 76% on 9/5/19 (right side using EMG on left hand)  MT 9: C2 (R side using EMG on L hand) 78% on 9/12/2019  MT 10: C2 (R side using EMG on L hand) 76% on 9/26/2019  MT 11: C2 (R side using EMG on L hand) 82 on 1/31/2020  MT 12: C2 (R side using EMG on L Hand) 86% on 12/22/2020   MT 13: C2 (R side) 100% on 04/21/22  MT 14: C2 (R side) 86% on 6/7/22  MT 15: 0 - 6 - 16.5 @ 68% on 6/14/2022  MT 16: C2 (R side) 89% on 6/16/22  MT 17: F3 @ 91% on 4/30/2024    LDLPFC:  Frequency: 50 Hz  Number of pulses:  3                        Number of bursts: 10  Burst frequency: 5 Hz  Cycle time: 10 sec  Total pulses delivered: 1800  Tx Loc: F3  Energy: 80% (90% MT) -max output  Number of Cycles: 60 trains     RDLPFC:  Frequency: 50 Hz  Number of pulses:  3                        Number of bursts: 200  Burst frequency: 5 Hz  Cycle time: 190.0sec  Total pulses delivered: 1800  Tx Loc: F4 (right side)  Energy: 80% (90% MT) -max output  Number of Cycles: 3 trains    Rating Scales:  Date MADRS QIDS PHQ-9   4/30/2024 30 15 15   5/3/24  17 15   5/10/24  16 13   5/17/24  15 11   5/24/24  16 16                   Plan   - Continue TMS treatments       This service provided today was under the supervising provider of the day, Tim Williamson MD, who was available if needed.     Neli Smith, TMS Technician  [facility]romodulation

## 2024-05-30 ENCOUNTER — OFFICE VISIT (OUTPATIENT)
Dept: PSYCHIATRY | Facility: CLINIC | Age: 54
End: 2024-05-30
Payer: COMMERCIAL

## 2024-05-30 DIAGNOSIS — F33.2 SEVERE EPISODE OF RECURRENT MAJOR DEPRESSIVE DISORDER, WITHOUT PSYCHOTIC FEATURES (H): Primary | ICD-10-CM

## 2024-05-30 ASSESSMENT — PATIENT HEALTH QUESTIONNAIRE - PHQ9
SUM OF ALL RESPONSES TO PHQ QUESTIONS 1-9: 17
SUM OF ALL RESPONSES TO PHQ QUESTIONS 1-9: 17
10. IF YOU CHECKED OFF ANY PROBLEMS, HOW DIFFICULT HAVE THESE PROBLEMS MADE IT FOR YOU TO DO YOUR WORK, TAKE CARE OF THINGS AT HOME, OR GET ALONG WITH OTHER PEOPLE: VERY DIFFICULT
SUM OF ALL RESPONSES TO PHQ QUESTIONS 1-9: 17
SUM OF ALL RESPONSES TO PHQ QUESTIONS 1-9: 17
10. IF YOU CHECKED OFF ANY PROBLEMS, HOW DIFFICULT HAVE THESE PROBLEMS MADE IT FOR YOU TO DO YOUR WORK, TAKE CARE OF THINGS AT HOME, OR GET ALONG WITH OTHER PEOPLE: VERY DIFFICULT

## 2024-05-30 NOTE — PROGRESS NOTES
[facility] TMS Program  [address], [address]  [place] [address]  TMS Procedure Note   Aure Joy MRN# [id number]  Age: 54 year old   YOB: 1970    Aure Joy comes into clinic today at the request ofESTELA MD, ordering provider for TMS treatments.     Pre-Procedure:  History and Physical: Reviewed in medical record  Consent signed by: Aure Joy on: 12/22/2020    Clinical Narrative:  Patient tolerated treatment. Going to look at cars this weekend.    Indications for TMS:  MDD, recurrent, severe; 4+ medication trials (from 2+ classes) ineffective; Psychotherapy ineffective.     Pre-Procedure Diagnosis:  MDD, recurrent, severe F33.2    Treatment Hx:  Treatment number this series: 20  Total lifetime treatment number: 222    Allergies   Allergen Reactions    Codeine Nausea    Lithium GI Disturbance     Cramping, nausea, diarrhea    Other  [No Clinical Screening - See Comments] Nausea and Vomiting and Other (See Comments)     general anesthesia- uncontrolled vomitting      There were no vitals taken for this visit.    Pause for the Cause:  Right patient:  Yes  Right procedure/correct coil:  Yes; rTMS; 54374; F8 coil.   Earplugs in place:  Yes    Procedure:  Patient was seated in procedure chair. Identity and procedure was verified. Ear plugs were placed in ears and patient-specific cap was placed on head and tightened appropriately. Ruler locations were verified. Coil was placed at treatment location and stimulator was set to parameters described below. A test train was delivered and patient tolerated so 60 trains were delivered. Patient tolerated treatment well.    Motor Threshold Determination  Distance from nasion to inion: 35  Distance along circumference from midline: 6.67cm  Distance from Vertex: 9.56cm  Cap to Nasion: 4cm  MT 1: 0 - 6 - 16 @ 69% on 1/29/2018  MT 2: 0 - 6 - 16 @ 69% on 2/6/2018   MT 3: 0 - 6 - 16 @ 69% on 2/13/2018   MT 4: 0 - 6 - 16 @ 69% on  2/23/2018   MT 5: 0 - 6 - 16 @ 69% on 3/2/2018   MT 6: 0 - 5.5 - 15.5(always use intersection at left side of ruler)@ 85% on 8/23/19  MT 7: 0 - 5.5 - 15.5(always use intersection at left side of ruler)@ 80% on 8/29/19  MT 8: 0 - 5.5 - 37.5(always use intersection at left side of ruler)@ 76% on 9/5/19 (right side using EMG on left hand)  MT 9: C2 (R side using EMG on L hand) 78% on 9/12/2019  MT 10: C2 (R side using EMG on L hand) 76% on 9/26/2019  MT 11: C2 (R side using EMG on L hand) 82 on 1/31/2020  MT 12: C2 (R side using EMG on L Hand) 86% on 12/22/2020   MT 13: C2 (R side) 100% on 04/21/22  MT 14: C2 (R side) 86% on 6/7/22  MT 15: 0 - 6 - 16.5 @ 68% on 6/14/2022  MT 16: C2 (R side) 89% on 6/16/22  MT 17: F3 @ 91% on 4/30/2024    LDLPFC:  Frequency: 50 Hz  Number of pulses:  3                        Number of bursts: 10  Burst frequency: 5 Hz  Cycle time: 10 sec  Total pulses delivered: 1800  Tx Loc: F3  Energy: 80% (90% MT) -max output  Number of Cycles: 60 trains     RDLPFC:  Frequency: 50 Hz  Number of pulses:  3                        Number of bursts: 200  Burst frequency: 5 Hz  Cycle time: 190.0sec  Total pulses delivered: 1800  Tx Loc: F4 (right side)  Energy: 80% (90% MT) -max output  Number of Cycles: 3 trains    Rating Scales:  Date MADRS QIDS PHQ-9   4/30/2024 30 15 15   5/3/24  17 15   5/10/24  16 13   5/17/24  15 11   5/24/24  16 16                   Plan   - Continue TMS treatments       This service provided today was under the supervising provider of the day, ESTELA Zamudio MD, who was available if needed.     Imelda Davis, TMS Technician  [facility] Neuromodulation

## 2024-05-31 ENCOUNTER — MYC REFILL (OUTPATIENT)
Dept: PSYCHIATRY | Facility: CLINIC | Age: 54
End: 2024-05-31

## 2024-05-31 ENCOUNTER — OFFICE VISIT (OUTPATIENT)
Dept: PSYCHIATRY | Facility: CLINIC | Age: 54
End: 2024-05-31
Payer: COMMERCIAL

## 2024-05-31 DIAGNOSIS — F33.2 SEVERE EPISODE OF RECURRENT MAJOR DEPRESSIVE DISORDER, WITHOUT PSYCHOTIC FEATURES (H): ICD-10-CM

## 2024-05-31 DIAGNOSIS — F33.2 SEVERE EPISODE OF RECURRENT MAJOR DEPRESSIVE DISORDER, WITHOUT PSYCHOTIC FEATURES (H): Primary | ICD-10-CM

## 2024-05-31 NOTE — PROGRESS NOTES
[facility] TMS Program  [address], [address]  [place] [address]  TMS Procedure Note   Aure Joy MRN# [id number]  Age: 54 year old   YOB: 1970    Aure Joy comes into clinic today at the request ofESTELA MD, ordering provider for TMS treatments.     Pre-Procedure:  History and Physical: Reviewed in medical record  Consent signed by: Aure Joy on: 12/22/2020    Clinical Narrative:  Patient tolerated treatment. Has to work this weekend.     Indications for TMS:  MDD, recurrent, severe; 4+ medication trials (from 2+ classes) ineffective; Psychotherapy ineffective.     Pre-Procedure Diagnosis:  MDD, recurrent, severe F33.2    Treatment Hx:  Treatment number this series: 21  Total lifetime treatment number: 223    Allergies   Allergen Reactions    Codeine Nausea    Lithium GI Disturbance     Cramping, nausea, diarrhea    Other  [No Clinical Screening - See Comments] Nausea and Vomiting and Other (See Comments)     general anesthesia- uncontrolled vomitting      There were no vitals taken for this visit.    Pause for the Cause:  Right patient:  Yes  Right procedure/correct coil:  Yes; rTMS; 59555; F8 coil.   Earplugs in place:  Yes    Procedure:  Patient was seated in procedure chair. Identity and procedure was verified. Ear plugs were placed in ears and patient-specific cap was placed on head and tightened appropriately. Ruler locations were verified. Coil was placed at treatment location and stimulator was set to parameters described below. A test train was delivered and patient tolerated so 60 trains were delivered. Patient tolerated treatment well.    Motor Threshold Determination  Distance from nasion to inion: 35  Distance along circumference from midline: 6.67cm  Distance from Vertex: 9.56cm  Cap to Nasion: 4cm  MT 1: 0 - 6 - 16 @ 69% on 1/29/2018  MT 2: 0 - 6 - 16 @ 69% on 2/6/2018   MT 3: 0 - 6 - 16 @ 69% on 2/13/2018   MT 4: 0 - 6 - 16 @ 69% on 2/23/2018   MT  5: 0 - 6 - 16 @ 69% on 3/2/2018   MT 6: 0 - 5.5 - 15.5(always use intersection at left side of ruler)@ 85% on 8/23/19  MT 7: 0 - 5.5 - 15.5(always use intersection at left side of ruler)@ 80% on 8/29/19  MT 8: 0 - 5.5 - 37.5(always use intersection at left side of ruler)@ 76% on 9/5/19 (right side using EMG on left hand)  MT 9: C2 (R side using EMG on L hand) 78% on 9/12/2019  MT 10: C2 (R side using EMG on L hand) 76% on 9/26/2019  MT 11: C2 (R side using EMG on L hand) 82 on 1/31/2020  MT 12: C2 (R side using EMG on L Hand) 86% on 12/22/2020   MT 13: C2 (R side) 100% on 04/21/22  MT 14: C2 (R side) 86% on 6/7/22  MT 15: 0 - 6 - 16.5 @ 68% on 6/14/2022  MT 16: C2 (R side) 89% on 6/16/22  MT 17: F3 @ 91% on 4/30/2024    LDLPFC:  Frequency: 50 Hz  Number of pulses:  3                        Number of bursts: 10  Burst frequency: 5 Hz  Cycle time: 10 sec  Total pulses delivered: 1800  Tx Loc: F3  Energy: 80% (90% MT) -max output  Number of Cycles: 60 trains     RDLPFC:  Frequency: 50 Hz  Number of pulses:  3                        Number of bursts: 200  Burst frequency: 5 Hz  Cycle time: 190.0sec  Total pulses delivered: 1800  Tx Loc: F4 (right side)  Energy: 80% (90% MT) -max output  Number of Cycles: 3 trains    Rating Scales:  Date MADRS QIDS PHQ-9   4/30/2024 30 15 15   5/3/24  17 15   5/10/24  16 13   5/17/24  15 11   5/24/24  16 16   5/31/24  14 12             Plan   - Continue TMS treatments       This service provided today was under the supervising provider of the day, Elizabeth Gordon MD, who was available if needed.     Lilia Prieto, TMS Technician  [facility] Neuromodulation    [Fever] : no fever [Chills] : no chills [Fatigue] : no fatigue [Discharge] : no discharge [Vision Problems] : no vision problems [Itching] : no itching [Earache] : no earache [Nasal Discharge] : no nasal discharge [Chest Pain] : no chest pain [Palpitations] : no palpitations [Shortness Of Breath] : no shortness of breath [Wheezing] : no wheezing [Abdominal Pain] : no abdominal pain [Nausea] : no nausea [Constipation] : no constipation [Diarrhea] : diarrhea [Vomiting] : no vomiting [Dysuria] : no dysuria [Hematuria] : no hematuria [Joint Pain] : no joint pain [Back Pain] : no back pain [Skin Rash] : no skin rash [Headache] : no headache [Dizziness] : no dizziness [FreeTextEntry9] : R ankle sprain

## 2024-06-03 ENCOUNTER — ALLIED HEALTH/NURSE VISIT (OUTPATIENT)
Dept: PSYCHIATRY | Facility: CLINIC | Age: 54
End: 2024-06-03
Payer: COMMERCIAL

## 2024-06-03 VITALS — DIASTOLIC BLOOD PRESSURE: 78 MMHG | SYSTOLIC BLOOD PRESSURE: 114 MMHG | HEART RATE: 86 BPM

## 2024-06-03 DIAGNOSIS — F33.2 SEVERE EPISODE OF RECURRENT MAJOR DEPRESSIVE DISORDER, WITHOUT PSYCHOTIC FEATURES (H): Primary | ICD-10-CM

## 2024-06-03 ASSESSMENT — PATIENT HEALTH QUESTIONNAIRE - PHQ9: SUM OF ALL RESPONSES TO PHQ QUESTIONS 1-9: 20

## 2024-06-03 NOTE — PROGRESS NOTES
Aure Joy comes into clinic today at the request of Dr. Zamudio Ordering Provider for Med Injection only ketamine .    Procedure Prep:  Medication double check completed by: Teodora Donnelly RN  Prior to administration pt identified by name and : Yes    Nursing Assessment:  Appearance: Casual   Mood: Okay.   Affect: WNL  Eye contact: Good      6/3/2024     9:21 AM   PHQ   PHQ-9 Total Score 20   Q9: Thoughts of better off dead/self-harm past 2 weeks Several days     QIDDSR 16 weekly assessment: score 18. Assessment was scanned to EHR.     Procedure Performed:  VSS and mental status WNL. Patient was given ketamine. See MAR for details.     Post Procedure Assessment:  Patient tolerated the treatment with the following side effects: dissociation. Vital signs were monitored, see VS Flowsheet. Patient stated they felt ready to go home prior to discharge. AVS was offered to patient and patient declined. Patient was instructed not to drive for the remainder of the day and to notify clinic if any concerns arise.     Next appt: Monday    Medications were supplied by Clinic       This service provided today was under the supervising provider of the day LOTUS Gordon, who was available if needed.      Perla Robbins RN

## 2024-06-04 ENCOUNTER — OFFICE VISIT (OUTPATIENT)
Dept: PSYCHIATRY | Facility: CLINIC | Age: 54
End: 2024-06-04
Payer: COMMERCIAL

## 2024-06-04 DIAGNOSIS — F33.2 SEVERE EPISODE OF RECURRENT MAJOR DEPRESSIVE DISORDER, WITHOUT PSYCHOTIC FEATURES (H): Primary | ICD-10-CM

## 2024-06-04 ASSESSMENT — PATIENT HEALTH QUESTIONNAIRE - PHQ9: SUM OF ALL RESPONSES TO PHQ QUESTIONS 1-9: 16

## 2024-06-04 NOTE — PROGRESS NOTES
[facility] TMS Program  [address], [address]  [place] [address]  TMS Procedure Note   Aure Joy MRN# [id number]  Age: 54 year old   YOB: 1970    Aure Joy comes into clinic today at the request ofESTELA MD, ordering provider for TMS treatments.     Pre-Procedure:  History and Physical: Reviewed in medical record  Consent signed by: Aure Joy on: 12/22/2020    Clinical Narrative:  Patient tolerated treatment. No changes reported.      Indications for TMS:  MDD, recurrent, severe; 4+ medication trials (from 2+ classes) ineffective; Psychotherapy ineffective.     Pre-Procedure Diagnosis:  MDD, recurrent, severe F33.2    Treatment Hx:  Treatment number this series: 22  Total lifetime treatment number: 224    Allergies   Allergen Reactions    Codeine Nausea    Lithium GI Disturbance     Cramping, nausea, diarrhea    Other  [No Clinical Screening - See Comments] Nausea and Vomiting and Other (See Comments)     general anesthesia- uncontrolled vomitting      There were no vitals taken for this visit.    Pause for the Cause:  Right patient:  Yes  Right procedure/correct coil:  Yes; rTMS; 16525; F8 coil.   Earplugs in place:  Yes    Procedure:  Patient was seated in procedure chair. Identity and procedure was verified. Ear plugs were placed in ears and patient-specific cap was placed on head and tightened appropriately. Ruler locations were verified. Coil was placed at treatment location and stimulator was set to parameters described below. A test train was delivered and patient tolerated so 60 trains were delivered. Patient tolerated treatment well.    Motor Threshold Determination  Distance from nasion to inion: 35  Distance along circumference from midline: 6.67cm  Distance from Vertex: 9.56cm  Cap to Nasion: 4cm  MT 1: 0 - 6 - 16 @ 69% on 1/29/2018  MT 2: 0 - 6 - 16 @ 69% on 2/6/2018   MT 3: 0 - 6 - 16 @ 69% on 2/13/2018   MT 4: 0 - 6 - 16 @ 69% on 2/23/2018   MT 5: 0  - 6 - 16 @ 69% on 3/2/2018   MT 6: 0 - 5.5 - 15.5(always use intersection at left side of ruler)@ 85% on 8/23/19  MT 7: 0 - 5.5 - 15.5(always use intersection at left side of ruler)@ 80% on 8/29/19  MT 8: 0 - 5.5 - 37.5(always use intersection at left side of ruler)@ 76% on 9/5/19 (right side using EMG on left hand)  MT 9: C2 (R side using EMG on L hand) 78% on 9/12/2019  MT 10: C2 (R side using EMG on L hand) 76% on 9/26/2019  MT 11: C2 (R side using EMG on L hand) 82 on 1/31/2020  MT 12: C2 (R side using EMG on L Hand) 86% on 12/22/2020   MT 13: C2 (R side) 100% on 04/21/22  MT 14: C2 (R side) 86% on 6/7/22  MT 15: 0 - 6 - 16.5 @ 68% on 6/14/2022  MT 16: C2 (R side) 89% on 6/16/22  MT 17: F3 @ 91% on 4/30/2024    LDLPFC:  Frequency: 50 Hz  Number of pulses:  3                        Number of bursts: 10  Burst frequency: 5 Hz  Cycle time: 10 sec  Total pulses delivered: 1800  Tx Loc: F3  Energy: 80% (90% MT) -max output  Number of Cycles: 60 trains     RDLPFC:  Frequency: 50 Hz  Number of pulses:  3                        Number of bursts: 200  Burst frequency: 5 Hz  Cycle time: 97.0sec  Total pulses delivered: 1800  Tx Loc: F4 (right side)  Energy: 80% (90% MT) -max output  Number of Cycles: 3 trains    Rating Scales:  Date MADRS QIDS PHQ-9   4/30/2024 30 15 15   5/3/24  17 15   5/10/24  16 13   5/17/24  15 11   5/24/24  16 16   5/31/24  14 12             Plan   - Continue TMS treatments       This service provided today was under the supervising provider of the day, Tim Williamson MD, who was available if needed.     Jael Alexandra RN   [facility] Neuromodulation

## 2024-06-05 ENCOUNTER — OFFICE VISIT (OUTPATIENT)
Dept: PSYCHIATRY | Facility: CLINIC | Age: 54
End: 2024-06-05
Payer: COMMERCIAL

## 2024-06-05 DIAGNOSIS — F33.2 SEVERE EPISODE OF RECURRENT MAJOR DEPRESSIVE DISORDER, WITHOUT PSYCHOTIC FEATURES (H): Primary | ICD-10-CM

## 2024-06-05 ASSESSMENT — PATIENT HEALTH QUESTIONNAIRE - PHQ9: SUM OF ALL RESPONSES TO PHQ QUESTIONS 1-9: 19

## 2024-06-05 NOTE — PROGRESS NOTES
[facility] TMS Program  [address], [address]  [place] [address]  TMS Procedure Note   Aure Joy MRN# [id number]  Age: 54 year old   YOB: 1970    Aure Joy comes into clinic today at the request ofESTELA MD, ordering provider for TMS treatments.     Pre-Procedure:  History and Physical: Reviewed in medical record  Consent signed by: Aure Joy on: 12/22/2020    Clinical Narrative:  Patient tolerated treatment. Got a new car yesterday.      Indications for TMS:  MDD, recurrent, severe; 4+ medication trials (from 2+ classes) ineffective; Psychotherapy ineffective.     Pre-Procedure Diagnosis:  MDD, recurrent, severe F33.2    Treatment Hx:  Treatment number this series: 23  Total lifetime treatment number: 225    Allergies   Allergen Reactions    Codeine Nausea    Lithium GI Disturbance     Cramping, nausea, diarrhea    Other  [No Clinical Screening - See Comments] Nausea and Vomiting and Other (See Comments)     general anesthesia- uncontrolled vomitting      There were no vitals taken for this visit.    Pause for the Cause:  Right patient:  Yes  Right procedure/correct coil:  Yes; rTMS; 87945; F8 coil.   Earplugs in place:  Yes    Procedure:  Patient was seated in procedure chair. Identity and procedure was verified. Ear plugs were placed in ears and patient-specific cap was placed on head and tightened appropriately. Ruler locations were verified. Coil was placed at treatment location and stimulator was set to parameters described below. A test train was delivered and patient tolerated so 60 trains were delivered. Patient tolerated treatment well.    Motor Threshold Determination  Distance from nasion to inion: 35  Distance along circumference from midline: 6.67cm  Distance from Vertex: 9.56cm  Cap to Nasion: 4cm  MT 1: 0 - 6 - 16 @ 69% on 1/29/2018  MT 2: 0 - 6 - 16 @ 69% on 2/6/2018   MT 3: 0 - 6 - 16 @ 69% on 2/13/2018   MT 4: 0 - 6 - 16 @ 69% on 2/23/2018   MT  5: 0 - 6 - 16 @ 69% on 3/2/2018   MT 6: 0 - 5.5 - 15.5(always use intersection at left side of ruler)@ 85% on 8/23/19  MT 7: 0 - 5.5 - 15.5(always use intersection at left side of ruler)@ 80% on 8/29/19  MT 8: 0 - 5.5 - 37.5(always use intersection at left side of ruler)@ 76% on 9/5/19 (right side using EMG on left hand)  MT 9: C2 (R side using EMG on L hand) 78% on 9/12/2019  MT 10: C2 (R side using EMG on L hand) 76% on 9/26/2019  MT 11: C2 (R side using EMG on L hand) 82 on 1/31/2020  MT 12: C2 (R side using EMG on L Hand) 86% on 12/22/2020   MT 13: C2 (R side) 100% on 04/21/22  MT 14: C2 (R side) 86% on 6/7/22  MT 15: 0 - 6 - 16.5 @ 68% on 6/14/2022  MT 16: C2 (R side) 89% on 6/16/22  MT 17: F3 @ 91% on 4/30/2024    LDLPFC:  Frequency: 50 Hz  Number of pulses:  3                        Number of bursts: 10  Burst frequency: 5 Hz  Cycle time: 10 sec  Total pulses delivered: 1800  Tx Loc: F3  Energy: 91% (100% MT) -max output  Number of Cycles: 60 trains     RDLPFC:  Frequency: 50 Hz  Number of pulses:  3                        Number of bursts: 200  Burst frequency: 5 Hz  Cycle time: 97.0sec  Total pulses delivered: 1800  Tx Loc: F4 (right side)  Energy: 91% (100% MT) -max output  Number of Cycles: 3 trains    Rating Scales:  Date MADRS QIDS PHQ-9   4/30/2024 30 15 15   5/3/24  17 15   5/10/24  16 13   5/17/24  15 11   5/24/24  16 16   5/31/24  14 12             Plan   - Continue TMS treatments       This service provided today was under the supervising provider of the day, Elizabeth Gordon MD, who was available if needed.     Imelda Davis, TMS Technician  [facility] Neuromodulation

## 2024-06-06 ENCOUNTER — OFFICE VISIT (OUTPATIENT)
Dept: PSYCHIATRY | Facility: CLINIC | Age: 54
End: 2024-06-06
Payer: COMMERCIAL

## 2024-06-06 DIAGNOSIS — F33.2 SEVERE EPISODE OF RECURRENT MAJOR DEPRESSIVE DISORDER, WITHOUT PSYCHOTIC FEATURES (H): Primary | ICD-10-CM

## 2024-06-06 ASSESSMENT — PATIENT HEALTH QUESTIONNAIRE - PHQ9: SUM OF ALL RESPONSES TO PHQ QUESTIONS 1-9: 15

## 2024-06-06 NOTE — PROGRESS NOTES
[facility] TMS Program  [address], [address]  [place] [address]  TMS Procedure Note   Aure Joy MRN# [id number]  Age: 54 year old   YOB: 1970    Aure Joy comes into clinic today at the request ofESTELA MD, ordering provider for TMS treatments.     Pre-Procedure:  History and Physical: Reviewed in medical record  Consent signed by: Aure Joy on: 12/22/2020    Clinical Narrative:  Patient tolerated treatment. Has a lot of paperwork to do today.    Indications for TMS:  MDD, recurrent, severe; 4+ medication trials (from 2+ classes) ineffective; Psychotherapy ineffective.     Pre-Procedure Diagnosis:  MDD, recurrent, severe F33.2    Treatment Hx:  Treatment number this series: 24  Total lifetime treatment number: 226    Allergies   Allergen Reactions    Codeine Nausea    Lithium GI Disturbance     Cramping, nausea, diarrhea    Other  [No Clinical Screening - See Comments] Nausea and Vomiting and Other (See Comments)     general anesthesia- uncontrolled vomitting      There were no vitals taken for this visit.    Pause for the Cause:  Right patient:  Yes  Right procedure/correct coil:  Yes; rTMS; 07883; F8 coil.   Earplugs in place:  Yes    Procedure:  Patient was seated in procedure chair. Identity and procedure was verified. Ear plugs were placed in ears and patient-specific cap was placed on head and tightened appropriately. Ruler locations were verified. Coil was placed at treatment location and stimulator was set to parameters described below. A test train was delivered and patient tolerated so 60 trains were delivered. Patient tolerated treatment well.    Motor Threshold Determination  Distance from nasion to inion: 35  Distance along circumference from midline: 6.67cm  Distance from Vertex: 9.56cm  Cap to Nasion: 4cm  MT 1: 0 - 6 - 16 @ 69% on 1/29/2018  MT 2: 0 - 6 - 16 @ 69% on 2/6/2018   MT 3: 0 - 6 - 16 @ 69% on 2/13/2018   MT 4: 0 - 6 - 16 @ 69% on  2/23/2018   MT 5: 0 - 6 - 16 @ 69% on 3/2/2018   MT 6: 0 - 5.5 - 15.5(always use intersection at left side of ruler)@ 85% on 8/23/19  MT 7: 0 - 5.5 - 15.5(always use intersection at left side of ruler)@ 80% on 8/29/19  MT 8: 0 - 5.5 - 37.5(always use intersection at left side of ruler)@ 76% on 9/5/19 (right side using EMG on left hand)  MT 9: C2 (R side using EMG on L hand) 78% on 9/12/2019  MT 10: C2 (R side using EMG on L hand) 76% on 9/26/2019  MT 11: C2 (R side using EMG on L hand) 82 on 1/31/2020  MT 12: C2 (R side using EMG on L Hand) 86% on 12/22/2020   MT 13: C2 (R side) 100% on 04/21/22  MT 14: C2 (R side) 86% on 6/7/22  MT 15: 0 - 6 - 16.5 @ 68% on 6/14/2022  MT 16: C2 (R side) 89% on 6/16/22  MT 17: F3 @ 91% on 4/30/2024    LDLPFC:  Frequency: 50 Hz  Number of pulses:  3                        Number of bursts: 10  Burst frequency: 5 Hz  Cycle time: 10 sec  Total pulses delivered: 1800  Tx Loc: F3  Energy: 91% (100% MT) -max output  Number of Cycles: 60 trains     RDLPFC:  Frequency: 50 Hz  Number of pulses:  3                        Number of bursts: 200  Burst frequency: 5 Hz  Cycle time: 97.0sec  Total pulses delivered: 1800  Tx Loc: F4 (right side)  Energy: 91% (100% MT) -max output  Number of Cycles: 3 trains    Rating Scales:  Date MADRS QIDS PHQ-9   4/30/2024 30 15 15   5/3/24  17 15   5/10/24  16 13   5/17/24  15 11   5/24/24  16 16   5/31/24  14 12             Plan   - Continue TMS treatments       This service provided today was under the supervising provider of the day, ESTELA Zamudio MD, who was available if needed.     Lilia Prieto, TMS Technician  [facility] Neuromodulation

## 2024-06-07 ENCOUNTER — OFFICE VISIT (OUTPATIENT)
Dept: PSYCHIATRY | Facility: CLINIC | Age: 54
End: 2024-06-07
Payer: COMMERCIAL

## 2024-06-07 DIAGNOSIS — F33.2 SEVERE EPISODE OF RECURRENT MAJOR DEPRESSIVE DISORDER, WITHOUT PSYCHOTIC FEATURES (H): Primary | ICD-10-CM

## 2024-06-07 RX ORDER — ARIPIPRAZOLE 10 MG/1
10 TABLET ORAL DAILY
Qty: 30 TABLET | Refills: 1 | Status: SHIPPED | OUTPATIENT
Start: 2024-06-07 | End: 2024-07-19

## 2024-06-07 RX ORDER — LAMOTRIGINE 200 MG/1
400 TABLET ORAL DAILY
Qty: 60 TABLET | Refills: 1 | Status: SHIPPED | OUTPATIENT
Start: 2024-06-07 | End: 2024-07-19

## 2024-06-07 ASSESSMENT — PATIENT HEALTH QUESTIONNAIRE - PHQ9: SUM OF ALL RESPONSES TO PHQ QUESTIONS 1-9: 13

## 2024-06-07 NOTE — PROGRESS NOTES
[facility] TMS Program  [address], [address]  [place] [address]  TMS Procedure Note   Aure Joy MRN# [id number]  Age: 54 year old   YOB: 1970    Aure Joy comes into clinic today at the request ofESTELA MD, ordering provider for TMS treatments.     Pre-Procedure:  History and Physical: Reviewed in medical record  Consent signed by: Aure Joy on: 12/22/2020    Clinical Narrative:  Patient tolerated treatment. Working this weekend.    Indications for TMS:  MDD, recurrent, severe; 4+ medication trials (from 2+ classes) ineffective; Psychotherapy ineffective.     Pre-Procedure Diagnosis:  MDD, recurrent, severe F33.2    Treatment Hx:  Treatment number this series: 25  Total lifetime treatment number: 227    Allergies   Allergen Reactions    Codeine Nausea    Lithium GI Disturbance     Cramping, nausea, diarrhea    Other  [No Clinical Screening - See Comments] Nausea and Vomiting and Other (See Comments)     general anesthesia- uncontrolled vomitting      There were no vitals taken for this visit.    Pause for the Cause:  Right patient:  Yes  Right procedure/correct coil:  Yes; rTMS; 24516; F8 coil.   Earplugs in place:  Yes    Procedure:  Patient was seated in procedure chair. Identity and procedure was verified. Ear plugs were placed in ears and patient-specific cap was placed on head and tightened appropriately. Ruler locations were verified. Coil was placed at treatment location and stimulator was set to parameters described below. A test train was delivered and patient tolerated so 60 trains were delivered. Patient tolerated treatment well.    Motor Threshold Determination  Distance from nasion to inion: 35  Distance along circumference from midline: 6.67cm  Distance from Vertex: 9.56cm  Cap to Nasion: 4cm  MT 1: 0 - 6 - 16 @ 69% on 1/29/2018  MT 2: 0 - 6 - 16 @ 69% on 2/6/2018   MT 3: 0 - 6 - 16 @ 69% on 2/13/2018   MT 4: 0 - 6 - 16 @ 69% on 2/23/2018   MT 5: 0 -  6 - 16 @ 69% on 3/2/2018   MT 6: 0 - 5.5 - 15.5(always use intersection at left side of ruler)@ 85% on 8/23/19  MT 7: 0 - 5.5 - 15.5(always use intersection at left side of ruler)@ 80% on 8/29/19  MT 8: 0 - 5.5 - 37.5(always use intersection at left side of ruler)@ 76% on 9/5/19 (right side using EMG on left hand)  MT 9: C2 (R side using EMG on L hand) 78% on 9/12/2019  MT 10: C2 (R side using EMG on L hand) 76% on 9/26/2019  MT 11: C2 (R side using EMG on L hand) 82 on 1/31/2020  MT 12: C2 (R side using EMG on L Hand) 86% on 12/22/2020   MT 13: C2 (R side) 100% on 04/21/22  MT 14: C2 (R side) 86% on 6/7/22  MT 15: 0 - 6 - 16.5 @ 68% on 6/14/2022  MT 16: C2 (R side) 89% on 6/16/22  MT 17: F3 @ 91% on 4/30/2024    LDLPFC:  Frequency: 50 Hz  Number of pulses:  3                        Number of bursts: 10  Burst frequency: 5 Hz  Cycle time: 10 sec  Total pulses delivered: 1800  Tx Loc: F3  Energy: 91% (100% MT) -max output  Number of Cycles: 60 trains     RDLPFC:  Frequency: 50 Hz  Number of pulses:  3                        Number of bursts: 200  Burst frequency: 5 Hz  Cycle time: 97.0sec  Total pulses delivered: 1800  Tx Loc: F4 (right side)  Energy: 91% (100% MT) -max output  Number of Cycles: 3 trains    Rating Scales:  Date MADRS QIDS PHQ-9   4/30/2024 30 15 15   5/3/24  17 15   5/10/24  16 13   5/17/24  15 11   5/24/24  16 16   5/31/24  14 12   6/7/24  17 13       Plan   - Continue TMS treatments     This service provided today was under the supervising provider of the day, Elizabeth Gordon MD, who was available if needed.     Imelda Davis, TMS Technician  [facility]

## 2024-06-10 ENCOUNTER — ALLIED HEALTH/NURSE VISIT (OUTPATIENT)
Dept: PSYCHIATRY | Facility: CLINIC | Age: 54
End: 2024-06-10
Payer: COMMERCIAL

## 2024-06-10 ENCOUNTER — OFFICE VISIT (OUTPATIENT)
Dept: PSYCHIATRY | Facility: CLINIC | Age: 54
End: 2024-06-10
Payer: COMMERCIAL

## 2024-06-10 VITALS — SYSTOLIC BLOOD PRESSURE: 107 MMHG | HEART RATE: 81 BPM | DIASTOLIC BLOOD PRESSURE: 70 MMHG

## 2024-06-10 DIAGNOSIS — F33.2 SEVERE EPISODE OF RECURRENT MAJOR DEPRESSIVE DISORDER, WITHOUT PSYCHOTIC FEATURES (H): Primary | ICD-10-CM

## 2024-06-10 ASSESSMENT — PATIENT HEALTH QUESTIONNAIRE - PHQ9: SUM OF ALL RESPONSES TO PHQ QUESTIONS 1-9: 19

## 2024-06-10 NOTE — PROGRESS NOTES
[facility] TMS Program  [address], [address]  [place] [address]  TMS Procedure Note   Aure Joy MRN# [id number]  Age: 54 year old   YOB: 1970    Aure Joy comes into clinic today at the request ofESTELA MD, ordering provider for TMS treatments.     Pre-Procedure:  History and Physical: Reviewed in medical record  Consent signed by: Aure Joy on: 12/22/2020    Clinical Narrative:  Patient tolerated treatment.     Indications for TMS:  MDD, recurrent, severe; 4+ medication trials (from 2+ classes) ineffective; Psychotherapy ineffective.     Pre-Procedure Diagnosis:  MDD, recurrent, severe F33.2    Treatment Hx:  Treatment number this series: 26  Total lifetime treatment number: 228    Allergies   Allergen Reactions    Codeine Nausea    Lithium GI Disturbance     Cramping, nausea, diarrhea    Other  [No Clinical Screening - See Comments] Nausea and Vomiting and Other (See Comments)     general anesthesia- uncontrolled vomitting      There were no vitals taken for this visit.    Pause for the Cause:  Right patient:  Yes  Right procedure/correct coil:  Yes; rTMS; 72434; F8 coil.   Earplugs in place:  Yes    Procedure:  Patient was seated in procedure chair. Identity and procedure was verified. Ear plugs were placed in ears and patient-specific cap was placed on head and tightened appropriately. Ruler locations were verified. Coil was placed at treatment location and stimulator was set to parameters described below. A test train was delivered and patient tolerated so 60 trains were delivered. Patient tolerated treatment well.    Motor Threshold Determination  Distance from nasion to inion: 35  Distance along circumference from midline: 6.67cm  Distance from Vertex: 9.56cm  Cap to Nasion: 4cm  MT 1: 0 - 6 - 16 @ 69% on 1/29/2018  MT 2: 0 - 6 - 16 @ 69% on 2/6/2018   MT 3: 0 - 6 - 16 @ 69% on 2/13/2018   MT 4: 0 - 6 - 16 @ 69% on 2/23/2018   MT 5: 0 - 6 - 16 @ 69% on  3/2/2018   MT 6: 0 - 5.5 - 15.5(always use intersection at left side of ruler)@ 85% on 8/23/19  MT 7: 0 - 5.5 - 15.5(always use intersection at left side of ruler)@ 80% on 8/29/19  MT 8: 0 - 5.5 - 37.5(always use intersection at left side of ruler)@ 76% on 9/5/19 (right side using EMG on left hand)  MT 9: C2 (R side using EMG on L hand) 78% on 9/12/2019  MT 10: C2 (R side using EMG on L hand) 76% on 9/26/2019  MT 11: C2 (R side using EMG on L hand) 82 on 1/31/2020  MT 12: C2 (R side using EMG on L Hand) 86% on 12/22/2020   MT 13: C2 (R side) 100% on 04/21/22  MT 14: C2 (R side) 86% on 6/7/22  MT 15: 0 - 6 - 16.5 @ 68% on 6/14/2022  MT 16: C2 (R side) 89% on 6/16/22  MT 17: F3 @ 91% on 4/30/2024    LDLPFC:  Frequency: 50 Hz  Number of pulses:  3                        Number of bursts: 10  Burst frequency: 5 Hz  Cycle time: 10 sec  Total pulses delivered: 1800  Tx Loc: F3  Energy: 91% (100% MT) -max output  Number of Cycles: 60 trains     RDLPFC:  Frequency: 50 Hz  Number of pulses:  3                        Number of bursts: 200  Burst frequency: 5 Hz  Cycle time: 97.0sec  Total pulses delivered: 1800  Tx Loc: F4 (right side)  Energy: 91% (100% MT) -max output  Number of Cycles: 3 trains    Rating Scales:  Date MADRS QIDS PHQ-9   4/30/2024 30 15 15   5/3/24  17 15   5/10/24  16 13   5/17/24  15 11   5/24/24  16 16   5/31/24  14 12   6/7/24  17 13       Plan   - Continue TMS treatments     This service provided today was under the supervising provider of the day, Elizabeth Gordon MD, who was available if needed.     Neli Smith, TMS Technician  [facility]

## 2024-06-10 NOTE — PROGRESS NOTES
Aure Joy comes into clinic today at the request of Dr. Zamudio Ordering Provider for Med Injection only ketamine .    Procedure Prep:  Medication double check completed by: Teodora Donnelly RN  Prior to administration pt identified by name and : Yes    Nursing Assessment:  Appearance: Casual   Mood: Okay.   Affect: WNL  Eye contact: Good      6/10/2024     9:20 AM   PHQ   PHQ-9 Total Score 19   Q9: Thoughts of better off dead/self-harm past 2 weeks Several days     QIDDSR 16 weekly assessment: score 17. Assessment was scanned to EHR.     Procedure Performed:  VSS and mental status WNL. Patient was given ketamine. See MAR for details.     Post Procedure Assessment:  Patient tolerated the treatment with the following side effects: dissociation. Vital signs were monitored, see VS Flowsheet. Patient stated they felt ready to go home prior to discharge. AVS was offered to patient and patient declined. Patient was instructed not to drive for the remainder of the day and to notify clinic if any concerns arise.     Next appt: Monday    Medications were supplied by Clinic       This service provided today was under the supervising provider of the day LOTUS Gordon, who was available if needed.      Perla Robbins RN

## 2024-06-11 ENCOUNTER — OFFICE VISIT (OUTPATIENT)
Dept: PSYCHIATRY | Facility: CLINIC | Age: 54
End: 2024-06-11
Payer: COMMERCIAL

## 2024-06-11 DIAGNOSIS — F33.2 SEVERE EPISODE OF RECURRENT MAJOR DEPRESSIVE DISORDER, WITHOUT PSYCHOTIC FEATURES (H): Primary | ICD-10-CM

## 2024-06-11 ASSESSMENT — PATIENT HEALTH QUESTIONNAIRE - PHQ9: SUM OF ALL RESPONSES TO PHQ QUESTIONS 1-9: 14

## 2024-06-11 NOTE — PROGRESS NOTES
[facility] TMS Program  [address], [address]  [place] [address]  TMS Procedure Note   Aure Joy MRN# [id number]  Age: 54 year old   YOB: 1970    Aure Joy comes into clinic today at the request ofESTELA MD, ordering provider for TMS treatments.     Pre-Procedure:  History and Physical: Reviewed in medical record  Consent signed by: Aure Joy on: 12/22/2020    Clinical Narrative:  Patient tolerated treatment. Had a busy work weekend. Celebrated daughters birthday.    Indications for TMS:  MDD, recurrent, severe; 4+ medication trials (from 2+ classes) ineffective; Psychotherapy ineffective.     Pre-Procedure Diagnosis:  MDD, recurrent, severe F33.2    Treatment Hx:  Treatment number this series: 27  Total lifetime treatment number: 229    Allergies   Allergen Reactions    Codeine Nausea    Lithium GI Disturbance     Cramping, nausea, diarrhea    Other  [No Clinical Screening - See Comments] Nausea and Vomiting and Other (See Comments)     general anesthesia- uncontrolled vomitting      There were no vitals taken for this visit.    Pause for the Cause:  Right patient:  Yes  Right procedure/correct coil:  Yes; rTMS; 39518; F8 coil.   Earplugs in place:  Yes    Procedure:  Patient was seated in procedure chair. Identity and procedure was verified. Ear plugs were placed in ears and patient-specific cap was placed on head and tightened appropriately. Ruler locations were verified. Coil was placed at treatment location and stimulator was set to parameters described below. A test train was delivered and patient tolerated so 60 trains were delivered. Patient tolerated treatment well.    Motor Threshold Determination  Distance from nasion to inion: 35  Distance along circumference from midline: 6.67cm  Distance from Vertex: 9.56cm  Cap to Nasion: 4cm  MT 1: 0 - 6 - 16 @ 69% on 1/29/2018  MT 2: 0 - 6 - 16 @ 69% on 2/6/2018   MT 3: 0 - 6 - 16 @ 69% on 2/13/2018   MT 4: 0 - 6 -  16 @ 69% on 2/23/2018   MT 5: 0 - 6 - 16 @ 69% on 3/2/2018   MT 6: 0 - 5.5 - 15.5(always use intersection at left side of ruler)@ 85% on 8/23/19  MT 7: 0 - 5.5 - 15.5(always use intersection at left side of ruler)@ 80% on 8/29/19  MT 8: 0 - 5.5 - 37.5(always use intersection at left side of ruler)@ 76% on 9/5/19 (right side using EMG on left hand)  MT 9: C2 (R side using EMG on L hand) 78% on 9/12/2019  MT 10: C2 (R side using EMG on L hand) 76% on 9/26/2019  MT 11: C2 (R side using EMG on L hand) 82 on 1/31/2020  MT 12: C2 (R side using EMG on L Hand) 86% on 12/22/2020   MT 13: C2 (R side) 100% on 04/21/22  MT 14: C2 (R side) 86% on 6/7/22  MT 15: 0 - 6 - 16.5 @ 68% on 6/14/2022  MT 16: C2 (R side) 89% on 6/16/22  MT 17: F3 @ 91% on 4/30/2024    LDLPFC:  Frequency: 50 Hz  Number of pulses:  3                        Number of bursts: 10  Burst frequency: 5 Hz  Cycle time: 10 sec  Total pulses delivered: 1800  Tx Loc: F3  Energy: 91% (100% MT) -max output  Number of Cycles: 60 trains     RDLPFC:  Frequency: 50 Hz  Number of pulses:  3                        Number of bursts: 200  Burst frequency: 5 Hz  Cycle time: 97.0sec  Total pulses delivered: 1800  Tx Loc: F4 (right side)  Energy: 91% (100% MT) -max output  Number of Cycles: 3 trains    Rating Scales:  Date MADRS QIDS PHQ-9   4/30/2024 30 15 15   5/3/24  17 15   5/10/24  16 13   5/17/24  15 11   5/24/24  16 16   5/31/24  14 12   6/7/24  17 13       Plan   - Continue TMS treatments     This service provided today was under the supervising provider of the day, Tim Williamson MD, who was available if needed.     Imelda Davis, TMS Technician  [facility]

## 2024-06-12 ENCOUNTER — OFFICE VISIT (OUTPATIENT)
Dept: PSYCHIATRY | Facility: CLINIC | Age: 54
End: 2024-06-12
Payer: COMMERCIAL

## 2024-06-12 DIAGNOSIS — F33.2 SEVERE EPISODE OF RECURRENT MAJOR DEPRESSIVE DISORDER, WITHOUT PSYCHOTIC FEATURES (H): Primary | ICD-10-CM

## 2024-06-12 ASSESSMENT — PATIENT HEALTH QUESTIONNAIRE - PHQ9: SUM OF ALL RESPONSES TO PHQ QUESTIONS 1-9: 14

## 2024-06-12 NOTE — PROGRESS NOTES
[facility] TMS Program  [address], [address]  [place] [address]  TMS Procedure Note   Aure Joy MRN# [id number]  Age: 54 year old   YOB: 1970    Aure Joy comes into clinic today at the request ofESTELA MD, ordering provider for TMS treatments.     Pre-Procedure:  History and Physical: Reviewed in medical record  Consent signed by: Aure Joy on: 12/22/2020    Clinical Narrative:  Patient tolerated treatment. Has to go to the car dealership today.    Indications for TMS:  MDD, recurrent, severe; 4+ medication trials (from 2+ classes) ineffective; Psychotherapy ineffective.     Pre-Procedure Diagnosis:  MDD, recurrent, severe F33.2    Treatment Hx:  Treatment number this series: 28  Total lifetime treatment number: 230    Allergies   Allergen Reactions    Codeine Nausea    Lithium GI Disturbance     Cramping, nausea, diarrhea    Other  [No Clinical Screening - See Comments] Nausea and Vomiting and Other (See Comments)     general anesthesia- uncontrolled vomitting      There were no vitals taken for this visit.    Pause for the Cause:  Right patient:  Yes  Right procedure/correct coil:  Yes; rTMS; 44731; F8 coil.   Earplugs in place:  Yes    Procedure:  Patient was seated in procedure chair. Identity and procedure was verified. Ear plugs were placed in ears and patient-specific cap was placed on head and tightened appropriately. Ruler locations were verified. Coil was placed at treatment location and stimulator was set to parameters described below. A test train was delivered and patient tolerated so 60 trains were delivered. Patient tolerated treatment well.    Motor Threshold Determination  Distance from nasion to inion: 35  Distance along circumference from midline: 6.67cm  Distance from Vertex: 9.56cm  Cap to Nasion: 4cm  MT 1: 0 - 6 - 16 @ 69% on 1/29/2018  MT 2: 0 - 6 - 16 @ 69% on 2/6/2018   MT 3: 0 - 6 - 16 @ 69% on 2/13/2018   MT 4: 0 - 6 - 16 @ 69% on  2/23/2018   MT 5: 0 - 6 - 16 @ 69% on 3/2/2018   MT 6: 0 - 5.5 - 15.5(always use intersection at left side of ruler)@ 85% on 8/23/19  MT 7: 0 - 5.5 - 15.5(always use intersection at left side of ruler)@ 80% on 8/29/19  MT 8: 0 - 5.5 - 37.5(always use intersection at left side of ruler)@ 76% on 9/5/19 (right side using EMG on left hand)  MT 9: C2 (R side using EMG on L hand) 78% on 9/12/2019  MT 10: C2 (R side using EMG on L hand) 76% on 9/26/2019  MT 11: C2 (R side using EMG on L hand) 82 on 1/31/2020  MT 12: C2 (R side using EMG on L Hand) 86% on 12/22/2020   MT 13: C2 (R side) 100% on 04/21/22  MT 14: C2 (R side) 86% on 6/7/22  MT 15: 0 - 6 - 16.5 @ 68% on 6/14/2022  MT 16: C2 (R side) 89% on 6/16/22  MT 17: F3 @ 91% on 4/30/2024    LDLPFC:  Frequency: 50 Hz  Number of pulses:  3                        Number of bursts: 10  Burst frequency: 5 Hz  Cycle time: 10 sec  Total pulses delivered: 1800  Tx Loc: F3  Energy: 91% (100% MT) -max output  Number of Cycles: 60 trains     RDLPFC:  Frequency: 50 Hz  Number of pulses:  3                        Number of bursts: 200  Burst frequency: 5 Hz  Cycle time: 97.0sec  Total pulses delivered: 1800  Tx Loc: F4 (right side)  Energy: 91% (100% MT) -max output  Number of Cycles: 3 trains    Rating Scales:  Date MADRS QIDS PHQ-9   4/30/2024 30 15 15   5/3/24  17 15   5/10/24  16 13   5/17/24  15 11   5/24/24  16 16   5/31/24  14 12   6/7/24  17 13       Plan   - Continue TMS treatments     This service provided today was under the supervising provider of the day, Elizabeth Gordon MD, who was available if needed.     Lilia Prieto, TMS Technician  [facility]

## 2024-06-17 ENCOUNTER — ALLIED HEALTH/NURSE VISIT (OUTPATIENT)
Dept: PSYCHIATRY | Facility: CLINIC | Age: 54
End: 2024-06-17
Payer: COMMERCIAL

## 2024-06-17 ENCOUNTER — OFFICE VISIT (OUTPATIENT)
Dept: PSYCHIATRY | Facility: CLINIC | Age: 54
End: 2024-06-17
Payer: COMMERCIAL

## 2024-06-17 VITALS — SYSTOLIC BLOOD PRESSURE: 115 MMHG | DIASTOLIC BLOOD PRESSURE: 78 MMHG | HEART RATE: 69 BPM

## 2024-06-17 DIAGNOSIS — F33.2 SEVERE EPISODE OF RECURRENT MAJOR DEPRESSIVE DISORDER, WITHOUT PSYCHOTIC FEATURES (H): Primary | ICD-10-CM

## 2024-06-17 ASSESSMENT — PATIENT HEALTH QUESTIONNAIRE - PHQ9: SUM OF ALL RESPONSES TO PHQ QUESTIONS 1-9: 16

## 2024-06-17 NOTE — PROGRESS NOTES
[facility] TMS Program  [address], [address]  [place] [address]  TMS Procedure Note   Aure Joy MRN# [id number]  Age: 54 year old   YOB: 1970    Aure Joy comes into clinic today at the request ofESTELA MD, ordering provider for TMS treatments.     Pre-Procedure:  History and Physical: Reviewed in medical record  Consent signed by: Aure Joy on: 12/22/2020    Clinical Narrative:  Patient tolerated treatment. Got new car, does not like it.    Indications for TMS:  MDD, recurrent, severe; 4+ medication trials (from 2+ classes) ineffective; Psychotherapy ineffective.     Pre-Procedure Diagnosis:  MDD, recurrent, severe F33.2    Treatment Hx:  Treatment number this series: 29  Total lifetime treatment number: 231    Allergies   Allergen Reactions    Codeine Nausea    Lithium GI Disturbance     Cramping, nausea, diarrhea    Other  [No Clinical Screening - See Comments] Nausea and Vomiting and Other (See Comments)     general anesthesia- uncontrolled vomitting      There were no vitals taken for this visit.    Pause for the Cause:  Right patient:  Yes  Right procedure/correct coil:  Yes; rTMS; 26847; F8 coil.   Earplugs in place:  Yes    Procedure:  Patient was seated in procedure chair. Identity and procedure was verified. Ear plugs were placed in ears and patient-specific cap was placed on head and tightened appropriately. Ruler locations were verified. Coil was placed at treatment location and stimulator was set to parameters described below. A test train was delivered and patient tolerated so 60 trains were delivered. Patient tolerated treatment well.    Motor Threshold Determination  Distance from nasion to inion: 35  Distance along circumference from midline: 6.67cm  Distance from Vertex: 9.56cm  Cap to Nasion: 4cm  MT 1: 0 - 6 - 16 @ 69% on 1/29/2018  MT 2: 0 - 6 - 16 @ 69% on 2/6/2018   MT 3: 0 - 6 - 16 @ 69% on 2/13/2018   MT 4: 0 - 6 - 16 @ 69% on 2/23/2018    MT 5: 0 - 6 - 16 @ 69% on 3/2/2018   MT 6: 0 - 5.5 - 15.5(always use intersection at left side of ruler)@ 85% on 8/23/19  MT 7: 0 - 5.5 - 15.5(always use intersection at left side of ruler)@ 80% on 8/29/19  MT 8: 0 - 5.5 - 37.5(always use intersection at left side of ruler)@ 76% on 9/5/19 (right side using EMG on left hand)  MT 9: C2 (R side using EMG on L hand) 78% on 9/12/2019  MT 10: C2 (R side using EMG on L hand) 76% on 9/26/2019  MT 11: C2 (R side using EMG on L hand) 82 on 1/31/2020  MT 12: C2 (R side using EMG on L Hand) 86% on 12/22/2020   MT 13: C2 (R side) 100% on 04/21/22  MT 14: C2 (R side) 86% on 6/7/22  MT 15: 0 - 6 - 16.5 @ 68% on 6/14/2022  MT 16: C2 (R side) 89% on 6/16/22  MT 17: F3 @ 91% on 4/30/2024    LDLPFC:  Frequency: 50 Hz  Number of pulses:  3                        Number of bursts: 10  Burst frequency: 5 Hz  Cycle time: 10 sec  Total pulses delivered: 1800  Tx Loc: F3  Energy: 91% (100% MT) -max output  Number of Cycles: 60 trains     RDLPFC:  Frequency: 50 Hz  Number of pulses:  3                        Number of bursts: 200  Burst frequency: 5 Hz  Cycle time: 97.0sec  Total pulses delivered: 1800  Tx Loc: F4 (right side)  Energy: 91% (100% MT) -max output  Number of Cycles: 3 trains    Rating Scales:  Date MADRS QIDS PHQ-9   4/30/2024 30 15 15   5/3/24  17 15   5/10/24  16 13   5/17/24  15 11   5/24/24  16 16   5/31/24  14 12   6/7/24  17 13       Plan   - Continue TMS treatments     This service provided today was under the supervising provider of the day, Elizabeth Gordon MD, who was available if needed.     Neli Smith, TMS Technician  [facility]

## 2024-06-17 NOTE — PROGRESS NOTES
Aure Joy comes into clinic today at the request of Dr. Zamudio Ordering Provider for Med Injection only ketamine .    Procedure Prep:  Medication double check completed by: Perla Robbins RN  Prior to administration pt identified by name and : Yes    Nursing Assessment:  Appearance: Casual   Mood: Okay.   Affect: WNL  Eye contact: Good      2024     9:18 AM   PHQ   PHQ-9 Total Score 16   Q9: Thoughts of better off dead/self-harm past 2 weeks Several days     QIDDSR 16 weekly assessment: score 17. Assessment was scanned to EHR.     Procedure Performed:  VSS and mental status WNL. Patient was given ketamine. See MAR for details.     Post Procedure Assessment:  Patient tolerated the treatment with the following side effects: dissociation. Vital signs were monitored, see VS Flowsheet. Patient stated they felt ready to go home prior to discharge. AVS was offered to patient and patient declined. Patient was instructed not to drive for the remainder of the day and to notify clinic if any concerns arise.     Next appt: Monday    Medications were supplied by Clinic       This service provided today was under the supervising provider of the day LOTUS Gordon, who was available if needed.      Joan Donnelly RN

## 2024-06-18 ENCOUNTER — OFFICE VISIT (OUTPATIENT)
Dept: PSYCHIATRY | Facility: CLINIC | Age: 54
End: 2024-06-18
Payer: COMMERCIAL

## 2024-06-18 DIAGNOSIS — F33.2 SEVERE EPISODE OF RECURRENT MAJOR DEPRESSIVE DISORDER, WITHOUT PSYCHOTIC FEATURES (H): Primary | ICD-10-CM

## 2024-06-18 ASSESSMENT — PATIENT HEALTH QUESTIONNAIRE - PHQ9: SUM OF ALL RESPONSES TO PHQ QUESTIONS 1-9: 11

## 2024-06-18 NOTE — PROGRESS NOTES
[facility] TMS Program  [address], [address]  [place] [address]  TMS Procedure Note   Aure Joy MRN# [id number]  Age: 54 year old   YOB: 1970    Aure Joy comes into clinic today at the request ofESTELA MD, ordering provider for TMS treatments.     Pre-Procedure:  History and Physical: Reviewed in medical record  Consent signed by: Aure Joy on: 12/22/2020    Clinical Narrative:  Patient tolerated treatment. Spent time with her kids on Saturday, went out for breakfast.    Indications for TMS:  MDD, recurrent, severe; 4+ medication trials (from 2+ classes) ineffective; Psychotherapy ineffective.     Pre-Procedure Diagnosis:  MDD, recurrent, severe F33.2    Treatment Hx:  Treatment number this series: 30  Total lifetime treatment number: 232    Allergies   Allergen Reactions    Codeine Nausea    Lithium GI Disturbance     Cramping, nausea, diarrhea    Other  [No Clinical Screening - See Comments] Nausea and Vomiting and Other (See Comments)     general anesthesia- uncontrolled vomitting      There were no vitals taken for this visit.    Pause for the Cause:  Right patient:  Yes  Right procedure/correct coil:  Yes; rTMS; 27860; F8 coil.   Earplugs in place:  Yes    Procedure:  Patient was seated in procedure chair. Identity and procedure was verified. Ear plugs were placed in ears and patient-specific cap was placed on head and tightened appropriately. Ruler locations were verified. Coil was placed at treatment location and stimulator was set to parameters described below. A test train was delivered and patient tolerated so 60 trains were delivered. Patient tolerated treatment well.    Motor Threshold Determination  Distance from nasion to inion: 35  Distance along circumference from midline: 6.67cm  Distance from Vertex: 9.56cm  Cap to Nasion: 4cm  MT 1: 0 - 6 - 16 @ 69% on 1/29/2018  MT 2: 0 - 6 - 16 @ 69% on 2/6/2018   MT 3: 0 - 6 - 16 @ 69% on 2/13/2018   MT 4: 0  - 6 - 16 @ 69% on 2/23/2018   MT 5: 0 - 6 - 16 @ 69% on 3/2/2018   MT 6: 0 - 5.5 - 15.5(always use intersection at left side of ruler)@ 85% on 8/23/19  MT 7: 0 - 5.5 - 15.5(always use intersection at left side of ruler)@ 80% on 8/29/19  MT 8: 0 - 5.5 - 37.5(always use intersection at left side of ruler)@ 76% on 9/5/19 (right side using EMG on left hand)  MT 9: C2 (R side using EMG on L hand) 78% on 9/12/2019  MT 10: C2 (R side using EMG on L hand) 76% on 9/26/2019  MT 11: C2 (R side using EMG on L hand) 82 on 1/31/2020  MT 12: C2 (R side using EMG on L Hand) 86% on 12/22/2020   MT 13: C2 (R side) 100% on 04/21/22  MT 14: C2 (R side) 86% on 6/7/22  MT 15: 0 - 6 - 16.5 @ 68% on 6/14/2022  MT 16: C2 (R side) 89% on 6/16/22  MT 17: F3 @ 91% on 4/30/2024    LDLPFC:  Frequency: 50 Hz  Number of pulses:  3                        Number of bursts: 10  Burst frequency: 5 Hz  Cycle time: 10 sec  Total pulses delivered: 1800  Tx Loc: F3  Energy: 91% (100% MT) -max output  Number of Cycles: 60 trains     RDLPFC:  Frequency: 50 Hz  Number of pulses:  3                        Number of bursts: 200  Burst frequency: 5 Hz  Cycle time: 97.0sec  Total pulses delivered: 1800  Tx Loc: F4 (right side)  Energy: 91% (100% MT) -max output  Number of Cycles: 3 trains    Rating Scales:  Date MADRS QIDS PHQ-9   4/30/2024 30 15 15   5/3/24  17 15   5/10/24  16 13   5/17/24  15 11   5/24/24  16 16   5/31/24  14 12   6/7/24  17 13       Plan   - Continue TMS treatments     This service provided today was under the supervising provider of the day, Dom Shirley MD, who was available if needed.     Imelda Davis, TMS Technician  [facility] Neuromod[facility]

## 2024-06-19 ENCOUNTER — OFFICE VISIT (OUTPATIENT)
Dept: PSYCHIATRY | Facility: CLINIC | Age: 54
End: 2024-06-19
Payer: COMMERCIAL

## 2024-06-19 DIAGNOSIS — F33.2 SEVERE EPISODE OF RECURRENT MAJOR DEPRESSIVE DISORDER, WITHOUT PSYCHOTIC FEATURES (H): Primary | ICD-10-CM

## 2024-06-19 ASSESSMENT — PATIENT HEALTH QUESTIONNAIRE - PHQ9: SUM OF ALL RESPONSES TO PHQ QUESTIONS 1-9: 11

## 2024-06-19 NOTE — PROGRESS NOTES
[facility] TMS Program  [address], [address]  [place] [address]  TMS Procedure Note   Aure Joy MRN# [id number]  Age: 54 year old   YOB: 1970    Aure Joy comes into clinic today at the request ofESTELA MD, ordering provider for TMS treatments.     Pre-Procedure:  History and Physical: Reviewed in medical record  Consent signed by: Aure Joy on: 12/22/2020    Clinical Narrative:  Patient tolerated treatment. Enjoyed not working this past weekend.     Indications for TMS:  MDD, recurrent, severe; 4+ medication trials (from 2+ classes) ineffective; Psychotherapy ineffective.     Pre-Procedure Diagnosis:  MDD, recurrent, severe F33.2    Treatment Hx:  Treatment number this series: 31  Total lifetime treatment number: 233    Allergies   Allergen Reactions    Codeine Nausea    Lithium GI Disturbance     Cramping, nausea, diarrhea    Other  [No Clinical Screening - See Comments] Nausea and Vomiting and Other (See Comments)     general anesthesia- uncontrolled vomitting      There were no vitals taken for this visit.    Pause for the Cause:  Right patient:  Yes  Right procedure/correct coil:  Yes; rTMS; 81869; F8 coil.   Earplugs in place:  Yes    Procedure:  Patient was seated in procedure chair. Identity and procedure was verified. Ear plugs were placed in ears and patient-specific cap was placed on head and tightened appropriately. Ruler locations were verified. Coil was placed at treatment location and stimulator was set to parameters described below. A test train was delivered and patient tolerated so 60 trains were delivered. Patient tolerated treatment well.    Motor Threshold Determination  Distance from nasion to inion: 35  Distance along circumference from midline: 6.67cm  Distance from Vertex: 9.56cm  Cap to Nasion: 4cm  MT 1: 0 - 6 - 16 @ 69% on 1/29/2018  MT 2: 0 - 6 - 16 @ 69% on 2/6/2018   MT 3: 0 - 6 - 16 @ 69% on 2/13/2018   MT 4: 0 - 6 - 16 @ 69% on  2/23/2018   MT 5: 0 - 6 - 16 @ 69% on 3/2/2018   MT 6: 0 - 5.5 - 15.5(always use intersection at left side of ruler)@ 85% on 8/23/19  MT 7: 0 - 5.5 - 15.5(always use intersection at left side of ruler)@ 80% on 8/29/19  MT 8: 0 - 5.5 - 37.5(always use intersection at left side of ruler)@ 76% on 9/5/19 (right side using EMG on left hand)  MT 9: C2 (R side using EMG on L hand) 78% on 9/12/2019  MT 10: C2 (R side using EMG on L hand) 76% on 9/26/2019  MT 11: C2 (R side using EMG on L hand) 82 on 1/31/2020  MT 12: C2 (R side using EMG on L Hand) 86% on 12/22/2020   MT 13: C2 (R side) 100% on 04/21/22  MT 14: C2 (R side) 86% on 6/7/22  MT 15: 0 - 6 - 16.5 @ 68% on 6/14/2022  MT 16: C2 (R side) 89% on 6/16/22  MT 17: F3 @ 91% on 4/30/2024    LDLPFC:  Frequency: 50 Hz  Number of pulses:  3                        Number of bursts: 10  Burst frequency: 5 Hz  Cycle time: 10 sec  Total pulses delivered: 1800  Tx Loc: F3  Energy: 91% (100% MT) -max output  Number of Cycles: 60 trains     RDLPFC:  Frequency: 50 Hz  Number of pulses:  3                        Number of bursts: 200  Burst frequency: 5 Hz  Cycle time: 97.0sec  Total pulses delivered: 1800  Tx Loc: F4 (right side)  Energy: 91% (100% MT) -max output  Number of Cycles: 3 trains    Rating Scales:  Date MADRS QIDS PHQ-9   4/30/2024 30 15 15   5/3/24  17 15   5/10/24  16 13   5/17/24  15 11   5/24/24  16 16   5/31/24  14 12   6/7/24  17 13       Plan   - Continue TMS treatments     This service provided today was under the supervising provider of the day, Elizabeth Gordon MD, who was available if needed.     Lilia Prieto, TMS Technician  [facility] Neuromod[facility]

## 2024-06-20 ENCOUNTER — OFFICE VISIT (OUTPATIENT)
Dept: PSYCHIATRY | Facility: CLINIC | Age: 54
End: 2024-06-20
Payer: COMMERCIAL

## 2024-06-20 DIAGNOSIS — F33.2 SEVERE EPISODE OF RECURRENT MAJOR DEPRESSIVE DISORDER, WITHOUT PSYCHOTIC FEATURES (H): Primary | ICD-10-CM

## 2024-06-20 ASSESSMENT — PATIENT HEALTH QUESTIONNAIRE - PHQ9: SUM OF ALL RESPONSES TO PHQ QUESTIONS 1-9: 11

## 2024-06-20 NOTE — PROGRESS NOTES
[facility] TMS Program  [address], [address]  [place] [address]  TMS Procedure Note   Aure Joy MRN# [id number]  Age: 54 year old   YOB: 1970    Aure Joy comes into clinic today at the request ofESTELA MD, ordering provider for TMS treatments.     Pre-Procedure:  History and Physical: Reviewed in medical record  Consent signed by: Aure Joy on: 12/22/2020    Clinical Narrative:  Patient tolerated treatment. Stated she feels TMS has been helping.    Indications for TMS:  MDD, recurrent, severe; 4+ medication trials (from 2+ classes) ineffective; Psychotherapy ineffective.     Pre-Procedure Diagnosis:  MDD, recurrent, severe F33.2    Treatment Hx:  Treatment number this series: 32  Total lifetime treatment number: 233    Allergies   Allergen Reactions    Codeine Nausea    Lithium GI Disturbance     Cramping, nausea, diarrhea    Other  [No Clinical Screening - See Comments] Nausea and Vomiting and Other (See Comments)     general anesthesia- uncontrolled vomitting      There were no vitals taken for this visit.    Pause for the Cause:  Right patient:  Yes  Right procedure/correct coil:  Yes; rTMS; 03059; F8 coil.   Earplugs in place:  Yes    Procedure:  Patient was seated in procedure chair. Identity and procedure was verified. Ear plugs were placed in ears and patient-specific cap was placed on head and tightened appropriately. Ruler locations were verified. Coil was placed at treatment location and stimulator was set to parameters described below. A test train was delivered and patient tolerated so 60 trains were delivered. Patient tolerated treatment well.    Motor Threshold Determination  Distance from nasion to inion: 35  Distance along circumference from midline: 6.67cm  Distance from Vertex: 9.56cm  Cap to Nasion: 4cm  MT 1: 0 - 6 - 16 @ 69% on 1/29/2018  MT 2: 0 - 6 - 16 @ 69% on 2/6/2018   MT 3: 0 - 6 - 16 @ 69% on 2/13/2018   MT 4: 0 - 6 - 16 @ 69% on  2/23/2018   MT 5: 0 - 6 - 16 @ 69% on 3/2/2018   MT 6: 0 - 5.5 - 15.5(always use intersection at left side of ruler)@ 85% on 8/23/19  MT 7: 0 - 5.5 - 15.5(always use intersection at left side of ruler)@ 80% on 8/29/19  MT 8: 0 - 5.5 - 37.5(always use intersection at left side of ruler)@ 76% on 9/5/19 (right side using EMG on left hand)  MT 9: C2 (R side using EMG on L hand) 78% on 9/12/2019  MT 10: C2 (R side using EMG on L hand) 76% on 9/26/2019  MT 11: C2 (R side using EMG on L hand) 82 on 1/31/2020  MT 12: C2 (R side using EMG on L Hand) 86% on 12/22/2020   MT 13: C2 (R side) 100% on 04/21/22  MT 14: C2 (R side) 86% on 6/7/22  MT 15: 0 - 6 - 16.5 @ 68% on 6/14/2022  MT 16: C2 (R side) 89% on 6/16/22  MT 17: F3 @ 91% on 4/30/2024    LDLPFC:  Frequency: 50 Hz  Number of pulses:  3                        Number of bursts: 10  Burst frequency: 5 Hz  Cycle time: 10 sec  Total pulses delivered: 1800  Tx Loc: F3  Energy: 91% (100% MT) -max output  Number of Cycles: 60 trains     RDLPFC:  Frequency: 50 Hz  Number of pulses:  3                        Number of bursts: 200  Burst frequency: 5 Hz  Cycle time: 97.0sec  Total pulses delivered: 1800  Tx Loc: F4 (right side)  Energy: 91% (100% MT) -max output  Number of Cycles: 3 trains    Rating Scales:  Date MADRS QIDS PHQ-9   4/30/2024 30 15 15   5/3/24  17 15   5/10/24  16 13   5/17/24  15 11   5/24/24  16 16   5/31/24  14 12   6/7/24  17 13       Plan   - Continue TMS treatments     This service provided today was under the supervising provider of the day, ESTELA Zamudio MD, who was available if needed.     Neli Smith, TMS Technician  [facility]  [facility] Neuromod[facility]

## 2024-06-21 ENCOUNTER — OFFICE VISIT (OUTPATIENT)
Dept: PSYCHIATRY | Facility: CLINIC | Age: 54
End: 2024-06-21
Payer: COMMERCIAL

## 2024-06-21 DIAGNOSIS — F33.2 SEVERE EPISODE OF RECURRENT MAJOR DEPRESSIVE DISORDER, WITHOUT PSYCHOTIC FEATURES (H): Primary | ICD-10-CM

## 2024-06-21 ASSESSMENT — PATIENT HEALTH QUESTIONNAIRE - PHQ9: SUM OF ALL RESPONSES TO PHQ QUESTIONS 1-9: 15

## 2024-06-21 NOTE — PROGRESS NOTES
[facility] TMS Program  [address], [address]  [place] [address]  TMS Procedure Note   Aure Joy MRN# [id number]  Age: 54 year old   YOB: 1970    Aure Joy comes into clinic today at the request ofESTELA MD, ordering provider for TMS treatments.     Pre-Procedure:  History and Physical: Reviewed in medical record  Consent signed by: Aure Joy on: 12/22/2020    Clinical Narrative:  Patient tolerated treatment. Has to work all weekend.     Indications for TMS:  MDD, recurrent, severe; 4+ medication trials (from 2+ classes) ineffective; Psychotherapy ineffective.     Pre-Procedure Diagnosis:  MDD, recurrent, severe F33.2    Treatment Hx:  Treatment number this series: 33  Total lifetime treatment number: 234    Allergies   Allergen Reactions    Codeine Nausea    Lithium GI Disturbance     Cramping, nausea, diarrhea    Other  [No Clinical Screening - See Comments] Nausea and Vomiting and Other (See Comments)     general anesthesia- uncontrolled vomitting      There were no vitals taken for this visit.    Pause for the Cause:  Right patient:  Yes  Right procedure/correct coil:  Yes; rTMS; 40817; F8 coil.   Earplugs in place:  Yes    Procedure:  Patient was seated in procedure chair. Identity and procedure was verified. Ear plugs were placed in ears and patient-specific cap was placed on head and tightened appropriately. Ruler locations were verified. Coil was placed at treatment location and stimulator was set to parameters described below. A test train was delivered and patient tolerated so 60 trains were delivered. Patient tolerated treatment well.    Motor Threshold Determination  Distance from nasion to inion: 35  Distance along circumference from midline: 6.67cm  Distance from Vertex: 9.56cm  Cap to Nasion: 4cm  MT 1: 0 - 6 - 16 @ 69% on 1/29/2018  MT 2: 0 - 6 - 16 @ 69% on 2/6/2018   MT 3: 0 - 6 - 16 @ 69% on 2/13/2018   MT 4: 0 - 6 - 16 @ 69% on 2/23/2018   MT 5:  0 - 6 - 16 @ 69% on 3/2/2018   MT 6: 0 - 5.5 - 15.5(always use intersection at left side of ruler)@ 85% on 8/23/19  MT 7: 0 - 5.5 - 15.5(always use intersection at left side of ruler)@ 80% on 8/29/19  MT 8: 0 - 5.5 - 37.5(always use intersection at left side of ruler)@ 76% on 9/5/19 (right side using EMG on left hand)  MT 9: C2 (R side using EMG on L hand) 78% on 9/12/2019  MT 10: C2 (R side using EMG on L hand) 76% on 9/26/2019  MT 11: C2 (R side using EMG on L hand) 82 on 1/31/2020  MT 12: C2 (R side using EMG on L Hand) 86% on 12/22/2020   MT 13: C2 (R side) 100% on 04/21/22  MT 14: C2 (R side) 86% on 6/7/22  MT 15: 0 - 6 - 16.5 @ 68% on 6/14/2022  MT 16: C2 (R side) 89% on 6/16/22  MT 17: F3 @ 91% on 4/30/2024    LDLPFC:  Frequency: 50 Hz  Number of pulses:  3                        Number of bursts: 10  Burst frequency: 5 Hz  Cycle time: 10 sec  Total pulses delivered: 1800  Tx Loc: F3  Energy: 91% (100% MT) -max output  Number of Cycles: 60 trains     RDLPFC:  Frequency: 50 Hz  Number of pulses:  3                        Number of bursts: 200  Burst frequency: 5 Hz  Cycle time: 97.0sec  Total pulses delivered: 1800  Tx Loc: F4 (right side)  Energy: 91% (100% MT) -max output  Number of Cycles: 3 trains    Rating Scales:  Date MADRS QIDS PHQ-9   4/30/2024 30 15 15   5/3/24  17 15   5/10/24  16 13   5/17/24  15 11   5/24/24  16 16   5/31/24  14 12   6/7/24  17 13   6/21/24  17 15       Plan   - Continue TMS treatments     This service provided today was under the supervising provider of the day, Elizabeth Gordon MD, who was available if needed.     Lilai Prieto, TMS Technician  [facility]  [facility] Neuromodulation

## 2024-06-24 ENCOUNTER — ALLIED HEALTH/NURSE VISIT (OUTPATIENT)
Dept: PSYCHIATRY | Facility: CLINIC | Age: 54
End: 2024-06-24
Payer: COMMERCIAL

## 2024-06-24 ENCOUNTER — OFFICE VISIT (OUTPATIENT)
Dept: PSYCHIATRY | Facility: CLINIC | Age: 54
End: 2024-06-24
Payer: COMMERCIAL

## 2024-06-24 VITALS — HEART RATE: 69 BPM | SYSTOLIC BLOOD PRESSURE: 119 MMHG | DIASTOLIC BLOOD PRESSURE: 80 MMHG

## 2024-06-24 DIAGNOSIS — F33.2 SEVERE EPISODE OF RECURRENT MAJOR DEPRESSIVE DISORDER, WITHOUT PSYCHOTIC FEATURES (H): Primary | ICD-10-CM

## 2024-06-24 ASSESSMENT — PATIENT HEALTH QUESTIONNAIRE - PHQ9: SUM OF ALL RESPONSES TO PHQ QUESTIONS 1-9: 17

## 2024-06-24 NOTE — PROGRESS NOTES
Aure Joy comes into clinic today at the request of ESTELA Zamudio MD Ordering Provider for Med Injection only Ketamine .    Procedure Prep:  Medication double check completed by: John GUERRERO RN  Prior to administration pt identified by name and : yes    Nursing Assessment:  Appearance: dressed casually   Mood: depressed  Affect: flat  Eye contact: fair      2024     9:14 AM   PHQ   PHQ-9 Total Score 17   Q9: Thoughts of better off dead/self-harm past 2 weeks Several days     QIDDSR 16 weekly assessment: score 15. Assessment was scanned to EHR.       Procedure Performed:  VSS and mental status WNL. Patient was given Ketamine. See MAR for details.     Post Procedure Assessment:  Patient tolerated the treatment with the following side effects: dissociation. Vital signs were monitored, see VS Flowsheet. Patient stated they felt ready to go home prior to discharge. AVS was offered to patient and patient declined. Patient was instructed not to drive for the remainder of the day and to notify clinic if any concerns arise.     Next appt: 24    Medications were supplied by Clinic       This service provided today was under the supervising provider of the day Cash Valdivia MD, who was available if needed.    Jael Alexandra RN

## 2024-06-24 NOTE — PROGRESS NOTES
[facility] TMS Program  [address], [address]  [place] [address]  TMS Procedure Note   Aure Joy MRN# [id number]  Age: 54 year old   YOB: 1970    Aure Joy comes into clinic today at the request ofESTELA MD, ordering provider for TMS treatments.     Pre-Procedure:  History and Physical: Reviewed in medical record  Consent signed by: Aure Joy on: 12/22/2020    Clinical Narrative:  Patient tolerated treatment. Last treatment! Feels like the TMS has given her a nice boost in her mood.    Indications for TMS:  MDD, recurrent, severe; 4+ medication trials (from 2+ classes) ineffective; Psychotherapy ineffective.     Pre-Procedure Diagnosis:  MDD, recurrent, severe F33.2    Treatment Hx:  Treatment number this series: 34  Total lifetime treatment number: 235    Allergies   Allergen Reactions    Codeine Nausea    Lithium GI Disturbance     Cramping, nausea, diarrhea    Other  [No Clinical Screening - See Comments] Nausea and Vomiting and Other (See Comments)     general anesthesia- uncontrolled vomitting      There were no vitals taken for this visit.    Pause for the Cause:  Right patient:  Yes  Right procedure/correct coil:  Yes; rTMS; 05271; F8 coil.   Earplugs in place:  Yes    Procedure:  Patient was seated in procedure chair. Identity and procedure was verified. Ear plugs were placed in ears and patient-specific cap was placed on head and tightened appropriately. Ruler locations were verified. Coil was placed at treatment location and stimulator was set to parameters described below. A test train was delivered and patient tolerated so 60 trains were delivered. Patient tolerated treatment well.    Motor Threshold Determination  Distance from nasion to inion: 35  Distance along circumference from midline: 6.67cm  Distance from Vertex: 9.56cm  Cap to Nasion: 4cm  MT 1: 0 - 6 - 16 @ 69% on 1/29/2018  MT 2: 0 - 6 - 16 @ 69% on 2/6/2018   MT 3: 0 - 6 - 16 @ 69% on  2/13/2018   MT 4: 0 - 6 - 16 @ 69% on 2/23/2018   MT 5: 0 - 6 - 16 @ 69% on 3/2/2018   MT 6: 0 - 5.5 - 15.5(always use intersection at left side of ruler)@ 85% on 8/23/19  MT 7: 0 - 5.5 - 15.5(always use intersection at left side of ruler)@ 80% on 8/29/19  MT 8: 0 - 5.5 - 37.5(always use intersection at left side of ruler)@ 76% on 9/5/19 (right side using EMG on left hand)  MT 9: C2 (R side using EMG on L hand) 78% on 9/12/2019  MT 10: C2 (R side using EMG on L hand) 76% on 9/26/2019  MT 11: C2 (R side using EMG on L hand) 82 on 1/31/2020  MT 12: C2 (R side using EMG on L Hand) 86% on 12/22/2020   MT 13: C2 (R side) 100% on 04/21/22  MT 14: C2 (R side) 86% on 6/7/22  MT 15: 0 - 6 - 16.5 @ 68% on 6/14/2022  MT 16: C2 (R side) 89% on 6/16/22  MT 17: F3 @ 91% on 4/30/2024    LDLPFC:  Frequency: 50 Hz  Number of pulses:  3                        Number of bursts: 10  Burst frequency: 5 Hz  Cycle time: 10 sec  Total pulses delivered: 1800  Tx Loc: F3  Energy: 91% (100% MT) -max output  Number of Cycles: 60 trains     RDLPFC:  Frequency: 50 Hz  Number of pulses:  3                        Number of bursts: 200  Burst frequency: 5 Hz  Cycle time: 97.0sec  Total pulses delivered: 1800  Tx Loc: F4 (right side)  Energy: 91% (100% MT) -max output  Number of Cycles: 3 trains    Rating Scales:  Date MADRS QIDS PHQ-9   4/30/2024 30 15 15   5/3/24  17 15   5/10/24  16 13   5/17/24  15 11   5/24/24  16 16   5/31/24  14 12   6/7/24  17 13   6/21/24  17 15   6/24/24 27 15 17       Plan   - Continue TMS treatments     This service provided today was under the supervising provider of the day, Cash Valdivia MD, who was available if needed.     Neli Smith, TMS Technician  [facility] Neuromodulation

## 2024-07-01 ENCOUNTER — ALLIED HEALTH/NURSE VISIT (OUTPATIENT)
Dept: PSYCHIATRY | Facility: CLINIC | Age: 54
End: 2024-07-01
Payer: COMMERCIAL

## 2024-07-01 VITALS — DIASTOLIC BLOOD PRESSURE: 82 MMHG | HEART RATE: 70 BPM | SYSTOLIC BLOOD PRESSURE: 114 MMHG

## 2024-07-01 DIAGNOSIS — F33.2 SEVERE EPISODE OF RECURRENT MAJOR DEPRESSIVE DISORDER, WITHOUT PSYCHOTIC FEATURES (H): Primary | ICD-10-CM

## 2024-07-01 ASSESSMENT — PATIENT HEALTH QUESTIONNAIRE - PHQ9: SUM OF ALL RESPONSES TO PHQ QUESTIONS 1-9: 16
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
## 2024-07-01 NOTE — PROGRESS NOTES
Aure Joy comes into clinic today at the request of ESTELA Zamudio MD Ordering Provider for Med Injection only Ketamine .    Procedure Prep:  Medication double check completed by: Perla Robbins RN  Prior to administration pt identified by name and : yes    Nursing Assessment:  Appearance: dressed casually   Mood: depressed  Affect: flat  Eye contact: fair      2024     9:20 AM   PHQ   PHQ-9 Total Score 16   Q9: Thoughts of better off dead/self-harm past 2 weeks Several days     QIDDSR 16 weekly assessment: score 16. Assessment was scanned to EHR.       Procedure Performed:  VSS and mental status WNL. Patient was given Ketamine. See MAR for details.     Post Procedure Assessment:  Patient tolerated the treatment with the following side effects: dissociation. Vital signs were monitored, see VS Flowsheet. Patient stated they felt ready to go home prior to discharge. AVS was offered to patient and patient declined. Patient was instructed not to drive for the remainder of the day and to notify clinic if any concerns arise.     Next appt: Monday    Medications were supplied by Clinic       This service provided today was under the supervising provider of the day DYLLAN Chaudhry MD, who was available if needed.        Jona Donnelly RN on 2024 at 9:21 AM

## 2024-07-03 ENCOUNTER — TELEPHONE (OUTPATIENT)
Dept: PSYCHIATRY | Facility: CLINIC | Age: 54
End: 2024-07-03

## 2024-07-03 NOTE — TELEPHONE ENCOUNTER
Writer called pharmacy at patient's request as she had been having concerns regarding filling her medications a day before she is out.     Per pharmacy they have been filling the medication the day she requests it which is one day before she runs out.  They have not had any rejections from insurance and are suggesting that patient request the medication about 5 days before she is out and see if that works better for her.        [employer] message sent to patient.

## 2024-07-08 ENCOUNTER — ALLIED HEALTH/NURSE VISIT (OUTPATIENT)
Dept: PSYCHIATRY | Facility: CLINIC | Age: 54
End: 2024-07-08
Payer: COMMERCIAL

## 2024-07-08 VITALS — SYSTOLIC BLOOD PRESSURE: 120 MMHG | HEART RATE: 70 BPM | DIASTOLIC BLOOD PRESSURE: 84 MMHG

## 2024-07-08 DIAGNOSIS — F33.2 SEVERE EPISODE OF RECURRENT MAJOR DEPRESSIVE DISORDER, WITHOUT PSYCHOTIC FEATURES (H): Primary | ICD-10-CM

## 2024-07-08 ASSESSMENT — PATIENT HEALTH QUESTIONNAIRE - PHQ9: SUM OF ALL RESPONSES TO PHQ QUESTIONS 1-9: 15

## 2024-07-08 NOTE — PROGRESS NOTES
Aure Joy comes into clinic today at the request of ESTELA Zamudio MD Ordering Provider for Med Injection only Ketamine .    Procedure Prep:  Medication double check completed by: Perla Robbins RN  Prior to administration pt identified by name and : yes    Nursing Assessment:  Appearance: dressed casually   Mood: depressed  Affect: WNL  Eye contact: fair      2024     9:38 AM   PHQ   PHQ-9 Total Score 15   Q9: Thoughts of better off dead/self-harm past 2 weeks Several days     QIDDSR 16 weekly assessment: score 15. Assessment was scanned to EHR.       Procedure Performed:  VSS and mental status WNL. Patient was given Ketamine. See MAR for details.     Post Procedure Assessment:  Patient tolerated the treatment with the following side effects: dissociation. Vital signs were monitored, see VS Flowsheet. Patient stated they felt ready to go home prior to discharge. AVS was offered to patient and patient declined. Patient was instructed not to drive for the remainder of the day and to notify clinic if any concerns arise.     Next appt: Monday    Medications were supplied by Clinic       This service provided today was under the supervising provider of the day MICHAEL Shirley MD, who was available if needed.        Joan Donnelly RN

## 2024-07-15 ENCOUNTER — ALLIED HEALTH/NURSE VISIT (OUTPATIENT)
Dept: PSYCHIATRY | Facility: CLINIC | Age: 54
End: 2024-07-15
Payer: COMMERCIAL

## 2024-07-15 VITALS — SYSTOLIC BLOOD PRESSURE: 119 MMHG | DIASTOLIC BLOOD PRESSURE: 82 MMHG | HEART RATE: 64 BPM

## 2024-07-15 DIAGNOSIS — F33.2 SEVERE EPISODE OF RECURRENT MAJOR DEPRESSIVE DISORDER, WITHOUT PSYCHOTIC FEATURES (H): Primary | ICD-10-CM

## 2024-07-15 ASSESSMENT — PATIENT HEALTH QUESTIONNAIRE - PHQ9
10. IF YOU CHECKED OFF ANY PROBLEMS, HOW DIFFICULT HAVE THESE PROBLEMS MADE IT FOR YOU TO DO YOUR WORK, TAKE CARE OF THINGS AT HOME, OR GET ALONG WITH OTHER PEOPLE: VERY DIFFICULT
SUM OF ALL RESPONSES TO PHQ QUESTIONS 1-9: 18
SUM OF ALL RESPONSES TO PHQ QUESTIONS 1-9: 18

## 2024-07-15 NOTE — PROGRESS NOTES
Aure Joy comes into clinic today at the request of ESTELA Zamudio MD Ordering Provider for Med Injection only Ketamine .    Procedure Prep:  Medication double check completed by: Perla Robbins RN  Prior to administration pt identified by name and : yes    Nursing Assessment:  Appearance: dressed casually   Mood: depressed  Affect: WNL  Eye contact: fair      7/15/2024     9:10 AM   PHQ   PHQ-9 Total Score 18   Q9: Thoughts of better off dead/self-harm past 2 weeks Several days   F/U: Thoughts of suicide or self-harm No   F/U: Safety concerns No     QIDDSR 16 weekly assessment: score 18. Assessment was scanned to EHR.       Procedure Performed:  VSS and mental status WNL. Patient was given Ketamine. See MAR for details.     Post Procedure Assessment:  Patient tolerated the treatment with the following side effects: dissociation. Vital signs were monitored, see VS Flowsheet. Patient stated they felt ready to go home prior to discharge. AVS was offered to patient and patient declined. Patient was instructed not to drive for the remainder of the day and to notify clinic if any concerns arise.     Next appt: Monday    Medications were supplied by Clinic       This service provided today was under the supervising provider of the day MICHAEL Shirley MD, who was available if needed.        Joan Donnelly RN     Answers submitted by the patient for this visit:  Patient Health Questionnaire (Submitted on 7/15/2024)  If you checked off any problems, how difficult have these problems made it for you to do your work, take care of things at home, or get along with other people?: Very difficult  PHQ9 TOTAL SCORE: 18

## 2024-07-18 ENCOUNTER — OFFICE VISIT (OUTPATIENT)
Dept: PSYCHIATRY | Facility: CLINIC | Age: 54
End: 2024-07-18
Payer: COMMERCIAL

## 2024-07-18 VITALS
OXYGEN SATURATION: 100 % | SYSTOLIC BLOOD PRESSURE: 126 MMHG | BODY MASS INDEX: 26.61 KG/M2 | HEIGHT: 62 IN | HEART RATE: 63 BPM | DIASTOLIC BLOOD PRESSURE: 85 MMHG | WEIGHT: 144.6 LBS | TEMPERATURE: 97.3 F

## 2024-07-18 DIAGNOSIS — F43.10 COMPLEX POSTTRAUMATIC STRESS DISORDER: ICD-10-CM

## 2024-07-18 DIAGNOSIS — F40.00 AGORAPHOBIA: ICD-10-CM

## 2024-07-18 DIAGNOSIS — D32.9 MENINGIOMA (H): ICD-10-CM

## 2024-07-18 DIAGNOSIS — E55.9 VITAMIN D DEFICIENCY: ICD-10-CM

## 2024-07-18 DIAGNOSIS — F33.2 SEVERE EPISODE OF RECURRENT MAJOR DEPRESSIVE DISORDER, WITHOUT PSYCHOTIC FEATURES (H): Primary | ICD-10-CM

## 2024-07-18 DIAGNOSIS — G24.01 TARDIVE DYSKINESIA: ICD-10-CM

## 2024-07-18 RX ORDER — DEXTROMETHORPHAN HYDROBROMIDE, BUPROPION HYDROCHLORIDE 105; 45 MG/1; MG/1
1 TABLET, MULTILAYER, EXTENDED RELEASE ORAL 2 TIMES DAILY
Qty: 60 TABLET | Refills: 1 | Status: SHIPPED | OUTPATIENT
Start: 2024-07-18 | End: 2024-07-18

## 2024-07-18 RX ORDER — DEXTROMETHORPHAN HYDROBROMIDE, BUPROPION HYDROCHLORIDE 105; 45 MG/1; MG/1
1 TABLET, MULTILAYER, EXTENDED RELEASE ORAL 2 TIMES DAILY
Qty: 60 TABLET | Refills: 1 | Status: SHIPPED | OUTPATIENT
Start: 2024-07-18 | End: 2024-08-19
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
## 2024-07-18 NOTE — PATIENT INSTRUCTIONS
"Today's Recommendations:  - We are going to increase your Ketamine dose to 1.1 mg/kg   - We are going to start you on Auvelity (Bupropion+Dextromethorphan) to help manage your mood (Start at One tablet a day for three days and then go to two times a day)  - Follow  up with the Clinic Nurse Practitioner (Dr. Sung Lopez) in a Month  - On your next visit based on how your mood is, we will try to go down on your Abilify    We are specifically a university and research clinic, and there are often research studies ongoing. Some of those are clinical trials of new brain stimulation treatments. Others are what we would call \"biomarker\" studies, where we ask you to participate in some kind of measurement while you are undergoing regular treatment.   If you are interested in hearing about research consider signing up for our research registry. This will allow researchers in our clinic to reach out if you become eligible for a study. Sign up today at [email or web address]    In some cases, a clinical trial is the only way to get access to an advanced treatment that is not covered by your insurance. Usually, we will talk about this in your visit if it is an option.      Patient Education       General Information:  Our Treatment-Resistant Disorders/Interventional Psychiatry clinic is what is often called a \"consultation\" or \"tertiary care\" clinic. That means we do not see patients long-term for medication management or talk therapy. We offer thorough evaluations, recommendations about medications/therapies you may have not yet tried, and in some cases, brain stimulation or office-based treatments. If you are likely to benefit from one of those advanced treatments, we will have talked about it today. Once that treatment is complete, we will see you once or twice afterwards to check in, and then you will return to getting ongoing psychiatric care from whomever you were seeing before you came for your evaluation with us. " If for some reason you no longer have access to that clinician, we can help with a referral to our main [facility]. The only patients we see long-term are patients with implanted medical devices that require ongoing monitoring and programming.     Our recommendations almost always include some kind of cognitive-behavioral therapy (CBT) if you are not getting it already. Brain and nerve stimulation treatments work best when combined with certain talk therapies. We make these recommendations because we strongly believe that, without the therapy piece, most people will not get better, or will have limited clinical benefit. It is often difficult or inconvenient to add therapy to an already busy schedule, but it is also necessary.    It is important to remember that, like all mental health treatments, our interventional therapies are not perfect. About one third of people will not feel better after treatment, and even when they work, they do not take away the symptoms entirely.If we have recommended something above, it means we think there is a better than even chance of it working, but things are never guaranteed. This is another reason we usually recommend CBT along with our advanced treatments -- it can address the symptoms that remain after the stimulation/ketamine treatment.       While we are waiting to implement the recommendations you and I have discussed, you should know what to do if your symptoms worsen. A variety of crisis numbers/resources are available. They include:    CRISIS GENERAL NUMBERS   0-296-TMVIJRS (1-662.345.1983)  OR  911     CRISIS INTERVENTION TEAM (CIT) - this is a POLICE UNIT, specially trained.  This website has information for all of [place]'s CITs. Lays out which areas have this team.  [email or web address]  However, one can just call 911 and ask for this special team.   If police need to be called, DO ask for this team.    CRISIS MOBILE TEAMS  [and  see end of this phrase*]  [facility] -COPE: 24hrs/7days:    [phone]  (Adults > 17yo)    [phone]  (Child   < 18yo)                                       FRONT DOOR (during the day could call)   [phone]    [facility] -[phone] (Adult)  [phone] (Child)     [facility] -[phone] (Adult and Child)     [facility] -[phone] ([employer] mobile crisis team; Adult and Child; 24hr)     [facility] -[phone] (Adult and Child)     CRISIS HOUSING  [facility]                           [address]              [phone]  [facility]                                [address]                       [phone]  [facility]                               [address]     [phone]   [facility]  [address]                [phone]    [place] EMERGENCY NUMBERS    Crisis Connection:                                [phone]  [facility]:     [phone]  Crisis Intervention:                                [phone] or [phone]   COPE: Mobile Team 24hrs/7days:    [phone]  (Adults > 17yo)                                                                            [phone]  (Child   < 18yo)  [facility]        [phone] 24/7 line and Mobile Team   [address] [place] [address]  DROP IN:  M-F: 8:00 am - 7:00 pm  Sat: 11:00 am - 3:00 pm   Sun: Closed     WALK-IN COUNSELING:  [facility]       [phone]    [address]:   M, W, F:  1:00-3:00PM    M-Th:  6:30-8:30PM    TRANS and LGBT  Call [employer] at [phone]. This service is staffed by trans people 24/7.   LGBT youth <24 contemplating suicide, call the [employer] [phone].    POISON CONTROL CENTER  [phone]   "  OR  go to nearest ER    CHILD  \"[facility] has a needs assessment team who will do an intake evaluation. Based on the results of the intake they will recommended inpatient admission, partial hospitalization, intensive outpatient or outpatient care. The number is [phone]. \"    CRISIS TEXT LINE  Http://www.crisistextline.org  FREE SUPPORT AT YOUR FINGERTIPS,   Crisis Text Line serves anyone, in any type of crisis, providing access to free,  support and information via the medium people already use and trust: text. Here s how it works:  1)  Text [phone] from anywhere in the [place], anytime, about any type of crisis.  2)  A live, trained Crisis Counselor receives the text and responds quickly.      The volunteer Crisis Counselor will help you move from a 'hot moment to a cool moment'    [facility] EMERGENCY NUMBERS    Crisis Connection:                                [phone]  [facility]:              [phone]  Crisis Intervention:                                [phone] or [phone]   COPE: Mobile Team 24hrs/7days:    [phone]  (Adults > 19yo)                                                                            [phone]  (Child   < 18yo)  [facility]        [phone]  CALL 24 hours a day.  [address] [place] [address]  DROP IN:  M-F: 8:00 am - 7:00 pm  Sat: 11:00 am - 3:00 pm   Sun: Closed    WALK-IN COUNSELIN)  [facility]                                  [phone]        [address]:                  TRIPP, W:      5:00-7:00PM       2)  [facility]                            [phone]        [place], [address]                T, Th:      6:00-8:00PM    TRANS and LGBT  Call [employer] at [phone]. This service is staffed by trans people .   LGBT youth <24 contemplating suicide, call the [employer] [phone].    POISON CONTROL CENTER  " "[phone]    OR  go to nearest ER    CHILD  \"[facility] has a needs assessment team who will do an intake evaluation. Based on the results of the intake they will recommended inpatient admission, partial hospitalization, intensive outpatient or outpatient care. The number is [phone] or [phone]. \"    CRISIS TEXT LINE  Http://www.crisistextline.org  FREE SUPPORT AT YOUR FINGERTIPS, 24/7  Crisis Text Line serves anyone, in any type of crisis, providing access to free, 24/7 support and information via the medium people already use and trust: text. Here s how it works:  1)  Text [phone] from anywhere in the [place], anytime, about any type of crisis.  2)  A live, trained Crisis Counselor receives the text and responds quickly.      The volunteer Crisis Counselor will help you move from a 'hot moment to a cool moment'      * A Community Paramedic (CP) is an advanced paramedic that works to increase access to primary and preventive care and decrease use of emergency departments, which in turn decreases health care costs. Among other things, CPs may play a key role in providing follow-up services after a hospital discharge to prevent hospital readmission. CPs can provide health assessments, chronic disease monitoring and education, medication management, immunizations and vaccinations, laboratory specimen collection, hospital discharge follow-up care and minor medical procedures. CPs work under the direction of an Ambulance Medical Director.   "

## 2024-07-19 RX ORDER — ARIPIPRAZOLE 10 MG/1
10 TABLET ORAL DAILY
Qty: 30 TABLET | Refills: 1 | Status: SHIPPED | OUTPATIENT
Start: 2024-07-19 | End: 2024-10-04

## 2024-07-19 RX ORDER — LAMOTRIGINE 200 MG/1
400 TABLET ORAL DAILY
Qty: 60 TABLET | Refills: 3 | Status: SHIPPED | OUTPATIENT
Start: 2024-07-19

## 2024-07-22 ENCOUNTER — TELEPHONE (OUTPATIENT)
Dept: PSYCHIATRY | Facility: CLINIC | Age: 54
End: 2024-07-22

## 2024-07-22 ENCOUNTER — ALLIED HEALTH/NURSE VISIT (OUTPATIENT)
Dept: PSYCHIATRY | Facility: CLINIC | Age: 54
End: 2024-07-22
Payer: COMMERCIAL

## 2024-07-22 VITALS — SYSTOLIC BLOOD PRESSURE: 123 MMHG | DIASTOLIC BLOOD PRESSURE: 79 MMHG | HEART RATE: 86 BPM

## 2024-07-22 DIAGNOSIS — F33.2 SEVERE EPISODE OF RECURRENT MAJOR DEPRESSIVE DISORDER, WITHOUT PSYCHOTIC FEATURES (H): Primary | ICD-10-CM

## 2024-07-22 DIAGNOSIS — F33.2 SEVERE EPISODE OF RECURRENT MAJOR DEPRESSIVE DISORDER, WITHOUT PSYCHOTIC FEATURES (H): ICD-10-CM

## 2024-07-22 ASSESSMENT — PATIENT HEALTH QUESTIONNAIRE - PHQ9: SUM OF ALL RESPONSES TO PHQ QUESTIONS 1-9: 18

## 2024-07-22 NOTE — PROGRESS NOTES
Aure Joy comes into clinic today at the request of Dr. Zamudio Ordering Provider for Med Injection only ketamine .    Procedure Prep:  Medication double check completed by: Jael Alexandra RN  Prior to administration pt identified by name and : Yes    Nursing Assessment:  Appearance: Casual   Mood: Okay.   Affect: WNL  Eye contact: Good      2024     9:20 AM   PHQ   PHQ-9 Total Score 18   Q9: Thoughts of better off dead/self-harm past 2 weeks Several days     QIDDSR 16 weekly assessment: score 16. Assessment was scanned to EHR.     Procedure Performed:  VSS and mental status WNL. Patient was given ketamine. See MAR for details.     Post Procedure Assessment:  Patient tolerated the treatment with the following side effects: dissociation. Vital signs were monitored, see VS Flowsheet. Patient stated they felt ready to go home prior to discharge. AVS was offered to patient and patient declined. Patient was instructed not to drive for the remainder of the day and to notify clinic if any concerns arise.     Next appt: Monday    Medications were supplied by Clinic       This service provided today was under the supervising provider of the day Dom Shirley MD, who was available if needed.      Perla Robbins RN

## 2024-07-29 ENCOUNTER — ALLIED HEALTH/NURSE VISIT (OUTPATIENT)
Dept: PSYCHIATRY | Facility: CLINIC | Age: 54
End: 2024-07-29
Payer: COMMERCIAL

## 2024-07-29 VITALS — DIASTOLIC BLOOD PRESSURE: 83 MMHG | SYSTOLIC BLOOD PRESSURE: 119 MMHG | HEART RATE: 82 BPM

## 2024-07-29 DIAGNOSIS — F33.2 SEVERE EPISODE OF RECURRENT MAJOR DEPRESSIVE DISORDER, WITHOUT PSYCHOTIC FEATURES (H): Primary | ICD-10-CM

## 2024-07-29 ASSESSMENT — PATIENT HEALTH QUESTIONNAIRE - PHQ9: SUM OF ALL RESPONSES TO PHQ QUESTIONS 1-9: 17

## 2024-07-29 NOTE — PROGRESS NOTES
Aure Joy comes into clinic today at the request of Dr. Zamudio Ordering Provider for Med Injection only ketamine .    Procedure Prep:  Medication double check completed by: Jael Alexandra RN  Prior to administration pt identified by name and : Yes    Nursing Assessment:  Appearance: Casual   Mood: Okay.   Affect: WNL  Eye contact: Good      2024     9:38 AM   PHQ   PHQ-9 Total Score 17   Q9: Thoughts of better off dead/self-harm past 2 weeks Several days     QIDDSR 16 weekly assessment: score 16. Assessment was scanned to EHR.     Procedure Performed:  VSS and mental status WNL. Patient was given ketamine. See MAR for details.     Post Procedure Assessment:  Patient tolerated the treatment with the following side effects: dissociation. Vital signs were monitored, see VS Flowsheet. Patient stated they felt ready to go home prior to discharge. AVS was offered to patient and patient declined. Patient was instructed not to drive for the remainder of the day and to notify clinic if any concerns arise.     Next appt: Monday    Medications were supplied by Clinic       This service provided today was under the supervising provider of the day Dom Shirley MD, who was available if needed.      Perla Robbins RN

## 2024-08-01 ENCOUNTER — MYC REFILL (OUTPATIENT)
Dept: PSYCHIATRY | Facility: CLINIC | Age: 54
End: 2024-08-01

## 2024-08-01 DIAGNOSIS — F33.2 SEVERE EPISODE OF RECURRENT MAJOR DEPRESSIVE DISORDER, WITHOUT PSYCHOTIC FEATURES (H): ICD-10-CM

## 2024-08-02 RX ORDER — ARIPIPRAZOLE 10 MG/1
10 TABLET ORAL DAILY
Qty: 30 TABLET | Refills: 1 | OUTPATIENT
Start: 2024-08-02

## 2024-08-02 RX ORDER — LAMOTRIGINE 200 MG/1
400 TABLET ORAL DAILY
Qty: 60 TABLET | Refills: 3 | OUTPATIENT
Start: 2024-08-02

## 2024-08-05 ENCOUNTER — ALLIED HEALTH/NURSE VISIT (OUTPATIENT)
Dept: PSYCHIATRY | Facility: CLINIC | Age: 54
End: 2024-08-05
Payer: COMMERCIAL

## 2024-08-05 VITALS — SYSTOLIC BLOOD PRESSURE: 122 MMHG | HEART RATE: 86 BPM | DIASTOLIC BLOOD PRESSURE: 78 MMHG

## 2024-08-05 DIAGNOSIS — F33.2 SEVERE EPISODE OF RECURRENT MAJOR DEPRESSIVE DISORDER, WITHOUT PSYCHOTIC FEATURES (H): Primary | ICD-10-CM

## 2024-08-05 ASSESSMENT — PATIENT HEALTH QUESTIONNAIRE - PHQ9: SUM OF ALL RESPONSES TO PHQ QUESTIONS 1-9: 19

## 2024-08-05 NOTE — PROGRESS NOTES
Aure Joy comes into clinic today at the request of Dr. Zamudio Ordering Provider for Med Injection only ketamine .    Procedure Prep:  Medication double check completed by: Teodora Donnelly RN  Prior to administration pt identified by name and : Yes    Nursing Assessment:  Appearance: Casual   Mood: Okay.   Affect: WNL  Eye contact: Good      2024     9:22 AM   PHQ   PHQ-9 Total Score 19   Q9: Thoughts of better off dead/self-harm past 2 weeks Several days     QIDDSR 16 weekly assessment: score 18. Assessment was scanned to EHR.     Procedure Performed:  VSS and mental status WNL. Patient was given ketamine. See MAR for details.     Post Procedure Assessment:  Patient tolerated the treatment with the following side effects: dissociation. Vital signs were monitored, see VS Flowsheet. Patient stated they felt ready to go home prior to discharge. AVS was offered to patient and patient declined. Patient was instructed not to drive for the remainder of the day and to notify clinic if any concerns arise.     Next appt: Monday    Medications were supplied by Clinic       This service provided today was under the supervising provider of the day Cash Valdivia MD, who was available if needed.      Perla Robbins RN

## 2024-08-08 ENCOUNTER — TELEPHONE (OUTPATIENT)
Dept: PSYCHIATRY | Facility: CLINIC | Age: 54
End: 2024-08-08

## 2024-08-08 NOTE — TELEPHONE ENCOUNTER
"Patient called in today to discuss issues with taking Auvelity.      She reports that she started taking the auvelity on 7/29 and followed the titration instructions, so started taking the BID dosing on 8/1.  She notes that she was doing fine until after her ketamine on 8/5.  She notes that after ketamine she had some residual feelings of being \"high.\"  But it was pretty minimal.      She notes that today it has been much more severe.  She reports that she is also having blurred vision.  She reports that she had tried to send a message to the care team, however her vision has been blurry so she could not see well enough to type the message.  She would like to stop the medication.                  "

## 2024-08-08 NOTE — TELEPHONE ENCOUNTER
Writer spoke with Dr. Zamudio.  She would like patient to stop the Auvelity.            Writer called patient to update her.  She verbalized understanding and will stop the Auvelity today.  She had no further questions.

## 2024-08-12 ENCOUNTER — ALLIED HEALTH/NURSE VISIT (OUTPATIENT)
Dept: PSYCHIATRY | Facility: CLINIC | Age: 54
End: 2024-08-12
Payer: COMMERCIAL

## 2024-08-12 VITALS — DIASTOLIC BLOOD PRESSURE: 70 MMHG | SYSTOLIC BLOOD PRESSURE: 122 MMHG | HEART RATE: 81 BPM

## 2024-08-12 DIAGNOSIS — F33.2 SEVERE EPISODE OF RECURRENT MAJOR DEPRESSIVE DISORDER, WITHOUT PSYCHOTIC FEATURES (H): Primary | ICD-10-CM

## 2024-08-12 ASSESSMENT — PATIENT HEALTH QUESTIONNAIRE - PHQ9: SUM OF ALL RESPONSES TO PHQ QUESTIONS 1-9: 18

## 2024-08-12 NOTE — TELEPHONE ENCOUNTER
Patient came in for ketamine this morning. Patient reported symptoms improving once stopping medication. Patient's eyesight returned to baseline. Patient had no further concerns.

## 2024-08-12 NOTE — PROGRESS NOTES
Aure Joy comes into clinic today at the request of Dr. Zamudio Ordering Provider for Med Injection only ketamine .    Procedure Prep:  Medication double check completed by: Teodora Donnelly RN  Prior to administration pt identified by name and : Yes    Nursing Assessment:  Appearance: Casual   Mood: Okay.   Affect: WNL  Eye contact: Good      2024     9:35 AM   PHQ   PHQ-9 Total Score 18   Q9: Thoughts of better off dead/self-harm past 2 weeks Several days     QIDDSR 16 weekly assessment: score 16. Assessment was scanned to EHR.     Procedure Performed:  VSS and mental status WNL. Patient was given ketamine. See MAR for details.     Post Procedure Assessment:  Patient tolerated the treatment with the following side effects: dissociation. Vital signs were monitored, see VS Flowsheet. Patient stated they felt ready to go home prior to discharge. AVS was offered to patient and patient declined. Patient was instructed not to drive for the remainder of the day and to notify clinic if any concerns arise.     Next appt: Monday    Medications were supplied by Clinic       This service provided today was under the supervising provider of the day Mihir Chaudhry MD, who was available if needed.      Perla Robbins RN

## 2024-08-15 NOTE — PROGRESS NOTES
10/02/23 0500   Appointment Info   Signing clinician's name / credentials Sarahi John, PT, DPT   Total/Authorized Visits 6 (PT)   Visits Used 3   Medical Diagnosis Lower urinary tract symptoms (LUTS)   PT Tx Diagnosis Lower urinary tract symptoms (LUTS), stress urinary incontinence   Quick Adds Pelvic Consent;Certification   Progress Note/Certification   Start of Care Date 08/14/23   Onset of illness/injury or Date of Surgery 07/18/23  (referral)   Therapy Frequency 1x/month   Predicted Duration 3 months   Certification date from 10/02/23   Certification date to 12/30/23   Progress Note Due Date 12/02/23   Progress Note Completed Date 10/02/23       Present No   PT Goal 1   Goal Identifier LTG 1   Goal Description Patient will decrease PF tension in order to itime in between voids to 2 hours.   Rationale to maximize safety and independence with performance of ADLs and functional tasks  (continence throughout the day)   Goal Progress Has not had issues with leakage or needing to lean forward to urinate. Drinking less overall. Still  going every 30-45 min. during the day; nighttime 1-2x. improved   Target Date 12/30/23   Subjective Report   Subjective Report Patient reports her back went out. Has not gotten better yet. Has not done her HEP lately. Would like to go over HEP. Is having carpal and cubital tunnel release as well and would like modifications for that as well.   Objective Measures   Objective Measures Objective Measure 1   Objective Measure 1   Objective Measure min rib cage expansion with DBE   Details focused on supine and sitting positions for PF retraining; has made progress thus far with bladder retraining, PF isolation and relaxation;   Treatment Interventions (PT)   Interventions Therapeutic Procedure/Exercise;Therapeutic Activity;Neuromuscular Re-education;Manual Therapy   Therapeutic Procedure/Exercise   Therapeutic Procedures: strength, endurance, ROM, flexibility  minutes (30607) 10   PTRx Ther Proc 1 Pelvic Floor Muscle Strengthening Elevator    PTRx Ther Proc 1 - Details 3 sec contract 3 sec relax; x 10; emphasis on relaxation; Education on practicing this in supine sitting or standing depending on how back feels   PTRx Ther Proc 2 Supine Butterfly   PTRx Ther Proc 2 - Details foam roll under legs to improve relaxation - this is more comfortable; hold with DBE    Skilled Intervention see above   Patient Response/Progress tolerated well; reports stretch in adducots with butterfly position   Therapeutic Activity   Therapeutic Activities: dynamic activities to improve functional performance minutes (76851) 5   Ther Act 1 Bladder and fluid education   Ther Act 1 - Details in depth discussion on bladder habits; pt notes has decreased caffeine, therapist encouraging decreased use continued; also discussed increasing fluid intake up to 1.5-2 hours during the day;   Skilled Intervention see above   Patient Response/Progress patient agrees to plan of increasing water intake   Neuromuscular Re-education   Neuromuscular re-ed of mvmt, balance, coord, kinesthetic sense, posture, proprioception minutes (81825) 10   PTRx Neuro Re-ed 1 Diaphragmatic Breathing   PTRx Neuro Re-ed 1 - Details vc abdominal expansion with inhalation; cued for longer inhale/exhale; tc at ribs to improve expansion; tolerated well;   PTRx Neuro Re-ed 2 Sit to Stand Pelvic Floor    PTRx Neuro Re-ed 2 - Details x 10; difficulty holding with eccentric phase. education on using books to increase seat height for decresed difficulty; lowering seat for increased difficulty   Skilled Intervention PF downtraining   Patient Response/Progress tolerated upright ans supine exercises well   Education   Learner/Method Patient;No Barriers to Learning   Plan   Home program print ptrx   Updates to plan of care removed HEP in quadruped due to upcoming wrist procedure;   Plan for next session assess tolerance to increased fluid  consumption   Comments   Pelvic Health Informed Consent Statement Discussed with patient/guardian reason for referral regarding pelvic health needs and external/internal pelvic floor muscle examination.  Opportunity provided to ask questions and verbal consent for assessment and intervention was given.   Total Session Time   Timed Code Treatment Minutes 25   Total Treatment Time (sum of timed and untimed services) 25           DISCHARGE  Reason for Discharge: Patient has failed to schedule further appointments.    Equipment Issued: none    Discharge Plan: Patient to continue home program.    Referring Provider:  Aranza Gutierrez

## 2024-08-17 ENCOUNTER — HEALTH MAINTENANCE LETTER (OUTPATIENT)
Age: 54
End: 2024-08-17

## 2024-08-19 ENCOUNTER — ALLIED HEALTH/NURSE VISIT (OUTPATIENT)
Dept: PSYCHIATRY | Facility: CLINIC | Age: 54
End: 2024-08-19
Payer: COMMERCIAL

## 2024-08-19 ENCOUNTER — OFFICE VISIT (OUTPATIENT)
Dept: PSYCHIATRY | Facility: CLINIC | Age: 54
End: 2024-08-19
Payer: COMMERCIAL

## 2024-08-19 VITALS
SYSTOLIC BLOOD PRESSURE: 119 MMHG | WEIGHT: 137 LBS | DIASTOLIC BLOOD PRESSURE: 82 MMHG | HEART RATE: 71 BPM | OXYGEN SATURATION: 97 % | BODY MASS INDEX: 25.21 KG/M2 | TEMPERATURE: 97.3 F | HEIGHT: 62 IN

## 2024-08-19 VITALS — SYSTOLIC BLOOD PRESSURE: 106 MMHG | DIASTOLIC BLOOD PRESSURE: 73 MMHG | HEART RATE: 77 BPM

## 2024-08-19 DIAGNOSIS — F33.2 SEVERE EPISODE OF RECURRENT MAJOR DEPRESSIVE DISORDER, WITHOUT PSYCHOTIC FEATURES (H): Primary | ICD-10-CM

## 2024-08-19 ASSESSMENT — PATIENT HEALTH QUESTIONNAIRE - PHQ9: SUM OF ALL RESPONSES TO PHQ QUESTIONS 1-9: 18

## 2024-08-19 NOTE — PATIENT INSTRUCTIONS
"Today's Recommendations:  -  You will continue your current dose of ketamine and frequency.  - We discuss the consideration of starting a stimulant medication to help with energy levels and motivation and an MAOI, but you will continue this discussion with Dr. Gallo on your next visit  - Please continue your weekly therapy sessions.  - I encourage you to spend less time in bed and more time in living spaces.  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  -     We are specifically a university and research clinic, and there are often research studies ongoing. Some of those are clinical trials of new brain stimulation treatments. Others are what we would call \"biomarker\" studies, where we ask you to participate in some kind of measurement while you are undergoing regular treatment.   If you are interested in hearing about research consider signing up for our research registry. This will allow researchers in our clinic to reach out if you become eligible for a study. Sign up today at [email or web address]    In some cases, a clinical trial is the only way to get access to an advanced treatment that is not covered by your insurance. Usually, we will talk about this in your visit if it is an option.      Patient Education       General Information:  Our Treatment-Resistant Disorders/Interventional Psychiatry clinic is what is often called a \"consultation\" or \"tertiary care\" clinic. That means we do not see patients long-term for medication management or talk therapy. We offer thorough evaluations, recommendations about medications/therapies you may have not yet tried, and in some cases, brain stimulation or office-based treatments. If you are likely to benefit from one of those advanced treatments, we will have talked about it today. Once that treatment is complete, we will see you once or twice afterwards to check in, and then you will return to getting ongoing " psychiatric care from whomever you were seeing before you came for your evaluation with us. If for some reason you no longer have access to that clinician, we can help with a referral to our main [facility]. The only patients we see long-term are patients with implanted medical devices that require ongoing monitoring and programming.     Our recommendations almost always include some kind of cognitive-behavioral therapy (CBT) if you are not getting it already. Brain and nerve stimulation treatments work best when combined with certain talk therapies. We make these recommendations because we strongly believe that, without the therapy piece, most people will not get better, or will have limited clinical benefit. It is often difficult or inconvenient to add therapy to an already busy schedule, but it is also necessary.    It is important to remember that, like all mental health treatments, our interventional therapies are not perfect. About one third of people will not feel better after treatment, and even when they work, they do not take away the symptoms entirely.If we have recommended something above, it means we think there is a better than even chance of it working, but things are never guaranteed. This is another reason we usually recommend CBT along with our advanced treatments -- it can address the symptoms that remain after the stimulation/ketamine treatment.       While we are waiting to implement the recommendations you and I have discussed, you should know what to do if your symptoms worsen. A variety of crisis numbers/resources are available. They include:    CRISIS GENERAL NUMBERS   3-791-KRTWVYT (1-941.374.9094)  OR  911     CRISIS INTERVENTION TEAM (CIT) - this is a POLICE UNIT, specially trained.  This website has information for all of [place]'s CITs. Lays out which areas have this team.  [email or web address]/[email or web address]  However, one can just call 911 and ask for this  special team.   If police need to be called, DO ask for this team.    CRISIS MOBILE TEAMS  [and see end of this phrase*]  [facility] -COPE: 24hrs/7days:    [phone]  (Adults > 17yo)    [phone]  (Child   < 18yo)                                       FRONT DOOR (during the day could call)   [phone]    [facility] -[phone] (Adult)  -[phone] (Child)     [facility] -[phone] (Adult and Child)     [facility] -[phone] (PrePayMe mobile crisis team; Adult and Child; 24hr)     [facility] -[phone] (Adult and Child)     CRISIS HOUSING  [facility]                           [address]              [phone]  [facility]                                [address]                       [phone]  [facility]                               [address]     [phone]   [facility]  [address]                [phone]    [place] EMERGENCY NUMBERS    Crisis Connection:                                [phone]  [facility]:     [phone]  Crisis Intervention:                                [phone] or [phone]   COPE: Mobile Team 24hrs/7days:    [phone]  (Adults > 17yo)                                                                            [phone]  (Child   < 18yo)  [facility]        [phone] 24/7 line and Mobile Team   [address] [place] [address]  DROP IN:  M-F: 8:00 am - 7:00 pm  Sat: 11:00 am - 3:00 pm   Sun: Closed     WALK-IN COUNSELING:  [facility]       [phone]    [address]:   M, W, F:  1:00-3:00PM    M-Th:  6:30-8:30PM    TRANS and LGBT  Call [employer] at [phone]. This service is staffed by trans people 24/7.   LGBT youth <24 contemplating  "suicide, call the [employer] Lifeline 8-002-2091.    POISON CONTROL CENTER  [phone]    OR  go to nearest ER    CHILD  \"[facility] has a needs assessment team who will do an intake evaluation. Based on the results of the intake they will recommended inpatient admission, partial hospitalization, intensive outpatient or outpatient care. The number is [phone]. \"    CRISIS TEXT LINE  Http://www.crisistextline.org  FREE SUPPORT AT YOUR FINGERTIPS,   Crisis Text Line serves anyone, in any type of crisis, providing access to free,  support and information via the medium people already use and trust: text. Here s how it works:  1)  Text [phone] from anywhere in the [place], anytime, about any type of crisis.  2)  A live, trained Crisis Counselor receives the text and responds quickly.      The volunteer Crisis Counselor will help you move from a 'hot moment to a cool moment'    [facility] EMERGENCY NUMBERS    Crisis Connection:                                [phone]  [facility]:              [phone]  Crisis Intervention:                                [phone] or [phone]   COPE: Mobile Team 24hrs/7days:    [phone]  (Adults > 17yo)                                                                            [phone]  (Child   < 16yo)  [facility]        [phone]  CALL 24 hours a day.  [address] [place] [address]  DROP IN:  M-F: 8:00 am - 7:00 pm  Sat: 11:00 am - 3:00 pm   Sun: Closed    WALK-IN COUNSELIN)  [facility]                                  [phone]        [address]:                  M, W:      5:00-7:00PM       2)  [facility]                            [phone]        [place], [address]                T, Th:      6:00-8:00PM    TRANS and LGBT  Call Compliance 11 at [phone]. This service is staffed by trans people .   LGBT youth <24 " "contemplating suicide, call the [employer] Lifeline 2-932-8866.    POISON CONTROL CENTER  [phone]    OR  go to nearest ER    CHILD  \"[facility] has a needs assessment team who will do an intake evaluation. Based on the results of the intake they will recommended inpatient admission, partial hospitalization, intensive outpatient or outpatient care. The number is [phone] or [phone]. \"    CRISIS TEXT LINE  Http://www.crisistextline.org  FREE SUPPORT AT YOUR FINGERTIPS, 24/7  Crisis Text Line serves anyone, in any type of crisis, providing access to free, 24/7 support and information via the medium people already use and trust: text. Here s how it works:  1)  Text [phone] from anywhere in the [place], anytime, about any type of crisis.  2)  A live, trained Crisis Counselor receives the text and responds quickly.      The volunteer Crisis Counselor will help you move from a 'hot moment to a cool moment'      * A Community Paramedic (CP) is an advanced paramedic that works to increase access to primary and preventive care and decrease use of emergency departments, which in turn decreases health care costs. Among other things, CPs may play a key role in providing follow-up services after a hospital discharge to prevent hospital readmission. CPs can provide health assessments, chronic disease monitoring and education, medication management, immunizations and vaccinations, laboratory specimen collection, hospital discharge follow-up care and minor medical procedures. CPs work under the direction of an Ambulance Medical Director.   "
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
## 2024-08-19 NOTE — PROGRESS NOTES
Aure Joy comes into clinic today at the request of ESTELA Zamudio MD Ordering Provider for Med Injection only Ketamine .    Procedure Prep:  Medication double check completed by: Perla Robbins RN  Prior to administration pt identified by name and : yes    Nursing Assessment:  Appearance: dressed casually   Mood: depressed  Affect: WNL  Eye contact: fair      2024     9:18 AM   PHQ   PHQ-9 Total Score 18   Q9: Thoughts of better off dead/self-harm past 2 weeks Several days     QIDDSR 16 weekly assessment: score 16. Assessment was scanned to EHR.       Procedure Performed:  VSS and mental status WNL. Patient was given Ketamine. See MAR for details.     Post Procedure Assessment:  Patient tolerated the treatment with the following side effects: dissociation. Vital signs were monitored, see VS Flowsheet. Patient stated they felt ready to go home prior to discharge. AVS was offered to patient and patient declined. Patient was instructed not to drive for the remainder of the day and to notify clinic if any concerns arise.     Next appt: Monday    Medications were supplied by Clinic       This service provided today was under the supervising provider of the day LOTUS Gordon MD, who was available if needed.        Joan Donnelly RN

## 2024-08-26 ENCOUNTER — ALLIED HEALTH/NURSE VISIT (OUTPATIENT)
Dept: PSYCHIATRY | Facility: CLINIC | Age: 54
End: 2024-08-26
Payer: COMMERCIAL

## 2024-08-26 VITALS — SYSTOLIC BLOOD PRESSURE: 117 MMHG | HEART RATE: 70 BPM | DIASTOLIC BLOOD PRESSURE: 77 MMHG

## 2024-08-26 DIAGNOSIS — F33.2 SEVERE EPISODE OF RECURRENT MAJOR DEPRESSIVE DISORDER, WITHOUT PSYCHOTIC FEATURES (H): Primary | ICD-10-CM

## 2024-08-26 ASSESSMENT — PATIENT HEALTH QUESTIONNAIRE - PHQ9: SUM OF ALL RESPONSES TO PHQ QUESTIONS 1-9: 17

## 2024-08-26 NOTE — PROGRESS NOTES
Aure Joy comes into clinic today at the request of ESTELA Zamudio MD Ordering Provider for Med Injection only Ketamine .    Procedure Prep:  Medication double check completed by: Jael Alexandra RN  Prior to administration pt identified by name and : yes    Nursing Assessment:  Appearance: dressed casually   Mood: depressed  Affect: WNL  Eye contact: fair      2024     9:12 AM   PHQ   PHQ-9 Total Score 17   Q9: Thoughts of better off dead/self-harm past 2 weeks Not at all     QIDDSR 16 weekly assessment: score 16. Assessment was scanned to EHR.       Procedure Performed:  VSS and mental status WNL. Patient was given Ketamine. See MAR for details.     Post Procedure Assessment:  Patient tolerated the treatment with the following side effects: dissociation. Vital signs were monitored, see VS Flowsheet. Patient stated they felt ready to go home prior to discharge. AVS was offered to patient and patient declined. Patient was instructed not to drive for the remainder of the day and to notify clinic if any concerns arise.     Next appt: Monday    Medications were supplied by Clinic       This service provided today was under the supervising provider of the day LOTUS Gordon MD, who was available if needed.        Joan Donnelly RN

## 2024-09-03 ENCOUNTER — ALLIED HEALTH/NURSE VISIT (OUTPATIENT)
Dept: PSYCHIATRY | Facility: CLINIC | Age: 54
End: 2024-09-03
Payer: COMMERCIAL

## 2024-09-03 VITALS — SYSTOLIC BLOOD PRESSURE: 108 MMHG | HEART RATE: 77 BPM | DIASTOLIC BLOOD PRESSURE: 78 MMHG

## 2024-09-03 DIAGNOSIS — F33.2 SEVERE EPISODE OF RECURRENT MAJOR DEPRESSIVE DISORDER, WITHOUT PSYCHOTIC FEATURES (H): Primary | ICD-10-CM

## 2024-09-03 ASSESSMENT — PATIENT HEALTH QUESTIONNAIRE - PHQ9: SUM OF ALL RESPONSES TO PHQ QUESTIONS 1-9: 18

## 2024-09-03 NOTE — PROGRESS NOTES
Aure Joy comes into clinic today at the request of ESTELA Zamudio MD Ordering Provider for Med Injection only Ketamine .    Procedure Prep:  Medication double check completed by: Perla Robbins RN  Prior to administration pt identified by name and : yes    Nursing Assessment:  Appearance: dressed casually   Mood: depressed  Affect: WNL  Eye contact: fair      9/3/2024     9:21 AM   PHQ   PHQ-9 Total Score 18   Q9: Thoughts of better off dead/self-harm past 2 weeks Several days     QIDDSR 16 weekly assessment: score 14. Assessment was scanned to EHR.       Procedure Performed:  VSS and mental status WNL. Patient was given Ketamine. See MAR for details.     Post Procedure Assessment:  Patient tolerated the treatment with the following side effects: dissociation. Vital signs were monitored, see VS Flowsheet. Patient stated they felt ready to go home prior to discharge. AVS was offered to patient and patient declined. Patient was instructed not to drive for the remainder of the day and to notify clinic if any concerns arise.     Next appt: Monday    Medications were supplied by Clinic       This service provided today was under the supervising provider of the day DYLLAN Williamson MD, who was available if needed.        Joan Donnelly RN

## 2024-09-09 ENCOUNTER — ALLIED HEALTH/NURSE VISIT (OUTPATIENT)
Dept: PSYCHIATRY | Facility: CLINIC | Age: 54
End: 2024-09-09
Payer: COMMERCIAL

## 2024-09-09 VITALS — HEART RATE: 86 BPM | SYSTOLIC BLOOD PRESSURE: 122 MMHG | DIASTOLIC BLOOD PRESSURE: 84 MMHG

## 2024-09-09 DIAGNOSIS — F33.2 SEVERE EPISODE OF RECURRENT MAJOR DEPRESSIVE DISORDER, WITHOUT PSYCHOTIC FEATURES (H): Primary | ICD-10-CM

## 2024-09-09 ASSESSMENT — PATIENT HEALTH QUESTIONNAIRE - PHQ9: SUM OF ALL RESPONSES TO PHQ QUESTIONS 1-9: 16

## 2024-09-09 NOTE — PROGRESS NOTES
Aure Joy comes into clinic today at the request of Dr. Zamudio Ordering Provider for Med Injection only ketamine .    Procedure Prep:  Medication double check completed by: Jael Alexandra RN  Prior to administration pt identified by name and : Yes    Nursing Assessment:  Appearance: Casual   Mood: Okay.   Affect: WNL  Eye contact: Good      9/3/2024     9:21 AM   PHQ   PHQ-9 Total Score 18   Q9: Thoughts of better off dead/self-harm past 2 weeks Several days     QIDDSR 16 weekly assessment: score 13. Assessment was scanned to EHR.     Procedure Performed:  VSS and mental status WNL. Patient was given ketamine. See MAR for details.     Post Procedure Assessment:  Patient tolerated the treatment with the following side effects: dissociation. Vital signs were monitored, see VS Flowsheet. Patient stated they felt ready to go home prior to discharge. AVS was offered to patient and patient declined. Patient was instructed not to drive for the remainder of the day and to notify clinic if any concerns arise.     Next appt: Monday    Medications were supplied by Clinic       This service provided today was under the supervising provider of the day LOTUS Gordon MD, who was available if needed.      Perla Robbins RN

## 2024-09-16 ENCOUNTER — ALLIED HEALTH/NURSE VISIT (OUTPATIENT)
Dept: PSYCHIATRY | Facility: CLINIC | Age: 54
End: 2024-09-16
Payer: COMMERCIAL

## 2024-09-16 VITALS — SYSTOLIC BLOOD PRESSURE: 112 MMHG | DIASTOLIC BLOOD PRESSURE: 78 MMHG | HEART RATE: 80 BPM

## 2024-09-16 DIAGNOSIS — F33.2 SEVERE EPISODE OF RECURRENT MAJOR DEPRESSIVE DISORDER, WITHOUT PSYCHOTIC FEATURES (H): Primary | ICD-10-CM

## 2024-09-16 ASSESSMENT — PATIENT HEALTH QUESTIONNAIRE - PHQ9: SUM OF ALL RESPONSES TO PHQ QUESTIONS 1-9: 14

## 2024-09-16 NOTE — PROGRESS NOTES
Aure Joy comes into clinic today at the request of Dr. Zamudio Ordering Provider for Med Injection only ketamine .    Procedure Prep:  Medication double check completed by:Teodora Donnelly RN  Prior to administration pt identified by name and : Yes    Nursing Assessment:  Appearance: Casual   Mood: Okay.   Affect: WNL  Eye contact: Good      2024     9:21 AM   PHQ   PHQ-9 Total Score 14   Q9: Thoughts of better off dead/self-harm past 2 weeks Not at all     QIDDSR 16 weekly assessment: score 17. Assessment was scanned to EHR.     Procedure Performed:  VSS and mental status WNL. Patient was given ketamine. See MAR for details.     Post Procedure Assessment:  Patient tolerated the treatment with the following side effects: dissociation. Vital signs were monitored, see VS Flowsheet. Patient stated they felt ready to go home prior to discharge. AVS was offered to patient and patient declined. Patient was instructed not to drive for the remainder of the day and to notify clinic if any concerns arise.     Next appt: Monday    Medications were supplied by Clinic       This service provided today was under the supervising provider of the day LOTUS Gordon MD, who was available if needed.      Perla Robbins RN

## 2024-09-23 ENCOUNTER — ALLIED HEALTH/NURSE VISIT (OUTPATIENT)
Dept: PSYCHIATRY | Facility: CLINIC | Age: 54
End: 2024-09-23
Payer: COMMERCIAL

## 2024-09-23 VITALS — HEART RATE: 72 BPM | DIASTOLIC BLOOD PRESSURE: 72 MMHG | SYSTOLIC BLOOD PRESSURE: 115 MMHG

## 2024-09-23 DIAGNOSIS — F33.2 SEVERE EPISODE OF RECURRENT MAJOR DEPRESSIVE DISORDER, WITHOUT PSYCHOTIC FEATURES (H): Primary | ICD-10-CM

## 2024-09-23 ASSESSMENT — PATIENT HEALTH QUESTIONNAIRE - PHQ9: SUM OF ALL RESPONSES TO PHQ QUESTIONS 1-9: 15

## 2024-09-23 NOTE — PROGRESS NOTES
Aure Joy comes into clinic today at the request of ESTELA Zamudio MD Ordering Provider for Med Injection only Ketamine .    Procedure Prep:  Medication double check completed by: Jael Alexandra RN  Prior to administration pt identified by name and : yes    Nursing Assessment:  Appearance: dressed casually   Mood: depressed  Affect: WNL  Eye contact: fair      2024     9:24 AM   PHQ   PHQ-9 Total Score 15   Q9: Thoughts of better off dead/self-harm past 2 weeks Several days     QIDDSR 16 weekly assessment: score 14. Assessment was scanned to EHR.       Procedure Performed:  VSS and mental status WNL. Patient was given Ketamine. See MAR for details.     Post Procedure Assessment:  Patient tolerated the treatment with the following side effects: dissociation. Vital signs were monitored, see VS Flowsheet. Patient stated they felt ready to go home prior to discharge. AVS was offered to patient and patient declined. Patient was instructed not to drive for the remainder of the day and to notify clinic if any concerns arise.     Next appt: Monday    Medications were supplied by Clinic       This service provided today was under the supervising provider of the day LOTUS Gordon MD, who was available if needed.        Joan Donnelly RN

## 2024-09-30 ENCOUNTER — ALLIED HEALTH/NURSE VISIT (OUTPATIENT)
Dept: PSYCHIATRY | Facility: CLINIC | Age: 54
End: 2024-09-30
Payer: COMMERCIAL

## 2024-09-30 VITALS — DIASTOLIC BLOOD PRESSURE: 83 MMHG | HEART RATE: 67 BPM | SYSTOLIC BLOOD PRESSURE: 123 MMHG

## 2024-09-30 DIAGNOSIS — F33.2 SEVERE EPISODE OF RECURRENT MAJOR DEPRESSIVE DISORDER, WITHOUT PSYCHOTIC FEATURES (H): Primary | ICD-10-CM

## 2024-09-30 ASSESSMENT — PATIENT HEALTH QUESTIONNAIRE - PHQ9: SUM OF ALL RESPONSES TO PHQ QUESTIONS 1-9: 14

## 2024-09-30 NOTE — PROGRESS NOTES
Aure Joy comes into clinic today at the request of ESTELA Zamudio MD Ordering Provider for Med Injection only Ketamine .    Procedure Prep:  Medication double check completed by: John Velazco RN  Prior to administration pt identified by name and : yes    Nursing Assessment:  Appearance: dressed casually   Mood: depressed  Affect: WNL  Eye contact: fair      2024     9:28 AM   PHQ   PHQ-9 Total Score 14   Q9: Thoughts of better off dead/self-harm past 2 weeks Not at all     QIDDSR 16 weekly assessment: score 14. Assessment was scanned to EHR.       Procedure Performed:  VSS and mental status WNL. Patient was given Ketamine. See MAR for details.     Post Procedure Assessment:  Patient tolerated the treatment with the following side effects: dissociation. Vital signs were monitored, see VS Flowsheet. Patient stated they felt ready to go home prior to discharge. AVS was offered to patient and patient declined. Patient was instructed not to drive for the remainder of the day and to notify clinic if any concerns arise.     Next appt: Monday    Medications were supplied by Clinic       This service provided today was under the supervising provider of the day LOTUS Gordon MD, who was available if needed.        Joan Donnelly RN

## 2024-10-04 ENCOUNTER — MYC REFILL (OUTPATIENT)
Dept: PSYCHIATRY | Facility: CLINIC | Age: 54
End: 2024-10-04

## 2024-10-04 DIAGNOSIS — F33.2 SEVERE EPISODE OF RECURRENT MAJOR DEPRESSIVE DISORDER, WITHOUT PSYCHOTIC FEATURES (H): ICD-10-CM

## 2024-10-07 ENCOUNTER — ALLIED HEALTH/NURSE VISIT (OUTPATIENT)
Dept: PSYCHIATRY | Facility: CLINIC | Age: 54
End: 2024-10-07
Payer: COMMERCIAL

## 2024-10-07 VITALS — DIASTOLIC BLOOD PRESSURE: 80 MMHG | SYSTOLIC BLOOD PRESSURE: 114 MMHG | HEART RATE: 70 BPM

## 2024-10-07 DIAGNOSIS — F33.2 SEVERE EPISODE OF RECURRENT MAJOR DEPRESSIVE DISORDER, WITHOUT PSYCHOTIC FEATURES (H): Primary | ICD-10-CM

## 2024-10-07 RX ORDER — ARIPIPRAZOLE 10 MG/1
10 TABLET ORAL DAILY
Qty: 30 TABLET | Refills: 1 | Status: SHIPPED | OUTPATIENT
Start: 2024-10-07

## 2024-10-07 ASSESSMENT — PATIENT HEALTH QUESTIONNAIRE - PHQ9: SUM OF ALL RESPONSES TO PHQ QUESTIONS 1-9: 15

## 2024-10-07 NOTE — PROGRESS NOTES
Aure Joy comes into clinic today at the request of ESTELA Zamudio MD Ordering Provider for Med Injection only Ketamine .    Procedure Prep:  Medication double check completed by: John Velazco RN  Prior to administration pt identified by name and : yes    Nursing Assessment:  Appearance: dressed casually   Mood: depressed  Affect: WNL  Eye contact: fair      10/7/2024     9:23 AM   PHQ   PHQ-9 Total Score 15   Q9: Thoughts of better off dead/self-harm past 2 weeks Not at all     QIDDSR 16 weekly assessment: score 13. Assessment was scanned to EHR.       Procedure Performed:  VSS and mental status WNL. Patient was given Ketamine. See MAR for details.     Post Procedure Assessment:  Patient tolerated the treatment with the following side effects: dissociation. Vital signs were monitored, see VS Flowsheet. Patient stated they felt ready to go home prior to discharge. AVS was offered to patient and patient declined. Patient was instructed not to drive for the remainder of the day and to notify clinic if any concerns arise.     Next appt: Monday    Medications were supplied by Clinic       This service provided today was under the supervising provider of the day LOTUS Gordon MD, who was available if needed.        Joan Donnelly RN

## 2024-10-07 NOTE — TELEPHONE ENCOUNTER
Last seen: 8/19/24  RTC: 2 mo  Cancel: no  No-show: no  Next appt: 10/17/24    Incoming refill from patient via my chart    Medication requested: ARIPiprazole (ABILIFY) 10 MG tablet   Directions: Take 1 tablet (10 mg) by mouth daily - Oral   Qty: 30 + 1 rf  Last refilled: 7/19/24    Medication refill approved per refill protocol

## 2024-10-10 ASSESSMENT — PATIENT HEALTH QUESTIONNAIRE - PHQ9
SUM OF ALL RESPONSES TO PHQ QUESTIONS 1-9: 16
SUM OF ALL RESPONSES TO PHQ QUESTIONS 1-9: 12
SUM OF ALL RESPONSES TO PHQ QUESTIONS 1-9: 14

## 2024-10-14 ENCOUNTER — ALLIED HEALTH/NURSE VISIT (OUTPATIENT)
Dept: PSYCHIATRY | Facility: CLINIC | Age: 54
End: 2024-10-14
Payer: COMMERCIAL

## 2024-10-14 VITALS — DIASTOLIC BLOOD PRESSURE: 75 MMHG | SYSTOLIC BLOOD PRESSURE: 111 MMHG | HEART RATE: 70 BPM

## 2024-10-14 DIAGNOSIS — F33.2 SEVERE EPISODE OF RECURRENT MAJOR DEPRESSIVE DISORDER, WITHOUT PSYCHOTIC FEATURES (H): Primary | ICD-10-CM

## 2024-10-14 ASSESSMENT — PATIENT HEALTH QUESTIONNAIRE - PHQ9: SUM OF ALL RESPONSES TO PHQ QUESTIONS 1-9: 15

## 2024-10-14 NOTE — PROGRESS NOTES
Aure Joy comes into clinic today at the request of ESTELA Zamudio MD Ordering Provider for Med Injection only Ketamine .    Procedure Prep:  Medication double check completed by: Jael Alexandra RN  Prior to administration pt identified by name and : yes    Nursing Assessment:  Appearance: dressed casually   Mood: depressed  Affect: WNL  Eye contact: fair      10/14/2024     9:20 AM   PHQ   PHQ-9 Total Score 15   Q9: Thoughts of better off dead/self-harm past 2 weeks Not at all     QIDDSR 16 weekly assessment: score 13. Assessment was scanned to EHR.       Procedure Performed:  VSS and mental status WNL. Patient was given Ketamine. See MAR for details.     Post Procedure Assessment:  Patient tolerated the treatment with the following side effects: dissociation. Vital signs were monitored, see VS Flowsheet. Patient stated they felt ready to go home prior to discharge. AVS was offered to patient and patient declined. Patient was instructed not to drive for the remainder of the day and to notify clinic if any concerns arise.     Next appt: Monday    Medications were supplied by Clinic       This service provided today was under the supervising provider of the day LOTUS Gordon MD, who was available if needed.          Joan Donnelly RN

## 2024-10-17 ENCOUNTER — OFFICE VISIT (OUTPATIENT)
Dept: PSYCHIATRY | Facility: CLINIC | Age: 54
End: 2024-10-17
Payer: COMMERCIAL

## 2024-10-17 VITALS
TEMPERATURE: 97.3 F | HEART RATE: 73 BPM | BODY MASS INDEX: 25.09 KG/M2 | DIASTOLIC BLOOD PRESSURE: 83 MMHG | WEIGHT: 137.2 LBS | OXYGEN SATURATION: 99 % | SYSTOLIC BLOOD PRESSURE: 126 MMHG

## 2024-10-17 DIAGNOSIS — D32.9 MENINGIOMA (H): ICD-10-CM

## 2024-10-17 DIAGNOSIS — F33.2 SEVERE RECURRENT MAJOR DEPRESSION WITHOUT PSYCHOTIC FEATURES (H): Primary | ICD-10-CM

## 2024-10-17 DIAGNOSIS — G21.11 NEUROLEPTIC-INDUCED PARKINSONISM (H): ICD-10-CM

## 2024-10-17 DIAGNOSIS — E55.9 VITAMIN D DEFICIENCY: ICD-10-CM

## 2024-10-17 DIAGNOSIS — G24.01 TARDIVE DYSKINESIA: ICD-10-CM

## 2024-10-17 DIAGNOSIS — F40.00 AGORAPHOBIA: ICD-10-CM

## 2024-10-17 DIAGNOSIS — T43.505A NEUROLEPTIC-INDUCED PARKINSONISM (H): ICD-10-CM

## 2024-10-17 DIAGNOSIS — F43.10 COMPLEX POSTTRAUMATIC STRESS DISORDER: ICD-10-CM

## 2024-10-17 RX ORDER — ARIPIPRAZOLE 5 MG/1
7.5 TABLET ORAL DAILY
Qty: 45 TABLET | Refills: 0 | Status: SHIPPED | OUTPATIENT
Start: 2024-10-17

## 2024-10-17 RX ORDER — ARIPIPRAZOLE 5 MG/1
2.5 TABLET ORAL DAILY
Qty: 15 TABLET | Refills: 1 | Status: SHIPPED | OUTPATIENT
Start: 2024-12-16

## 2024-10-17 RX ORDER — ARIPIPRAZOLE 5 MG/1
5 TABLET ORAL DAILY
Qty: 30 TABLET | Refills: 1 | Status: SHIPPED | OUTPATIENT
Start: 2024-11-16

## 2024-10-17 ASSESSMENT — PAIN SCALES - GENERAL: PAINLEVEL: NO PAIN (0)
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
## 2024-10-21 ENCOUNTER — ALLIED HEALTH/NURSE VISIT (OUTPATIENT)
Dept: PSYCHIATRY | Facility: CLINIC | Age: 54
End: 2024-10-21
Payer: COMMERCIAL

## 2024-10-21 VITALS — SYSTOLIC BLOOD PRESSURE: 117 MMHG | DIASTOLIC BLOOD PRESSURE: 82 MMHG | HEART RATE: 69 BPM

## 2024-10-21 DIAGNOSIS — F33.2 SEVERE RECURRENT MAJOR DEPRESSION WITHOUT PSYCHOTIC FEATURES (H): Primary | ICD-10-CM

## 2024-10-21 ASSESSMENT — PATIENT HEALTH QUESTIONNAIRE - PHQ9: SUM OF ALL RESPONSES TO PHQ QUESTIONS 1-9: 15

## 2024-10-21 NOTE — PROGRESS NOTES
Aure Joy comes into clinic today at the request of ESTELA Zamudio MD Ordering Provider for Med Injection only Ketamine .    Procedure Prep:  Medication double check completed by: Jael Alexandra RN  Prior to administration pt identified by name and : yes    Nursing Assessment:  Appearance: dressed casually   Mood: depressed  Affect: WNL  Eye contact: fair      10/14/2024     9:20 AM   PHQ   PHQ-9 Total Score 15   Q9: Thoughts of better off dead/self-harm past 2 weeks Not at all     QIDDSR 16 weekly assessment: score 15. Assessment was scanned to EHR.       Procedure Performed:  VSS and mental status WNL. Patient was given Ketamine. See MAR for details.     Post Procedure Assessment:  Patient tolerated the treatment with the following side effects: dissociation. Vital signs were monitored, see VS Flowsheet. Patient stated they felt ready to go home prior to discharge. AVS was offered to patient and patient declined. Patient was instructed not to drive for the remainder of the day and to notify clinic if any concerns arise.     Next appt: Monday    Medications were supplied by Clinic       This service provided today was under the supervising provider of the day LOTUS Gordon MD, who was available if needed.          Joan Donnelly RN

## 2024-10-28 ENCOUNTER — ALLIED HEALTH/NURSE VISIT (OUTPATIENT)
Dept: PSYCHIATRY | Facility: CLINIC | Age: 54
End: 2024-10-28
Payer: COMMERCIAL

## 2024-10-28 VITALS — DIASTOLIC BLOOD PRESSURE: 69 MMHG | HEART RATE: 70 BPM | SYSTOLIC BLOOD PRESSURE: 101 MMHG

## 2024-10-28 DIAGNOSIS — F33.2 SEVERE RECURRENT MAJOR DEPRESSION WITHOUT PSYCHOTIC FEATURES (H): Primary | ICD-10-CM

## 2024-10-28 ASSESSMENT — PATIENT HEALTH QUESTIONNAIRE - PHQ9: SUM OF ALL RESPONSES TO PHQ QUESTIONS 1-9: 13

## 2024-10-28 NOTE — PROGRESS NOTES
Aure Joy comes into clinic today at the request of ESTELA Zamudio MD Ordering Provider for Med Injection only Ketamine .    Procedure Prep:  Medication double check completed by: Jael Alexandra RN  Prior to administration pt identified by name and : yes    Nursing Assessment:  Appearance: Casually clothed   Mood: Depressed, OK  Affect: WNL  Eye contact: Fair      10/28/2024     9:15 AM   PHQ   PHQ-9 Total Score 13   Q9: Thoughts of better off dead/self-harm past 2 weeks Not at all     QIDDSR 16 weekly assessment: score 12. Assessment was scanned to EHR.       Procedure Performed:  VSS and mental status WNL. Patient was given Ketamine. See MAR for details.     Post Procedure Assessment:  Patient tolerated the treatment with the following side effects: dissociation. Vital signs were monitored, see VS Flowsheet. Patient stated they felt ready to go home prior to discharge. AVS was offered to patient and patient declined. Patient was instructed not to drive for the remainder of the day and to notify clinic if any concerns arise.     Next appt: Monday    Medications were supplied by Clinic       This service provided today was under the supervising provider of the day LOTUS Gordon MD, who was available if needed.          Joan Donnelly RN

## 2024-11-04 ENCOUNTER — ALLIED HEALTH/NURSE VISIT (OUTPATIENT)
Dept: PSYCHIATRY | Facility: CLINIC | Age: 54
End: 2024-11-04
Payer: COMMERCIAL

## 2024-11-04 VITALS — DIASTOLIC BLOOD PRESSURE: 71 MMHG | HEART RATE: 79 BPM | SYSTOLIC BLOOD PRESSURE: 103 MMHG

## 2024-11-04 DIAGNOSIS — F33.2 SEVERE RECURRENT MAJOR DEPRESSION WITHOUT PSYCHOTIC FEATURES (H): Primary | ICD-10-CM

## 2024-11-04 ASSESSMENT — PATIENT HEALTH QUESTIONNAIRE - PHQ9: SUM OF ALL RESPONSES TO PHQ QUESTIONS 1-9: 14

## 2024-11-04 NOTE — PROGRESS NOTES
Aure Joy comes into clinic today at the request of ZEE Zamudio MD Ordering Provider for Med Injection only Ketamine 60 mg .    Procedure Prep:  Medication double check completed by: Jael Alexandra RN  Prior to administration pt identified by name and : Yes    Nursing Assessment:  Appearance: Fair   Mood: Good  Affect: Calm, Bright  Eye contact: Good      2024     9:23 AM   PHQ   PHQ-9 Total Score 14   Q9: Thoughts of better off dead/self-harm past 2 weeks Not at all     QIDDSR 16 weekly assessment: score 12. Assessment was scanned to EHR.       Procedure Performed:  VSS and mental status WNL. Patient was given Ketamine. See MAR for details.         Post Procedure Assessment:  Patient tolerated the treatment with the following side effects: dissociation. Vital signs were monitored, see VS Flowsheet. Patient stated they felt ready to go home prior to discharge. AVS was offered to patient and patient declined. Patient was instructed not to drive for the remainder of the day and to notify clinic if any concerns arise.     Next appt:     Medications were supplied by Clinic      This service provided today was under the supervising provider of the day LOTUS Gordon, who was available if needed.    Hallie Jaramillo RN

## 2024-11-11 ENCOUNTER — ALLIED HEALTH/NURSE VISIT (OUTPATIENT)
Dept: PSYCHIATRY | Facility: CLINIC | Age: 54
End: 2024-11-11
Payer: COMMERCIAL

## 2024-11-11 VITALS — SYSTOLIC BLOOD PRESSURE: 116 MMHG | HEART RATE: 76 BPM | DIASTOLIC BLOOD PRESSURE: 79 MMHG

## 2024-11-11 DIAGNOSIS — F33.2 SEVERE RECURRENT MAJOR DEPRESSION WITHOUT PSYCHOTIC FEATURES (H): Primary | ICD-10-CM

## 2024-11-11 ASSESSMENT — PATIENT HEALTH QUESTIONNAIRE - PHQ9: SUM OF ALL RESPONSES TO PHQ QUESTIONS 1-9: 11

## 2024-11-11 NOTE — PROGRESS NOTES
Aure Joy comes into clinic today at the request of ESTELA Zamudio MD Ordering Provider for Med Injection only Ketamine .    Procedure Prep:  Medication double check completed by: John Velazco RN  Prior to administration pt identified by name and : Yes    Nursing Assessment:  Appearance: Good   Mood: Neutral  Affect: Bright  Eye contact: Good      2024     3:43 PM   PHQ   PHQ-9 Total Score 11   Q9: Thoughts of better off dead/self-harm past 2 weeks Not at all     QIDDSR 16 weekly assessment: score 12. Assessment was scanned to EHR.     Procedure Performed:  VSS and mental status WNL. Patient was given Ketamine. See MAR for details.       Post Procedure Assessment:  Patient tolerated the treatment with the following side effects: dissociation. Vital signs were monitored, see VS Flowsheet. Patient stated they felt ready to go home prior to discharge. AVS was offered to patient and patient declined. Patient was instructed not to drive for the remainder of the day and to notify clinic if any concerns arise.     Next appt:     Medications were supplied by Clinic     This service provided today was under the supervising provider of the day LOTUS Gordon MD, who was available if needed.    Hallie Jaramillo, GERARDO

## 2024-11-12 ENCOUNTER — TELEPHONE (OUTPATIENT)
Dept: PSYCHIATRY | Facility: CLINIC | Age: 54
End: 2024-11-12

## 2024-11-12 DIAGNOSIS — F33.2 SEVERE EPISODE OF RECURRENT MAJOR DEPRESSIVE DISORDER, WITHOUT PSYCHOTIC FEATURES (H): ICD-10-CM

## 2024-11-18 ENCOUNTER — OFFICE VISIT (OUTPATIENT)
Dept: PSYCHIATRY | Facility: CLINIC | Age: 54
End: 2024-11-18
Payer: COMMERCIAL

## 2024-11-18 ENCOUNTER — TELEPHONE (OUTPATIENT)
Dept: PSYCHIATRY | Facility: CLINIC | Age: 54
End: 2024-11-18

## 2024-11-18 ENCOUNTER — ALLIED HEALTH/NURSE VISIT (OUTPATIENT)
Dept: PSYCHIATRY | Facility: CLINIC | Age: 54
End: 2024-11-18
Payer: COMMERCIAL

## 2024-11-18 VITALS
TEMPERATURE: 98.4 F | OXYGEN SATURATION: 98 % | HEART RATE: 70 BPM | SYSTOLIC BLOOD PRESSURE: 115 MMHG | DIASTOLIC BLOOD PRESSURE: 80 MMHG

## 2024-11-18 VITALS — DIASTOLIC BLOOD PRESSURE: 78 MMHG | HEART RATE: 77 BPM | SYSTOLIC BLOOD PRESSURE: 112 MMHG

## 2024-11-18 DIAGNOSIS — F33.2 SEVERE EPISODE OF RECURRENT MAJOR DEPRESSIVE DISORDER, WITHOUT PSYCHOTIC FEATURES (H): Primary | ICD-10-CM

## 2024-11-18 DIAGNOSIS — F33.2 SEVERE EPISODE OF RECURRENT MAJOR DEPRESSIVE DISORDER, WITHOUT PSYCHOTIC FEATURES (H): ICD-10-CM

## 2024-11-18 LAB
ALBUMIN SERPL BCG-MCNC: 4.4 G/DL (ref 3.5–5.2)
ALBUMIN UR-MCNC: 10 MG/DL
ALP SERPL-CCNC: 115 U/L (ref 40–150)
ALT SERPL W P-5'-P-CCNC: 11 U/L (ref 0–70)
AMPHETAMINES UR QL SCN: ABNORMAL
ANION GAP SERPL CALCULATED.3IONS-SCNC: 12 MMOL/L (ref 7–15)
APPEARANCE UR: ABNORMAL
AST SERPL W P-5'-P-CCNC: 13 U/L (ref 0–45)
BACTERIA #/AREA URNS HPF: ABNORMAL /HPF
BARBITURATES UR QL SCN: ABNORMAL
BASOPHILS # BLD AUTO: 0.1 10E3/UL (ref 0–0.2)
BASOPHILS NFR BLD AUTO: 1 %
BENZODIAZ UR QL SCN: ABNORMAL
BILIRUB DIRECT SERPL-MCNC: <0.2 MG/DL (ref 0–0.3)
BILIRUB SERPL-MCNC: 0.3 MG/DL
BILIRUB UR QL STRIP: NEGATIVE
BUN SERPL-MCNC: 9.8 MG/DL (ref 6–20)
BZE UR QL SCN: ABNORMAL
CALCIUM SERPL-MCNC: 10.5 MG/DL (ref 8.8–10.4)
CANNABINOIDS UR QL SCN: ABNORMAL
CAOX CRY #/AREA URNS HPF: ABNORMAL /HPF
CHLORIDE SERPL-SCNC: 106 MMOL/L (ref 98–107)
COLOR UR AUTO: YELLOW
CREAT SERPL-MCNC: 0.71 MG/DL (ref 0.51–1.17)
EGFRCR SERPLBLD CKD-EPI 2021: >90 ML/MIN/1.73M2
EOSINOPHIL # BLD AUTO: 0.1 10E3/UL (ref 0–0.7)
EOSINOPHIL NFR BLD AUTO: 1 %
ERYTHROCYTE [DISTWIDTH] IN BLOOD BY AUTOMATED COUNT: 13.2 % (ref 10–15)
FENTANYL UR QL: ABNORMAL
GLUCOSE SERPL-MCNC: 95 MG/DL (ref 70–99)
GLUCOSE UR STRIP-MCNC: NEGATIVE MG/DL
HCO3 SERPL-SCNC: 24 MMOL/L (ref 22–29)
HCT VFR BLD AUTO: 45.4 % (ref 35–53)
HGB BLD-MCNC: 14.4 G/DL (ref 11.7–17.7)
HGB UR QL STRIP: NEGATIVE
IMM GRANULOCYTES # BLD: 0 10E3/UL
IMM GRANULOCYTES NFR BLD: 0 %
KETONES UR STRIP-MCNC: NEGATIVE MG/DL
LEUKOCYTE ESTERASE UR QL STRIP: ABNORMAL
LYMPHOCYTES # BLD AUTO: 2.4 10E3/UL (ref 0.8–5.3)
LYMPHOCYTES NFR BLD AUTO: 34 %
MCH RBC QN AUTO: 29.7 PG (ref 26.5–33)
MCHC RBC AUTO-ENTMCNC: 31.7 G/DL (ref 31.5–36.5)
MCV RBC AUTO: 94 FL (ref 78–100)
MONOCYTES # BLD AUTO: 0.5 10E3/UL (ref 0–1.3)
MONOCYTES NFR BLD AUTO: 8 %
MUCOUS THREADS #/AREA URNS LPF: PRESENT /LPF
NEUTROPHILS # BLD AUTO: 3.9 10E3/UL (ref 1.6–8.3)
NEUTROPHILS NFR BLD AUTO: 57 %
NITRATE UR QL: NEGATIVE
NRBC # BLD AUTO: 0 10E3/UL
NRBC BLD AUTO-RTO: 0 /100
OPIATES UR QL SCN: ABNORMAL
PCP QUAL URINE (ROCHE): ABNORMAL
PH UR STRIP: 5 [PH] (ref 5–7)
PLATELET # BLD AUTO: 297 10E3/UL (ref 150–450)
POTASSIUM SERPL-SCNC: 3.7 MMOL/L (ref 3.4–5.3)
PROT SERPL-MCNC: 6.8 G/DL (ref 6.4–8.3)
RBC # BLD AUTO: 4.85 10E6/UL (ref 3.8–5.9)
RBC URINE: 5 /HPF
SODIUM SERPL-SCNC: 142 MMOL/L (ref 135–145)
SP GR UR STRIP: 1.02 (ref 1–1.03)
SQUAMOUS EPITHELIAL: 14 /HPF
TRANSITIONAL EPI: 1 /HPF
UROBILINOGEN UR STRIP-MCNC: NORMAL MG/DL
VIT D+METAB SERPL-MCNC: 59 NG/ML (ref 20–50)
WBC # BLD AUTO: 6.9 10E3/UL (ref 4–11)
WBC URINE: 47 /HPF

## 2024-11-18 ASSESSMENT — PAIN SCALES - GENERAL: PAINLEVEL_OUTOF10: NO PAIN (0)

## 2024-11-18 NOTE — PROGRESS NOTES
Aure Joy comes into clinic today at the request of ESTELA Zamudio MD Ordering Provider for Med Injection only Ketamine .    Procedure Prep:  Medication double check completed by: John Velazco RN  Prior to administration pt identified by name and : Yes    Nursing Assessment:  Appearance: Fair   Mood: Good  Affect: Neutral  Eye contact: Good      2024     3:43 PM   PHQ   PHQ-9 Total Score 11   Q9: Thoughts of better off dead/self-harm past 2 weeks Not at all     QIDDSR 16 weekly assessment: score pt did not complete this visit.      Procedure Performed:  VSS and mental status WNL. Patient was given Ketamine. See MAR for details.       Post Procedure Assessment:  Patient tolerated the treatment with the following side effects: dissociation. Vital signs were monitored, see VS Flowsheet. Patient stated they felt ready to go home prior to discharge. AVS was offered to patient and patient declined. Patient was instructed not to drive for the remainder of the day and to notify clinic if any concerns arise.     Next appt:     Medications were supplied by Clinic     This service provided today was under the supervising provider of the day LOTUS Gordon MD, who was available if needed.    Hallie Jaramillo, GERARDO
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
## 2024-11-19 LAB — BACTERIA UR CULT: NORMAL

## 2024-11-19 NOTE — PATIENT INSTRUCTIONS
"Today's Recommendations:  -   -Please continue your weekly ketamine injections and monitor for side effects.  - Please schedule a comprehensive physical examination, as it has been 18 months since your last evaluation.  - Please continue weekly behavioral activation therapy.  -Please do your electrocardiogram (EKG) at [facility], and we have sent the order to them via fax.  - Please initiate phototherapy using a light therapy lamp for seasonal affective disorder during winter.  -     We are specifically a university and research clinic, and there are often research studies ongoing. Some of those are clinical trials of new brain stimulation treatments. Others are what we would call \"biomarker\" studies, where we ask you to participate in some kind of measurement while you are undergoing regular treatment.   If you are interested in hearing about research consider signing up for our research registry. This will allow researchers in our clinic to reach out if you become eligible for a study. Sign up today at [email or web address]    In some cases, a clinical trial is the only way to get access to an advanced treatment that is not covered by your insurance. Usually, we will talk about this in your visit if it is an option.      Patient Education       General Information:  Our Treatment-Resistant Disorders/Interventional Psychiatry clinic is what is often called a \"consultation\" or \"tertiary care\" clinic. That means we do not see patients long-term for medication management or talk therapy. We offer thorough evaluations, recommendations about medications/therapies you may have not yet tried, and in some cases, brain stimulation or office-based treatments. If you are likely to benefit from one of those advanced treatments, we will have talked about it today. Once that treatment is complete, we will see you once or twice afterwards to check in, and then you will return to getting ongoing psychiatric care from " whomever you were seeing before you came for your evaluation with us. If for some reason you no longer have access to that clinician, we can help with a referral to our main [facility]. The only patients we see long-term are patients with implanted medical devices that require ongoing monitoring and programming.     Our recommendations almost always include some kind of cognitive-behavioral therapy (CBT) if you are not getting it already. Brain and nerve stimulation treatments work best when combined with certain talk therapies. We make these recommendations because we strongly believe that, without the therapy piece, most people will not get better, or will have limited clinical benefit. It is often difficult or inconvenient to add therapy to an already busy schedule, but it is also necessary.    It is important to remember that, like all mental health treatments, our interventional therapies are not perfect. About one third of people will not feel better after treatment, and even when they work, they do not take away the symptoms entirely.If we have recommended something above, it means we think there is a better than even chance of it working, but things are never guaranteed. This is another reason we usually recommend CBT along with our advanced treatments -- it can address the symptoms that remain after the stimulation/ketamine treatment.       While we are waiting to implement the recommendations you and I have discussed, you should know what to do if your symptoms worsen. A variety of crisis numbers/resources are available. They include:    CRISIS GENERAL NUMBERS   3-869-XKMVAFK (1-580.651.5738)  OR  911     CRISIS INTERVENTION TEAM (CIT) - this is a POLICE UNIT, specially trained.  This website has information for all of [place]'s CITs. Lays out which areas have this team.  [email or web address]/[email or web address]  However, one can just call 911 and ask for this special team.   If police  need to be called, DO ask for this team.    CRISIS MOBILE TEAMS  [and see end of this phrase*]  [facility] -COPE: 24hrs/7days:    [phone]  (Adults > 17yo)    [phone]  (Child   < 16yo)                                       FRONT DOOR (during the day could call)   [phone]    [facility] -[phone] (Adult)  [phone] (Child)     [facility] -[phone] (Adult and Child)     [facility] -[phone] ([facility] mobile crisis team; Adult and Child; 24hr)     [facility] -[phone] (Adult and Child)     CRISIS HOUSING  [facility]                           [address]              [phone]  [facility]                                [address]                       [phone]  [facility]                               [address] [facility] [address]     [phone]   [facility]  [address]                [phone]    [facility] NUMBERS    Crisis Connection:                                [phone]  [facility]:     [phone]  Crisis Intervention:                                [phone] or [phone]   COPE: Mobile Team 24hrs/7days:    [phone]  (Adults > 17yo)                                                                            [phone]  (Child   < 16yo)  [facility]        [phone] 24/7 line and Mobile Team   [address] [place] [address]  DROP IN:  M-F: 8:00 am - 7:00 pm  Sat: 11:00 am - 3:00 pm   Sun: Closed     WALK-IN COUNSELING:  [facility]       [phone]    [address]:   M, W, F:  1:00-3:00PM    M-Th:  6:30-8:30PM    TRANS and LGBT  Call [employer] at [phone]. This service is staffed by trans people 24/7.   LGBT youth <24 contemplating suicide, call the [place]  "Project Lifeline 6-255-7944.    POISON CONTROL CENTER  [phone]    OR  go to nearest ER    CHILD  \"[facility] has a needs assessment team who will do an intake evaluation. Based on the results of the intake they will recommended inpatient admission, partial hospitalization, intensive outpatient or outpatient care. The number is [phone]. \"    CRISIS TEXT LINE  Http://www.crisistextline.org  FREE SUPPORT AT YOUR FINGERTIPS,   Crisis Text Line serves anyone, in any type of crisis, providing access to free,  support and information via the medium people already use and trust: text. Here s how it works:  1)  Text [phone] from anywhere in the [place], anytime, about any type of crisis.  2)  A live, trained Crisis Counselor receives the text and responds quickly.      The volunteer Crisis Counselor will help you move from a 'hot moment to a cool moment'    [facility] EMERGENCY NUMBERS    Crisis Connection:                                [phone]  [facility]:              [phone]  Crisis Intervention:                                [phone] or [phone]   COPE: Mobile Team 24hrs/7days:    [phone]  (Adults > 17yo)                                                                            [phone]  (Child   < 16yo)  [facility]        [phone]  CALL 24 hours a day.  [address] [place] [address]  DROP IN:  M-F: 8:00 am - 7:00 pm  Sat: 11:00 am - 3:00 pm   Sun: Closed    WALK-IN COUNSELIN)  [facility]                                  [phone]        [address]:                  TRIPP, W:      5:00-7:00PM       2)  [facility]                            [phone]        [place]                T, Th:      6:00-8:00PM    TRANS and LGBT  Call [employer] at [phone]. This service is staffed by trans people .   LGBT youth <24 contemplating suicide, call the " "[place] Project Lifeline 6-766-5572.    POISON CONTROL CENTER  [phone]    OR  go to nearest ER    CHILD  \"[facility] has a needs assessment team who will do an intake evaluation. Based on the results of the intake they will recommended inpatient admission, partial hospitalization, intensive outpatient or outpatient care. The number is [phone] or [phone]. \"    CRISIS TEXT LINE  Http://www.crisistextline.org  FREE SUPPORT AT YOUR FINGERTIPS, 24/7  Crisis Text Line serves anyone, in any type of crisis, providing access to free, 24/7 support and information via the medium people already use and trust: text. Here s how it works:  1)  Text [phone] from anywhere in the [place], anytime, about any type of crisis.  2)  A live, trained Crisis Counselor receives the text and responds quickly.      The volunteer Crisis Counselor will help you move from a 'hot moment to a cool moment'      * A Community Paramedic (CP) is an advanced paramedic that works to increase access to primary and preventive care and decrease use of emergency departments, which in turn decreases health care costs. Among other things, CPs may play a key role in providing follow-up services after a hospital discharge to prevent hospital readmission. CPs can provide health assessments, chronic disease monitoring and education, medication management, immunizations and vaccinations, laboratory specimen collection, hospital discharge follow-up care and minor medical procedures. CPs work under the direction of an Ambulance Medical Director.   "

## 2024-11-25 ENCOUNTER — ALLIED HEALTH/NURSE VISIT (OUTPATIENT)
Dept: PSYCHIATRY | Facility: CLINIC | Age: 54
End: 2024-11-25
Payer: COMMERCIAL

## 2024-11-25 VITALS — DIASTOLIC BLOOD PRESSURE: 75 MMHG | SYSTOLIC BLOOD PRESSURE: 109 MMHG | HEART RATE: 74 BPM

## 2024-11-25 DIAGNOSIS — F33.2 SEVERE RECURRENT MAJOR DEPRESSION WITHOUT PSYCHOTIC FEATURES (H): Primary | ICD-10-CM

## 2024-11-25 ASSESSMENT — PATIENT HEALTH QUESTIONNAIRE - PHQ9: SUM OF ALL RESPONSES TO PHQ QUESTIONS 1-9: 13

## 2024-11-25 NOTE — PROGRESS NOTES
Aure Joy comes into clinic today at the request of ESTELA Zamudio MD Ordering Provider for Med Injection only Ketamine .    Procedure Prep:  Medication double check completed by: Hallie Jaramillo RN  Prior to administration pt identified by name and : yes    Nursing Assessment:  Appearance: Casually clothed   Mood: Depressed, OK  Affect: WNL  Eye contact: Fair      2024     4:48 PM   PHQ   PHQ-9 Total Score 13   Q9: Thoughts of better off dead/self-harm past 2 weeks Not at all     QIDDSR 16 weekly assessment: score 13. Assessment was scanned to EHR.       Procedure Performed:  VSS and mental status WNL. Patient was given Ketamine. See MAR for details.     Post Procedure Assessment:  Patient tolerated the treatment with the following side effects: dissociation. Vital signs were monitored, see VS Flowsheet. Patient stated they felt ready to go home prior to discharge. AVS was offered to patient and patient declined. Patient was instructed not to drive for the remainder of the day and to notify clinic if any concerns arise.     Next appt: Monday    Medications were supplied by Clinic       This service provided today was under the supervising provider of the day LOTUS Gordon MD, who was available if needed.          Joan Donnelly RN

## 2024-11-26 ASSESSMENT — PATIENT HEALTH QUESTIONNAIRE - PHQ9: SUM OF ALL RESPONSES TO PHQ QUESTIONS 1-9: 17

## 2024-12-02 ENCOUNTER — ALLIED HEALTH/NURSE VISIT (OUTPATIENT)
Dept: PSYCHIATRY | Facility: CLINIC | Age: 54
End: 2024-12-02
Payer: COMMERCIAL

## 2024-12-02 VITALS — SYSTOLIC BLOOD PRESSURE: 120 MMHG | DIASTOLIC BLOOD PRESSURE: 83 MMHG | HEART RATE: 60 BPM

## 2024-12-02 DIAGNOSIS — F33.2 SEVERE RECURRENT MAJOR DEPRESSION WITHOUT PSYCHOTIC FEATURES (H): Primary | ICD-10-CM

## 2024-12-02 NOTE — PROGRESS NOTES
Aure Joy comes into clinic today at the request of ESTELA Zamudio MD Ordering Provider for Med Injection only Ketamine .    Procedure Prep:  Medication double check completed by: John GUERRERO RN  Prior to administration pt identified by name and : yes    Nursing Assessment:  Appearance: dressed casually   Mood: depressed  Affect: blunted  Eye contact: fair      2024     4:48 PM   PHQ   PHQ-9 Total Score 13   Q9: Thoughts of better off dead/self-harm past 2 weeks Not at all     QIDDSR 16 weekly assessment: score 12. Assessment was scanned to EHR.       Procedure Performed:  VSS and mental status WNL. Patient was given Ketamine. See MAR for details.         Post Procedure Assessment:  Patient tolerated the treatment with the following side effects: dissociation. Vital signs were monitored, see VS Flowsheet. Patient stated they felt ready to go home prior to discharge. AVS was offered to patient and patient declined. Patient was instructed not to drive for the remainder of the day and to notify clinic if any concerns arise.     Next appt: 24      Medications were supplied by Clinic       This service provided today was under the supervising provider of the day Elizabeth Gordon MD, who was available if needed.    Jael Alexandra RN

## 2024-12-09 ENCOUNTER — ALLIED HEALTH/NURSE VISIT (OUTPATIENT)
Dept: PSYCHIATRY | Facility: CLINIC | Age: 54
End: 2024-12-09
Payer: COMMERCIAL

## 2024-12-09 ENCOUNTER — MYC REFILL (OUTPATIENT)
Dept: PSYCHIATRY | Facility: CLINIC | Age: 54
End: 2024-12-09

## 2024-12-09 VITALS — HEART RATE: 74 BPM | SYSTOLIC BLOOD PRESSURE: 111 MMHG | DIASTOLIC BLOOD PRESSURE: 78 MMHG

## 2024-12-09 DIAGNOSIS — F33.2 SEVERE EPISODE OF RECURRENT MAJOR DEPRESSIVE DISORDER, WITHOUT PSYCHOTIC FEATURES (H): ICD-10-CM

## 2024-12-09 DIAGNOSIS — F33.2 SEVERE RECURRENT MAJOR DEPRESSION WITHOUT PSYCHOTIC FEATURES (H): Primary | ICD-10-CM

## 2024-12-09 RX ORDER — LAMOTRIGINE 200 MG/1
400 TABLET ORAL DAILY
Qty: 180 TABLET | Refills: 3 | Status: SHIPPED | OUTPATIENT
Start: 2024-12-09

## 2024-12-09 ASSESSMENT — PATIENT HEALTH QUESTIONNAIRE - PHQ9: SUM OF ALL RESPONSES TO PHQ QUESTIONS 1-9: 13

## 2024-12-09 NOTE — PROGRESS NOTES
Aure Joy comes into clinic today at the request of ESTELA Zamudio MD Ordering Provider for Med Injection only Ketamine .    Procedure Prep:  Medication double check completed by: Hallie Jaramillo RN  Prior to administration pt identified by name and : yes    Nursing Assessment:  Appearance: Casually clothed   Mood: Depressed, OK  Affect: WNL  Eye contact: Fair      2024     3:54 PM   PHQ   PHQ-9 Total Score 13   Q9: Thoughts of better off dead/self-harm past 2 weeks Not at all     QIDDSR 16 weekly assessment: score 14. Assessment was scanned to EHR.       Procedure Performed:  VSS and mental status WNL. Patient was given Ketamine. See MAR for details.     Post Procedure Assessment:  Patient tolerated the treatment with the following side effects: dissociation. Vital signs were monitored, see VS Flowsheet. Patient stated they felt ready to go home prior to discharge. AVS was offered to patient and patient declined. Patient was instructed not to drive for the remainder of the day and to notify clinic if any concerns arise.     Next appt: Monday    Medications were supplied by Clinic       This service provided today was under the supervising provider of the day MICHAEL Shirley MD, who was available if needed.          Joan Donnelly RN

## 2024-12-09 NOTE — TELEPHONE ENCOUNTER
Last seen: 11/18/2024  RTC: 6 weeks from 11/28/2024  Cancel: Yes  No-show: No  Next appt: 12/30/2024    Incoming refill from pt via MyChart    Medication requested: lamoTRIgine (LAMICTAL) 200 MG tablet   Directions: Take 2 tablets (400 mg) by mouth daily   Qty: 180 tablets with 3 refills  Last refilled: 07/19/2024    Medication refill approved per refill protocol

## 2024-12-16 ENCOUNTER — ALLIED HEALTH/NURSE VISIT (OUTPATIENT)
Dept: PSYCHIATRY | Facility: CLINIC | Age: 54
End: 2024-12-16
Payer: COMMERCIAL

## 2024-12-16 VITALS — HEART RATE: 74 BPM | SYSTOLIC BLOOD PRESSURE: 126 MMHG | DIASTOLIC BLOOD PRESSURE: 90 MMHG

## 2024-12-16 DIAGNOSIS — F33.2 SEVERE EPISODE OF RECURRENT MAJOR DEPRESSIVE DISORDER, WITHOUT PSYCHOTIC FEATURES (H): Primary | ICD-10-CM

## 2024-12-16 ASSESSMENT — PATIENT HEALTH QUESTIONNAIRE - PHQ9: SUM OF ALL RESPONSES TO PHQ QUESTIONS 1-9: 13

## 2024-12-16 NOTE — PROGRESS NOTES
Aure Joy comes into clinic today at the request of ESTELA Zamudio MD Ordering Provider for Med Injection only Ketamine .    Procedure Prep:  Medication double check completed by: Joan Donnelly RN  Prior to administration pt identified by name and : Yes    Nursing Assessment:  Appearance: Good   Mood: Neutral  Affect: Bright  Eye contact: Good      2024     3:39 PM   PHQ   PHQ-9 Total Score 13   Q9: Thoughts of better off dead/self-harm past 2 weeks Not at all     QIDDSR 16 weekly assessment: score 10. Assessment was scanned to EHR.     Procedure Performed:  VSS and mental status WNL. Patient was given Ketamine. See MAR for details.     Post Procedure Assessment:  Patient tolerated the treatment with the following side effects: dissociation. Vital signs were monitored, see VS Flowsheet. Patient stated they felt ready to go home prior to discharge. AVS was offered to patient and patient declined. Patient was instructed not to drive for the remainder of the day and to notify clinic if any concerns arise.     Next appt:     Medications were supplied by Clinic      This service provided today was under the supervising provider of the day LOTUS Gordon MD, who was available if needed.    Hallie Jaramillo RN

## 2024-12-23 ENCOUNTER — ALLIED HEALTH/NURSE VISIT (OUTPATIENT)
Dept: PSYCHIATRY | Facility: CLINIC | Age: 54
End: 2024-12-23
Payer: COMMERCIAL

## 2024-12-23 VITALS — DIASTOLIC BLOOD PRESSURE: 86 MMHG | SYSTOLIC BLOOD PRESSURE: 121 MMHG | HEART RATE: 65 BPM

## 2024-12-23 DIAGNOSIS — F33.2 SEVERE EPISODE OF RECURRENT MAJOR DEPRESSIVE DISORDER, WITHOUT PSYCHOTIC FEATURES (H): Primary | ICD-10-CM

## 2024-12-23 ASSESSMENT — PATIENT HEALTH QUESTIONNAIRE - PHQ9: SUM OF ALL RESPONSES TO PHQ QUESTIONS 1-9: 13

## 2024-12-23 NOTE — PROGRESS NOTES
Aure Joy comes into clinic today at the request of ESTELA Zamudio MD Ordering Provider for Med Injection only Ketamine .    Procedure Prep:  Medication double check completed by: Joan Donnelly RN  Prior to administration pt identified by name and : Yes    Nursing Assessment:  Appearance: Good   Mood: Neutral  Affect: Calm  Eye contact: Good      2024     3:38 PM   PHQ   PHQ-9 Total Score 13   Q9: Thoughts of better off dead/self-harm past 2 weeks Not at all     QIDDSR 16 weekly assessment: score 11. Assessment was scanned to EHR.     Procedure Performed:  VSS and mental status WNL. Patient was given Ketamine. See MAR for details.     Post Procedure Assessment:  Patient tolerated the treatment with the following side effects: dissociation. Vital signs were monitored, see VS Flowsheet. Patient stated they felt ready to go home prior to discharge. AVS was offered to patient and patient declined. Patient was instructed not to drive for the remainder of the day and to notify clinic if any concerns arise.     Next appt:     Medications were supplied by Clinic      This service provided today was under the supervising provider of the day LOTUS Gordon MD, who was available if needed.    Hallie Jaramillo RN

## 2024-12-30 ENCOUNTER — OFFICE VISIT (OUTPATIENT)
Dept: PSYCHIATRY | Facility: CLINIC | Age: 54
End: 2024-12-30
Payer: COMMERCIAL

## 2024-12-30 ENCOUNTER — ALLIED HEALTH/NURSE VISIT (OUTPATIENT)
Dept: PSYCHIATRY | Facility: CLINIC | Age: 54
End: 2024-12-30
Payer: COMMERCIAL

## 2024-12-30 VITALS
HEIGHT: 62 IN | DIASTOLIC BLOOD PRESSURE: 77 MMHG | SYSTOLIC BLOOD PRESSURE: 110 MMHG | TEMPERATURE: 97.8 F | WEIGHT: 138.6 LBS | BODY MASS INDEX: 25.51 KG/M2 | HEART RATE: 70 BPM

## 2024-12-30 VITALS — SYSTOLIC BLOOD PRESSURE: 114 MMHG | HEART RATE: 108 BPM | DIASTOLIC BLOOD PRESSURE: 67 MMHG

## 2024-12-30 DIAGNOSIS — F33.2 SEVERE EPISODE OF RECURRENT MAJOR DEPRESSIVE DISORDER, WITHOUT PSYCHOTIC FEATURES (H): Primary | ICD-10-CM

## 2024-12-30 DIAGNOSIS — E55.9 VITAMIN D DEFICIENCY: ICD-10-CM

## 2024-12-30 RX ORDER — FAMOTIDINE 20 MG
200 TABLET ORAL DAILY
Qty: 30 CAPSULE | Refills: 1 | Status: SHIPPED | OUTPATIENT
Start: 2024-12-30

## 2024-12-30 ASSESSMENT — PATIENT HEALTH QUESTIONNAIRE - PHQ9: SUM OF ALL RESPONSES TO PHQ QUESTIONS 1-9: 12

## 2024-12-30 NOTE — PATIENT INSTRUCTIONS
"Today's Recommendations:  - We will adjust your ketamine treatment schedule to every other week to manage costs and maintain mood stability. Your next ketamine session is scheduled for today, and subsequent sessions will occur every two weeks.  -We will initiate Transcranial Magnetic Stimulation (TMS) treatment to support your mood stabilization and compensate for the reduced frequency of ketamine treatments.   -I modify your vitamin D supplementation to a lower dose of 200 micrograms daily. I sent a new prescription to ensure adequate vitamin D levels without exceeding the normal range.  - During your upcoming physical examination appointment, ensure an EKG is performed to monitor cardiac health.   - Maintain your current medication regimen with Lamotrigine as a mood stabilizer.   - Complete the tapering off of Abilify by the end of January, as planned.  - Implement the phototherapy lamp in the morning to enhance mood and energy levels, especially during winter when natural sunlight is limited.  -     We are specifically a university and research clinic, and there are often research studies ongoing. Some of those are clinical trials of new brain stimulation treatments. Others are what we would call \"biomarker\" studies, where we ask you to participate in some kind of measurement while you are undergoing regular treatment.   If you are interested in hearing about research consider signing up for our research registry. This will allow researchers in our clinic to reach out if you become eligible for a study. Sign up today at [email or web address]    In some cases, a clinical trial is the only way to get access to an advanced treatment that is not covered by your insurance. Usually, we will talk about this in your visit if it is an option.      Patient Education       General Information:  Our Treatment-Resistant Disorders/Interventional Psychiatry clinic is what is often called a \"consultation\" or \"tertiary " "care\" clinic. That means we do not see patients long-term for medication management or talk therapy. We offer thorough evaluations, recommendations about medications/therapies you may have not yet tried, and in some cases, brain stimulation or office-based treatments. If you are likely to benefit from one of those advanced treatments, we will have talked about it today. Once that treatment is complete, we will see you once or twice afterwards to check in, and then you will return to getting ongoing psychiatric care from whomever you were seeing before you came for your evaluation with us. If for some reason you no longer have access to that clinician, we can help with a referral to our main [facility]. The only patients we see long-term are patients with implanted medical devices that require ongoing monitoring and programming.     Our recommendations almost always include some kind of cognitive-behavioral therapy (CBT) if you are not getting it already. Brain and nerve stimulation treatments work best when combined with certain talk therapies. We make these recommendations because we strongly believe that, without the therapy piece, most people will not get better, or will have limited clinical benefit. It is often difficult or inconvenient to add therapy to an already busy schedule, but it is also necessary.    It is important to remember that, like all mental health treatments, our interventional therapies are not perfect. About one third of people will not feel better after treatment, and even when they work, they do not take away the symptoms entirely.If we have recommended something above, it means we think there is a better than even chance of it working, but things are never guaranteed. This is another reason we usually recommend CBT along with our advanced treatments -- it can address the symptoms that remain after the stimulation/ketamine treatment.       While we are waiting to implement " the recommendations you and I have discussed, you should know what to do if your symptoms worsen. A variety of crisis numbers/resources are available. They include:    CRISIS GENERAL NUMBERS   3-829-CXTFRZH (1-437.421.9822)  OR  911     CRISIS INTERVENTION TEAM (CIT) - this is a POLICE UNIT, specially trained.  This website has information for all of [place]'s CITs. Lays out which areas have this team.  [email or web address]  However, one can just call 911 and ask for this special team.   If police need to be called, DO ask for this team.    CRISIS MOBILE TEAMS  [and see end of this phrase*]  [facility] -COPE: 24hrs/7days:    [phone]  (Adults > 17yo)    [phone]  (Child   < 18yo)                                       FRONT DOOR (during the day could call)   [phone]    [facility] -[phone] (Adult)  [phone] (Child)     [facility] -[phone] (Adult and Child)     [facility] -[phone] ([employer] mobile crisis team; Adult and Child; 24hr)     [facility] -[phone] (Adult and Child)     CRISIS HOUSING  [facility]                           [address]              [phone]  [facility]                                [address]                       [phone]  [facility]                               [address]     [phone]   [facility]  [address]                [phone]    [place] EMERGENCY NUMBERS    Crisis Connection:                                [phone]  [facility]:     [phone]  Crisis Intervention:                                [phone] or [phone]   COPE: Mobile Team 24hrs/7days:    [phone]  (Adults > 17yo)                                                                            [phone]  (Child  "  < 16yo)  [facility]        [phone] 24/7 line and Mobile Team   [address] [place] [address]  DROP IN:  M-F: 8:00 am - 7:00 pm  Sat: 11:00 am - 3:00 pm   Sun: Closed     WALK-IN COUNSELING:  [facility]       [phone]    [address]:   M, W, F:  1:00-3:00PM    M-Th:  6:30-8:30PM    TRANS and LGBT  Call [employer] at [phone]. This service is staffed by trans people 24/7.   LGBT youth <24 contemplating suicide, call the [facility] [phone].    POISON CONTROL CENTER  [phone]    OR  go to nearest ER    CHILD  \"[facility] has a needs assessment team who will do an intake evaluation. Based on the results of the intake they will recommended inpatient admission, partial hospitalization, intensive outpatient or outpatient care. The number is [phone]. \"    CRISIS TEXT LINE  Http://www.crisistextline.org  FREE SUPPORT AT YOUR FINGERTIPS, 24/7  Crisis Text Line serves anyone, in any type of crisis, providing access to free, 24/7 support and information via the medium people already use and trust: text. Here s how it works:  1)  Text 223232 from anywhere in the [place], anytime, about any type of crisis.  2)  A live, trained Crisis Counselor receives the text and responds quickly.      The volunteer Crisis Counselor will help you move from a 'hot moment to a cool moment'    [facility] EMERGENCY NUMBERS    Crisis Connection:                                [phone]  [facility]:              [phone]  Crisis Intervention:                                [phone] or [phone]   COPE: Mobile Team 24hrs/7days:    [phone]  (Adults > 17yo)                                                                            [phone]  (Child   < 16yo)  [facility]        [phone]  CALL 24 hours a day.  [address] [place] [address]  DROP IN:  " "M-F: 8:00 am - 7:00 pm  Sat: 11:00 am - 3:00 pm   Sun: Closed    WALK-IN COUNSELIN)  [facility]                                  [phone]        [place], [address]:                  TRIPP, W:      5:00-7:00PM       2)  [facility]                            [phone]        [place], [address]                T, Th:      6:00-8:00PM    TRANS and LGBT  Call [employer] at [phone]. This service is staffed by trans people .   LGBT youth <24 contemplating suicide, call the [employer] [facility] [phone].    POISON CONTROL CENTER  [phone]    OR  go to nearest ER    CHILD  \"[facility] has a needs assessment team who will do an intake evaluation. Based on the results of the intake they will recommended inpatient admission, partial hospitalization, intensive outpatient or outpatient care. The number is [phone] or [phone]. \"    CRISIS TEXT LINE  Http://www.crisistextline.org  FREE SUPPORT AT YOUR FINGERTIPS,   Crisis Text Line serves anyone, in any type of crisis, providing access to free,  support and information via the medium people already use and trust: text. Here s how it works:  1)  Text [phone] from anywhere in the [place], anytime, about any type of crisis.  2)  A live, trained Crisis Counselor receives the text and responds quickly.      The volunteer Crisis Counselor will help you move from a 'hot moment to a cool moment'      * A Community Paramedic (CP) is an advanced paramedic that works to increase access to primary and preventive care and decrease use of emergency departments, which in turn decreases health care costs. Among other things, CPs may play a key role in providing follow-up services after a hospital discharge to prevent hospital readmission. CPs can provide health assessments, chronic disease monitoring and education, medication management, immunizations and vaccinations, laboratory specimen collection, hospital discharge " follow-up care and minor medical procedures. CPs work under the direction of an Ambulance Medical Director.
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
## 2024-12-30 NOTE — PROGRESS NOTES
Aure Joy comes into clinic today at the request of ESTELA Zamudio MD Ordering Provider for Med Injection only Ketamine .    Procedure Prep:  Medication double check completed by: Jael Alexandra RN  Prior to administration pt identified by name and : Yes    Nursing Assessment:  Appearance: Good   Mood: Good  Affect: Neutral, Calm   Eye contact: Good      2024     3:51 PM   PHQ   PHQ-9 Total Score 12   Q9: Thoughts of better off dead/self-harm past 2 weeks Not at all     QIDDSR 16 weekly assessment: score 10. Assessment was scanned to EHR.     Procedure Performed:  VSS and mental status WNL. Patient was given Ketamine. See MAR for details.       Post Procedure Assessment:  Patient tolerated the treatment with the following side effects: dissociation, nausea with emesis. Vital signs were monitored, see VS Flowsheet. Patient stated they felt ready to go home prior to discharge. AVS was offered to patient and patient declined. Patient was instructed not to drive for the remainder of the day and to notify clinic if any concerns arise.     Next appt:     Medications were supplied by Clinic      This service provided today was under the supervising provider of the day LOTUS Gordon MD, who was available if needed.    Hallie Jaramillo, RN

## 2025-01-13 ENCOUNTER — ALLIED HEALTH/NURSE VISIT (OUTPATIENT)
Dept: PSYCHIATRY | Facility: CLINIC | Age: 55
End: 2025-01-13
Payer: COMMERCIAL

## 2025-01-13 VITALS — HEART RATE: 71 BPM | DIASTOLIC BLOOD PRESSURE: 78 MMHG | SYSTOLIC BLOOD PRESSURE: 112 MMHG

## 2025-01-13 DIAGNOSIS — F33.2 SEVERE EPISODE OF RECURRENT MAJOR DEPRESSIVE DISORDER, WITHOUT PSYCHOTIC FEATURES (H): Primary | ICD-10-CM

## 2025-01-13 ASSESSMENT — PATIENT HEALTH QUESTIONNAIRE - PHQ9: SUM OF ALL RESPONSES TO PHQ QUESTIONS 1-9: 13

## 2025-01-13 NOTE — PROGRESS NOTES
"Aure Joy comes into clinic today at the request of ESTELA Zamudio MD Ordering Provider for Med Injection only Ketamine .    Procedure Prep:  Medication double check completed by: Joan Donnelly RN  Prior to administration pt identified by name and : Yes    Nursing Assessment:  Appearance: Good   Mood: Neutral. Pt reported her mood is very \"stable\" though low.  Affect: Calm  Eye contact: Good      2025     3:30 PM   PHQ   PHQ-9 Total Score 13   Q9: Thoughts of better off dead/self-harm past 2 weeks Not at all     QIDDSR 16 weekly assessment: score 12. Assessment was scanned to EHR.     Procedure Performed:  VSS and mental status WNL. Patient was given Ketamine. See MAR for details.       Post Procedure Assessment:  Patient tolerated the treatment with the following side effects: dissociation. Vital signs were monitored, see VS Flowsheet. Patient stated they felt ready to go home prior to discharge. AVS was offered to patient and patient declined. Patient was instructed not to drive for the remainder of the day and to notify clinic if any concerns arise.     Next appt:     Medications were supplied by Clinic      This service provided today was under the supervising provider of the day LOTUS Gordon MD, who was available if needed.    Hallie Jaramillo RN  "

## 2025-01-14 DIAGNOSIS — R11.0 NAUSEA: ICD-10-CM

## 2025-01-14 RX ORDER — ONDANSETRON 4 MG/1
4 TABLET, ORALLY DISINTEGRATING ORAL PRN
Qty: 12 TABLET | Refills: 5 | Status: SHIPPED | OUTPATIENT
Start: 2025-01-14

## 2025-01-27 ENCOUNTER — ALLIED HEALTH/NURSE VISIT (OUTPATIENT)
Dept: PSYCHIATRY | Facility: CLINIC | Age: 55
End: 2025-01-27
Payer: COMMERCIAL

## 2025-01-27 VITALS — SYSTOLIC BLOOD PRESSURE: 120 MMHG | HEART RATE: 73 BPM | DIASTOLIC BLOOD PRESSURE: 87 MMHG

## 2025-01-27 DIAGNOSIS — F33.2 SEVERE EPISODE OF RECURRENT MAJOR DEPRESSIVE DISORDER, WITHOUT PSYCHOTIC FEATURES (H): Primary | ICD-10-CM

## 2025-01-27 NOTE — PROGRESS NOTES
Aure Joy comes into clinic today at the request of ESTELA Zamudio MD Ordering Provider for Med Injection only Ketamine .    Procedure Prep:  Medication double check completed by: Joan Donnelly RN  Prior to administration pt identified by name and : Yes    Nursing Assessment:  Appearance: Fair  Mood: Neutral  Affect: Calm  Eye contact: Good      2025     3:30 PM   PHQ   PHQ-9 Total Score 13   Q9: Thoughts of better off dead/self-harm past 2 weeks Not at all     QIDDSR 16 weekly assessment: score 10. Assessment was scanned to EHR.     Procedure Performed:  VSS and mental status WNL. Patient was given Ketamine. See MAR for details.       Post Procedure Assessment:  Patient tolerated the treatment with the following side effects: dissociation. Vital signs were monitored, see VS Flowsheet. Patient stated they felt ready to go home prior to discharge. AVS was offered to patient and patient declined. Patient was instructed not to drive for the remainder of the day and to notify clinic if any concerns arise.     Next appt: 2/10    Medications were supplied by Clinic      This service provided today was under the supervising provider of the day LOTUS Gordon MD, who was available if needed.    Hallie Jaramillo, RN

## 2025-02-10 ENCOUNTER — ALLIED HEALTH/NURSE VISIT (OUTPATIENT)
Dept: PSYCHIATRY | Facility: CLINIC | Age: 55
End: 2025-02-10
Payer: COMMERCIAL

## 2025-02-10 VITALS — HEART RATE: 60 BPM | SYSTOLIC BLOOD PRESSURE: 114 MMHG | DIASTOLIC BLOOD PRESSURE: 79 MMHG

## 2025-02-10 DIAGNOSIS — F33.2 SEVERE RECURRENT MAJOR DEPRESSION WITHOUT PSYCHOTIC FEATURES (H): Primary | ICD-10-CM

## 2025-02-10 ASSESSMENT — PATIENT HEALTH QUESTIONNAIRE - PHQ9: SUM OF ALL RESPONSES TO PHQ QUESTIONS 1-9: 13

## 2025-02-10 NOTE — PROGRESS NOTES
"Aure Joy comes into clinic today at the request of ESTELA Zamudio MD Ordering Provider for Med Injection only Ketamine .    Procedure Prep:  Medication double check completed by: Joan Donnelly RN  Prior to administration pt identified by name and : Yes    Nursing Assessment:  Appearance: Fair  Mood: \"Okay\". Pt expressed feeling optimistic because she is restarting TMS this week.   Affect: Calm  Eye contact: Good      2/10/2025     4:08 PM   PHQ   PHQ-9 Total Score 13   Q9: Thoughts of better off dead/self-harm past 2 weeks Not at all     QIDDSR 16 weekly assessment: score 10. Assessment was scanned to EHR.     Procedure Performed:  VSS and mental status WNL. Patient was given Ketamine. See MAR for details.       Post Procedure Assessment:  Patient tolerated the treatment with the following side effects: dissociation. Vital signs were monitored, see VS Flowsheet. Patient stated they felt ready to go home prior to discharge. AVS was offered to patient and patient declined. Patient was instructed not to drive for the remainder of the day and to notify clinic if any concerns arise.     Next appt:     Medications were supplied by Clinic      This service provided today was under the supervising provider of the day LOTUS Gordon MD, who was available if needed.    Hallie Jaramillo RN  "

## 2025-02-13 ENCOUNTER — OFFICE VISIT (OUTPATIENT)
Dept: PSYCHIATRY | Facility: CLINIC | Age: 55
End: 2025-02-13
Payer: COMMERCIAL

## 2025-02-13 DIAGNOSIS — F33.2 SEVERE RECURRENT MAJOR DEPRESSION WITHOUT PSYCHOTIC FEATURES (H): Primary | ICD-10-CM

## 2025-02-13 NOTE — PROGRESS NOTES
[facility] TMS Program  [address], [address]  [place] [address]  TMS Procedure Note   Aure Joy MRN# [id number]  Age: 54 year old   YOB: 1970    Aure Joy comes into clinic today at the request ofESTELA MD, ordering provider for TMS treatments.     Pre-Procedure:  History and Physical: Reviewed in medical record  Consent signed by: Aure Joy on: 12/22/2020    Clinical Narrative:  Patient tolerated treatment. Changed to [employer] for this series. Treated at 90%    Indications for TMS:  MDD, recurrent, severe; 4+ medication trials (from 2+ classes) ineffective; Psychotherapy ineffective.     Pre-Procedure Diagnosis:  MDD, recurrent, severe F33.2    Treatment Hx:  Treatment number this series: 1  Total lifetime treatment number: 236    Allergies   Allergen Reactions    Codeine Nausea    Lithium GI Disturbance     Cramping, nausea, diarrhea    Other  [No Clinical Screening - See Comments] Nausea and Vomiting and Other (See Comments)     general anesthesia- uncontrolled vomitting      There were no vitals taken for this visit.    Pause for the Cause:  Right patient:  Yes  Right procedure/correct coil:  Yes; rTMS; 70939; F8 coil.   Earplugs in place:  Yes    Procedure:  Patient was seated in procedure chair. Identity and procedure was verified. Ear plugs were placed in ears and patient-specific cap was placed on head and tightened appropriately. Ruler locations were verified. Coil was placed at treatment location and stimulator was set to parameters described below. A test train was delivered and patient tolerated so 60 trains were delivered. Patient tolerated treatment well.    Motor Threshold Determination  Distance from nasion to inion: 34cm  Distance along circumference from midline: 6.42cm  Distance from Vertex: 9.4cm  Cap to Nasion: 3cm  MT 1: 0 - 6 - 16 @ 69% on 1/29/2018  MT 2: 0 - 6 - 16 @ 69% on 2/6/2018   MT 3: 0 - 6 - 16 @ 69% on 2/13/2018   MT 4: 0 - 6 -  16 @ 69% on 2/23/2018   MT 5: 0 - 6 - 16 @ 69% on 3/2/2018   MT 6: 0 - 5.5 - 15.5(always use intersection at left side of ruler)@ 85% on 8/23/19  MT 7: 0 - 5.5 - 15.5(always use intersection at left side of ruler)@ 80% on 8/29/19  MT 8: 0 - 5.5 - 37.5(always use intersection at left side of ruler)@ 76% on 9/5/19 (right side using EMG on left hand)  MT 9: C2 (R side using EMG on L hand) 78% on 9/12/2019  MT 10: C2 (R side using EMG on L hand) 76% on 9/26/2019  MT 11: C2 (R side using EMG on L hand) 82 on 1/31/2020  MT 12: C2 (R side using EMG on L Hand) 86% on 12/22/2020   MT 13: C2 (R side) 100% on 04/21/22  MT 14: C2 (R side) 86% on 6/7/22  MT 15: 0 - 6 - 16.5 @ 68% on 6/14/2022  MT 16: C2 (R side) 89% on 6/16/22  MT 17: F3 @ 91% on 4/30/2024  MT 18: F3  @ 70 %  on 2/13/2025    LDLPFC:  Frequency: 50 Hz  Number of pulses:  3                        Number of bursts: 10  Burst frequency: 5 Hz  Cycle time: 10 sec  Total pulses delivered: 1800  Tx Loc: F3  Energy: 63% (90% MT)   Number of Cycles: 60 trains     RDLPFC:  Frequency: 50 Hz  Number of pulses:  3                        Number of bursts: 200  Burst frequency: 5 Hz  Cycle time: 97.0sec  Total pulses delivered: 1800  Tx Loc: F4 (right side)  Energy: 63% (90% MT)   Number of Cycles: 3 trains    Rating Scales:  Date MADRS QIDS PHQ-9   4/30/2024 30 15 15   5/3/24  17 15   5/10/24  16 13   5/17/24  15 11   5/24/24  16 16   5/31/24  14 12   6/7/24  17 13   6/21/24  17 15   6/24/24 27 15 17       Date MADRS QIDS PHQ-9   02/13/25 25 10 13    --           Plan   - Continue TMS treatments     This service provided today was under the supervising provider of the day, Dom Shirley MD, who determined motor threshold and was available if needed.     Neli Smith, TMS Technician  [facility] Neuromodulation

## 2025-02-17 ENCOUNTER — OFFICE VISIT (OUTPATIENT)
Dept: PSYCHIATRY | Facility: CLINIC | Age: 55
End: 2025-02-17
Payer: COMMERCIAL

## 2025-02-17 DIAGNOSIS — F33.2 SEVERE RECURRENT MAJOR DEPRESSION WITHOUT PSYCHOTIC FEATURES (H): Primary | ICD-10-CM

## 2025-02-17 ASSESSMENT — PATIENT HEALTH QUESTIONNAIRE - PHQ9: SUM OF ALL RESPONSES TO PHQ QUESTIONS 1-9: 11

## 2025-02-17 NOTE — PROGRESS NOTES
[facility] TMS Program  [address], [address]  [place] [address]  TMS Procedure Note   Aure Joy MRN# [id number]  Age: 54 year old   YOB: 1970    Aure Joy comes into clinic today at the request ofESTELA MD, ordering provider for TMS treatments.     Pre-Procedure:  History and Physical: Reviewed in medical record  Consent signed by: Aure Joy on: 12/22/2020    Clinical Narrative:  Patient tolerated treatment. Tolerated increase to 110%.    Indications for TMS:  MDD, recurrent, severe; 4+ medication trials (from 2+ classes) ineffective; Psychotherapy ineffective.     Pre-Procedure Diagnosis:  MDD, recurrent, severe F33.2    Treatment Hx:  Treatment number this series: 3  Total lifetime treatment number: 238    Allergies   Allergen Reactions    Codeine Nausea    Lithium GI Disturbance     Cramping, nausea, diarrhea    Other  [No Clinical Screening - See Comments] Nausea and Vomiting and Other (See Comments)     general anesthesia- uncontrolled vomitting      There were no vitals taken for this visit.    Pause for the Cause:  Right patient:  Yes  Right procedure/correct coil:  Yes; rTMS; 95554; F8 coil.   Earplugs in place:  Yes    Procedure:  Patient was seated in procedure chair. Identity and procedure was verified. Ear plugs were placed in ears and patient-specific cap was placed on head and tightened appropriately. Ruler locations were verified. Coil was placed at treatment location and stimulator was set to parameters described below. A test train was delivered and patient tolerated so 60 trains were delivered. Patient tolerated treatment well.    Motor Threshold Determination  Distance from nasion to inion: 34cm  Distance along circumference from midline: 6.42cm  Distance from Vertex: 9.4cm  Cap to Nasion: 3cm  MT 1: 0 - 6 - 16 @ 69% on 1/29/2018  MT 2: 0 - 6 - 16 @ 69% on 2/6/2018   MT 3: 0 - 6 - 16 @ 69% on 2/13/2018   MT 4: 0 - 6 - 16 @ 69% on 2/23/2018   MT  5: 0 - 6 - 16 @ 69% on 3/2/2018   MT 6: 0 - 5.5 - 15.5(always use intersection at left side of ruler)@ 85% on 8/23/19  MT 7: 0 - 5.5 - 15.5(always use intersection at left side of ruler)@ 80% on 8/29/19  MT 8: 0 - 5.5 - 37.5(always use intersection at left side of ruler)@ 76% on 9/5/19 (right side using EMG on left hand)  MT 9: C2 (R side using EMG on L hand) 78% on 9/12/2019  MT 10: C2 (R side using EMG on L hand) 76% on 9/26/2019  MT 11: C2 (R side using EMG on L hand) 82 on 1/31/2020  MT 12: C2 (R side using EMG on L Hand) 86% on 12/22/2020   MT 13: C2 (R side) 100% on 04/21/22  MT 14: C2 (R side) 86% on 6/7/22  MT 15: 0 - 6 - 16.5 @ 68% on 6/14/2022  MT 16: C2 (R side) 89% on 6/16/22  MT 17: F3 @ 91% on 4/30/2024  MT 18: F3  @ 70 %  on 2/13/2025    LDLPFC:  Frequency: 50 Hz  Number of pulses:  3                        Number of bursts: 10  Burst frequency: 5 Hz  Cycle time: 10 sec  Total pulses delivered: 1800  Tx Loc: F3  Energy: 77% (110% MT)   Number of Cycles: 60 trains     RDLPFC:  Frequency: 50 Hz  Number of pulses:  3                        Number of bursts: 600  Burst frequency: 5 Hz  Cycle time: 97.0sec  Total pulses delivered: 1800  Tx Loc: F4 (right side)  Energy: 77% (110% MT)   Number of Cycles: 1 trains    Rating Scales:  Date MADRS QIDS PHQ-9   4/30/2024 30 15 15   5/3/24  17 15   5/10/24  16 13   5/17/24  15 11   5/24/24  16 16   5/31/24  14 12   6/7/24  17 13   6/21/24  17 15   6/24/24 27 15 17       Date MADRS QIDS PHQ-9   02/13/25 25 10 13    --           Plan   - Continue TMS treatments     This service provided today was under the supervising provider of the day, Dom Shirley MD, who was available if needed.     Neli Smith TMS Technician  [facility] Neuromodulation

## 2025-02-19 ENCOUNTER — OFFICE VISIT (OUTPATIENT)
Dept: PSYCHIATRY | Facility: CLINIC | Age: 55
End: 2025-02-19
Payer: COMMERCIAL

## 2025-02-19 DIAGNOSIS — F33.2 SEVERE RECURRENT MAJOR DEPRESSION WITHOUT PSYCHOTIC FEATURES (H): Primary | ICD-10-CM

## 2025-02-19 ASSESSMENT — PATIENT HEALTH QUESTIONNAIRE - PHQ9: SUM OF ALL RESPONSES TO PHQ QUESTIONS 1-9: 11

## 2025-02-19 NOTE — PROGRESS NOTES
[facility] TMS Program  [address], [address]  [place] [address]  TMS Procedure Note   Aure Joy MRN# [id number]  Age: 54 year old   YOB: 1970    Aure Joy comes into clinic today at the request ofESTELA MD, ordering provider for TMS treatments.     Pre-Procedure:  History and Physical: Reviewed in medical record  Consent signed by: Aure Joy on: 12/22/2020    Clinical Narrative:  Patient tolerated treatment. Tolerated increase to 120%.    Indications for TMS:  MDD, recurrent, severe; 4+ medication trials (from 2+ classes) ineffective; Psychotherapy ineffective.     Pre-Procedure Diagnosis:  MDD, recurrent, severe F33.2    Treatment Hx:  Treatment number this series: 4  Total lifetime treatment number: 239    Allergies   Allergen Reactions    Codeine Nausea    Lithium GI Disturbance     Cramping, nausea, diarrhea    Other  [No Clinical Screening - See Comments] Nausea and Vomiting and Other (See Comments)     general anesthesia- uncontrolled vomitting      There were no vitals taken for this visit.    Pause for the Cause:  Right patient:  Yes  Right procedure/correct coil:  Yes; rTMS; 28748; F8 coil.   Earplugs in place:  Yes    Procedure:  Patient was seated in procedure chair. Identity and procedure was verified. Ear plugs were placed in ears and patient-specific cap was placed on head and tightened appropriately. Ruler locations were verified. Coil was placed at treatment location and stimulator was set to parameters described below. A test train was delivered and patient tolerated so 60 trains were delivered. Patient tolerated treatment well.    Motor Threshold Determination  Distance from nasion to inion: 34cm  Distance along circumference from midline: 6.42cm  Distance from Vertex: 9.4cm  Cap to Nasion: 3cm  MT 1: 0 - 6 - 16 @ 69% on 1/29/2018  MT 2: 0 - 6 - 16 @ 69% on 2/6/2018   MT 3: 0 - 6 - 16 @ 69% on 2/13/2018   MT 4: 0 - 6 - 16 @ 69% on 2/23/2018   MT  5: 0 - 6 - 16 @ 69% on 3/2/2018   MT 6: 0 - 5.5 - 15.5(always use intersection at left side of ruler)@ 85% on 8/23/19  MT 7: 0 - 5.5 - 15.5(always use intersection at left side of ruler)@ 80% on 8/29/19  MT 8: 0 - 5.5 - 37.5(always use intersection at left side of ruler)@ 76% on 9/5/19 (right side using EMG on left hand)  MT 9: C2 (R side using EMG on L hand) 78% on 9/12/2019  MT 10: C2 (R side using EMG on L hand) 76% on 9/26/2019  MT 11: C2 (R side using EMG on L hand) 82 on 1/31/2020  MT 12: C2 (R side using EMG on L Hand) 86% on 12/22/2020   MT 13: C2 (R side) 100% on 04/21/22  MT 14: C2 (R side) 86% on 6/7/22  MT 15: 0 - 6 - 16.5 @ 68% on 6/14/2022  MT 16: C2 (R side) 89% on 6/16/22  MT 17: F3 @ 91% on 4/30/2024  MT 18: F3  @ 70 %  on 2/13/2025    LDLPFC:  Frequency: 50 Hz  Number of pulses:  3                        Number of bursts: 10  Burst frequency: 5 Hz  Cycle time: 10 sec  Total pulses delivered: 1800  Tx Loc: F3  Energy: 84% (120% MT)   Number of Cycles: 60 trains     RDLPFC:  Frequency: 50 Hz  Number of pulses:  3                        Number of bursts: 600  Burst frequency: 5 Hz  Cycle time: 97.0sec  Total pulses delivered: 1800  Tx Loc: F4 (right side)  Energy: 77% (110% MT)   Number of Cycles: 1 trains    Rating Scales:  Date MADRS QIDS PHQ-9   4/30/2024 30 15 15   5/3/24  17 15   5/10/24  16 13   5/17/24  15 11   5/24/24  16 16   5/31/24  14 12   6/7/24  17 13   6/21/24  17 15   6/24/24 27 15 17       Date MADRS QIDS PHQ-9   02/13/25 25 10 13    --           Plan   - Continue TMS treatments     This service provided today was under the supervising provider of the day, Mihir Chaudhry MD, who was available if needed.     Lilia Prieto, TMS Technician  [facility] Neuromodulation

## 2025-02-20 ENCOUNTER — OFFICE VISIT (OUTPATIENT)
Dept: PSYCHIATRY | Facility: CLINIC | Age: 55
End: 2025-02-20
Payer: COMMERCIAL

## 2025-02-20 DIAGNOSIS — F33.2 SEVERE RECURRENT MAJOR DEPRESSION WITHOUT PSYCHOTIC FEATURES (H): Primary | ICD-10-CM

## 2025-02-20 ASSESSMENT — PATIENT HEALTH QUESTIONNAIRE - PHQ9: SUM OF ALL RESPONSES TO PHQ QUESTIONS 1-9: 11

## 2025-02-20 NOTE — PROGRESS NOTES
[facility] TMS Program  [address], [address]  [place] [address]  TMS Procedure Note   Aure Joy MRN# [id number]  Age: 54 year old   YOB: 1970    Aure Joy comes into clinic today at the request ofESTELA MD, ordering provider for TMS treatments.     Pre-Procedure:  History and Physical: Reviewed in medical record  Consent signed by: Aure Joy on: 12/22/2020    Clinical Narrative:  Patient tolerated treatment. No changes reported.    Indications for TMS:  MDD, recurrent, severe; 4+ medication trials (from 2+ classes) ineffective; Psychotherapy ineffective.     Pre-Procedure Diagnosis:  MDD, recurrent, severe F33.2    Treatment Hx:  Treatment number this series: 5  Total lifetime treatment number: 240    Allergies   Allergen Reactions    Codeine Nausea    Lithium GI Disturbance     Cramping, nausea, diarrhea    Other  [No Clinical Screening - See Comments] Nausea and Vomiting and Other (See Comments)     general anesthesia- uncontrolled vomitting      There were no vitals taken for this visit.    Pause for the Cause:  Right patient:  Yes  Right procedure/correct coil:  Yes; rTMS; 82409; F8 coil.   Earplugs in place:  Yes    Procedure:  Patient was seated in procedure chair. Identity and procedure was verified. Ear plugs were placed in ears and patient-specific cap was placed on head and tightened appropriately. Ruler locations were verified. Coil was placed at treatment location and stimulator was set to parameters described below. A test train was delivered and patient tolerated so 60 trains were delivered. Patient tolerated treatment well.    Motor Threshold Determination  Distance from nasion to inion: 34cm  Distance along circumference from midline: 6.42cm  Distance from Vertex: 9.4cm  Cap to Nasion: 3cm  MT 1: 0 - 6 - 16 @ 69% on 1/29/2018  MT 2: 0 - 6 - 16 @ 69% on 2/6/2018   MT 3: 0 - 6 - 16 @ 69% on 2/13/2018   MT 4: 0 - 6 - 16 @ 69% on 2/23/2018   MT 5: 0 -  6 - 16 @ 69% on 3/2/2018   MT 6: 0 - 5.5 - 15.5(always use intersection at left side of ruler)@ 85% on 8/23/19  MT 7: 0 - 5.5 - 15.5(always use intersection at left side of ruler)@ 80% on 8/29/19  MT 8: 0 - 5.5 - 37.5(always use intersection at left side of ruler)@ 76% on 9/5/19 (right side using EMG on left hand)  MT 9: C2 (R side using EMG on L hand) 78% on 9/12/2019  MT 10: C2 (R side using EMG on L hand) 76% on 9/26/2019  MT 11: C2 (R side using EMG on L hand) 82 on 1/31/2020  MT 12: C2 (R side using EMG on L Hand) 86% on 12/22/2020   MT 13: C2 (R side) 100% on 04/21/22  MT 14: C2 (R side) 86% on 6/7/22  MT 15: 0 - 6 - 16.5 @ 68% on 6/14/2022  MT 16: C2 (R side) 89% on 6/16/22  MT 17: F3 @ 91% on 4/30/2024  MT 18: F3  @ 70 %  on 2/13/2025    LDLPFC:  Frequency: 50 Hz  Number of pulses:  3                        Number of bursts: 10  Burst frequency: 5 Hz  Cycle time: 10 sec  Total pulses delivered: 1800  Tx Loc: F3  Energy: 84% (120% MT)   Number of Cycles: 60 trains     RDLPFC:  Frequency: 50 Hz  Number of pulses:  3                        Number of bursts: 600  Burst frequency: 5 Hz  Cycle time: 97.0sec  Total pulses delivered: 1800  Tx Loc: F4 (right side)  Energy: 77% (110% MT)   Number of Cycles: 1 trains    Rating Scales:  Date MADRS QIDS PHQ-9   4/30/2024 30 15 15   5/3/24  17 15   5/10/24  16 13   5/17/24  15 11   5/24/24  16 16   5/31/24  14 12   6/7/24  17 13   6/21/24  17 15   6/24/24 27 15 17       Date MADRS QIDS PHQ-9   02/13/25 25 10 13    --           Plan   - Continue TMS treatments     This service provided today was under the supervising provider of the day, ESTELA Zamudio MD, who was available if needed.     Neli Smith TMS Technician  [facility] Neuromodulation

## 2025-02-24 ENCOUNTER — ALLIED HEALTH/NURSE VISIT (OUTPATIENT)
Dept: PSYCHIATRY | Facility: CLINIC | Age: 55
End: 2025-02-24
Payer: COMMERCIAL

## 2025-02-24 ENCOUNTER — OFFICE VISIT (OUTPATIENT)
Dept: PSYCHIATRY | Facility: CLINIC | Age: 55
End: 2025-02-24
Payer: COMMERCIAL

## 2025-02-24 VITALS — SYSTOLIC BLOOD PRESSURE: 113 MMHG | HEART RATE: 58 BPM | DIASTOLIC BLOOD PRESSURE: 77 MMHG

## 2025-02-24 DIAGNOSIS — F33.2 SEVERE RECURRENT MAJOR DEPRESSION WITHOUT PSYCHOTIC FEATURES (H): Primary | ICD-10-CM

## 2025-02-24 ASSESSMENT — PATIENT HEALTH QUESTIONNAIRE - PHQ9
SUM OF ALL RESPONSES TO PHQ QUESTIONS 1-9: 12
SUM OF ALL RESPONSES TO PHQ QUESTIONS 1-9: 11

## 2025-02-24 NOTE — PROGRESS NOTES
[facility] TMS Program  [address], [address]  [place] [address]  TMS Procedure Note   Aure Joy MRN# [id number]  Age: 54 year old   YOB: 1970    Aure Joy comes into clinic today at the request ofESTELA MD, ordering provider for TMS treatments.     Pre-Procedure:  History and Physical: Reviewed in medical record  Consent signed by: Aure Joy on: 12/22/2020    Clinical Narrative:  Patient tolerated treatment. Didn't have to work over the weekend. It was very nice.    Indications for TMS:  MDD, recurrent, severe; 4+ medication trials (from 2+ classes) ineffective; Psychotherapy ineffective.     Pre-Procedure Diagnosis:  MDD, recurrent, severe F33.2    Treatment Hx:  Treatment number this series: 7  Total lifetime treatment number: 242    Allergies   Allergen Reactions    Codeine Nausea    Lithium GI Disturbance     Cramping, nausea, diarrhea    Other  [No Clinical Screening - See Comments] Nausea and Vomiting and Other (See Comments)     general anesthesia- uncontrolled vomitting      There were no vitals taken for this visit.    Pause for the Cause:  Right patient:  Yes  Right procedure/correct coil:  Yes; rTMS; 57534; F8 coil.   Earplugs in place:  Yes    Procedure:  Patient was seated in procedure chair. Identity and procedure was verified. Ear plugs were placed in ears and patient-specific cap was placed on head and tightened appropriately. Ruler locations were verified. Coil was placed at treatment location and stimulator was set to parameters described below. A test train was delivered and patient tolerated so 60 trains were delivered. Patient tolerated treatment well.    Motor Threshold Determination  Distance from nasion to inion: 34cm  Distance along circumference from midline: 6.42cm  Distance from Vertex: 9.4cm  Cap to Nasion: 3cm  MT 1: 0 - 6 - 16 @ 69% on 1/29/2018  MT 2: 0 - 6 - 16 @ 69% on 2/6/2018   MT 3: 0 - 6 - 16 @ 69% on 2/13/2018   MT 4: 0 - 6 -  16 @ 69% on 2/23/2018   MT 5: 0 - 6 - 16 @ 69% on 3/2/2018   MT 6: 0 - 5.5 - 15.5(always use intersection at left side of ruler)@ 85% on 8/23/19  MT 7: 0 - 5.5 - 15.5(always use intersection at left side of ruler)@ 80% on 8/29/19  MT 8: 0 - 5.5 - 37.5(always use intersection at left side of ruler)@ 76% on 9/5/19 (right side using EMG on left hand)  MT 9: C2 (R side using EMG on L hand) 78% on 9/12/2019  MT 10: C2 (R side using EMG on L hand) 76% on 9/26/2019  MT 11: C2 (R side using EMG on L hand) 82 on 1/31/2020  MT 12: C2 (R side using EMG on L Hand) 86% on 12/22/2020   MT 13: C2 (R side) 100% on 04/21/22  MT 14: C2 (R side) 86% on 6/7/22  MT 15: 0 - 6 - 16.5 @ 68% on 6/14/2022  MT 16: C2 (R side) 89% on 6/16/22  MT 17: F3 @ 91% on 4/30/2024  MT 18: F3  @ 70 %  on 2/13/2025    LDLPFC:  Frequency: 50 Hz  Number of pulses:  3                        Number of bursts: 10  Burst frequency: 5 Hz  Cycle time: 10 sec  Total pulses delivered: 1800  Tx Loc: F3  Energy: 84% (120% MT)   Number of Cycles: 60 trains     RDLPFC:  Frequency: 50 Hz  Number of pulses:  3                        Number of bursts: 600  Burst frequency: 5 Hz  Cycle time: 97.0sec  Total pulses delivered: 1800  Tx Loc: F4 (right side)  Energy: 77% (110% MT)   Number of Cycles: 1 trains    Rating Scales:  Date MADRS QIDS PHQ-9   4/30/2024 30 15 15   5/3/24  17 15   5/10/24  16 13   5/17/24  15 11   5/24/24  16 16   5/31/24  14 12   6/7/24  17 13   6/21/24  17 15   6/24/24 27 15 17       Date MADRS QIDS PHQ-9   02/13/25 25 10 13   2/21/25 -- 10 12     Plan   - Continue TMS treatments     This service provided today was under the supervising provider of the day, ESTELA Zamudio MD, who was available if needed.     Neli Smith TMS Technician  [facility]  [facility] Neuromodulation

## 2025-02-24 NOTE — PROGRESS NOTES
Aure Joy comes into clinic today at the request of ESTELA Zamudio MD Ordering Provider for Med Injection only Ketamine .    Procedure Prep:  Medication double check completed by: Jael Alexandra RN  Prior to administration pt identified by name and : Yes    Nursing Assessment:  Appearance: Casually clothed   Mood: Okay, was happy to have weekend off work  Affect: WNL for patient  Eye contact: Good      2025     3:35 PM   PHQ   PHQ-9 Total Score 12   Q9: Thoughts of better off dead/self-harm past 2 weeks Not at all     PCL5 weekly assessment: score 14. Assessment was scanned to EHR.     Procedure Performed:  VSS and mental status WNL. Patient was given ketamine. See MAR for details.     Post Procedure Assessment:  Patient tolerated the treatment with the following side effects: dissociation. Vital signs were monitored, see VS Flowsheet. Patient stated they felt ready to go home prior to discharge. AVS was offered to patient and patient declined. Patient was instructed not to drive for the remainder of the day and to notify clinic if any concerns arise.     Next appt: weekly     Medications were supplied by Clinic     This service provided today was under the supervising provider of the day ESTELA Valdivia MD, who was available if needed.        Joan Donnelly RN

## 2025-02-25 ENCOUNTER — OFFICE VISIT (OUTPATIENT)
Dept: PSYCHIATRY | Facility: CLINIC | Age: 55
End: 2025-02-25
Payer: COMMERCIAL

## 2025-02-25 DIAGNOSIS — F33.2 SEVERE RECURRENT MAJOR DEPRESSION WITHOUT PSYCHOTIC FEATURES (H): Primary | ICD-10-CM

## 2025-02-25 ASSESSMENT — PATIENT HEALTH QUESTIONNAIRE - PHQ9: SUM OF ALL RESPONSES TO PHQ QUESTIONS 1-9: 10

## 2025-02-25 NOTE — PROGRESS NOTES
[facility] TMS Program  [address], [address]  [place] [address]  TMS Procedure Note   Aure Joy MRN# [id number]  Age: 54 year old   YOB: 1970    Aure Joy comes into clinic today at the request ofESTELA MD, ordering provider for TMS treatments.     Pre-Procedure:  History and Physical: Reviewed in medical record  Consent signed by: Aure Joy on: 12/22/2020    Clinical Narrative:  Patient tolerated treatment. Has to work today but is enjoying the weather.    Indications for TMS:  MDD, recurrent, severe; 4+ medication trials (from 2+ classes) ineffective; Psychotherapy ineffective.     Pre-Procedure Diagnosis:  MDD, recurrent, severe F33.2    Treatment Hx:  Treatment number this series: 8  Total lifetime treatment number: 243    Allergies   Allergen Reactions    Codeine Nausea    Lithium GI Disturbance     Cramping, nausea, diarrhea    Other  [No Clinical Screening - See Comments] Nausea and Vomiting and Other (See Comments)     general anesthesia- uncontrolled vomitting      There were no vitals taken for this visit.    Pause for the Cause:  Right patient:  Yes  Right procedure/correct coil:  Yes; rTMS; 13249; F8 coil.   Earplugs in place:  Yes    Procedure:  Patient was seated in procedure chair. Identity and procedure was verified. Ear plugs were placed in ears and patient-specific cap was placed on head and tightened appropriately. Ruler locations were verified. Coil was placed at treatment location and stimulator was set to parameters described below. A test train was delivered and patient tolerated so 60 trains were delivered. Patient tolerated treatment well.    Motor Threshold Determination  Distance from nasion to inion: 34cm  Distance along circumference from midline: 6.42cm  Distance from Vertex: 9.4cm  Cap to Nasion: 3cm  MT 1: 0 - 6 - 16 @ 69% on 1/29/2018  MT 2: 0 - 6 - 16 @ 69% on 2/6/2018   MT 3: 0 - 6 - 16 @ 69% on 2/13/2018   MT 4: 0 - 6 - 16 @ 69%  on 2/23/2018   MT 5: 0 - 6 - 16 @ 69% on 3/2/2018   MT 6: 0 - 5.5 - 15.5(always use intersection at left side of ruler)@ 85% on 8/23/19  MT 7: 0 - 5.5 - 15.5(always use intersection at left side of ruler)@ 80% on 8/29/19  MT 8: 0 - 5.5 - 37.5(always use intersection at left side of ruler)@ 76% on 9/5/19 (right side using EMG on left hand)  MT 9: C2 (R side using EMG on L hand) 78% on 9/12/2019  MT 10: C2 (R side using EMG on L hand) 76% on 9/26/2019  MT 11: C2 (R side using EMG on L hand) 82 on 1/31/2020  MT 12: C2 (R side using EMG on L Hand) 86% on 12/22/2020   MT 13: C2 (R side) 100% on 04/21/22  MT 14: C2 (R side) 86% on 6/7/22  MT 15: 0 - 6 - 16.5 @ 68% on 6/14/2022  MT 16: C2 (R side) 89% on 6/16/22  MT 17: F3 @ 91% on 4/30/2024  MT 18: F3  @ 70 %  on 2/13/2025    LDLPFC:  Frequency: 50 Hz  Number of pulses:  3                        Number of bursts: 10  Burst frequency: 5 Hz  Cycle time: 10 sec  Total pulses delivered: 1800  Tx Loc: F3  Energy: 84% (120% MT)   Number of Cycles: 60 trains     RDLPFC:  Frequency: 50 Hz  Number of pulses:  3                        Number of bursts: 600  Burst frequency: 5 Hz  Cycle time: 97.0sec  Total pulses delivered: 1800  Tx Loc: F4 (right side)  Energy: 77% (110% MT)   Number of Cycles: 1 trains    Rating Scales:  Date MADRS QIDS PHQ-9   4/30/2024 30 15 15   5/3/24  17 15   5/10/24  16 13   5/17/24  15 11   5/24/24  16 16   5/31/24  14 12   6/7/24  17 13   6/21/24  17 15   6/24/24 27 15 17       Date MADRS QIDS PHQ-9   02/13/25 25 10 13   2/21/25 -- 10 12     Plan   - Continue TMS treatments     This service provided today was under the supervising provider of the day, ESTELA Zamudio MD, who was available if needed.     Lilia Prieto, TMS Technician  [facility] Neuromodulation

## 2025-02-26 ENCOUNTER — OFFICE VISIT (OUTPATIENT)
Dept: PSYCHIATRY | Facility: CLINIC | Age: 55
End: 2025-02-26
Payer: COMMERCIAL

## 2025-02-26 DIAGNOSIS — F33.2 SEVERE RECURRENT MAJOR DEPRESSION WITHOUT PSYCHOTIC FEATURES (H): Primary | ICD-10-CM

## 2025-02-26 ASSESSMENT — PATIENT HEALTH QUESTIONNAIRE - PHQ9: SUM OF ALL RESPONSES TO PHQ QUESTIONS 1-9: 11

## 2025-02-26 NOTE — PROGRESS NOTES
[facility] TMS Program  [address], [address]  [place] [address]  TMS Procedure Note   Aure Joy MRN# [id number]  Age: 54 year old   YOB: 1970    Aure Joy comes into clinic today at the request ofESTELA MD, ordering provider for TMS treatments.     Pre-Procedure:  History and Physical: Reviewed in medical record  Consent signed by: Aure Joy on: 12/22/2020    Clinical Narrative:  Patient tolerated treatment. No changes reported.    Indications for TMS:  MDD, recurrent, severe; 4+ medication trials (from 2+ classes) ineffective; Psychotherapy ineffective.     Pre-Procedure Diagnosis:  MDD, recurrent, severe F33.2    Treatment Hx:  Treatment number this series: 9  Total lifetime treatment number: 244    Allergies   Allergen Reactions    Codeine Nausea    Lithium GI Disturbance     Cramping, nausea, diarrhea    Other  [No Clinical Screening - See Comments] Nausea and Vomiting and Other (See Comments)     general anesthesia- uncontrolled vomitting      There were no vitals taken for this visit.    Pause for the Cause:  Right patient:  Yes  Right procedure/correct coil:  Yes; rTMS; 98044; F8 coil.   Earplugs in place:  Yes    Procedure:  Patient was seated in procedure chair. Identity and procedure was verified. Ear plugs were placed in ears and patient-specific cap was placed on head and tightened appropriately. Ruler locations were verified. Coil was placed at treatment location and stimulator was set to parameters described below. A test train was delivered and patient tolerated so 60 trains were delivered. Patient tolerated treatment well.    Motor Threshold Determination  Distance from nasion to inion: 34cm  Distance along circumference from midline: 6.42cm  Distance from Vertex: 9.4cm  Cap to Nasion: 3cm  MT 1: 0 - 6 - 16 @ 69% on 1/29/2018  MT 2: 0 - 6 - 16 @ 69% on 2/6/2018   MT 3: 0 - 6 - 16 @ 69% on 2/13/2018   MT 4: 0 - 6 - 16 @ 69% on 2/23/2018   MT 5: 0 -  6 - 16 @ 69% on 3/2/2018   MT 6: 0 - 5.5 - 15.5(always use intersection at left side of ruler)@ 85% on 8/23/19  MT 7: 0 - 5.5 - 15.5(always use intersection at left side of ruler)@ 80% on 8/29/19  MT 8: 0 - 5.5 - 37.5(always use intersection at left side of ruler)@ 76% on 9/5/19 (right side using EMG on left hand)  MT 9: C2 (R side using EMG on L hand) 78% on 9/12/2019  MT 10: C2 (R side using EMG on L hand) 76% on 9/26/2019  MT 11: C2 (R side using EMG on L hand) 82 on 1/31/2020  MT 12: C2 (R side using EMG on L Hand) 86% on 12/22/2020   MT 13: C2 (R side) 100% on 04/21/22  MT 14: C2 (R side) 86% on 6/7/22  MT 15: 0 - 6 - 16.5 @ 68% on 6/14/2022  MT 16: C2 (R side) 89% on 6/16/22  MT 17: F3 @ 91% on 4/30/2024  MT 18: F3  @ 70 %  on 2/13/2025    LDLPFC:  Frequency: 50 Hz  Number of pulses:  3                        Number of bursts: 10  Burst frequency: 5 Hz  Cycle time: 10 sec  Total pulses delivered: 1800  Tx Loc: F3  Energy: 84% (120% MT)   Number of Cycles: 60 trains     RDLPFC:  Frequency: 50 Hz  Number of pulses:  3                        Number of bursts: 600  Burst frequency: 5 Hz  Cycle time: 97.0sec  Total pulses delivered: 1800  Tx Loc: F4 (right side)  Energy: 77% (110% MT)   Number of Cycles: 1 trains    Rating Scales:  Date MADRS QIDS PHQ-9   4/30/2024 30 15 15   5/3/24  17 15   5/10/24  16 13   5/17/24  15 11   5/24/24  16 16   5/31/24  14 12   6/7/24  17 13   6/21/24  17 15   6/24/24 27 15 17       Date MADRS QIDS PHQ-9   02/13/25 25 10 13   2/21/25 -- 10 12     Plan   - Continue TMS treatments     This service provided today was under the supervising provider of the day, ESTELA Zamudio MD, who was available if needed.     Imelda Davis, TMS Technician  [facility] Neuromodulation

## 2025-02-27 ENCOUNTER — OFFICE VISIT (OUTPATIENT)
Dept: PSYCHIATRY | Facility: CLINIC | Age: 55
End: 2025-02-27
Payer: COMMERCIAL

## 2025-02-27 DIAGNOSIS — F33.2 SEVERE RECURRENT MAJOR DEPRESSION WITHOUT PSYCHOTIC FEATURES (H): Primary | ICD-10-CM

## 2025-02-27 ASSESSMENT — PATIENT HEALTH QUESTIONNAIRE - PHQ9: SUM OF ALL RESPONSES TO PHQ QUESTIONS 1-9: 10

## 2025-02-27 NOTE — PROGRESS NOTES
[facility] TMS Program  [address], [address]  [place] [address]  TMS Procedure Note   Arue Jyo MRN# [id number]  Age: 54 year old   YOB: 1970    Aure Joy comes into clinic today at the request ofESTELA MD, ordering provider for TMS treatments.     Pre-Procedure:  History and Physical: Reviewed in medical record  Consent signed by: Aure Joy on: 12/22/2020    Clinical Narrative:  Patient tolerated treatment. Doesn't work today so she is going to read.    Indications for TMS:  MDD, recurrent, severe; 4+ medication trials (from 2+ classes) ineffective; Psychotherapy ineffective.     Pre-Procedure Diagnosis:  MDD, recurrent, severe F33.2    Treatment Hx:  Treatment number this series: 10  Total lifetime treatment number: 245    Allergies   Allergen Reactions    Codeine Nausea    Lithium GI Disturbance     Cramping, nausea, diarrhea    Other  [No Clinical Screening - See Comments] Nausea and Vomiting and Other (See Comments)     general anesthesia- uncontrolled vomitting      There were no vitals taken for this visit.    Pause for the Cause:  Right patient:  Yes  Right procedure/correct coil:  Yes; rTMS; 77923; F8 coil.   Earplugs in place:  Yes    Procedure:  Patient was seated in procedure chair. Identity and procedure was verified. Ear plugs were placed in ears and patient-specific cap was placed on head and tightened appropriately. Ruler locations were verified. Coil was placed at treatment location and stimulator was set to parameters described below. A test train was delivered and patient tolerated so 60 trains were delivered. Patient tolerated treatment well.    Motor Threshold Determination  Distance from nasion to inion: 34cm  Distance along circumference from midline: 6.42cm  Distance from Vertex: 9.4cm  Cap to Nasion: 3cm  MT 1: 0 - 6 - 16 @ 69% on 1/29/2018  MT 2: 0 - 6 - 16 @ 69% on 2/6/2018   MT 3: 0 - 6 - 16 @ 69% on 2/13/2018   MT 4: 0 - 6 - 16 @ 69%  on 2/23/2018   MT 5: 0 - 6 - 16 @ 69% on 3/2/2018   MT 6: 0 - 5.5 - 15.5(always use intersection at left side of ruler)@ 85% on 8/23/19  MT 7: 0 - 5.5 - 15.5(always use intersection at left side of ruler)@ 80% on 8/29/19  MT 8: 0 - 5.5 - 37.5(always use intersection at left side of ruler)@ 76% on 9/5/19 (right side using EMG on left hand)  MT 9: C2 (R side using EMG on L hand) 78% on 9/12/2019  MT 10: C2 (R side using EMG on L hand) 76% on 9/26/2019  MT 11: C2 (R side using EMG on L hand) 82 on 1/31/2020  MT 12: C2 (R side using EMG on L Hand) 86% on 12/22/2020   MT 13: C2 (R side) 100% on 04/21/22  MT 14: C2 (R side) 86% on 6/7/22  MT 15: 0 - 6 - 16.5 @ 68% on 6/14/2022  MT 16: C2 (R side) 89% on 6/16/22  MT 17: F3 @ 91% on 4/30/2024  MT 18: F3  @ 70 %  on 2/13/2025    LDLPFC:  Frequency: 50 Hz  Number of pulses:  3                        Number of bursts: 10  Burst frequency: 5 Hz  Cycle time: 10 sec  Total pulses delivered: 1800  Tx Loc: F3  Energy: 84% (120% MT)   Number of Cycles: 60 trains     RDLPFC:  Frequency: 50 Hz  Number of pulses:  3                        Number of bursts: 600  Burst frequency: 5 Hz  Cycle time: 97.0sec  Total pulses delivered: 1800  Tx Loc: F4 (right side)  Energy: 77% (110% MT)   Number of Cycles: 1 trains    Rating Scales:  Date MADRS QIDS PHQ-9   4/30/2024 30 15 15   5/3/24  17 15   5/10/24  16 13   5/17/24  15 11   5/24/24  16 16   5/31/24  14 12   6/7/24  17 13   6/21/24  17 15   6/24/24 27 15 17       Date MADRS QIDS PHQ-9   02/13/25 25 10 13   2/21/25 -- 10 12     Plan   - Continue TMS treatments     This service provided today was under the supervising provider of the day, Dom Shirley MD, who was available if needed.     Lilia Prieto, TMS Technician  [facility] Neuromodulation

## 2025-03-03 ENCOUNTER — OFFICE VISIT (OUTPATIENT)
Dept: PSYCHIATRY | Facility: CLINIC | Age: 55
End: 2025-03-03
Payer: COMMERCIAL

## 2025-03-03 DIAGNOSIS — F33.2 SEVERE RECURRENT MAJOR DEPRESSION WITHOUT PSYCHOTIC FEATURES (H): Primary | ICD-10-CM

## 2025-03-03 ASSESSMENT — PATIENT HEALTH QUESTIONNAIRE - PHQ9: SUM OF ALL RESPONSES TO PHQ QUESTIONS 1-9: 9

## 2025-03-03 NOTE — PROGRESS NOTES
[facility] TMS Program  [address], [address]  [place] [address]  TMS Procedure Note   Aure Joy MRN# [id number]  Age: 54 year old   YOB: 1970    Aure Joy comes into clinic today at the request ofESTELA MD, ordering provider for TMS treatments.     Pre-Procedure:  History and Physical: Reviewed in medical record  Consent signed by: Aure Joy on: 12/22/2020    Clinical Narrative:  Patient tolerated treatment. Had a nice weekend.    Indications for TMS:  MDD, recurrent, severe; 4+ medication trials (from 2+ classes) ineffective; Psychotherapy ineffective.     Pre-Procedure Diagnosis:  MDD, recurrent, severe F33.2    Treatment Hx:  Treatment number this series: 12  Total lifetime treatment number: 247    Allergies   Allergen Reactions    Codeine Nausea    Lithium GI Disturbance     Cramping, nausea, diarrhea    Other  [No Clinical Screening - See Comments] Nausea and Vomiting and Other (See Comments)     general anesthesia- uncontrolled vomitting      There were no vitals taken for this visit.    Pause for the Cause:  Right patient:  Yes  Right procedure/correct coil:  Yes; rTMS; 97662; F8 coil.   Earplugs in place:  Yes    Procedure:  Patient was seated in procedure chair. Identity and procedure was verified. Ear plugs were placed in ears and patient-specific cap was placed on head and tightened appropriately. Ruler locations were verified. Coil was placed at treatment location and stimulator was set to parameters described below. A test train was delivered and patient tolerated so 60 trains were delivered. Patient tolerated treatment well.    Motor Threshold Determination  Distance from nasion to inion: 34cm  Distance along circumference from midline: 6.42cm  Distance from Vertex: 9.4cm  Cap to Nasion: 3cm  MT 1: 0 - 6 - 16 @ 69% on 1/29/2018  MT 2: 0 - 6 - 16 @ 69% on 2/6/2018   MT 3: 0 - 6 - 16 @ 69% on 2/13/2018   MT 4: 0 - 6 - 16 @ 69% on 2/23/2018   MT 5: 0 -  6 - 16 @ 69% on 3/2/2018   MT 6: 0 - 5.5 - 15.5(always use intersection at left side of ruler)@ 85% on 8/23/19  MT 7: 0 - 5.5 - 15.5(always use intersection at left side of ruler)@ 80% on 8/29/19  MT 8: 0 - 5.5 - 37.5(always use intersection at left side of ruler)@ 76% on 9/5/19 (right side using EMG on left hand)  MT 9: C2 (R side using EMG on L hand) 78% on 9/12/2019  MT 10: C2 (R side using EMG on L hand) 76% on 9/26/2019  MT 11: C2 (R side using EMG on L hand) 82 on 1/31/2020  MT 12: C2 (R side using EMG on L Hand) 86% on 12/22/2020   MT 13: C2 (R side) 100% on 04/21/22  MT 14: C2 (R side) 86% on 6/7/22  MT 15: 0 - 6 - 16.5 @ 68% on 6/14/2022  MT 16: C2 (R side) 89% on 6/16/22  MT 17: F3 @ 91% on 4/30/2024  MT 18: F3  @ 70 %  on 2/13/2025    LDLPFC:  Frequency: 50 Hz  Number of pulses:  3                        Number of bursts: 10  Burst frequency: 5 Hz  Cycle time: 10 sec  Total pulses delivered: 1800  Tx Loc: F3  Energy: 84% (120% MT)   Number of Cycles: 60 trains     RDLPFC:  Frequency: 50 Hz  Number of pulses:  3                        Number of bursts: 600  Burst frequency: 5 Hz  Cycle time: 97.0sec  Total pulses delivered: 1800  Tx Loc: F4 (right side)  Energy: 77% (110% MT)   Number of Cycles: 1 trains    Rating Scales:  Date MADRS QIDS PHQ-9   4/30/2024 30 15 15   5/3/24  17 15   5/10/24  16 13   5/17/24  15 11   5/24/24  16 16   5/31/24  14 12   6/7/24  17 13   6/21/24  17 15   6/24/24 27 15 17       Date MADRS QIDS PHQ-9   02/13/25 25 10 13   2/21/25 -- 10 12   2/28/25  9 10     Plan   - Continue TMS treatments     This service provided today was under the supervising provider of the day, Tim Williamson MD, who was available if needed.     Neli Smith TMS Technician  [facility] Neuromodulation

## 2025-03-04 ENCOUNTER — OFFICE VISIT (OUTPATIENT)
Dept: PSYCHIATRY | Facility: CLINIC | Age: 55
End: 2025-03-04
Payer: COMMERCIAL

## 2025-03-04 DIAGNOSIS — F33.2 SEVERE RECURRENT MAJOR DEPRESSION WITHOUT PSYCHOTIC FEATURES (H): Primary | ICD-10-CM

## 2025-03-04 ASSESSMENT — PATIENT HEALTH QUESTIONNAIRE - PHQ9: SUM OF ALL RESPONSES TO PHQ QUESTIONS 1-9: 11

## 2025-03-04 NOTE — PROGRESS NOTES
[facility] TMS Program  [address], [address]  [place] [address]  TMS Procedure Note   Aure Joy MRN# [id number]  Age: 54 year old   YOB: 1970    Aure Joy comes into clinic today at the request ofESTELA MD, ordering provider for TMS treatments.     Pre-Procedure:  History and Physical: Reviewed in medical record  Consent signed by: Aure Joy on: 12/22/2020    Clinical Narrative:  Patient tolerated treatment. Saw kids last night for dinner.    Indications for TMS:  MDD, recurrent, severe; 4+ medication trials (from 2+ classes) ineffective; Psychotherapy ineffective.     Pre-Procedure Diagnosis:  MDD, recurrent, severe F33.2    Treatment Hx:  Treatment number this series: 13  Total lifetime treatment number: 248    Allergies   Allergen Reactions    Codeine Nausea    Lithium GI Disturbance     Cramping, nausea, diarrhea    Other  [No Clinical Screening - See Comments] Nausea and Vomiting and Other (See Comments)     general anesthesia- uncontrolled vomitting      There were no vitals taken for this visit.    Pause for the Cause:  Right patient:  Yes  Right procedure/correct coil:  Yes; rTMS; 54752; F8 coil.   Earplugs in place:  Yes    Procedure:  Patient was seated in procedure chair. Identity and procedure was verified. Ear plugs were placed in ears and patient-specific cap was placed on head and tightened appropriately. Ruler locations were verified. Coil was placed at treatment location and stimulator was set to parameters described below. A test train was delivered and patient tolerated so 60 trains were delivered. Patient tolerated treatment well.    Motor Threshold Determination  Distance from nasion to inion: 34cm  Distance along circumference from midline: 6.42cm  Distance from Vertex: 9.4cm  Cap to Nasion: 3cm  MT 1: 0 - 6 - 16 @ 69% on 1/29/2018  MT 2: 0 - 6 - 16 @ 69% on 2/6/2018   MT 3: 0 - 6 - 16 @ 69% on 2/13/2018   MT 4: 0 - 6 - 16 @ 69% on 2/23/2018    MT 5: 0 - 6 - 16 @ 69% on 3/2/2018   MT 6: 0 - 5.5 - 15.5(always use intersection at left side of ruler)@ 85% on 8/23/19  MT 7: 0 - 5.5 - 15.5(always use intersection at left side of ruler)@ 80% on 8/29/19  MT 8: 0 - 5.5 - 37.5(always use intersection at left side of ruler)@ 76% on 9/5/19 (right side using EMG on left hand)  MT 9: C2 (R side using EMG on L hand) 78% on 9/12/2019  MT 10: C2 (R side using EMG on L hand) 76% on 9/26/2019  MT 11: C2 (R side using EMG on L hand) 82 on 1/31/2020  MT 12: C2 (R side using EMG on L Hand) 86% on 12/22/2020   MT 13: C2 (R side) 100% on 04/21/22  MT 14: C2 (R side) 86% on 6/7/22  MT 15: 0 - 6 - 16.5 @ 68% on 6/14/2022  MT 16: C2 (R side) 89% on 6/16/22  MT 17: F3 @ 91% on 4/30/2024  MT 18: F3  @ 70 %  on 2/13/2025    LDLPFC:  Frequency: 50 Hz  Number of pulses:  3                        Number of bursts: 10  Burst frequency: 5 Hz  Cycle time: 10 sec  Total pulses delivered: 1800  Tx Loc: F3  Energy: 84% (120% MT)   Number of Cycles: 60 trains     RDLPFC:  Frequency: 50 Hz  Number of pulses:  3                        Number of bursts: 600  Burst frequency: 5 Hz  Cycle time: 97.0sec  Total pulses delivered: 1800  Tx Loc: F4 (right side)  Energy: 77% (110% MT)   Number of Cycles: 1 trains    Rating Scales:  Date MADRS QIDS PHQ-9   4/30/2024 30 15 15   5/3/24  17 15   5/10/24  16 13   5/17/24  15 11   5/24/24  16 16   5/31/24  14 12   6/7/24  17 13   6/21/24  17 15   6/24/24 27 15 17       Date MADRS QIDS PHQ-9   02/13/25 25 10 13   2/21/25 -- 10 12   2/28/25  9 10     Plan   - Continue TMS treatments     This service provided today was under the supervising provider of the day, Tim Williamson MD, who was available if needed.     Imelda Davis, TMS Technician  [facility] Neuromodulation

## 2025-03-10 ENCOUNTER — OFFICE VISIT (OUTPATIENT)
Dept: PSYCHIATRY | Facility: CLINIC | Age: 55
End: 2025-03-10
Payer: COMMERCIAL

## 2025-03-10 ENCOUNTER — ALLIED HEALTH/NURSE VISIT (OUTPATIENT)
Dept: PSYCHIATRY | Facility: CLINIC | Age: 55
End: 2025-03-10
Payer: COMMERCIAL

## 2025-03-10 VITALS — HEART RATE: 78 BPM | DIASTOLIC BLOOD PRESSURE: 74 MMHG | SYSTOLIC BLOOD PRESSURE: 109 MMHG

## 2025-03-10 DIAGNOSIS — F33.2 SEVERE EPISODE OF RECURRENT MAJOR DEPRESSIVE DISORDER, WITHOUT PSYCHOTIC FEATURES (H): Primary | ICD-10-CM

## 2025-03-10 DIAGNOSIS — F33.2 SEVERE RECURRENT MAJOR DEPRESSION WITHOUT PSYCHOTIC FEATURES (H): Primary | ICD-10-CM

## 2025-03-10 ASSESSMENT — PATIENT HEALTH QUESTIONNAIRE - PHQ9
SUM OF ALL RESPONSES TO PHQ QUESTIONS 1-9: 7
SUM OF ALL RESPONSES TO PHQ QUESTIONS 1-9: 13

## 2025-03-10 NOTE — PROGRESS NOTES
"Aure Joy comes into clinic today at the request of ESTELA Zamudio MD Ordering Provider for Med Injection only Ketamine .    Procedure Prep:  Medication double check completed by: Joan Donnelly RN  Prior to administration pt identified by name and : Yes    Nursing Assessment:  Appearance: Casually clothed   Mood: \"Good\". Pt reported having a good weekend off work.  Affect: Bright  Eye contact: Good      3/10/2025     3:38 PM   PHQ   PHQ-9 Total Score 13   Q9: Thoughts of better off dead/self-harm past 2 weeks Not at all     PCL5 weekly assessment: score 10. Assessment was scanned to EHR.     Procedure Performed:  VSS and mental status WNL. Patient was given ketamine. See MAR for details.       Post Procedure Assessment:  Patient tolerated the treatment with the following side effects: dissociation. Vital signs were monitored, see VS Flowsheet. Patient stated they felt ready to go home prior to discharge. AVS was offered to patient and patient declined. Patient was instructed not to drive for the remainder of the day and to notify clinic if any concerns arise.     Next appt: 3/24    Medications were supplied by Clinic     This service provided today was under the supervising provider of the day ESTELA Valdivia MD, who was available if needed.    Hallie Jaramillo RN  "

## 2025-03-10 NOTE — PROGRESS NOTES
[facility] TMS Program  [address], [address]  [place] [address]  TMS Procedure Note   Aure Joy MRN# [id number]  Age: 54 year old   YOB: 1970    Aure Joy comes into clinic today at the request ofESTELA MD, ordering provider for TMS treatments.     Pre-Procedure:  History and Physical: Reviewed in medical record  Consent signed by: Aure Joy on: 12/22/2020    Clinical Narrative:  Patient tolerated treatment. Had a really nice weekend, didn't have to work. Oldest kid came over for lunch.    Indications for TMS:  MDD, recurrent, severe; 4+ medication trials (from 2+ classes) ineffective; Psychotherapy ineffective.     Pre-Procedure Diagnosis:  MDD, recurrent, severe F33.2    Treatment Hx:  Treatment number this series: 15  Total lifetime treatment number: 250    Allergies   Allergen Reactions    Codeine Nausea    Lithium GI Disturbance     Cramping, nausea, diarrhea    Other  [No Clinical Screening - See Comments] Nausea and Vomiting and Other (See Comments)     general anesthesia- uncontrolled vomitting      There were no vitals taken for this visit.    Pause for the Cause:  Right patient:  Yes  Right procedure/correct coil:  Yes; rTMS; 89338; F8 coil.   Earplugs in place:  Yes    Procedure:  Patient was seated in procedure chair. Identity and procedure was verified. Ear plugs were placed in ears and patient-specific cap was placed on head and tightened appropriately. Ruler locations were verified. Coil was placed at treatment location and stimulator was set to parameters described below. A test train was delivered and patient tolerated so 60 trains were delivered. Patient tolerated treatment well.    Motor Threshold Determination  Distance from nasion to inion: 34cm  Distance along circumference from midline: 6.42cm  Distance from Vertex: 9.4cm  Cap to Nasion: 3cm  MT 1: 0 - 6 - 16 @ 69% on 1/29/2018  MT 2: 0 - 6 - 16 @ 69% on 2/6/2018   MT 3: 0 - 6 - 16 @ 69% on  2/13/2018   MT 4: 0 - 6 - 16 @ 69% on 2/23/2018   MT 5: 0 - 6 - 16 @ 69% on 3/2/2018   MT 6: 0 - 5.5 - 15.5(always use intersection at left side of ruler)@ 85% on 8/23/19  MT 7: 0 - 5.5 - 15.5(always use intersection at left side of ruler)@ 80% on 8/29/19  MT 8: 0 - 5.5 - 37.5(always use intersection at left side of ruler)@ 76% on 9/5/19 (right side using EMG on left hand)  MT 9: C2 (R side using EMG on L hand) 78% on 9/12/2019  MT 10: C2 (R side using EMG on L hand) 76% on 9/26/2019  MT 11: C2 (R side using EMG on L hand) 82 on 1/31/2020  MT 12: C2 (R side using EMG on L Hand) 86% on 12/22/2020   MT 13: C2 (R side) 100% on 04/21/22  MT 14: C2 (R side) 86% on 6/7/22  MT 15: 0 - 6 - 16.5 @ 68% on 6/14/2022  MT 16: C2 (R side) 89% on 6/16/22  MT 17: F3 @ 91% on 4/30/2024  MT 18: F3  @ 70 %  on 2/13/2025    LDLPFC:  Frequency: 50 Hz  Number of pulses:  3                        Number of bursts: 10  Burst frequency: 5 Hz  Cycle time: 10 sec  Total pulses delivered: 1800  Tx Loc: F3  Energy: 84% (120% MT)   Number of Cycles: 60 trains     RDLPFC:  Frequency: 50 Hz  Number of pulses:  3                        Number of bursts: 600  Burst frequency: 5 Hz  Cycle time: 97.0sec  Total pulses delivered: 1800  Tx Loc: F4 (right side)  Energy: 77% (110% MT)   Number of Cycles: 1 trains    Rating Scales:  Date MADRS QIDS PHQ-9   4/30/2024 30 15 15   5/3/24  17 15   5/10/24  16 13   5/17/24  15 11   5/24/24  16 16   5/31/24  14 12   6/7/24  17 13   6/21/24  17 15   6/24/24 27 15 17       Date MADRS QIDS PHQ-9   02/13/25 25 10 13   2/21/25 -- 10 12   2/28/25  9 10   3/7/25  10 9     Plan   - Continue TMS treatments     This service provided today was under the supervising provider of the day, Cash Valdivia MD, who was available if needed.     Neli Smith, TMS Technician  [facility] Neuromodulation

## 2025-03-11 ENCOUNTER — OFFICE VISIT (OUTPATIENT)
Dept: PSYCHIATRY | Facility: CLINIC | Age: 55
End: 2025-03-11
Payer: COMMERCIAL

## 2025-03-11 DIAGNOSIS — F33.2 SEVERE EPISODE OF RECURRENT MAJOR DEPRESSIVE DISORDER, WITHOUT PSYCHOTIC FEATURES (H): Primary | ICD-10-CM

## 2025-03-11 ASSESSMENT — PATIENT HEALTH QUESTIONNAIRE - PHQ9: SUM OF ALL RESPONSES TO PHQ QUESTIONS 1-9: 4

## 2025-03-11 NOTE — PROGRESS NOTES
[facility] TMS Program  [address], [address]  [place] [address]  TMS Procedure Note   Aure Joy MRN# [id number]  Age: 54 year old   YOB: 1970    Aure Joy comes into clinic today at the request ofESTELA MD, ordering provider for TMS treatments.     Pre-Procedure:  History and Physical: Reviewed in medical record  Consent signed by: Aure Joy on: 12/22/2020    Clinical Narrative:  Patient tolerated treatment. Looking forward to the nice weather on Friday.      Indications for TMS:  MDD, recurrent, severe; 4+ medication trials (from 2+ classes) ineffective; Psychotherapy ineffective.     Pre-Procedure Diagnosis:  MDD, recurrent, severe F33.2    Treatment Hx:  Treatment number this series: 16  Total lifetime treatment number: 251    Allergies   Allergen Reactions    Codeine Nausea    Lithium GI Disturbance     Cramping, nausea, diarrhea    Other  [No Clinical Screening - See Comments] Nausea and Vomiting and Other (See Comments)     general anesthesia- uncontrolled vomitting      There were no vitals taken for this visit.    Pause for the Cause:  Right patient:  Yes  Right procedure/correct coil:  Yes; rTMS; 07511; F8 coil.   Earplugs in place:  Yes    Procedure:  Patient was seated in procedure chair. Identity and procedure was verified. Ear plugs were placed in ears and patient-specific cap was placed on head and tightened appropriately. Ruler locations were verified. Coil was placed at treatment location and stimulator was set to parameters described below. A test train was delivered and patient tolerated so 60 trains were delivered. Patient tolerated treatment well.    Motor Threshold Determination  Distance from nasion to inion: 34cm  Distance along circumference from midline: 6.42cm  Distance from Vertex: 9.4cm  Cap to Nasion: 3cm  MT 1: 0 - 6 - 16 @ 69% on 1/29/2018  MT 2: 0 - 6 - 16 @ 69% on 2/6/2018   MT 3: 0 - 6 - 16 @ 69% on 2/13/2018   MT 4: 0 - 6 - 16 @  69% on 2/23/2018   MT 5: 0 - 6 - 16 @ 69% on 3/2/2018   MT 6: 0 - 5.5 - 15.5(always use intersection at left side of ruler)@ 85% on 8/23/19  MT 7: 0 - 5.5 - 15.5(always use intersection at left side of ruler)@ 80% on 8/29/19  MT 8: 0 - 5.5 - 37.5(always use intersection at left side of ruler)@ 76% on 9/5/19 (right side using EMG on left hand)  MT 9: C2 (R side using EMG on L hand) 78% on 9/12/2019  MT 10: C2 (R side using EMG on L hand) 76% on 9/26/2019  MT 11: C2 (R side using EMG on L hand) 82 on 1/31/2020  MT 12: C2 (R side using EMG on L Hand) 86% on 12/22/2020   MT 13: C2 (R side) 100% on 04/21/22  MT 14: C2 (R side) 86% on 6/7/22  MT 15: 0 - 6 - 16.5 @ 68% on 6/14/2022  MT 16: C2 (R side) 89% on 6/16/22  MT 17: F3 @ 91% on 4/30/2024  MT 18: F3  @ 70 %  on 2/13/2025    LDLPFC:  Frequency: 50 Hz  Number of pulses:  3                        Number of bursts: 10  Burst frequency: 5 Hz  Cycle time: 10 sec  Total pulses delivered: 1800  Tx Loc: F3  Energy: 84% (120% MT)   Number of Cycles: 60 trains     RDLPFC:  Frequency: 50 Hz  Number of pulses:  3                        Number of bursts: 600  Burst frequency: 5 Hz  Cycle time: 97.0sec  Total pulses delivered: 1800  Tx Loc: F4 (right side)  Energy: 77% (110% MT)   Number of Cycles: 1 trains    Rating Scales:  Date MADRS QIDS PHQ-9   4/30/2024 30 15 15   5/3/24  17 15   5/10/24  16 13   5/17/24  15 11   5/24/24  16 16   5/31/24  14 12   6/7/24  17 13   6/21/24  17 15   6/24/24 27 15 17       Date MADRS QIDS PHQ-9   02/13/25 25 10 13   2/21/25 -- 10 12   2/28/25  9 10   3/7/25  10 9     Plan   - Continue TMS treatments     This service provided today was under the supervising provider of the day, Tim Williamson MD, who was available if needed.     Lilia Prieto, TMS Technician  [facility]  [facility] Neuromodulation

## 2025-03-12 ENCOUNTER — OFFICE VISIT (OUTPATIENT)
Dept: PSYCHIATRY | Facility: CLINIC | Age: 55
End: 2025-03-12
Payer: COMMERCIAL

## 2025-03-12 DIAGNOSIS — F33.2 SEVERE EPISODE OF RECURRENT MAJOR DEPRESSIVE DISORDER, WITHOUT PSYCHOTIC FEATURES (H): Primary | ICD-10-CM

## 2025-03-12 ASSESSMENT — PATIENT HEALTH QUESTIONNAIRE - PHQ9: SUM OF ALL RESPONSES TO PHQ QUESTIONS 1-9: 7

## 2025-03-13 ENCOUNTER — OFFICE VISIT (OUTPATIENT)
Dept: PSYCHIATRY | Facility: CLINIC | Age: 55
End: 2025-03-13
Payer: COMMERCIAL

## 2025-03-13 DIAGNOSIS — F33.2 SEVERE EPISODE OF RECURRENT MAJOR DEPRESSIVE DISORDER, WITHOUT PSYCHOTIC FEATURES (H): Primary | ICD-10-CM

## 2025-03-13 ASSESSMENT — PATIENT HEALTH QUESTIONNAIRE - PHQ9: SUM OF ALL RESPONSES TO PHQ QUESTIONS 1-9: 6

## 2025-03-13 NOTE — PROGRESS NOTES
[facility] TMS Program  [address], [address]  [place] [address]  TMS Procedure Note   Aure Joy MRN# [id number]  Age: 54 year old   YOB: 1970    Aure Joy comes into clinic today at the request ofESTELA MD, ordering provider for TMS treatments.     Pre-Procedure:  History and Physical: Reviewed in medical record  Consent signed by: Aure Joy on: 12/22/2020    Clinical Narrative:  Patient tolerated treatment. No work today so going to try and enjoy the nice weather.     Indications for TMS:  MDD, recurrent, severe; 4+ medication trials (from 2+ classes) ineffective; Psychotherapy ineffective.     Pre-Procedure Diagnosis:  MDD, recurrent, severe F33.2    Treatment Hx:  Treatment number this series: 18  Total lifetime treatment number: 253    Allergies   Allergen Reactions    Codeine Nausea    Lithium GI Disturbance     Cramping, nausea, diarrhea    Other  [No Clinical Screening - See Comments] Nausea and Vomiting and Other (See Comments)     general anesthesia- uncontrolled vomitting      There were no vitals taken for this visit.    Pause for the Cause:  Right patient:  Yes  Right procedure/correct coil:  Yes; rTMS; 82577; F8 coil.   Earplugs in place:  Yes    Procedure:  Patient was seated in procedure chair. Identity and procedure was verified. Ear plugs were placed in ears and patient-specific cap was placed on head and tightened appropriately. Ruler locations were verified. Coil was placed at treatment location and stimulator was set to parameters described below. A test train was delivered and patient tolerated so 60 trains were delivered. Patient tolerated treatment well.    Motor Threshold Determination  Distance from nasion to inion: 34cm  Distance along circumference from midline: 6.42cm  Distance from Vertex: 9.4cm  Cap to Nasion: 3cm  MT 1: 0 - 6 - 16 @ 69% on 1/29/2018  MT 2: 0 - 6 - 16 @ 69% on 2/6/2018   MT 3: 0 - 6 - 16 @ 69% on 2/13/2018   MT 4: 0 -  6 - 16 @ 69% on 2/23/2018   MT 5: 0 - 6 - 16 @ 69% on 3/2/2018   MT 6: 0 - 5.5 - 15.5(always use intersection at left side of ruler)@ 85% on 8/23/19  MT 7: 0 - 5.5 - 15.5(always use intersection at left side of ruler)@ 80% on 8/29/19  MT 8: 0 - 5.5 - 37.5(always use intersection at left side of ruler)@ 76% on 9/5/19 (right side using EMG on left hand)  MT 9: C2 (R side using EMG on L hand) 78% on 9/12/2019  MT 10: C2 (R side using EMG on L hand) 76% on 9/26/2019  MT 11: C2 (R side using EMG on L hand) 82 on 1/31/2020  MT 12: C2 (R side using EMG on L Hand) 86% on 12/22/2020   MT 13: C2 (R side) 100% on 04/21/22  MT 14: C2 (R side) 86% on 6/7/22  MT 15: 0 - 6 - 16.5 @ 68% on 6/14/2022  MT 16: C2 (R side) 89% on 6/16/22  MT 17: F3 @ 91% on 4/30/2024  MT 18: F3  @ 70 %  on 2/13/2025    LDLPFC:  Frequency: 50 Hz  Number of pulses:  3                        Number of bursts: 10  Burst frequency: 5 Hz  Cycle time: 10 sec  Total pulses delivered: 1800  Tx Loc: F3  Energy: 84% (120% MT)   Number of Cycles: 60 trains     RDLPFC:  Frequency: 50 Hz  Number of pulses:  3                        Number of bursts: 600  Burst frequency: 5 Hz  Cycle time: 97.0sec  Total pulses delivered: 1800  Tx Loc: F4 (right side)  Energy: 77% (110% MT)   Number of Cycles: 1 trains    Rating Scales:  Date MADRS QIDS PHQ-9   4/30/2024 30 15 15   5/3/24  17 15   5/10/24  16 13   5/17/24  15 11   5/24/24  16 16   5/31/24  14 12   6/7/24  17 13   6/21/24  17 15   6/24/24 27 15 17       Date MADRS QIDS PHQ-9   02/13/25 25 10 13   2/21/25 -- 10 12   2/28/25  9 10   3/7/25  10 9     Plan   - Continue TMS treatments     This service provided today was under the supervising provider of the day, ESTELA Zamudio MD, who was available if needed.     Lilia Prieto, TMS Technician  [facility] Neuromodulation

## 2025-03-17 ENCOUNTER — OFFICE VISIT (OUTPATIENT)
Dept: PSYCHIATRY | Facility: CLINIC | Age: 55
End: 2025-03-17
Payer: COMMERCIAL

## 2025-03-17 DIAGNOSIS — F33.2 SEVERE EPISODE OF RECURRENT MAJOR DEPRESSIVE DISORDER, WITHOUT PSYCHOTIC FEATURES (H): Primary | ICD-10-CM

## 2025-03-17 ASSESSMENT — PATIENT HEALTH QUESTIONNAIRE - PHQ9: SUM OF ALL RESPONSES TO PHQ QUESTIONS 1-9: 9

## 2025-03-17 NOTE — PROGRESS NOTES
[facility] TMS Program  [address], [address]  [place] [address]  TMS Procedure Note   Aure Joy MRN# [id number]  Age: 54 year old   YOB: 1970    Aure Joy comes into clinic today at the request ofESTELA MD, ordering provider for TMS treatments.     Pre-Procedure:  History and Physical: Reviewed in medical record  Consent signed by: Aure Joy on: 12/22/2020    Clinical Narrative:  Patient tolerated treatment. Worked over the weekend.     Indications for TMS:  MDD, recurrent, severe; 4+ medication trials (from 2+ classes) ineffective; Psychotherapy ineffective.     Pre-Procedure Diagnosis:  MDD, recurrent, severe F33.2    Treatment Hx:  Treatment number this series: 20  Total lifetime treatment number: 255    Allergies   Allergen Reactions    Codeine Nausea    Lithium GI Disturbance     Cramping, nausea, diarrhea    Other  [No Clinical Screening - See Comments] Nausea and Vomiting and Other (See Comments)     general anesthesia- uncontrolled vomitting      There were no vitals taken for this visit.    Pause for the Cause:  Right patient:  Yes  Right procedure/correct coil:  Yes; rTMS; 91455; F8 coil.   Earplugs in place:  Yes    Procedure:  Patient was seated in procedure chair. Identity and procedure was verified. Ear plugs were placed in ears and patient-specific cap was placed on head and tightened appropriately. Ruler locations were verified. Coil was placed at treatment location and stimulator was set to parameters described below. A test train was delivered and patient tolerated so 60 trains were delivered. Patient tolerated treatment well.    Motor Threshold Determination  Distance from nasion to inion: 34cm  Distance along circumference from midline: 6.42cm  Distance from Vertex: 9.4cm  Cap to Nasion: 3cm  MT 1: 0 - 6 - 16 @ 69% on 1/29/2018  MT 2: 0 - 6 - 16 @ 69% on 2/6/2018   MT 3: 0 - 6 - 16 @ 69% on 2/13/2018   MT 4: 0 - 6 - 16 @ 69% on 2/23/2018   MT  5: 0 - 6 - 16 @ 69% on 3/2/2018   MT 6: 0 - 5.5 - 15.5(always use intersection at left side of ruler)@ 85% on 8/23/19  MT 7: 0 - 5.5 - 15.5(always use intersection at left side of ruler)@ 80% on 8/29/19  MT 8: 0 - 5.5 - 37.5(always use intersection at left side of ruler)@ 76% on 9/5/19 (right side using EMG on left hand)  MT 9: C2 (R side using EMG on L hand) 78% on 9/12/2019  MT 10: C2 (R side using EMG on L hand) 76% on 9/26/2019  MT 11: C2 (R side using EMG on L hand) 82 on 1/31/2020  MT 12: C2 (R side using EMG on L Hand) 86% on 12/22/2020   MT 13: C2 (R side) 100% on 04/21/22  MT 14: C2 (R side) 86% on 6/7/22  MT 15: 0 - 6 - 16.5 @ 68% on 6/14/2022  MT 16: C2 (R side) 89% on 6/16/22  MT 17: F3 @ 91% on 4/30/2024  MT 18: F3  @ 70 %  on 2/13/2025    LDLPFC:  Frequency: 50 Hz  Number of pulses:  3                        Number of bursts: 10  Burst frequency: 5 Hz  Cycle time: 10 sec  Total pulses delivered: 1800  Tx Loc: F3  Energy: 84% (120% MT)   Number of Cycles: 60 trains     RDLPFC:  Frequency: 50 Hz  Number of pulses:  3                        Number of bursts: 600  Burst frequency: 5 Hz  Cycle time: 97.0sec  Total pulses delivered: 1800  Tx Loc: F4 (right side)  Energy: 77% (110% MT)   Number of Cycles: 1 trains    Rating Scales:  Date MADRS QIDS PHQ-9   4/30/2024 30 15 15   5/3/24  17 15   5/10/24  16 13   5/17/24  15 11   5/24/24  16 16   5/31/24  14 12   6/7/24  17 13   6/21/24  17 15   6/24/24 27 15 17       Date MADRS QIDS PHQ-9   02/13/25 25 10 13   2/21/25 -- 10 12   2/28/25  9 10   3/7/25  10 9   3/14/25  8 7     Plan   - Continue TMS treatments     This service provided today was under the supervising provider of the day, Dom Shirley MD, who was available if needed.     Lilia Prieto, TMS Technician  [facility] Neuromodulation

## 2025-03-18 ENCOUNTER — OFFICE VISIT (OUTPATIENT)
Dept: PSYCHIATRY | Facility: CLINIC | Age: 55
End: 2025-03-18
Payer: COMMERCIAL

## 2025-03-18 DIAGNOSIS — F33.2 SEVERE EPISODE OF RECURRENT MAJOR DEPRESSIVE DISORDER, WITHOUT PSYCHOTIC FEATURES (H): Primary | ICD-10-CM

## 2025-03-18 ASSESSMENT — PATIENT HEALTH QUESTIONNAIRE - PHQ9: SUM OF ALL RESPONSES TO PHQ QUESTIONS 1-9: 8

## 2025-03-18 NOTE — PROGRESS NOTES
[facility] TMS Program  [address], [address]  [place] [address]  TMS Procedure Note   Aure Joy MRN# [id number]  Age: 54 year old   YOB: 1970    Aure Joy comes into clinic today at the request ofESTELA MD, ordering provider for TMS treatments.     Pre-Procedure:  History and Physical: Reviewed in medical record  Consent signed by: Aure Joy on: 12/22/2020    Clinical Narrative:  Patient tolerated treatment. Had dinner with her kids last night.    Indications for TMS:  MDD, recurrent, severe; 4+ medication trials (from 2+ classes) ineffective; Psychotherapy ineffective.     Pre-Procedure Diagnosis:  MDD, recurrent, severe F33.2    Treatment Hx:  Treatment number this series: 21  Total lifetime treatment number: 256    Allergies   Allergen Reactions    Codeine Nausea    Lithium GI Disturbance     Cramping, nausea, diarrhea    Other  [No Clinical Screening - See Comments] Nausea and Vomiting and Other (See Comments)     general anesthesia- uncontrolled vomitting      There were no vitals taken for this visit.    Pause for the Cause:  Right patient:  Yes  Right procedure/correct coil:  Yes; rTMS; 66124; F8 coil.   Earplugs in place:  Yes    Procedure:  Patient was seated in procedure chair. Identity and procedure was verified. Ear plugs were placed in ears and patient-specific cap was placed on head and tightened appropriately. Ruler locations were verified. Coil was placed at treatment location and stimulator was set to parameters described below. A test train was delivered and patient tolerated so 60 trains were delivered. Patient tolerated treatment well.    Motor Threshold Determination  Distance from nasion to inion: 34cm  Distance along circumference from midline: 6.42cm  Distance from Vertex: 9.4cm  Cap to Nasion: 3cm  MT 1: 0 - 6 - 16 @ 69% on 1/29/2018  MT 2: 0 - 6 - 16 @ 69% on 2/6/2018   MT 3: 0 - 6 - 16 @ 69% on 2/13/2018   MT 4: 0 - 6 - 16 @ 69% on  2/23/2018   MT 5: 0 - 6 - 16 @ 69% on 3/2/2018   MT 6: 0 - 5.5 - 15.5(always use intersection at left side of ruler)@ 85% on 8/23/19  MT 7: 0 - 5.5 - 15.5(always use intersection at left side of ruler)@ 80% on 8/29/19  MT 8: 0 - 5.5 - 37.5(always use intersection at left side of ruler)@ 76% on 9/5/19 (right side using EMG on left hand)  MT 9: C2 (R side using EMG on L hand) 78% on 9/12/2019  MT 10: C2 (R side using EMG on L hand) 76% on 9/26/2019  MT 11: C2 (R side using EMG on L hand) 82 on 1/31/2020  MT 12: C2 (R side using EMG on L Hand) 86% on 12/22/2020   MT 13: C2 (R side) 100% on 04/21/22  MT 14: C2 (R side) 86% on 6/7/22  MT 15: 0 - 6 - 16.5 @ 68% on 6/14/2022  MT 16: C2 (R side) 89% on 6/16/22  MT 17: F3 @ 91% on 4/30/2024  MT 18: F3  @ 70 %  on 2/13/2025    LDLPFC:  Frequency: 50 Hz  Number of pulses:  3                        Number of bursts: 10  Burst frequency: 5 Hz  Cycle time: 10 sec  Total pulses delivered: 1800  Tx Loc: F3  Energy: 84% (120% MT)   Number of Cycles: 60 trains     RDLPFC:  Frequency: 50 Hz  Number of pulses:  3                        Number of bursts: 600  Burst frequency: 5 Hz  Cycle time: 97.0sec  Total pulses delivered: 1800  Tx Loc: F4 (right side)  Energy: 77% (110% MT)   Number of Cycles: 1 trains    Rating Scales:  Date MADRS QIDS PHQ-9   4/30/2024 30 15 15   5/3/24  17 15   5/10/24  16 13   5/17/24  15 11   5/24/24  16 16   5/31/24  14 12   6/7/24  17 13   6/21/24  17 15   6/24/24 27 15 17       Date MADRS QIDS PHQ-9   02/13/25 25 10 13   2/21/25 -- 10 12   2/28/25  9 10   3/7/25  10 9   3/14/25  8 7     Plan   - Continue TMS treatments     This service provided today was under the supervising provider of the day, Truman Eaton MD, who was available if needed.     Neli Smith TMS Technician  [facility] Neuromodulation

## 2025-03-19 ENCOUNTER — OFFICE VISIT (OUTPATIENT)
Dept: PSYCHIATRY | Facility: CLINIC | Age: 55
End: 2025-03-19
Payer: COMMERCIAL

## 2025-03-19 DIAGNOSIS — F33.2 SEVERE EPISODE OF RECURRENT MAJOR DEPRESSIVE DISORDER, WITHOUT PSYCHOTIC FEATURES (H): Primary | ICD-10-CM

## 2025-03-19 ASSESSMENT — PATIENT HEALTH QUESTIONNAIRE - PHQ9: SUM OF ALL RESPONSES TO PHQ QUESTIONS 1-9: 8

## 2025-03-19 NOTE — PROGRESS NOTES
[facility] TMS Program  [address], [address]  [place] [address]  TMS Procedure Note   Aure Joy MRN# [id number]  Age: 54 year old   YOB: 1970    Aure Joy comes into clinic today at the request ofESTELA MD, ordering provider for TMS treatments.     Pre-Procedure:  History and Physical: Reviewed in medical record  Consent signed by: Aure Joy on: 12/22/2020    Clinical Narrative:  Patient tolerated treatment. No changes reported.    Indications for TMS:  MDD, recurrent, severe; 4+ medication trials (from 2+ classes) ineffective; Psychotherapy ineffective.     Pre-Procedure Diagnosis:  MDD, recurrent, severe F33.2    Treatment Hx:  Treatment number this series: 22  Total lifetime treatment number: 257    Allergies   Allergen Reactions    Codeine Nausea    Lithium GI Disturbance     Cramping, nausea, diarrhea    Other  [No Clinical Screening - See Comments] Nausea and Vomiting and Other (See Comments)     general anesthesia- uncontrolled vomitting      There were no vitals taken for this visit.    Pause for the Cause:  Right patient:  Yes  Right procedure/correct coil:  Yes; rTMS; 96592; F8 coil.   Earplugs in place:  Yes    Procedure:  Patient was seated in procedure chair. Identity and procedure was verified. Ear plugs were placed in ears and patient-specific cap was placed on head and tightened appropriately. Ruler locations were verified. Coil was placed at treatment location and stimulator was set to parameters described below. A test train was delivered and patient tolerated so 60 trains were delivered. Patient tolerated treatment well.    Motor Threshold Determination  Distance from nasion to inion: 34cm  Distance along circumference from midline: 6.42cm  Distance from Vertex: 9.4cm  Cap to Nasion: 3cm  MT 1: 0 - 6 - 16 @ 69% on 1/29/2018  MT 2: 0 - 6 - 16 @ 69% on 2/6/2018   MT 3: 0 - 6 - 16 @ 69% on 2/13/2018   MT 4: 0 - 6 - 16 @ 69% on 2/23/2018   MT 5: 0 -  6 - 16 @ 69% on 3/2/2018   MT 6: 0 - 5.5 - 15.5(always use intersection at left side of ruler)@ 85% on 8/23/19  MT 7: 0 - 5.5 - 15.5(always use intersection at left side of ruler)@ 80% on 8/29/19  MT 8: 0 - 5.5 - 37.5(always use intersection at left side of ruler)@ 76% on 9/5/19 (right side using EMG on left hand)  MT 9: C2 (R side using EMG on L hand) 78% on 9/12/2019  MT 10: C2 (R side using EMG on L hand) 76% on 9/26/2019  MT 11: C2 (R side using EMG on L hand) 82 on 1/31/2020  MT 12: C2 (R side using EMG on L Hand) 86% on 12/22/2020   MT 13: C2 (R side) 100% on 04/21/22  MT 14: C2 (R side) 86% on 6/7/22  MT 15: 0 - 6 - 16.5 @ 68% on 6/14/2022  MT 16: C2 (R side) 89% on 6/16/22  MT 17: F3 @ 91% on 4/30/2024  MT 18: F3  @ 70 %  on 2/13/2025    LDLPFC:  Frequency: 50 Hz  Number of pulses:  3                        Number of bursts: 10  Burst frequency: 5 Hz  Cycle time: 10 sec  Total pulses delivered: 1800  Tx Loc: F3  Energy: 84% (120% MT)   Number of Cycles: 60 trains     RDLPFC:  Frequency: 50 Hz  Number of pulses:  3                        Number of bursts: 600  Burst frequency: 5 Hz  Cycle time: 97.0sec  Total pulses delivered: 1800  Tx Loc: F4 (right side)  Energy: 77% (110% MT)   Number of Cycles: 1 trains    Rating Scales:  Date MADRS QIDS PHQ-9   4/30/2024 30 15 15   5/3/24  17 15   5/10/24  16 13   5/17/24  15 11   5/24/24  16 16   5/31/24  14 12   6/7/24  17 13   6/21/24  17 15   6/24/24 27 15 17       Date MADRS QIDS PHQ-9   02/13/25 25 10 13   2/21/25 -- 10 12   2/28/25  9 10   3/7/25  10 9   3/14/25  8 7     Plan   - Continue TMS treatments     This service provided today was under the supervising provider of the day, Dom Shirley MD, who was available if needed.     Imelda Davis, TMS Technician  [facility] Neuromodulation

## 2025-03-20 ENCOUNTER — OFFICE VISIT (OUTPATIENT)
Dept: PSYCHIATRY | Facility: CLINIC | Age: 55
End: 2025-03-20
Payer: COMMERCIAL

## 2025-03-20 DIAGNOSIS — F33.2 SEVERE EPISODE OF RECURRENT MAJOR DEPRESSIVE DISORDER, WITHOUT PSYCHOTIC FEATURES (H): Primary | ICD-10-CM

## 2025-03-20 NOTE — PROGRESS NOTES
[facility] TMS Program  [address], [address]  [place] [address]  TMS Procedure Note   Aure Joy MRN# [id number]  Age: 54 year old   YOB: 1970    Aure Joy comes into clinic today at the request ofESTELA MD, ordering provider for TMS treatments.     Pre-Procedure:  History and Physical: Reviewed in medical record  Consent signed by: Aure Joy on: 12/22/2020    Clinical Narrative:  Patient tolerated treatment. No plans for today.     Indications for TMS:  MDD, recurrent, severe; 4+ medication trials (from 2+ classes) ineffective; Psychotherapy ineffective.     Pre-Procedure Diagnosis:  MDD, recurrent, severe F33.2    Treatment Hx:  Treatment number this series: 23  Total lifetime treatment number: 258    Allergies   Allergen Reactions    Codeine Nausea    Lithium GI Disturbance     Cramping, nausea, diarrhea    Other  [No Clinical Screening - See Comments] Nausea and Vomiting and Other (See Comments)     general anesthesia- uncontrolled vomitting      There were no vitals taken for this visit.    Pause for the Cause:  Right patient:  Yes  Right procedure/correct coil:  Yes; rTMS; 26493; F8 coil.   Earplugs in place:  Yes    Procedure:  Patient was seated in procedure chair. Identity and procedure was verified. Ear plugs were placed in ears and patient-specific cap was placed on head and tightened appropriately. Ruler locations were verified. Coil was placed at treatment location and stimulator was set to parameters described below. A test train was delivered and patient tolerated so 60 trains were delivered. Patient tolerated treatment well.    Motor Threshold Determination  Distance from nasion to inion: 34cm  Distance along circumference from midline: 6.42cm  Distance from Vertex: 9.4cm  Cap to Nasion: 3cm  MT 1: 0 - 6 - 16 @ 69% on 1/29/2018  MT 2: 0 - 6 - 16 @ 69% on 2/6/2018   MT 3: 0 - 6 - 16 @ 69% on 2/13/2018   MT 4: 0 - 6 - 16 @ 69% on 2/23/2018   MT 5: 0 -  6 - 16 @ 69% on 3/2/2018   MT 6: 0 - 5.5 - 15.5(always use intersection at left side of ruler)@ 85% on 8/23/19  MT 7: 0 - 5.5 - 15.5(always use intersection at left side of ruler)@ 80% on 8/29/19  MT 8: 0 - 5.5 - 37.5(always use intersection at left side of ruler)@ 76% on 9/5/19 (right side using EMG on left hand)  MT 9: C2 (R side using EMG on L hand) 78% on 9/12/2019  MT 10: C2 (R side using EMG on L hand) 76% on 9/26/2019  MT 11: C2 (R side using EMG on L hand) 82 on 1/31/2020  MT 12: C2 (R side using EMG on L Hand) 86% on 12/22/2020   MT 13: C2 (R side) 100% on 04/21/22  MT 14: C2 (R side) 86% on 6/7/22  MT 15: 0 - 6 - 16.5 @ 68% on 6/14/2022  MT 16: C2 (R side) 89% on 6/16/22  MT 17: F3 @ 91% on 4/30/2024  MT 18: F3  @ 70 %  on 2/13/2025    LDLPFC:  Frequency: 50 Hz  Number of pulses:  3                        Number of bursts: 10  Burst frequency: 5 Hz  Cycle time: 10 sec  Total pulses delivered: 1800  Tx Loc: F3  Energy: 84% (120% MT)   Number of Cycles: 60 trains     RDLPFC:  Frequency: 50 Hz  Number of pulses:  3                        Number of bursts: 600  Burst frequency: 5 Hz  Cycle time: 97.0sec  Total pulses delivered: 1800  Tx Loc: F4 (right side)  Energy: 77% (110% MT)   Number of Cycles: 1 trains    Rating Scales:  Date MADRS QIDS PHQ-9   4/30/2024 30 15 15   5/3/24  17 15   5/10/24  16 13   5/17/24  15 11   5/24/24  16 16   5/31/24  14 12   6/7/24  17 13   6/21/24  17 15   6/24/24 27 15 17       Date MADRS QIDS PHQ-9   02/13/25 25 10 13   2/21/25 -- 10 12   2/28/25  9 10   3/7/25  10 9   3/14/25  8 7     Plan   - Continue TMS treatments     This service provided today was under the supervising provider of the day, Dom hSirley MD, who was available if needed.     Lilia Prieto, TMS Technician  [facility] Neuromodulation

## 2025-03-24 ENCOUNTER — OFFICE VISIT (OUTPATIENT)
Dept: PSYCHIATRY | Facility: CLINIC | Age: 55
End: 2025-03-24
Payer: COMMERCIAL

## 2025-03-24 ENCOUNTER — ALLIED HEALTH/NURSE VISIT (OUTPATIENT)
Dept: PSYCHIATRY | Facility: CLINIC | Age: 55
End: 2025-03-24
Payer: COMMERCIAL

## 2025-03-24 VITALS — HEART RATE: 69 BPM | DIASTOLIC BLOOD PRESSURE: 75 MMHG | SYSTOLIC BLOOD PRESSURE: 111 MMHG

## 2025-03-24 DIAGNOSIS — F33.2 SEVERE EPISODE OF RECURRENT MAJOR DEPRESSIVE DISORDER, WITHOUT PSYCHOTIC FEATURES (H): Primary | ICD-10-CM

## 2025-03-24 DIAGNOSIS — F33.2 SEVERE RECURRENT MAJOR DEPRESSION WITHOUT PSYCHOTIC FEATURES (H): Primary | ICD-10-CM

## 2025-03-24 ASSESSMENT — PATIENT HEALTH QUESTIONNAIRE - PHQ9: SUM OF ALL RESPONSES TO PHQ QUESTIONS 1-9: 9

## 2025-03-24 NOTE — PROGRESS NOTES
"Aure Joy comes into clinic today at the request of ESTELA Zamudio MD Ordering Provider for Med Injection only Ketamine .    Procedure Prep:  Medication double check completed by: Joan Donnelly RN  Prior to administration pt identified by name and : Yes    Nursing Assessment:  Appearance: Casually clothed   Mood: \"Good\".   Affect: Bright  Eye contact: Good      3/24/2025     9:05 AM   PHQ   PHQ-9 Total Score 9   Q9: Thoughts of better off dead/self-harm past 2 weeks Not at all   QIDS: 10. Assessment was scanned to EHR.     Procedure Performed:  VSS and mental status WNL. Patient was given ketamine. See MAR for details.       Post Procedure Assessment:  Patient tolerated the treatment with the following side effects: dissociation. Vital signs were monitored, see VS Flowsheet. Patient stated they felt ready to go home prior to discharge. AVS was offered to patient and patient declined. Patient was instructed not to drive for the remainder of the day and to notify clinic if any concerns arise.     Next appt:     Medications were supplied by Clinic     This service provided today was under the supervising provider of the day LOTUS Gordon MD, who was available if needed.    Hallie Jaramillo RN  "

## 2025-03-24 NOTE — PROGRESS NOTES
[facility] TMS Program  [address], [address]  [place] [address]  TMS Procedure Note   Aure Joy MRN# [id number]  Age: 54 year old   YOB: 1970    Aure Joy comes into clinic today at the request ofESTELA MD, ordering provider for TMS treatments.     Pre-Procedure:  History and Physical: Reviewed in medical record  Consent signed by: Aure Joy on: 12/22/2020    Clinical Narrative:  Patient tolerated treatment. Enjoyed her weekend even though she had to work.    Indications for TMS:  MDD, recurrent, severe; 4+ medication trials (from 2+ classes) ineffective; Psychotherapy ineffective.     Pre-Procedure Diagnosis:  MDD, recurrent, severe F33.2    Treatment Hx:  Treatment number this series: 25  Total lifetime treatment number: 260    Allergies   Allergen Reactions    Codeine Nausea    Lithium GI Disturbance     Cramping, nausea, diarrhea    Other  [No Clinical Screening - See Comments] Nausea and Vomiting and Other (See Comments)     general anesthesia- uncontrolled vomitting      There were no vitals taken for this visit.    Pause for the Cause:  Right patient:  Yes  Right procedure/correct coil:  Yes; rTMS; 70478; F8 coil.   Earplugs in place:  Yes    Procedure:  Patient was seated in procedure chair. Identity and procedure was verified. Ear plugs were placed in ears and patient-specific cap was placed on head and tightened appropriately. Ruler locations were verified. Coil was placed at treatment location and stimulator was set to parameters described below. A test train was delivered and patient tolerated so 60 trains were delivered. Patient tolerated treatment well.    Motor Threshold Determination  Distance from nasion to inion: 34cm  Distance along circumference from midline: 6.42cm  Distance from Vertex: 9.4cm  Cap to Nasion: 3cm  MT 1: 0 - 6 - 16 @ 69% on 1/29/2018  MT 2: 0 - 6 - 16 @ 69% on 2/6/2018   MT 3: 0 - 6 - 16 @ 69% on 2/13/2018   MT 4: 0 - 6 - 16 @  69% on 2/23/2018   MT 5: 0 - 6 - 16 @ 69% on 3/2/2018   MT 6: 0 - 5.5 - 15.5(always use intersection at left side of ruler)@ 85% on 8/23/19  MT 7: 0 - 5.5 - 15.5(always use intersection at left side of ruler)@ 80% on 8/29/19  MT 8: 0 - 5.5 - 37.5(always use intersection at left side of ruler)@ 76% on 9/5/19 (right side using EMG on left hand)  MT 9: C2 (R side using EMG on L hand) 78% on 9/12/2019  MT 10: C2 (R side using EMG on L hand) 76% on 9/26/2019  MT 11: C2 (R side using EMG on L hand) 82 on 1/31/2020  MT 12: C2 (R side using EMG on L Hand) 86% on 12/22/2020   MT 13: C2 (R side) 100% on 04/21/22  MT 14: C2 (R side) 86% on 6/7/22  MT 15: 0 - 6 - 16.5 @ 68% on 6/14/2022  MT 16: C2 (R side) 89% on 6/16/22  MT 17: F3 @ 91% on 4/30/2024  MT 18: F3  @ 70 %  on 2/13/2025    LDLPFC:  Frequency: 50 Hz  Number of pulses:  3                        Number of bursts: 10  Burst frequency: 5 Hz  Cycle time: 10 sec  Total pulses delivered: 1800  Tx Loc: F3  Energy: 84% (120% MT)   Number of Cycles: 60 trains     RDLPFC:  Frequency: 50 Hz  Number of pulses:  3                        Number of bursts: 600  Burst frequency: 5 Hz  Cycle time: 97.0sec  Total pulses delivered: 1800  Tx Loc: F4 (right side)  Energy: 77% (110% MT)   Number of Cycles: 1 trains    Rating Scales:  Date MADRS QIDS PHQ-9   4/30/2024 30 15 15   5/3/24  17 15   5/10/24  16 13   5/17/24  15 11   5/24/24  16 16   5/31/24  14 12   6/7/24  17 13   6/21/24  17 15   6/24/24 27 15 17       Date MADRS QIDS PHQ-9   02/13/25 25 10 13   2/21/25 -- 10 12   2/28/25  9 10   3/7/25  10 9   3/14/25  8 7   3/21/25  10 9     Plan   - Continue TMS treatments     This service provided today was under the supervising provider of the day, Mihir Chaudhry MD, who was available if needed.     Lilia Prieto, TMS Technician  [facility] Neuromodulation

## 2025-03-25 ENCOUNTER — OFFICE VISIT (OUTPATIENT)
Dept: PSYCHIATRY | Facility: CLINIC | Age: 55
End: 2025-03-25
Payer: COMMERCIAL

## 2025-03-25 DIAGNOSIS — F33.2 SEVERE EPISODE OF RECURRENT MAJOR DEPRESSIVE DISORDER, WITHOUT PSYCHOTIC FEATURES (H): Primary | ICD-10-CM

## 2025-03-25 ASSESSMENT — PATIENT HEALTH QUESTIONNAIRE - PHQ9: SUM OF ALL RESPONSES TO PHQ QUESTIONS 1-9: 9

## 2025-03-25 NOTE — PROGRESS NOTES
[facility]  [address], [address]  [place] [address]  TMS Procedure Note   Aure Joy MRN# [id number]  Age: 54 year old   YOB: 1970    Aure Joy comes into clinic today at the request ofESTELA MD, ordering provider for TMS treatments.     Pre-Procedure:  History and Physical: Reviewed in medical record  Consent signed by: Aure Joy on: 12/22/2020    Clinical Narrative:  Patient tolerated treatment. No changes reported.    Indications for TMS:  MDD, recurrent, severe; 4+ medication trials (from 2+ classes) ineffective; Psychotherapy ineffective.     Pre-Procedure Diagnosis:  MDD, recurrent, severe F33.2    Treatment Hx:  Treatment number this series: 26  Total lifetime treatment number: 261    Allergies   Allergen Reactions    Codeine Nausea    Lithium GI Disturbance     Cramping, nausea, diarrhea    Other  [No Clinical Screening - See Comments] Nausea and Vomiting and Other (See Comments)     general anesthesia- uncontrolled vomitting      There were no vitals taken for this visit.    Pause for the Cause:  Right patient:  Yes  Right procedure/correct coil:  Yes; rTMS; 93964; F8 coil.   Earplugs in place:  Yes    Procedure:  Patient was seated in procedure chair. Identity and procedure was verified. Ear plugs were placed in ears and patient-specific cap was placed on head and tightened appropriately. Ruler locations were verified. Coil was placed at treatment location and stimulator was set to parameters described below. A test train was delivered and patient tolerated so 60 trains were delivered. Patient tolerated treatment well.    Motor Threshold Determination  Distance from nasion to inion: 34cm  Distance along circumference from midline: 6.42cm  Distance from Vertex: 9.4cm  Cap to Nasion: 3cm  MT 1: 0 - 6 - 16 @ 69% on 1/29/2018  MT 2: 0 - 6 - 16 @ 69% on 2/6/2018   MT 3: 0 - 6 - 16 @ 69% on 2/13/2018   MT 4: 0 - 6 - 16 @ 69% on 2/23/2018   MT 5: 0 -  6 - 16 @ 69% on 3/2/2018   MT 6: 0 - 5.5 - 15.5(always use intersection at left side of ruler)@ 85% on 8/23/19  MT 7: 0 - 5.5 - 15.5(always use intersection at left side of ruler)@ 80% on 8/29/19  MT 8: 0 - 5.5 - 37.5(always use intersection at left side of ruler)@ 76% on 9/5/19 (right side using EMG on left hand)  MT 9: C2 (R side using EMG on L hand) 78% on 9/12/2019  MT 10: C2 (R side using EMG on L hand) 76% on 9/26/2019  MT 11: C2 (R side using EMG on L hand) 82 on 1/31/2020  MT 12: C2 (R side using EMG on L Hand) 86% on 12/22/2020   MT 13: C2 (R side) 100% on 04/21/22  MT 14: C2 (R side) 86% on 6/7/22  MT 15: 0 - 6 - 16.5 @ 68% on 6/14/2022  MT 16: C2 (R side) 89% on 6/16/22  MT 17: F3 @ 91% on 4/30/2024  MT 18: F3  @ 70 %  on 2/13/2025    LDLPFC:  Frequency: 50 Hz  Number of pulses:  3                        Number of bursts: 10  Burst frequency: 5 Hz  Cycle time: 10 sec  Total pulses delivered: 1800  Tx Loc: F3  Energy: 84% (120% MT)   Number of Cycles: 60 trains     RDLPFC:  Frequency: 50 Hz  Number of pulses:  3                        Number of bursts: 600  Burst frequency: 5 Hz  Cycle time: 97.0sec  Total pulses delivered: 1800  Tx Loc: F4 (right side)  Energy: 77% (110% MT)   Number of Cycles: 1 trains    Rating Scales:  Date MADRS QIDS PHQ-9   4/30/2024 30 15 15   5/3/24  17 15   5/10/24  16 13   5/17/24  15 11   5/24/24  16 16   5/31/24  14 12   6/7/24  17 13   6/21/24  17 15   6/24/24 27 15 17       Date MADRS QIDS PHQ-9   02/13/25 25 10 13   2/21/25 -- 10 12   2/28/25  9 10   3/7/25  10 9   3/14/25  8 7   3/21/25  10 9     Plan   - Continue TMS treatments     This service provided today was under the supervising provider of the day, Truman Eaton MD, who was available if needed.     Imelda Davis, TMS Technician  [facility]od[facility]

## 2025-03-26 ENCOUNTER — OFFICE VISIT (OUTPATIENT)
Dept: PSYCHIATRY | Facility: CLINIC | Age: 55
End: 2025-03-26
Payer: COMMERCIAL

## 2025-03-26 ENCOUNTER — ALLIED HEALTH/NURSE VISIT (OUTPATIENT)
Dept: PSYCHIATRY | Facility: CLINIC | Age: 55
End: 2025-03-26
Payer: COMMERCIAL

## 2025-03-26 DIAGNOSIS — F33.2 SEVERE EPISODE OF RECURRENT MAJOR DEPRESSIVE DISORDER, WITHOUT PSYCHOTIC FEATURES (H): Primary | ICD-10-CM

## 2025-03-26 ASSESSMENT — PATIENT HEALTH QUESTIONNAIRE - PHQ9: SUM OF ALL RESPONSES TO PHQ QUESTIONS 1-9: 7

## 2025-03-26 NOTE — PROGRESS NOTES
[facility]  [address], [address]  [place] [address]  TMS Procedure Note   Aure Joy MRN# [id number]  Age: 54 year old   YOB: 1970    Aure Joy comes into clinic today at the request ofESTELA MD, ordering provider for TMS treatments.     Pre-Procedure:  History and Physical: Reviewed in medical record  Consent signed by: Aure Joy on: 12/22/2020    Clinical Narrative:  Patient tolerated treatment. Nurse check in today.    Indications for TMS:  MDD, recurrent, severe; 4+ medication trials (from 2+ classes) ineffective; Psychotherapy ineffective.     Pre-Procedure Diagnosis:  MDD, recurrent, severe F33.2    Treatment Hx:  Treatment number this series: 27  Total lifetime treatment number: 262    Allergies   Allergen Reactions    Codeine Nausea    Lithium GI Disturbance     Cramping, nausea, diarrhea    Other  [No Clinical Screening - See Comments] Nausea and Vomiting and Other (See Comments)     general anesthesia- uncontrolled vomitting      There were no vitals taken for this visit.    Pause for the Cause:  Right patient:  Yes  Right procedure/correct coil:  Yes; rTMS; 55386; F8 coil.   Earplugs in place:  Yes    Procedure:  Patient was seated in procedure chair. Identity and procedure was verified. Ear plugs were placed in ears and patient-specific cap was placed on head and tightened appropriately. Ruler locations were verified. Coil was placed at treatment location and stimulator was set to parameters described below. A test train was delivered and patient tolerated so 60 trains were delivered. Patient tolerated treatment well.    Motor Threshold Determination  Distance from nasion to inion: 34cm  Distance along circumference from midline: 6.42cm  Distance from Vertex: 9.4cm  Cap to Nasion: 3cm  MT 1: 0 - 6 - 16 @ 69% on 1/29/2018  MT 2: 0 - 6 - 16 @ 69% on 2/6/2018   MT 3: 0 - 6 - 16 @ 69% on 2/13/2018   MT 4: 0 - 6 - 16 @ 69% on 2/23/2018   MT 5: 0  - 6 - 16 @ 69% on 3/2/2018   MT 6: 0 - 5.5 - 15.5(always use intersection at left side of ruler)@ 85% on 8/23/19  MT 7: 0 - 5.5 - 15.5(always use intersection at left side of ruler)@ 80% on 8/29/19  MT 8: 0 - 5.5 - 37.5(always use intersection at left side of ruler)@ 76% on 9/5/19 (right side using EMG on left hand)  MT 9: C2 (R side using EMG on L hand) 78% on 9/12/2019  MT 10: C2 (R side using EMG on L hand) 76% on 9/26/2019  MT 11: C2 (R side using EMG on L hand) 82 on 1/31/2020  MT 12: C2 (R side using EMG on L Hand) 86% on 12/22/2020   MT 13: C2 (R side) 100% on 04/21/22  MT 14: C2 (R side) 86% on 6/7/22  MT 15: 0 - 6 - 16.5 @ 68% on 6/14/2022  MT 16: C2 (R side) 89% on 6/16/22  MT 17: F3 @ 91% on 4/30/2024  MT 18: F3  @ 70 %  on 2/13/2025    LDLPFC:  Frequency: 50 Hz  Number of pulses:  3                        Number of bursts: 10  Burst frequency: 5 Hz  Cycle time: 10 sec  Total pulses delivered: 1800  Tx Loc: F3  Energy: 84% (120% MT)   Number of Cycles: 60 trains     RDLPFC:  Frequency: 50 Hz  Number of pulses:  3                        Number of bursts: 600  Burst frequency: 5 Hz  Cycle time: 97.0sec  Total pulses delivered: 1800  Tx Loc: F4 (right side)  Energy: 77% (110% MT)   Number of Cycles: 1 trains    Rating Scales:  Date MADRS QIDS PHQ-9   4/30/2024 30 15 15   5/3/24  17 15   5/10/24  16 13   5/17/24  15 11   5/24/24  16 16   5/31/24  14 12   6/7/24  17 13   6/21/24  17 15   6/24/24 27 15 17       Date MADRS QIDS PHQ-9   02/13/25 25 10 13   2/21/25 -- 10 12   2/28/25  9 10   3/7/25  10 9   3/14/25  8 7   3/21/25  10 9         3/21/2025     9:03 AM 3/24/2025     9:05 AM 3/25/2025     9:10 AM   PHQ-9 SCORE   PHQ-9 Total Score 9 9 9       Plan   - Continue TMS treatments     This service provided today was under the supervising provider of the day, Mihir Chaudhry MD, who was available if needed.     Neli Smith TMS Technician  [facility]  [facility]od[facility]

## 2025-03-26 NOTE — PROGRESS NOTES
"[facility]:  Care Coordination Note     SITUATION   Aure Joy is a 54 year old female who has been receiving Transcranial Magnetic Stimulation (TMS) at the request of ESTELA Zamudio MD.    BACKGROUND     During course of TMS    ASSESSMENT        Met with patient today to check on her during TMS course.    During today's discussion writer and patient discussed:    Patient reports that she is about 50% better with TMS.  She also notes that she has been able to [place] \"0's\" on the PHQ9.  She reports that she feels like her symptoms are much less intense since starting TMS.  She notes that her appetite has improved and she is no longer forgetting to eat.  She also reports that her sleep has much improved as well.  She reports that she has not noticed a change in her motivation or ability to get things done.  However she states that is she is working hard with her therapist on BAT and has daily homework.  Overall she reports that she does like this machine much better than the previous machines.  She did not have any further questions at this time.        PHQ 9: 7  # of completed TMS Sessions: 27          PLAN       Nursing Interventions: TMS edu    Follow-up plan:  RN to continue to follow    Jael Alexandra RN   "

## 2025-03-27 ENCOUNTER — OFFICE VISIT (OUTPATIENT)
Dept: PSYCHIATRY | Facility: CLINIC | Age: 55
End: 2025-03-27
Payer: COMMERCIAL

## 2025-03-27 DIAGNOSIS — F33.2 SEVERE EPISODE OF RECURRENT MAJOR DEPRESSIVE DISORDER, WITHOUT PSYCHOTIC FEATURES (H): Primary | ICD-10-CM

## 2025-03-27 ASSESSMENT — PATIENT HEALTH QUESTIONNAIRE - PHQ9: SUM OF ALL RESPONSES TO PHQ QUESTIONS 1-9: 7

## 2025-03-27 NOTE — PROGRESS NOTES
[facility] TMS [facility]  [address], [address]  [place] [address]  TMS Procedure Note   Aure Joy MRN# [id number]  Age: 54 year old   YOB: 1970    Aure Joy comes into clinic today at the request ofESTELA MD, ordering provider for TMS treatments.     Pre-Procedure:  History and Physical: Reviewed in medical record  Consent signed by: Aure Joy on: 12/22/2020    Clinical Narrative:  Patient tolerated treatment. Works this weekend.    Indications for TMS:  MDD, recurrent, severe; 4+ medication trials (from 2+ classes) ineffective; Psychotherapy ineffective.     Pre-Procedure Diagnosis:  MDD, recurrent, severe F33.2    Treatment Hx:  Treatment number this series: 28  Total lifetime treatment number: 263    Allergies   Allergen Reactions    Codeine Nausea    Lithium GI Disturbance     Cramping, nausea, diarrhea    Other  [No Clinical Screening - See Comments] Nausea and Vomiting and Other (See Comments)     general anesthesia- uncontrolled vomitting      There were no vitals taken for this visit.    Pause for the Cause:  Right patient:  Yes  Right procedure/correct coil:  Yes; rTMS; 46076; F8 coil.   Earplugs in place:  Yes    Procedure:  Patient was seated in procedure chair. Identity and procedure was verified. Ear plugs were placed in ears and patient-specific cap was placed on head and tightened appropriately. Ruler locations were verified. Coil was placed at treatment location and stimulator was set to parameters described below. A test train was delivered and patient tolerated so 60 trains were delivered. Patient tolerated treatment well.    Motor Threshold Determination  Distance from nasion to inion: 34cm  Distance along circumference from midline: 6.42cm  Distance from Vertex: 9.4cm  Cap to Nasion: 3cm  MT 1: 0 - 6 - 16 @ 69% on 1/29/2018  MT 2: 0 - 6 - 16 @ 69% on 2/6/2018   MT 3: 0 - 6 - 16 @ 69% on 2/13/2018   MT 4: 0 - 6 - 16 @ 69% on 2/23/2018   MT 5: 0 -  6 - 16 @ 69% on 3/2/2018   MT 6: 0 - 5.5 - 15.5(always use intersection at left side of ruler)@ 85% on 8/23/19  MT 7: 0 - 5.5 - 15.5(always use intersection at left side of ruler)@ 80% on 8/29/19  MT 8: 0 - 5.5 - 37.5(always use intersection at left side of ruler)@ 76% on 9/5/19 (right side using EMG on left hand)  MT 9: C2 (R side using EMG on L hand) 78% on 9/12/2019  MT 10: C2 (R side using EMG on L hand) 76% on 9/26/2019  MT 11: C2 (R side using EMG on L hand) 82 on 1/31/2020  MT 12: C2 (R side using EMG on L Hand) 86% on 12/22/2020   MT 13: C2 (R side) 100% on 04/21/22  MT 14: C2 (R side) 86% on 6/7/22  MT 15: 0 - 6 - 16.5 @ 68% on 6/14/2022  MT 16: C2 (R side) 89% on 6/16/22  MT 17: F3 @ 91% on 4/30/2024  MT 18: F3  @ 70 %  on 2/13/2025    LDLPFC:  Frequency: 50 Hz  Number of pulses:  3                        Number of bursts: 10  Burst frequency: 5 Hz  Cycle time: 10 sec  Total pulses delivered: 1800  Tx Loc: F3  Energy: 84% (120% MT)   Number of Cycles: 60 trains     RDLPFC:  Frequency: 50 Hz  Number of pulses:  3                        Number of bursts: 600  Burst frequency: 5 Hz  Cycle time: 97.0sec  Total pulses delivered: 1800  Tx Loc: F4 (right side)  Energy: 77% (110% MT)   Number of Cycles: 1 trains    Rating Scales:  Date MADRS QIDS PHQ-9   4/30/2024 30 15 15   5/3/24  17 15   5/10/24  16 13   5/17/24  15 11   5/24/24  16 16   5/31/24  14 12   6/7/24  17 13   6/21/24  17 15   6/24/24 27 15 17       Date MADRS QIDS PHQ-9   02/13/25 25 10 13   2/21/25 -- 10 12   2/28/25  9 10   3/7/25  10 9   3/14/25  8 7   3/21/25  10 9       Plan   - Continue TMS treatments     This service provided today was under the supervising provider of the day, ESTELA Zamudio MD, who was available if needed.     Imelda Davis, TMS Technician  [facility]romod[facility]

## 2025-03-31 ENCOUNTER — OFFICE VISIT (OUTPATIENT)
Dept: PSYCHIATRY | Facility: CLINIC | Age: 55
End: 2025-03-31
Payer: COMMERCIAL

## 2025-03-31 DIAGNOSIS — F33.2 SEVERE EPISODE OF RECURRENT MAJOR DEPRESSIVE DISORDER, WITHOUT PSYCHOTIC FEATURES (H): Primary | ICD-10-CM

## 2025-03-31 ASSESSMENT — PATIENT HEALTH QUESTIONNAIRE - PHQ9: SUM OF ALL RESPONSES TO PHQ QUESTIONS 1-9: 5

## 2025-03-31 NOTE — PROGRESS NOTES
[facility] TMS [facility]  [address]  [place] [address]  TMS Procedure Note   Aure Joy MRN# [id number]  Age: 54 year old   YOB: 1970    Aure Joy comes into clinic today at the request ofESTELA MD, ordering provider for TMS treatments.     Pre-Procedure:  History and Physical: Reviewed in medical record  Consent signed by: Aure Joy on: 12/22/2020    Clinical Narrative:  Patient tolerated treatment. Got breakfast with kids yesterday.    Indications for TMS:  MDD, recurrent, severe; 4+ medication trials (from 2+ classes) ineffective; Psychotherapy ineffective.     Pre-Procedure Diagnosis:  MDD, recurrent, severe F33.2    Treatment Hx:  Treatment number this series: 30  Total lifetime treatment number: 265    Allergies   Allergen Reactions    Codeine Nausea    Lithium GI Disturbance     Cramping, nausea, diarrhea    Other  [No Clinical Screening - See Comments] Nausea and Vomiting and Other (See Comments)     general anesthesia- uncontrolled vomitting      There were no vitals taken for this visit.    Pause for the Cause:  Right patient:  Yes  Right procedure/correct coil:  Yes; rTMS; 97423; F8 coil.   Earplugs in place:  Yes    Procedure:  Patient was seated in procedure chair. Identity and procedure was verified. Ear plugs were placed in ears and patient-specific cap was placed on head and tightened appropriately. Ruler locations were verified. Coil was placed at treatment location and stimulator was set to parameters described below. A test train was delivered and patient tolerated so 60 trains were delivered. Patient tolerated treatment well.    Motor Threshold Determination  Distance from nasion to inion: 34cm  Distance along circumference from midline: 6.42cm  Distance from Vertex: 9.4cm  Cap to Nasion: 3cm  MT 1: 0 - 6 - 16 @ 69% on 1/29/2018  MT 2: 0 - 6 - 16 @ 69% on 2/6/2018   MT 3: 0 - 6 - 16 @ 69% on 2/13/2018   MT 4: 0 - 6 - 16 @ 69% on  2/23/2018   MT 5: 0 - 6 - 16 @ 69% on 3/2/2018   MT 6: 0 - 5.5 - 15.5(always use intersection at left side of ruler)@ 85% on 8/23/19  MT 7: 0 - 5.5 - 15.5(always use intersection at left side of ruler)@ 80% on 8/29/19  MT 8: 0 - 5.5 - 37.5(always use intersection at left side of ruler)@ 76% on 9/5/19 (right side using EMG on left hand)  MT 9: C2 (R side using EMG on L hand) 78% on 9/12/2019  MT 10: C2 (R side using EMG on L hand) 76% on 9/26/2019  MT 11: C2 (R side using EMG on L hand) 82 on 1/31/2020  MT 12: C2 (R side using EMG on L Hand) 86% on 12/22/2020   MT 13: C2 (R side) 100% on 04/21/22  MT 14: C2 (R side) 86% on 6/7/22  MT 15: 0 - 6 - 16.5 @ 68% on 6/14/2022  MT 16: C2 (R side) 89% on 6/16/22  MT 17: F3 @ 91% on 4/30/2024  MT 18: F3  @ 70 %  on 2/13/2025    LDLPFC:  Frequency: 50 Hz  Number of pulses:  3                        Number of bursts: 10  Burst frequency: 5 Hz  Cycle time: 10 sec  Total pulses delivered: 1800  Tx Loc: F3  Energy: 84% (120% MT)   Number of Cycles: 60 trains     RDLPFC:  Frequency: 50 Hz  Number of pulses:  3                        Number of bursts: 600  Burst frequency: 5 Hz  Cycle time: 97.0sec  Total pulses delivered: 1800  Tx Loc: F4 (right side)  Energy: 77% (110% MT)   Number of Cycles: 1 trains    Rating Scales:  Date MADRS QIDS PHQ-9   4/30/2024 30 15 15   5/3/24  17 15   5/10/24  16 13   5/17/24  15 11   5/24/24  16 16   5/31/24  14 12   6/7/24  17 13   6/21/24  17 15   6/24/24 27 15 17       Date MADRS QIDS PHQ-9   02/13/25 25 10 13   2/21/25 -- 10 12   2/28/25  9 10   3/7/25  10 9   3/14/25  8 7   3/21/25  10 9   3/28/25  8 7       Plan   - Continue TMS treatments     This service provided today was under the supervising provider of the day, Dom Shirley MD, who was available if needed.     Imelda Davis, TMS Technician  [facility] Neuromodulation

## 2025-03-31 NOTE — ADDENDUM NOTE
Addended by: DUSTIN DICKENS on: 3/31/2025 09:59 AM     Modules accepted: Orders, Level of Service

## 2025-04-01 ENCOUNTER — OFFICE VISIT (OUTPATIENT)
Dept: PSYCHIATRY | Facility: CLINIC | Age: 55
End: 2025-04-01
Payer: COMMERCIAL

## 2025-04-01 DIAGNOSIS — F33.2 SEVERE EPISODE OF RECURRENT MAJOR DEPRESSIVE DISORDER, WITHOUT PSYCHOTIC FEATURES (H): Primary | ICD-10-CM

## 2025-04-01 ASSESSMENT — PATIENT HEALTH QUESTIONNAIRE - PHQ9: SUM OF ALL RESPONSES TO PHQ QUESTIONS 1-9: 7

## 2025-04-01 NOTE — PROGRESS NOTES
[facility] TMS Program  [address], [address]  [place] [address]  TMS Procedure Note   Aure Joy MRN# [id number]  Age: 54 year old   YOB: 1970    Aure Joy comes into clinic today at the request ofESTELA MD, ordering provider for TMS treatments.     Pre-Procedure:  History and Physical: Reviewed in medical record  Consent signed by: Aure Joy on: 12/22/2020    Clinical Narrative:  Patient tolerated treatment. No plans for today.    Indications for TMS:  MDD, recurrent, severe; 4+ medication trials (from 2+ classes) ineffective; Psychotherapy ineffective.     Pre-Procedure Diagnosis:  MDD, recurrent, severe F33.2    Treatment Hx:  Treatment number this series: 31  Total lifetime treatment number: 266    Allergies   Allergen Reactions    Codeine Nausea    Lithium GI Disturbance     Cramping, nausea, diarrhea    Other  [No Clinical Screening - See Comments] Nausea and Vomiting and Other (See Comments)     general anesthesia- uncontrolled vomitting      There were no vitals taken for this visit.    Pause for the Cause:  Right patient:  Yes  Right procedure/correct coil:  Yes; rTMS; 90127; F8 coil.   Earplugs in place:  Yes    Procedure:  Patient was seated in procedure chair. Identity and procedure was verified. Ear plugs were placed in ears and patient-specific cap was placed on head and tightened appropriately. Ruler locations were verified. Coil was placed at treatment location and stimulator was set to parameters described below. A test train was delivered and patient tolerated so 60 trains were delivered. Patient tolerated treatment well.    Motor Threshold Determination  Distance from nasion to inion: 34cm  Distance along circumference from midline: 6.42cm  Distance from Vertex: 9.4cm  Cap to Nasion: 3cm  MT 1: 0 - 6 - 16 @ 69% on 1/29/2018  MT 2: 0 - 6 - 16 @ 69% on 2/6/2018   MT 3: 0 - 6 - 16 @ 69% on 2/13/2018   MT 4: 0 - 6 - 16 @ 69% on 2/23/2018   MT 5: 0 -  6 - 16 @ 69% on 3/2/2018   MT 6: 0 - 5.5 - 15.5(always use intersection at left side of ruler)@ 85% on 8/23/19  MT 7: 0 - 5.5 - 15.5(always use intersection at left side of ruler)@ 80% on 8/29/19  MT 8: 0 - 5.5 - 37.5(always use intersection at left side of ruler)@ 76% on 9/5/19 (right side using EMG on left hand)  MT 9: C2 (R side using EMG on L hand) 78% on 9/12/2019  MT 10: C2 (R side using EMG on L hand) 76% on 9/26/2019  MT 11: C2 (R side using EMG on L hand) 82 on 1/31/2020  MT 12: C2 (R side using EMG on L Hand) 86% on 12/22/2020   MT 13: C2 (R side) 100% on 04/21/22  MT 14: C2 (R side) 86% on 6/7/22  MT 15: 0 - 6 - 16.5 @ 68% on 6/14/2022  MT 16: C2 (R side) 89% on 6/16/22  MT 17: F3 @ 91% on 4/30/2024  MT 18: F3  @ 70 %  on 2/13/2025    LDLPFC:  Frequency: 50 Hz  Number of pulses:  3                        Number of bursts: 10  Burst frequency: 5 Hz  Cycle time: 10 sec  Total pulses delivered: 1800  Tx Loc: F3  Energy: 84% (120% MT)   Number of Cycles: 60 trains     RDLPFC:  Frequency: 50 Hz  Number of pulses:  3                        Number of bursts: 600  Burst frequency: 5 Hz  Cycle time: 97.0sec  Total pulses delivered: 1800  Tx Loc: F4 (right side)  Energy: 77% (110% MT)   Number of Cycles: 1 trains    Rating Scales:  Date MADRS QIDS PHQ-9   4/30/2024 30 15 15   5/3/24  17 15   5/10/24  16 13   5/17/24  15 11   5/24/24  16 16   5/31/24  14 12   6/7/24  17 13   6/21/24  17 15   6/24/24 27 15 17       Date MADRS QIDS PHQ-9   02/13/25 25 10 13   2/21/25 -- 10 12   2/28/25  9 10   3/7/25  10 9   3/14/25  8 7   3/21/25  10 9   3/28/25  8 7       Plan   - Continue TMS treatments     This service provided today was under the supervising provider of the day, Tim Williamson MD, who was available if needed.     Lilia Prieto, TMS Technician  [facility] Neuromodulation

## 2025-04-02 ENCOUNTER — OFFICE VISIT (OUTPATIENT)
Dept: PSYCHIATRY | Facility: CLINIC | Age: 55
End: 2025-04-02
Payer: COMMERCIAL

## 2025-04-02 DIAGNOSIS — F33.2 SEVERE EPISODE OF RECURRENT MAJOR DEPRESSIVE DISORDER, WITHOUT PSYCHOTIC FEATURES (H): Primary | ICD-10-CM

## 2025-04-02 ASSESSMENT — PATIENT HEALTH QUESTIONNAIRE - PHQ9: SUM OF ALL RESPONSES TO PHQ QUESTIONS 1-9: 8

## 2025-04-02 NOTE — PROGRESS NOTES
[facility] TMS Program  [address], [address]  [place] [address]  TMS Procedure Note   Aure Joy MRN# [id number]  Age: 54 year old   YOB: 1970    Aure Joy comes into clinic today at the request ofESTELA MD, ordering provider for TMS treatments.     Pre-Procedure:  History and Physical: Reviewed in medical record  Consent signed by: Aure Joy on: 12/22/2020    Clinical Narrative:  Patient tolerated treatment. Has to work this evening.    Indications for TMS:  MDD, recurrent, severe; 4+ medication trials (from 2+ classes) ineffective; Psychotherapy ineffective.     Pre-Procedure Diagnosis:  MDD, recurrent, severe F33.2    Treatment Hx:  Treatment number this series: 32  Total lifetime treatment number: 267    Allergies   Allergen Reactions    Codeine Nausea    Lithium GI Disturbance     Cramping, nausea, diarrhea    Other  [No Clinical Screening - See Comments] Nausea and Vomiting and Other (See Comments)     general anesthesia- uncontrolled vomitting      There were no vitals taken for this visit.    Pause for the Cause:  Right patient:  Yes  Right procedure/correct coil:  Yes; rTMS; 44387; F8 coil.   Earplugs in place:  Yes    Procedure:  Patient was seated in procedure chair. Identity and procedure was verified. Ear plugs were placed in ears and patient-specific cap was placed on head and tightened appropriately. Ruler locations were verified. Coil was placed at treatment location and stimulator was set to parameters described below. A test train was delivered and patient tolerated so 60 trains were delivered. Patient tolerated treatment well.    Motor Threshold Determination  Distance from nasion to inion: 34cm  Distance along circumference from midline: 6.42cm  Distance from Vertex: 9.4cm  Cap to Nasion: 3cm  MT 1: 0 - 6 - 16 @ 69% on 1/29/2018  MT 2: 0 - 6 - 16 @ 69% on 2/6/2018   MT 3: 0 - 6 - 16 @ 69% on 2/13/2018   MT 4: 0 - 6 - 16 @ 69% on 2/23/2018   MT  5: 0 - 6 - 16 @ 69% on 3/2/2018   MT 6: 0 - 5.5 - 15.5(always use intersection at left side of ruler)@ 85% on 8/23/19  MT 7: 0 - 5.5 - 15.5(always use intersection at left side of ruler)@ 80% on 8/29/19  MT 8: 0 - 5.5 - 37.5(always use intersection at left side of ruler)@ 76% on 9/5/19 (right side using EMG on left hand)  MT 9: C2 (R side using EMG on L hand) 78% on 9/12/2019  MT 10: C2 (R side using EMG on L hand) 76% on 9/26/2019  MT 11: C2 (R side using EMG on L hand) 82 on 1/31/2020  MT 12: C2 (R side using EMG on L Hand) 86% on 12/22/2020   MT 13: C2 (R side) 100% on 04/21/22  MT 14: C2 (R side) 86% on 6/7/22  MT 15: 0 - 6 - 16.5 @ 68% on 6/14/2022  MT 16: C2 (R side) 89% on 6/16/22  MT 17: F3 @ 91% on 4/30/2024  MT 18: F3  @ 70 %  on 2/13/2025    LDLPFC:  Frequency: 50 Hz  Number of pulses:  3                        Number of bursts: 10  Burst frequency: 5 Hz  Cycle time: 10 sec  Total pulses delivered: 1800  Tx Loc: F3  Energy: 84% (120% MT)   Number of Cycles: 60 trains     RDLPFC:  Frequency: 50 Hz  Number of pulses:  3                        Number of bursts: 600  Burst frequency: 5 Hz  Cycle time: 97.0sec  Total pulses delivered: 1800  Tx Loc: F4 (right side)  Energy: 77% (110% MT)   Number of Cycles: 1 trains    Rating Scales:  Date MADRS QIDS PHQ-9   4/30/2024 30 15 15   5/3/24  17 15   5/10/24  16 13   5/17/24  15 11   5/24/24  16 16   5/31/24  14 12   6/7/24  17 13   6/21/24  17 15   6/24/24 27 15 17       Date MADRS QIDS PHQ-9   02/13/25 25 10 13   2/21/25 -- 10 12   2/28/25  9 10   3/7/25  10 9   3/14/25  8 7   3/21/25  10 9   3/28/25  8 7           3/28/2025     9:04 AM 3/31/2025     9:11 AM 4/1/2025     8:58 AM   PHQ-9 SCORE   PHQ-9 Total Score 7 5 7       Plan   - Continue TMS treatments     This service provided today was under the supervising provider of the day, Elizabeth Gordon MD, who was available if needed.     Neli Smith TMS Technician  [facility]  Neuromodulation

## 2025-04-03 ENCOUNTER — OFFICE VISIT (OUTPATIENT)
Dept: PSYCHIATRY | Facility: CLINIC | Age: 55
End: 2025-04-03
Payer: COMMERCIAL

## 2025-04-03 DIAGNOSIS — F33.2 SEVERE EPISODE OF RECURRENT MAJOR DEPRESSIVE DISORDER, WITHOUT PSYCHOTIC FEATURES (H): Primary | ICD-10-CM

## 2025-04-03 ASSESSMENT — PATIENT HEALTH QUESTIONNAIRE - PHQ9: SUM OF ALL RESPONSES TO PHQ QUESTIONS 1-9: 7

## 2025-04-03 NOTE — PROGRESS NOTES
[facility] TMS Program  [address], [address]  [place] [address]  TMS Procedure Note   Aure Joy MRN# [id number]  Age: 54 year old   YOB: 1970    Aure Joy comes into clinic today at the request ofESTELA MD, ordering provider for TMS treatments.     Pre-Procedure:  History and Physical: Reviewed in medical record  Consent signed by: Aure Joy on: 12/22/2020    Clinical Narrative:  Patient tolerated treatment. No changes reported.    Indications for TMS:  MDD, recurrent, severe; 4+ medication trials (from 2+ classes) ineffective; Psychotherapy ineffective.     Pre-Procedure Diagnosis:  MDD, recurrent, severe F33.2    Treatment Hx:  Treatment number this series: 33  Total lifetime treatment number: 268    Allergies   Allergen Reactions    Codeine Nausea    Lithium GI Disturbance     Cramping, nausea, diarrhea    Other  [No Clinical Screening - See Comments] Nausea and Vomiting and Other (See Comments)     general anesthesia- uncontrolled vomitting      There were no vitals taken for this visit.    Pause for the Cause:  Right patient:  Yes  Right procedure/correct coil:  Yes; rTMS; 07895; F8 coil.   Earplugs in place:  Yes    Procedure:  Patient was seated in procedure chair. Identity and procedure was verified. Ear plugs were placed in ears and patient-specific cap was placed on head and tightened appropriately. Ruler locations were verified. Coil was placed at treatment location and stimulator was set to parameters described below. A test train was delivered and patient tolerated so 60 trains were delivered. Patient tolerated treatment well.    Motor Threshold Determination  Distance from nasion to inion: 34cm  Distance along circumference from midline: 6.42cm  Distance from Vertex: 9.4cm  Cap to Nasion: 3cm  MT 1: 0 - 6 - 16 @ 69% on 1/29/2018  MT 2: 0 - 6 - 16 @ 69% on 2/6/2018   MT 3: 0 - 6 - 16 @ 69% on 2/13/2018   MT 4: 0 - 6 - 16 @ 69% on 2/23/2018   MT 5: 0 -  6 - 16 @ 69% on 3/2/2018   MT 6: 0 - 5.5 - 15.5(always use intersection at left side of ruler)@ 85% on 8/23/19  MT 7: 0 - 5.5 - 15.5(always use intersection at left side of ruler)@ 80% on 8/29/19  MT 8: 0 - 5.5 - 37.5(always use intersection at left side of ruler)@ 76% on 9/5/19 (right side using EMG on left hand)  MT 9: C2 (R side using EMG on L hand) 78% on 9/12/2019  MT 10: C2 (R side using EMG on L hand) 76% on 9/26/2019  MT 11: C2 (R side using EMG on L hand) 82 on 1/31/2020  MT 12: C2 (R side using EMG on L Hand) 86% on 12/22/2020   MT 13: C2 (R side) 100% on 04/21/22  MT 14: C2 (R side) 86% on 6/7/22  MT 15: 0 - 6 - 16.5 @ 68% on 6/14/2022  MT 16: C2 (R side) 89% on 6/16/22  MT 17: F3 @ 91% on 4/30/2024  MT 18: F3  @ 70 %  on 2/13/2025    LDLPFC:  Frequency: 50 Hz  Number of pulses:  3                        Number of bursts: 10  Burst frequency: 5 Hz  Cycle time: 10 sec  Total pulses delivered: 1800  Tx Loc: F3  Energy: 84% (120% MT)   Number of Cycles: 60 trains     RDLPFC:  Frequency: 50 Hz  Number of pulses:  3                        Number of bursts: 600  Burst frequency: 5 Hz  Cycle time: 97.0sec  Total pulses delivered: 1800  Tx Loc: F4 (right side)  Energy: 77% (110% MT)   Number of Cycles: 1 trains    Rating Scales:  Date MADRS QIDS PHQ-9   4/30/2024 30 15 15   5/3/24  17 15   5/10/24  16 13   5/17/24  15 11   5/24/24  16 16   5/31/24  14 12   6/7/24  17 13   6/21/24  17 15   6/24/24 27 15 17       Date MADRS QIDS PHQ-9   02/13/25 25 10 13   2/21/25 -- 10 12   2/28/25  9 10   3/7/25  10 9   3/14/25  8 7   3/21/25  10 9   3/28/25  8 7           3/31/2025     9:11 AM 4/1/2025     8:58 AM 4/2/2025     9:06 AM   PHQ-9 SCORE   PHQ-9 Total Score 5 7 8       Plan   - Continue TMS treatments     This service provided today was under the supervising provider of the day, ESTELA Zamudio MD, who was available if needed.     Imelda Davis, TMS Technician  [facility]  Neuromodulation

## 2025-04-07 ENCOUNTER — ALLIED HEALTH/NURSE VISIT (OUTPATIENT)
Dept: PSYCHIATRY | Facility: CLINIC | Age: 55
End: 2025-04-07
Payer: COMMERCIAL

## 2025-04-07 ENCOUNTER — OFFICE VISIT (OUTPATIENT)
Dept: PSYCHIATRY | Facility: CLINIC | Age: 55
End: 2025-04-07
Payer: COMMERCIAL

## 2025-04-07 VITALS — HEART RATE: 71 BPM | SYSTOLIC BLOOD PRESSURE: 112 MMHG | DIASTOLIC BLOOD PRESSURE: 77 MMHG

## 2025-04-07 DIAGNOSIS — F33.2 SEVERE EPISODE OF RECURRENT MAJOR DEPRESSIVE DISORDER, WITHOUT PSYCHOTIC FEATURES (H): Primary | ICD-10-CM

## 2025-04-07 ASSESSMENT — PATIENT HEALTH QUESTIONNAIRE - PHQ9
SUM OF ALL RESPONSES TO PHQ QUESTIONS 1-9: 7
SUM OF ALL RESPONSES TO PHQ QUESTIONS 1-9: 9

## 2025-04-07 NOTE — PROGRESS NOTES
[facility] TMS Program  [address], [address]  [place] [address]  TMS Procedure Note   Aure Joy MRN# [id number]  Age: 54 year old   YOB: 1970    Aure Joy comes into clinic today at the request ofESTELA MD, ordering provider for TMS treatments.     Pre-Procedure:  History and Physical: Reviewed in medical record  Consent signed by: Aure Joy on: 12/22/2020    Clinical Narrative:  Patient tolerated treatment. Last Treatment! Patient feels this round of TMS has been the most beneficial.     Indications for TMS:  MDD, recurrent, severe; 4+ medication trials (from 2+ classes) ineffective; Psychotherapy ineffective.     Pre-Procedure Diagnosis:  MDD, recurrent, severe F33.2    Treatment Hx:  Treatment number this series: 35  Total lifetime treatment number: 270    Allergies   Allergen Reactions    Codeine Nausea    Lithium GI Disturbance     Cramping, nausea, diarrhea    Other  [No Clinical Screening - See Comments] Nausea and Vomiting and Other (See Comments)     general anesthesia- uncontrolled vomitting      There were no vitals taken for this visit.    Pause for the Cause:  Right patient:  Yes  Right procedure/correct coil:  Yes; rTMS; 18793; F8 coil.   Earplugs in place:  Yes    Procedure:  Patient was seated in procedure chair. Identity and procedure was verified. Ear plugs were placed in ears and patient-specific cap was placed on head and tightened appropriately. Ruler locations were verified. Coil was placed at treatment location and stimulator was set to parameters described below. A test train was delivered and patient tolerated so 60 trains were delivered. Patient tolerated treatment well.    Motor Threshold Determination  Distance from nasion to inion: 34cm  Distance along circumference from midline: 6.42cm  Distance from Vertex: 9.4cm  Cap to Nasion: 3cm  MT 1: 0 - 6 - 16 @ 69% on 1/29/2018  MT 2: 0 - 6 - 16 @ 69% on 2/6/2018   MT 3: 0 - 6 - 16 @ 69% on  2/13/2018   MT 4: 0 - 6 - 16 @ 69% on 2/23/2018   MT 5: 0 - 6 - 16 @ 69% on 3/2/2018   MT 6: 0 - 5.5 - 15.5(always use intersection at left side of ruler)@ 85% on 8/23/19  MT 7: 0 - 5.5 - 15.5(always use intersection at left side of ruler)@ 80% on 8/29/19  MT 8: 0 - 5.5 - 37.5(always use intersection at left side of ruler)@ 76% on 9/5/19 (right side using EMG on left hand)  MT 9: C2 (R side using EMG on L hand) 78% on 9/12/2019  MT 10: C2 (R side using EMG on L hand) 76% on 9/26/2019  MT 11: C2 (R side using EMG on L hand) 82 on 1/31/2020  MT 12: C2 (R side using EMG on L Hand) 86% on 12/22/2020   MT 13: C2 (R side) 100% on 04/21/22  MT 14: C2 (R side) 86% on 6/7/22  MT 15: 0 - 6 - 16.5 @ 68% on 6/14/2022  MT 16: C2 (R side) 89% on 6/16/22  MT 17: F3 @ 91% on 4/30/2024  MT 18: F3  @ 70 %  on 2/13/2025    LDLPFC:  Frequency: 50 Hz  Number of pulses:  3                        Number of bursts: 10  Burst frequency: 5 Hz  Cycle time: 10 sec  Total pulses delivered: 1800  Tx Loc: F3  Energy: 84% (120% MT)   Number of Cycles: 60 trains     RDLPFC:  Frequency: 50 Hz  Number of pulses:  3                        Number of bursts: 600  Burst frequency: 5 Hz  Cycle time: 97.0sec  Total pulses delivered: 1800  Tx Loc: F4 (right side)  Energy: 77% (110% MT)   Number of Cycles: 1 trains    Rating Scales:  Date MADRS QIDS PHQ-9   4/30/2024 30 15 15   5/3/24  17 15   5/10/24  16 13   5/17/24  15 11   5/24/24  16 16   5/31/24  14 12   6/7/24  17 13   6/21/24  17 15   6/24/24 27 15 17       Date MADRS QIDS PHQ-9   02/13/25 25 10 13   2/21/25 -- 10 12   2/28/25  9 10   3/7/25  10 9   3/14/25  8 7   3/21/25  10 9   3/28/25  8 7   4/4/25  8 7   4/7/25 12  7           4/2/2025     9:06 AM 4/3/2025     9:11 AM 4/4/2025     9:04 AM   PHQ-9 SCORE   PHQ-9 Total Score 8 7 7       Plan   -Follow up with Dr. Zamudio on 5/1/25     This service provided today was under the supervising provider of the day, Elizabeth Gordon MD, who was available if  needed.    Neli Smith TMS Technician  [facility]  Mental [facility] Neuromodulation

## 2025-04-07 NOTE — PROGRESS NOTES
"Aure Joy comes into clinic today at the request of ESTELA Zamudio MD Ordering Provider for Med Injection only Ketamine .    Procedure Prep:  Medication double check completed by: Joan Donnelly RN  Prior to administration pt identified by name and : Yes    Nursing Assessment:  Appearance: Casually clothed   Mood: \"Good\". PT reports just finishing TMS and is feeling a little \"better\".  Affect: Bright, Calm  Eye contact: Good      2025     4:08 PM   PHQ   PHQ-9 Total Score 9   Q9: Thoughts of better off dead/self-harm past 2 weeks Not at all   QIDS: 9. Assessment was scanned to EHR.     Procedure Performed:  VSS and mental status WNL. Patient was given ketamine. See MAR for details.       Post Procedure Assessment:  Patient tolerated the treatment with the following side effects: dissociation. Vital signs were monitored, see VS Flowsheet. Patient stated they felt ready to go home prior to discharge. AVS was offered to patient and patient declined. Patient was instructed not to drive for the remainder of the day and to notify clinic if any concerns arise.     Next appt:     Medications were supplied by Clinic     This service provided today was under the supervising provider of the day LOTUS Gordon MD, who was available if needed.    Hallie Jaramillo RN  "

## 2025-04-21 ENCOUNTER — ALLIED HEALTH/NURSE VISIT (OUTPATIENT)
Dept: PSYCHIATRY | Facility: CLINIC | Age: 55
End: 2025-04-21
Payer: COMMERCIAL

## 2025-04-21 VITALS — DIASTOLIC BLOOD PRESSURE: 76 MMHG | HEART RATE: 80 BPM | SYSTOLIC BLOOD PRESSURE: 108 MMHG

## 2025-04-21 DIAGNOSIS — F33.2 SEVERE EPISODE OF RECURRENT MAJOR DEPRESSIVE DISORDER, WITHOUT PSYCHOTIC FEATURES (H): Primary | ICD-10-CM

## 2025-04-21 ASSESSMENT — PATIENT HEALTH QUESTIONNAIRE - PHQ9: SUM OF ALL RESPONSES TO PHQ QUESTIONS 1-9: 6

## 2025-04-21 NOTE — PROGRESS NOTES
"Aure Joy comes into clinic today at the request of ESTELA Zamudio MD Ordering Provider for Med Injection only Ketamine .    Procedure Prep:  Medication double check completed by: Hallie Jaramillo RN  Prior to administration pt identified by name and : Yes    Nursing Assessment:  Appearance: Casually clothed   Mood: \"Good\". Patient reported depression symptoms improving since finishing TMS.   Affect: Pleasant, Calm  Eye contact: Good      2025     4:08 PM   PHQ   PHQ-9 Total Score 9   Q9: Thoughts of better off dead/self-harm past 2 weeks Not at all   QIDS: 5. Assessment was scanned to EHR.     Procedure Performed:  VSS and mental status WNL. Patient was given ketamine. See MAR for details.     Post Procedure Assessment:  Patient tolerated the treatment with the following side effects: dissociation. Vital signs were monitored, see VS Flowsheet. Patient stated they felt ready to go home prior to discharge. AVS was offered to patient and patient declined. Patient was instructed not to drive for the remainder of the day and to notify clinic if any concerns arise.     Next appt: 2 weeks    Medications were supplied by Clinic     This service provided today was under the supervising provider of the day LOTUS Gordon MD, who was available if needed.        Angelina Bejarano RN  "

## 2025-05-05 ENCOUNTER — ALLIED HEALTH/NURSE VISIT (OUTPATIENT)
Dept: PSYCHIATRY | Facility: CLINIC | Age: 55
End: 2025-05-05
Payer: COMMERCIAL

## 2025-05-05 VITALS — DIASTOLIC BLOOD PRESSURE: 80 MMHG | HEART RATE: 76 BPM | SYSTOLIC BLOOD PRESSURE: 118 MMHG

## 2025-05-05 DIAGNOSIS — F33.2 SEVERE EPISODE OF RECURRENT MAJOR DEPRESSIVE DISORDER, WITHOUT PSYCHOTIC FEATURES (H): Primary | ICD-10-CM

## 2025-05-05 ASSESSMENT — PATIENT HEALTH QUESTIONNAIRE - PHQ9: SUM OF ALL RESPONSES TO PHQ QUESTIONS 1-9: 8

## 2025-05-05 NOTE — PROGRESS NOTES
Aure Joy comes into clinic today at the request of ESTELA Zamudio MD Ordering Provider for Med Injection only Ketamine .    Procedure Prep:  Medication double check completed by: Hallie Jaramillo RN  Prior to administration pt identified by name and : Yes    Nursing Assessment:  Appearance: Casually clothed   Mood: Okay, was happy to have weekend off work. Mood stable  Affect: Bright for patient  Eye contact: Good      2025     3:38 PM   PHQ   PHQ-9 Total Score 8   Q9: Thoughts of better off dead/self-harm past 2 weeks Not at all     PCL5 weekly assessment: score 7. Assessment was scanned to EHR.     Procedure Performed:  VSS and mental status WNL. Patient was given ketamine. See MAR for details.     Post Procedure Assessment:  Patient tolerated the treatment with the following side effects: dissociation. Vital signs were monitored, see VS Flowsheet. Patient stated they felt ready to go home prior to discharge. AVS was offered to patient and patient declined. Patient was instructed not to drive for the remainder of the day and to notify clinic if any concerns arise.     Next appt: weekly     Medications were supplied by Clinic     This service provided today was under the supervising provider of the day LOTUS Gordon MD, who was available if needed.        Joan Donnelly RN

## 2025-05-08 ENCOUNTER — OFFICE VISIT (OUTPATIENT)
Dept: PSYCHIATRY | Facility: CLINIC | Age: 55
End: 2025-05-08
Payer: COMMERCIAL

## 2025-05-08 VITALS
SYSTOLIC BLOOD PRESSURE: 120 MMHG | TEMPERATURE: 97.5 F | HEIGHT: 62 IN | HEART RATE: 60 BPM | WEIGHT: 135 LBS | BODY MASS INDEX: 24.84 KG/M2 | DIASTOLIC BLOOD PRESSURE: 82 MMHG

## 2025-05-08 DIAGNOSIS — G21.11 NEUROLEPTIC-INDUCED PARKINSONISM: ICD-10-CM

## 2025-05-08 DIAGNOSIS — G21.11 NEUROLEPTIC INDUCED PARKINSONISM: ICD-10-CM

## 2025-05-08 DIAGNOSIS — F33.2 SEVERE RECURRENT MAJOR DEPRESSION WITHOUT PSYCHOTIC FEATURES (H): ICD-10-CM

## 2025-05-08 DIAGNOSIS — D32.9 MENINGIOMA (H): ICD-10-CM

## 2025-05-08 DIAGNOSIS — G24.01 TARDIVE DYSKINESIA: ICD-10-CM

## 2025-05-08 DIAGNOSIS — E55.9 VITAMIN D DEFICIENCY: ICD-10-CM

## 2025-05-08 DIAGNOSIS — T43.505A NEUROLEPTIC-INDUCED PARKINSONISM: ICD-10-CM

## 2025-05-08 DIAGNOSIS — F43.10 COMPLEX POSTTRAUMATIC STRESS DISORDER: ICD-10-CM

## 2025-05-08 DIAGNOSIS — T43.505A NEUROLEPTIC INDUCED PARKINSONISM: ICD-10-CM

## 2025-05-08 DIAGNOSIS — F33.2 SEVERE EPISODE OF RECURRENT MAJOR DEPRESSIVE DISORDER, WITHOUT PSYCHOTIC FEATURES (H): Primary | ICD-10-CM

## 2025-05-08 ASSESSMENT — PATIENT HEALTH QUESTIONNAIRE - PHQ9: SUM OF ALL RESPONSES TO PHQ QUESTIONS 1-9: 7

## 2025-05-08 NOTE — PATIENT INSTRUCTIONS
"Today's Recommendations:  - No changes in medications and ketamine treatment during this visit.   - We recommend continuing with therapy and Behavioral Activation.     We are specifically a university and research clinic, and there are often research studies ongoing.     The \"Measurement Based Care\" research study is being conducted throughout the [facility] Department of Psychiatry and Behavioral Sciences. This provides some additional testing that might be diagnostically helpful or may help us to know what treatments are better suited to different mental health symptoms. This study will enable us how to better incorporate testing into clinical care.  More information about that is at: [email or web address]   If you are interested in the study you can email, [email or web address].     If you do not want to be contacted about research, please let us know. (Being called does not mean you have to be in a study.)     Our clinic is also conducting clinical trials of new brain stimulation treatments. Other studies are what we would call \"biomarker\" studies, where we ask you to participate in some kind of measurement while you are undergoing regular treatment. You may be called by one of our clinical research coordinators about these studies.       General Information:  Our Treatment-Resistant Disorders/Interventional Psychiatry clinic is what is often called a \"consultation\" or \"tertiary care\" clinic. That means we do not see patients long-term for medication management or talk therapy. We offer thorough evaluations, recommendations about medications/therapies you may have not yet tried, and in some cases, brain stimulation or office-based treatments. If you are likely to benefit from one of those advanced treatments, we will have talked about it today. Once that treatment is complete, we will see you once or twice afterwards to check in, and then you will return to " getting ongoing psychiatric care from whomever you were seeing before you came for your evaluation with us. If for some reason you no longer have access to that clinician, we can help with a referral to our main [facility]. The only patients we see long-term are patients with implanted medical devices that require ongoing monitoring and programming.     Our recommendations almost always include some kind of cognitive-behavioral therapy (CBT) if you are not getting it already. Brain and nerve stimulation treatments work best when combined with certain talk therapies. We make these recommendations because we strongly believe that, without the therapy piece, most people will not get better, or will have limited clinical benefit. It is often difficult or inconvenient to add therapy to an already busy schedule, but it is also necessary.    It is important to remember that, like all mental health treatments, our interventional therapies are not perfect. About one third of people will not feel better after treatment, and even when they work, they do not take away the symptoms entirely.If we have recommended something above, it means we think there is a better than even chance of it working, but things are never guaranteed. This is another reason we usually recommend CBT along with our advanced treatments -- it can address the symptoms that remain after the stimulation/ketamine treatment.       While we are waiting to implement the recommendations you and I have discussed, you should know what to do if your symptoms worsen. A variety of crisis numbers/resources are available. They include:    CRISIS GENERAL NUMBERS   7-866-WFWGRJH (1-999.744.5577)  OR  911     CRISIS INTERVENTION TEAM (CIT) - this is a POLICE UNIT, specially trained.  This website has information for all of [place]'s CITs. Lays out which areas have this team.  [email or web address]  However, one can just call 911 and  ask for this special team.   If police need to be called, DO ask for this team.    CRISIS MOBILE TEAMS  [and see end of this phrase*]  [facility] -COPE: 24hrs/7days:    [phone]  (Adults > 19yo)    [phone]  (Child   < 18yo)                                       FRONT DOOR (during the day could call)   [phone]    Psychiatric -[phone] (Adult)  [phone] (Child)     [facility] -[phone] (Adult and Child)     [facility] -[phone] ([employer] mobile crisis team; Adult and Child; 24hr)     [facility] -[phone] (Adult and Child)     CRISIS HOUSING  [facility]                           [address]              [phone]  [facility]                                [address]                       [phone]  [facility]                               [address]     [phone]   [facility]  [address]                [phone]    [place] EMERGENCY NUMBERS    Crisis Connection:                                [phone]  [facility]:     [phone]  Crisis Intervention:                                [phone] or [phone]   COPE: Mobile Team 24hrs/7days:    [phone]  (Adults > 19yo)                                                                            [phone]  (Child   < 18yo)  [facility]        [phone] 24/7 line and Mobile Team   [address] [place] [address]  DROP IN:  M-F: 8:00 am - 7:00 pm  Sat: 11:00 am - 3:00 pm   Sun: Closed     WALK-IN COUNSELING:  [facility]       [phone]    [address]:   M, W, F:  1:00-3:00PM    M-Th:  6:30-8:30PM    TRANS and LGBT  Call [employer] at [phone]. This service is staffed by trans people 24/7.   LGBT youth <24  "contemplating suicide, call the [facility] Lifeline [phone].    POISON CONTROL CENTER  [phone]    OR  go to nearest ER    CHILD  \"[facility] has a needs assessment team who will do an intake evaluation. Based on the results of the intake they will recommended inpatient admission, partial hospitalization, intensive outpatient or outpatient care. The number is [phone]. \"    CRISIS TEXT LINE  Http://www.crisistextline.org  FREE SUPPORT AT YOUR FINGERTIPS,   Crisis Text Line serves anyone, in any type of crisis, providing access to free,  support and information via the medium people already use and trust: text. Here s how it works:  1)  Text 339439 from anywhere in the [place], anytime, about any type of crisis.  2)  A live, trained Crisis Counselor receives the text and responds quickly.      The volunteer Crisis Counselor will help you move from a 'hot moment to a cool moment'    [facility] EMERGENCY NUMBERS    Crisis Connection:                                [phone]  [facility]:              [phone]  Crisis Intervention:                                [phone] or [phone]   COPE: Mobile Team 24hrs/7days:    [phone]  (Adults > 17yo)                                                                            [phone]  (Child   < 16yo)  [facility]        [phone]  CALL 24 hours a day.  [address] [place] [address]  DROP IN:  M-F: 8:00 am - 7:00 pm  Sat: 11:00 am - 3:00 pm   Sun: Closed    WALK-IN COUNSELIN)  [facility]                                  [phone]        [address]:                  M, W:      5:00-7:00PM       2)  [facility]                            [phone]        [place], [address]                T, Th:      6:00-8:00PM    TRANS and LGBT  Call [employer] at [phone]. This service is staffed by trans people .   LGBT youth " "<24 contemplating suicide, call the [employer] Lifeline 2-674-5680.    POISON CONTROL CENTER  [phone]    OR  go to nearest ER    CHILD  \"[facility] has a needs assessment team who will do an intake evaluation. Based on the results of the intake they will recommended inpatient admission, partial hospitalization, intensive outpatient or outpatient care. The number is [phone] or [phone]. \"    CRISIS TEXT LINE  Http://www.crisistextline.org  FREE SUPPORT AT YOUR FINGERTIPS, 24/7  Crisis Text Line serves anyone, in any type of crisis, providing access to free, 24/7 support and information via the medium people already use and trust: text. Here s how it works:  1)  Text 815-133 from anywhere in the [place], anytime, about any type of crisis.  2)  A live, trained Crisis Counselor receives the text and responds quickly.      The volunteer Crisis Counselor will help you move from a 'hot moment to a cool moment'      * A Community Paramedic (CP) is an advanced paramedic that works to increase access to primary and preventive care and decrease use of emergency departments, which in turn decreases health care costs. Among other things, CPs may play a key role in providing follow-up services after a hospital discharge to prevent hospital readmission. CPs can provide health assessments, chronic disease monitoring and education, medication management, immunizations and vaccinations, laboratory specimen collection, hospital discharge follow-up care and minor medical procedures. CPs work under the direction of an Ambulance Medical Director.    "
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
## 2025-05-19 ENCOUNTER — ALLIED HEALTH/NURSE VISIT (OUTPATIENT)
Dept: PSYCHIATRY | Facility: CLINIC | Age: 55
End: 2025-05-19
Payer: COMMERCIAL

## 2025-05-19 VITALS — SYSTOLIC BLOOD PRESSURE: 114 MMHG | HEART RATE: 68 BPM | DIASTOLIC BLOOD PRESSURE: 84 MMHG

## 2025-05-19 DIAGNOSIS — F33.2 SEVERE RECURRENT MAJOR DEPRESSION WITHOUT PSYCHOTIC FEATURES (H): Primary | ICD-10-CM

## 2025-05-19 ASSESSMENT — PATIENT HEALTH QUESTIONNAIRE - PHQ9: SUM OF ALL RESPONSES TO PHQ QUESTIONS 1-9: 8

## 2025-05-19 NOTE — PROGRESS NOTES
Aure Joy comes into clinic today at the request of ESTELA Zamudio MD Ordering Provider for Med Injection only Ketamine.    Procedure Prep:  Medication double check completed by: Angelina Bejarano RN  Prior to administration pt identified by name and : Yes    Nursing Assessment:  Appearance: Casually clothed  Mood: Okay. Stable.   Affect: Bright for patient  Eye contact: Good      2025     3:56 PM   PHQ   PHQ-9 Total Score 8   Q9: Thoughts of better off dead/self-harm past 2 weeks Not at all   QIDS weekly assessment: score 11. Assessment was scanned to EHR.     Procedure Performed:  VSS and mental status WNL. Patient was given ketamine. See MAR for details.       Post Procedure Assessment:  Patient tolerated the treatment with the following side effects: dissociation. Vital signs were monitored, see VS Flowsheet. Patient stated they felt ready to go home prior to discharge. AVS was offered to patient and patient declined. Patient was instructed not to drive for the remainder of the day and to notify clinic if any concerns arise.     Next appt: weekly, 6/2    Medications were supplied by Clinic     This service provided today was under the supervising provider of the day LOTUS Gordon MD, who was available if needed.    Hallie Jaramillo RN

## 2025-06-02 ENCOUNTER — ALLIED HEALTH/NURSE VISIT (OUTPATIENT)
Dept: PSYCHIATRY | Facility: CLINIC | Age: 55
End: 2025-06-02
Payer: COMMERCIAL

## 2025-06-02 VITALS — SYSTOLIC BLOOD PRESSURE: 110 MMHG | DIASTOLIC BLOOD PRESSURE: 70 MMHG | HEART RATE: 62 BPM

## 2025-06-02 DIAGNOSIS — F33.2 SEVERE RECURRENT MAJOR DEPRESSION WITHOUT PSYCHOTIC FEATURES (H): Primary | ICD-10-CM

## 2025-06-02 ASSESSMENT — PATIENT HEALTH QUESTIONNAIRE - PHQ9: SUM OF ALL RESPONSES TO PHQ QUESTIONS 1-9: 7

## 2025-06-02 NOTE — PROGRESS NOTES
"Aure Joy comes into clinic today at the request of ESTELA Zamudio MD Ordering Provider for Med Injection only Ketamine.    Procedure Prep:  Medication double check completed by: Joan Donnelly RN  Prior to administration pt identified by name and : Yes    Nursing Assessment:  Appearance: Casually clothed  Mood: \"Good\" improved since last visit.  Affect: Bright for patient  Eye contact: Good      2025     3:46 PM   PHQ   PHQ-9 Total Score 7   Q9: Thoughts of better off dead/self-harm past 2 weeks Not at all   QIDS weekly assessment: score 8. Assessment was scanned to EHR.     Procedure Performed:  VSS and mental status WNL. Patient was given ketamine. See MAR for details.     Post Procedure Assessment:  Patient tolerated the treatment with the following side effects: dissociation. Vital signs were monitored, see VS Flowsheet. Patient stated they felt ready to go home prior to discharge. AVS was offered to patient and patient declined. Patient was instructed not to drive for the remainder of the day and to notify clinic if any concerns arise.     Next appt: 2 weeks    Medications were supplied by Clinic     This service provided today was under the supervising provider of the day LOTUS Gordon MD, who was available if needed.        Angelina Bejarano RN  "

## 2025-06-16 ENCOUNTER — ALLIED HEALTH/NURSE VISIT (OUTPATIENT)
Dept: PSYCHIATRY | Facility: CLINIC | Age: 55
End: 2025-06-16
Payer: COMMERCIAL

## 2025-06-16 VITALS — SYSTOLIC BLOOD PRESSURE: 109 MMHG | DIASTOLIC BLOOD PRESSURE: 71 MMHG | HEART RATE: 68 BPM

## 2025-06-16 DIAGNOSIS — F33.2 SEVERE RECURRENT MAJOR DEPRESSION WITHOUT PSYCHOTIC FEATURES (H): Primary | ICD-10-CM

## 2025-06-16 ASSESSMENT — PATIENT HEALTH QUESTIONNAIRE - PHQ9: SUM OF ALL RESPONSES TO PHQ QUESTIONS 1-9: 6

## 2025-06-16 NOTE — PROGRESS NOTES
Aure Joy comes into clinic today at the request of ESTELA Zamudio MD Ordering Provider for Med Injection only Ketamine.    Procedure Prep:  Medication double check completed by: Angelina Bejarano RN  Prior to administration pt identified by name and : Yes    Nursing Assessment:  Appearance: Casually clothed   Mood: Doing well, mood stable  Affect: Bright for patient  Eye contact: Good      2025     3:39 PM   PHQ   PHQ-9 Total Score 6   Q9: Thoughts of better off dead/self-harm past 2 weeks Not at all     PCL5 weekly assessment: score 6. Assessment was scanned to EHR.     Procedure Performed:  VSS and mental status WNL. Patient was given ketamine. See MAR for details.     Post Procedure Assessment:  Patient tolerated the treatment with the following side effects: dissociation. Vital signs were monitored, see VS Flowsheet. Patient stated they felt ready to go home prior to discharge. AVS was offered to patient and patient declined. Patient was instructed not to drive for the remainder of the day and to notify clinic if any concerns arise.     Next appt: weekly     Medications were supplied by Clinic     This service provided today was under the supervising provider of the day LOTUS Gordon MD, who was available if needed.        Joan Donnelly RN

## 2025-06-30 ENCOUNTER — ALLIED HEALTH/NURSE VISIT (OUTPATIENT)
Dept: PSYCHIATRY | Facility: CLINIC | Age: 55
End: 2025-06-30
Payer: COMMERCIAL

## 2025-06-30 VITALS — HEART RATE: 85 BPM | SYSTOLIC BLOOD PRESSURE: 115 MMHG | DIASTOLIC BLOOD PRESSURE: 67 MMHG

## 2025-06-30 DIAGNOSIS — F33.2 SEVERE RECURRENT MAJOR DEPRESSION WITHOUT PSYCHOTIC FEATURES (H): Primary | ICD-10-CM

## 2025-06-30 ASSESSMENT — PATIENT HEALTH QUESTIONNAIRE - PHQ9: SUM OF ALL RESPONSES TO PHQ QUESTIONS 1-9: 6

## 2025-06-30 NOTE — PROGRESS NOTES
Aure Joy comes into clinic today at the request of ESTELA Zamudio MD Ordering Provider for Med Injection only Ketamine.    Procedure Prep:  Medication double check completed by: Joan Donnelly RN  Prior to administration pt identified by name and : Yes    Nursing Assessment:  Appearance: Casually clothed   Mood: Doing well, mood stable  Affect: Bright for patient  Eye contact: Good      2025     4:20 PM   PHQ   PHQ-9 Total Score 6   Q9: Thoughts of better off dead/self-harm past 2 weeks Several days     PCL5 weekly assessment: score 6. Assessment was scanned to EHR.     Procedure Performed:  VSS and mental status WNL. Patient was given ketamine. See MAR for details.     Post Procedure Assessment:  Patient tolerated the treatment with the following side effects: dissociation. Vital signs were monitored, see VS Flowsheet. Patient stated they felt ready to go home prior to discharge. AVS was offered to patient and patient declined. Patient was instructed not to drive for the remainder of the day and to notify clinic if any concerns arise.     Next appt: 2 weeks    Medications were supplied by Clinic     This service provided today was under the supervising provider of the day ESTELA Zamudio MD, who was available if needed.        Angelina Bejarano RN  
no

## 2025-07-14 ENCOUNTER — ALLIED HEALTH/NURSE VISIT (OUTPATIENT)
Dept: PSYCHIATRY | Facility: CLINIC | Age: 55
End: 2025-07-14
Payer: COMMERCIAL

## 2025-07-14 VITALS — SYSTOLIC BLOOD PRESSURE: 122 MMHG | DIASTOLIC BLOOD PRESSURE: 82 MMHG | HEART RATE: 70 BPM

## 2025-07-14 DIAGNOSIS — R11.0 NAUSEA: ICD-10-CM

## 2025-07-14 DIAGNOSIS — F33.2 SEVERE RECURRENT MAJOR DEPRESSION WITHOUT PSYCHOTIC FEATURES (H): Primary | ICD-10-CM

## 2025-07-14 RX ORDER — ONDANSETRON 4 MG/1
4 TABLET, ORALLY DISINTEGRATING ORAL ONCE
Status: COMPLETED | OUTPATIENT
Start: 2025-07-14 | End: 2025-07-14

## 2025-07-14 RX ORDER — ONDANSETRON 4 MG/1
4 TABLET, ORALLY DISINTEGRATING ORAL PRN
Qty: 12 TABLET | Refills: 5 | Status: SHIPPED | OUTPATIENT
Start: 2025-07-14

## 2025-07-14 RX ADMIN — ONDANSETRON 4 MG: 4 TABLET, ORALLY DISINTEGRATING ORAL at 15:54

## 2025-07-14 ASSESSMENT — PATIENT HEALTH QUESTIONNAIRE - PHQ9: SUM OF ALL RESPONSES TO PHQ QUESTIONS 1-9: 5

## 2025-07-14 NOTE — PROGRESS NOTES
Age-related nuclear cataract of both eyes  Discussed advanced cataracts in both eyes with patient. Patient is happy with their vision and is not interested in cataract surgery at this time. New glasses Rx given. Not recommended to change.         Vitreo Aure Joy comes into clinic today at the request of ESTELA Zamudio MD Ordering Provider for Med Injection only Ketamine.    Procedure Prep:  Medication double check completed by: Angelina Bejarano RN  Prior to administration pt identified by name and : Yes    Nursing Assessment:  Appearance: Casually clothed   Mood: Good, per patient!  Affect: Bright for patient  Eye contact: Good      2025     3:48 PM   PHQ   PHQ-9 Total Score 5   Q9: Thoughts of better off dead/self-harm past 2 weeks Not at all     PCL5 weekly assessment: score 7. Assessment was scanned to EHR.     Procedure Performed:  VSS and mental status WNL. Patient was given ketamine. See MAR for details.     Post Procedure Assessment:  Patient tolerated the treatment with the following side effects: dissociation. Vital signs were monitored, see VS Flowsheet. Patient stated they felt ready to go home prior to discharge. AVS was offered to patient and patient declined. Patient was instructed not to drive for the remainder of the day and to notify clinic if any concerns arise.     Next appt: weekly     Medications were supplied by Clinic     This service provided today was under the supervising provider of the day GERARDO Eaton MD, who was available if needed.        Joan Donnelly RN

## 2025-07-28 ENCOUNTER — ALLIED HEALTH/NURSE VISIT (OUTPATIENT)
Dept: PSYCHIATRY | Facility: CLINIC | Age: 55
End: 2025-07-28
Payer: COMMERCIAL

## 2025-07-28 VITALS — DIASTOLIC BLOOD PRESSURE: 73 MMHG | HEART RATE: 80 BPM | SYSTOLIC BLOOD PRESSURE: 108 MMHG

## 2025-07-28 DIAGNOSIS — F33.2 SEVERE RECURRENT MAJOR DEPRESSION WITHOUT PSYCHOTIC FEATURES (H): Primary | ICD-10-CM

## 2025-07-28 NOTE — PROGRESS NOTES
Aure Joy comes into clinic today at the request of ESTELA Zamudio MD Ordering Provider for Med Injection only Ketamine.    Procedure Prep:  Medication double check completed by: Angelina Bejarano RN  Prior to administration pt identified by name and : Yes    Nursing Assessment:  Appearance: Casually clothed   Mood: Good, per patient!  Affect: Bright for patient  Eye contact: Good      2025     3:48 PM   PHQ   PHQ-9 Total Score 5   Q9: Thoughts of better off dead/self-harm past 2 weeks Not at all     PCL5 weekly assessment: score 8. Assessment was scanned to EHR.     Procedure Performed:  VSS and mental status WNL. Patient was given ketamine. See MAR for details.     Post Procedure Assessment:  Patient tolerated the treatment with the following side effects: dissociation. Vital signs were monitored, see VS Flowsheet. Patient stated they felt ready to go home prior to discharge. AVS was offered to patient and patient declined. Patient was instructed not to drive for the remainder of the day and to notify clinic if any concerns arise.     Next appt: weekly     Medications were supplied by Clinic     This service provided today was under the supervising provider of the day GERARDO Eaton MD, who was available if needed.        Joan Donnelly RN

## 2025-08-11 ENCOUNTER — ALLIED HEALTH/NURSE VISIT (OUTPATIENT)
Dept: PSYCHIATRY | Facility: CLINIC | Age: 55
End: 2025-08-11
Payer: COMMERCIAL

## 2025-08-11 VITALS — SYSTOLIC BLOOD PRESSURE: 111 MMHG | HEART RATE: 65 BPM | DIASTOLIC BLOOD PRESSURE: 76 MMHG

## 2025-08-11 DIAGNOSIS — F33.2 SEVERE RECURRENT MAJOR DEPRESSION WITHOUT PSYCHOTIC FEATURES (H): Primary | ICD-10-CM

## 2025-08-11 ASSESSMENT — PATIENT HEALTH QUESTIONNAIRE - PHQ9: SUM OF ALL RESPONSES TO PHQ QUESTIONS 1-9: 6

## 2025-08-14 ENCOUNTER — OFFICE VISIT (OUTPATIENT)
Dept: PSYCHIATRY | Facility: CLINIC | Age: 55
End: 2025-08-14
Payer: COMMERCIAL

## 2025-08-14 VITALS
TEMPERATURE: 97.7 F | SYSTOLIC BLOOD PRESSURE: 134 MMHG | HEART RATE: 79 BPM | OXYGEN SATURATION: 97 % | DIASTOLIC BLOOD PRESSURE: 86 MMHG

## 2025-08-14 DIAGNOSIS — F40.00 AGORAPHOBIA: ICD-10-CM

## 2025-08-14 DIAGNOSIS — F33.2 SEVERE RECURRENT MAJOR DEPRESSION WITHOUT PSYCHOTIC FEATURES (H): Primary | ICD-10-CM

## 2025-08-14 DIAGNOSIS — F43.10 COMPLEX POSTTRAUMATIC STRESS DISORDER: ICD-10-CM

## 2025-08-14 DIAGNOSIS — T43.505A NEUROLEPTIC INDUCED PARKINSONISM: ICD-10-CM

## 2025-08-14 DIAGNOSIS — D32.9 MENINGIOMA (H): ICD-10-CM

## 2025-08-14 DIAGNOSIS — G21.11 NEUROLEPTIC INDUCED PARKINSONISM: ICD-10-CM

## 2025-08-14 DIAGNOSIS — E55.9 VITAMIN D DEFICIENCY: ICD-10-CM

## 2025-08-20 PROBLEM — T43.505A NEUROLEPTIC INDUCED PARKINSONISM: Status: ACTIVE | Noted: 2025-08-20

## 2025-08-20 PROBLEM — F40.00 AGORAPHOBIA: Status: ACTIVE | Noted: 2025-08-20

## 2025-08-20 PROBLEM — G21.11 NEUROLEPTIC INDUCED PARKINSONISM: Status: ACTIVE | Noted: 2025-08-20

## 2025-08-20 PROBLEM — D32.9 MENINGIOMA (H): Status: ACTIVE | Noted: 2025-08-20

## 2025-08-25 ENCOUNTER — ALLIED HEALTH/NURSE VISIT (OUTPATIENT)
Dept: PSYCHIATRY | Facility: CLINIC | Age: 55
End: 2025-08-25
Payer: COMMERCIAL

## 2025-08-25 VITALS — HEART RATE: 77 BPM | DIASTOLIC BLOOD PRESSURE: 88 MMHG | SYSTOLIC BLOOD PRESSURE: 138 MMHG

## 2025-08-25 DIAGNOSIS — F33.2 SEVERE RECURRENT MAJOR DEPRESSION WITHOUT PSYCHOTIC FEATURES (H): Primary | ICD-10-CM

## 2025-08-25 RX ORDER — ONDANSETRON 4 MG/1
4 TABLET, ORALLY DISINTEGRATING ORAL ONCE
Status: COMPLETED | OUTPATIENT
Start: 2025-08-25 | End: 2025-08-25

## 2025-08-25 RX ADMIN — ONDANSETRON 4 MG: 4 TABLET, ORALLY DISINTEGRATING ORAL at 16:03

## 2025-08-25 ASSESSMENT — PATIENT HEALTH QUESTIONNAIRE - PHQ9: SUM OF ALL RESPONSES TO PHQ QUESTIONS 1-9: 7
